# Patient Record
Sex: MALE | Race: OTHER | NOT HISPANIC OR LATINO | URBAN - METROPOLITAN AREA
[De-identification: names, ages, dates, MRNs, and addresses within clinical notes are randomized per-mention and may not be internally consistent; named-entity substitution may affect disease eponyms.]

---

## 2017-07-18 PROBLEM — D04.39 CARCINOMA IN SITU OF SKIN OF OTHER PARTS OF FACE: Status: ACTIVE | Noted: 2017-07-18

## 2017-10-12 ENCOUNTER — APPOINTMENT (RX ONLY)
Dept: URBAN - METROPOLITAN AREA CLINIC 36 | Facility: CLINIC | Age: 72
Setting detail: DERMATOLOGY
End: 2017-10-12

## 2017-10-12 DIAGNOSIS — L57.8 OTHER SKIN CHANGES DUE TO CHRONIC EXPOSURE TO NONIONIZING RADIATION: ICD-10-CM

## 2017-10-12 PROBLEM — C44.329 SQUAMOUS CELL CARCINOMA OF SKIN OF OTHER PARTS OF FACE: Status: ACTIVE | Noted: 2017-10-12

## 2017-10-12 PROCEDURE — 17311 MOHS 1 STAGE H/N/HF/G: CPT

## 2017-10-12 PROCEDURE — 17312 MOHS ADDL STAGE: CPT

## 2017-10-12 PROCEDURE — ? COUNSELING

## 2017-10-12 PROCEDURE — 14041 TIS TRNFR F/C/C/M/N/A/G/H/F: CPT

## 2017-10-12 PROCEDURE — ? MOHS SURGERY

## 2017-10-12 PROCEDURE — 99213 OFFICE O/P EST LOW 20 MIN: CPT | Mod: 25

## 2017-10-12 NOTE — PROCEDURE: MOHS SURGERY
Complex Repair And Graft Additional Text (Will Appearing After The Standard Complex Repair Text): The complex repair was not sufficient to completely close the primary defect. The remaining additional defect was repaired with the graft mentioned below.
Stage 10: Additional Anesthesia Volume In Cc: 0
Inflammation Suggestive Of Cancer Camouflage Histology Text: There was a dense lymphocytic infiltrate which prevented adequate histologic evaluation of adjacent structures.
Number Of Stages: 4
Surgical Defect Width In Cm (Optional): 1.6
Show Asc Variables: Yes
Melolabial Transposition Flap Text: The defect edges were debeveled with a #15 scalpel blade.  Given the location of the defect and the proximity to free margins a melolabial flap was deemed most appropriate.  Using a sterile surgical marker, an appropriate melolabial transposition flap was drawn incorporating the defect.    The area thus outlined was incised deep to adipose tissue with a #15 scalpel blade.  The skin margins were undermined to an appropriate distance in all directions utilizing iris scissors.
Consent (Ear)/Introductory Paragraph: The rationale for Mohs was explained to the patient and consent was obtained. The risks, benefits and alternatives to therapy were discussed in detail. Specifically, the risks of ear deformity, infection, scarring, bleeding, prolonged wound healing, incomplete removal, allergy to anesthesia, nerve injury and recurrence were addressed. Prior to the procedure, the treatment site was clearly identified and confirmed by the patient. All components of Universal Protocol/PAUSE Rule completed.
Surgical Defect Length In Cm (Optional): 2.2
Secondary Intention Text (Leave Blank If You Do Not Want): The defect will heal with secondary intention.
Dressing: dry sterile dressing
Dorsal Nasal Flap Text: The defect edges were debeveled with a #15 scalpel blade.  Given the location of the defect and the proximity to free margins a dorsal nasal flap was deemed most appropriate.  Using a sterile surgical marker, an appropriate dorsal nasal flap was drawn around the defect.    The area thus outlined was incised deep to adipose tissue with a #15 scalpel blade.  The skin margins were undermined to an appropriate distance in all directions utilizing iris scissors.
Complex Repair Preamble Text (Leave Blank If You Do Not Want): Extensive wide undermining was performed.
Crescentic Advancement Flap Text: The defect edges were debeveled with a #15 scalpel blade.  Given the location of the defect and the proximity to free margins a crescentic advancement flap was deemed most appropriate.  Using a sterile surgical marker, the appropriate advancement flap was drawn incorporating the defect and placing the expected incisions within the relaxed skin tension lines where possible.    The area thus outlined was incised deep to adipose tissue with a #15 scalpel blade.  The skin margins were undermined to an appropriate distance in all directions utilizing iris scissors.
Area L Indication Text: Tumors in this location are included in Area L (trunk and extremities).  Mohs surgery is indicated for larger tumors, or tumors with aggressive histologic features, in these anatomic locations.
Eye Shield Used: No
Crescentic Intermediate Repair Preamble Text (Leave Blank If You Do Not Want): Undermining was performed with blunt dissection.
Medical Necessity Statement: Based on my medical judgement, Mohs surgery is the most appropriate treatment for this cancer compared to other treatments.
Repair Hemostasis (Optional): Electrocautery (pulsed)
Epidermal Autograft Text: The defect edges were debeveled with a #15 scalpel blade.  Given the location of the defect, shape of the defect and the proximity to free margins an epidermal autograft was deemed most appropriate.  Using a sterile surgical marker, the primary defect shape was transferred to the donor site. The epidermal graft was then harvested.  The skin graft was then placed in the primary defect and oriented appropriately.
Graft Donor Site Bandage (Optional-Leave Blank If You Don't Want In Note): Steri-strips and a pressure bandage were applied to the donor site.
Stage 5: Additional Anesthesia Type: 1% lidocaine with epinephrine
Surgical Defect Length In Cm (Optional): 2.6
Consent Type: Consent 1 (Standard)
Advancement Flap (Single) Text: The defect edges were debeveled with a #15 scalpel blade.  Given the location of the defect and the proximity to free margins a single advancement flap was deemed most appropriate.  Using a sterile surgical marker, an appropriate advancement flap was drawn incorporating the defect and placing the expected incisions within the relaxed skin tension lines where possible.    The area thus outlined was incised deep to adipose tissue with a #15 scalpel blade.  The skin margins were undermined to an appropriate distance in all directions utilizing iris scissors.
V-Y Plasty Text: The defect edges were debeveled with a #15 scalpel blade.  Given the location of the defect, shape of the defect and the proximity to free margins an V-Y advancement flap was deemed most appropriate.  Using a sterile surgical marker, an appropriate advancement flap was drawn incorporating the defect and placing the expected incisions within the relaxed skin tension lines where possible.    The area thus outlined was incised deep to adipose tissue with a #15 scalpel blade.  The skin margins were undermined to an appropriate distance in all directions utilizing iris scissors.
Anesthesia Volume In Cc: 6
Referred To Otolaryngology For Closure Text (Leave Blank If You Do Not Want): After obtaining clear surgical margins the patient was sent to otolaryngology for surgical repair.  The patient understands they will receive post-surgical care and follow-up from the referring physician's office.
Mucosal Advancement Flap Text: Given the location of the defect, shape of the defect and the proximity to free margins a mucosal advancement flap was deemed most appropriate. Incisions were made with a 15 blade scalpel in the appropriate fashion along the cutaneous vermilion border and the mucosal lip. The remaining actinically damaged mucosal tissue was excised.  The mucosal advancement flap was then elevated to the gingival sulcus with care taken to preserve the neurovascular structures and advanced into the primary defect. Care was taken to ensure that precise realignment of the vermilion border was achieved.
Surgical Defect Width In Cm (Optional): 2.8
Bcc Infiltrative Histology Text: There were numerous aggregates of basaloid cells demonstrating an infiltrative pattern.
Partial Purse String (Simple) Text: Given the location of the defect and the characteristics of the surrounding skin a simple purse string closure was deemed most appropriate.  Undermining was performed circumfirentially around the surgical defect.  A purse string suture was then placed and tightened. Wound tension only allowed a partial closure of the circular defect.
Full Thickness Lip Wedge Repair (Flap) Text: Given the location of the defect and the proximity to free margins a full thickness wedge repair was deemed most appropriate.  Using a sterile surgical marker, the appropriate repair was drawn incorporating the defect and placing the expected incisions perpendicular to the vermilion border.  The vermilion border was also meticulously outlined to ensure appropriate reapproximation during the repair.  The area thus outlined was incised through and through with a #15 scalpel blade.  The muscularis and dermis were reaproximated with deep sutures following hemostasis. Care was taken to realign the vermilion border before proceeding with the superficial closure.  Once the vermilion was realigned the superfical and mucosal closure was finished.
Mastoid Interpolation Flap Text: A decision was made to reconstruct the defect utilizing an interpolation axial flap and a staged reconstruction.  A telfa template was made of the defect.  This telfa template was then used to outline the mastoid interpolation flap.  The donor area for the pedicle flap was then injected with anesthesia.  The flap was excised through the skin and subcutaneous tissue down to the layer of the underlying musculature.  The pedicle flap was carefully excised within this deep plane to maintain its blood supply.  The edges of the donor site were undermined.   The donor site was closed in a primary fashion.  The pedicle was then rotated into position and sutured.  Once the tube was sutured into place, adequate blood supply was confirmed with blanching and refill.  The pedicle was then wrapped with xeroform gauze and dressed appropriately with a telfa and gauze bandage to ensure continued blood supply and protect the attached pedicle.
Location Indication Override (Is Already Calculated Based On Selected Body Location): Area H
Bcc Histology Text: There were numerous aggregates of basaloid cells.
Same Histology In Subsequent Stages Text: The pattern and morphology of the tumor is as described in the first stage.
Mauc Instructions: By selecting yes to the question below the MAUC number will be added into the note.  This will be calculated automatically based on the diagnosis chosen, the size entered, the body zone selected (H,M,L) and the specific indications you chose. You will also have the option to override the Mohs AUC if you disagree with the automatically calculated number and this option is found in the Case Summary tab.
Island Pedicle Flap With Canthal Suspension Text: The defect edges were debeveled with a #15 scalpel blade.  Given the location of the defect, shape of the defect and the proximity to free margins an island pedicle advancement flap was deemed most appropriate.  Using a sterile surgical marker, an appropriate advancement flap was drawn incorporating the defect, outlining the appropriate donor tissue and placing the expected incisions within the relaxed skin tension lines where possible. The area thus outlined was incised deep to adipose tissue with a #15 scalpel blade.  The skin margins were undermined to an appropriate distance in all directions around the primary defect and laterally outward around the island pedicle utilizing iris scissors.  There was minimal undermining beneath the pedicle flap. A suspension suture was placed in the canthal tendon to prevent tension and prevent ectropion.
Stage 2: Number Of Blocks?: 1
Bi-Rhombic Flap Text: The defect edges were debeveled with a #15 scalpel blade.  Given the location of the defect and the proximity to free margins a bi-rhombic flap was deemed most appropriate.  Using a sterile surgical marker, an appropriate rhombic flap was drawn incorporating the defect. The area thus outlined was incised deep to adipose tissue with a #15 scalpel blade.  The skin margins were undermined to an appropriate distance in all directions utilizing iris scissors.
Consent 2/Introductory Paragraph: Mohs surgery was explained to the patient and consent was obtained. The risks, benefits and alternatives to therapy were discussed in detail. Specifically, the risks of infection, scarring, bleeding, prolonged wound healing, incomplete removal, allergy to anesthesia, nerve injury and recurrence were addressed. Prior to the procedure, the treatment site was clearly identified and confirmed by the patient. All components of Universal Protocol/PAUSE Rule completed.
Anesthesia Type: 1% lidocaine with epinephrine and a 1:10 solution of 8.4% sodium bicarbonate
Anesthesia Volume In Cc: 3.5
Tarsorrhaphy Text: A tarsorrhaphy was performed using Frost sutures.
Advancement-Rotation Flap Text: The defect edges were debeveled with a #15 scalpel blade.  Given the location of the defect, shape of the defect and the proximity to free margins an advancement-rotation flap was deemed most appropriate.  Using a sterile surgical marker, an appropriate flap was drawn incorporating the defect and placing the expected incisions within the relaxed skin tension lines where possible. The area thus outlined was incised deep to adipose tissue with a #15 scalpel blade.  The skin margins were undermined to an appropriate distance in all directions utilizing iris scissors.
Epidermal Sutures: 5-0 Surgipro
Purse String (Simple) Text: Given the location of the defect and the characteristics of the surrounding skin a purse string closure was deemed most appropriate.  Undermining was performed circumfirentially around the surgical defect.  A purse string suture was then placed and tightened.
Initial Size Of Lesion: 1.5
Repair Anesthesia Method: local infiltration
Advancement Flap (Double) Text: The defect edges were debeveled with a #15 scalpel blade.  Given the location of the defect and the proximity to free margins a double advancement flap was deemed most appropriate.  Using a sterile surgical marker, the appropriate advancement flaps were drawn incorporating the defect and placing the expected incisions within the relaxed skin tension lines where possible.    The area thus outlined was incised deep to adipose tissue with a #15 scalpel blade.  The skin margins were undermined to an appropriate distance in all directions utilizing iris scissors.
Referred To Mid-Level For Closure Text (Leave Blank If You Do Not Want): After obtaining clear surgical margins the patient was sent to a mid-level provider for surgical repair.  The patient understands they will receive post-surgical care and follow-up from the mid-level provider.
Mohs Method Verbiage: An incision at a 45 degree angle following the standard Mohs approach was done and the specimen was harvested as a microscopic controlled layer.
Additional Epidermal Closure (Optional): three point
Primary Defect Length In Cm (Final Defect Size - Required For Flaps/Grafts): 3.1
Paramedian Forehead Flap Text: A decision was made to reconstruct the defect utilizing an interpolation axial flap and a staged reconstruction.  A telfa template was made of the defect.  This telfa template was then used to outline the paramedian forehead pedicle flap.  The donor area for the pedicle flap was then injected with anesthesia.  The flap was excised through the skin and subcutaneous tissue down to the layer of the underlying musculature.  The pedicle flap was carefully excised within this deep plane to maintain its blood supply.  The edges of the donor site were undermined.   The donor site was closed in a primary fashion.  The pedicle was then rotated into position and sutured.  Once the tube was sutured into place, adequate blood supply was confirmed with blanching and refill.  The pedicle was then wrapped with xeroform gauze and dressed appropriately with a telfa and gauze bandage to ensure continued blood supply and protect the attached pedicle.
Star Wedge Flap Text: The defect edges were debeveled with a #15 scalpel blade.  Given the location of the defect, shape of the defect and the proximity to free margins a star wedge flap was deemed most appropriate.  Using a sterile surgical marker, an appropriate rotation flap was drawn incorporating the defect and placing the expected incisions within the relaxed skin tension lines where possible. The area thus outlined was incised deep to adipose tissue with a #15 scalpel blade.  The skin margins were undermined to an appropriate distance in all directions utilizing iris scissors.
Detail Level: Detailed
O-T Advancement Flap Text: The defect edges were debeveled with a #15 scalpel blade.  Given the location of the defect, shape of the defect and the proximity to free margins an O-T advancement flap was deemed most appropriate.  Using a sterile surgical marker, an appropriate advancement flap was drawn incorporating the defect and placing the expected incisions within the relaxed skin tension lines where possible.    The area thus outlined was incised deep to adipose tissue with a #15 scalpel blade.  The skin margins were undermined to an appropriate distance in all directions utilizing iris scissors.
Bilobed Transposition Flap Text: The defect edges were debeveled with a #15 scalpel blade.  Given the location of the defect and the proximity to free margins a bilobed transposition flap was deemed most appropriate.  Using a sterile surgical marker, an appropriate bilobe flap drawn around the defect.    The area thus outlined was incised deep to adipose tissue with a #15 scalpel blade.  The skin margins were undermined to an appropriate distance in all directions utilizing iris scissors.
Wound Care: Bacitracin
Consent (Temporal Branch)/Introductory Paragraph: The rationale for Mohs was explained to the patient and consent was obtained. The risks, benefits and alternatives to therapy were discussed in detail. Specifically, the risks of damage to the temporal branch of the facial nerve, infection, scarring, bleeding, prolonged wound healing, incomplete removal, allergy to anesthesia, and recurrence were addressed. Prior to the procedure, the treatment site was clearly identified and confirmed by the patient. All components of Universal Protocol/PAUSE Rule completed.
Referred To Plastics For Closure Text (Leave Blank If You Do Not Want): After obtaining clear surgical margins the patient was sent to plastics for surgical repair.  The patient understands they will receive post-surgical care and follow-up from the referring physician's office.
Ear Wedge Repair Text: A wedge excision was completed by carrying down an excision through the full thickness of the ear and cartilage with an inward facing Burow's triangle. The wound was then closed in a layered fashion.
O-Z Plasty Text: The defect edges were debeveled with a #15 scalpel blade.  Given the location of the defect, shape of the defect and the proximity to free margins an O-Z plasty (double transposition flap) was deemed most appropriate.  Using a sterile surgical marker, the appropriate transposition flaps were drawn incorporating the defect and placing the expected incisions within the relaxed skin tension lines where possible.    The area thus outlined was incised deep to adipose tissue with a #15 scalpel blade.  The skin margins were undermined to an appropriate distance in all directions utilizing iris scissors.  Hemostasis was achieved with electrocautery.  The flaps were then transposed into place, one clockwise and the other counterclockwise, and anchored with interrupted buried subcutaneous sutures.
Unna Boot Text: An Unna boot was placed to help immobilize the limb and facilitate more rapid healing.
Closure 3 Information: This tab is for additional flaps and grafts above and beyond our usual structured repairs.  Please note if you enter information here it will not currently bill and you will need to add the billing information manually.
Cheiloplasty (Complex) Text: A decision was made to reconstruct the defect with a  cheiloplasty.  The defect was undermined extensively.  Additional obicularis oris muscle was excised with a 15 blade scalpel.  The defect was converted into a full thickness wedge to facilite a better cosmetic result.  Small vessels were then tied off with 5-0 monocyrl. The obicularis oris, superficial fascia, adipose and dermis were then reapproximated.  After the deeper layers were approximated the epidermis was reapproximated with particular care given to realign the vermilion border.
Bilateral Helical Rim Advancement Flap Text: The defect edges were debeveled with a #15 blade scalpel.  Given the location of the defect and the proximity to free margins (helical rim) a bilateral helical rim advancement flap was deemed most appropriate.  Using a sterile surgical marker, the appropriate advancement flaps were drawn incorporating the defect and placing the expected incisions between the helical rim and antihelix where possible.  The area thus outlined was incised through and through with a #15 scalpel blade.  With a skin hook and iris scissors, the flaps were gently and sharply undermined and freed up.
Skin Substitute Text: The defect edges were debeveled with a #15 scalpel blade.  Given the location of the defect, shape of the defect and the proximity to free margins a skin substitute graft was deemed most appropriate.  The graft material was trimmed to fit the size of the defect. The graft was then placed in the primary defect and oriented appropriately.
Area M Indication Text: Tumors in this location are included in Area M (cheek, forehead, scalp, neck, jawline and pretibial skin).  Mohs surgery is indicated for tumors in these anatomic locations.
Double Island Pedicle Flap Text: The defect edges were debeveled with a #15 scalpel blade.  Given the location of the defect, shape of the defect and the proximity to free margins a double island pedicle advancement flap was deemed most appropriate.  Using a sterile surgical marker, an appropriate advancement flap was drawn incorporating the defect, outlining the appropriate donor tissue and placing the expected incisions within the relaxed skin tension lines where possible.    The area thus outlined was incised deep to adipose tissue with a #15 scalpel blade.  The skin margins were undermined to an appropriate distance in all directions around the primary defect and laterally outward around the island pedicle utilizing iris scissors.  There was minimal undermining beneath the pedicle flap.
Epidermal Closure Graft Donor Site (Optional): simple interrupted
Stage 2: Additional Anesthesia Type: 0.5% lidocaine with 1:200,000 epinephrine and a 1:10 solution of 8.4% sodium bicarbonate
Consent (Spinal Accessory)/Introductory Paragraph: The rationale for Mohs was explained to the patient and consent was obtained. The risks, benefits and alternatives to therapy were discussed in detail. Specifically, the risks of damage to the spinal accessory nerve, infection, scarring, bleeding, prolonged wound healing, incomplete removal, allergy to anesthesia, and recurrence were addressed. Prior to the procedure, the treatment site was clearly identified and confirmed by the patient. All components of Universal Protocol/PAUSE Rule completed.
Cheek-To-Nose Interpolation Flap Text: A decision was made to reconstruct the defect utilizing an interpolation axial flap and a staged reconstruction.  A telfa template was made of the defect.  This telfa template was then used to outline the Cheek-To-Nose Interpolation flap.  The donor area for the pedicle flap was then injected with anesthesia.  The flap was excised through the skin and subcutaneous tissue down to the layer of the underlying musculature.  The interpolation flap was carefully excised within this deep plane to maintain its blood supply.  The edges of the donor site were undermined.   The donor site was closed in a primary fashion.  The pedicle was then rotated into position and sutured.  Once the tube was sutured into place, adequate blood supply was confirmed with blanching and refill.  The pedicle was then wrapped with xeroform gauze and dressed appropriately with a telfa and gauze bandage to ensure continued blood supply and protect the attached pedicle.
Tumor Debulked?: curette
Muscle Hinge Flap Text: The defect edges were debeveled with a #15 scalpel blade.  Given the size, depth and location of the defect and the proximity to free margins a muscle hinge flap was deemed most appropriate.  Using a sterile surgical marker, an appropriate hinge flap was drawn incorporating the defect. The area thus outlined was incised with a #15 scalpel blade.  The skin margins were undermined to an appropriate distance in all directions utilizing iris scissors.
Mohs Histo Method Verbiage: Each section was then chromacoded and processed in the Mohs lab using the Mohs protocol and submitted for frozen section.
Z Plasty Text: The lesion was extirpated to the level of the fat with a #15 scalpel blade.  Given the location of the defect, shape of the defect and the proximity to free margins a Z-plasty was deemed most appropriate for repair.  Using a sterile surgical marker, the appropriate transposition arms of the Z-plasty were drawn incorporating the defect and placing the expected incisions within the relaxed skin tension lines where possible.    The area thus outlined was incised deep to adipose tissue with a #15 scalpel blade.  The skin margins were undermined to an appropriate distance in all directions utilizing iris scissors.  The opposing transposition arms were then transposed into place in opposite direction and anchored with interrupted buried subcutaneous sutures.
Split-Thickness Skin Graft Text: The defect edges were debeveled with a #15 scalpel blade.  Given the location of the defect, shape of the defect and the proximity to free margins a split thickness skin graft was deemed most appropriate.  Using a sterile surgical marker, the primary defect shape was transferred to the donor site. The split thickness graft was then harvested.  The skin graft was then placed in the primary defect and oriented appropriately.
Ear Star Wedge Flap Text: The defect edges were debeveled with a #15 blade scalpel.  Given the location of the defect and the proximity to free margins (helical rim) an ear star wedge flap was deemed most appropriate.  Using a sterile surgical marker, the appropriate flap was drawn incorporating the defect and placing the expected incisions between the helical rim and antihelix where possible.  The area thus outlined was incised through and through with a #15 scalpel blade.
H Plasty Text: Given the location of the defect, shape of the defect and the proximity to free margins a H-plasty was deemed most appropriate for repair.  Using a sterile surgical marker, the appropriate advancement arms of the H-plasty were drawn incorporating the defect and placing the expected incisions within the relaxed skin tension lines where possible. The area thus outlined was incised deep to adipose tissue with a #15 scalpel blade. The skin margins were undermined to an appropriate distance in all directions utilizing iris scissors.  The opposing advancement arms were then advanced into place in opposite direction and anchored with interrupted buried subcutaneous sutures.
Consent (Scalp)/Introductory Paragraph: The rationale for Mohs was explained to the patient and consent was obtained. The risks, benefits and alternatives to therapy were discussed in detail. Specifically, the risks of changes in hair growth pattern secondary to repair, infection, scarring, bleeding, prolonged wound healing, incomplete removal, allergy to anesthesia, nerve injury and recurrence were addressed. Prior to the procedure, the treatment site was clearly identified and confirmed by the patient. All components of Universal Protocol/PAUSE Rule completed.
S Plasty Text: Given the location and shape of the defect, and the orientation of relaxed skin tension lines, an S-plasty was deemed most appropriate for repair.  Using a sterile surgical marker, the appropriate outline of the S-plasty was drawn, incorporating the defect and placing the expected incisions within the relaxed skin tension lines where possible.  The area thus outlined was incised deep to adipose tissue with a #15 scalpel blade.  The skin margins were undermined to an appropriate distance in all directions utilizing iris scissors. The skin flaps were advanced over the defect.  The opposing margins were then approximated with interrupted buried subcutaneous sutures.
Subsequent Stages Histo Method Verbiage: Using a similar technique to that described above, a thin layer of tissue was removed from all areas where tumor was visible on the previous stage.  The tissue was again oriented, mapped, dyed, and processed as above.
Referred To Oculoplastics For Closure Text (Leave Blank If You Do Not Want): After obtaining clear surgical margins the patient was sent to oculoplastics for surgical repair.  The patient understands they will receive post-surgical care and follow-up from the referring physician's office.
Hatchet Flap Text: The defect edges were debeveled with a #15 scalpel blade.  Given the location of the defect, shape of the defect and the proximity to free margins a hatchet flap was deemed most appropriate.  Using a sterile surgical marker, an appropriate hatchet flap was drawn incorporating the defect and placing the expected incisions within the relaxed skin tension lines where possible.    The area thus outlined was incised deep to adipose tissue with a #15 scalpel blade.  The skin margins were undermined to an appropriate distance in all directions utilizing iris scissors.
Surgeon: Kwasi Pinon M.D.
No Residual Tumor Seen Histology Text: There were no malignant cells seen in the sections examined.
Repair Performed By Another Provider Text (Leave Blank If You Do Not Want): After obtaining clear surgical margins the defect was repaired by another provider.
Epidermal Closure: running cuticular
Xenograft Text: The defect edges were debeveled with a #15 scalpel blade.  Given the location of the defect, shape of the defect and the proximity to free margins a xenograft was deemed most appropriate.  The graft was then trimmed to fit the size of the defect.  The graft was then placed in the primary defect and oriented appropriately.
Alar Island Pedicle Flap Text: The defect edges were debeveled with a #15 scalpel blade.  Given the location of the defect, shape of the defect and the proximity to the alar rim an island pedicle advancement flap was deemed most appropriate.  Using a sterile surgical marker, an appropriate advancement flap was drawn incorporating the defect, outlining the appropriate donor tissue and placing the expected incisions within the nasal ala running parallel to the alar rim. The area thus outlined was incised with a #15 scalpel blade.  The skin margins were undermined minimally to an appropriate distance in all directions around the primary defect and laterally outward around the island pedicle utilizing iris scissors.  There was minimal undermining beneath the pedicle flap.
Repair Type: Flap
No Repair - Repaired With Adjacent Surgical Defect Text (Leave Blank If You Do Not Want): After obtaining clear surgical margins the defect was repaired concurrently with another surgical defect which was in close approximation.
Partial Purse String (Intermediate) Text: Given the location of the defect and the characteristics of the surrounding skin an intermediate purse string closure was deemed most appropriate.  Undermining was performed circumfirentially around the surgical defect.  A purse string suture was then placed and tightened. Wound tension only allowed a partial closure of the circular defect.
Composite Graft Text: The defect edges were debeveled with a #15 scalpel blade.  Given the location of the defect, shape of the defect, the proximity to free margins and the fact the defect was full thickness a composite graft was deemed most appropriate.  The defect was outline and then transferred to the donor site.  A full thickness graft was then excised from the donor site. The graft was then placed in the primary defect, oriented appropriately and then sutured into place.  The secondary defect was then repaired using a primary closure.
Alternatives Discussed Intro (Do Not Add Period): I discussed alternative treatments to Mohs surgery and specifically discussed the risks and benefits of
Posterior Auricular Interpolation Flap Text: A decision was made to reconstruct the defect utilizing an interpolation axial flap and a staged reconstruction.  A telfa template was made of the defect.  This telfa template was then used to outline the posterior auricular interpolation flap.  The donor area for the pedicle flap was then injected with anesthesia.  The flap was excised through the skin and subcutaneous tissue down to the layer of the underlying musculature.  The pedicle flap was carefully excised within this deep plane to maintain its blood supply.  The edges of the donor site were undermined.   The donor site was closed in a primary fashion.  The pedicle was then rotated into position and sutured.  Once the tube was sutured into place, adequate blood supply was confirmed with blanching and refill.  The pedicle was then wrapped with xeroform gauze and dressed appropriately with a telfa and gauze bandage to ensure continued blood supply and protect the attached pedicle.
Trilobed Flap Text: The defect edges were debeveled with a #15 scalpel blade.  Given the location of the defect and the proximity to free margins a trilobed flap was deemed most appropriate.  Using a sterile surgical marker, an appropriate trilobed flap drawn around the defect.    The area thus outlined was incised deep to adipose tissue with a #15 scalpel blade.  The skin margins were undermined to an appropriate distance in all directions utilizing iris scissors.
Consent (Nose)/Introductory Paragraph: The rationale for Mohs was explained to the patient and consent was obtained. The risks, benefits and alternatives to therapy were discussed in detail. Specifically, the risks of nasal deformity, changes in the flow of air through the nose, infection, scarring, bleeding, prolonged wound healing, incomplete removal, allergy to anesthesia, nerve injury and recurrence were addressed. Prior to the procedure, the treatment site was clearly identified and confirmed by the patient. All components of Universal Protocol/PAUSE Rule completed.
Consent 1/Introductory Paragraph: The rationale for Mohs was explained to the patient and consent was obtained. The risks, benefits and alternatives to therapy were discussed in detail. Specifically, the risks of infection, scarring, bleeding, prolonged wound healing, incomplete removal, allergy to anesthesia, nerve injury and recurrence were addressed. Prior to the procedure, the treatment site was clearly identified and confirmed by the patient. All components of Universal Protocol/PAUSE Rule completed.
Melolabial Interpolation Flap Text: A decision was made to reconstruct the defect utilizing an interpolation axial flap and a staged reconstruction.  A telfa template was made of the defect.  This telfa template was then used to outline the melolabial interpolation flap.  The donor area for the pedicle flap was then injected with anesthesia.  The flap was excised through the skin and subcutaneous tissue down to the layer of the underlying musculature.  The pedicle flap was carefully excised within this deep plane to maintain its blood supply.  The edges of the donor site were undermined.   The donor site was closed in a primary fashion.  The pedicle was then rotated into position and sutured.  Once the tube was sutured into place, adequate blood supply was confirmed with blanching and refill.  The pedicle was then wrapped with xeroform gauze and dressed appropriately with a telfa and gauze bandage to ensure continued blood supply and protect the attached pedicle.
X Size Of Lesion In Cm (Optional): 1.2
Island Pedicle Flap Text: The defect edges were debeveled with a #15 scalpel blade.  Given the location of the defect, shape of the defect and the proximity to free margins an island pedicle advancement flap was deemed most appropriate.  Using a sterile surgical marker, an appropriate advancement flap was drawn incorporating the defect, outlining the appropriate donor tissue and placing the expected incisions within the relaxed skin tension lines where possible.    The area thus outlined was incised deep to adipose tissue with a #15 scalpel blade.  The skin margins were undermined to an appropriate distance in all directions around the primary defect and laterally outward around the island pedicle utilizing iris scissors.  There was minimal undermining beneath the pedicle flap.
Modified Advancement Flap Text: The defect edges were debeveled with a #15 scalpel blade.  Given the location of the defect, shape of the defect and the proximity to free margins a modified advancement flap was deemed most appropriate.  Using a sterile surgical marker, an appropriate advancement flap was drawn incorporating the defect and placing the expected incisions within the relaxed skin tension lines where possible.    The area thus outlined was incised deep to adipose tissue with a #15 scalpel blade.  The skin margins were undermined to an appropriate distance in all directions utilizing iris scissors.
Closure 2 Information: This tab is for additional flaps and grafts, including complex repair and grafts and complex repair and flaps. You can also specify a different location for the additional defect, if the location is the same you do not need to select a new one. We will insert the automated text for the repair you select below just as we do for solitary flaps and grafts. Please note that at this time if you select a location with a different insurance zone you will need to override the ICD10 and CPT if appropriate.
Flap Type: Rhombic Flap
Rhombic Flap Text: The defect edges were debeveled with a #15 scalpel blade.  Given the location of the defect and the proximity to free margins a rhombic flap was deemed most appropriate.  Using a sterile surgical marker, an appropriate rhombic flap was drawn incorporating the defect.    The area thus outlined was incised deep to adipose tissue with a #15 scalpel blade.  The skin margins were undermined to an appropriate distance in all directions utilizing iris scissors.
Surgical Defect Width In Cm (Optional): 3
Keystone Flap Text: The defect edges were debeveled with a #15 scalpel blade.  Given the location of the defect, shape of the defect a keystone flap was deemed most appropriate.  Using a sterile surgical marker, an appropriate keystone flap was drawn incorporating the defect, outlining the appropriate donor tissue and placing the expected incisions within the relaxed skin tension lines where possible. The area thus outlined was incised deep to adipose tissue with a #15 scalpel blade.  The skin margins were undermined to an appropriate distance in all directions around the primary defect and laterally outward around the flap utilizing iris scissors.
Spiral Flap Text: The defect edges were debeveled with a #15 scalpel blade.  Given the location of the defect, shape of the defect and the proximity to free margins a spiral flap was deemed most appropriate.  Using a sterile surgical marker, an appropriate rotation flap was drawn incorporating the defect and placing the expected incisions within the relaxed skin tension lines where possible. The area thus outlined was incised deep to adipose tissue with a #15 scalpel blade.  The skin margins were undermined to an appropriate distance in all directions utilizing iris scissors.
Suture Removal: 7 days
V-Y Flap Text: The defect edges were debeveled with a #15 scalpel blade.  Given the location of the defect, shape of the defect and the proximity to free margins a V-Y flap was deemed most appropriate.  Using a sterile surgical marker, an appropriate advancement flap was drawn incorporating the defect and placing the expected incisions within the relaxed skin tension lines where possible.    The area thus outlined was incised deep to adipose tissue with a #15 scalpel blade.  The skin margins were undermined to an appropriate distance in all directions utilizing iris scissors.
Mohs Rapid Report Verbiage: The area of clinically evident tumor was marked with skin marking ink and appropriately hatched.  The initial incision was made following the Mohs approach through the skin.  The specimen was taken to the lab, divided into the necessary number of pieces, chromacoded and processed according to the Mohs protocol.  This was repeated in successive stages until a tumor free defect was achieved.
Helical Rim Advancement Flap Text: The defect edges were debeveled with a #15 blade scalpel.  Given the location of the defect and the proximity to free margins (helical rim) a double helical rim advancement flap was deemed most appropriate.  Using a sterile surgical marker, the appropriate advancement flaps were drawn incorporating the defect and placing the expected incisions between the helical rim and antihelix where possible.  The area thus outlined was incised through and through with a #15 scalpel blade.  With a skin hook and iris scissors, the flaps were gently and sharply undermined and freed up.
Interpolation Flap Text: A decision was made to reconstruct the defect utilizing an interpolation axial flap and a staged reconstruction.  A telfa template was made of the defect.  This telfa template was then used to outline the interpolation flap.  The donor area for the pedicle flap was then injected with anesthesia.  The flap was excised through the skin and subcutaneous tissue down to the layer of the underlying musculature.  The interpolation flap was carefully excised within this deep plane to maintain its blood supply.  The edges of the donor site were undermined.   The donor site was closed in a primary fashion.  The pedicle was then rotated into position and sutured.  Once the tube was sutured into place, adequate blood supply was confirmed with blanching and refill.  The pedicle was then wrapped with xeroform gauze and dressed appropriately with a telfa and gauze bandage to ensure continued blood supply and protect the attached pedicle.
Post-Care Instructions: I reviewed with the patient in detail post-care instructions. Patient is not to engage in any heavy lifting, exercise, or swimming for the next 14 days. Should the patient develop any fevers, chills, bleeding, severe pain patient will contact the office immediately.
Ftsg Text: The defect edges were debeveled with a #15 scalpel blade.  Given the location of the defect, shape of the defect and the proximity to free margins a full thickness skin graft was deemed most appropriate.  Using a sterile surgical marker, the primary defect shape was transferred to the donor site. The area thus outlined was incised deep to adipose tissue with a #15 scalpel blade.  The harvested graft was then trimmed of adipose tissue until only dermis and epidermis was left.  The skin margins of the secondary defect were undermined to an appropriate distance in all directions utilizing iris scissors.  The secondary defect was closed with interrupted buried subcutaneous sutures.  The skin edges were then re-apposed with running  sutures.  The skin graft was then placed in the primary defect and oriented appropriately.
Surgeon/Pathologist Verbiage (Will Incorporate Name Of Surgeon From Intro If Not Blank): operated in two distinct and integrated capacities as the surgeon and pathologist.
O-T Plasty Text: The defect edges were debeveled with a #15 scalpel blade.  Given the location of the defect, shape of the defect and the proximity to free margins an O-T plasty was deemed most appropriate.  Using a sterile surgical marker, an appropriate O-T plasty was drawn incorporating the defect and placing the expected incisions within the relaxed skin tension lines where possible.    The area thus outlined was incised deep to adipose tissue with a #15 scalpel blade.  The skin margins were undermined to an appropriate distance in all directions utilizing iris scissors.
Manual Repair Warning Statement: We plan on removing the manually selected variable below in favor of our much easier automatic structured text blocks found in the previous tab. We decided to do this to help make the flow better and give you the full power of structured data. Manual selection is never going to be ideal in our platform and I would encourage you to avoid using manual selection from this point on, especially since I will be sunsetting this feature. It is important that you do one of two things with the customized text below. First, you can save all of the text in a word file so you can have it for future reference. Second, transfer the text to the appropriate area in the Library tab. Lastly, if there is a flap or graft type which we do not have you need to let us know right away so I can add it in before the variable is hidden. No need to panic, we plan to give you roughly 6 months to make the change.
Consent 3/Introductory Paragraph: I gave the patient a chance to ask questions they had about the procedure.  Following this I explained the Mohs procedure and consent was obtained. The risks, benefits and alternatives to therapy were discussed in detail. Specifically, the risks of infection, scarring, bleeding, prolonged wound healing, incomplete removal, allergy to anesthesia, nerve injury and recurrence were addressed. Prior to the procedure, the treatment site was clearly identified and confirmed by the patient. All components of Universal Protocol/PAUSE Rule completed.
Hemostasis: Electrocautery
Referred To Asc For Closure Text (Leave Blank If You Do Not Want): After obtaining clear surgical margins the patient was sent to an ASC for surgical repair.  The patient understands they will receive post-surgical care and follow-up from the ASC physician.
Localized Dermabrasion With Wire Brush Text: The patient was draped in routine manner.  Localized dermabrasion using 3 x 17 mm wire brush was performed in routine manner to papillary dermis. This spot dermabrasion is being performed to complete skin cancer reconstruction. It also will eliminate the other sun damaged precancerous cells that are known to be part of the regional effect of a lifetime's worth of sun exposure. This localized dermabrasion is therapeutic and should not be considered cosmetic in any regard.
Tissue Cultured Epidermal Autograft Text: The defect edges were debeveled with a #15 scalpel blade.  Given the location of the defect, shape of the defect and the proximity to free margins a tissue cultured epidermal autograft was deemed most appropriate.  The graft was then trimmed to fit the size of the defect.  The graft was then placed in the primary defect and oriented appropriately.
Postop Diagnosis: same
Purse String (Intermediate) Text: Given the location of the defect and the characteristics of the surrounding skin a purse string intermediate closure was deemed most appropriate.  Undermining was performed circumfirentially around the surgical defect.  A purse string suture was then placed and tightened.
Area H Indication Text: Tumors in this location are included in Area H (eyelids, eyebrows, nose, lips, chin, ear, pre-auricular, post-auricular, temple, genitalia, hands, feet, ankles and areola).  Tissue conservation is critical in these anatomic locations.
W Plasty Text: The lesion was extirpated to the level of the fat with a #15 scalpel blade.  Given the location of the defect, shape of the defect and the proximity to free margins a W-plasty was deemed most appropriate for repair.  Using a sterile surgical marker, the appropriate transposition arms of the W-plasty were drawn incorporating the defect and placing the expected incisions within the relaxed skin tension lines where possible.    The area thus outlined was incised deep to adipose tissue with a #15 scalpel blade.  The skin margins were undermined to an appropriate distance in all directions utilizing iris scissors.  The opposing transposition arms were then transposed into place in opposite direction and anchored with interrupted buried subcutaneous sutures.
O-L Flap Text: The defect edges were debeveled with a #15 scalpel blade.  Given the location of the defect, shape of the defect and the proximity to free margins an O-L flap was deemed most appropriate.  Using a sterile surgical marker, an appropriate advancement flap was drawn incorporating the defect and placing the expected incisions within the relaxed skin tension lines where possible.    The area thus outlined was incised deep to adipose tissue with a #15 scalpel blade.  The skin margins were undermined to an appropriate distance in all directions utilizing iris scissors.
Home Suture Removal Text: Patient was provided instructions on removing sutures and will remove their sutures at home.  If they have any questions or difficulties they will call the office.
Transposition Flap Text: The defect edges were debeveled with a #15 scalpel blade.  Given the location of the defect and the proximity to free margins a transposition flap was deemed most appropriate.  Using a sterile surgical marker, an appropriate transposition flap was drawn incorporating the defect.    The area thus outlined was incised deep to adipose tissue with a #15 scalpel blade.  The skin margins were undermined to an appropriate distance in all directions utilizing iris scissors.
Consent (Lip)/Introductory Paragraph: The rationale for Mohs was explained to the patient and consent was obtained. The risks, benefits and alternatives to therapy were discussed in detail. Specifically, the risks of lip deformity, changes in the oral aperture, infection, scarring, bleeding, prolonged wound healing, incomplete removal, allergy to anesthesia, nerve injury and recurrence were addressed. Prior to the procedure, the treatment site was clearly identified and confirmed by the patient. All components of Universal Protocol/PAUSE Rule completed.
Burow's Advancement Flap Text: The defect edges were debeveled with a #15 scalpel blade.  Given the location of the defect and the proximity to free margins a Burow's advancement flap was deemed most appropriate.  Using a sterile surgical marker, the appropriate advancement flap was drawn incorporating the defect and placing the expected incisions within the relaxed skin tension lines where possible.    The area thus outlined was incised deep to adipose tissue with a #15 scalpel blade.  The skin margins were undermined to an appropriate distance in all directions utilizing iris scissors.
Cartilage Graft Text: The defect edges were debeveled with a #15 scalpel blade.  Given the location of the defect, shape of the defect, the fact the defect involved a full thickness cartilage defect a cartilage graft was deemed most appropriate.  An appropriate donor site was identified, cleansed, and anesthetized. The cartilage graft was then harvested and transferred to the recipient site, oriented appropriately and then sutured into place.  The secondary defect was then repaired using a primary closure.
Cheek Interpolation Flap Text: A decision was made to reconstruct the defect utilizing an interpolation axial flap and a staged reconstruction.  A telfa template was made of the defect.  This telfa template was then used to outline the Cheek Interpolation flap.  The donor area for the pedicle flap was then injected with anesthesia.  The flap was excised through the skin and subcutaneous tissue down to the layer of the underlying musculature.  The interpolation flap was carefully excised within this deep plane to maintain its blood supply.  The edges of the donor site were undermined.   The donor site was closed in a primary fashion.  The pedicle was then rotated into position and sutured.  Once the tube was sutured into place, adequate blood supply was confirmed with blanching and refill.  The pedicle was then wrapped with xeroform gauze and dressed appropriately with a telfa and gauze bandage to ensure continued blood supply and protect the attached pedicle.
Consent (Marginal Mandibular)/Introductory Paragraph: The rationale for Mohs was explained to the patient and consent was obtained. The risks, benefits and alternatives to therapy were discussed in detail. Specifically, the risks of damage to the marginal mandibular branch of the facial nerve, infection, scarring, bleeding, prolonged wound healing, incomplete removal, allergy to anesthesia, and recurrence were addressed. Prior to the procedure, the treatment site was clearly identified and confirmed by the patient. All components of Universal Protocol/PAUSE Rule completed.
Consent (Near Eyelid Margin)/Introductory Paragraph: The rationale for Mohs was explained to the patient and consent was obtained. The risks, benefits and alternatives to therapy were discussed in detail. Specifically, the risks of ectropion or eyelid deformity, infection, scarring, bleeding, prolonged wound healing, incomplete removal, allergy to anesthesia, nerve injury and recurrence were addressed. Prior to the procedure, the treatment site was clearly identified and confirmed by the patient. All components of Universal Protocol/PAUSE Rule completed.
Bilobed Flap Text: The defect edges were debeveled with a #15 scalpel blade.  Given the location of the defect and the proximity to free margins a bilobe flap was deemed most appropriate.  Using a sterile surgical marker, an appropriate bilobe flap drawn around the defect.    The area thus outlined was incised deep to adipose tissue with a #15 scalpel blade.  The skin margins were undermined to an appropriate distance in all directions utilizing iris scissors.
Secondary Defect Length In Cm (Required For Flaps): 2
Island Pedicle Flap-Requiring Vessel Identification Text: The defect edges were debeveled with a #15 scalpel blade.  Given the location of the defect, shape of the defect and the proximity to free margins an island pedicle advancement flap was deemed most appropriate.  Using a sterile surgical marker, an appropriate advancement flap was drawn, based on the axial vessel mentioned above, incorporating the defect, outlining the appropriate donor tissue and placing the expected incisions within the relaxed skin tension lines where possible.    The area thus outlined was incised deep to adipose tissue with a #15 scalpel blade.  The skin margins were undermined to an appropriate distance in all directions around the primary defect and laterally outward around the island pedicle utilizing iris scissors.  There was minimal undermining beneath the pedicle flap.
Estimated Blood Loss (Cc): minimal
Eye Protection Verbiage: Before proceeding with the stage, a plastic scleral shield was inserted. The globe was anesthetized with a few drops of 1% lidocaine with 1:100,000 epinephrine. Then, an appropriate sized scleral shield was chosen and coated with lacrilube ointment. The shield was gently inserted and left in place for the duration of each stage. After the stage was completed, the shield was gently removed.
Rotation Flap Text: The defect edges were debeveled with a #15 scalpel blade.  Given the location of the defect, shape of the defect and the proximity to free margins a rotation flap was deemed most appropriate.  Using a sterile surgical marker, an appropriate rotation flap was drawn incorporating the defect and placing the expected incisions within the relaxed skin tension lines where possible.    The area thus outlined was incised deep to adipose tissue with a #15 scalpel blade.  The skin margins were undermined to an appropriate distance in all directions utilizing iris scissors.
Dermal Autograft Text: The defect edges were debeveled with a #15 scalpel blade.  Given the location of the defect, shape of the defect and the proximity to free margins a dermal autograft was deemed most appropriate.  Using a sterile surgical marker, the primary defect shape was transferred to the donor site. The area thus outlined was incised deep to adipose tissue with a #15 scalpel blade.  The harvested graft was then trimmed of adipose and epidermal tissue until only dermis was left.  The skin graft was then placed in the primary defect and oriented appropriately.
Complex Repair And Flap Additional Text (Will Appearing After The Standard Complex Repair Text): The complex repair was not sufficient to completely close the primary defect. The remaining additional defect was repaired with the flap mentioned below.
Cheiloplasty (Less Than 50%) Text: A decision was made to reconstruct the defect with a  cheiloplasty.  The defect was undermined extensively.  Additional obicularis oris muscle was excised with a 15 blade scalpel.  The defect was converted into a full thickness wedge, of less than 50% of the vertical height of the lip, to facilite a better cosmetic result.  Small vessels were then tied off with 5-0 monocyrl. The obicularis oris, superficial fascia, adipose and dermis were then reapproximated.  After the deeper layers were approximated the epidermis was reapproximated with particular care given to realign the vermilion border.
Deep Sutures: 4-0 Vicryl
Mohs Case Number: [id number]
A-T Advancement Flap Text: The defect edges were debeveled with a #15 scalpel blade.  Given the location of the defect, shape of the defect and the proximity to free margins an A-T advancement flap was deemed most appropriate.  Using a sterile surgical marker, an appropriate advancement flap was drawn incorporating the defect and placing the expected incisions within the relaxed skin tension lines where possible.    The area thus outlined was incised deep to adipose tissue with a #15 scalpel blade.  The skin margins were undermined to an appropriate distance in all directions utilizing iris scissors.

## 2017-10-20 ENCOUNTER — APPOINTMENT (RX ONLY)
Dept: URBAN - METROPOLITAN AREA CLINIC 4 | Facility: CLINIC | Age: 72
Setting detail: DERMATOLOGY
End: 2017-10-20

## 2017-10-20 DIAGNOSIS — Z48.02 ENCOUNTER FOR REMOVAL OF SUTURES: ICD-10-CM

## 2017-10-20 PROCEDURE — 99024 POSTOP FOLLOW-UP VISIT: CPT

## 2017-10-20 PROCEDURE — ? SUTURE REMOVAL (GLOBAL PERIOD)

## 2017-10-20 PROCEDURE — ? POST-OP WOUND CHECK (NO GLOBAL PERIOD)

## 2017-10-20 ASSESSMENT — LOCATION ZONE DERM: LOCATION ZONE: FACE

## 2017-10-20 ASSESSMENT — LOCATION SIMPLE DESCRIPTION DERM: LOCATION SIMPLE: LEFT TEMPLE

## 2017-10-20 ASSESSMENT — LOCATION DETAILED DESCRIPTION DERM: LOCATION DETAILED: LEFT CENTRAL TEMPLE

## 2017-10-20 NOTE — PROCEDURE: SUTURE REMOVAL (GLOBAL PERIOD)
Detail Level: Detailed
Add 38832 Cpt? (Important Note: In 2017 The Use Of 50272 Is Being Tracked By Cms To Determine Future Global Period Reimbursement For Global Periods): yes

## 2018-01-18 ENCOUNTER — APPOINTMENT (RX ONLY)
Dept: URBAN - METROPOLITAN AREA CLINIC 36 | Facility: CLINIC | Age: 73
Setting detail: DERMATOLOGY
End: 2018-01-18

## 2018-01-18 DIAGNOSIS — Z48.817 ENCOUNTER FOR SURGICAL AFTERCARE FOLLOWING SURGERY ON THE SKIN AND SUBCUTANEOUS TISSUE: ICD-10-CM

## 2018-01-18 PROCEDURE — ? POST-OP WOUND EVALUATION

## 2018-01-18 PROCEDURE — 99024 POSTOP FOLLOW-UP VISIT: CPT

## 2018-01-18 ASSESSMENT — LOCATION DETAILED DESCRIPTION DERM: LOCATION DETAILED: LEFT CENTRAL TEMPLE

## 2018-01-18 ASSESSMENT — LOCATION SIMPLE DESCRIPTION DERM: LOCATION SIMPLE: LEFT TEMPLE

## 2018-01-18 ASSESSMENT — LOCATION ZONE DERM: LOCATION ZONE: FACE

## 2018-01-18 NOTE — PROCEDURE: POST-OP WOUND EVALUATION
Wound Evaluated By (Optional): Kwasi Pinon MD
Detail Level: Detailed
Wound Crusting?: clean
Wound Diameter In Cm(Optional): 0
Add 09049 Cpt? (Important Note: In 2017 The Use Of 42596 Is Being Tracked By Cms To Determine Future Global Period Reimbursement For Global Periods): yes
Wound Color?: pink

## 2018-01-22 ENCOUNTER — APPOINTMENT (RX ONLY)
Dept: URBAN - METROPOLITAN AREA CLINIC 20 | Facility: CLINIC | Age: 73
Setting detail: DERMATOLOGY
End: 2018-01-22

## 2018-01-22 DIAGNOSIS — L82.0 INFLAMED SEBORRHEIC KERATOSIS: ICD-10-CM

## 2018-01-22 DIAGNOSIS — D22 MELANOCYTIC NEVI: ICD-10-CM

## 2018-01-22 DIAGNOSIS — L57.8 OTHER SKIN CHANGES DUE TO CHRONIC EXPOSURE TO NONIONIZING RADIATION: ICD-10-CM

## 2018-01-22 DIAGNOSIS — L82.1 OTHER SEBORRHEIC KERATOSIS: ICD-10-CM

## 2018-01-22 DIAGNOSIS — L57.0 ACTINIC KERATOSIS: ICD-10-CM

## 2018-01-22 DIAGNOSIS — Z85.828 PERSONAL HISTORY OF OTHER MALIGNANT NEOPLASM OF SKIN: ICD-10-CM

## 2018-01-22 DIAGNOSIS — L81.4 OTHER MELANIN HYPERPIGMENTATION: ICD-10-CM

## 2018-01-22 DIAGNOSIS — D18.0 HEMANGIOMA: ICD-10-CM

## 2018-01-22 PROBLEM — Z85.79 PERSONAL HISTORY OF OTHER MALIGNANT NEOPLASMS OF LYMPHOID, HEMATOPOIETIC AND RELATED TISSUES: Status: ACTIVE | Noted: 2018-01-22

## 2018-01-22 PROBLEM — D48.5 NEOPLASM OF UNCERTAIN BEHAVIOR OF SKIN: Status: ACTIVE | Noted: 2018-01-22

## 2018-01-22 PROBLEM — D22.5 MELANOCYTIC NEVI OF TRUNK: Status: ACTIVE | Noted: 2018-01-22

## 2018-01-22 PROBLEM — D18.01 HEMANGIOMA OF SKIN AND SUBCUTANEOUS TISSUE: Status: ACTIVE | Noted: 2018-01-22

## 2018-01-22 PROCEDURE — ? COUNSELING

## 2018-01-22 PROCEDURE — ? LIQUID NITROGEN

## 2018-01-22 PROCEDURE — 17003 DESTRUCT PREMALG LES 2-14: CPT

## 2018-01-22 PROCEDURE — ? BIOPSY BY SHAVE METHOD

## 2018-01-22 PROCEDURE — 11100: CPT | Mod: 59

## 2018-01-22 PROCEDURE — 99214 OFFICE O/P EST MOD 30 MIN: CPT | Mod: 25

## 2018-01-22 PROCEDURE — 17000 DESTRUCT PREMALG LESION: CPT | Mod: 59

## 2018-01-22 ASSESSMENT — LOCATION ZONE DERM
LOCATION ZONE: SCALP
LOCATION ZONE: NECK
LOCATION ZONE: HAND
LOCATION ZONE: NOSE
LOCATION ZONE: TRUNK
LOCATION ZONE: FACE

## 2018-01-22 ASSESSMENT — LOCATION DETAILED DESCRIPTION DERM
LOCATION DETAILED: RIGHT SUPERIOR MEDIAL UPPER BACK
LOCATION DETAILED: LEFT SUPERIOR MEDIAL FOREHEAD
LOCATION DETAILED: EPIGASTRIC SKIN
LOCATION DETAILED: RIGHT SUPERIOR FOREHEAD
LOCATION DETAILED: NASAL ROOT
LOCATION DETAILED: LEFT RIB CAGE
LOCATION DETAILED: LEFT SUPERIOR ANTERIOR NECK
LOCATION DETAILED: RIGHT ULNAR DORSAL HAND
LOCATION DETAILED: LEFT ULNAR DORSAL HAND
LOCATION DETAILED: RIGHT INFERIOR CENTRAL MALAR CHEEK
LOCATION DETAILED: RIGHT SUPERIOR UPPER BACK
LOCATION DETAILED: LEFT LATERAL ABDOMEN
LOCATION DETAILED: PERIUMBILICAL SKIN
LOCATION DETAILED: LEFT SUPERIOR LATERAL FOREHEAD
LOCATION DETAILED: LEFT MEDIAL FRONTAL SCALP

## 2018-01-22 ASSESSMENT — LOCATION SIMPLE DESCRIPTION DERM
LOCATION SIMPLE: RIGHT HAND
LOCATION SIMPLE: LEFT SCALP
LOCATION SIMPLE: LEFT ANTERIOR NECK
LOCATION SIMPLE: RIGHT CHEEK
LOCATION SIMPLE: RIGHT FOREHEAD
LOCATION SIMPLE: RIGHT UPPER BACK
LOCATION SIMPLE: ABDOMEN
LOCATION SIMPLE: LEFT HAND
LOCATION SIMPLE: LEFT FOREHEAD
LOCATION SIMPLE: NOSE

## 2018-01-22 NOTE — PROCEDURE: LIQUID NITROGEN
Duration Of Freeze Thaw-Cycle (Seconds): 4
Consent: The patient's consent was obtained including but not limited to risks of crusting, scabbing, blistering, scarring, darker or lighter pigmentary change, recurrence, incomplete removal and infection.
Post-Care Instructions: I reviewed with the patient in detail post-care instructions. Patient is to wear sunprotection, and avoid picking at any of the treated lesions. Pt may apply Vaseline to crusted or scabbing areas.
Detail Level: Detailed
Render Post-Care Instructions In Note?: no
Medical Necessity Clause: This procedure was medically necessary because the lesions that were treated were:
Medical Necessity Information: It is in your best interest to select a reason for this procedure from the list below. All of these items fulfill various CMS LCD requirements except the new and changing color options.

## 2018-01-22 NOTE — PROCEDURE: BIOPSY BY SHAVE METHOD
Type Of Destruction Used: Curettage
Detail Level: Detailed
Hemostasis: Drysol and Electrocautery
Dressing: Band-Aid
Biopsy Type: H and E
Electrodesiccation Text: The wound bed was treated with electrodesiccation after the biopsy was performed.
Billing Type: Third-Party Bill
Render Post-Care Instructions In Note?: no
Notification Instructions: Patient will be notified of biopsy results. However, patient instructed to call the office if not contacted within 2 weeks.
Lab Facility: 
Post-Care Instructions: I reviewed with the patient in detail post-care instructions. Patient is to keep the biopsy site dry overnight, and then apply bacitracin twice daily until healed. Patient may apply hydrogen peroxide soaks to remove any crusting.
Consent: Written consent was obtained and risks were reviewed including but not limited to scarring, infection, bleeding, scabbing, incomplete removal, nerve damage and allergy to anesthesia.
Biopsy Method: Personna blade
X Size Of Lesion In Cm: 0
Lab: [id number]
Electrodesiccation And Curettage Text: The wound bed was treated with electrodesiccation and curettage after the biopsy was performed.
Wound Care: Vaseline
Anesthesia Type: 1% lidocaine with 1:100,000 epinephrine and 408mcg clindamycin/ml and a 1:10 solution of 8.4% sodium bicarbonate
Anesthesia Volume In Cc: 0.5
Silver Nitrate Text: The wound bed was treated with silver nitrate after the biopsy was performed.

## 2018-02-26 ENCOUNTER — APPOINTMENT (RX ONLY)
Dept: URBAN - METROPOLITAN AREA CLINIC 20 | Facility: CLINIC | Age: 73
Setting detail: DERMATOLOGY
End: 2018-02-26

## 2018-02-26 PROBLEM — C44.329 SQUAMOUS CELL CARCINOMA OF SKIN OF OTHER PARTS OF FACE: Status: ACTIVE | Noted: 2018-02-26

## 2018-02-26 PROCEDURE — 17281 DSTR MAL LS F/E/E/N/L/M .6-1: CPT

## 2018-02-26 PROCEDURE — ? CURETTAGE AND DESTRUCTION

## 2018-02-26 NOTE — PROCEDURE: CURETTAGE AND DESTRUCTION
Detail Level: Detailed
Post-Care Instructions: I reviewed with the patient in detail post-care instructions. Patient is to keep the area dry for 48 hours, and not to engage in any swimming until the area is healed. Should the patient develop any fevers, chills, bleeding, severe pain patient will contact the office immediately.
Add Ability To Document Additional Intralesional Injection: No
What Was Performed First?: Curettage
Total Volume (Ccs): 1
Cautery Type: electrodesiccation
Anesthesia Volume In Cc: -
Anesthesia Type: 1% lidocaine with epinephrine
Additional Information: (Optional): The wound was cleaned, and a pressure dressing was applied.  The patient received detailed post-op instructions.
Medication Injected: 5-Fluorouracil
Bill As A Line Item Or As Units: Line Item
Consent was obtained from the patient. The risks, benefits and alternatives to therapy were discussed in detail. Specifically, the risks of infection, scarring, bleeding, prolonged wound healing, nerve injury, incomplete removal, allergy to anesthesia and recurrence were addressed. Alternatives to ED&C, such as: surgical removal and XRT were also discussed.  Prior to the procedure, the treatment site was clearly identified and confirmed by the patient. All components of Universal Protocol/PAUSE Rule completed.
Size Of Lesion In Cm: 0.5
Size Of Lesion After Curettage: 0.6
Number Of Curettages: 3

## 2018-08-07 ENCOUNTER — APPOINTMENT (RX ONLY)
Dept: URBAN - METROPOLITAN AREA CLINIC 20 | Facility: CLINIC | Age: 73
Setting detail: DERMATOLOGY
End: 2018-08-07

## 2018-08-07 DIAGNOSIS — D18.0 HEMANGIOMA: ICD-10-CM

## 2018-08-07 DIAGNOSIS — L57.8 OTHER SKIN CHANGES DUE TO CHRONIC EXPOSURE TO NONIONIZING RADIATION: ICD-10-CM

## 2018-08-07 DIAGNOSIS — Z85.828 PERSONAL HISTORY OF OTHER MALIGNANT NEOPLASM OF SKIN: ICD-10-CM

## 2018-08-07 DIAGNOSIS — D22 MELANOCYTIC NEVI: ICD-10-CM

## 2018-08-07 DIAGNOSIS — L82.1 OTHER SEBORRHEIC KERATOSIS: ICD-10-CM

## 2018-08-07 DIAGNOSIS — L81.4 OTHER MELANIN HYPERPIGMENTATION: ICD-10-CM

## 2018-08-07 DIAGNOSIS — L57.0 ACTINIC KERATOSIS: ICD-10-CM

## 2018-08-07 PROBLEM — D22.5 MELANOCYTIC NEVI OF TRUNK: Status: ACTIVE | Noted: 2018-08-07

## 2018-08-07 PROBLEM — D18.01 HEMANGIOMA OF SKIN AND SUBCUTANEOUS TISSUE: Status: ACTIVE | Noted: 2018-08-07

## 2018-08-07 PROCEDURE — ? LIQUID NITROGEN

## 2018-08-07 PROCEDURE — 17000 DESTRUCT PREMALG LESION: CPT

## 2018-08-07 PROCEDURE — ? COUNSELING

## 2018-08-07 PROCEDURE — 99213 OFFICE O/P EST LOW 20 MIN: CPT | Mod: 25

## 2018-08-07 PROCEDURE — 17003 DESTRUCT PREMALG LES 2-14: CPT

## 2018-08-07 ASSESSMENT — LOCATION SIMPLE DESCRIPTION DERM
LOCATION SIMPLE: LEFT CHEEK
LOCATION SIMPLE: SCALP
LOCATION SIMPLE: RIGHT HAND
LOCATION SIMPLE: RIGHT FOREHEAD
LOCATION SIMPLE: LEFT HAND
LOCATION SIMPLE: LEFT FOREHEAD
LOCATION SIMPLE: LEFT EAR
LOCATION SIMPLE: LEFT ANTERIOR NECK
LOCATION SIMPLE: RIGHT SCALP
LOCATION SIMPLE: LEFT SCALP
LOCATION SIMPLE: RIGHT UPPER BACK
LOCATION SIMPLE: RIGHT CHEEK

## 2018-08-07 ASSESSMENT — LOCATION DETAILED DESCRIPTION DERM
LOCATION DETAILED: RIGHT ULNAR DORSAL HAND
LOCATION DETAILED: LEFT INFERIOR LATERAL FOREHEAD
LOCATION DETAILED: LEFT CLAVICULAR NECK
LOCATION DETAILED: RIGHT MEDIAL FRONTAL SCALP
LOCATION DETAILED: RIGHT SUPERIOR MEDIAL UPPER BACK
LOCATION DETAILED: LEFT SUPERIOR CRUS OF ANTIHELIX
LOCATION DETAILED: LEFT SUPERIOR ANTERIOR NECK
LOCATION DETAILED: LEFT SUPERIOR LATERAL MALAR CHEEK
LOCATION DETAILED: RIGHT SUPERIOR FRONTAL SCALP
LOCATION DETAILED: RIGHT SUPERIOR FOREHEAD
LOCATION DETAILED: LEFT ULNAR DORSAL HAND
LOCATION DETAILED: RIGHT SUPERIOR UPPER BACK
LOCATION DETAILED: RIGHT INFERIOR CENTRAL MALAR CHEEK
LOCATION DETAILED: RIGHT CENTRAL FRONTAL SCALP
LOCATION DETAILED: LEFT LATERAL FRONTAL SCALP

## 2018-08-07 ASSESSMENT — LOCATION ZONE DERM
LOCATION ZONE: NECK
LOCATION ZONE: HAND
LOCATION ZONE: EAR
LOCATION ZONE: FACE
LOCATION ZONE: SCALP
LOCATION ZONE: TRUNK

## 2018-08-07 NOTE — PROCEDURE: LIQUID NITROGEN
Consent: The patient's consent was obtained including but not limited to risks of crusting, scabbing, blistering, scarring, darker or lighter pigmentary change, recurrence, incomplete removal and infection.
Post-Care Instructions: I reviewed with the patient in detail post-care instructions. Patient is to wear sunprotection, and avoid picking at any of the treated lesions. Pt may apply Vaseline to crusted or scabbing areas.
Render Post-Care Instructions In Note?: no
Detail Level: Detailed
Duration Of Freeze Thaw-Cycle (Seconds): 4

## 2018-11-29 ENCOUNTER — APPOINTMENT (RX ONLY)
Dept: URBAN - METROPOLITAN AREA CLINIC 20 | Facility: CLINIC | Age: 73
Setting detail: DERMATOLOGY
End: 2018-11-29

## 2018-11-29 DIAGNOSIS — L57.0 ACTINIC KERATOSIS: ICD-10-CM

## 2018-11-29 DIAGNOSIS — Z85.828 PERSONAL HISTORY OF OTHER MALIGNANT NEOPLASM OF SKIN: ICD-10-CM

## 2018-11-29 PROBLEM — D48.5 NEOPLASM OF UNCERTAIN BEHAVIOR OF SKIN: Status: ACTIVE | Noted: 2018-11-29

## 2018-11-29 PROCEDURE — 11100: CPT | Mod: 59

## 2018-11-29 PROCEDURE — ? BIOPSY BY SHAVE METHOD AND DESTRUCTION

## 2018-11-29 PROCEDURE — 99212 OFFICE O/P EST SF 10 MIN: CPT | Mod: 25

## 2018-11-29 PROCEDURE — ? LIQUID NITROGEN

## 2018-11-29 PROCEDURE — 17000 DESTRUCT PREMALG LESION: CPT

## 2018-11-29 PROCEDURE — ? OBSERVATION

## 2018-11-29 PROCEDURE — 17003 DESTRUCT PREMALG LES 2-14: CPT

## 2018-11-29 ASSESSMENT — LOCATION DETAILED DESCRIPTION DERM
LOCATION DETAILED: LEFT SUPERIOR MEDIAL MALAR CHEEK
LOCATION DETAILED: RIGHT FOREHEAD
LOCATION DETAILED: LEFT TRAGUS
LOCATION DETAILED: RIGHT TRAGUS
LOCATION DETAILED: RIGHT LATERAL MALAR CHEEK
LOCATION DETAILED: LEFT TRIANGULAR FOSSA
LOCATION DETAILED: RIGHT SUPERIOR FOREHEAD

## 2018-11-29 ASSESSMENT — LOCATION ZONE DERM
LOCATION ZONE: FACE
LOCATION ZONE: EAR

## 2018-11-29 ASSESSMENT — LOCATION SIMPLE DESCRIPTION DERM
LOCATION SIMPLE: RIGHT FOREHEAD
LOCATION SIMPLE: RIGHT EAR
LOCATION SIMPLE: RIGHT CHEEK
LOCATION SIMPLE: LEFT EAR
LOCATION SIMPLE: LEFT CHEEK

## 2018-11-29 NOTE — PROCEDURE: LIQUID NITROGEN
Post-Care Instructions: I reviewed with the patient in detail post-care instructions. Patient is to wear sunprotection, and avoid picking at any of the treated lesions. Pt may apply Vaseline to crusted or scabbing areas.
Duration Of Freeze Thaw-Cycle (Seconds): 4
Render Post-Care Instructions In Note?: no
Consent: The patient's consent was obtained including but not limited to risks of crusting, scabbing, blistering, scarring, darker or lighter pigmentary change, recurrence, incomplete removal and infection.
Detail Level: Detailed

## 2018-11-29 NOTE — PROCEDURE: BIOPSY BY SHAVE METHOD AND DESTRUCTION
Lab Facility: 
Post-Care Instructions: I reviewed with the patient in detail post-care instructions. Patient is to keep the biopsy site dry overnight, and then apply bacitracin twice daily until healed. Patient may apply hydrogen peroxide soaks to remove any crusting.
Anesthesia Volume In Cc: 0.5
Render Post-Care Instructions In Note?: no
Size Of Lesion After Curettage: 0.6
Anesthesia Type: 1% lidocaine with epinephrine
Destruction Type: electrodesiccation
Bill As?: Biopsy by Shave Method
Number Of Curettages: 3
Notification Instructions: Patient will be notified of biopsy results. However, patient instructed to call the office if not contacted within 2 weeks.
Hemostasis: Drysol
Size Of Lesion In Cm (Optional): 0.4
Lab: [id number]
Biopsy Type: H and E
Billing Type: Third-Party Bill
Detail Level: Detailed
Consent: Written consent was obtained and risks were reviewed including but not limited to scarring, infection, bleeding, scabbing, incomplete removal, nerve damage and allergy to anesthesia.
Wound Care: Petrolatum

## 2019-08-29 ENCOUNTER — APPOINTMENT (RX ONLY)
Dept: URBAN - METROPOLITAN AREA CLINIC 4 | Facility: CLINIC | Age: 74
Setting detail: DERMATOLOGY
End: 2019-08-29

## 2019-08-29 DIAGNOSIS — D18.0 HEMANGIOMA: ICD-10-CM

## 2019-08-29 DIAGNOSIS — D22 MELANOCYTIC NEVI: ICD-10-CM

## 2019-08-29 DIAGNOSIS — Z85.828 PERSONAL HISTORY OF OTHER MALIGNANT NEOPLASM OF SKIN: ICD-10-CM

## 2019-08-29 DIAGNOSIS — L82.1 OTHER SEBORRHEIC KERATOSIS: ICD-10-CM

## 2019-08-29 DIAGNOSIS — L72.8 OTHER FOLLICULAR CYSTS OF THE SKIN AND SUBCUTANEOUS TISSUE: ICD-10-CM

## 2019-08-29 DIAGNOSIS — L82.0 INFLAMED SEBORRHEIC KERATOSIS: ICD-10-CM

## 2019-08-29 DIAGNOSIS — L81.4 OTHER MELANIN HYPERPIGMENTATION: ICD-10-CM

## 2019-08-29 PROBLEM — D18.01 HEMANGIOMA OF SKIN AND SUBCUTANEOUS TISSUE: Status: ACTIVE | Noted: 2019-08-29

## 2019-08-29 PROBLEM — D22.5 MELANOCYTIC NEVI OF TRUNK: Status: ACTIVE | Noted: 2019-08-29

## 2019-08-29 PROBLEM — C44.329 SQUAMOUS CELL CARCINOMA OF SKIN OF OTHER PARTS OF FACE: Status: ACTIVE | Noted: 2019-08-29

## 2019-08-29 PROCEDURE — 99213 OFFICE O/P EST LOW 20 MIN: CPT | Mod: 25

## 2019-08-29 PROCEDURE — ? OBSERVATION

## 2019-08-29 PROCEDURE — 17110 DESTRUCTION B9 LES UP TO 14: CPT

## 2019-08-29 PROCEDURE — ? LIQUID NITROGEN

## 2019-08-29 ASSESSMENT — LOCATION DETAILED DESCRIPTION DERM
LOCATION DETAILED: LEFT DISTAL ULNAR DORSAL FOREARM
LOCATION DETAILED: LEFT MEDIAL UPPER BACK
LOCATION DETAILED: LEFT DISTAL DORSAL FOREARM
LOCATION DETAILED: LEFT SUPERIOR MEDIAL UPPER BACK
LOCATION DETAILED: RIGHT DISTAL DORSAL FOREARM
LOCATION DETAILED: LEFT SUPERIOR PARIETAL SCALP
LOCATION DETAILED: LEFT SUPERIOR MEDIAL MIDBACK
LOCATION DETAILED: LEFT PROXIMAL DORSAL FOREARM
LOCATION DETAILED: LEFT CENTRAL MALAR CHEEK
LOCATION DETAILED: RIGHT MEDIAL UPPER BACK
LOCATION DETAILED: INFERIOR THORACIC SPINE
LOCATION DETAILED: LEFT INFERIOR CENTRAL MALAR CHEEK
LOCATION DETAILED: RIGHT ELBOW
LOCATION DETAILED: LEFT INFERIOR LATERAL MALAR CHEEK

## 2019-08-29 ASSESSMENT — LOCATION SIMPLE DESCRIPTION DERM
LOCATION SIMPLE: LEFT FOREARM
LOCATION SIMPLE: LEFT UPPER BACK
LOCATION SIMPLE: UPPER BACK
LOCATION SIMPLE: RIGHT UPPER BACK
LOCATION SIMPLE: RIGHT FOREARM
LOCATION SIMPLE: LEFT CHEEK
LOCATION SIMPLE: SCALP
LOCATION SIMPLE: RIGHT ELBOW
LOCATION SIMPLE: LEFT LOWER BACK

## 2019-08-29 ASSESSMENT — LOCATION ZONE DERM
LOCATION ZONE: FACE
LOCATION ZONE: ARM
LOCATION ZONE: SCALP
LOCATION ZONE: TRUNK

## 2019-10-09 ENCOUNTER — OUTPATIENT INFUSION SERVICES (OUTPATIENT)
Dept: ONCOLOGY | Facility: MEDICAL CENTER | Age: 74
End: 2019-10-09
Attending: INTERNAL MEDICINE
Payer: MEDICARE

## 2019-10-09 VITALS
OXYGEN SATURATION: 94 % | HEART RATE: 82 BPM | SYSTOLIC BLOOD PRESSURE: 135 MMHG | RESPIRATION RATE: 18 BRPM | HEIGHT: 67 IN | WEIGHT: 181.44 LBS | TEMPERATURE: 98.8 F | BODY MASS INDEX: 28.48 KG/M2 | DIASTOLIC BLOOD PRESSURE: 59 MMHG

## 2019-10-09 DIAGNOSIS — C82.18 GRADE 2 FOLLICULAR LYMPHOMA OF LYMPH NODES OF MULTIPLE REGIONS (HCC): ICD-10-CM

## 2019-10-09 PROBLEM — C82.90 FOLLICULAR LYMPHOMA (HCC): Status: ACTIVE | Noted: 2019-10-09

## 2019-10-09 LAB
ALBUMIN SERPL BCP-MCNC: 4.3 G/DL (ref 3.2–4.9)
ALBUMIN/GLOB SERPL: 1.8 G/DL
ALP SERPL-CCNC: 56 U/L (ref 30–99)
ALT SERPL-CCNC: 13 U/L (ref 2–50)
ANION GAP SERPL CALC-SCNC: 7 MMOL/L (ref 0–11.9)
AST SERPL-CCNC: 17 U/L (ref 12–45)
BASOPHILS # BLD AUTO: 0.9 % (ref 0–1.8)
BASOPHILS # BLD: 0.04 K/UL (ref 0–0.12)
BILIRUB SERPL-MCNC: 0.5 MG/DL (ref 0.1–1.5)
BUN SERPL-MCNC: 14 MG/DL (ref 8–22)
CALCIUM SERPL-MCNC: 9.1 MG/DL (ref 8.5–10.5)
CHLORIDE SERPL-SCNC: 106 MMOL/L (ref 96–112)
CO2 SERPL-SCNC: 23 MMOL/L (ref 20–33)
CREAT SERPL-MCNC: 0.71 MG/DL (ref 0.5–1.4)
EOSINOPHIL # BLD AUTO: 0.18 K/UL (ref 0–0.51)
EOSINOPHIL NFR BLD: 4 % (ref 0–6.9)
ERYTHROCYTE [DISTWIDTH] IN BLOOD BY AUTOMATED COUNT: 42.8 FL (ref 35.9–50)
GLOBULIN SER CALC-MCNC: 2.4 G/DL (ref 1.9–3.5)
GLUCOSE SERPL-MCNC: 96 MG/DL (ref 65–99)
HCT VFR BLD AUTO: 46.9 % (ref 42–52)
HGB BLD-MCNC: 16.8 G/DL (ref 14–18)
IMM GRANULOCYTES # BLD AUTO: 0.01 K/UL (ref 0–0.11)
IMM GRANULOCYTES NFR BLD AUTO: 0.2 % (ref 0–0.9)
LDH SERPL L TO P-CCNC: 128 U/L (ref 107–266)
LYMPHOCYTES # BLD AUTO: 1.46 K/UL (ref 1–4.8)
LYMPHOCYTES NFR BLD: 32.6 % (ref 22–41)
MCH RBC QN AUTO: 32.9 PG (ref 27–33)
MCHC RBC AUTO-ENTMCNC: 35.8 G/DL (ref 33.7–35.3)
MCV RBC AUTO: 91.8 FL (ref 81.4–97.8)
MONOCYTES # BLD AUTO: 0.48 K/UL (ref 0–0.85)
MONOCYTES NFR BLD AUTO: 10.7 % (ref 0–13.4)
NEUTROPHILS # BLD AUTO: 2.31 K/UL (ref 1.82–7.42)
NEUTROPHILS NFR BLD: 51.6 % (ref 44–72)
NRBC # BLD AUTO: 0 K/UL
NRBC BLD-RTO: 0 /100 WBC
PLATELET # BLD AUTO: 164 K/UL (ref 164–446)
PMV BLD AUTO: 9.9 FL (ref 9–12.9)
POTASSIUM SERPL-SCNC: 4.1 MMOL/L (ref 3.6–5.5)
PROT SERPL-MCNC: 6.7 G/DL (ref 6–8.2)
RBC # BLD AUTO: 5.11 M/UL (ref 4.7–6.1)
SODIUM SERPL-SCNC: 136 MMOL/L (ref 135–145)
URATE SERPL-MCNC: 3.7 MG/DL (ref 2.5–8.3)
WBC # BLD AUTO: 4.5 K/UL (ref 4.8–10.8)

## 2019-10-09 PROCEDURE — 96375 TX/PRO/DX INJ NEW DRUG ADDON: CPT

## 2019-10-09 PROCEDURE — 96415 CHEMO IV INFUSION ADDL HR: CPT

## 2019-10-09 PROCEDURE — 700105 HCHG RX REV CODE 258: Performed by: INTERNAL MEDICINE

## 2019-10-09 PROCEDURE — 96411 CHEMO IV PUSH ADDL DRUG: CPT

## 2019-10-09 PROCEDURE — 85025 COMPLETE CBC W/AUTO DIFF WBC: CPT

## 2019-10-09 PROCEDURE — 700111 HCHG RX REV CODE 636 W/ 250 OVERRIDE (IP): Mod: JG

## 2019-10-09 PROCEDURE — 700105 HCHG RX REV CODE 258

## 2019-10-09 PROCEDURE — 80053 COMPREHEN METABOLIC PANEL: CPT

## 2019-10-09 PROCEDURE — 96413 CHEMO IV INFUSION 1 HR: CPT

## 2019-10-09 PROCEDURE — 83615 LACTATE (LD) (LDH) ENZYME: CPT

## 2019-10-09 PROCEDURE — 84550 ASSAY OF BLOOD/URIC ACID: CPT

## 2019-10-09 PROCEDURE — 700111 HCHG RX REV CODE 636 W/ 250 OVERRIDE (IP): Performed by: INTERNAL MEDICINE

## 2019-10-09 PROCEDURE — 700102 HCHG RX REV CODE 250 W/ 637 OVERRIDE(OP): Performed by: INTERNAL MEDICINE

## 2019-10-09 PROCEDURE — A9270 NON-COVERED ITEM OR SERVICE: HCPCS | Performed by: INTERNAL MEDICINE

## 2019-10-09 RX ORDER — ACETAMINOPHEN 325 MG/1
650 TABLET ORAL PRN
Status: CANCELLED | OUTPATIENT
Start: 2019-10-09

## 2019-10-09 RX ORDER — ACETAMINOPHEN 325 MG/1
650 TABLET ORAL ONCE
Status: COMPLETED | OUTPATIENT
Start: 2019-10-09 | End: 2019-10-09

## 2019-10-09 RX ORDER — ONDANSETRON 8 MG/1
8 TABLET, ORALLY DISINTEGRATING ORAL PRN
Status: CANCELLED | OUTPATIENT
Start: 2019-10-09

## 2019-10-09 RX ORDER — PROCHLORPERAZINE MALEATE 10 MG
10 TABLET ORAL EVERY 6 HOURS PRN
Status: CANCELLED | OUTPATIENT
Start: 2019-10-10

## 2019-10-09 RX ORDER — ONDANSETRON 4 MG/1
4 TABLET, FILM COATED ORAL EVERY 4 HOURS PRN
Qty: 30 TAB | Refills: 6 | Status: SHIPPED | OUTPATIENT
Start: 2019-10-09 | End: 2020-12-03

## 2019-10-09 RX ORDER — MEPERIDINE HYDROCHLORIDE 25 MG/ML
25 INJECTION INTRAMUSCULAR; INTRAVENOUS; SUBCUTANEOUS PRN
Status: CANCELLED | OUTPATIENT
Start: 2019-10-09

## 2019-10-09 RX ORDER — ACETAMINOPHEN 325 MG/1
650 TABLET ORAL ONCE
Status: CANCELLED | OUTPATIENT
Start: 2019-10-09

## 2019-10-09 RX ORDER — 0.9 % SODIUM CHLORIDE 0.9 %
VIAL (ML) INJECTION PRN
Status: CANCELLED | OUTPATIENT
Start: 2019-10-10

## 2019-10-09 RX ORDER — SODIUM CHLORIDE 9 MG/ML
INJECTION, SOLUTION INTRAVENOUS CONTINUOUS
Status: CANCELLED | OUTPATIENT
Start: 2019-10-09

## 2019-10-09 RX ORDER — ONDANSETRON 2 MG/ML
4 INJECTION INTRAMUSCULAR; INTRAVENOUS PRN
Status: CANCELLED | OUTPATIENT
Start: 2019-10-10

## 2019-10-09 RX ORDER — 0.9 % SODIUM CHLORIDE 0.9 %
5 VIAL (ML) INJECTION PRN
Status: CANCELLED | OUTPATIENT
Start: 2019-10-10

## 2019-10-09 RX ORDER — ACYCLOVIR 200 MG/1
800 CAPSULE ORAL
COMMUNITY
End: 2020-11-03

## 2019-10-09 RX ORDER — 0.9 % SODIUM CHLORIDE 0.9 %
5 VIAL (ML) INJECTION PRN
Status: CANCELLED | OUTPATIENT
Start: 2019-10-09

## 2019-10-09 RX ORDER — 0.9 % SODIUM CHLORIDE 0.9 %
VIAL (ML) INJECTION PRN
Status: CANCELLED | OUTPATIENT
Start: 2019-10-09

## 2019-10-09 RX ORDER — SODIUM CHLORIDE 9 MG/ML
INJECTION, SOLUTION INTRAVENOUS CONTINUOUS
Status: CANCELLED | OUTPATIENT
Start: 2019-10-10

## 2019-10-09 RX ORDER — DIPHENHYDRAMINE HYDROCHLORIDE 50 MG/ML
25 INJECTION INTRAMUSCULAR; INTRAVENOUS PRN
Status: CANCELLED | OUTPATIENT
Start: 2019-10-09

## 2019-10-09 RX ORDER — ONDANSETRON 8 MG/1
8 TABLET, ORALLY DISINTEGRATING ORAL PRN
Status: CANCELLED | OUTPATIENT
Start: 2019-10-10

## 2019-10-09 RX ORDER — LIDOCAINE AND PRILOCAINE 25; 25 MG/G; MG/G
CREAM TOPICAL
Qty: 1 TUBE | Refills: 3 | Status: SHIPPED
Start: 2019-10-09 | End: 2020-01-01

## 2019-10-09 RX ORDER — PROCHLORPERAZINE MALEATE 10 MG
10 TABLET ORAL EVERY 6 HOURS PRN
Status: CANCELLED | OUTPATIENT
Start: 2019-10-09

## 2019-10-09 RX ORDER — PROCHLORPERAZINE MALEATE 10 MG
10 TABLET ORAL EVERY 6 HOURS PRN
Qty: 30 TAB | Refills: 6 | Status: SHIPPED | OUTPATIENT
Start: 2019-10-09 | End: 2020-12-03

## 2019-10-09 RX ORDER — ONDANSETRON 2 MG/ML
4 INJECTION INTRAMUSCULAR; INTRAVENOUS PRN
Status: CANCELLED | OUTPATIENT
Start: 2019-10-09

## 2019-10-09 RX ADMIN — BENDAMUSTINE HYDROCHLORIDE 137.9 MG: 25 INJECTION, SOLUTION INTRAVENOUS at 13:25

## 2019-10-09 RX ADMIN — ONDANSETRON HYDROCHLORIDE 16 MG: 2 SOLUTION INTRAMUSCULAR; INTRAVENOUS at 12:50

## 2019-10-09 RX ADMIN — DIPHENHYDRAMINE HYDROCHLORIDE 50 MG: 50 INJECTION INTRAMUSCULAR; INTRAVENOUS at 12:36

## 2019-10-09 RX ADMIN — DEXAMETHASONE SODIUM PHOSPHATE 12 MG: 4 INJECTION, SOLUTION INTRAMUSCULAR; INTRAVENOUS at 13:08

## 2019-10-09 RX ADMIN — HYDROCORTISONE SODIUM SUCCINATE 100 MG: 100 INJECTION, POWDER, FOR SOLUTION INTRAMUSCULAR; INTRAVENOUS at 15:49

## 2019-10-09 RX ADMIN — ACETAMINOPHEN 650 MG: 325 TABLET ORAL at 12:36

## 2019-10-09 RX ADMIN — SODIUM CHLORIDE 700 MG: 9 INJECTION, SOLUTION INTRAVENOUS at 13:43

## 2019-10-09 NOTE — PROGRESS NOTES
Chemotherapy Verification - PRIMARY RN      Height = 169.5 cm  Weight = 82.3 kg  BSA = 1.97 m2       Medication: Bendeka  Dose: 70 mg/m2  Calculated Dose: 137.9 mg                             (In mg/m2, AUC, mg/kg)     Medication: Rituxan  Dose: 375 mg/m2  Calculated Dose: 738.75 mg                             (In mg/m2, AUC, mg/kg)      I confirm this process was performed independently with the BSA and all final chemotherapy dosing calculations congruent.  Any discrepancies of 10% or greater have been addressed with the chemotherapy pharmacist. The resolution of the discrepancy has been documented in the EPIC progress notes.

## 2019-10-09 NOTE — PROGRESS NOTES
"Pharmacy Chemotherapy Calculations Note:    Dx: Follicular Lymphoma  Cycle:  1 Days 1-2 Previous treatment: n/a     Protocol: BR  Rituximab 375 mg/m2 IV on Day 1  Bendamustine  IV Daily on Days 1 and 2  --C1: MD dosing 70 mg/m2  28-Day cycle x 6 cycles  NCCN Guidelines for B-Cell Lymphomas. V.3.2019.  Jacqueline GREEN, et al. Lancet. 2013;381(0260):8483-10.  Chacho IW, et al. Blood. 2014;123(08):1074-52.          /59   Pulse 82   Temp 37.1 °C (98.8 °F) (Temporal)   Resp 18   Ht 1.695 m (5' 6.73\") Comment: Pt unable to stand straight, back bothering him  Wt 82.3 kg (181 lb 7 oz) Comment: Weight taken twice  SpO2 94%   BMI 28.65 kg/m²  Body surface area is 1.97 meters squared.    Labs from 10/9/19 reviewed - all within treatment parameters.  10/4/19 CCS: HepB panel NEG     Rituximab 375 mg/m² x 1.97 m² = 738.8 mg   rounded to vial size (within 10%) per RX protocol = 700 mg IV on Day 1    Bendamustine 70 mg/m² x 1.97 m² = 137.9 mg   <10% difference, okay to treat with final dose = 137.9 mg IV on Days 1 and 2        Sarina Arevalo, PharmD, BCOP             "

## 2019-10-09 NOTE — PROGRESS NOTES
Pt arrived to [facility], ambulatory, for day 1 Rituxan/Bendeka. Pt voices no complaints. Pt oriented to infusion services. Pt educated about treatment and potential side effects. 22g PIV established in the L-wrist, positive blood return noted. Labs drawn per MD order. Due to delay in lab with chemistry machine, pt's CMP not resulted for 2.5 hours. Pre-medications administered with no complications. Bendeka infused with no complications. Rituxan infusion started at 50 mg/hr. At 150 mg/hr, pt c/o itching on chest, back, and ears. Infusion paused. After 30 minutes, pt reported slight resolution in symptoms. Solu-cortef administered, pt reported complete resolution of symptoms after 20 minutes. Rituxan restarted at 75 mg/hr, titrated to 125 mg/hr - report given to DREW Drew.

## 2019-10-09 NOTE — PROGRESS NOTES
"Pharmacy Chemotherapy Calculation    Dx: follicular lymphoma         Protocol: Rituxan + Bendeka  *Dosing Reference*  Rituximab 375 mg/m² IV on Day 1  Bendamustine  IV over 10 min on IV over 30 min daily on Days 1-2   -- dose reduced to 70 mg/m² for anticipated tolerance  28-day cycle for 6 cycles  NCCN Guidelines for B-Cell Lymphomas V.3.2019  Jacqueline GREEN, et al. Lancet. 2013;381(2763):0818-10    Allergies:  Vicodin [hydrocodone-acetaminophen]     /59   Pulse 82   Temp 37.1 °C (98.8 °F) (Temporal)   Resp 18   Ht 1.695 m (5' 6.73\") Comment: Pt unable to stand straight, back bothering him  Wt 82.3 kg (181 lb 7 oz) Comment: Weight taken twice  SpO2 94%   BMI 28.65 kg/m²  Body surface area is 1.97 meters squared.    All labs (10/9/19) within treatment plan parameters.       Drug Order   (Drug name, dose, route, IV Fluid & volume, frequency, number of doses) Cycle 1, Day 1      Previous treatment: none     Medication = Bendamustine (Bendeka)  Base Dose = 70 mg/m²  Calc Dose: Base Dose x 1.97 m² = 137.9 mg  Final Dose = 137.9 mg  Route = IV  Fluid & Volume = NS 50 mL  Admin Duration = Over 10 min          <10% difference, okay to treat with final dose   Medication = Rituximab (Rituxan)  Base Dose = 375 mg/m²  Calc Dose: Base Dose x 1.97 m² = 739 mg  Final Dose = 700 mg  Route = IV  Fluid & Volume =  mL  Admin Duration = see rate sheet          Rounded to vial size (within 10%) per dose rounding protocol   By my signature below, I confirm this process was performed independently with the BSA and all final chemotherapy dosing calculations congruent. I have reviewed the above chemotherapy order and that my calculation of the final dose and BSA (when applicable) corroborate those calculations of the  pharmacist. Discrepancies of 5% or greater in the written dose have been addressed and documented within the [facility] Progress notes.      Shawna Fischer, PharmD  "

## 2019-10-09 NOTE — PROGRESS NOTES
169.5 cmChemotherapy Verification - SECONDARY RN       Height = 169.5 cm  Weight = 82.3 kg  BSA = 1.97 m2       Medication: Rituxan  Dose: 375 mg/m2  Calculated Dose: 738.75 mg                            (In mg/m2, AUC, mg/kg)     Medication: Bendeka  Dose: 70 mg/m2  Calculated Dose: 137.9 mg                             (In mg/m2, AUC, mg/kg)        I confirm that this process was performed independently.

## 2019-10-10 ENCOUNTER — OUTPATIENT INFUSION SERVICES (OUTPATIENT)
Dept: ONCOLOGY | Facility: MEDICAL CENTER | Age: 74
End: 2019-10-10
Attending: INTERNAL MEDICINE
Payer: MEDICARE

## 2019-10-10 ENCOUNTER — DOCUMENTATION (OUTPATIENT)
Dept: HEMATOLOGY ONCOLOGY | Facility: MEDICAL CENTER | Age: 74
End: 2019-10-10

## 2019-10-10 VITALS
DIASTOLIC BLOOD PRESSURE: 49 MMHG | TEMPERATURE: 98.9 F | SYSTOLIC BLOOD PRESSURE: 102 MMHG | OXYGEN SATURATION: 95 % | WEIGHT: 187.39 LBS | HEIGHT: 67 IN | HEART RATE: 88 BPM | RESPIRATION RATE: 18 BRPM | BODY MASS INDEX: 29.41 KG/M2

## 2019-10-10 DIAGNOSIS — C82.18 GRADE 2 FOLLICULAR LYMPHOMA OF LYMPH NODES OF MULTIPLE REGIONS (HCC): ICD-10-CM

## 2019-10-10 PROCEDURE — 96409 CHEMO IV PUSH SNGL DRUG: CPT

## 2019-10-10 PROCEDURE — 96375 TX/PRO/DX INJ NEW DRUG ADDON: CPT

## 2019-10-10 PROCEDURE — 700105 HCHG RX REV CODE 258: Performed by: INTERNAL MEDICINE

## 2019-10-10 PROCEDURE — 700111 HCHG RX REV CODE 636 W/ 250 OVERRIDE (IP): Performed by: INTERNAL MEDICINE

## 2019-10-10 RX ORDER — SODIUM CHLORIDE 9 MG/ML
INJECTION, SOLUTION INTRAVENOUS CONTINUOUS
Status: DISCONTINUED | OUTPATIENT
Start: 2019-10-10 | End: 2019-10-10 | Stop reason: HOSPADM

## 2019-10-10 RX ADMIN — SODIUM CHLORIDE: 9 INJECTION, SOLUTION INTRAVENOUS at 15:06

## 2019-10-10 RX ADMIN — ONDANSETRON HYDROCHLORIDE 16 MG: 2 SOLUTION INTRAMUSCULAR; INTRAVENOUS at 15:13

## 2019-10-10 RX ADMIN — BENDAMUSTINE HYDROCHLORIDE 140 MG: 25 INJECTION, SOLUTION INTRAVENOUS at 15:35

## 2019-10-10 NOTE — PROGRESS NOTES
Pt to [facility] for C1D2 Bendamustine.  Pt stated he tolerated yesterday's treatment well. Denied any bothersome side effects.  PIV placed, flushed easily, osmel briskly. Pt received pre-med followed by Bendamustine. Tolerated treatment well.  PIV flushed with NS then d/c'd. Catheter tip remained intact. Gauze and coban to site. Pt left [facility] in NAD, copy of appointment time given to patient.

## 2019-10-10 NOTE — PROGRESS NOTES
Chemotherapy Verification - SECONDARY RN       Height = 169 cm  Weight = 85 kg  BSA = 2 m2       Medication: Bendeka  Dose: 70 mg/m2  Calculated Dose: 140 mg                             (In mg/m2, AUC, mg/kg)       I confirm that this process was performed independently.

## 2019-10-10 NOTE — PROGRESS NOTES
Chemotherapy Verification - PRIMARY RN      Height = 169 cm  Weight = 85 kg  BSA = 2 m2       Medication: Bendamustine  Dose: 40 mg/m2  Calculated Dose: 140 mg                             (In mg/m2, AUC, mg/kg)     I confirm this process was performed independently with the BSA and all final chemotherapy dosing calculations congruent.  Any discrepancies of 10% or greater have been addressed with the chemotherapy pharmacist. The resolution of the discrepancy has been documented in the EPIC progress notes.

## 2019-10-10 NOTE — PROGRESS NOTES
Report received from DREW Costa.  Pt resting comfortably. Rituxan infusing at 175mg/hr. Rate increased by 50 mg/hr to final rate of 375 mg/hr. Pt tolerated remainder of infusion well. PIV flushed with NS, then d/c'd. Catheter tip remained intact. Gauze and coban to site. Pt left [facility] in NAD, aware of next appointment time.

## 2019-10-10 NOTE — PROGRESS NOTES
"Nutrition Services: RD Consultation/ New Chemotherapy Start  Pt presented to appointment alone. RD met with pt to assess current intake, appetite, and nutritional status. Pt appears adequately nourished. Pt states is doing well and appetite is good. Relays that would like to lose 10-15 lbs as has gained weight recently. States was previously around 170 lbs a few months ago. Relays cause of weight gain likely d/t sedentary job and eating a bit more throughout the day. Chart review shows limited weight history, last recorded weight from 2016. States drinking Equate on occasion to get more vitamins and minerals. Denies avoiding certain foods nor has food allergies. Denies n/v/d/c. No issues chewing or swallowing. States brother underwent chemo years ago so is aware of side effects. Pt did not express any further nutrition-related questions or concerns at this time.     Assessment:  - Primary Dx: Follicular lymphoma   -Pertinent Medication: zofran, emla, compazine  - Height: 5'6\"  -   Weight: 187 lbs   -   BMI: 29 (Overweight classification)  -Recent Weight Change: Pt has experienced weight gain within past few months, per pt discussion, wt has increased by 17 lbs.     Patient does not appear to present with malnutrition at this time, no criteria noted.     Plan/Recommendations:  1. Provided and discussed handout regarding general nutrition guidelines as well as nutritional recommendations for patient's with Cancer, including tips/tricks should side effects of tx occur.   2. Discussed importance of PO intake/ nutrition to maintain weight and preserve lean body mass.   3. Provided and went over food lists including healthy snack ideas as well as foods high in protein.   4. Reviewed adequate hydration  5. Provided RD contact info, encouraged to reach out as concerns/questions arise.     Pt reports understanding and was somewhat receptive. RD following and to make further recommendations as indicated.     "

## 2019-10-10 NOTE — PROGRESS NOTES
"Pharmacy Chemotherapy [facility]  Patient Name: Ronal Hair  Dx: follicular lymphoma         Protocol: RiTUXimab + Bendeka  *Dosing Reference*  RiTUXmab 375 mg/m² IV on Day 1  Bendamustine  IV over 10 min on IV over 30 min daily on Days 1-2   -- dose reduced to 70 mg/m² for anticipated tolerance  28-day cycle for 6 cycles  NCCN Guidelines for B-Cell Lymphomas V.3.2019  Jacqueline GREEN, et al. Lancet. 2013;381(1253):8261-10    Allergies:  Vicodin [hydrocodone-acetaminophen]     /49   Pulse 88   Temp 37.2 °C (98.9 °F) (Temporal)   Resp 18   Ht 1.69 m (5' 6.54\")   Wt 85 kg (187 lb 6.3 oz)   SpO2 95%   BMI 29.76 kg/m²  Body surface area is 2 meters squared.    Labs 10/9/19    Hgb 16.8 Plt 164k  SCr 0.71 CrCl 113.2 mL/min   AST/ALT/AP = 17/13/56 Tbili 0.5  Uric Acid = 3.7 LDH = 128     Drug Order   (Drug name, dose, route, IV Fluid & volume, frequency, number of doses) Cycle 1, Day 2    Previous treatment: none     Medication = Bendamustine (Bendeka)  Base Dose = 70 mg/m²  Calc Dose: Base Dose x 2 m² = 140 mg  Final Dose = 140 mg  Route = IV  Fluid & Volume = NS 50 mL  Admin Duration = Over 10 minutes          <10% difference, okay to treat with final dose   By my signature below, I confirm this process was performed independently with the BSA and all final chemotherapy dosing calculations congruent. I have reviewed the above chemotherapy order and that my calculation of the final dose and BSA (when applicable) corroborate those calculations of the  pharmacist. Discrepancies of 5% or greater in the written dose have been addressed and documented within the [facility] Progress notes.    Estephania Ayoub, PharmD, BCOP    "

## 2019-10-15 ENCOUNTER — PATIENT OUTREACH (OUTPATIENT)
Dept: ONCOLOGY | Facility: MEDICAL CENTER | Age: 74
End: 2019-10-15

## 2019-10-15 NOTE — PROGRESS NOTES
Returned patient's call stated that he had completed the financial hardship paperwork.  I returned his phone call and let him know that he could drop the application off at the radiation desk for completion and to ask for Noé. He was also asking about assistance in getting his care fixed in which he had already paid for 3-4 months ago.  EMMETT explained that [facility] can assist moving forward for additional inexpensive costs if he qualified but at the moment that was not a realistic option.

## 2019-11-05 ENCOUNTER — HOSPITAL ENCOUNTER (OUTPATIENT)
Dept: LAB | Facility: MEDICAL CENTER | Age: 74
End: 2019-11-05
Attending: INTERNAL MEDICINE
Payer: MEDICARE

## 2019-11-05 LAB
ALBUMIN SERPL BCP-MCNC: 4.2 G/DL (ref 3.2–4.9)
ALBUMIN/GLOB SERPL: 1.9 G/DL
ALP SERPL-CCNC: 57 U/L (ref 30–99)
ALT SERPL-CCNC: 13 U/L (ref 2–50)
ANION GAP SERPL CALC-SCNC: 9 MMOL/L (ref 0–11.9)
AST SERPL-CCNC: 18 U/L (ref 12–45)
BASOPHILS # BLD AUTO: 0.7 % (ref 0–1.8)
BASOPHILS # BLD: 0.03 K/UL (ref 0–0.12)
BILIRUB SERPL-MCNC: 0.6 MG/DL (ref 0.1–1.5)
BUN SERPL-MCNC: 15 MG/DL (ref 8–22)
CALCIUM SERPL-MCNC: 8.8 MG/DL (ref 8.5–10.5)
CHLORIDE SERPL-SCNC: 106 MMOL/L (ref 96–112)
CO2 SERPL-SCNC: 24 MMOL/L (ref 20–33)
CREAT SERPL-MCNC: 0.74 MG/DL (ref 0.5–1.4)
EOSINOPHIL # BLD AUTO: 0.33 K/UL (ref 0–0.51)
EOSINOPHIL NFR BLD: 8.1 % (ref 0–6.9)
ERYTHROCYTE [DISTWIDTH] IN BLOOD BY AUTOMATED COUNT: 45.4 FL (ref 35.9–50)
GLOBULIN SER CALC-MCNC: 2.2 G/DL (ref 1.9–3.5)
GLUCOSE SERPL-MCNC: 99 MG/DL (ref 65–99)
HCT VFR BLD AUTO: 43.8 % (ref 42–52)
HGB BLD-MCNC: 15.1 G/DL (ref 14–18)
IMM GRANULOCYTES # BLD AUTO: 0.01 K/UL (ref 0–0.11)
IMM GRANULOCYTES NFR BLD AUTO: 0.2 % (ref 0–0.9)
LDH SERPL L TO P-CCNC: 123 U/L (ref 107–266)
LYMPHOCYTES # BLD AUTO: 0.83 K/UL (ref 1–4.8)
LYMPHOCYTES NFR BLD: 20.4 % (ref 22–41)
MCH RBC QN AUTO: 32.5 PG (ref 27–33)
MCHC RBC AUTO-ENTMCNC: 34.5 G/DL (ref 33.7–35.3)
MCV RBC AUTO: 94.4 FL (ref 81.4–97.8)
MONOCYTES # BLD AUTO: 0.54 K/UL (ref 0–0.85)
MONOCYTES NFR BLD AUTO: 13.3 % (ref 0–13.4)
NEUTROPHILS # BLD AUTO: 2.33 K/UL (ref 1.82–7.42)
NEUTROPHILS NFR BLD: 57.3 % (ref 44–72)
NRBC # BLD AUTO: 0 K/UL
NRBC BLD-RTO: 0 /100 WBC
PLATELET # BLD AUTO: 164 K/UL (ref 164–446)
PMV BLD AUTO: 10.4 FL (ref 9–12.9)
POTASSIUM SERPL-SCNC: 3.9 MMOL/L (ref 3.6–5.5)
PROT SERPL-MCNC: 6.4 G/DL (ref 6–8.2)
RBC # BLD AUTO: 4.64 M/UL (ref 4.7–6.1)
SODIUM SERPL-SCNC: 139 MMOL/L (ref 135–145)
URATE SERPL-MCNC: 3.6 MG/DL (ref 2.5–8.3)
WBC # BLD AUTO: 4.1 K/UL (ref 4.8–10.8)

## 2019-11-05 PROCEDURE — 84550 ASSAY OF BLOOD/URIC ACID: CPT

## 2019-11-05 PROCEDURE — 80053 COMPREHEN METABOLIC PANEL: CPT

## 2019-11-05 PROCEDURE — 83615 LACTATE (LD) (LDH) ENZYME: CPT

## 2019-11-05 PROCEDURE — 85025 COMPLETE CBC W/AUTO DIFF WBC: CPT

## 2019-11-05 RX ORDER — 0.9 % SODIUM CHLORIDE 0.9 %
VIAL (ML) INJECTION PRN
Status: CANCELLED | OUTPATIENT
Start: 2019-11-07

## 2019-11-05 RX ORDER — ACETAMINOPHEN 325 MG/1
650 TABLET ORAL PRN
Status: CANCELLED | OUTPATIENT
Start: 2019-11-06

## 2019-11-05 RX ORDER — PROCHLORPERAZINE MALEATE 10 MG
10 TABLET ORAL EVERY 6 HOURS PRN
Status: CANCELLED | OUTPATIENT
Start: 2019-11-06

## 2019-11-05 RX ORDER — SODIUM CHLORIDE 9 MG/ML
INJECTION, SOLUTION INTRAVENOUS CONTINUOUS
Status: CANCELLED | OUTPATIENT
Start: 2019-11-07

## 2019-11-05 RX ORDER — 0.9 % SODIUM CHLORIDE 0.9 %
VIAL (ML) INJECTION PRN
Status: CANCELLED | OUTPATIENT
Start: 2019-11-06

## 2019-11-05 RX ORDER — SODIUM CHLORIDE 9 MG/ML
INJECTION, SOLUTION INTRAVENOUS CONTINUOUS
Status: CANCELLED | OUTPATIENT
Start: 2019-11-06

## 2019-11-05 RX ORDER — ONDANSETRON 2 MG/ML
4 INJECTION INTRAMUSCULAR; INTRAVENOUS PRN
Status: CANCELLED | OUTPATIENT
Start: 2019-11-07

## 2019-11-05 RX ORDER — 0.9 % SODIUM CHLORIDE 0.9 %
5 VIAL (ML) INJECTION PRN
Status: CANCELLED | OUTPATIENT
Start: 2019-11-05

## 2019-11-05 RX ORDER — 0.9 % SODIUM CHLORIDE 0.9 %
5 VIAL (ML) INJECTION PRN
Status: CANCELLED | OUTPATIENT
Start: 2019-11-07

## 2019-11-05 RX ORDER — PROCHLORPERAZINE MALEATE 10 MG
10 TABLET ORAL EVERY 6 HOURS PRN
Status: CANCELLED | OUTPATIENT
Start: 2019-11-07

## 2019-11-05 RX ORDER — ONDANSETRON 8 MG/1
8 TABLET, ORALLY DISINTEGRATING ORAL PRN
Status: CANCELLED | OUTPATIENT
Start: 2019-11-06

## 2019-11-05 RX ORDER — ONDANSETRON 2 MG/ML
4 INJECTION INTRAMUSCULAR; INTRAVENOUS PRN
Status: CANCELLED | OUTPATIENT
Start: 2019-11-06

## 2019-11-05 RX ORDER — MEPERIDINE HYDROCHLORIDE 25 MG/ML
25 INJECTION INTRAMUSCULAR; INTRAVENOUS; SUBCUTANEOUS PRN
Status: CANCELLED | OUTPATIENT
Start: 2019-11-06

## 2019-11-05 RX ORDER — 0.9 % SODIUM CHLORIDE 0.9 %
VIAL (ML) INJECTION PRN
Status: CANCELLED | OUTPATIENT
Start: 2019-11-05

## 2019-11-05 RX ORDER — DIPHENHYDRAMINE HYDROCHLORIDE 50 MG/ML
25 INJECTION INTRAMUSCULAR; INTRAVENOUS PRN
Status: CANCELLED | OUTPATIENT
Start: 2019-11-06

## 2019-11-05 RX ORDER — ACETAMINOPHEN 325 MG/1
650 TABLET ORAL ONCE
Status: CANCELLED | OUTPATIENT
Start: 2019-11-06

## 2019-11-05 RX ORDER — 0.9 % SODIUM CHLORIDE 0.9 %
5 VIAL (ML) INJECTION PRN
Status: CANCELLED | OUTPATIENT
Start: 2019-11-06

## 2019-11-05 RX ORDER — ONDANSETRON 8 MG/1
8 TABLET, ORALLY DISINTEGRATING ORAL PRN
Status: CANCELLED | OUTPATIENT
Start: 2019-11-07

## 2019-11-05 NOTE — PROGRESS NOTES
"Pharmacy Chemotherapy Verification    Dx: Follicular Lymphoma  Cycle: 2 Days 1-2 Previous treatment: C1 October 9-10, 2019     Protocol: BR  RiTUXimab 375 mg/m2 IV on Day 1  Bendamustine  IV Daily on Days 1 and 2   -C1: MD dosing 70 mg/m2  28-Day cycle x 6 cycles  NCCN Guidelines for B-Cell Lymphomas. V.3.2019.  Jacqueline GREEN, et al. Lancet. 2013;381(2391):1023-10.  Chacho IW, et al. Blood. 2014;123(23):7027-52.          Allergies:Vicodin [hydrocodone-acetaminophen]  /59   Pulse 78   Temp 36.6 °C (97.9 °F) (Temporal)   Resp 18   Ht 1.695 m (5' 6.73\")   Wt 83.5 kg (184 lb 1.4 oz)   SpO2 98%   BMI 29.06 kg/m²  Body surface area is 1.98 meters squared.    Labs 11/5/19  ANC 2330 Hgb 15.1 Plt 164k  SCr 0.74 CrCl 106.5 mL/min   AST/ALT/AP = 18/13/57 Tbili = 0.6  Uric Acid = 3.6  LDH = 123    Labs 10/4/19 ([facility])  Hep B CorAB/sAG: Negative    RiTUXimab 375 mg/m² x 1.98 m² = 742.5 mg   Rounded to vial size (within 10%) per RX protocol = 700 mg IV on Day 1    Bendamustine 70 mg/m² x 1.98 m² = 138.6 mg   <10% difference, okay to treat with final dose = 138.6 mg IV on Days 1 and 2    Estephania Ayoub, PharmD, BCOP             "

## 2019-11-06 ENCOUNTER — OUTPATIENT INFUSION SERVICES (OUTPATIENT)
Dept: ONCOLOGY | Facility: MEDICAL CENTER | Age: 74
End: 2019-11-06
Attending: INTERNAL MEDICINE
Payer: MEDICARE

## 2019-11-06 VITALS
HEART RATE: 78 BPM | SYSTOLIC BLOOD PRESSURE: 123 MMHG | WEIGHT: 184.08 LBS | OXYGEN SATURATION: 98 % | BODY MASS INDEX: 28.89 KG/M2 | RESPIRATION RATE: 18 BRPM | HEIGHT: 67 IN | DIASTOLIC BLOOD PRESSURE: 59 MMHG | TEMPERATURE: 97.9 F

## 2019-11-06 DIAGNOSIS — C82.18 GRADE 2 FOLLICULAR LYMPHOMA OF LYMPH NODES OF MULTIPLE REGIONS (HCC): ICD-10-CM

## 2019-11-06 PROCEDURE — 96415 CHEMO IV INFUSION ADDL HR: CPT

## 2019-11-06 PROCEDURE — 700111 HCHG RX REV CODE 636 W/ 250 OVERRIDE (IP): Performed by: INTERNAL MEDICINE

## 2019-11-06 PROCEDURE — A9270 NON-COVERED ITEM OR SERVICE: HCPCS | Performed by: INTERNAL MEDICINE

## 2019-11-06 PROCEDURE — 96375 TX/PRO/DX INJ NEW DRUG ADDON: CPT

## 2019-11-06 PROCEDURE — 96411 CHEMO IV PUSH ADDL DRUG: CPT

## 2019-11-06 PROCEDURE — 700102 HCHG RX REV CODE 250 W/ 637 OVERRIDE(OP): Performed by: INTERNAL MEDICINE

## 2019-11-06 PROCEDURE — 96413 CHEMO IV INFUSION 1 HR: CPT

## 2019-11-06 PROCEDURE — 700105 HCHG RX REV CODE 258: Performed by: INTERNAL MEDICINE

## 2019-11-06 RX ORDER — ACETAMINOPHEN 325 MG/1
650 TABLET ORAL ONCE
Status: COMPLETED | OUTPATIENT
Start: 2019-11-06 | End: 2019-11-06

## 2019-11-06 RX ADMIN — BENDAMUSTINE HYDROCHLORIDE 138.6 MG: 25 INJECTION, SOLUTION INTRAVENOUS at 11:09

## 2019-11-06 RX ADMIN — RITUXIMAB 700 MG: 10 INJECTION, SOLUTION INTRAVENOUS at 11:31

## 2019-11-06 RX ADMIN — DEXAMETHASONE SODIUM PHOSPHATE 12 MG: 4 INJECTION, SOLUTION INTRAMUSCULAR; INTRAVENOUS at 10:50

## 2019-11-06 RX ADMIN — DIPHENHYDRAMINE HYDROCHLORIDE 50 MG: 50 INJECTION INTRAMUSCULAR; INTRAVENOUS at 10:17

## 2019-11-06 RX ADMIN — ONDANSETRON HYDROCHLORIDE 16 MG: 2 SOLUTION INTRAMUSCULAR; INTRAVENOUS at 10:30

## 2019-11-06 RX ADMIN — ACETAMINOPHEN 650 MG: 325 TABLET ORAL at 10:17

## 2019-11-06 NOTE — PROGRESS NOTES
Chemotherapy Verification - SECONDARY RN       Height = 169.5 cm  Weight = 83.5 kg  BSA = 1.98 m2       Medication: Bendeka  Dose: 70 mg/m2  Calculated Dose: 138.6 mg                             (In mg/m2, AUC, mg/kg)     Medication: Rituxan  Dose: 375 mg/m2  Calculated Dose: 742.5 mg                             (In mg/m2, AUC, mg/kg)      I confirm that this process was performed independently.

## 2019-11-06 NOTE — PROGRESS NOTES
"Pharmacy Chemotherapy Calculation    Dx: follicular lymphoma         Protocol: Rituxan + Bendeka  *Dosing Reference*  Rituximab 375 mg/m² IV on Day 1  Bendamustine  IV over 10 min on IV over 30 min daily on Days 1-2   -- dose reduced to 70 mg/m² for anticipated tolerance  28-day cycle for 6 cycles  NCCN Guidelines for B-Cell Lymphomas V.3.2019  Jacqueline GREEN, et al. Lancet. 2013;381(4500):8915-23    Allergies:  Vicodin [hydrocodone-acetaminophen]     /59   Pulse 78   Temp 36.6 °C (97.9 °F) (Temporal)   Resp 18   Ht 1.695 m (5' 6.73\")   Wt 83.5 kg (184 lb 1.4 oz)   SpO2 98%   BMI 29.06 kg/m²  Body surface area is 1.98 meters squared.    Labs: 11/5/19  ANC: 2330  PLT: 164K HGB: 15.1   SCr: 0.74 CrCl:106.57    LFT: WNL TBili: 0.6    10/3/19: Negative HepB Panel     Drug Order   (Drug name, dose, route, IV Fluid & volume, frequency, number of doses) Cycle 2, Day 1 of 2  Previous treatment: C1 D1-2 10/9/19-10/10/19     Medication = Bendamustine (Bendeka)  Base Dose = 70 mg/m²  Calc Dose: Base Dose x 1.98 m² = 138.6 mg  Final Dose = 138.6mg  Route = IV  Fluid & Volume = NS 50 mL  Admin Duration = Over 10 min          <10% difference, okay to treat with final dose   Medication = Rituximab (Rituxan)  Base Dose = 375 mg/m²  Calc Dose: Base Dose x 1.98m² = 742.5 mg  Final Dose = 700 mg  Route = IV  Fluid & Volume =  mL  Admin Duration = see rate sheet   Patient Assistance Medication       Rounded to vial size (within 10%) per dose rounding protocol   By my signature below, I confirm this process was performed independently with the BSA and all final chemotherapy dosing calculations congruent. I have reviewed the above chemotherapy order and that my calculation of the final dose and BSA (when applicable) corroborate those calculations of the  pharmacist. Discrepancies of 10% or greater in the written dose have been addressed and documented within the [facility] Progress notes.      Bay Davidson " PharmD

## 2019-11-06 NOTE — PROGRESS NOTES
Pt arrived to IS, ambulatory, for cycle 2 day 1 Rituxan/Bendeka. Pt voices no complaints. Labs reviewed from 11/5, pt within parameters to treat today. 24g PIV established in the L-FA, positive blood return noted. Pre-medications administered with no complications. Bendeka infused with no complications. Rituxan infused with no complications, titrated per protocol. PIV flushed and removed per pt request. Pt left IS with no s/sx of distress. Follow up appointment confirmed for tomorrow.

## 2019-11-06 NOTE — PROGRESS NOTES
Chemotherapy Verification - PRIMARY RN      Height = 169.5 cm  Weight = 83.5 kg  BSA = 1.98 m2       Medication: Rituxan  Dose: 375 mg/m2  Calculated Dose: 742.5 mg                             (In mg/m2, AUC, mg/kg)     Medication: Bendeka  Dose: 70 mg/m2  Calculated Dose: 138.6 mg                             (In mg/m2, AUC, mg/kg)      I confirm this process was performed independently with the BSA and all final chemotherapy dosing calculations congruent.  Any discrepancies of 10% or greater have been addressed with the chemotherapy pharmacist. The resolution of the discrepancy has been documented in the EPIC progress notes.

## 2019-11-07 ENCOUNTER — DOCUMENTATION (OUTPATIENT)
Dept: HEMATOLOGY ONCOLOGY | Facility: MEDICAL CENTER | Age: 74
End: 2019-11-07

## 2019-11-07 ENCOUNTER — OUTPATIENT INFUSION SERVICES (OUTPATIENT)
Dept: ONCOLOGY | Facility: MEDICAL CENTER | Age: 74
End: 2019-11-07
Attending: INTERNAL MEDICINE
Payer: MEDICARE

## 2019-11-07 VITALS
BODY MASS INDEX: 29.41 KG/M2 | OXYGEN SATURATION: 98 % | RESPIRATION RATE: 18 BRPM | DIASTOLIC BLOOD PRESSURE: 60 MMHG | TEMPERATURE: 98 F | HEIGHT: 67 IN | HEART RATE: 77 BPM | SYSTOLIC BLOOD PRESSURE: 131 MMHG | WEIGHT: 187.39 LBS

## 2019-11-07 DIAGNOSIS — C82.18 GRADE 2 FOLLICULAR LYMPHOMA OF LYMPH NODES OF MULTIPLE REGIONS (HCC): ICD-10-CM

## 2019-11-07 PROCEDURE — 700111 HCHG RX REV CODE 636 W/ 250 OVERRIDE (IP): Mod: TB | Performed by: INTERNAL MEDICINE

## 2019-11-07 PROCEDURE — 96409 CHEMO IV PUSH SNGL DRUG: CPT

## 2019-11-07 PROCEDURE — 96375 TX/PRO/DX INJ NEW DRUG ADDON: CPT

## 2019-11-07 PROCEDURE — 96413 CHEMO IV INFUSION 1 HR: CPT

## 2019-11-07 PROCEDURE — 700105 HCHG RX REV CODE 258: Performed by: INTERNAL MEDICINE

## 2019-11-07 RX ORDER — ATORVASTATIN CALCIUM 40 MG/1
40 TABLET, FILM COATED ORAL EVERY EVENING
COMMUNITY
End: 2022-08-26 | Stop reason: SDUPTHER

## 2019-11-07 RX ORDER — SILDENAFIL 25 MG/1
TABLET, FILM COATED ORAL
COMMUNITY
Start: 2014-06-12 | End: 2020-11-03

## 2019-11-07 RX ORDER — SODIUM CHLORIDE 9 MG/ML
INJECTION, SOLUTION INTRAVENOUS CONTINUOUS
Status: DISCONTINUED | OUTPATIENT
Start: 2019-11-07 | End: 2019-11-07 | Stop reason: HOSPADM

## 2019-11-07 RX ADMIN — BENDAMUSTINE HYDROCHLORIDE 140 MG: 25 INJECTION, SOLUTION INTRAVENOUS at 14:45

## 2019-11-07 RX ADMIN — ONDANSETRON HYDROCHLORIDE 16 MG: 2 SOLUTION INTRAMUSCULAR; INTRAVENOUS at 13:49

## 2019-11-07 RX ADMIN — SODIUM CHLORIDE: 9 INJECTION, SOLUTION INTRAVENOUS at 13:47

## 2019-11-07 NOTE — PROGRESS NOTES
Nutrition Services: Brief Update  Weight: 187 lbs, weighed today in Infusion  Previous weight: 187 lbs on 10/10/19    Weight Change: pt maintained weight within past month.    RD able to visit pt at bedside. Pt states is doing well. States appetite is great, mentions if anything has had more of an appetite after chemo. Denies n/v/d/c. In good spirits, pt states does not have plan to do something for birthday but may consider it after treatment. Denies concerns for RD at this time.    Plan/Recommend:  • Discussed weight maintenance as positive.  • RD reviewed importance of hydration  • Pt confirms has RD contact info and will reach out as needed.     Pt reports doing well in regards to nutrition. RD to sign off at this time.   Please contact [phone]

## 2019-11-07 NOTE — PROGRESS NOTES
Chemotherapy Verification - SECONDARY RN     D2C2    Height = 169cm  Weight = 85kg  BSA = 2m2       Medication: Bendamustine HCI (Bendeka)  Dose: 70mg/m2  Calculated Dose: 140mg                                 I confirm that this process was performed independently.

## 2019-11-07 NOTE — PROGRESS NOTES
"Pharmacy Chemotherapy Calculation    Dx: Follicular Lymphoma         Protocol: Rituxan + Bendeka    *Dosing Reference*  Rituximab 375 mg/m² IV on Day 1  Bendamustine  IV over 10 minutes on IV over 30 minutes daily on Days 1-2   -- dose reduced to 70 mg/m² for anticipated tolerance  28-day cycle for 6 cycles    NCCN Guidelines for B-Cell Lymphomas. V.3.2019.  Jacqueline GREEN, et al. Lancet. 2013;381(5013):8356-10.    Allergies:  Vicodin [hydrocodone-acetaminophen]     /60   Pulse 77   Temp 36.7 °C (98 °F) (Temporal)   Resp 18   Ht 1.695 m (5' 6.73\")   Wt 85 kg (187 lb 6.3 oz)   SpO2 98%   BMI 29.59 kg/m²  Body surface area is 2 meters squared.    Labs: 11/5/19  ANC = 2330  PLT = 164     SCr = 0.74 CrCl = 106.57  LFT = WNL TBili = 0.6    10/3/19: Negative HepB Panel     Drug Order   (Drug name, dose, route, IV Fluid & volume, frequency, number of doses) Cycle 2, Day 2 of 2  Previous treatment: C1 = 10/9-10/10/19   Medication = Bendamustine (Bendeka)  Base Dose = 70 mg/m²  Calc Dose: Base Dose x 2 m² = 140 mg  Final Dose = 140 mg  Route = IV  Fluid & Volume = NSS 50 mL  Admin Duration = Over 10 minutes          <10% difference, okay to treat with final dose   By my signature below, I confirm this process was performed independently with the BSA and all final chemotherapy dosing calculations congruent. I have reviewed the above chemotherapy order and that my calculation of the final dose and BSA (when applicable) corroborate those calculations of the  pharmacist. Discrepancies of 10% or greater in the written dose have been addressed and documented within the [facility] Progress notes.      Keo Parham, PharmD, BCPS  "

## 2019-11-07 NOTE — PROGRESS NOTES
Chemotherapy Verification - PRIMARY RN    D2C2    Height = 169.5 cm  Weight = 85 kg  BSA = 2.0 m2       Medication: Bendamustine (Bendeka)  Dose: 70 mg/m2  Calculated Dose: 140 mg                                I confirm this process was performed independently with the BSA and all final chemotherapy dosing calculations congruent.  Any discrepancies of 10% or greater have been addressed with the chemotherapy pharmacist. The resolution of the discrepancy has been documented in the EPIC progress notes.

## 2019-11-08 NOTE — PROGRESS NOTES
Pt returns for D2 Bendeka only. IV access established. Premed given and chemo infused per MAR. Pt iván well. Line flushed clear. IV flushed and removed. Pressure dressing applied. Pt knows when to return. Discharged home under self care in no apparent distress.

## 2019-11-18 NOTE — PROGRESS NOTES
Patient is currently enrolled in Rituxan replacement patient assistance program.  Patient credited for the following dates:  11/06/2019  10/09/2019           Ilsa Cerda   Pharmacy Patient Advocate

## 2019-11-25 ENCOUNTER — PATIENT OUTREACH (OUTPATIENT)
Dept: OTHER | Facility: MEDICAL CENTER | Age: 74
End: 2019-11-25

## 2019-11-25 NOTE — PROGRESS NOTES
Patient called Cancer Nurse Navigator today informing me that he was going to have a insurance change beginning the first of the year he is switching his medicare to [facility] for lower co pays.  Patient stated that he received a letter from [facility] asking for authorization for his infusions starting in January.  Onn called infusion  and they instructed the patient to bring in his letter or his new card to his next appt on December 4th and they will handle the situation for the patient.

## 2019-12-03 ENCOUNTER — HOSPITAL ENCOUNTER (OUTPATIENT)
Dept: LAB | Facility: MEDICAL CENTER | Age: 74
End: 2019-12-03
Attending: INTERNAL MEDICINE
Payer: MEDICARE

## 2019-12-03 LAB
ALBUMIN SERPL BCP-MCNC: 4.2 G/DL (ref 3.2–4.9)
ALBUMIN/GLOB SERPL: 2 G/DL
ALP SERPL-CCNC: 56 U/L (ref 30–99)
ALT SERPL-CCNC: 17 U/L (ref 2–50)
ANION GAP SERPL CALC-SCNC: 8 MMOL/L (ref 0–11.9)
AST SERPL-CCNC: 19 U/L (ref 12–45)
BILIRUB SERPL-MCNC: 0.7 MG/DL (ref 0.1–1.5)
BUN SERPL-MCNC: 15 MG/DL (ref 8–22)
CALCIUM SERPL-MCNC: 8.9 MG/DL (ref 8.5–10.5)
CHLORIDE SERPL-SCNC: 105 MMOL/L (ref 96–112)
CO2 SERPL-SCNC: 27 MMOL/L (ref 20–33)
CREAT SERPL-MCNC: 0.85 MG/DL (ref 0.5–1.4)
GLOBULIN SER CALC-MCNC: 2.1 G/DL (ref 1.9–3.5)
GLUCOSE SERPL-MCNC: 90 MG/DL (ref 65–99)
LDH SERPL L TO P-CCNC: 137 U/L (ref 107–266)
POTASSIUM SERPL-SCNC: 4.5 MMOL/L (ref 3.6–5.5)
PROT SERPL-MCNC: 6.3 G/DL (ref 6–8.2)
SODIUM SERPL-SCNC: 140 MMOL/L (ref 135–145)
URATE SERPL-MCNC: 3.5 MG/DL (ref 2.5–8.3)

## 2019-12-03 PROCEDURE — 84550 ASSAY OF BLOOD/URIC ACID: CPT

## 2019-12-03 PROCEDURE — 80053 COMPREHEN METABOLIC PANEL: CPT

## 2019-12-03 PROCEDURE — 83615 LACTATE (LD) (LDH) ENZYME: CPT

## 2019-12-04 ENCOUNTER — OUTPATIENT INFUSION SERVICES (OUTPATIENT)
Dept: ONCOLOGY | Facility: MEDICAL CENTER | Age: 74
End: 2019-12-04
Attending: INTERNAL MEDICINE
Payer: MEDICARE

## 2019-12-04 VITALS
RESPIRATION RATE: 18 BRPM | BODY MASS INDEX: 28.75 KG/M2 | HEIGHT: 67 IN | OXYGEN SATURATION: 92 % | TEMPERATURE: 97.7 F | HEART RATE: 82 BPM | SYSTOLIC BLOOD PRESSURE: 118 MMHG | WEIGHT: 183.2 LBS | DIASTOLIC BLOOD PRESSURE: 91 MMHG

## 2019-12-04 DIAGNOSIS — C82.18 GRADE 2 FOLLICULAR LYMPHOMA OF LYMPH NODES OF MULTIPLE REGIONS (HCC): ICD-10-CM

## 2019-12-04 PROCEDURE — 700111 HCHG RX REV CODE 636 W/ 250 OVERRIDE (IP): Performed by: INTERNAL MEDICINE

## 2019-12-04 PROCEDURE — 700111 HCHG RX REV CODE 636 W/ 250 OVERRIDE (IP)

## 2019-12-04 PROCEDURE — A9270 NON-COVERED ITEM OR SERVICE: HCPCS | Performed by: INTERNAL MEDICINE

## 2019-12-04 PROCEDURE — 700102 HCHG RX REV CODE 250 W/ 637 OVERRIDE(OP): Performed by: INTERNAL MEDICINE

## 2019-12-04 PROCEDURE — 96413 CHEMO IV INFUSION 1 HR: CPT

## 2019-12-04 PROCEDURE — 96411 CHEMO IV PUSH ADDL DRUG: CPT

## 2019-12-04 PROCEDURE — 96415 CHEMO IV INFUSION ADDL HR: CPT

## 2019-12-04 PROCEDURE — 96375 TX/PRO/DX INJ NEW DRUG ADDON: CPT

## 2019-12-04 PROCEDURE — 700105 HCHG RX REV CODE 258: Performed by: INTERNAL MEDICINE

## 2019-12-04 PROCEDURE — 700105 HCHG RX REV CODE 258

## 2019-12-04 RX ORDER — DIPHENHYDRAMINE HYDROCHLORIDE 50 MG/ML
25 INJECTION INTRAMUSCULAR; INTRAVENOUS PRN
Status: CANCELLED | OUTPATIENT
Start: 2019-12-04

## 2019-12-04 RX ORDER — 0.9 % SODIUM CHLORIDE 0.9 %
VIAL (ML) INJECTION PRN
Status: CANCELLED | OUTPATIENT
Start: 2019-12-04

## 2019-12-04 RX ORDER — 0.9 % SODIUM CHLORIDE 0.9 %
VIAL (ML) INJECTION PRN
Status: CANCELLED | OUTPATIENT
Start: 2019-12-05

## 2019-12-04 RX ORDER — ONDANSETRON 8 MG/1
8 TABLET, ORALLY DISINTEGRATING ORAL PRN
Status: CANCELLED | OUTPATIENT
Start: 2019-12-04

## 2019-12-04 RX ORDER — ONDANSETRON 2 MG/ML
4 INJECTION INTRAMUSCULAR; INTRAVENOUS PRN
Status: CANCELLED | OUTPATIENT
Start: 2019-12-05

## 2019-12-04 RX ORDER — SODIUM CHLORIDE 9 MG/ML
INJECTION, SOLUTION INTRAVENOUS CONTINUOUS
Status: CANCELLED | OUTPATIENT
Start: 2019-12-05

## 2019-12-04 RX ORDER — PROCHLORPERAZINE MALEATE 10 MG
10 TABLET ORAL EVERY 6 HOURS PRN
Status: CANCELLED | OUTPATIENT
Start: 2019-12-05

## 2019-12-04 RX ORDER — PROCHLORPERAZINE MALEATE 10 MG
10 TABLET ORAL EVERY 6 HOURS PRN
Status: CANCELLED | OUTPATIENT
Start: 2019-12-04

## 2019-12-04 RX ORDER — MEPERIDINE HYDROCHLORIDE 25 MG/ML
25 INJECTION INTRAMUSCULAR; INTRAVENOUS; SUBCUTANEOUS PRN
Status: CANCELLED | OUTPATIENT
Start: 2019-12-04

## 2019-12-04 RX ORDER — ACETAMINOPHEN 325 MG/1
650 TABLET ORAL ONCE
Status: COMPLETED | OUTPATIENT
Start: 2019-12-04 | End: 2019-12-04

## 2019-12-04 RX ORDER — ACETAMINOPHEN 325 MG/1
650 TABLET ORAL PRN
Status: CANCELLED | OUTPATIENT
Start: 2019-12-04

## 2019-12-04 RX ORDER — 0.9 % SODIUM CHLORIDE 0.9 %
5 VIAL (ML) INJECTION PRN
Status: CANCELLED | OUTPATIENT
Start: 2019-12-05

## 2019-12-04 RX ORDER — 0.9 % SODIUM CHLORIDE 0.9 %
5 VIAL (ML) INJECTION PRN
Status: CANCELLED | OUTPATIENT
Start: 2019-12-04

## 2019-12-04 RX ORDER — SODIUM CHLORIDE 9 MG/ML
INJECTION, SOLUTION INTRAVENOUS CONTINUOUS
Status: CANCELLED | OUTPATIENT
Start: 2019-12-04

## 2019-12-04 RX ORDER — ONDANSETRON 2 MG/ML
4 INJECTION INTRAMUSCULAR; INTRAVENOUS PRN
Status: CANCELLED | OUTPATIENT
Start: 2019-12-04

## 2019-12-04 RX ORDER — ACETAMINOPHEN 325 MG/1
650 TABLET ORAL ONCE
Status: CANCELLED | OUTPATIENT
Start: 2019-12-04

## 2019-12-04 RX ORDER — ONDANSETRON 8 MG/1
8 TABLET, ORALLY DISINTEGRATING ORAL PRN
Status: CANCELLED | OUTPATIENT
Start: 2019-12-05

## 2019-12-04 RX ADMIN — RITUXIMAB 700 MG: 10 INJECTION, SOLUTION INTRAVENOUS at 11:30

## 2019-12-04 RX ADMIN — ONDANSETRON HYDROCHLORIDE 16 MG: 2 SOLUTION INTRAMUSCULAR; INTRAVENOUS at 10:35

## 2019-12-04 RX ADMIN — DIPHENHYDRAMINE HYDROCHLORIDE 50 MG: 50 INJECTION INTRAMUSCULAR; INTRAVENOUS at 10:20

## 2019-12-04 RX ADMIN — DEXAMETHASONE SODIUM PHOSPHATE 12 MG: 4 INJECTION, SOLUTION INTRAMUSCULAR; INTRAVENOUS at 10:55

## 2019-12-04 RX ADMIN — ACETAMINOPHEN 650 MG: 325 TABLET ORAL at 10:19

## 2019-12-04 RX ADMIN — BENDAMUSTINE HYDROCHLORIDE 138.6 MG: 25 INJECTION, SOLUTION INTRAVENOUS at 11:13

## 2019-12-04 NOTE — PROGRESS NOTES
Chemotherapy Verification - SECONDARY RN       Height = 169 cm  Weight = 83.1 kg  BSA = 1.98 m2       Medication: Bendeka  Dose: 70 mg/m2  Calculated Dose: 138.6 mg                             (In mg/m2, AUC, mg/kg)     Medication: Rituxan  Dose: 375 mg/m2  Calculated Dose: 742.5 mg                             (In mg/m2, AUC, mg/kg)        I confirm that this process was performed independently.

## 2019-12-04 NOTE — PROGRESS NOTES
"Pharmacy Chemotherapy Calculation    Dx: follicular lymphoma         Protocol: Rituxan + Bendeka  *Dosing Reference*  Rituximab 375 mg/m² IV on Day 1  Bendamustine  IV over 10 min on IV over 30 min daily on Days 1-2   -- dose reduced to 70 mg/m² for anticipated tolerance  28-day cycle for 6 cycles  NCCN Guidelines for B-Cell Lymphomas V.3.2019  Jacqueline GREEN, et al. Lancet. 2013;381(1862):9645-26    Allergies:  Vicodin [hydrocodone-acetaminophen]     /91   Pulse 82   Temp 36.5 °C (97.7 °F) (Temporal)   Resp 18   Ht 1.69 m (5' 6.54\")   Wt 83.1 kg (183 lb 3.2 oz)   SpO2 92%   BMI 29.10 kg/m²  Body surface area is 1.98 meters squared.    10/3/19: Negative HepB Panel     CBC (12/3/19 @ [facility]) and CMP (12/3/19 @ [facility]) within treatment plan parameters.      Drug Order   (Drug name, dose, route, IV Fluid & volume, frequency, number of doses) Cycle 3, Day 1 of 2  Previous treatment: C2D1-2 11/6/19-11/7/19   Medication = Bendamustine (Bendeka)  Base Dose = 70 mg/m²  Calc Dose: Base Dose x 1.98 m² = 138.6 mg  Final Dose = 138.6 mg  Route = IV  Fluid & Volume = NS 50 mL  Admin Duration = Over 10 min          <10% difference, okay to treat with final dose   Medication = Rituximab (Rituxan)  Base Dose = 375 mg/m²  Calc Dose: Base Dose x 1.98 m² = 743 mg  Final Dose = 700 mg  Route = IV  Fluid & Volume =  mL  Admin Duration = see rate sheet   Patient Assistance Medication       Rounded down to vial size (within 10%) per dose rounding protocol   By my signature below, I confirm this process was performed independently with the BSA and all final chemotherapy dosing calculations congruent. I have reviewed the above chemotherapy order and that my calculation of the final dose and BSA (when applicable) corroborate those calculations of the  pharmacist. Discrepancies of 10% or greater in the written dose have been addressed and documented within the [facility] Progress notes.      Shawna Fischer, PharmD  "

## 2019-12-04 NOTE — PROGRESS NOTES
Pt arrived to IS, ambulatory, for cycle 3 day 1 Rituxan/Bendeka. Pt voices no complaints. Labs reviewed from 12/3, pt within parameters to treat today. 22g PIV established in the L-upper arm, positive blood return noted. Pre-medications administered with no complications. Bendeka infused with no complications. Rituxan infused with no complications, titrated per protocol. PIV flushed and removed per pt request. Pt left IS with no s/sx of distress. Follow up appointment confirmed for tomorrow.

## 2019-12-04 NOTE — PROGRESS NOTES
"Pharmacy Chemotherapy Verification    Dx: Follicular Lymphoma  Cycle: 3 Days 1-2   Previous treatment: C2  Nov 6-7, 2019     Protocol: BR  RiTUXimab 375 mg/m2 IV on Day 1  Bendamustine  IV Daily on Days 1 and 2   -C1: MD dosing 70 mg/m2  28-Day cycle x 6 cycles  NCCN Guidelines for B-Cell Lymphomas. V.3.2019.  Jacqueline GREEN, et al. Lancet. 2013;381(3428):2303-10.  Chacho IW, et al. Blood. 2014;123(02):7725-52.          Allergies:Vicodin [hydrocodone-acetaminophen]  /91   Pulse 82   Temp 36.5 °C (97.7 °F) (Temporal)   Resp 18   Ht 1.69 m (5' 6.54\")   Wt 83.1 kg (183 lb 3.2 oz)   SpO2 92%   BMI 29.10 kg/m²  Body surface area is 1.98 meters squared.    All lab results 12/3/19 [facility] and 12/4/19 [facility] within treatment parameters.       Labs 10/4/19 ([facility])  Hep B CorAB/sAG: Negative    RiTUXimab 375 mg/m² x 1.98m² = 742.5mg   Rounded to vial size (within 10%) per RX protocol = 700mg IV on Day 1    Bendamustine 70 mg/m² x 1.98m² = 138.6mg   <10% difference, okay to treat with final dose = 138.6mg IV on Days 1 and 2    RADHA Gayle, Pharm.D.               "

## 2019-12-04 NOTE — PROGRESS NOTES
Chemotherapy Verification - PRIMARY RN      Height = 169 cm  Weight = 83.1 kg  BSA = 1.98 m2       Medication: Rituxan  Dose: 375 mg/m2  Calculated Dose: 742.5 mg                             (In mg/m2, AUC, mg/kg)     Medication: Bendeka  Dose: 70 mg/m2  Calculated Dose: 138.6 mg                             (In mg/m2, AUC, mg/kg)      I confirm this process was performed independently with the BSA and all final chemotherapy dosing calculations congruent.  Any discrepancies of 10% or greater have been addressed with the chemotherapy pharmacist. The resolution of the discrepancy has been documented in the EPIC progress notes.

## 2019-12-05 ENCOUNTER — OUTPATIENT INFUSION SERVICES (OUTPATIENT)
Dept: ONCOLOGY | Facility: MEDICAL CENTER | Age: 74
End: 2019-12-05
Attending: INTERNAL MEDICINE
Payer: MEDICARE

## 2019-12-05 VITALS
DIASTOLIC BLOOD PRESSURE: 59 MMHG | WEIGHT: 186.29 LBS | TEMPERATURE: 98.3 F | SYSTOLIC BLOOD PRESSURE: 106 MMHG | OXYGEN SATURATION: 93 % | RESPIRATION RATE: 18 BRPM | HEART RATE: 87 BPM | HEIGHT: 67 IN | BODY MASS INDEX: 29.24 KG/M2

## 2019-12-05 DIAGNOSIS — C82.18 GRADE 2 FOLLICULAR LYMPHOMA OF LYMPH NODES OF MULTIPLE REGIONS (HCC): ICD-10-CM

## 2019-12-05 PROCEDURE — 96409 CHEMO IV PUSH SNGL DRUG: CPT

## 2019-12-05 PROCEDURE — 96375 TX/PRO/DX INJ NEW DRUG ADDON: CPT

## 2019-12-05 PROCEDURE — 700111 HCHG RX REV CODE 636 W/ 250 OVERRIDE (IP): Mod: TB | Performed by: INTERNAL MEDICINE

## 2019-12-05 PROCEDURE — 700105 HCHG RX REV CODE 258: Performed by: INTERNAL MEDICINE

## 2019-12-05 RX ADMIN — BENDAMUSTINE HYDROCHLORIDE 139.3 MG: 25 INJECTION, SOLUTION INTRAVENOUS at 14:46

## 2019-12-05 RX ADMIN — ONDANSETRON HYDROCHLORIDE 16 MG: 2 SOLUTION INTRAMUSCULAR; INTRAVENOUS at 14:03

## 2019-12-31 ENCOUNTER — HOSPITAL ENCOUNTER (OUTPATIENT)
Dept: LAB | Facility: MEDICAL CENTER | Age: 74
End: 2019-12-31
Attending: NURSE PRACTITIONER
Payer: MEDICARE

## 2019-12-31 LAB
ALBUMIN SERPL BCP-MCNC: 4.2 G/DL (ref 3.2–4.9)
ALBUMIN/GLOB SERPL: 1.6 G/DL
ALP SERPL-CCNC: 58 U/L (ref 30–99)
ALT SERPL-CCNC: 17 U/L (ref 2–50)
ANION GAP SERPL CALC-SCNC: 7 MMOL/L (ref 0–11.9)
AST SERPL-CCNC: 17 U/L (ref 12–45)
BASOPHILS # BLD AUTO: 0.9 % (ref 0–1.8)
BASOPHILS # BLD: 0.03 K/UL (ref 0–0.12)
BILIRUB SERPL-MCNC: 0.7 MG/DL (ref 0.1–1.5)
BUN SERPL-MCNC: 15 MG/DL (ref 8–22)
CALCIUM SERPL-MCNC: 8.9 MG/DL (ref 8.5–10.5)
CHLORIDE SERPL-SCNC: 107 MMOL/L (ref 96–112)
CO2 SERPL-SCNC: 25 MMOL/L (ref 20–33)
CREAT SERPL-MCNC: 0.85 MG/DL (ref 0.5–1.4)
EOSINOPHIL # BLD AUTO: 0.33 K/UL (ref 0–0.51)
EOSINOPHIL NFR BLD: 9.5 % (ref 0–6.9)
ERYTHROCYTE [DISTWIDTH] IN BLOOD BY AUTOMATED COUNT: 48.4 FL (ref 35.9–50)
GLOBULIN SER CALC-MCNC: 2.6 G/DL (ref 1.9–3.5)
GLUCOSE SERPL-MCNC: 84 MG/DL (ref 65–99)
HCT VFR BLD AUTO: 43.4 % (ref 42–52)
HGB BLD-MCNC: 14.9 G/DL (ref 14–18)
IMM GRANULOCYTES # BLD AUTO: 0.01 K/UL (ref 0–0.11)
IMM GRANULOCYTES NFR BLD AUTO: 0.3 % (ref 0–0.9)
LDH SERPL L TO P-CCNC: 144 U/L (ref 107–266)
LYMPHOCYTES # BLD AUTO: 0.45 K/UL (ref 1–4.8)
LYMPHOCYTES NFR BLD: 13 % (ref 22–41)
MCH RBC QN AUTO: 32.7 PG (ref 27–33)
MCHC RBC AUTO-ENTMCNC: 34.3 G/DL (ref 33.7–35.3)
MCV RBC AUTO: 95.2 FL (ref 81.4–97.8)
MONOCYTES # BLD AUTO: 0.52 K/UL (ref 0–0.85)
MONOCYTES NFR BLD AUTO: 15 % (ref 0–13.4)
NEUTROPHILS # BLD AUTO: 2.13 K/UL (ref 1.82–7.42)
NEUTROPHILS NFR BLD: 61.3 % (ref 44–72)
NRBC # BLD AUTO: 0 K/UL
NRBC BLD-RTO: 0 /100 WBC
PLATELET # BLD AUTO: 160 K/UL (ref 164–446)
PMV BLD AUTO: 10.2 FL (ref 9–12.9)
POTASSIUM SERPL-SCNC: 4.2 MMOL/L (ref 3.6–5.5)
PROT SERPL-MCNC: 6.8 G/DL (ref 6–8.2)
RBC # BLD AUTO: 4.56 M/UL (ref 4.7–6.1)
SODIUM SERPL-SCNC: 139 MMOL/L (ref 135–145)
URATE SERPL-MCNC: 3.7 MG/DL (ref 2.5–8.3)
WBC # BLD AUTO: 3.5 K/UL (ref 4.8–10.8)

## 2019-12-31 PROCEDURE — 84550 ASSAY OF BLOOD/URIC ACID: CPT

## 2019-12-31 PROCEDURE — 80053 COMPREHEN METABOLIC PANEL: CPT

## 2019-12-31 PROCEDURE — 83615 LACTATE (LD) (LDH) ENZYME: CPT

## 2019-12-31 PROCEDURE — 85025 COMPLETE CBC W/AUTO DIFF WBC: CPT

## 2019-12-31 RX ORDER — 0.9 % SODIUM CHLORIDE 0.9 %
VIAL (ML) INJECTION PRN
Status: CANCELLED | OUTPATIENT
Start: 2020-01-02

## 2019-12-31 RX ORDER — SODIUM CHLORIDE 9 MG/ML
INJECTION, SOLUTION INTRAVENOUS CONTINUOUS
Status: CANCELLED | OUTPATIENT
Start: 2020-01-01

## 2019-12-31 RX ORDER — SODIUM CHLORIDE 9 MG/ML
INJECTION, SOLUTION INTRAVENOUS CONTINUOUS
Status: CANCELLED | OUTPATIENT
Start: 2020-01-02

## 2019-12-31 RX ORDER — ACETAMINOPHEN 325 MG/1
650 TABLET ORAL ONCE
Status: CANCELLED | OUTPATIENT
Start: 2020-01-01

## 2019-12-31 RX ORDER — PROCHLORPERAZINE MALEATE 10 MG
10 TABLET ORAL EVERY 6 HOURS PRN
Status: CANCELLED | OUTPATIENT
Start: 2020-01-01

## 2019-12-31 RX ORDER — PROCHLORPERAZINE MALEATE 10 MG
10 TABLET ORAL EVERY 6 HOURS PRN
Status: CANCELLED | OUTPATIENT
Start: 2020-01-02

## 2019-12-31 RX ORDER — 0.9 % SODIUM CHLORIDE 0.9 %
5 VIAL (ML) INJECTION PRN
Status: CANCELLED | OUTPATIENT
Start: 2020-01-01

## 2019-12-31 RX ORDER — DIPHENHYDRAMINE HYDROCHLORIDE 50 MG/ML
25 INJECTION INTRAMUSCULAR; INTRAVENOUS PRN
Status: CANCELLED | OUTPATIENT
Start: 2020-01-01

## 2019-12-31 RX ORDER — ONDANSETRON 8 MG/1
8 TABLET, ORALLY DISINTEGRATING ORAL PRN
Status: CANCELLED | OUTPATIENT
Start: 2020-01-02

## 2019-12-31 RX ORDER — MEPERIDINE HYDROCHLORIDE 25 MG/ML
25 INJECTION INTRAMUSCULAR; INTRAVENOUS; SUBCUTANEOUS PRN
Status: CANCELLED | OUTPATIENT
Start: 2020-01-01

## 2019-12-31 RX ORDER — 0.9 % SODIUM CHLORIDE 0.9 %
VIAL (ML) INJECTION PRN
Status: CANCELLED | OUTPATIENT
Start: 2020-01-01

## 2019-12-31 RX ORDER — ONDANSETRON 2 MG/ML
4 INJECTION INTRAMUSCULAR; INTRAVENOUS PRN
Status: CANCELLED | OUTPATIENT
Start: 2020-01-01

## 2019-12-31 RX ORDER — 0.9 % SODIUM CHLORIDE 0.9 %
5 VIAL (ML) INJECTION PRN
Status: CANCELLED | OUTPATIENT
Start: 2020-01-02

## 2019-12-31 RX ORDER — ONDANSETRON 2 MG/ML
4 INJECTION INTRAMUSCULAR; INTRAVENOUS PRN
Status: CANCELLED | OUTPATIENT
Start: 2020-01-02

## 2019-12-31 RX ORDER — ACETAMINOPHEN 325 MG/1
650 TABLET ORAL PRN
Status: CANCELLED | OUTPATIENT
Start: 2020-01-01

## 2019-12-31 RX ORDER — ONDANSETRON 8 MG/1
8 TABLET, ORALLY DISINTEGRATING ORAL PRN
Status: CANCELLED | OUTPATIENT
Start: 2020-01-01

## 2020-01-01 ENCOUNTER — OUTPATIENT INFUSION SERVICES (OUTPATIENT)
Dept: ONCOLOGY | Facility: MEDICAL CENTER | Age: 75
End: 2020-01-01
Attending: INTERNAL MEDICINE
Payer: MEDICARE

## 2020-01-01 VITALS
WEIGHT: 184.3 LBS | SYSTOLIC BLOOD PRESSURE: 101 MMHG | RESPIRATION RATE: 18 BRPM | DIASTOLIC BLOOD PRESSURE: 48 MMHG | OXYGEN SATURATION: 97 % | HEART RATE: 90 BPM | TEMPERATURE: 97.8 F | BODY MASS INDEX: 28.93 KG/M2 | HEIGHT: 67 IN

## 2020-01-01 DIAGNOSIS — C82.18 GRADE 2 FOLLICULAR LYMPHOMA OF LYMPH NODES OF MULTIPLE REGIONS (HCC): ICD-10-CM

## 2020-01-01 PROCEDURE — 96415 CHEMO IV INFUSION ADDL HR: CPT

## 2020-01-01 PROCEDURE — 700102 HCHG RX REV CODE 250 W/ 637 OVERRIDE(OP): Performed by: NURSE PRACTITIONER

## 2020-01-01 PROCEDURE — A9270 NON-COVERED ITEM OR SERVICE: HCPCS | Performed by: NURSE PRACTITIONER

## 2020-01-01 PROCEDURE — 96375 TX/PRO/DX INJ NEW DRUG ADDON: CPT

## 2020-01-01 PROCEDURE — 96417 CHEMO IV INFUS EACH ADDL SEQ: CPT

## 2020-01-01 PROCEDURE — 700111 HCHG RX REV CODE 636 W/ 250 OVERRIDE (IP)

## 2020-01-01 PROCEDURE — 96413 CHEMO IV INFUSION 1 HR: CPT

## 2020-01-01 PROCEDURE — 700111 HCHG RX REV CODE 636 W/ 250 OVERRIDE (IP): Performed by: NURSE PRACTITIONER

## 2020-01-01 PROCEDURE — 700105 HCHG RX REV CODE 258: Performed by: NURSE PRACTITIONER

## 2020-01-01 PROCEDURE — 700105 HCHG RX REV CODE 258

## 2020-01-01 RX ORDER — ACETAMINOPHEN 325 MG/1
650 TABLET ORAL ONCE
Status: COMPLETED | OUTPATIENT
Start: 2020-01-01 | End: 2020-01-01

## 2020-01-01 RX ORDER — SODIUM CHLORIDE 9 MG/ML
INJECTION, SOLUTION INTRAVENOUS CONTINUOUS
Status: DISCONTINUED | OUTPATIENT
Start: 2020-01-01 | End: 2020-01-01 | Stop reason: HOSPADM

## 2020-01-01 RX ADMIN — RITUXIMAB 700 MG: 10 INJECTION, SOLUTION INTRAVENOUS at 11:04

## 2020-01-01 RX ADMIN — DIPHENHYDRAMINE HYDROCHLORIDE 50 MG: 50 INJECTION INTRAMUSCULAR; INTRAVENOUS at 10:00

## 2020-01-01 RX ADMIN — ACETAMINOPHEN 650 MG: 325 TABLET ORAL at 09:31

## 2020-01-01 RX ADMIN — DEXAMETHASONE SODIUM PHOSPHATE 12 MG: 4 INJECTION, SOLUTION INTRAMUSCULAR; INTRAVENOUS at 09:31

## 2020-01-01 RX ADMIN — BENDAMUSTINE HYDROCHLORIDE 139.3 MG: 25 INJECTION, SOLUTION INTRAVENOUS at 10:34

## 2020-01-01 RX ADMIN — ONDANSETRON HYDROCHLORIDE 16 MG: 2 SOLUTION INTRAMUSCULAR; INTRAVENOUS at 09:42

## 2020-01-01 RX ADMIN — SODIUM CHLORIDE: 9 INJECTION, SOLUTION INTRAVENOUS at 09:33

## 2020-01-01 NOTE — PROGRESS NOTES
Pt returns for D1C4 Bendeka/Taxol. IV access established. Pt had labs done ahead, results reviewed and WNL to proceed. Premeds given and chemo infused per MAR. Pt iván well. Line flushed clear. IV flushed and removed. Pressure dressing applied. Pt knows to return tomorrow. Discharged home under self care in no apparent distress.

## 2020-01-01 NOTE — PROGRESS NOTES
"Pharmacy Chemotherapy Calculation    Dx: follicular lymphoma         Protocol: Rituxan + Bendeka  *Dosing Reference*  Rituximab 375 mg/m² IV on Day 1  Bendamustine  IV over 10 min on IV over 30 min daily on Days 1-2   -- dose reduced to 70 mg/m² for anticipated tolerance  28-day cycle for 6 cycles  NCCN Guidelines for B-Cell Lymphomas V.3.2019  Jacqueline GREEN, et al. Lancet. 2013;381(9734):8488-60    Allergies:  Vicodin [hydrocodone-acetaminophen]     /48   Pulse 90   Temp 36.6 °C (97.8 °F) (Temporal)   Resp 18   Ht 1.7 m (5' 6.93\")   Wt 83.6 kg (184 lb 4.9 oz)   SpO2 97%   BMI 28.93 kg/m²  Body surface area is 1.99 meters squared.    10/3/19: Negative HepB Panel     Labs 12/31/19  ANC~ 2130 Plt = 160k   Hgb = 14.9     SCr = 0.85 mg/dL CrCl ~ 92.7 mL/min   LFT's = WNLs  TBili = 0.7  LDH = 144    Drug Order   (Drug name, dose, route, IV Fluid & volume, frequency, number of doses) Cycle 4, Day 1 of 2  Previous treatment: C3D1-2 12/4/19-12/5/19   Medication = Bendamustine (Bendeka)  Base Dose = 70 mg/m²  Calc Dose: Base Dose x 1.99 m² = 139.3 mg  Final Dose = 139.3 mg  Route = IV  Fluid & Volume = NS 50 mL  Admin Duration = Over 10 min          <10% difference, okay to treat with final dose   Medication = Rituximab (Rituxan)  Base Dose = 375 mg/m²  Calc Dose: Base Dose x 1.99 m² = 746.25 mg  Final Dose = 700 mg  Route = IV  Fluid & Volume =  mL  Admin Duration = see rate sheet   Patient Assistance Medication       Rounded down to vial size (within 10%) per dose rounding protocol   By my signature below, I confirm this process was performed independently with the BSA and all final chemotherapy dosing calculations congruent. I have reviewed the above chemotherapy order and that my calculation of the final dose and BSA (when applicable) corroborate those calculations of the  pharmacist. Discrepancies of 10% or greater in the written dose have been addressed and documented within the EPIC Progress " notes.    Scott Martinez, PharmD

## 2020-01-01 NOTE — PROGRESS NOTES
"Pharmacy Chemotherapy Verification    Dx: Follicular Lymphoma  Cycle: 4 Days 1-2   Previous treatment: C3  Dec 4-5, 2019     Protocol: BR  RiTUXimab 375 mg/m2 IV on Day 1  Bendamustine  IV Daily on Days 1 and 2  -C1: MD dosing 70 mg/m2  28-Day cycle x 6 cycles  NCCN Guidelines for B-Cell Lymphomas. V.3.2019.  Jacqueline GREEN, et al. Lancet. 2013;381(7687):1943-10.  Chacho IW, et al. Blood. 2014;123(15):3601-52.          Allergies:Vicodin [hydrocodone-acetaminophen]  /48   Pulse 90   Temp 36.6 °C (97.8 °F) (Temporal)   Resp 18   Ht 1.7 m (5' 6.93\")   Wt 83.6 kg (184 lb 4.9 oz)   SpO2 97%   BMI 28.93 kg/m²  Body surface area is 1.99 meters squared.    All lab results 12/31/19 within treatment parameters.       Labs 10/4/19 ([facility])  Hep B CorAB/sAG: Negative    RiTUXimab 375 mg/m² x 1.99m² = 746.3mg   Rounded to vial size (within 10%) per RX protocol = 700mg IV on Day 1    Bendamustine 70 mg/m² x 1.99m² = 139.3mg   <10% difference, okay to treat with final dose = 139.3mg IV on Days 1 and 2    RADHA Gayle, Pharm.D.  "

## 2020-01-01 NOTE — PROGRESS NOTES
Chemotherapy Verification - PRIMARY RN    D1C4    Height = 170 cm  Weight = 83.6 kg  BSA = 1.98 m2       Medication: Bendamustine HCl (Bendeka)  Dose: 70 mg/m2  Calculated Dose: 138.6 mg                                Medication: Rituximab (Rituxan)  Dose: 375 mg/m2  Calculated Dose: 742.5 mg - dose rounded to 700 mg per MAB protocol                               I confirm this process was performed independently with the BSA and all final chemotherapy dosing calculations congruent.  Any discrepancies of 10% or greater have been addressed with the chemotherapy pharmacist. The resolution of the discrepancy has been documented in the EPIC progress notes.

## 2020-01-02 ENCOUNTER — OUTPATIENT INFUSION SERVICES (OUTPATIENT)
Dept: ONCOLOGY | Facility: MEDICAL CENTER | Age: 75
End: 2020-01-02
Attending: INTERNAL MEDICINE
Payer: MEDICARE

## 2020-01-02 VITALS
SYSTOLIC BLOOD PRESSURE: 122 MMHG | OXYGEN SATURATION: 97 % | HEIGHT: 67 IN | WEIGHT: 187.39 LBS | TEMPERATURE: 98.2 F | HEART RATE: 88 BPM | DIASTOLIC BLOOD PRESSURE: 95 MMHG | BODY MASS INDEX: 29.41 KG/M2 | RESPIRATION RATE: 18 BRPM

## 2020-01-02 DIAGNOSIS — C82.18 GRADE 2 FOLLICULAR LYMPHOMA OF LYMPH NODES OF MULTIPLE REGIONS (HCC): ICD-10-CM

## 2020-01-02 PROCEDURE — 96375 TX/PRO/DX INJ NEW DRUG ADDON: CPT

## 2020-01-02 PROCEDURE — 700111 HCHG RX REV CODE 636 W/ 250 OVERRIDE (IP): Performed by: NURSE PRACTITIONER

## 2020-01-02 PROCEDURE — 96409 CHEMO IV PUSH SNGL DRUG: CPT

## 2020-01-02 PROCEDURE — 700105 HCHG RX REV CODE 258: Performed by: NURSE PRACTITIONER

## 2020-01-02 RX ADMIN — ONDANSETRON HYDROCHLORIDE 16 MG: 2 SOLUTION INTRAMUSCULAR; INTRAVENOUS at 13:48

## 2020-01-02 RX ADMIN — BENDAMUSTINE HYDROCHLORIDE 140 MG: 25 INJECTION, SOLUTION INTRAVENOUS at 14:21

## 2020-01-02 ASSESSMENT — PAIN SCALES - GENERAL: PAINLEVEL: NO PAIN

## 2020-01-02 NOTE — PROGRESS NOTES
Chemotherapy Verification - SECONDARY RN     C4D2    Height = 170 cm  Weight = 85 kg  BSA = 2.0 m2       Medication: Bendeka  Dose: 70mg/m2  Calculated Dose: 140 mg                             (In mg/m2, AUC, mg/kg)     I confirm that this process was performed independently.

## 2020-01-02 NOTE — PROGRESS NOTES
"Pharmacy Chemotherapy Verification  Patient Name; Ronal Hair  Dx: follicular lymphoma         Protocol: Rituxan + Bendeka  *Dosing Reference*  RiTUXimab 375 mg/m² IV on Day 1  Bendamustine  IV over 10 min on IV over 30 min daily on Days 1-2   -- dose reduced to 70 mg/m² for anticipated tolerance  28-day cycle for 6 cycles  NCCN Guidelines for B-Cell Lymphomas V.3.2019  Jacqueline MJ, et al. Lancet. 2013;381(8325):0803-10    Allergies:  Vicodin [hydrocodone-acetaminophen]     /95   Pulse 88   Temp 36.8 °C (98.2 °F) (Temporal)   Resp 18   Ht 1.7 m (5' 6.93\")   Wt 85 kg (187 lb 6.3 oz)   SpO2 97%   BMI 29.41 kg/m²  Body surface area is 2 meters squared.    Labs 12/31/19  ANC~ 2130 Plt = 160k   Hgb = 14.9     SCr = 0.85 mg/dL CrCl ~ 92.7 mL/min   AST/ALT/AP = 17/17/58 TBili = 0.7  LDH = 144    Labs 10/3/19  Hep B panel: Negative    Drug Order   (Drug name, dose, route, IV Fluid & volume, frequency, number of doses) Cycle 4, Day 2 of 2  Previous treatment: C3D1-2 12/4/19-12/5/19   Medication = Bendamustine (Bendeka)  Base Dose = 70 mg/m²  Calc Dose: Base Dose x 2 m² = 140 mg  Final Dose = 140 mg  Route = IV  Fluid & Volume = NS 50 mL  Admin Duration = Over 10 minutes          <10% difference, okay to treat with final dose   By my signature below, I confirm this process was performed independently with the BSA and all final chemotherapy dosing calculations congruent. I have reviewed the above chemotherapy order and that my calculation of the final dose and BSA (when applicable) corroborate those calculations of the  pharmacist. Discrepancies of 10% or greater in the written dose have been addressed and documented within the [facility] Progress notes.    Estephania Ayoub, PharmD, BCOP    "

## 2020-01-02 NOTE — PROGRESS NOTES
Chemotherapy Verification - PRIMARY RN      Height = 170 cm  Weight = 85 kg  BSA = 2 m^2       Medication: Bendamustine (Bendeka)  Dose: 70 mg/m^2  Calculated Dose: 140 mg                             (In mg/m2, AUC, mg/kg)     I confirm this process was performed independently with the BSA and all final chemotherapy dosing calculations congruent.  Any discrepancies of 10% or greater have been addressed with the chemotherapy pharmacist. The resolution of the discrepancy has been documented in the EPIC progress notes.

## 2020-01-02 NOTE — PROGRESS NOTES
Patient here for Day 2 bendamustine. PIV established. Pre-medication given per MAR. Bendamustine given per MAR. PIV flushed with normal saline and removed; gauze/coban applied to site. Next appointment scheduled. Discharged to self care; no apparent distress noted.

## 2020-01-10 ENCOUNTER — PATIENT OUTREACH (OUTPATIENT)
Dept: OTHER | Facility: MEDICAL CENTER | Age: 75
End: 2020-01-10

## 2020-01-10 NOTE — PROGRESS NOTES
"On January 10, 2020, Oncology Social Worker Lucia Iglesias contacted pt. via telephone.  Pt. states he is \"doing fine.\"  Pt. states his only complaint to be [facility]'s billing.  Pt. states he received a marta from the [employer] for $5000 but has not been able to send them his hospital bill as he has only received one of his four chemo's bills.  Pt. states the marta requires that he submit bills every 90 days and is worried that he will lose the money because of [facility] billing's delay.  Pt. stated if he lost his marta because of [facility] billing he will nicholas them.  Pt. 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  Pt. states medically [facility] \"is one of the best there is.\"  MARIA ELENA Iglesias asked pt. if he is working with his Financial Resource Advocate Noé Vasquez.  Pt. stated he worked with him in the beginning.  MARIA ELENA Iglesias informed pt. she would be putting him in contact with PILAR Vasquez to help navigate his billing concerns.  Pt. shared financially he is \"fine\" as he continues to work at [employer].  Pt. states he does not know think he will be able to work longer than 6 more months at which time he would only have the social security income of $1284 instead of $2000.  Pt. states his rent went up from $400 to $700.  MARIA ELENA gIlesias asked pt. if he would accept for MARIA ELENA Iglesias to mail him a [employer] [facility] application.  Pt. agreed.   "

## 2020-01-23 ENCOUNTER — APPOINTMENT (OUTPATIENT)
Dept: RADIOLOGY | Facility: MEDICAL CENTER | Age: 75
End: 2020-01-23
Attending: INTERNAL MEDICINE
Payer: MEDICARE

## 2020-01-24 ENCOUNTER — HOSPITAL ENCOUNTER (OUTPATIENT)
Dept: RADIOLOGY | Facility: MEDICAL CENTER | Age: 75
End: 2020-01-24
Attending: INTERNAL MEDICINE
Payer: MEDICARE

## 2020-01-24 ENCOUNTER — HOSPITAL ENCOUNTER (OUTPATIENT)
Dept: RADIOLOGY | Facility: MEDICAL CENTER | Age: 75
End: 2020-01-24

## 2020-01-24 DIAGNOSIS — C82.05 FOLICLAR LYMPH GRADE I, NODES OF ING REGION AND LOWER LIMB (HCC): ICD-10-CM

## 2020-01-24 PROCEDURE — A9552 F18 FDG: HCPCS

## 2020-01-28 ENCOUNTER — HOSPITAL ENCOUNTER (OUTPATIENT)
Dept: LAB | Facility: MEDICAL CENTER | Age: 75
End: 2020-01-28
Attending: INTERNAL MEDICINE
Payer: MEDICARE

## 2020-01-28 LAB
ALBUMIN SERPL BCP-MCNC: 4.2 G/DL (ref 3.2–4.9)
ALBUMIN/GLOB SERPL: 1.6 G/DL
ALP SERPL-CCNC: 52 U/L (ref 30–99)
ALT SERPL-CCNC: 17 U/L (ref 2–50)
ANION GAP SERPL CALC-SCNC: 7 MMOL/L (ref 0–11.9)
AST SERPL-CCNC: 19 U/L (ref 12–45)
BASOPHILS # BLD AUTO: 1.2 % (ref 0–1.8)
BASOPHILS # BLD: 0.03 K/UL (ref 0–0.12)
BILIRUB SERPL-MCNC: 0.6 MG/DL (ref 0.1–1.5)
BUN SERPL-MCNC: 14 MG/DL (ref 8–22)
CALCIUM SERPL-MCNC: 9 MG/DL (ref 8.5–10.5)
CHLORIDE SERPL-SCNC: 105 MMOL/L (ref 96–112)
CO2 SERPL-SCNC: 27 MMOL/L (ref 20–33)
CREAT SERPL-MCNC: 0.8 MG/DL (ref 0.5–1.4)
EOSINOPHIL # BLD AUTO: 0.29 K/UL (ref 0–0.51)
EOSINOPHIL NFR BLD: 11.4 % (ref 0–6.9)
ERYTHROCYTE [DISTWIDTH] IN BLOOD BY AUTOMATED COUNT: 48.2 FL (ref 35.9–50)
GLOBULIN SER CALC-MCNC: 2.6 G/DL (ref 1.9–3.5)
GLUCOSE SERPL-MCNC: 92 MG/DL (ref 65–99)
HCT VFR BLD AUTO: 40.6 % (ref 42–52)
HGB BLD-MCNC: 14.3 G/DL (ref 14–18)
IMM GRANULOCYTES # BLD AUTO: 0.01 K/UL (ref 0–0.11)
IMM GRANULOCYTES NFR BLD AUTO: 0.4 % (ref 0–0.9)
LDH SERPL L TO P-CCNC: 156 U/L (ref 107–266)
LYMPHOCYTES # BLD AUTO: 0.36 K/UL (ref 1–4.8)
LYMPHOCYTES NFR BLD: 14.1 % (ref 22–41)
MCH RBC QN AUTO: 34 PG (ref 27–33)
MCHC RBC AUTO-ENTMCNC: 35.2 G/DL (ref 33.7–35.3)
MCV RBC AUTO: 96.4 FL (ref 81.4–97.8)
MONOCYTES # BLD AUTO: 0.55 K/UL (ref 0–0.85)
MONOCYTES NFR BLD AUTO: 21.6 % (ref 0–13.4)
NEUTROPHILS # BLD AUTO: 1.31 K/UL (ref 1.82–7.42)
NEUTROPHILS NFR BLD: 51.3 % (ref 44–72)
NRBC # BLD AUTO: 0 K/UL
NRBC BLD-RTO: 0 /100 WBC
PLATELET # BLD AUTO: 154 K/UL (ref 164–446)
PMV BLD AUTO: 10.2 FL (ref 9–12.9)
POTASSIUM SERPL-SCNC: 4.4 MMOL/L (ref 3.6–5.5)
PROT SERPL-MCNC: 6.8 G/DL (ref 6–8.2)
RBC # BLD AUTO: 4.21 M/UL (ref 4.7–6.1)
SODIUM SERPL-SCNC: 139 MMOL/L (ref 135–145)
WBC # BLD AUTO: 2.6 K/UL (ref 4.8–10.8)

## 2020-01-28 PROCEDURE — 82784 ASSAY IGA/IGD/IGG/IGM EACH: CPT

## 2020-01-28 PROCEDURE — 80053 COMPREHEN METABOLIC PANEL: CPT

## 2020-01-28 PROCEDURE — 85025 COMPLETE CBC W/AUTO DIFF WBC: CPT

## 2020-01-28 PROCEDURE — 83615 LACTATE (LD) (LDH) ENZYME: CPT

## 2020-01-28 RX ORDER — 0.9 % SODIUM CHLORIDE 0.9 %
VIAL (ML) INJECTION PRN
Status: CANCELLED | OUTPATIENT
Start: 2020-01-29

## 2020-01-28 RX ORDER — PROCHLORPERAZINE MALEATE 10 MG
10 TABLET ORAL EVERY 6 HOURS PRN
Status: CANCELLED | OUTPATIENT
Start: 2020-01-29

## 2020-01-28 RX ORDER — 0.9 % SODIUM CHLORIDE 0.9 %
VIAL (ML) INJECTION PRN
Status: CANCELLED | OUTPATIENT
Start: 2020-01-30

## 2020-01-28 RX ORDER — ACETAMINOPHEN 325 MG/1
650 TABLET ORAL ONCE
Status: CANCELLED | OUTPATIENT
Start: 2020-01-29

## 2020-01-28 RX ORDER — PROCHLORPERAZINE MALEATE 10 MG
10 TABLET ORAL EVERY 6 HOURS PRN
Status: CANCELLED | OUTPATIENT
Start: 2020-01-30

## 2020-01-28 RX ORDER — 0.9 % SODIUM CHLORIDE 0.9 %
5 VIAL (ML) INJECTION PRN
Status: CANCELLED | OUTPATIENT
Start: 2020-01-30

## 2020-01-28 RX ORDER — ONDANSETRON 8 MG/1
8 TABLET, ORALLY DISINTEGRATING ORAL PRN
Status: CANCELLED | OUTPATIENT
Start: 2020-01-30

## 2020-01-28 RX ORDER — ONDANSETRON 2 MG/ML
4 INJECTION INTRAMUSCULAR; INTRAVENOUS PRN
Status: CANCELLED | OUTPATIENT
Start: 2020-01-29

## 2020-01-28 RX ORDER — 0.9 % SODIUM CHLORIDE 0.9 %
VIAL (ML) INJECTION PRN
Status: CANCELLED | OUTPATIENT
Start: 2020-01-28

## 2020-01-28 RX ORDER — 0.9 % SODIUM CHLORIDE 0.9 %
5 VIAL (ML) INJECTION PRN
Status: CANCELLED | OUTPATIENT
Start: 2020-01-28

## 2020-01-28 RX ORDER — ONDANSETRON 8 MG/1
8 TABLET, ORALLY DISINTEGRATING ORAL PRN
Status: CANCELLED | OUTPATIENT
Start: 2020-01-29

## 2020-01-28 RX ORDER — SODIUM CHLORIDE 9 MG/ML
INJECTION, SOLUTION INTRAVENOUS CONTINUOUS
Status: CANCELLED | OUTPATIENT
Start: 2020-01-29

## 2020-01-28 RX ORDER — 0.9 % SODIUM CHLORIDE 0.9 %
5 VIAL (ML) INJECTION PRN
Status: CANCELLED | OUTPATIENT
Start: 2020-01-29

## 2020-01-28 RX ORDER — DIPHENHYDRAMINE HYDROCHLORIDE 50 MG/ML
25 INJECTION INTRAMUSCULAR; INTRAVENOUS PRN
Status: CANCELLED | OUTPATIENT
Start: 2020-01-29

## 2020-01-28 RX ORDER — MEPERIDINE HYDROCHLORIDE 25 MG/ML
25 INJECTION INTRAMUSCULAR; INTRAVENOUS; SUBCUTANEOUS PRN
Status: CANCELLED | OUTPATIENT
Start: 2020-01-29

## 2020-01-28 RX ORDER — SODIUM CHLORIDE 9 MG/ML
INJECTION, SOLUTION INTRAVENOUS CONTINUOUS
Status: CANCELLED | OUTPATIENT
Start: 2020-01-30

## 2020-01-28 RX ORDER — ACETAMINOPHEN 325 MG/1
650 TABLET ORAL PRN
Status: CANCELLED | OUTPATIENT
Start: 2020-01-29

## 2020-01-28 RX ORDER — ONDANSETRON 2 MG/ML
4 INJECTION INTRAMUSCULAR; INTRAVENOUS PRN
Status: CANCELLED | OUTPATIENT
Start: 2020-01-30

## 2020-01-29 ENCOUNTER — OUTPATIENT INFUSION SERVICES (OUTPATIENT)
Dept: ONCOLOGY | Facility: MEDICAL CENTER | Age: 75
End: 2020-01-29
Attending: INTERNAL MEDICINE
Payer: MEDICARE

## 2020-01-29 VITALS
BODY MASS INDEX: 29.24 KG/M2 | RESPIRATION RATE: 18 BRPM | TEMPERATURE: 98 F | HEIGHT: 67 IN | WEIGHT: 186.29 LBS | OXYGEN SATURATION: 98 % | SYSTOLIC BLOOD PRESSURE: 112 MMHG | HEART RATE: 89 BPM | DIASTOLIC BLOOD PRESSURE: 50 MMHG

## 2020-01-29 DIAGNOSIS — C82.18 GRADE 2 FOLLICULAR LYMPHOMA OF LYMPH NODES OF MULTIPLE REGIONS (HCC): ICD-10-CM

## 2020-01-29 PROCEDURE — 96415 CHEMO IV INFUSION ADDL HR: CPT

## 2020-01-29 PROCEDURE — 700105 HCHG RX REV CODE 258: Performed by: INTERNAL MEDICINE

## 2020-01-29 PROCEDURE — 96411 CHEMO IV PUSH ADDL DRUG: CPT

## 2020-01-29 PROCEDURE — 96413 CHEMO IV INFUSION 1 HR: CPT

## 2020-01-29 PROCEDURE — 96375 TX/PRO/DX INJ NEW DRUG ADDON: CPT

## 2020-01-29 PROCEDURE — 700111 HCHG RX REV CODE 636 W/ 250 OVERRIDE (IP)

## 2020-01-29 PROCEDURE — 96409 CHEMO IV PUSH SNGL DRUG: CPT

## 2020-01-29 PROCEDURE — 700105 HCHG RX REV CODE 258

## 2020-01-29 PROCEDURE — 700102 HCHG RX REV CODE 250 W/ 637 OVERRIDE(OP): Performed by: INTERNAL MEDICINE

## 2020-01-29 PROCEDURE — 700111 HCHG RX REV CODE 636 W/ 250 OVERRIDE (IP): Performed by: INTERNAL MEDICINE

## 2020-01-29 PROCEDURE — A9270 NON-COVERED ITEM OR SERVICE: HCPCS | Performed by: INTERNAL MEDICINE

## 2020-01-29 RX ORDER — ACETAMINOPHEN 325 MG/1
650 TABLET ORAL ONCE
Status: COMPLETED | OUTPATIENT
Start: 2020-01-29 | End: 2020-01-29

## 2020-01-29 RX ADMIN — BENDAMUSTINE HYDROCHLORIDE 140 MG: 25 INJECTION, SOLUTION INTRAVENOUS at 10:47

## 2020-01-29 RX ADMIN — ONDANSETRON HYDROCHLORIDE 16 MG: 2 SOLUTION INTRAMUSCULAR; INTRAVENOUS at 10:14

## 2020-01-29 RX ADMIN — RITUXIMAB 700 MG: 10 INJECTION, SOLUTION INTRAVENOUS at 11:12

## 2020-01-29 RX ADMIN — DEXAMETHASONE SODIUM PHOSPHATE 12 MG: 4 INJECTION, SOLUTION INTRAMUSCULAR; INTRAVENOUS at 10:00

## 2020-01-29 RX ADMIN — ACETAMINOPHEN 650 MG: 325 TABLET ORAL at 10:00

## 2020-01-29 RX ADMIN — DIPHENHYDRAMINE HYDROCHLORIDE 50 MG: 50 INJECTION INTRAMUSCULAR; INTRAVENOUS at 10:27

## 2020-01-29 NOTE — PROGRESS NOTES
Chemotherapy Verification - SECONDARY RN       Height = 170.5 cm  Weight = 84.5 kg  BSA = 2 m2       Medication: Bendeka  Dose: 70 mg/m2  Calculated Dose: 140 mg                             (In mg/m2, AUC, mg/kg)     Medication: Rituxan  Dose: 375 mg/m2  Calculated Dose: 750 mg                             (In mg/m2, AUC, mg/kg)      I confirm that this process was performed independently.

## 2020-01-29 NOTE — PROGRESS NOTES
"Pharmacy Chemotherapy Verification    Dx: Follicular Lymphoma  Cycle: 5 Days 1-2   Previous treatment: C4  Jan 1-2, 2020     Protocol: BR  RiTUXimab 375 mg/m2 IV on Day 1  Bendamustine  IV Daily on Days 1 and 2  -C1: MD dosing 70 mg/m2  28-Day cycle x 6 cycles  NCCN Guidelines for B-Cell Lymphomas. V.3.2019.  Jacqueline GREEN, et al. Lancet. 2013;381(4755):5873-10.  Chacho IW, et al. Blood. 2014;123(06):9398-52.          Allergies:Vicodin [hydrocodone-acetaminophen]  /50   Pulse 89   Temp 36.7 °C (98 °F) (Temporal)   Resp 18   Ht 1.705 m (5' 7.13\")   Wt 84.5 kg (186 lb 4.6 oz)   SpO2 98%   BMI 29.07 kg/m²  Body surface area is 2 meters squared.    All lab results 1/28/20 within treatment parameters. Except ANC < 1500.  MD aware of all current lab results. Orders received to proceed with treatment. Plan for pt to return on day 3 for neulasta injection.      Labs 10/4/19 ([facility])  Hep B CorAB/sAG: Negative    RiTUXimab 375 mg/m² x 2m² = 750mg   Rounded to vial size (within 10%) per RX protocol = 700mg IV on Day 1    Bendamustine 70 mg/m² x 2m² = 140mg   <10% difference, okay to treat with final dose = 140mg IV on Days 1 and 2    RADHA Gayle Pharm.D.  "

## 2020-01-29 NOTE — PROGRESS NOTES
Patient presents for day one cycle five Bendeka / Rituxan. Reviewed plan of care, patient verbalizes understanding. Call placed to Dr. Cha. Confirmed with MD coe to proceed with ANC of 1310, patient to get Neulasta day three. Patient updated and aware. PIV established, flushes well with brisk blood return. Infusions completed with no new patient complaints, line flushed clear. PIV removed per patient request, tip intact, compression dressing to site. Patient returns tomorrow and released in no acute distress.

## 2020-01-29 NOTE — PROGRESS NOTES
"Pharmacy Chemotherapy Verification  Patient Name; Ronal Hair  Dx: follicular lymphoma         Protocol: RiTUXimab + Bendeka  *Dosing Reference*  RiTUXimab 375 mg/m² IV on Day 1  Bendamustine  IV over 10 min on IV over 30 min daily on Days 1-2   --Dose reduced to 70 mg/m² for anticipated tolerance  28-day cycle for 6 cycles  NCCN Guidelines for B-Cell Lymphomas V.3.2019  Jacqueline MJ, et al. Lancet. 2013;381(4059):4309-10    Allergies:  Vicodin [hydrocodone-acetaminophen]     /50   Pulse 89   Temp 36.7 °C (98 °F) (Temporal)   Resp 18   Ht 1.705 m (5' 7.13\")   Wt 84.5 kg (186 lb 4.6 oz)   SpO2 98%   BMI 29.07 kg/m²  Body surface area is 2 meters squared.    Labs 1/28/20  ANC 1310 Hgb 14.3 Plt 154k  SCr 0.8 CrCl 99.4 mL/min   AST/ALT/AP = 19/17/52 Tbili = 0.6  LDH = 156    Labs 10/3/19  Hep B panel: Negative    Drug Order   (Drug name, dose, route, IV Fluid & volume, frequency, number of doses) Cycle 5, Day 1 of 2  Previous treatment: C4D1-2 1/1/20-1/2/20   Medication = Bendamustine (Bendeka)  Base Dose = 70 mg/m²  Calc Dose: Base Dose x 2 m² = 140 mg  Final Dose = 140 mg  Route = IV  Fluid & Volume = NS 50 mL  Admin Duration = Over 10 minutes          <10% difference, okay to treat with final dose     Medication = RiTUXimab (Rituxan)  Base Dose = 375 mg/m²  Calc Dose: Base Dose x 2 m² = 750 mg  Final Dose = 700 mg  Route = IV  Fluid & Volume =  mL  Admin Duration = Per rate sheet            Rounded down to vial size (within 10%) per dose rounding protocol     By my signature below, I confirm this process was performed independently with the BSA and all final chemotherapy dosing calculations congruent. I have reviewed the above chemotherapy order and that my calculation of the final dose and BSA (when applicable) corroborate those calculations of the  pharmacist. Discrepancies of 10% or greater in the written dose have been addressed and documented within the EPIC Progress " notes.    Estephanai Ayoub, PharmD, BCOP

## 2020-01-29 NOTE — PROGRESS NOTES
CChemotherapy Verification - PRIMARY RN      Height = 170.5 cm  Weight = 84.5 kg  BSA = 2 m2       Medication: Bendeka  Dose: 70 mg/m2  Calculated Dose: 140 mg                             (In mg/m2, AUC, mg/kg)     Medication: Rituxan  Dose: 375 mg/m2  Calculated Dose: 750 mg                             (In mg/m2, AUC, mg/kg)          I confirm this process was performed independently with the BSA and all final chemotherapy dosing calculations congruent.  Any discrepancies of 10% or greater have been addressed with the chemotherapy pharmacist. The resolution of the discrepancy has been documented in the EPIC progress notes.

## 2020-01-30 ENCOUNTER — OUTPATIENT INFUSION SERVICES (OUTPATIENT)
Dept: ONCOLOGY | Facility: MEDICAL CENTER | Age: 75
End: 2020-01-30
Attending: INTERNAL MEDICINE
Payer: MEDICARE

## 2020-01-30 VITALS
WEIGHT: 186.07 LBS | DIASTOLIC BLOOD PRESSURE: 59 MMHG | BODY MASS INDEX: 29.2 KG/M2 | RESPIRATION RATE: 18 BRPM | SYSTOLIC BLOOD PRESSURE: 136 MMHG | TEMPERATURE: 98.4 F | HEART RATE: 83 BPM | OXYGEN SATURATION: 98 % | HEIGHT: 67 IN

## 2020-01-30 DIAGNOSIS — C82.18 GRADE 2 FOLLICULAR LYMPHOMA OF LYMPH NODES OF MULTIPLE REGIONS (HCC): ICD-10-CM

## 2020-01-30 PROCEDURE — 96409 CHEMO IV PUSH SNGL DRUG: CPT

## 2020-01-30 PROCEDURE — 700111 HCHG RX REV CODE 636 W/ 250 OVERRIDE (IP): Mod: TB | Performed by: INTERNAL MEDICINE

## 2020-01-30 PROCEDURE — 96413 CHEMO IV INFUSION 1 HR: CPT

## 2020-01-30 PROCEDURE — 700105 HCHG RX REV CODE 258: Performed by: INTERNAL MEDICINE

## 2020-01-30 PROCEDURE — 96375 TX/PRO/DX INJ NEW DRUG ADDON: CPT

## 2020-01-30 RX ADMIN — BENDAMUSTINE HYDROCHLORIDE 140 MG: 25 INJECTION, SOLUTION INTRAVENOUS at 14:35

## 2020-01-30 RX ADMIN — ONDANSETRON HYDROCHLORIDE 16 MG: 2 SOLUTION INTRAMUSCULAR; INTRAVENOUS at 14:13

## 2020-01-30 NOTE — PROGRESS NOTES
"Pharmacy Chemotherapy Verification    Dx: follicular lymphoma         Protocol: RiTUXimab + Bendeka  *Dosing Reference*  RiTUXimab 375 mg/m² IV on Day 1  Bendamustine  IV over 10 min on IV over 30 min daily on Days 1-2   --Dose reduced to 70 mg/m² for anticipated tolerance  28-day cycle for 6 cycles  NCCN Guidelines for B-Cell Lymphomas V.3.2019  Jacqueline GREEN, et al. Lancet. 2013;381(5742):7782-07    Allergies:  Vicodin [hydrocodone-acetaminophen]     /59   Pulse 83   Temp 36.9 °C (98.4 °F) (Temporal)   Resp 18   Ht 1.705 m (5' 7.13\")   Wt 84.4 kg (186 lb 1.1 oz)   SpO2 98%   BMI 29.03 kg/m²  Body surface area is 2 meters squared.    Labs 10/3/19  Hep B panel: Negative    Labs 1/28/20  ANC 1310 Hgb 14.3 Plt 154k  SCr 0.8 CrCl 99.4 mL/min   AST/ALT/AP = 19/17/52 Tbili = 0.6  LDH = 156    Drug Order   (Drug name, dose, route, IV Fluid & volume, frequency, number of doses) Cycle 5, Day 2 of 2  Previous treatment: C4D1-2 1/1/20-1/2/20   Medication = Bendamustine (Bendeka)  Base Dose = 70 mg/m²  Calc Dose: Base Dose x 2 m² = 140 mg  Final Dose = 140 mg  Route = IV  Fluid & Volume = NS 50 mL  Admin Duration = Over 10 minutes          <10% difference, okay to treat with final dose   By my signature below, I confirm this process was performed independently with the BSA and all final chemotherapy dosing calculations congruent. I have reviewed the above chemotherapy order and that my calculation of the final dose and BSA (when applicable) corroborate those calculations of the  pharmacist. Discrepancies of 10% or greater in the written dose have been addressed and documented within the [facility] Progress notes.    Shawna Fischer, PharmD  "

## 2020-01-30 NOTE — PROGRESS NOTES
Chemotherapy Verification - PRIMARY RN    C5D2    Height = 170.5 cm  Weight = 84.4 kg  BSA = 2.0 m2       Medication: Bendamustine (Bendeka)  Dose: 70mg/m2  Calculated Dose: 140 mg                             (In mg/m2, AUC, mg/kg)     I confirm this process was performed independently with the BSA and all final chemotherapy dosing calculations congruent.  Any discrepancies of 10% or greater have been addressed with the chemotherapy pharmacist. The resolution of the discrepancy has been documented in the EPIC progress notes.

## 2020-01-30 NOTE — PROGRESS NOTES
Chemotherapy Verification - SECONDARY RN     D2C5    Height = 170.5 cm  Weight = 84.4 kg  BSA = 2.0 m2       Medication: Bendamustine (Bendeka)  Dose: 70 mg/m2  Calculated Dose: 140 mg                              I confirm that this process was performed independently.

## 2020-01-30 NOTE — PROGRESS NOTES
Pt returns to C5D2 of Bendeka infusion.  This is pt's last treatment.  Reviewed labs drawn on 1/28/2020.  Pt reports he had hiccups today and it caused an episode of n/v.   Pt denies nausea currently.  24g PIV established to L-hand with positive blood return noted.  Zofran given as pre-medication.  Bendeka infused without issue.   PIV flushed and site removed.  Site wrapped with pressure dressing.  Confirmed tomorrow's appt time for Neulasta with pt.  Pt dc home to self care.

## 2020-01-31 ENCOUNTER — OUTPATIENT INFUSION SERVICES (OUTPATIENT)
Dept: ONCOLOGY | Facility: MEDICAL CENTER | Age: 75
End: 2020-01-31
Attending: INTERNAL MEDICINE
Payer: MEDICARE

## 2020-01-31 VITALS
TEMPERATURE: 98.4 F | BODY MASS INDEX: 29.34 KG/M2 | WEIGHT: 186.95 LBS | RESPIRATION RATE: 18 BRPM | HEIGHT: 67 IN | OXYGEN SATURATION: 99 % | DIASTOLIC BLOOD PRESSURE: 61 MMHG | SYSTOLIC BLOOD PRESSURE: 115 MMHG | HEART RATE: 88 BPM

## 2020-01-31 DIAGNOSIS — C82.18 GRADE 2 FOLLICULAR LYMPHOMA OF LYMPH NODES OF MULTIPLE REGIONS (HCC): ICD-10-CM

## 2020-01-31 LAB — IGG SERPL-MCNC: 948 MG/DL (ref 768–1632)

## 2020-01-31 PROCEDURE — 96372 THER/PROPH/DIAG INJ SC/IM: CPT

## 2020-01-31 PROCEDURE — 700111 HCHG RX REV CODE 636 W/ 250 OVERRIDE (IP): Mod: JG | Performed by: INTERNAL MEDICINE

## 2020-01-31 PROCEDURE — 306780 HCHG STAT FOR TRANSFUSION PER CASE

## 2020-01-31 RX ORDER — IBUPROFEN 200 MG
200 TABLET ORAL EVERY 6 HOURS PRN
COMMUNITY
End: 2020-11-03

## 2020-01-31 RX ADMIN — PEGFILGRASTIM 6 MG: 6 INJECTION SUBCUTANEOUS at 17:10

## 2020-02-01 NOTE — PROGRESS NOTES
Here for SQ Neulasta ( first time dose ) 24 hrs post chemotherapy. In good spirits, voices no new complaints. Teaching and education reinforcement provided, also emotional support. SQ injection given in left back upper arm, well tolerated, compression dressing applied. Patient observed for 30 min post injection. No s/s of reaction observed or expressed. Discharge home with family to self care. No further appointments needed.

## 2020-02-04 ENCOUNTER — PATIENT OUTREACH (OUTPATIENT)
Dept: OTHER | Facility: MEDICAL CENTER | Age: 75
End: 2020-02-04

## 2020-02-04 NOTE — PROGRESS NOTES
"On 2-4-20, Oncology Social Worker, Maliha Hein, followed up on Distress of 9, done on 1/31/20 in infusion; no reasons stated. OSNIR Hein contacted pt and pt stated his only complaint is the billing department. Pt stated, \"I almost lost a $5000.00 marta in one day. Noé Vasquez was the only person that saved me. He saved [facility].\" OSNIR Hein offered other forms of support and pt denied. Pt stated, for now I am doing ok and will reach out if I need anything else. OSNIR Hein offered direct contact number and pt denied. “The stress of the billing is the only thing that may kill me. Right now I am cancer free and I will be fine.\"  "

## 2020-03-03 ENCOUNTER — APPOINTMENT (RX ONLY)
Dept: URBAN - METROPOLITAN AREA CLINIC 4 | Facility: CLINIC | Age: 75
Setting detail: DERMATOLOGY
End: 2020-03-03

## 2020-03-03 DIAGNOSIS — D18.0 HEMANGIOMA: ICD-10-CM

## 2020-03-03 DIAGNOSIS — Z85.828 PERSONAL HISTORY OF OTHER MALIGNANT NEOPLASM OF SKIN: ICD-10-CM

## 2020-03-03 DIAGNOSIS — D22 MELANOCYTIC NEVI: ICD-10-CM

## 2020-03-03 DIAGNOSIS — L81.4 OTHER MELANIN HYPERPIGMENTATION: ICD-10-CM

## 2020-03-03 DIAGNOSIS — L82.1 OTHER SEBORRHEIC KERATOSIS: ICD-10-CM

## 2020-03-03 DIAGNOSIS — L72.8 OTHER FOLLICULAR CYSTS OF THE SKIN AND SUBCUTANEOUS TISSUE: ICD-10-CM

## 2020-03-03 PROBLEM — D22.5 MELANOCYTIC NEVI OF TRUNK: Status: ACTIVE | Noted: 2020-03-03

## 2020-03-03 PROBLEM — D48.5 NEOPLASM OF UNCERTAIN BEHAVIOR OF SKIN: Status: ACTIVE | Noted: 2020-03-03

## 2020-03-03 PROBLEM — D18.01 HEMANGIOMA OF SKIN AND SUBCUTANEOUS TISSUE: Status: ACTIVE | Noted: 2020-03-03

## 2020-03-03 PROCEDURE — 11102 TANGNTL BX SKIN SINGLE LES: CPT

## 2020-03-03 PROCEDURE — ? OBSERVATION

## 2020-03-03 PROCEDURE — ? BIOPSY BY SHAVE METHOD

## 2020-03-03 PROCEDURE — 99213 OFFICE O/P EST LOW 20 MIN: CPT | Mod: 25

## 2020-03-03 ASSESSMENT — LOCATION DETAILED DESCRIPTION DERM
LOCATION DETAILED: LEFT SUPERIOR PARIETAL SCALP
LOCATION DETAILED: RIGHT MEDIAL UPPER BACK
LOCATION DETAILED: LEFT INFERIOR LATERAL MALAR CHEEK
LOCATION DETAILED: LEFT INFERIOR CENTRAL MALAR CHEEK
LOCATION DETAILED: LEFT SUPERIOR MEDIAL UPPER BACK
LOCATION DETAILED: INFERIOR THORACIC SPINE
LOCATION DETAILED: LEFT MEDIAL UPPER BACK
LOCATION DETAILED: RIGHT DISTAL DORSAL FOREARM
LOCATION DETAILED: LEFT DISTAL ULNAR DORSAL FOREARM
LOCATION DETAILED: LEFT SUPERIOR MEDIAL MIDBACK
LOCATION DETAILED: LEFT PROXIMAL DORSAL FOREARM
LOCATION DETAILED: RIGHT ELBOW

## 2020-03-03 ASSESSMENT — LOCATION SIMPLE DESCRIPTION DERM
LOCATION SIMPLE: RIGHT UPPER BACK
LOCATION SIMPLE: LEFT LOWER BACK
LOCATION SIMPLE: LEFT FOREARM
LOCATION SIMPLE: UPPER BACK
LOCATION SIMPLE: LEFT CHEEK
LOCATION SIMPLE: SCALP
LOCATION SIMPLE: RIGHT FOREARM
LOCATION SIMPLE: LEFT UPPER BACK
LOCATION SIMPLE: RIGHT ELBOW

## 2020-03-03 ASSESSMENT — LOCATION ZONE DERM
LOCATION ZONE: SCALP
LOCATION ZONE: ARM
LOCATION ZONE: TRUNK
LOCATION ZONE: FACE

## 2020-03-03 NOTE — PROCEDURE: BIOPSY BY SHAVE METHOD
Detail Level: Detailed
Depth Of Biopsy: dermis
Was A Bandage Applied: Yes
Size Of Lesion In Cm: 0
Anticipated Plan (Based On Presumed Biopsy Results): C&D
Biopsy Type: H and E
Biopsy Method: Personna blade
Anesthesia Type: 1% lidocaine with epinephrine and a 1:10 solution of 8.4% sodium bicarbonate
Anesthesia Volume In Cc: 1.5
Hemostasis: Drysol and Electrocautery
Wound Care: Vaseline
Dressing: Band-Aid
Destruction After The Procedure: No
Type Of Destruction Used: Curettage
Curettage Text: The wound bed was treated with curettage after the biopsy was performed.
Electrodesiccation Text: The wound bed was treated with electrodesiccation after the biopsy was performed.
Electrodesiccation And Curettage Text: The wound bed was treated with electrodesiccation and curettage after the biopsy was performed.
Lab: [id number]
Lab Facility: 
Consent: Written consent was obtained and risks were reviewed including but not limited to scarring, infection, bleeding, scabbing, incomplete removal, nerve damage and allergy to anesthesia.
Post-Care Instructions: I reviewed with the patient in detail post-care instructions. Patient is to keep the biopsy site dry overnight, and then apply vasaline twice daily until healed.
Notification Instructions: Patient will be notified of biopsy results. However, patient instructed to call the office if not contacted within 2 weeks.
Billing Type: Third-Party Bill

## 2020-03-05 ENCOUNTER — PATIENT OUTREACH (OUTPATIENT)
Dept: OTHER | Facility: MEDICAL CENTER | Age: 75
End: 2020-03-05

## 2020-03-05 NOTE — PROGRESS NOTES
On 2-5-20, Oncology Social Worker, Maliha Hein, followed up with pt and inquired on status and current needs. Pt stated, “I am doing okay. I continue to be cancer free and I get a PET scan once a year to verify my cancer status. I do have a lot of medical bills but Noé is helping me with all of them.” MARIA ELENA Hein inquired on basic needs such as food and housing. Pt stated, “I have food in my fridge, I pay rent a week early each month, and I get paid tomorrow to address my other needs. I also get my social security next week so I will be fine.” MARIA ELENA Hein offered to provide referrals. Pt declined and stated, “Everything takes too long to get so I just make do on my own. I also work 3 days and 1 night a week at the [employer] so that helps supplement some things.”   MARIA ELENA Hein encouraged pt to contact if needing social support/resources or has questions and/or concerns. Pt thanked MARIA ELENA Hein and stated he would reach out if something changes.

## 2020-05-22 ENCOUNTER — HOSPITAL ENCOUNTER (OUTPATIENT)
Dept: LAB | Facility: MEDICAL CENTER | Age: 75
End: 2020-05-22
Attending: INTERNAL MEDICINE
Payer: MEDICARE

## 2020-05-22 LAB
ALBUMIN SERPL BCP-MCNC: 4.1 G/DL (ref 3.2–4.9)
ALBUMIN/GLOB SERPL: 1.5 G/DL
ALP SERPL-CCNC: 70 U/L (ref 30–99)
ALT SERPL-CCNC: 23 U/L (ref 2–50)
ANION GAP SERPL CALC-SCNC: 13 MMOL/L (ref 7–16)
AST SERPL-CCNC: 26 U/L (ref 12–45)
BASOPHILS # BLD AUTO: 0.9 % (ref 0–1.8)
BASOPHILS # BLD: 0.03 K/UL (ref 0–0.12)
BILIRUB SERPL-MCNC: 0.6 MG/DL (ref 0.1–1.5)
BUN SERPL-MCNC: 14 MG/DL (ref 8–22)
CALCIUM SERPL-MCNC: 9.4 MG/DL (ref 8.5–10.5)
CHLORIDE SERPL-SCNC: 100 MMOL/L (ref 96–112)
CO2 SERPL-SCNC: 23 MMOL/L (ref 20–33)
CREAT SERPL-MCNC: 0.82 MG/DL (ref 0.5–1.4)
EOSINOPHIL # BLD AUTO: 0.15 K/UL (ref 0–0.51)
EOSINOPHIL NFR BLD: 4.4 % (ref 0–6.9)
ERYTHROCYTE [DISTWIDTH] IN BLOOD BY AUTOMATED COUNT: 44.5 FL (ref 35.9–50)
GLOBULIN SER CALC-MCNC: 2.7 G/DL (ref 1.9–3.5)
GLUCOSE SERPL-MCNC: 100 MG/DL (ref 65–99)
HCT VFR BLD AUTO: 45.7 % (ref 42–52)
HGB BLD-MCNC: 15.6 G/DL (ref 14–18)
IMM GRANULOCYTES # BLD AUTO: 0.05 K/UL (ref 0–0.11)
IMM GRANULOCYTES NFR BLD AUTO: 1.5 % (ref 0–0.9)
LDH SERPL L TO P-CCNC: 228 U/L (ref 107–266)
LYMPHOCYTES # BLD AUTO: 0.45 K/UL (ref 1–4.8)
LYMPHOCYTES NFR BLD: 13.3 % (ref 22–41)
MCH RBC QN AUTO: 32.2 PG (ref 27–33)
MCHC RBC AUTO-ENTMCNC: 34.1 G/DL (ref 33.7–35.3)
MCV RBC AUTO: 94.2 FL (ref 81.4–97.8)
MONOCYTES # BLD AUTO: 0.44 K/UL (ref 0–0.85)
MONOCYTES NFR BLD AUTO: 13 % (ref 0–13.4)
NEUTROPHILS # BLD AUTO: 2.27 K/UL (ref 1.82–7.42)
NEUTROPHILS NFR BLD: 66.9 % (ref 44–72)
NRBC # BLD AUTO: 0 K/UL
NRBC BLD-RTO: 0 /100 WBC
PLATELET # BLD AUTO: 171 K/UL (ref 164–446)
PMV BLD AUTO: 9.7 FL (ref 9–12.9)
POTASSIUM SERPL-SCNC: 4.5 MMOL/L (ref 3.6–5.5)
PROT SERPL-MCNC: 6.8 G/DL (ref 6–8.2)
RBC # BLD AUTO: 4.85 M/UL (ref 4.7–6.1)
SODIUM SERPL-SCNC: 136 MMOL/L (ref 135–145)
WBC # BLD AUTO: 3.4 K/UL (ref 4.8–10.8)

## 2020-05-22 PROCEDURE — 85025 COMPLETE CBC W/AUTO DIFF WBC: CPT

## 2020-05-22 PROCEDURE — 36415 COLL VENOUS BLD VENIPUNCTURE: CPT

## 2020-05-22 PROCEDURE — 80053 COMPREHEN METABOLIC PANEL: CPT

## 2020-05-22 PROCEDURE — 83615 LACTATE (LD) (LDH) ENZYME: CPT

## 2020-05-26 ENCOUNTER — HOSPITAL ENCOUNTER (OUTPATIENT)
Dept: RADIOLOGY | Facility: MEDICAL CENTER | Age: 75
End: 2020-05-26
Attending: INTERNAL MEDICINE
Payer: MEDICARE

## 2020-05-26 DIAGNOSIS — C82.05 FOLICLAR LYMPH GRADE I, NODES OF ING REGION AND LOWER LIMB (HCC): ICD-10-CM

## 2020-05-26 PROCEDURE — 700117 HCHG RX CONTRAST REV CODE 255: Performed by: INTERNAL MEDICINE

## 2020-05-26 PROCEDURE — 71260 CT THORAX DX C+: CPT

## 2020-05-26 RX ADMIN — IOHEXOL 100 ML: 350 INJECTION, SOLUTION INTRAVENOUS at 11:18

## 2020-05-26 RX ADMIN — IOHEXOL 25 ML: 240 INJECTION, SOLUTION INTRATHECAL; INTRAVASCULAR; INTRAVENOUS; ORAL at 11:05

## 2020-09-01 ENCOUNTER — APPOINTMENT (RX ONLY)
Dept: URBAN - METROPOLITAN AREA CLINIC 4 | Facility: CLINIC | Age: 75
Setting detail: DERMATOLOGY
End: 2020-09-01

## 2020-09-01 DIAGNOSIS — L72.8 OTHER FOLLICULAR CYSTS OF THE SKIN AND SUBCUTANEOUS TISSUE: ICD-10-CM

## 2020-09-01 DIAGNOSIS — L81.4 OTHER MELANIN HYPERPIGMENTATION: ICD-10-CM

## 2020-09-01 DIAGNOSIS — D18.0 HEMANGIOMA: ICD-10-CM

## 2020-09-01 DIAGNOSIS — Z85.828 PERSONAL HISTORY OF OTHER MALIGNANT NEOPLASM OF SKIN: ICD-10-CM

## 2020-09-01 DIAGNOSIS — D22 MELANOCYTIC NEVI: ICD-10-CM

## 2020-09-01 DIAGNOSIS — L82.1 OTHER SEBORRHEIC KERATOSIS: ICD-10-CM

## 2020-09-01 PROBLEM — D18.01 HEMANGIOMA OF SKIN AND SUBCUTANEOUS TISSUE: Status: ACTIVE | Noted: 2020-09-01

## 2020-09-01 PROBLEM — D22.5 MELANOCYTIC NEVI OF TRUNK: Status: ACTIVE | Noted: 2020-09-01

## 2020-09-01 PROCEDURE — ? DEFER

## 2020-09-01 PROCEDURE — 99213 OFFICE O/P EST LOW 20 MIN: CPT

## 2020-09-01 PROCEDURE — ? OBSERVATION

## 2020-09-01 ASSESSMENT — LOCATION DETAILED DESCRIPTION DERM
LOCATION DETAILED: LEFT INFERIOR LATERAL MALAR CHEEK
LOCATION DETAILED: LEFT INFERIOR CENTRAL MALAR CHEEK
LOCATION DETAILED: LEFT PROXIMAL DORSAL FOREARM
LOCATION DETAILED: LEFT MEDIAL UPPER BACK
LOCATION DETAILED: RIGHT ELBOW
LOCATION DETAILED: LEFT SUPERIOR PARIETAL SCALP
LOCATION DETAILED: LEFT SUPERIOR MEDIAL UPPER BACK
LOCATION DETAILED: RIGHT CLAVICULAR NECK
LOCATION DETAILED: LEFT SUPERIOR MEDIAL MIDBACK
LOCATION DETAILED: LEFT DISTAL ULNAR DORSAL FOREARM
LOCATION DETAILED: RIGHT MEDIAL UPPER BACK
LOCATION DETAILED: INFERIOR THORACIC SPINE
LOCATION DETAILED: RIGHT DISTAL DORSAL FOREARM
LOCATION DETAILED: STERNUM

## 2020-09-01 ASSESSMENT — LOCATION SIMPLE DESCRIPTION DERM
LOCATION SIMPLE: RIGHT ANTERIOR NECK
LOCATION SIMPLE: RIGHT ELBOW
LOCATION SIMPLE: UPPER BACK
LOCATION SIMPLE: RIGHT FOREARM
LOCATION SIMPLE: CHEST
LOCATION SIMPLE: SCALP
LOCATION SIMPLE: LEFT LOWER BACK
LOCATION SIMPLE: LEFT FOREARM
LOCATION SIMPLE: RIGHT UPPER BACK
LOCATION SIMPLE: LEFT UPPER BACK
LOCATION SIMPLE: LEFT CHEEK

## 2020-09-01 ASSESSMENT — LOCATION ZONE DERM
LOCATION ZONE: NECK
LOCATION ZONE: FACE
LOCATION ZONE: ARM
LOCATION ZONE: TRUNK
LOCATION ZONE: SCALP

## 2020-09-01 NOTE — PROCEDURE: REASSURANCE
Detail Level: Zone
Include Location In Plan?: Yes
Detail Level: Detailed
Additional Notes (Optional): Lesion on the back is bothersome and enlarging and he would like this one removed.  I will have him schedule an excision appointment for removal. 1.9cm
Hide Additional Notes?: No

## 2020-09-08 ENCOUNTER — APPOINTMENT (RX ONLY)
Dept: URBAN - METROPOLITAN AREA CLINIC 4 | Facility: CLINIC | Age: 75
Setting detail: DERMATOLOGY
End: 2020-09-08

## 2020-09-08 DIAGNOSIS — L72.8 OTHER FOLLICULAR CYSTS OF THE SKIN AND SUBCUTANEOUS TISSUE: ICD-10-CM

## 2020-09-08 PROCEDURE — 11403 EXC TR-EXT B9+MARG 2.1-3CM: CPT

## 2020-09-08 PROCEDURE — ? EXCISION

## 2020-09-08 PROCEDURE — 12032 INTMD RPR S/A/T/EXT 2.6-7.5: CPT

## 2020-09-08 ASSESSMENT — LOCATION SIMPLE DESCRIPTION DERM: LOCATION SIMPLE: LEFT UPPER BACK

## 2020-09-08 ASSESSMENT — LOCATION DETAILED DESCRIPTION DERM: LOCATION DETAILED: LEFT SUPERIOR MEDIAL UPPER BACK

## 2020-09-08 ASSESSMENT — LOCATION ZONE DERM: LOCATION ZONE: TRUNK

## 2020-09-08 NOTE — PROCEDURE: EXCISION
Medical Necessity Information: It is in your best interest to select a reason for this procedure from the list below. All of these items fulfill various CMS LCD requirements except lesion extends to a margin.
Include Z78.9 (Other Specified Conditions Influencing Health Status) As An Associated Diagnosis?: Yes
Add Nub Associated Diagnoses If Applicable When Selecting Medical Necessity: No
Medical Necessity Clause: This procedure was medically necessary because the lesion that was treated was:
Lab: [id number]
Lab Facility: 
Surgeon Performing Repair (Optional): Pramod
Biopsy Photograph Reviewed: No (no photograph available)
Size Of Lesion In Cm: 1.9
X Size Of Lesion In Cm (Optional): 0
Size Of Margin In Cm: 0.1
Anesthesia Volume In Cc: 8
Excision Method: Fusiform
Repair Type: Intermediate
Suturegard Retention Suture: 2-0 Nylon
Retention Suture Bite Size: 3 mm
Length To Time In Minutes Device Was In Place: 10
Number Of Hemigard Strips Per Side: 1
Intermediate / Complex Repair - Final Wound Length In Cm: 3.1
Undermining Type: Entire Wound
Debridement Text: The wound edges were debrided prior to proceeding with the closure to facilitate wound healing.
Helical Rim Text: The closure involved the helical rim.
Vermilion Border Text: The closure involved the vermilion border.
Nostril Rim Text: The closure involved the nostril rim.
Retention Suture Text: Retention sutures were placed to support the closure and prevent dehiscence.
Suture Removal: 13 days
Epidermal Closure Graft Donor Site (Optional): simple interrupted
Graft Donor Site Bandage (Optional-Leave Blank If You Don't Want In Note): Steri-strips and a pressure bandage were applied to the donor site.
Detail Level: Detailed
Excision Depth: adipose tissue
Scalpel Size: 15 blade
Anesthesia Type: 1% lidocaine with 1:100,000 epinephrine and a 1:10 solution of 8.4% sodium bicarbonate
Hemostasis: Electrocautery
Estimated Blood Loss (Cc): minimal
Deep Sutures: 4-0 Polysorb
Number Of Deep Sutures (Optional): 2
Epidermal Sutures: 4-0 Nylon
Epidermal Closure: running cuticular
Wound Care: Vaseline
Dressing: pressure dressing
Suturegard Intro: Intraoperative tissue expansion was performed, utilizing the SUTUREGARD device, in order to reduce wound tension.
Suturegard Body: The suture ends were repeatedly re-tightened and re-clamped to achieve the desired tissue expansion.
Hemigard Intro: Due to skin fragility and wound tension, it was decided to use HEMIGARD adhesive retention suture devices to permit a linear closure. The skin was cleaned and dried for a 6cm distance away from the wound. Excessive hair, if present, was removed to allow for adhesion.
Hemigard Postcare Instructions: The HEMIGARD strips are to remain completely dry for at least 5-7 days.
Complex Repair Preamble Text (Leave Blank If You Do Not Want): Extensive wide undermining was performed.
Intermediate Repair Preamble Text (Leave Blank If You Do Not Want): Undermining was performed with blunt dissection.
Fusiform Excision Additional Text (Leave Blank If You Do Not Want): The margin was drawn around the clinically apparent lesion.  A fusiform shape was then drawn on the skin incorporating the lesion and margins.  Incisions were then made along these lines to the appropriate tissue plane and the lesion was extirpated.
Eliptical Excision Additional Text (Leave Blank If You Do Not Want): The margin was drawn around the clinically apparent lesion.  An elliptical shape was then drawn on the skin incorporating the lesion and margins.  Incisions were then made along these lines to the appropriate tissue plane and the lesion was extirpated.
Saucerization Excision Additional Text (Leave Blank If You Do Not Want): The margin was drawn around the clinically apparent lesion.  Incisions were then made along these lines, in a tangential fashion, to the appropriate tissue plane and the lesion was extirpated.
Slit Excision Additional Text (Leave Blank If You Do Not Want): A linear line was drawn on the skin overlying the lesion. An incision was made slowly until the lesion was visualized.  Once visualized, the lesion was removed with blunt dissection.
Excisional Biopsy Additional Text (Leave Blank If You Do Not Want): The margin was drawn around the clinically apparent lesion. An elliptical shape was then drawn on the skin incorporating the lesion and margins.  Incisions were then made along these lines to the appropriate tissue plane and the lesion was extirpated.
Perilesional Excision Additional Text (Leave Blank If You Do Not Want): The margin was drawn around the clinically apparent lesion. Incisions were then made along these lines to the appropriate tissue plane and the lesion was extirpated.
Repair Performed By Another Provider Text (Leave Blank If You Do Not Want): After the tissue was excised the defect was repaired by another provider.
No Repair - Repaired With Adjacent Surgical Defect Text (Leave Blank If You Do Not Want): After the excision the defect was repaired concurrently with another surgical defect which was in close approximation.
Advancement Flap (Single) Text: The defect edges were debeveled with a #15 scalpel blade.  Given the location of the defect and the proximity to free margins a single advancement flap was deemed most appropriate.  Using a sterile surgical marker, an appropriate advancement flap was drawn incorporating the defect and placing the expected incisions within the relaxed skin tension lines where possible.    The area thus outlined was incised deep to adipose tissue with a #15 scalpel blade.  The skin margins were undermined to an appropriate distance in all directions utilizing iris scissors.
Advancement Flap (Double) Text: The defect edges were debeveled with a #15 scalpel blade.  Given the location of the defect and the proximity to free margins a double advancement flap was deemed most appropriate.  Using a sterile surgical marker, the appropriate advancement flaps were drawn incorporating the defect and placing the expected incisions within the relaxed skin tension lines where possible.    The area thus outlined was incised deep to adipose tissue with a #15 scalpel blade.  The skin margins were undermined to an appropriate distance in all directions utilizing iris scissors.
Burow's Advancement Flap Text: The defect edges were debeveled with a #15 scalpel blade.  Given the location of the defect and the proximity to free margins a Burow's advancement flap was deemed most appropriate.  Using a sterile surgical marker, the appropriate advancement flap was drawn incorporating the defect and placing the expected incisions within the relaxed skin tension lines where possible.    The area thus outlined was incised deep to adipose tissue with a #15 scalpel blade.  The skin margins were undermined to an appropriate distance in all directions utilizing iris scissors.
Chonodrocutaneous Helical Advancement Flap Text: The defect edges were debeveled with a #15 scalpel blade.  Given the location of the defect and the proximity to free margins a chondrocutaneous helical advancement flap was deemed most appropriate.  Using a sterile surgical marker, the appropriate advancement flap was drawn incorporating the defect and placing the expected incisions within the relaxed skin tension lines where possible.    The area thus outlined was incised deep to adipose tissue with a #15 scalpel blade.  The skin margins were undermined to an appropriate distance in all directions utilizing iris scissors.
Crescentic Advancement Flap Text: The defect edges were debeveled with a #15 scalpel blade.  Given the location of the defect and the proximity to free margins a crescentic advancement flap was deemed most appropriate.  Using a sterile surgical marker, the appropriate advancement flap was drawn incorporating the defect and placing the expected incisions within the relaxed skin tension lines where possible.    The area thus outlined was incised deep to adipose tissue with a #15 scalpel blade.  The skin margins were undermined to an appropriate distance in all directions utilizing iris scissors.
A-T Advancement Flap Text: The defect edges were debeveled with a #15 scalpel blade.  Given the location of the defect, shape of the defect and the proximity to free margins an A-T advancement flap was deemed most appropriate.  Using a sterile surgical marker, an appropriate advancement flap was drawn incorporating the defect and placing the expected incisions within the relaxed skin tension lines where possible.    The area thus outlined was incised deep to adipose tissue with a #15 scalpel blade.  The skin margins were undermined to an appropriate distance in all directions utilizing iris scissors.
O-T Advancement Flap Text: The defect edges were debeveled with a #15 scalpel blade.  Given the location of the defect, shape of the defect and the proximity to free margins an O-T advancement flap was deemed most appropriate.  Using a sterile surgical marker, an appropriate advancement flap was drawn incorporating the defect and placing the expected incisions within the relaxed skin tension lines where possible.    The area thus outlined was incised deep to adipose tissue with a #15 scalpel blade.  The skin margins were undermined to an appropriate distance in all directions utilizing iris scissors.
O-L Flap Text: The defect edges were debeveled with a #15 scalpel blade.  Given the location of the defect, shape of the defect and the proximity to free margins an O-L flap was deemed most appropriate.  Using a sterile surgical marker, an appropriate advancement flap was drawn incorporating the defect and placing the expected incisions within the relaxed skin tension lines where possible.    The area thus outlined was incised deep to adipose tissue with a #15 scalpel blade.  The skin margins were undermined to an appropriate distance in all directions utilizing iris scissors.
O-Z Flap Text: The defect edges were debeveled with a #15 scalpel blade.  Given the location of the defect, shape of the defect and the proximity to free margins an O-Z flap was deemed most appropriate.  Using a sterile surgical marker, an appropriate transposition flap was drawn incorporating the defect and placing the expected incisions within the relaxed skin tension lines where possible. The area thus outlined was incised deep to adipose tissue with a #15 scalpel blade.  The skin margins were undermined to an appropriate distance in all directions utilizing iris scissors.
Double O-Z Flap Text: The defect edges were debeveled with a #15 scalpel blade.  Given the location of the defect, shape of the defect and the proximity to free margins a Double O-Z flap was deemed most appropriate.  Using a sterile surgical marker, an appropriate transposition flap was drawn incorporating the defect and placing the expected incisions within the relaxed skin tension lines where possible. The area thus outlined was incised deep to adipose tissue with a #15 scalpel blade.  The skin margins were undermined to an appropriate distance in all directions utilizing iris scissors.
V-Y Flap Text: The defect edges were debeveled with a #15 scalpel blade.  Given the location of the defect, shape of the defect and the proximity to free margins a V-Y flap was deemed most appropriate.  Using a sterile surgical marker, an appropriate advancement flap was drawn incorporating the defect and placing the expected incisions within the relaxed skin tension lines where possible.    The area thus outlined was incised deep to adipose tissue with a #15 scalpel blade.  The skin margins were undermined to an appropriate distance in all directions utilizing iris scissors.
Mercedes Flap Text: The defect edges were debeveled with a #15 scalpel blade.  Given the location of the defect, shape of the defect and the proximity to free margins a Mercedes flap was deemed most appropriate.  Using a sterile surgical marker, an appropriate advancement flap was drawn incorporating the defect and placing the expected incisions within the relaxed skin tension lines where possible. The area thus outlined was incised deep to adipose tissue with a #15 scalpel blade.  The skin margins were undermined to an appropriate distance in all directions utilizing iris scissors.
Modified Advancement Flap Text: The defect edges were debeveled with a #15 scalpel blade.  Given the location of the defect, shape of the defect and the proximity to free margins a modified advancement flap was deemed most appropriate.  Using a sterile surgical marker, an appropriate advancement flap was drawn incorporating the defect and placing the expected incisions within the relaxed skin tension lines where possible.    The area thus outlined was incised deep to adipose tissue with a #15 scalpel blade.  The skin margins were undermined to an appropriate distance in all directions utilizing iris scissors.
Mucosal Advancement Flap Text: Given the location of the defect, shape of the defect and the proximity to free margins a mucosal advancement flap was deemed most appropriate. Incisions were made with a 15 blade scalpel in the appropriate fashion along the cutaneous vermillion border and the mucosal lip. The remaining actinically damaged mucosal tissue was excised.  The mucosal advancement flap was then elevated to the gingival sulcus with care taken to preserve the neurovascular structures and advanced into the primary defect. Care was taken to ensure that precise realignment of the vermilion border was achieved.
Hatchet Flap Text: The defect edges were debeveled with a #15 scalpel blade.  Given the location of the defect, shape of the defect and the proximity to free margins a hatchet flap was deemed most appropriate.  Using a sterile surgical marker, an appropriate hatchet flap was drawn incorporating the defect and placing the expected incisions within the relaxed skin tension lines where possible.    The area thus outlined was incised deep to adipose tissue with a #15 scalpel blade.  The skin margins were undermined to an appropriate distance in all directions utilizing iris scissors.
Rotation Flap Text: The defect edges were debeveled with a #15 scalpel blade.  Given the location of the defect, shape of the defect and the proximity to free margins a rotation flap was deemed most appropriate.  Using a sterile surgical marker, an appropriate rotation flap was drawn incorporating the defect and placing the expected incisions within the relaxed skin tension lines where possible.    The area thus outlined was incised deep to adipose tissue with a #15 scalpel blade.  The skin margins were undermined to an appropriate distance in all directions utilizing iris scissors.
Spiral Flap Text: The defect edges were debeveled with a #15 scalpel blade.  Given the location of the defect, shape of the defect and the proximity to free margins a spiral flap was deemed most appropriate.  Using a sterile surgical marker, an appropriate rotation flap was drawn incorporating the defect and placing the expected incisions within the relaxed skin tension lines where possible. The area thus outlined was incised deep to adipose tissue with a #15 scalpel blade.  The skin margins were undermined to an appropriate distance in all directions utilizing iris scissors.
Star Wedge Flap Text: The defect edges were debeveled with a #15 scalpel blade.  Given the location of the defect, shape of the defect and the proximity to free margins a star wedge flap was deemed most appropriate.  Using a sterile surgical marker, an appropriate rotation flap was drawn incorporating the defect and placing the expected incisions within the relaxed skin tension lines where possible. The area thus outlined was incised deep to adipose tissue with a #15 scalpel blade.  The skin margins were undermined to an appropriate distance in all directions utilizing iris scissors.
Transposition Flap Text: The defect edges were debeveled with a #15 scalpel blade.  Given the location of the defect and the proximity to free margins a transposition flap was deemed most appropriate.  Using a sterile surgical marker, an appropriate transposition flap was drawn incorporating the defect.    The area thus outlined was incised deep to adipose tissue with a #15 scalpel blade.  The skin margins were undermined to an appropriate distance in all directions utilizing iris scissors.
Muscle Hinge Flap Text: The defect edges were debeveled with a #15 scalpel blade.  Given the size, depth and location of the defect and the proximity to free margins a muscle hinge flap was deemed most appropriate.  Using a sterile surgical marker, an appropriate hinge flap was drawn incorporating the defect. The area thus outlined was incised with a #15 scalpel blade.  The skin margins were undermined to an appropriate distance in all directions utilizing iris scissors.
Melolabial Transposition Flap Text: The defect edges were debeveled with a #15 scalpel blade.  Given the location of the defect and the proximity to free margins a melolabial flap was deemed most appropriate.  Using a sterile surgical marker, an appropriate melolabial transposition flap was drawn incorporating the defect.    The area thus outlined was incised deep to adipose tissue with a #15 scalpel blade.  The skin margins were undermined to an appropriate distance in all directions utilizing iris scissors.
Rhombic Flap Text: The defect edges were debeveled with a #15 scalpel blade.  Given the location of the defect and the proximity to free margins a rhombic flap was deemed most appropriate.  Using a sterile surgical marker, an appropriate rhombic flap was drawn incorporating the defect.    The area thus outlined was incised deep to adipose tissue with a #15 scalpel blade.  The skin margins were undermined to an appropriate distance in all directions utilizing iris scissors.
Rhomboid Transposition Flap Text: The defect edges were debeveled with a #15 scalpel blade.  Given the location of the defect and the proximity to free margins a rhomboid transposition flap was deemed most appropriate.  Using a sterile surgical marker, an appropriate rhomboid flap was drawn incorporating the defect.    The area thus outlined was incised deep to adipose tissue with a #15 scalpel blade.  The skin margins were undermined to an appropriate distance in all directions utilizing iris scissors.
Bi-Rhombic Flap Text: The defect edges were debeveled with a #15 scalpel blade.  Given the location of the defect and the proximity to free margins a bi-rhombic flap was deemed most appropriate.  Using a sterile surgical marker, an appropriate rhombic flap was drawn incorporating the defect. The area thus outlined was incised deep to adipose tissue with a #15 scalpel blade.  The skin margins were undermined to an appropriate distance in all directions utilizing iris scissors.
Helical Rim Advancement Flap Text: The defect edges were debeveled with a #15 blade scalpel.  Given the location of the defect and the proximity to free margins (helical rim) a double helical rim advancement flap was deemed most appropriate.  Using a sterile surgical marker, the appropriate advancement flaps were drawn incorporating the defect and placing the expected incisions between the helical rim and antihelix where possible.  The area thus outlined was incised through and through with a #15 scalpel blade.  With a skin hook and iris scissors, the flaps were gently and sharply undermined and freed up.
Bilateral Helical Rim Advancement Flap Text: The defect edges were debeveled with a #15 blade scalpel.  Given the location of the defect and the proximity to free margins (helical rim) a bilateral helical rim advancement flap was deemed most appropriate.  Using a sterile surgical marker, the appropriate advancement flaps were drawn incorporating the defect and placing the expected incisions between the helical rim and antihelix where possible.  The area thus outlined was incised through and through with a #15 scalpel blade.  With a skin hook and iris scissors, the flaps were gently and sharply undermined and freed up.
Ear Star Wedge Flap Text: The defect edges were debeveled with a #15 blade scalpel.  Given the location of the defect and the proximity to free margins (helical rim) an ear star wedge flap was deemed most appropriate.  Using a sterile surgical marker, the appropriate flap was drawn incorporating the defect and placing the expected incisions between the helical rim and antihelix where possible.  The area thus outlined was incised through and through with a #15 scalpel blade.
Banner Transposition Flap Text: The defect edges were debeveled with a #15 scalpel blade.  Given the location of the defect and the proximity to free margins a Banner transposition flap was deemed most appropriate.  Using a sterile surgical marker, an appropriate flap drawn around the defect. The area thus outlined was incised deep to adipose tissue with a #15 scalpel blade.  The skin margins were undermined to an appropriate distance in all directions utilizing iris scissors.
Bilobed Flap Text: The defect edges were debeveled with a #15 scalpel blade.  Given the location of the defect and the proximity to free margins a bilobe flap was deemed most appropriate.  Using a sterile surgical marker, an appropriate bilobe flap drawn around the defect.    The area thus outlined was incised deep to adipose tissue with a #15 scalpel blade.  The skin margins were undermined to an appropriate distance in all directions utilizing iris scissors.
Bilobed Transposition Flap Text: The defect edges were debeveled with a #15 scalpel blade.  Given the location of the defect and the proximity to free margins a bilobed transposition flap was deemed most appropriate.  Using a sterile surgical marker, an appropriate bilobe flap drawn around the defect.    The area thus outlined was incised deep to adipose tissue with a #15 scalpel blade.  The skin margins were undermined to an appropriate distance in all directions utilizing iris scissors.
Trilobed Flap Text: The defect edges were debeveled with a #15 scalpel blade.  Given the location of the defect and the proximity to free margins a trilobed flap was deemed most appropriate.  Using a sterile surgical marker, an appropriate trilobed flap drawn around the defect.    The area thus outlined was incised deep to adipose tissue with a #15 scalpel blade.  The skin margins were undermined to an appropriate distance in all directions utilizing iris scissors.
Dorsal Nasal Flap Text: The defect edges were debeveled with a #15 scalpel blade.  Given the location of the defect and the proximity to free margins a dorsal nasal flap was deemed most appropriate.  Using a sterile surgical marker, an appropriate dorsal nasal flap was drawn around the defect.    The area thus outlined was incised deep to adipose tissue with a #15 scalpel blade.  The skin margins were undermined to an appropriate distance in all directions utilizing iris scissors.
Island Pedicle Flap Text: The defect edges were debeveled with a #15 scalpel blade.  Given the location of the defect, shape of the defect and the proximity to free margins an island pedicle advancement flap was deemed most appropriate.  Using a sterile surgical marker, an appropriate advancement flap was drawn incorporating the defect, outlining the appropriate donor tissue and placing the expected incisions within the relaxed skin tension lines where possible.    The area thus outlined was incised deep to adipose tissue with a #15 scalpel blade.  The skin margins were undermined to an appropriate distance in all directions around the primary defect and laterally outward around the island pedicle utilizing iris scissors.  There was minimal undermining beneath the pedicle flap.
Island Pedicle Flap With Canthal Suspension Text: The defect edges were debeveled with a #15 scalpel blade.  Given the location of the defect, shape of the defect and the proximity to free margins an island pedicle advancement flap was deemed most appropriate.  Using a sterile surgical marker, an appropriate advancement flap was drawn incorporating the defect, outlining the appropriate donor tissue and placing the expected incisions within the relaxed skin tension lines where possible. The area thus outlined was incised deep to adipose tissue with a #15 scalpel blade.  The skin margins were undermined to an appropriate distance in all directions around the primary defect and laterally outward around the island pedicle utilizing iris scissors.  There was minimal undermining beneath the pedicle flap. A suspension suture was placed in the canthal tendon to prevent tension and prevent ectropion.
Alar Island Pedicle Flap Text: The defect edges were debeveled with a #15 scalpel blade.  Given the location of the defect, shape of the defect and the proximity to the alar rim an island pedicle advancement flap was deemed most appropriate.  Using a sterile surgical marker, an appropriate advancement flap was drawn incorporating the defect, outlining the appropriate donor tissue and placing the expected incisions within the nasal ala running parallel to the alar rim. The area thus outlined was incised with a #15 scalpel blade.  The skin margins were undermined minimally to an appropriate distance in all directions around the primary defect and laterally outward around the island pedicle utilizing iris scissors.  There was minimal undermining beneath the pedicle flap.
Double Island Pedicle Flap Text: The defect edges were debeveled with a #15 scalpel blade.  Given the location of the defect, shape of the defect and the proximity to free margins a double island pedicle advancement flap was deemed most appropriate.  Using a sterile surgical marker, an appropriate advancement flap was drawn incorporating the defect, outlining the appropriate donor tissue and placing the expected incisions within the relaxed skin tension lines where possible.    The area thus outlined was incised deep to adipose tissue with a #15 scalpel blade.  The skin margins were undermined to an appropriate distance in all directions around the primary defect and laterally outward around the island pedicle utilizing iris scissors.  There was minimal undermining beneath the pedicle flap.
Island Pedicle Flap-Requiring Vessel Identification Text: The defect edges were debeveled with a #15 scalpel blade.  Given the location of the defect, shape of the defect and the proximity to free margins an island pedicle advancement flap was deemed most appropriate.  Using a sterile surgical marker, an appropriate advancement flap was drawn, based on the axial vessel mentioned above, incorporating the defect, outlining the appropriate donor tissue and placing the expected incisions within the relaxed skin tension lines where possible.    The area thus outlined was incised deep to adipose tissue with a #15 scalpel blade.  The skin margins were undermined to an appropriate distance in all directions around the primary defect and laterally outward around the island pedicle utilizing iris scissors.  There was minimal undermining beneath the pedicle flap.
Keystone Flap Text: The defect edges were debeveled with a #15 scalpel blade.  Given the location of the defect, shape of the defect a keystone flap was deemed most appropriate.  Using a sterile surgical marker, an appropriate keystone flap was drawn incorporating the defect, outlining the appropriate donor tissue and placing the expected incisions within the relaxed skin tension lines where possible. The area thus outlined was incised deep to adipose tissue with a #15 scalpel blade.  The skin margins were undermined to an appropriate distance in all directions around the primary defect and laterally outward around the flap utilizing iris scissors.
O-T Plasty Text: The defect edges were debeveled with a #15 scalpel blade.  Given the location of the defect, shape of the defect and the proximity to free margins an O-T plasty was deemed most appropriate.  Using a sterile surgical marker, an appropriate O-T plasty was drawn incorporating the defect and placing the expected incisions within the relaxed skin tension lines where possible.    The area thus outlined was incised deep to adipose tissue with a #15 scalpel blade.  The skin margins were undermined to an appropriate distance in all directions utilizing iris scissors.
O-Z Plasty Text: The defect edges were debeveled with a #15 scalpel blade.  Given the location of the defect, shape of the defect and the proximity to free margins an O-Z plasty (double transposition flap) was deemed most appropriate.  Using a sterile surgical marker, the appropriate transposition flaps were drawn incorporating the defect and placing the expected incisions within the relaxed skin tension lines where possible.    The area thus outlined was incised deep to adipose tissue with a #15 scalpel blade.  The skin margins were undermined to an appropriate distance in all directions utilizing iris scissors.  Hemostasis was achieved with electrocautery.  The flaps were then transposed into place, one clockwise and the other counterclockwise, and anchored with interrupted buried subcutaneous sutures.
Double O-Z Plasty Text: The defect edges were debeveled with a #15 scalpel blade.  Given the location of the defect, shape of the defect and the proximity to free margins a Double O-Z plasty (double transposition flap) was deemed most appropriate.  Using a sterile surgical marker, the appropriate transposition flaps were drawn incorporating the defect and placing the expected incisions within the relaxed skin tension lines where possible. The area thus outlined was incised deep to adipose tissue with a #15 scalpel blade.  The skin margins were undermined to an appropriate distance in all directions utilizing iris scissors.  Hemostasis was achieved with electrocautery.  The flaps were then transposed into place, one clockwise and the other counterclockwise, and anchored with interrupted buried subcutaneous sutures.
V-Y Plasty Text: The defect edges were debeveled with a #15 scalpel blade.  Given the location of the defect, shape of the defect and the proximity to free margins an V-Y advancement flap was deemed most appropriate.  Using a sterile surgical marker, an appropriate advancement flap was drawn incorporating the defect and placing the expected incisions within the relaxed skin tension lines where possible.    The area thus outlined was incised deep to adipose tissue with a #15 scalpel blade.  The skin margins were undermined to an appropriate distance in all directions utilizing iris scissors.
H Plasty Text: Given the location of the defect, shape of the defect and the proximity to free margins a H-plasty was deemed most appropriate for repair.  Using a sterile surgical marker, the appropriate advancement arms of the H-plasty were drawn incorporating the defect and placing the expected incisions within the relaxed skin tension lines where possible. The area thus outlined was incised deep to adipose tissue with a #15 scalpel blade. The skin margins were undermined to an appropriate distance in all directions utilizing iris scissors.  The opposing advancement arms were then advanced into place in opposite direction and anchored with interrupted buried subcutaneous sutures.
W Plasty Text: The lesion was extirpated to the level of the fat with a #15 scalpel blade.  Given the location of the defect, shape of the defect and the proximity to free margins a W-plasty was deemed most appropriate for repair.  Using a sterile surgical marker, the appropriate transposition arms of the W-plasty were drawn incorporating the defect and placing the expected incisions within the relaxed skin tension lines where possible.    The area thus outlined was incised deep to adipose tissue with a #15 scalpel blade.  The skin margins were undermined to an appropriate distance in all directions utilizing iris scissors.  The opposing transposition arms were then transposed into place in opposite direction and anchored with interrupted buried subcutaneous sutures.
Z Plasty Text: The lesion was extirpated to the level of the fat with a #15 scalpel blade.  Given the location of the defect, shape of the defect and the proximity to free margins a Z-plasty was deemed most appropriate for repair.  Using a sterile surgical marker, the appropriate transposition arms of the Z-plasty were drawn incorporating the defect and placing the expected incisions within the relaxed skin tension lines where possible.    The area thus outlined was incised deep to adipose tissue with a #15 scalpel blade.  The skin margins were undermined to an appropriate distance in all directions utilizing iris scissors.  The opposing transposition arms were then transposed into place in opposite direction and anchored with interrupted buried subcutaneous sutures.
Zygomaticofacial Flap Text: Given the location of the defect, shape of the defect and the proximity to free margins a zygomaticofacial flap was deemed most appropriate for repair.  Using a sterile surgical marker, the appropriate flap was drawn incorporating the defect and placing the expected incisions within the relaxed skin tension lines where possible. The area thus outlined was incised deep to adipose tissue with a #15 scalpel blade with preservation of a vascular pedicle.  The skin margins were undermined to an appropriate distance in all directions utilizing iris scissors.  The flap was then placed into the defect and anchored with interrupted buried subcutaneous sutures.
Cheek Interpolation Flap Text: A decision was made to reconstruct the defect utilizing an interpolation axial flap and a staged reconstruction.  A telfa template was made of the defect.  This telfa template was then used to outline the Cheek Interpolation flap.  The donor area for the pedicle flap was then injected with anesthesia.  The flap was excised through the skin and subcutaneous tissue down to the layer of the underlying musculature.  The interpolation flap was carefully excised within this deep plane to maintain its blood supply.  The edges of the donor site were undermined.   The donor site was closed in a primary fashion.  The pedicle was then rotated into position and sutured.  Once the tube was sutured into place, adequate blood supply was confirmed with blanching and refill.  The pedicle was then wrapped with xeroform gauze and dressed appropriately with a telfa and gauze bandage to ensure continued blood supply and protect the attached pedicle.
Cheek-To-Nose Interpolation Flap Text: A decision was made to reconstruct the defect utilizing an interpolation axial flap and a staged reconstruction.  A telfa template was made of the defect.  This telfa template was then used to outline the Cheek-To-Nose Interpolation flap.  The donor area for the pedicle flap was then injected with anesthesia.  The flap was excised through the skin and subcutaneous tissue down to the layer of the underlying musculature.  The interpolation flap was carefully excised within this deep plane to maintain its blood supply.  The edges of the donor site were undermined.   The donor site was closed in a primary fashion.  The pedicle was then rotated into position and sutured.  Once the tube was sutured into place, adequate blood supply was confirmed with blanching and refill.  The pedicle was then wrapped with xeroform gauze and dressed appropriately with a telfa and gauze bandage to ensure continued blood supply and protect the attached pedicle.
Interpolation Flap Text: A decision was made to reconstruct the defect utilizing an interpolation axial flap and a staged reconstruction.  A telfa template was made of the defect.  This telfa template was then used to outline the interpolation flap.  The donor area for the pedicle flap was then injected with anesthesia.  The flap was excised through the skin and subcutaneous tissue down to the layer of the underlying musculature.  The interpolation flap was carefully excised within this deep plane to maintain its blood supply.  The edges of the donor site were undermined.   The donor site was closed in a primary fashion.  The pedicle was then rotated into position and sutured.  Once the tube was sutured into place, adequate blood supply was confirmed with blanching and refill.  The pedicle was then wrapped with xeroform gauze and dressed appropriately with a telfa and gauze bandage to ensure continued blood supply and protect the attached pedicle.
Melolabial Interpolation Flap Text: A decision was made to reconstruct the defect utilizing an interpolation axial flap and a staged reconstruction.  A telfa template was made of the defect.  This telfa template was then used to outline the melolabial interpolation flap.  The donor area for the pedicle flap was then injected with anesthesia.  The flap was excised through the skin and subcutaneous tissue down to the layer of the underlying musculature.  The pedicle flap was carefully excised within this deep plane to maintain its blood supply.  The edges of the donor site were undermined.   The donor site was closed in a primary fashion.  The pedicle was then rotated into position and sutured.  Once the tube was sutured into place, adequate blood supply was confirmed with blanching and refill.  The pedicle was then wrapped with xeroform gauze and dressed appropriately with a telfa and gauze bandage to ensure continued blood supply and protect the attached pedicle.
Mastoid Interpolation Flap Text: A decision was made to reconstruct the defect utilizing an interpolation axial flap and a staged reconstruction.  A telfa template was made of the defect.  This telfa template was then used to outline the mastoid interpolation flap.  The donor area for the pedicle flap was then injected with anesthesia.  The flap was excised through the skin and subcutaneous tissue down to the layer of the underlying musculature.  The pedicle flap was carefully excised within this deep plane to maintain its blood supply.  The edges of the donor site were undermined.   The donor site was closed in a primary fashion.  The pedicle was then rotated into position and sutured.  Once the tube was sutured into place, adequate blood supply was confirmed with blanching and refill.  The pedicle was then wrapped with xeroform gauze and dressed appropriately with a telfa and gauze bandage to ensure continued blood supply and protect the attached pedicle.
Posterior Auricular Interpolation Flap Text: A decision was made to reconstruct the defect utilizing an interpolation axial flap and a staged reconstruction.  A telfa template was made of the defect.  This telfa template was then used to outline the posterior auricular interpolation flap.  The donor area for the pedicle flap was then injected with anesthesia.  The flap was excised through the skin and subcutaneous tissue down to the layer of the underlying musculature.  The pedicle flap was carefully excised within this deep plane to maintain its blood supply.  The edges of the donor site were undermined.   The donor site was closed in a primary fashion.  The pedicle was then rotated into position and sutured.  Once the tube was sutured into place, adequate blood supply was confirmed with blanching and refill.  The pedicle was then wrapped with xeroform gauze and dressed appropriately with a telfa and gauze bandage to ensure continued blood supply and protect the attached pedicle.
Paramedian Forehead Flap Text: A decision was made to reconstruct the defect utilizing an interpolation axial flap and a staged reconstruction.  A telfa template was made of the defect.  This telfa template was then used to outline the paramedian forehead pedicle flap.  The donor area for the pedicle flap was then injected with anesthesia.  The flap was excised through the skin and subcutaneous tissue down to the layer of the underlying musculature.  The pedicle flap was carefully excised within this deep plane to maintain its blood supply.  The edges of the donor site were undermined.   The donor site was closed in a primary fashion.  The pedicle was then rotated into position and sutured.  Once the tube was sutured into place, adequate blood supply was confirmed with blanching and refill.  The pedicle was then wrapped with xeroform gauze and dressed appropriately with a telfa and gauze bandage to ensure continued blood supply and protect the attached pedicle.
Lip Wedge Excision Repair Text: Given the location of the defect and the proximity to free margins a full thickness wedge repair was deemed most appropriate.  Using a sterile surgical marker, the appropriate repair was drawn incorporating the defect and placing the expected incisions perpendicular to the vermilion border.  The vermilion border was also meticulously outlined to ensure appropriate reapproximation during the repair.  The area thus outlined was incised through and through with a #15 scalpel blade.  The muscularis and dermis were reaproximated with deep sutures following hemostasis. Care was taken to realign the vermilion border before proceeding with the superficial closure.  Once the vermilion was realigned the superfical and mucosal closure was finished.
Ftsg Text: The defect edges were debeveled with a #15 scalpel blade.  Given the location of the defect, shape of the defect and the proximity to free margins a full thickness skin graft was deemed most appropriate.  Using a sterile surgical marker, the primary defect shape was transferred to the donor site. The area thus outlined was incised deep to adipose tissue with a #15 scalpel blade.  The harvested graft was then trimmed of adipose tissue until only dermis and epidermis was left.  The skin margins of the secondary defect were undermined to an appropriate distance in all directions utilizing iris scissors.  The secondary defect was closed with interrupted buried subcutaneous sutures.  The skin edges were then re-apposed with running  sutures.  The skin graft was then placed in the primary defect and oriented appropriately.
Split-Thickness Skin Graft Text: The defect edges were debeveled with a #15 scalpel blade.  Given the location of the defect, shape of the defect and the proximity to free margins a split thickness skin graft was deemed most appropriate.  Using a sterile surgical marker, the primary defect shape was transferred to the donor site. The split thickness graft was then harvested.  The skin graft was then placed in the primary defect and oriented appropriately.
Burow's Graft Text: The defect edges were debeveled with a #15 scalpel blade.  Given the location of the defect, shape of the defect, the proximity to free margins and the presence of a standing cone deformity a Burow's skin graft was deemed most appropriate. The standing cone was removed and this tissue was then trimmed to the shape of the primary defect. The adipose tissue was also removed until only dermis and epidermis were left.  The skin margins of the secondary defect were undermined to an appropriate distance in all directions utilizing iris scissors.  The secondary defect was closed with interrupted buried subcutaneous sutures.  The skin edges were then re-apposed with running  sutures.  The skin graft was then placed in the primary defect and oriented appropriately.
Cartilage Graft Text: The defect edges were debeveled with a #15 scalpel blade.  Given the location of the defect, shape of the defect, the fact the defect involved a full thickness cartilage defect a cartilage graft was deemed most appropriate.  An appropriate donor site was identified, cleansed, and anesthetized. The cartilage graft was then harvested and transferred to the recipient site, oriented appropriately and then sutured into place.  The secondary defect was then repaired using a primary closure.
Composite Graft Text: The defect edges were debeveled with a #15 scalpel blade.  Given the location of the defect, shape of the defect, the proximity to free margins and the fact the defect was full thickness a composite graft was deemed most appropriate.  The defect was outline and then transferred to the donor site.  A full thickness graft was then excised from the donor site. The graft was then placed in the primary defect, oriented appropriately and then sutured into place.  The secondary defect was then repaired using a primary closure.
Epidermal Autograft Text: The defect edges were debeveled with a #15 scalpel blade.  Given the location of the defect, shape of the defect and the proximity to free margins an epidermal autograft was deemed most appropriate.  Using a sterile surgical marker, the primary defect shape was transferred to the donor site. The epidermal graft was then harvested.  The skin graft was then placed in the primary defect and oriented appropriately.
Dermal Autograft Text: The defect edges were debeveled with a #15 scalpel blade.  Given the location of the defect, shape of the defect and the proximity to free margins a dermal autograft was deemed most appropriate.  Using a sterile surgical marker, the primary defect shape was transferred to the donor site. The area thus outlined was incised deep to adipose tissue with a #15 scalpel blade.  The harvested graft was then trimmed of adipose and epidermal tissue until only dermis was left.  The skin graft was then placed in the primary defect and oriented appropriately.
Skin Substitute Text: The defect edges were debeveled with a #15 scalpel blade.  Given the location of the defect, shape of the defect and the proximity to free margins a skin substitute graft was deemed most appropriate.  The graft material was trimmed to fit the size of the defect. The graft was then placed in the primary defect and oriented appropriately.
Tissue Cultured Epidermal Autograft Text: The defect edges were debeveled with a #15 scalpel blade.  Given the location of the defect, shape of the defect and the proximity to free margins a tissue cultured epidermal autograft was deemed most appropriate.  The graft was then trimmed to fit the size of the defect.  The graft was then placed in the primary defect and oriented appropriately.
Xenograft Text: The defect edges were debeveled with a #15 scalpel blade.  Given the location of the defect, shape of the defect and the proximity to free margins a xenograft was deemed most appropriate.  The graft was then trimmed to fit the size of the defect.  The graft was then placed in the primary defect and oriented appropriately.
Purse String (Intermediate) Text: Given the location of the defect and the characteristics of the surrounding skin a purse string intermediate closure was deemed most appropriate.  Undermining was performed circumferentially around the surgical defect.  A purse string suture was then placed and tightened.
Purse String (Simple) Text: Given the location of the defect and the characteristics of the surrounding skin a purse string simple closure was deemed most appropriate.  Undermining was performed circumferentially around the surgical defect.  A purse string suture was then placed and tightened.
Complex Repair And Single Advancement Flap Text: The defect edges were debeveled with a #15 scalpel blade.  The primary defect was closed partially with a complex linear closure.  Given the location of the remaining defect, shape of the defect and the proximity to free margins a single advancement flap was deemed most appropriate for complete closure of the defect.  Using a sterile surgical marker, an appropriate advancement flap was drawn incorporating the defect and placing the expected incisions within the relaxed skin tension lines where possible.    The area thus outlined was incised deep to adipose tissue with a #15 scalpel blade.  The skin margins were undermined to an appropriate distance in all directions utilizing iris scissors.
Complex Repair And Double Advancement Flap Text: The defect edges were debeveled with a #15 scalpel blade.  The primary defect was closed partially with a complex linear closure.  Given the location of the remaining defect, shape of the defect and the proximity to free margins a double advancement flap was deemed most appropriate for complete closure of the defect.  Using a sterile surgical marker, an appropriate advancement flap was drawn incorporating the defect and placing the expected incisions within the relaxed skin tension lines where possible.    The area thus outlined was incised deep to adipose tissue with a #15 scalpel blade.  The skin margins were undermined to an appropriate distance in all directions utilizing iris scissors.
Complex Repair And Modified Advancement Flap Text: The defect edges were debeveled with a #15 scalpel blade.  The primary defect was closed partially with a complex linear closure.  Given the location of the remaining defect, shape of the defect and the proximity to free margins a modified advancement flap was deemed most appropriate for complete closure of the defect.  Using a sterile surgical marker, an appropriate advancement flap was drawn incorporating the defect and placing the expected incisions within the relaxed skin tension lines where possible.    The area thus outlined was incised deep to adipose tissue with a #15 scalpel blade.  The skin margins were undermined to an appropriate distance in all directions utilizing iris scissors.
Complex Repair And A-T Advancement Flap Text: The defect edges were debeveled with a #15 scalpel blade.  The primary defect was closed partially with a complex linear closure.  Given the location of the remaining defect, shape of the defect and the proximity to free margins an A-T advancement flap was deemed most appropriate for complete closure of the defect.  Using a sterile surgical marker, an appropriate advancement flap was drawn incorporating the defect and placing the expected incisions within the relaxed skin tension lines where possible.    The area thus outlined was incised deep to adipose tissue with a #15 scalpel blade.  The skin margins were undermined to an appropriate distance in all directions utilizing iris scissors.
Complex Repair And O-T Advancement Flap Text: The defect edges were debeveled with a #15 scalpel blade.  The primary defect was closed partially with a complex linear closure.  Given the location of the remaining defect, shape of the defect and the proximity to free margins an O-T advancement flap was deemed most appropriate for complete closure of the defect.  Using a sterile surgical marker, an appropriate advancement flap was drawn incorporating the defect and placing the expected incisions within the relaxed skin tension lines where possible.    The area thus outlined was incised deep to adipose tissue with a #15 scalpel blade.  The skin margins were undermined to an appropriate distance in all directions utilizing iris scissors.
Complex Repair And O-L Flap Text: The defect edges were debeveled with a #15 scalpel blade.  The primary defect was closed partially with a complex linear closure.  Given the location of the remaining defect, shape of the defect and the proximity to free margins an O-L flap was deemed most appropriate for complete closure of the defect.  Using a sterile surgical marker, an appropriate flap was drawn incorporating the defect and placing the expected incisions within the relaxed skin tension lines where possible.    The area thus outlined was incised deep to adipose tissue with a #15 scalpel blade.  The skin margins were undermined to an appropriate distance in all directions utilizing iris scissors.
Complex Repair And Bilobe Flap Text: The defect edges were debeveled with a #15 scalpel blade.  The primary defect was closed partially with a complex linear closure.  Given the location of the remaining defect, shape of the defect and the proximity to free margins a bilobe flap was deemed most appropriate for complete closure of the defect.  Using a sterile surgical marker, an appropriate advancement flap was drawn incorporating the defect and placing the expected incisions within the relaxed skin tension lines where possible.    The area thus outlined was incised deep to adipose tissue with a #15 scalpel blade.  The skin margins were undermined to an appropriate distance in all directions utilizing iris scissors.
Complex Repair And Melolabial Flap Text: The defect edges were debeveled with a #15 scalpel blade.  The primary defect was closed partially with a complex linear closure.  Given the location of the remaining defect, shape of the defect and the proximity to free margins a melolabial flap was deemed most appropriate for complete closure of the defect.  Using a sterile surgical marker, an appropriate advancement flap was drawn incorporating the defect and placing the expected incisions within the relaxed skin tension lines where possible.    The area thus outlined was incised deep to adipose tissue with a #15 scalpel blade.  The skin margins were undermined to an appropriate distance in all directions utilizing iris scissors.
Complex Repair And Rotation Flap Text: The defect edges were debeveled with a #15 scalpel blade.  The primary defect was closed partially with a complex linear closure.  Given the location of the remaining defect, shape of the defect and the proximity to free margins a rotation flap was deemed most appropriate for complete closure of the defect.  Using a sterile surgical marker, an appropriate advancement flap was drawn incorporating the defect and placing the expected incisions within the relaxed skin tension lines where possible.    The area thus outlined was incised deep to adipose tissue with a #15 scalpel blade.  The skin margins were undermined to an appropriate distance in all directions utilizing iris scissors.
Complex Repair And Rhombic Flap Text: The defect edges were debeveled with a #15 scalpel blade.  The primary defect was closed partially with a complex linear closure.  Given the location of the remaining defect, shape of the defect and the proximity to free margins a rhombic flap was deemed most appropriate for complete closure of the defect.  Using a sterile surgical marker, an appropriate advancement flap was drawn incorporating the defect and placing the expected incisions within the relaxed skin tension lines where possible.    The area thus outlined was incised deep to adipose tissue with a #15 scalpel blade.  The skin margins were undermined to an appropriate distance in all directions utilizing iris scissors.
Complex Repair And Transposition Flap Text: The defect edges were debeveled with a #15 scalpel blade.  The primary defect was closed partially with a complex linear closure.  Given the location of the remaining defect, shape of the defect and the proximity to free margins a transposition flap was deemed most appropriate for complete closure of the defect.  Using a sterile surgical marker, an appropriate advancement flap was drawn incorporating the defect and placing the expected incisions within the relaxed skin tension lines where possible.    The area thus outlined was incised deep to adipose tissue with a #15 scalpel blade.  The skin margins were undermined to an appropriate distance in all directions utilizing iris scissors.
Complex Repair And V-Y Plasty Text: The defect edges were debeveled with a #15 scalpel blade.  The primary defect was closed partially with a complex linear closure.  Given the location of the remaining defect, shape of the defect and the proximity to free margins a V-Y plasty was deemed most appropriate for complete closure of the defect.  Using a sterile surgical marker, an appropriate advancement flap was drawn incorporating the defect and placing the expected incisions within the relaxed skin tension lines where possible.    The area thus outlined was incised deep to adipose tissue with a #15 scalpel blade.  The skin margins were undermined to an appropriate distance in all directions utilizing iris scissors.
Complex Repair And M Plasty Text: The defect edges were debeveled with a #15 scalpel blade.  The primary defect was closed partially with a complex linear closure.  Given the location of the remaining defect, shape of the defect and the proximity to free margins an M plasty was deemed most appropriate for complete closure of the defect.  Using a sterile surgical marker, an appropriate advancement flap was drawn incorporating the defect and placing the expected incisions within the relaxed skin tension lines where possible.    The area thus outlined was incised deep to adipose tissue with a #15 scalpel blade.  The skin margins were undermined to an appropriate distance in all directions utilizing iris scissors.
Complex Repair And Double M Plasty Text: The defect edges were debeveled with a #15 scalpel blade.  The primary defect was closed partially with a complex linear closure.  Given the location of the remaining defect, shape of the defect and the proximity to free margins a double M plasty was deemed most appropriate for complete closure of the defect.  Using a sterile surgical marker, an appropriate advancement flap was drawn incorporating the defect and placing the expected incisions within the relaxed skin tension lines where possible.    The area thus outlined was incised deep to adipose tissue with a #15 scalpel blade.  The skin margins were undermined to an appropriate distance in all directions utilizing iris scissors.
Complex Repair And W Plasty Text: The defect edges were debeveled with a #15 scalpel blade.  The primary defect was closed partially with a complex linear closure.  Given the location of the remaining defect, shape of the defect and the proximity to free margins a W plasty was deemed most appropriate for complete closure of the defect.  Using a sterile surgical marker, an appropriate advancement flap was drawn incorporating the defect and placing the expected incisions within the relaxed skin tension lines where possible.    The area thus outlined was incised deep to adipose tissue with a #15 scalpel blade.  The skin margins were undermined to an appropriate distance in all directions utilizing iris scissors.
Complex Repair And Z Plasty Text: The defect edges were debeveled with a #15 scalpel blade.  The primary defect was closed partially with a complex linear closure.  Given the location of the remaining defect, shape of the defect and the proximity to free margins a Z plasty was deemed most appropriate for complete closure of the defect.  Using a sterile surgical marker, an appropriate advancement flap was drawn incorporating the defect and placing the expected incisions within the relaxed skin tension lines where possible.    The area thus outlined was incised deep to adipose tissue with a #15 scalpel blade.  The skin margins were undermined to an appropriate distance in all directions utilizing iris scissors.
Complex Repair And Dorsal Nasal Flap Text: The defect edges were debeveled with a #15 scalpel blade.  The primary defect was closed partially with a complex linear closure.  Given the location of the remaining defect, shape of the defect and the proximity to free margins a dorsal nasal flap was deemed most appropriate for complete closure of the defect.  Using a sterile surgical marker, an appropriate flap was drawn incorporating the defect and placing the expected incisions within the relaxed skin tension lines where possible.    The area thus outlined was incised deep to adipose tissue with a #15 scalpel blade.  The skin margins were undermined to an appropriate distance in all directions utilizing iris scissors.
Complex Repair And Ftsg Text: The defect edges were debeveled with a #15 scalpel blade.  The primary defect was closed partially with a complex linear closure.  Given the location of the defect, shape of the defect and the proximity to free margins a full thickness skin graft was deemed most appropriate to repair the remaining defect.  The graft was trimmed to fit the size of the remaining defect.  The graft was then placed in the primary defect, oriented appropriately, and sutured into place.
Complex Repair And Burow's Graft Text: The defect edges were debeveled with a #15 scalpel blade.  The primary defect was closed partially with a complex linear closure.  Given the location of the defect, shape of the defect, the proximity to free margins and the presence of a standing cone deformity a Burow's graft was deemed most appropriate to repair the remaining defect.  The graft was trimmed to fit the size of the remaining defect.  The graft was then placed in the primary defect, oriented appropriately, and sutured into place.
Complex Repair And Split-Thickness Skin Graft Text: The defect edges were debeveled with a #15 scalpel blade.  The primary defect was closed partially with a complex linear closure.  Given the location of the defect, shape of the defect and the proximity to free margins a split thickness skin graft was deemed most appropriate to repair the remaining defect.  The graft was trimmed to fit the size of the remaining defect.  The graft was then placed in the primary defect, oriented appropriately, and sutured into place.
Complex Repair And Epidermal Autograft Text: The defect edges were debeveled with a #15 scalpel blade.  The primary defect was closed partially with a complex linear closure.  Given the location of the defect, shape of the defect and the proximity to free margins an epidermal autograft was deemed most appropriate to repair the remaining defect.  The graft was trimmed to fit the size of the remaining defect.  The graft was then placed in the primary defect, oriented appropriately, and sutured into place.
Complex Repair And Dermal Autograft Text: The defect edges were debeveled with a #15 scalpel blade.  The primary defect was closed partially with a complex linear closure.  Given the location of the defect, shape of the defect and the proximity to free margins an dermal autograft was deemed most appropriate to repair the remaining defect.  The graft was trimmed to fit the size of the remaining defect.  The graft was then placed in the primary defect, oriented appropriately, and sutured into place.
Complex Repair And Tissue Cultured Epidermal Autograft Text: The defect edges were debeveled with a #15 scalpel blade.  The primary defect was closed partially with a complex linear closure.  Given the location of the defect, shape of the defect and the proximity to free margins an tissue cultured epidermal autograft was deemed most appropriate to repair the remaining defect.  The graft was trimmed to fit the size of the remaining defect.  The graft was then placed in the primary defect, oriented appropriately, and sutured into place.
Complex Repair And Xenograft Text: The defect edges were debeveled with a #15 scalpel blade.  The primary defect was closed partially with a complex linear closure.  Given the location of the defect, shape of the defect and the proximity to free margins a xenograft was deemed most appropriate to repair the remaining defect.  The graft was trimmed to fit the size of the remaining defect.  The graft was then placed in the primary defect, oriented appropriately, and sutured into place.
Complex Repair And Skin Substitute Graft Text: The defect edges were debeveled with a #15 scalpel blade.  The primary defect was closed partially with a complex linear closure.  Given the location of the remaining defect, shape of the defect and the proximity to free margins a skin substitute graft was deemed most appropriate to repair the remaining defect.  The graft was trimmed to fit the size of the remaining defect.  The graft was then placed in the primary defect, oriented appropriately, and sutured into place.
Path Notes (To The Dermatopathologist): Please check margins.
Consent: Verbal consent was obtained from the patient. The risks and benefits to therapy were discussed in detail. Specifically, the risks of infection, scarring, bleeding, prolonged wound healing, incomplete removal, allergy to anesthesia, nerve injury and recurrence were addressed. Prior to the procedure, the treatment site was clearly identified and confirmed by the patient. All components of Universal Protocol/PAUSE Rule completed.
Post-Care Instructions: I reviewed with the patient in detail post-care instructions. Patient is not to engage in any heavy lifting, exercise, or swimming for the next 14 days. Should the patient develop any fevers, chills, bleeding, severe pain patient will contact the office immediately.
Where Do You Want The Question To Include Opioid Counseling Located?: Case Summary Tab
Billing Type: Third-Party Bill
Information: Selecting Yes will display possible errors in your note based on the variables you have selected. This validation is only offered as a suggestion for you. PLEASE NOTE THAT THE VALIDATION TEXT WILL BE REMOVED WHEN YOU FINALIZE YOUR NOTE. IF YOU WANT TO FAX A PRELIMINARY NOTE YOU WILL NEED TO TOGGLE THIS TO 'NO' IF YOU DO NOT WANT IT IN YOUR FAXED NOTE.

## 2020-09-21 ENCOUNTER — APPOINTMENT (RX ONLY)
Dept: URBAN - METROPOLITAN AREA CLINIC 4 | Facility: CLINIC | Age: 75
Setting detail: DERMATOLOGY
End: 2020-09-21

## 2020-09-21 DIAGNOSIS — Z48.02 ENCOUNTER FOR REMOVAL OF SUTURES: ICD-10-CM

## 2020-09-21 PROCEDURE — ? SUTURE REMOVAL (NO GLOBAL PERIOD)

## 2020-09-21 ASSESSMENT — LOCATION SIMPLE DESCRIPTION DERM: LOCATION SIMPLE: LEFT UPPER BACK

## 2020-09-21 ASSESSMENT — LOCATION DETAILED DESCRIPTION DERM: LOCATION DETAILED: LEFT SUPERIOR MEDIAL UPPER BACK

## 2020-09-21 ASSESSMENT — LOCATION ZONE DERM: LOCATION ZONE: TRUNK

## 2020-10-30 ENCOUNTER — OFFICE VISIT (OUTPATIENT)
Dept: URGENT CARE | Facility: CLINIC | Age: 75
End: 2020-10-30
Payer: MEDICARE

## 2020-10-30 VITALS
OXYGEN SATURATION: 97 % | HEART RATE: 84 BPM | SYSTOLIC BLOOD PRESSURE: 118 MMHG | DIASTOLIC BLOOD PRESSURE: 60 MMHG | BODY MASS INDEX: 29.4 KG/M2 | WEIGHT: 194 LBS | HEIGHT: 68 IN | RESPIRATION RATE: 18 BRPM | TEMPERATURE: 98.1 F

## 2020-10-30 DIAGNOSIS — L03.032 CELLULITIS OF GREAT TOE OF LEFT FOOT: ICD-10-CM

## 2020-10-30 PROCEDURE — 99203 OFFICE O/P NEW LOW 30 MIN: CPT | Mod: 25 | Performed by: PHYSICIAN ASSISTANT

## 2020-10-30 RX ORDER — SULFAMETHOXAZOLE AND TRIMETHOPRIM 800; 160 MG/1; MG/1
1 TABLET ORAL 2 TIMES DAILY
Qty: 14 TAB | Refills: 0 | Status: ON HOLD | OUTPATIENT
Start: 2020-10-30 | End: 2020-11-15

## 2020-10-30 ASSESSMENT — ENCOUNTER SYMPTOMS
NAUSEA: 0
TINGLING: 0
SORE THROAT: 0
CHILLS: 0
BLURRED VISION: 0
SENSORY CHANGE: 0
SHORTNESS OF BREATH: 0
FEVER: 0
PALPITATIONS: 0
VOMITING: 0

## 2020-10-30 ASSESSMENT — FIBROSIS 4 INDEX: FIB4 SCORE: 2.28

## 2020-10-30 NOTE — PROGRESS NOTES
Subjective:      Marcello Hair is a 74 y.o. male who presents with Toe Pain    HPI:  This is a new problem. Ronal Hair is a 74 y.o. male who presents today for evaluation of left toe pain. He states that 9 days ago he had his left big toenail removed at his primary care's office because he had an ingrown toenail.  He thought he was keeping it clean by putting hydrogen peroxide and Neosporin on it but a few days after the procedure it started to swell up and get a little bit red.  4 days ago he noticed that some of the swelling and redness was extending into his foot and the second/third toes as well.  He says that he finally decided to come back in to the office to get it checked out because it started to drain pus last night but his PCPs office is closed until Tuesday of next week.  He came here for evaluation instead.  Patient has no history of diabetes.  He has not had any fever/chills or numbness/tingling.  Pain is worse with ambulation.      Review of Systems   Constitutional: Negative for chills and fever.   HENT: Negative for sore throat.    Eyes: Negative for blurred vision.   Respiratory: Negative for shortness of breath.    Cardiovascular: Negative for chest pain and palpitations.   Gastrointestinal: Negative for nausea and vomiting.   Musculoskeletal: Negative for joint pain.   Skin:        Infection of left great toe   Neurological: Negative for tingling and sensory change.       PMH:  has no past medical history on file.  MEDS:   Current Outpatient Medications:   •  sulfamethoxazole-trimethoprim (BACTRIM DS) 800-160 MG tablet, Take 1 Tab by mouth 2 times a day for 7 days., Disp: 14 Tab, Rfl: 0  •  ibuprofen (MOTRIN) 200 MG Tab, Take 200 mg by mouth every 6 hours as needed., Disp: , Rfl:   •  atorvastatin (LIPITOR) 40 MG Tab, ATORVASTATIN CALCIUM 40 MG TABS, Disp: , Rfl:   •  sildenafil citrate (VIAGRA) 25 MG Tab, SILDENAFIL CITRATE 25 MG TABS, Disp: , Rfl:   •  ondansetron (ZOFRAN) 4 MG Tab  "tablet, Take 1 Tab by mouth every four hours as needed for Nausea/Vomiting (for nausea, vomiting)., Disp: 30 Tab, Rfl: 6  •  prochlorperazine (COMPAZINE) 10 MG Tab, Take 1 Tab by mouth every 6 hours as needed (for nausea, vomiting)., Disp: 30 Tab, Rfl: 6  •  acyclovir (ZOVIRAX) 200 MG Cap, Take 800 mg by mouth 5 Times a Day., Disp: , Rfl:     Current Facility-Administered Medications:   •  cefTRIAXone (ROCEPHIN) 1 g, lidocaine (XYLOCAINE) 1 % 3.6 mL for IM use, 1 g, Intramuscular, Once, RAMIRO VelazquezAMichaela-CRISTINE  ALLERGIES:   Allergies   Allergen Reactions   • Vicodin [Hydrocodone-Acetaminophen]      nausea     SURGHX: No past surgical history on file.  SOCHX:    FH: Family history was reviewed, no pertinent findings to report     Objective:     /60   Pulse 84   Temp 36.7 °C (98.1 °F) (Temporal)   Resp 18   Ht 1.727 m (5' 8\")   Wt 88 kg (194 lb)   SpO2 97%   BMI 29.50 kg/m²      Physical Exam  Constitutional:       Appearance: He is well-developed.   HENT:      Head: Normocephalic and atraumatic.      Right Ear: External ear normal.      Left Ear: External ear normal.   Eyes:      Conjunctiva/sclera: Conjunctivae normal.      Pupils: Pupils are equal, round, and reactive to light.   Cardiovascular:      Rate and Rhythm: Normal rate and regular rhythm.      Heart sounds: Normal heart sounds. No murmur.   Pulmonary:      Effort: Pulmonary effort is normal.      Breath sounds: Normal breath sounds. No wheezing.   Musculoskeletal:      Comments: Left great toenail has been removed and there is overlying scab on the nailbed.  There is a little bit of purulent discharge noted more proximally as well.  No fluctuance or abscess seen.  The entire toe is erythematous and edematous and this extends into the second and third toes as well as into the distal aspect of the foot.  This area is also tender to palpation, worse at the distal aspect of the toe.  No streaking from the wound.  Patient has full ROM of " affected toe/foot.  Sensation intact.  Cap refill less than 2 seconds.  DP pulses 2+ and symmetric bilaterally.  Antalgic gait.   Skin:     General: Skin is warm and dry.      Capillary Refill: Capillary refill takes less than 2 seconds.   Neurological:      Mental Status: He is alert and oriented to person, place, and time.   Psychiatric:         Behavior: Behavior normal.         Judgment: Judgment normal.            Assessment/Plan:     1. Cellulitis of great toe of left foot  - cefTRIAXone (ROCEPHIN) 1 g, lidocaine (XYLOCAINE) 1 % 3.6 mL for IM use  - sulfamethoxazole-trimethoprim (BACTRIM DS) 800-160 MG tablet; Take 1 Tab by Wound care instructions provided.  OTC analgesics prn  Keep elevated/ice as needed  Worsening/infection precautions given.  Patient states understands agrees with treatment plan and follow up.

## 2020-11-03 ENCOUNTER — HOSPITAL ENCOUNTER (INPATIENT)
Facility: MEDICAL CENTER | Age: 75
LOS: 12 days | DRG: 271 | End: 2020-11-15
Attending: EMERGENCY MEDICINE | Admitting: FAMILY MEDICINE
Payer: MEDICARE

## 2020-11-03 ENCOUNTER — APPOINTMENT (OUTPATIENT)
Dept: RADIOLOGY | Facility: MEDICAL CENTER | Age: 75
DRG: 271 | End: 2020-11-03
Attending: EMERGENCY MEDICINE
Payer: MEDICARE

## 2020-11-03 DIAGNOSIS — I73.9 PERIPHERAL ARTERIAL DISEASE (HCC): Primary | ICD-10-CM

## 2020-11-03 DIAGNOSIS — M86.9 OSTEOMYELITIS OF LEFT FOOT, UNSPECIFIED TYPE (HCC): ICD-10-CM

## 2020-11-03 DIAGNOSIS — I74.5 BILATERAL ILIAC ARTERY OCCLUSION (HCC): ICD-10-CM

## 2020-11-03 DIAGNOSIS — G89.18 POSTOPERATIVE PAIN: ICD-10-CM

## 2020-11-03 LAB
ALBUMIN SERPL BCP-MCNC: 4.5 G/DL (ref 3.2–4.9)
ALBUMIN/GLOB SERPL: 1.6 G/DL
ALP SERPL-CCNC: 75 U/L (ref 30–99)
ALT SERPL-CCNC: 23 U/L (ref 2–50)
ANION GAP SERPL CALC-SCNC: 8 MMOL/L (ref 7–16)
AST SERPL-CCNC: 23 U/L (ref 12–45)
BASOPHILS # BLD AUTO: 0.6 % (ref 0–1.8)
BASOPHILS # BLD: 0.02 K/UL (ref 0–0.12)
BILIRUB SERPL-MCNC: 0.4 MG/DL (ref 0.1–1.5)
BUN SERPL-MCNC: 16 MG/DL (ref 8–22)
CALCIUM SERPL-MCNC: 9.3 MG/DL (ref 8.5–10.5)
CHLORIDE SERPL-SCNC: 95 MMOL/L (ref 96–112)
CO2 SERPL-SCNC: 23 MMOL/L (ref 20–33)
CREAT SERPL-MCNC: 1.02 MG/DL (ref 0.5–1.4)
EOSINOPHIL # BLD AUTO: 0.15 K/UL (ref 0–0.51)
EOSINOPHIL NFR BLD: 4.2 % (ref 0–6.9)
ERYTHROCYTE [DISTWIDTH] IN BLOOD BY AUTOMATED COUNT: 45.3 FL (ref 35.9–50)
GLOBULIN SER CALC-MCNC: 2.8 G/DL (ref 1.9–3.5)
GLUCOSE SERPL-MCNC: 111 MG/DL (ref 65–99)
HCT VFR BLD AUTO: 43.4 % (ref 42–52)
HGB BLD-MCNC: 15 G/DL (ref 14–18)
IMM GRANULOCYTES # BLD AUTO: 0.02 K/UL (ref 0–0.11)
IMM GRANULOCYTES NFR BLD AUTO: 0.6 % (ref 0–0.9)
LYMPHOCYTES # BLD AUTO: 0.62 K/UL (ref 1–4.8)
LYMPHOCYTES NFR BLD: 17.3 % (ref 22–41)
MCH RBC QN AUTO: 32.5 PG (ref 27–33)
MCHC RBC AUTO-ENTMCNC: 34.6 G/DL (ref 33.7–35.3)
MCV RBC AUTO: 93.9 FL (ref 81.4–97.8)
MONOCYTES # BLD AUTO: 0.39 K/UL (ref 0–0.85)
MONOCYTES NFR BLD AUTO: 10.9 % (ref 0–13.4)
NEUTROPHILS # BLD AUTO: 2.38 K/UL (ref 1.82–7.42)
NEUTROPHILS NFR BLD: 66.4 % (ref 44–72)
NRBC # BLD AUTO: 0 K/UL
NRBC BLD-RTO: 0 /100 WBC
PLATELET # BLD AUTO: 226 K/UL (ref 164–446)
PMV BLD AUTO: 9.7 FL (ref 9–12.9)
POTASSIUM SERPL-SCNC: 4.7 MMOL/L (ref 3.6–5.5)
PROT SERPL-MCNC: 7.3 G/DL (ref 6–8.2)
RBC # BLD AUTO: 4.62 M/UL (ref 4.7–6.1)
SODIUM SERPL-SCNC: 126 MMOL/L (ref 135–145)
WBC # BLD AUTO: 3.6 K/UL (ref 4.8–10.8)

## 2020-11-03 PROCEDURE — 700105 HCHG RX REV CODE 258: Performed by: EMERGENCY MEDICINE

## 2020-11-03 PROCEDURE — 96366 THER/PROPH/DIAG IV INF ADDON: CPT

## 2020-11-03 PROCEDURE — 87040 BLOOD CULTURE FOR BACTERIA: CPT

## 2020-11-03 PROCEDURE — A9270 NON-COVERED ITEM OR SERVICE: HCPCS | Performed by: EMERGENCY MEDICINE

## 2020-11-03 PROCEDURE — 700111 HCHG RX REV CODE 636 W/ 250 OVERRIDE (IP): Performed by: EMERGENCY MEDICINE

## 2020-11-03 PROCEDURE — 99285 EMERGENCY DEPT VISIT HI MDM: CPT

## 2020-11-03 PROCEDURE — 96365 THER/PROPH/DIAG IV INF INIT: CPT

## 2020-11-03 PROCEDURE — 96375 TX/PRO/DX INJ NEW DRUG ADDON: CPT

## 2020-11-03 PROCEDURE — 700102 HCHG RX REV CODE 250 W/ 637 OVERRIDE(OP): Performed by: EMERGENCY MEDICINE

## 2020-11-03 PROCEDURE — 73660 X-RAY EXAM OF TOE(S): CPT | Mod: LT

## 2020-11-03 PROCEDURE — 770006 HCHG ROOM/CARE - MED/SURG/GYN SEMI*

## 2020-11-03 PROCEDURE — 36415 COLL VENOUS BLD VENIPUNCTURE: CPT

## 2020-11-03 PROCEDURE — 85025 COMPLETE CBC W/AUTO DIFF WBC: CPT

## 2020-11-03 PROCEDURE — 80053 COMPREHEN METABOLIC PANEL: CPT

## 2020-11-03 PROCEDURE — 96367 TX/PROPH/DG ADDL SEQ IV INF: CPT

## 2020-11-03 RX ORDER — ONDANSETRON 2 MG/ML
4 INJECTION INTRAMUSCULAR; INTRAVENOUS
Status: COMPLETED | OUTPATIENT
Start: 2020-11-03 | End: 2020-11-03

## 2020-11-03 RX ORDER — OXYCODONE HYDROCHLORIDE AND ACETAMINOPHEN 5; 325 MG/1; MG/1
1 TABLET ORAL ONCE
Status: COMPLETED | OUTPATIENT
Start: 2020-11-03 | End: 2020-11-03

## 2020-11-03 RX ADMIN — ONDANSETRON 4 MG: 2 INJECTION INTRAMUSCULAR; INTRAVENOUS at 21:36

## 2020-11-03 RX ADMIN — OXYCODONE HYDROCHLORIDE AND ACETAMINOPHEN 1 TABLET: 5; 325 TABLET ORAL at 21:12

## 2020-11-03 RX ADMIN — VANCOMYCIN HYDROCHLORIDE 2250 MG: 500 INJECTION, POWDER, LYOPHILIZED, FOR SOLUTION INTRAVENOUS at 22:45

## 2020-11-03 RX ADMIN — SODIUM CHLORIDE 3 G: 900 INJECTION INTRAVENOUS at 21:34

## 2020-11-03 ASSESSMENT — LIFESTYLE VARIABLES
EVER HAD A DRINK FIRST THING IN THE MORNING TO STEADY YOUR NERVES TO GET RID OF A HANGOVER: NO
EVER FELT BAD OR GUILTY ABOUT YOUR DRINKING: NO
TOTAL SCORE: 0
TOTAL SCORE: 0
HOW MANY TIMES IN THE PAST YEAR HAVE YOU HAD 5 OR MORE DRINKS IN A DAY: 0
HAVE PEOPLE ANNOYED YOU BY CRITICIZING YOUR DRINKING: NO
TOTAL SCORE: 0
CONSUMPTION TOTAL: NEGATIVE
DO YOU DRINK ALCOHOL: NO
DOES PATIENT WANT TO STOP DRINKING: NO
AVERAGE NUMBER OF DAYS PER WEEK YOU HAVE A DRINK CONTAINING ALCOHOL: 0
HAVE YOU EVER FELT YOU SHOULD CUT DOWN ON YOUR DRINKING: NO
ON A TYPICAL DAY WHEN YOU DRINK ALCOHOL HOW MANY DRINKS DO YOU HAVE: 0

## 2020-11-03 ASSESSMENT — FIBROSIS 4 INDEX: FIB4 SCORE: 2.28

## 2020-11-03 ASSESSMENT — PAIN DESCRIPTION - PAIN TYPE: TYPE: ACUTE PAIN

## 2020-11-03 NOTE — ED TRIAGE NOTES
Ronal Hair presents to triage, wearing a mask, reporting:  Chief Complaint   Patient presents with   • Toe Pain     L great toe. s/p nail removal infected.    Ingrown toenail removal on 10/23, seen at  for infxn on 10/30 and given rocephin + bactrim, s/s still ongoing. Toe is red, edematous upon assessment at this time  Pt denies any respiratory or flu like symptoms at this time.    Pt speaking in full sentences, NAD noted. Pt educated of triage process, placed back in waiting area pending room assignment.

## 2020-11-04 ENCOUNTER — APPOINTMENT (OUTPATIENT)
Dept: RADIOLOGY | Facility: MEDICAL CENTER | Age: 75
DRG: 271 | End: 2020-11-04
Attending: STUDENT IN AN ORGANIZED HEALTH CARE EDUCATION/TRAINING PROGRAM
Payer: MEDICARE

## 2020-11-04 PROCEDURE — A9576 INJ PROHANCE MULTIPACK: HCPCS | Performed by: STUDENT IN AN ORGANIZED HEALTH CARE EDUCATION/TRAINING PROGRAM

## 2020-11-04 PROCEDURE — 700111 HCHG RX REV CODE 636 W/ 250 OVERRIDE (IP): Performed by: STUDENT IN AN ORGANIZED HEALTH CARE EDUCATION/TRAINING PROGRAM

## 2020-11-04 PROCEDURE — C9803 HOPD COVID-19 SPEC COLLECT: HCPCS | Performed by: HOSPITALIST

## 2020-11-04 PROCEDURE — 96372 THER/PROPH/DIAG INJ SC/IM: CPT

## 2020-11-04 PROCEDURE — 700117 HCHG RX CONTRAST REV CODE 255: Performed by: STUDENT IN AN ORGANIZED HEALTH CARE EDUCATION/TRAINING PROGRAM

## 2020-11-04 PROCEDURE — A9270 NON-COVERED ITEM OR SERVICE: HCPCS | Performed by: STUDENT IN AN ORGANIZED HEALTH CARE EDUCATION/TRAINING PROGRAM

## 2020-11-04 PROCEDURE — 700102 HCHG RX REV CODE 250 W/ 637 OVERRIDE(OP): Performed by: STUDENT IN AN ORGANIZED HEALTH CARE EDUCATION/TRAINING PROGRAM

## 2020-11-04 PROCEDURE — 700105 HCHG RX REV CODE 258: Performed by: STUDENT IN AN ORGANIZED HEALTH CARE EDUCATION/TRAINING PROGRAM

## 2020-11-04 PROCEDURE — 96367 TX/PROPH/DG ADDL SEQ IV INF: CPT

## 2020-11-04 PROCEDURE — 96375 TX/PRO/DX INJ NEW DRUG ADDON: CPT

## 2020-11-04 PROCEDURE — 770006 HCHG ROOM/CARE - MED/SURG/GYN SEMI*

## 2020-11-04 PROCEDURE — 73720 MRI LWR EXTREMITY W/O&W/DYE: CPT | Mod: LT

## 2020-11-04 RX ORDER — NICOTINE 21 MG/24HR
21 PATCH, TRANSDERMAL 24 HOURS TRANSDERMAL
Status: DISCONTINUED | OUTPATIENT
Start: 2020-11-04 | End: 2020-11-15 | Stop reason: HOSPADM

## 2020-11-04 RX ORDER — HEPARIN SODIUM 5000 [USP'U]/ML
5000 INJECTION, SOLUTION INTRAVENOUS; SUBCUTANEOUS EVERY 8 HOURS
Status: DISCONTINUED | OUTPATIENT
Start: 2020-11-04 | End: 2020-11-08

## 2020-11-04 RX ORDER — BISACODYL 10 MG
10 SUPPOSITORY, RECTAL RECTAL
Status: DISCONTINUED | OUTPATIENT
Start: 2020-11-04 | End: 2020-11-11

## 2020-11-04 RX ORDER — OXYCODONE HYDROCHLORIDE AND ACETAMINOPHEN 5; 325 MG/1; MG/1
1 TABLET ORAL EVERY 4 HOURS PRN
Status: DISCONTINUED | OUTPATIENT
Start: 2020-11-04 | End: 2020-11-05

## 2020-11-04 RX ORDER — POLYETHYLENE GLYCOL 3350 17 G/17G
1 POWDER, FOR SOLUTION ORAL
Status: DISCONTINUED | OUTPATIENT
Start: 2020-11-04 | End: 2020-11-11

## 2020-11-04 RX ORDER — ACETAMINOPHEN 325 MG/1
650 TABLET ORAL EVERY 6 HOURS PRN
Status: DISCONTINUED | OUTPATIENT
Start: 2020-11-04 | End: 2020-11-15 | Stop reason: HOSPADM

## 2020-11-04 RX ORDER — ATORVASTATIN CALCIUM 40 MG/1
40 TABLET, FILM COATED ORAL EVERY EVENING
Status: DISCONTINUED | OUTPATIENT
Start: 2020-11-04 | End: 2020-11-15 | Stop reason: HOSPADM

## 2020-11-04 RX ORDER — ONDANSETRON 4 MG/1
4 TABLET, ORALLY DISINTEGRATING ORAL EVERY 4 HOURS PRN
Status: DISCONTINUED | OUTPATIENT
Start: 2020-11-04 | End: 2020-11-15 | Stop reason: HOSPADM

## 2020-11-04 RX ORDER — AMOXICILLIN 250 MG
2 CAPSULE ORAL 2 TIMES DAILY
Status: DISCONTINUED | OUTPATIENT
Start: 2020-11-04 | End: 2020-11-11

## 2020-11-04 RX ORDER — SODIUM CHLORIDE 9 MG/ML
INJECTION, SOLUTION INTRAVENOUS CONTINUOUS
Status: DISCONTINUED | OUTPATIENT
Start: 2020-11-04 | End: 2020-11-05

## 2020-11-04 RX ORDER — OXYCODONE HYDROCHLORIDE AND ACETAMINOPHEN 5; 325 MG/1; MG/1
1-2 TABLET ORAL EVERY 4 HOURS PRN
Status: DISCONTINUED | OUTPATIENT
Start: 2020-11-04 | End: 2020-11-15 | Stop reason: HOSPADM

## 2020-11-04 RX ORDER — MORPHINE SULFATE 4 MG/ML
1-2 INJECTION, SOLUTION INTRAMUSCULAR; INTRAVENOUS
Status: DISCONTINUED | OUTPATIENT
Start: 2020-11-04 | End: 2020-11-08

## 2020-11-04 RX ADMIN — SODIUM CHLORIDE: 9 INJECTION, SOLUTION INTRAVENOUS at 01:18

## 2020-11-04 RX ADMIN — NICOTINE TRANSDERMAL SYSTEM 21 MG: 21 PATCH, EXTENDED RELEASE TRANSDERMAL at 07:52

## 2020-11-04 RX ADMIN — HEPARIN SODIUM 5000 UNITS: 5000 INJECTION, SOLUTION INTRAVENOUS; SUBCUTANEOUS at 21:30

## 2020-11-04 RX ADMIN — GADOTERIDOL 19 ML: 279.3 INJECTION, SOLUTION INTRAVENOUS at 09:32

## 2020-11-04 RX ADMIN — MORPHINE SULFATE 2 MG: 4 INJECTION INTRAVENOUS at 11:24

## 2020-11-04 RX ADMIN — HEPARIN SODIUM 5000 UNITS: 5000 INJECTION, SOLUTION INTRAVENOUS; SUBCUTANEOUS at 14:46

## 2020-11-04 RX ADMIN — ATORVASTATIN CALCIUM 40 MG: 40 TABLET, FILM COATED ORAL at 17:26

## 2020-11-04 RX ADMIN — SODIUM CHLORIDE: 9 INJECTION, SOLUTION INTRAVENOUS at 23:40

## 2020-11-04 RX ADMIN — OXYCODONE HYDROCHLORIDE AND ACETAMINOPHEN 2 TABLET: 5; 325 TABLET ORAL at 21:29

## 2020-11-04 RX ADMIN — CEFTRIAXONE SODIUM 2 G: 2 INJECTION, POWDER, FOR SOLUTION INTRAMUSCULAR; INTRAVENOUS at 06:00

## 2020-11-04 RX ADMIN — DOCUSATE SODIUM 50 MG AND SENNOSIDES 8.6 MG 2 TABLET: 8.6; 5 TABLET, FILM COATED ORAL at 06:00

## 2020-11-04 RX ADMIN — DOCUSATE SODIUM 50 MG AND SENNOSIDES 8.6 MG 2 TABLET: 8.6; 5 TABLET, FILM COATED ORAL at 17:26

## 2020-11-04 RX ADMIN — OXYCODONE HYDROCHLORIDE AND ACETAMINOPHEN 2 TABLET: 5; 325 TABLET ORAL at 12:39

## 2020-11-04 RX ADMIN — OXYCODONE HYDROCHLORIDE AND ACETAMINOPHEN 2 TABLET: 5; 325 TABLET ORAL at 17:26

## 2020-11-04 RX ADMIN — OXYCODONE HYDROCHLORIDE AND ACETAMINOPHEN 1 TABLET: 5; 325 TABLET ORAL at 07:55

## 2020-11-04 RX ADMIN — HEPARIN SODIUM 5000 UNITS: 5000 INJECTION, SOLUTION INTRAVENOUS; SUBCUTANEOUS at 01:18

## 2020-11-04 SDOH — ECONOMIC STABILITY: FOOD INSECURITY: WITHIN THE PAST 12 MONTHS, THE FOOD YOU BOUGHT JUST DIDN'T LAST AND YOU DIDN'T HAVE MONEY TO GET MORE.: NEVER TRUE

## 2020-11-04 SDOH — ECONOMIC STABILITY: TRANSPORTATION INSECURITY
IN THE PAST 12 MONTHS, HAS LACK OF TRANSPORTATION KEPT YOU FROM MEETINGS, WORK, OR FROM GETTING THINGS NEEDED FOR DAILY LIVING?: NO

## 2020-11-04 SDOH — ECONOMIC STABILITY: FOOD INSECURITY: WITHIN THE PAST 12 MONTHS, YOU WORRIED THAT YOUR FOOD WOULD RUN OUT BEFORE YOU GOT MONEY TO BUY MORE.: NEVER TRUE

## 2020-11-04 SDOH — ECONOMIC STABILITY: TRANSPORTATION INSECURITY
IN THE PAST 12 MONTHS, HAS THE LACK OF TRANSPORTATION KEPT YOU FROM MEDICAL APPOINTMENTS OR FROM GETTING MEDICATIONS?: NO

## 2020-11-04 ASSESSMENT — PAIN DESCRIPTION - PAIN TYPE
TYPE: ACUTE PAIN

## 2020-11-04 ASSESSMENT — PATIENT HEALTH QUESTIONNAIRE - PHQ9
1. LITTLE INTEREST OR PLEASURE IN DOING THINGS: NOT AT ALL
SUM OF ALL RESPONSES TO PHQ9 QUESTIONS 1 AND 2: 0
2. FEELING DOWN, DEPRESSED, IRRITABLE, OR HOPELESS: NOT AT ALL

## 2020-11-04 ASSESSMENT — COGNITIVE AND FUNCTIONAL STATUS - GENERAL
CLIMB 3 TO 5 STEPS WITH RAILING: A LITTLE
WALKING IN HOSPITAL ROOM: A LITTLE
DRESSING REGULAR LOWER BODY CLOTHING: A LITTLE
SUGGESTED CMS G CODE MODIFIER DAILY ACTIVITY: CI
STANDING UP FROM CHAIR USING ARMS: A LITTLE
DAILY ACTIVITIY SCORE: 23
SUGGESTED CMS G CODE MODIFIER MOBILITY: CJ
MOBILITY SCORE: 21

## 2020-11-04 ASSESSMENT — LIFESTYLE VARIABLES
HAVE YOU EVER FELT YOU SHOULD CUT DOWN ON YOUR DRINKING: NO
EVER HAD A DRINK FIRST THING IN THE MORNING TO STEADY YOUR NERVES TO GET RID OF A HANGOVER: NO
EVER FELT BAD OR GUILTY ABOUT YOUR DRINKING: NO
HAVE PEOPLE ANNOYED YOU BY CRITICIZING YOUR DRINKING: NO
TOTAL SCORE: 0
CONSUMPTION TOTAL: INCOMPLETE
TOTAL SCORE: 0
TOTAL SCORE: 0
ALCOHOL_USE: NO

## 2020-11-04 ASSESSMENT — FIBROSIS 4 INDEX
FIB4 SCORE: 1.53
FIB4 SCORE: 1.53

## 2020-11-04 NOTE — ED NOTES
Med rec updated and complete. Allergies reviewed     Pt is currently on Bactrim start date 10/30/20-11/06/20    Home pharmacy [facility]

## 2020-11-04 NOTE — PROGRESS NOTES
Attending Hospitalist today is Dr Ladd starting at 0700. Please call this Physician for orders, updates and questions.

## 2020-11-04 NOTE — PROGRESS NOTES
"Report received from RN. Assessment completed. Pt in no acute distress asking to be taken off oxygen. Pt sat 98% per pt he has never used oxygen.   Pt irritable wanting to be discharged. Per pt \"I have been here too long with little done. I'm supposed to be getting an MRI done and zander been waiting HOURS for a 5 - 10 min scan.\"  RN over viewed POC with pt and assured pt she would call MRI to get an estimated time.     MRI soon after called this RN. Pt in MRI at this time. Pt impatient wanting to discharge soon after MRI. Placed page for [facility] family for MD notification.   "

## 2020-11-04 NOTE — PROGRESS NOTES
"Pharmacy Kinetics 74 y.o. male on vancomycin day # 1 2020    Currently on Vancomycin 1250mg mg IV q12hr  Provider specified end date: Not Specified    Indication for Treatment: Osteomyelitis    Pertinent history per medical record: Admitted on 11/3/2020 for Osteomyelitis.  Ingrown L big toenail removed on 10/21, 10/30 given IV rocephin + PO bactrim from urgent care, Leukopenia.  PMH current 3 pack/day smoker, PVD, lymphoma.      Other antibiotics: Ceftriaxone 2g q24 hours    Allergies: Vicodin [hydrocodone-acetaminophen]     List concerns for renal function: Advanced age  Pertinent cultures to date:   N/A    MRSA nares swab if pneumonia is a concern (ordered/positive/negative/n-a): N/A    Recent Labs     20  1531   WBC 3.6*   NEUTSPOLYS 66.40     Recent Labs     20  1531   BUN 16   CREATININE 1.02   ALBUMIN 4.5     No results for input(s): VANCOTROUGH, VANCOPEAK, VANCORANDOM in the last 72 hours.No intake or output data in the 24 hours ending 20 0033   BP (!) 162/67   Pulse 91   Temp 36.7 °C (98.1 °F) (Oral)   Resp 18   Ht 1.727 m (5' 8\")   Wt 88 kg (194 lb)   SpO2 90%  Temp (24hrs), Av.6 °C (97.8 °F), Min:36.3 °C (97.3 °F), Max:36.7 °C (98.1 °F)      A/P   1. Vancomycin dose change: New Start  2. Next vancomycin level:  @2230 (before 5th total dose, not ordered)  3. Goal trough: 10-15mcg/ml  4. Comments: Renal clearance as expected for age, although borderline 20mg/kg q24 hour vs 15mg/kg q12 hour empiric maintenance dosing.  15mg/kg q12 hour dosing initiated to expedite time to steady state. Consistent historical renal function.  2250mg loading dose given 11/3 @2245.  Please dose reduce as appropriate following 1st VT.  Pharmacy will continue to monitor.    Krunal Cline, AnnD     "

## 2020-11-04 NOTE — ED NOTES
PT calls RN to ask to leave the hospital to run errands for his roommate.  RN explained that he needs to stay here for MRI.  Pt very anxious, very upset that he does not have a scheduled time for MRI.

## 2020-11-04 NOTE — PROGRESS NOTES
Attempting to notify MD of pt's desire to leave. No call back at this time.   Third page placed to [facility] family.

## 2020-11-04 NOTE — H&P
[facility] – [place], [place] FAMILY MEDICINE HISTORY AND PHYSICAL     PATIENT ID:  NAME:  Ronal Hair  MRN:               [id number]  YOB: 1945    Date of Admission: 11/3/2020     Attending: Dr Alicea        Resident: Dr. Halley Dennis    Primary Care Physician:  Laura Orourke M.D.    CC: Left big toe pain    HPI: Ronal Hair is a 74 y.o. male with past medical history of lymphoma, peripheral vascular disease and a current 3 pack/day smoker, who presented with the above chief complaint.  Patient states that he had his left great toenail removed on October 21, nearly 2 weeks ago because of an ingrown toenail.  Since that time, the toe has been very painful, and the pain is been getting worse.  Most recently, it has kept him from sleeping well at night, and he states he has been waking up every hour because of pain in that foot.  On October 30, he went to an urgent care facility and was given a one-time dose of intramuscular ceftriaxone and prescribed Bactrim; he has been taking the Bactrim daily since, and it has not helped at all.  He states that today he took 2 pills of Bactrim to see if he could help speed things up a little.    Patient denies fever, chills, malaise.  He also denies cough, shortness of breath, nausea, vomiting, diarrhea, change in taste or smell.    Patient denies history of diabetes, high blood pressure (states blood pressures at home are 120/80).  He also denies history of poorly healing wounds.  He has had multiple toenails removed in the past for the same reason without complications.    ED course: Patient afebrile. One isolated hypertensive reading to 162/67.  CBC with leukopenia to 3.6, otherwise within normal limits.  CMP with hyponatremia at 126 and hypochloremia to 95; essentially otherwise within normal limits.  Plain films of toes with suspicion for osteomyelitis of the left great toe, as well as soft tissue swelling.    REVIEW OF SYSTEMS:   Ten systems reviewed and were negative except  as noted in the HPI.                PAST MEDICAL HISTORY:  History reviewed. No pertinent past medical history.    PAST SURGICAL HISTORY:  History reviewed. No pertinent surgical history.    FAMILY HISTORY:  No family history on file.    SOCIAL HISTORY:   Pt lives in [place]  Smoking current 3 packs/day  Etoh use denies  Drug use denies    DIET:   Orders Placed This Encounter   Procedures   • Diet NPO     Standing Status:   Standing     Number of Occurrences:   1     Order Specific Question:   Restrict to:     Answer:   Sips with Medications [3]       ALLERGIES:  Allergies   Allergen Reactions   • Vicodin [Hydrocodone-Acetaminophen]      nausea       OUTPATIENT MEDICATIONS:    Current Facility-Administered Medications:   •  senna-docusate (PERICOLACE or SENOKOT S) 8.6-50 MG per tablet 2 Tab, 2 Tab, Oral, BID **AND** polyethylene glycol/lytes (MIRALAX) PACKET 1 Packet, 1 Packet, Oral, QDAY PRN **AND** magnesium hydroxide (MILK OF MAGNESIA) suspension 30 mL, 30 mL, Oral, QDAY PRN **AND** bisacodyl (DULCOLAX) suppository 10 mg, 10 mg, Rectal, QDAY PRN, Halley Dennis M.D.  •  NS infusion, , Intravenous, Continuous, Halley Dennis M.D.  •  heparin injection 5,000 Units, 5,000 Units, Subcutaneous, Q8HRS, Halley Dennis M.D.  •  acetaminophen (TYLENOL) tablet 650 mg, 650 mg, Oral, Q6HRS PRN, Halley Dennis M.D.  •  nicotine (NICODERM) 21 MG/24HR 21 mg, 21 mg, Transdermal, Daily-0600 **AND** Nicotine Replacement Patient Education Materials, , , Once **AND** nicotine polacrilex (NICORETTE) 2 MG piece 2 mg, 2 mg, Oral, Q HOUR PRN, Halely Dennis M.D.  •  oxyCODONE-acetaminophen (PERCOCET) 5-325 MG per tablet 1 Tab, 1 Tab, Oral, Q4HRS PRN, Halley Dennsi M.D.  •  atorvastatin (LIPITOR) tablet 40 mg, 40 mg, Oral, Q EVENING, Halley Dennis M.D.  •  cefTRIAXone (ROCEPHIN) 2 g in  mL IVPB, 2 g, Intravenous, Q24HRS, Halley Dennis M.D.  •  MD Alert...Vancomycin per  Pharmacy, , Other, PHARMACY TO DOSE, Halley Dennis M.D.  •  vancomycin (VANCOCIN) 2,250 mg in  mL IVPB, 25 mg/kg, Intravenous, Once, Beltran Trinidad M.D., Last Rate: 166.7 mL/hr at 20, 2,250 mg at 20    Current Outpatient Medications:   •  sulfamethoxazole-trimethoprim (BACTRIM DS) 800-160 MG tablet, Take 1 Tab by mouth 2 times a day for 7 days., Disp: 14 Tab, Rfl: 0  •  atorvastatin (LIPITOR) 40 MG Tab, Take 40 mg by mouth every evening., Disp: , Rfl:   •  ondansetron (ZOFRAN) 4 MG Tab tablet, Take 1 Tab by mouth every four hours as needed for Nausea/Vomiting (for nausea, vomiting). (Patient not taking: Reported on 11/3/2020), Disp: 30 Tab, Rfl: 6  •  prochlorperazine (COMPAZINE) 10 MG Tab, Take 1 Tab by mouth every 6 hours as needed (for nausea, vomiting). (Patient not taking: Reported on 11/3/2020), Disp: 30 Tab, Rfl: 6    PHYSICAL EXAM:  Vitals:    20 1526 20 1708 20 1950 20 2244   BP:  147/83 141/74 (!) 162/67   Pulse:  96 91 91   Resp:  18 18    Temp:  36.3 °C (97.3 °F) 36.7 °C (98.1 °F)    TempSrc:  Temporal Oral    SpO2:  97% 96% 90%   Weight: 88 kg (194 lb)      Height:       , Temp (24hrs), Av.6 °C (97.8 °F), Min:36.3 °C (97.3 °F), Max:36.7 °C (98.1 °F)  , Pulse Oximetry: 90 %    General: Pt resting in NAD, cooperative    Skin:  Pink, warm and dry.  No rashes.  Multiple scattered seborrheic keratoses across upper extremities but especially prominent on face.  HEENT: NC/AT. PERRL. EOMI. MMM. No nasal discharge.   Neck:  Supple without lymphadenopathy or rigidity. No JVD   Lungs:  Symmetrical.  CTAB with no W/R/R.  Good air movement   Cardiovascular:  S1/S2 RRR without M/R/G.  Abdomen: Central adiposity.  Abdomen is soft NT/ND. +BS. No masses noted.  Genitourinary:  Deferred.    Extremities:  Full range of motion. No gross deformities noted. 2+ radial pulses at upper extremities bilaterally; thready to 1+ dorsalis pedis pulses bilaterally. No  "C/C/E   Spine:  Straight without vertebral anomalies.  CNS:  Muscle tone is normal. Cranial nerves II-XII grossly intact.          LAB TESTS:   Recent Labs     11/03/20  1531   WBC 3.6*   RBC 4.62*   HEMOGLOBIN 15.0   HEMATOCRIT 43.4   MCV 93.9   MCH 32.5   RDW 45.3   PLATELETCT 226   MPV 9.7   NEUTSPOLYS 66.40   LYMPHOCYTES 17.30*   MONOCYTES 10.90   EOSINOPHILS 4.20   BASOPHILS 0.60         Recent Labs     11/03/20  1531   SODIUM 126*   POTASSIUM 4.7   CHLORIDE 95*   CO2 23   BUN 16   CREATININE 1.02   CALCIUM 9.3   ALBUMIN 4.5       CULTURES:   Results     Procedure Component Value Units Date/Time    BLOOD CULTURE [[id number]] Collected: 11/03/20 2130    Order Status: Completed Specimen: Blood from Peripheral Updated: 11/03/20 2215    Narrative:      Per Hospital Policy: Only change Specimen Src: to \"Line\" if  specified by physician order.    BLOOD CULTURE [[id number]] Collected: 11/03/20 2115    Order Status: Completed Specimen: Blood from Peripheral Updated: 11/03/20 2215    Narrative:      Per Hospital Policy: Only change Specimen Src: to \"Line\" if  specified by physician order.          IMAGES:  DX-TOE(S) 2+ LEFT   Final Result         1.  No acute traumatic bony injury.   2.  Slight permeative appearance of the medial aspect of the first toe distal phalanx, could represent subtle changes of osteomyelitis.      MR-FOOT-WITH LEFT    (Results Pending)         CONSULTS:   none    ASSESSMENT/PLAN: 74 y.o. male with PMH of peripheral vascular disease, heavy tobacco smoking and lymphoma admitted for probable osteomyelitis of left great toe.    #Left great toe osteomyelitis, probable  #Left great toe pain  Patient with worsening pain and swelling of left great toe since outpatient toenail removal October 21.  This, in spite of one dose of intramuscular ceftriaxone at urgent care, followed by oral Bactrim at home.  -MRI with contrast now to confirm osteomyelitis  -Vancomycin and ceftriaxone empiric antibiotic " therapy  -Depending on MRI results, day team to consider orthopedic consult for possible debridement  -NPO at midnight with maintenance fluids until MRI results  -Percocet as needed for pain control    #Hyponatremia  -Maintenance normal saline while NPO  -Repeat BMP in the morning    #Leukopenia without neutropenia  -This appears to be the patient's baseline following lymphoma; no acute need at this time    #Nicotine dependence  Patient is a longtime smoker, currently smoking 3 packs/day.  -Nicotine patch will be provided at patient's request  -Smoking cessation counseling will be provided to the patient    Disposition: inpatient for IV antibiotics vs surgical debridement

## 2020-11-04 NOTE — ED PROVIDER NOTES
ED Provider Note    Scribed for Beltran Trinidad M.D. by Sukumar Roblero. 11/3/2020,  8:56 PM.    Means of Arrival: Walk-in  History obtained from: Patient  History limited by: None    CHIEF COMPLAINT  Chief Complaint   Patient presents with   • Toe Pain     L great toe. s/p nail removal infected.    • Sent by MD     sent by PCP to r/o osteo       HPI  Ronal Hair is a 74 y.o. male who presents to the Emergency Department complaining of left first toe pain. He had ingrown left first toenail removed two weeks ago and states pain and states pain and swelling in the toe has been worsening for about 10 days. He states the pain is keeping him from walking and sleeping. He also states he noticed a little bit of pus excreting around the nail bed. He denies any fever.    PPE Note: I personally donned full PPE for all patient encounters during this visit, including being clean-shaven with an N95 respirator mask, gloves, and goggles.     Scribe remained outside the patient's room and did not have any contact with the patient for the duration of patient encounter.    REVIEW OF SYSTEMS  CONSTITUTIONAL:  No fever.  EXTREMITIES: Pain and swelling in left first toe.    See HPI for further details.   All other systems are negative.     PAST MEDICAL HISTORY  History reviewed. No pertinent past medical history.    FAMILY HISTORY  No family history pertinent    SOCIAL HISTORY   None noted    SURGICAL HISTORY  History reviewed. No pertinent surgical history.    CURRENT MEDICATIONS  Home Medications     Reviewed by Schuyler B Collett, R.N. (Registered Nurse) on 11/03/20 at 1533  Med List Status: <None>   Medication Last Dose Status   acyclovir (ZOVIRAX) 200 MG Cap  Active   atorvastatin (LIPITOR) 40 MG Tab  Active   ibuprofen (MOTRIN) 200 MG Tab  Active   ondansetron (ZOFRAN) 4 MG Tab tablet  Active   prochlorperazine (COMPAZINE) 10 MG Tab  Active   sildenafil citrate (VIAGRA) 25 MG Tab  Active   sulfamethoxazole-trimethoprim  "(BACTRIM DS) 800-160 MG tablet  Active                ALLERGIES  Allergies   Allergen Reactions   • Vicodin [Hydrocodone-Acetaminophen]      nausea       PHYSICAL EXAM  VITAL SIGNS: /74   Pulse 91   Temp 36.7 °C (98.1 °F) (Oral)   Resp 18   Ht 1.727 m (5' 8\")   Wt 88 kg (194 lb)   SpO2 96%   BMI 29.50 kg/m²    Gen: Alert, milddistress  HEENT: ATNC  Eyes: PERRL, EOMI, normal conjunctiva.   Neck: trachea midline  Resp: no respiratory distress  CV: No JVD  Abd: non-distended  Ext: 1+ pitting edema on left shin. 2+ Dorsalis pedis pulses. Toenail absent on left first toe. Swelling of left foot, erythematous left first toe.  Psych: normal mood  Neuro: speech fluent     DIAGNOSTIC STUDIES / PROCEDURES     LABS  Labs Reviewed   CBC WITH DIFFERENTIAL - Abnormal; Notable for the following components:       Result Value    WBC 3.6 (*)     RBC 4.62 (*)     Lymphocytes 17.30 (*)     Lymphs (Absolute) 0.62 (*)     All other components within normal limits   COMP METABOLIC PANEL - Abnormal; Notable for the following components:    Sodium 126 (*)     Chloride 95 (*)     Glucose 111 (*)     All other components within normal limits   ESTIMATED GFR   BLOOD CULTURE    Narrative:     Per Hospital Policy: Only change Specimen Src: to \"Line\" if  specified by physician order.   BLOOD CULTURE    Narrative:     Per Hospital Policy: Only change Specimen Src: to \"Line\" if  specified by physician order.     All labs reviewed by me.    RADIOLOGY  DX-TOE(S) 2+ LEFT   Final Result         1.  No acute traumatic bony injury.   2.  Slight permeative appearance of the medial aspect of the first toe distal phalanx, could represent subtle changes of osteomyelitis.      MR-FOOT-WITH LEFT    (Results Pending)     The radiologist’s interpretation of all radiology studies have been reviewed by me.    COURSE & MEDICAL DECISION MAKING  Pertinent Labs & Imaging studies reviewed. (See chart for details)    8:56 PM - Patient seen and examined at " bedside. Informed patient I would order labs and imaging to evalute his symptoms and order pain medication, and he will most likely stay overnight.Ordered for labs and imaging to evaluate. Patient will be treated with Percocet 1 tab, Vancocin 500 mL IVPB, Zofran 4 mg injection, and Unasyn 100 mL IVPB for his symptoms.    9:52 PM - Patient was reevaluated at bedside. Discussed lab and radiology results with the patient and informed them that his x-ray results are concerning. Discussed plan to admit. Patient was given the opportunity to ask questions.    10:32 PM - I discussed the patient's case and the above findings with Dr. Otero (Hospitalist) who agreed to evaluate the patient for hospitalization.     Medical Decision Making:  Patient presents with great toe pain and redness with tenderness.  Labs reassuring, however x-ray concerning for possible osteomyelitis.  The patient has failed outpatient antibiotic therapy for this.  He has good circulation, no evidence of dry gangrene.  He is not diabetic, will cover for staph and strep.  I do not believe he requires Pseudomonas coverage at this time.  Blood cultures sent, patient will be hospitalized for further work-up.    DISPOSITION:  Patient will be hospitalized by Dr. Otero in guarded condition.     FINAL IMPRESSION  1. Osteomyelitis of left foot, unspecified type (HCC)       Sukumar JUNG ([place]), am scribing for, and in the presence of, Beltran Trinidad M.D..    Electronically signed by: Sukumar Roblero ([place]), 11/3/2020    Beltran JUNG M.D. personally performed the services described in this documentation, as scribed by Sukumar Roblero in my presence, and it is both accurate and complete.    C.    The note accurately reflects work and decisions made by me.  Beltran Trinidad M.D.  11/4/2020  3:54 AM

## 2020-11-04 NOTE — PROGRESS NOTES
"Pharmacy Kinetics 74 y.o. male on vancomycin day # 1 2020    Currently on Vancomycin 1750mg mg IV q24hr  Provider specified end date: Not Specified    Indication for Treatment: Osteomyelitis    Pertinent history per medical record: Admitted on 11/3/2020 for Osteomyelitis.  Ingrown L big toenail removed on 10/21, 10/30 given IV rocephin + PO bactrim from urgent care, Leukopenia.  PMH current 3 pack/day smoker, PVD, lymphoma.      Other antibiotics: Ceftriaxone 2g q24 hours    Allergies: Vicodin [hydrocodone-acetaminophen]     List concerns for renal function: Advanced age, body habitus  Pertinent cultures to date:    blood cx in process    MRSA nares swab if pneumonia is a concern (ordered/positive/negative/n-a): N/A    Recent Labs     20  1531   WBC 3.6*   NEUTSPOLYS 66.40     Recent Labs     20  1531   BUN 16   CREATININE 1.02   ALBUMIN 4.5     No results for input(s): VANCOTROUGH, VANCOPEAK, VANCORANDOM in the last 72 hours.No intake or output data in the 24 hours ending 20 0033   /70   Pulse 88   Temp 36.7 °C (98.1 °F) (Oral)   Resp 18   Ht 1.727 m (5' 8\")   Wt 88 kg (194 lb)   SpO2 99%  Temp (24hrs), Av.6 °C (97.8 °F), Min:36.3 °C (97.3 °F), Max:36.7 °C (98.1 °F)      A/P   1. Vancomycin dose change: vancomycin 1750 mg IV q24hr  2. Next vancomycin level:  @ 75494 (ordered)  3. Goal trough: 10-15mcg/ml  4. Comments: Started on vanco for osteomyelitis. Blood cultures in process. Patient afebrile and HD stable with no leukocytosis. No hx of vanco dosing to assess clearance. Given patient's age and body habitus do anticipate patient to accumulate vancomycin. Renal function is adequate but given above concerns will be conservative with dosing and change from q12 to q24 hr dosing as to reduced risk of vanco induced nephropathy. Trough prior to 3rd total dose as above. Will adjust vanco dosing based on levels and cultures.     Corey Segura, PharmD       "

## 2020-11-05 ENCOUNTER — APPOINTMENT (OUTPATIENT)
Dept: RADIOLOGY | Facility: MEDICAL CENTER | Age: 75
DRG: 271 | End: 2020-11-05
Attending: STUDENT IN AN ORGANIZED HEALTH CARE EDUCATION/TRAINING PROGRAM
Payer: MEDICARE

## 2020-11-05 LAB
ALBUMIN SERPL BCP-MCNC: 3.9 G/DL (ref 3.2–4.9)
ALBUMIN/GLOB SERPL: 1.6 G/DL
ALP SERPL-CCNC: 64 U/L (ref 30–99)
ALT SERPL-CCNC: 20 U/L (ref 2–50)
ANION GAP SERPL CALC-SCNC: 10 MMOL/L (ref 7–16)
AST SERPL-CCNC: 21 U/L (ref 12–45)
BASOPHILS # BLD AUTO: 0.9 % (ref 0–1.8)
BASOPHILS # BLD: 0.03 K/UL (ref 0–0.12)
BILIRUB SERPL-MCNC: 0.4 MG/DL (ref 0.1–1.5)
BUN SERPL-MCNC: 14 MG/DL (ref 8–22)
CALCIUM SERPL-MCNC: 8.5 MG/DL (ref 8.5–10.5)
CHLORIDE SERPL-SCNC: 102 MMOL/L (ref 96–112)
CO2 SERPL-SCNC: 24 MMOL/L (ref 20–33)
COVID ORDER STATUS COVID19: NORMAL
CREAT SERPL-MCNC: 0.72 MG/DL (ref 0.5–1.4)
EOSINOPHIL # BLD AUTO: 0.19 K/UL (ref 0–0.51)
EOSINOPHIL NFR BLD: 5.5 % (ref 0–6.9)
ERYTHROCYTE [DISTWIDTH] IN BLOOD BY AUTOMATED COUNT: 45.1 FL (ref 35.9–50)
GLOBULIN SER CALC-MCNC: 2.5 G/DL (ref 1.9–3.5)
GLUCOSE SERPL-MCNC: 101 MG/DL (ref 65–99)
HCT VFR BLD AUTO: 39.6 % (ref 42–52)
HGB BLD-MCNC: 13.4 G/DL (ref 14–18)
IMM GRANULOCYTES # BLD AUTO: 0.01 K/UL (ref 0–0.11)
IMM GRANULOCYTES NFR BLD AUTO: 0.3 % (ref 0–0.9)
LYMPHOCYTES # BLD AUTO: 0.68 K/UL (ref 1–4.8)
LYMPHOCYTES NFR BLD: 19.7 % (ref 22–41)
MAGNESIUM SERPL-MCNC: 2.1 MG/DL (ref 1.5–2.5)
MCH RBC QN AUTO: 32.3 PG (ref 27–33)
MCHC RBC AUTO-ENTMCNC: 33.8 G/DL (ref 33.7–35.3)
MCV RBC AUTO: 95.4 FL (ref 81.4–97.8)
MONOCYTES # BLD AUTO: 0.36 K/UL (ref 0–0.85)
MONOCYTES NFR BLD AUTO: 10.4 % (ref 0–13.4)
NEUTROPHILS # BLD AUTO: 2.18 K/UL (ref 1.82–7.42)
NEUTROPHILS NFR BLD: 63.2 % (ref 44–72)
NRBC # BLD AUTO: 0 K/UL
NRBC BLD-RTO: 0 /100 WBC
PLATELET # BLD AUTO: 172 K/UL (ref 164–446)
PMV BLD AUTO: 9.5 FL (ref 9–12.9)
POTASSIUM SERPL-SCNC: 4.4 MMOL/L (ref 3.6–5.5)
PROT SERPL-MCNC: 6.4 G/DL (ref 6–8.2)
RBC # BLD AUTO: 4.15 M/UL (ref 4.7–6.1)
SARS-COV-2 RNA RESP QL NAA+PROBE: NOTDETECTED
SODIUM SERPL-SCNC: 136 MMOL/L (ref 135–145)
SPECIMEN SOURCE: NORMAL
WBC # BLD AUTO: 3.5 K/UL (ref 4.8–10.8)

## 2020-11-05 PROCEDURE — A9270 NON-COVERED ITEM OR SERVICE: HCPCS | Performed by: STUDENT IN AN ORGANIZED HEALTH CARE EDUCATION/TRAINING PROGRAM

## 2020-11-05 PROCEDURE — 700102 HCHG RX REV CODE 250 W/ 637 OVERRIDE(OP): Performed by: STUDENT IN AN ORGANIZED HEALTH CARE EDUCATION/TRAINING PROGRAM

## 2020-11-05 PROCEDURE — 700105 HCHG RX REV CODE 258: Performed by: STUDENT IN AN ORGANIZED HEALTH CARE EDUCATION/TRAINING PROGRAM

## 2020-11-05 PROCEDURE — 99221 1ST HOSP IP/OBS SF/LOW 40: CPT | Mod: GC | Performed by: INTERNAL MEDICINE

## 2020-11-05 PROCEDURE — 93925 LOWER EXTREMITY STUDY: CPT

## 2020-11-05 PROCEDURE — 700111 HCHG RX REV CODE 636 W/ 250 OVERRIDE (IP): Performed by: STUDENT IN AN ORGANIZED HEALTH CARE EDUCATION/TRAINING PROGRAM

## 2020-11-05 PROCEDURE — 85025 COMPLETE CBC W/AUTO DIFF WBC: CPT

## 2020-11-05 PROCEDURE — 80053 COMPREHEN METABOLIC PANEL: CPT

## 2020-11-05 PROCEDURE — 93925 LOWER EXTREMITY STUDY: CPT | Mod: 26 | Performed by: INTERNAL MEDICINE

## 2020-11-05 PROCEDURE — U0003 INFECTIOUS AGENT DETECTION BY NUCLEIC ACID (DNA OR RNA); SEVERE ACUTE RESPIRATORY SYNDROME CORONAVIRUS 2 (SARS-COV-2) (CORONAVIRUS DISEASE [COVID-19]), AMPLIFIED PROBE TECHNIQUE, MAKING USE OF HIGH THROUGHPUT TECHNOLOGIES AS DESCRIBED BY CMS-2020-01-R: HCPCS

## 2020-11-05 PROCEDURE — 93922 UPR/L XTREMITY ART 2 LEVELS: CPT

## 2020-11-05 PROCEDURE — 99222 1ST HOSP IP/OBS MODERATE 55: CPT | Performed by: NURSE PRACTITIONER

## 2020-11-05 PROCEDURE — 93922 UPR/L XTREMITY ART 2 LEVELS: CPT | Mod: 26 | Performed by: INTERNAL MEDICINE

## 2020-11-05 PROCEDURE — 83735 ASSAY OF MAGNESIUM: CPT

## 2020-11-05 PROCEDURE — 36415 COLL VENOUS BLD VENIPUNCTURE: CPT

## 2020-11-05 PROCEDURE — 770006 HCHG ROOM/CARE - MED/SURG/GYN SEMI*

## 2020-11-05 RX ADMIN — ATORVASTATIN CALCIUM 40 MG: 40 TABLET, FILM COATED ORAL at 16:59

## 2020-11-05 RX ADMIN — HEPARIN SODIUM 5000 UNITS: 5000 INJECTION, SOLUTION INTRAVENOUS; SUBCUTANEOUS at 21:14

## 2020-11-05 RX ADMIN — OXYCODONE HYDROCHLORIDE AND ACETAMINOPHEN 2 TABLET: 5; 325 TABLET ORAL at 02:02

## 2020-11-05 RX ADMIN — VANCOMYCIN HYDROCHLORIDE 1750 MG: 500 INJECTION, POWDER, LYOPHILIZED, FOR SOLUTION INTRAVENOUS at 05:50

## 2020-11-05 RX ADMIN — HEPARIN SODIUM 5000 UNITS: 5000 INJECTION, SOLUTION INTRAVENOUS; SUBCUTANEOUS at 05:04

## 2020-11-05 RX ADMIN — SODIUM CHLORIDE: 9 INJECTION, SOLUTION INTRAVENOUS at 11:34

## 2020-11-05 RX ADMIN — OXYCODONE HYDROCHLORIDE AND ACETAMINOPHEN 2 TABLET: 5; 325 TABLET ORAL at 11:33

## 2020-11-05 RX ADMIN — OXYCODONE HYDROCHLORIDE AND ACETAMINOPHEN 2 TABLET: 5; 325 TABLET ORAL at 19:37

## 2020-11-05 RX ADMIN — OXYCODONE HYDROCHLORIDE AND ACETAMINOPHEN 2 TABLET: 5; 325 TABLET ORAL at 15:31

## 2020-11-05 RX ADMIN — HEPARIN SODIUM 5000 UNITS: 5000 INJECTION, SOLUTION INTRAVENOUS; SUBCUTANEOUS at 14:04

## 2020-11-05 RX ADMIN — OXYCODONE HYDROCHLORIDE AND ACETAMINOPHEN 2 TABLET: 5; 325 TABLET ORAL at 06:09

## 2020-11-05 RX ADMIN — CEFTRIAXONE SODIUM 2 G: 2 INJECTION, POWDER, FOR SOLUTION INTRAMUSCULAR; INTRAVENOUS at 05:03

## 2020-11-05 RX ADMIN — NICOTINE TRANSDERMAL SYSTEM 21 MG: 21 PATCH, EXTENDED RELEASE TRANSDERMAL at 05:03

## 2020-11-05 ASSESSMENT — PAIN DESCRIPTION - PAIN TYPE
TYPE: ACUTE PAIN

## 2020-11-05 NOTE — PROGRESS NOTES
"Pharmacy Kinetics 74 y.o. male on vancomycin day # 2 2020    Currently on Vancomycin 1750 mg iv q24hr  Provider specified end date: TBD (abx x 6 weeks total per ID)    Indication for Treatment: osteomyelitis    Pertinent history per medical record: Admitted on 11/3/2020 for left great toe pain s/p toe nail removal on 10/23. PMH current 3 pack/day smoker, PVD, lymphoma. Pt presented to urgent care and was given IM ceftriaxone x 1 and Bactrim rx, but redness and pain worsened. Osteomyelitis confirmed on imaging, abx started empirically and ID consulted.    Other antibiotics: ceftriaxone 2g IV daily    Allergies: Vicodin [hydrocodone-acetaminophen]     List concerns for renal function: age     Pertinent cultures to date:   11/3/20 PBC x 2 - NGTD    MRSA nares swab if pneumonia is a concern (ordered/positive/negative/n-a): n/a    Recent Labs     20  1531 20  0902   WBC 3.6* 3.5*   NEUTSPOLYS 66.40 63.20     Recent Labs     20  1531 20  0902   BUN 16 14   CREATININE 1.02 0.72   ALBUMIN 4.5 3.9     No results for input(s): VANCOTROUGH, VANCOPEAK, VANCORANDOM in the last 72 hours.    Intake/Output Summary (Last 24 hours) at 2020 1337  Last data filed at 2020 0900  Gross per 24 hour   Intake 2100 ml   Output --   Net 2100 ml      /73   Pulse 82   Temp 36.6 °C (97.8 °F) (Axillary)   Resp 16   Ht 1.727 m (5' 7.99\")   Wt 85.1 kg (187 lb 9.8 oz)   SpO2 95%  Temp (24hrs), Av.5 °C (97.7 °F), Min:36.1 °C (97 °F), Max:36.7 °C (98 °F)      A/P   1. Vancomycin dose change: not indicated  2. Next vancomycin level: tomorrow 20 @ 0530  3. Goal trough: 10-15 mcg/mL  4. Comments: No growth from Cx to date, renal function looks good. Trough tomorrow to evaluate current dose, anticipate 6 weeks abx per ID, can change to PO on d/c. Vascular sx also consulted re: amputation. CTM.    Sarina Arevalo, PharmD, BCOP      "

## 2020-11-05 NOTE — PROGRESS NOTES
Received bedside report from day RNReyna. Assumed care at 1900. Patient is awake/alert. Assessment completed. Pt on room air. PIV flushed and infusing NS @ 125. Bed locked in lowest position. Call light within reach. Whiteboard updates with nurse's and CNA's numbers. Hourly rounding in place.

## 2020-11-05 NOTE — PROGRESS NOTES
[facility] [place]     Attending: Shakira Henriquez M.D.  Senior Resident: Sudha Estrella M.D. (PGY-3)  PATIENT: Ronal Hair; [id number]; 1945    ID: 74 y.o. male with PMH of peripheral vascular disease, heavy tobacco smoking and lymphoma admitted for osteomyelitis of left great toe, confirmed on MR     Overnight Events: No acute events overnight    Subjective: Doing well, pain well managed with percocet     OBJECTIVE:  Temp:  [36.1 °C (97 °F)-36.7 °C (98 °F)] 36.6 °C (97.8 °F)  Pulse:  [80-89] 80  Resp:  [18-19] 18  BP: (110-141)/(59-69) 110/63  SpO2:  [90 %-98 %] 90 %    Intake/Output Summary (Last 24 hours) at 11/5/2020 0435  Last data filed at 11/4/2020 2129  Gross per 24 hour   Intake 1320 ml   Output --   Net 1320 ml       PE:  General: Pt resting in NAD, cooperative    Skin:  Pink, warm and dry.  No rashes.  Multiple scattered seborrheic keratoses across upper extremities but especially prominent on face.  HEENT: NC/AT. PERRL. EOMI. MMM. No nasal discharge.   Neck:  Supple without lymphadenopathy or rigidity. No JVD   Lungs:  Symmetrical.  CTAB with no W/R/R.  Good air movement   Cardiovascular:  S1/S2 RRR without M/R/G.  Abdomen: Central adiposity.  Abdomen is soft NT/ND. +BS. No masses noted.  Genitourinary:  Deferred.    Extremities:  Full range of motion. No gross deformities noted. 2+ radial pulses at upper extremities bilaterally; thready to 1+ dorsalis pedis pulses bilaterally. + swelling and edema noted along L great toe, erythema   Spine:  Straight without vertebral anomalies.  CNS:  Muscle tone is normal. Cranial nerves II-XII grossly intact.      LABS:  Recent Labs     11/03/20  1531   WBC 3.6*   RBC 4.62*   HEMOGLOBIN 15.0   HEMATOCRIT 43.4   MCV 93.9   MCH 32.5   RDW 45.3   PLATELETCT 226   MPV 9.7   NEUTSPOLYS 66.40   LYMPHOCYTES 17.30*   MONOCYTES 10.90   EOSINOPHILS 4.20   BASOPHILS 0.60     Recent Labs     11/03/20  1531   SODIUM 126*   POTASSIUM 4.7   CHLORIDE 95*   CO2 23    BUN 16   CREATININE 1.02   CALCIUM 9.3   ALBUMIN 4.5     Estimated GFR/CRCL = Estimated Creatinine Clearance: 69.5 mL/min (by C-G formula based on SCr of 1.02 mg/dL).  Recent Labs     11/03/20  1531   GLUCOSE 111*     Recent Labs     11/03/20  1531   ASTSGOT 23   ALTSGPT 23   TBILIRUBIN 0.4   ALKPHOSPHAT 75   GLOBULIN 2.8             No results for input(s): INR, APTT, FIBRINOGEN in the last 72 hours.    Invalid input(s): DIMER    MICROBIOLOGY:   No results found for: BLOODCULTU, BLDCULT, BCHOLD     IMAGING:   MR-FOOT-WITH & W/O LEFT   Final Result      1.  There are edema and mild enhancement of the tip of the first distal phalanx likely representing osteomyelitis.      2.  Cellulitis.      DX-TOE(S) 2+ LEFT   Final Result         1.  No acute traumatic bony injury.   2.  Slight permeative appearance of the medial aspect of the first toe distal phalanx, could represent subtle changes of osteomyelitis.          MEDS:  Current Facility-Administered Medications   Medication Last Admin   • senna-docusate (PERICOLACE or SENOKOT S) 8.6-50 MG per tablet 2 Tab 2 Tab at 11/04/20 1726    And   • polyethylene glycol/lytes (MIRALAX) PACKET 1 Packet      And   • magnesium hydroxide (MILK OF MAGNESIA) suspension 30 mL      And   • bisacodyl (DULCOLAX) suppository 10 mg     • NS infusion New Bag at 11/04/20 2340   • heparin injection 5,000 Units 5,000 Units at 11/04/20 2130   • acetaminophen (TYLENOL) tablet 650 mg     • nicotine (NICODERM) 21 MG/24HR 21 mg 21 mg at 11/04/20 0752    And   • nicotine polacrilex (NICORETTE) 2 MG piece 2 mg     • oxyCODONE-acetaminophen (PERCOCET) 5-325 MG per tablet 1 Tab 1 Tab at 11/04/20 0755   • atorvastatin (LIPITOR) tablet 40 mg 40 mg at 11/04/20 1726   • cefTRIAXone (ROCEPHIN) 2 g in  mL IVPB Stopped at 11/04/20 0630   • MD Alert...Vancomycin per Pharmacy     • vancomycin (VANCOCIN) 1,750 mg in  mL IVPB     • morphine (pf) 4 mg/mL injection 1-2 mg 2 mg at 11/04/20 1124   •  ondansetron (ZOFRAN ODT) dispertab 4 mg     • oxyCODONE-acetaminophen (PERCOCET) 5-325 MG per tablet 1-2 Tab 2 Tab at 11/05/20 0202       PROBLEM LIST:  No problems updated.    ASSESSMENT/PLAN:    74 y.o. male with PMH of peripheral vascular disease, heavy tobacco smoking and lymphoma admitted for osteomyelitis of left great toe, confirmed on MR     # Left great toe osteomyelitis  # Left great toe pain  # Cellulitis, failed OP therapy   # Severe peripheral vascular disease   Patient with worsening pain and swelling of left great toe since outpatient toenail removal October 21. This, in spite of one dose of intramuscular ceftriaxone at urgent care, followed by oral Bactrim at home.  MRI impression: There are edema and mild enhancement of the tip of the first distal phalanx likely representing osteomyelitis. Cellulitis.  HUGO showing monophasic waveforms at the femoral, popliteal, and tibial and peroneal arteries, severly reduced amplitude. Indicate significant disease in the aorto iliac segment   - Day 2 of Vancomycin and ceftriaxone empiric antibiotic therapy  - Vanco per pharmacy   - ID consulted, recommending vascular consult  - Arterial studies confirm severe reduced flow  - Vascular, Dr Morrison, consulted and states he will see him  - Percocet as needed for pain control   - IV morphine as needed for pain control   - Zofran for nausea  - Limb preservation services consulted  - Wound care consult     # Hyponatremia, resolved  Na+ of 126 on admission, asymptomatic, no neuro findings. After fluid resuscitation now normal  - Patient eating, DC fluids      # Leukopenia without neutropenia  This appears to be the patient's baseline following lymphoma; no acute need at this time     #Nicotine dependence  Patient is a longtime smoker, currently smoking 3 packs/day.  - Nicotine patch will be provided at patient's request  - Smoking cessation counseling will be provided to the patient     Disposition: inpatient pending  negative blood cultures and plan for OP treatment for osteomyelitis, pending vascular consult     #Core Measures   VTE PPx: Heparin   Abx: C3 and Vancomycin   Lines/Tubes: PIV  Fluids: DCed today   Diet: Regular   Code Status: Full       Sudha Estrella M.D.   PGY-3  [facility]   [phone]

## 2020-11-05 NOTE — CONSULTS
LIMB PRESERVATION SERVICE CONSULT      REFERRED BY: Dr. Estrella    DATE OF CONSULTATION: 11/5/2020    REASON FOR CONSULT: Left great toe     HISTORY OF PRESENT ILLNESS: Ronal Hair is a 74 y.o.  with a past medical history that includes tobacco abuse, lymphoma, previous history of toenail removal for ingrown toenails, admitted 11/3/2020 for toe osteomyelitis  Osteomyelitis of ankle or foot, acute, left (HCC).   LPS has been consulted for evaluation of left great toe SP left great toenail removal 10/21/2020 by PCP.    Patient reports he has previously had bilateral great toenails removed permanently about 8 years ago.  Ingrown toenail returned to left great toe.  It was removed on 10/21/2020 at Dr. Henriquez's office.  Patient reports he was treating the wound by applying a Neosporin, gauze, tape that he would change daily.  He did not notice any purulent drainage initially.  He did report that the toe was red post toenail removal but denied any edema.  Around 10/30/2020 he developed yellow-green drainage from the wound and edema.  His pain also worsened.  Patient experiencing majority of his pain to the medial aspect of the wound.  This is where he said the nail was deep in his toe.  He went to urgent care and was given a dose of Rocephin and discharged with Bactrim which he took until he followed up with his PCP on 11/3/2020.  Patient was sent to ED from PCPs office for further work-up and evaluation for possible osteomyelitis of left great toe.  Patient denies any fevers, chills, nausea, vomiting.  IV antibiotics were started on this admission.  Infectious diseases has been consulted.    Xray completed and suggestive of possible osteomyelitis.  MRI completed and report states that there is likely osteomyelitis  Ortho has not been consulted yet.    Denies diabetes.  Denies neuropathy.  Patient does complains of leg cramping.  Patient experiences cramping of his legs after ambulating short distances.  Denies  any claudication pains at rest.  Has never been seen by vascular surgeon before.  Currently smoking 3 packs/day which he reports is increased since he lost his job working at the [employer].      Smoking:   reports that he has been smoking cigarettes. He started smoking about 54 years ago. He has a 54.00 pack-year smoking history. He does not have any smokeless tobacco history on file.    Alcohol:   reports previous alcohol use.    Drug:   reports no history of drug use.      PAST MEDICAL HISTORY:   Past Medical History:   Diagnosis Date   • Back pain    • Cancer (HCC)    • Cataract    • Hypertension    • Infectious disease         PAST SURGICAL HISTORY:   Past Surgical History:   Procedure Laterality Date   • OTHER ABDOMINAL SURGERY     • OTHER ORTHOPEDIC SURGERY         MEDICATIONS:   Current Facility-Administered Medications   Medication Dose   • senna-docusate (PERICOLACE or SENOKOT S) 8.6-50 MG per tablet 2 Tab  2 Tab    And   • polyethylene glycol/lytes (MIRALAX) PACKET 1 Packet  1 Packet    And   • magnesium hydroxide (MILK OF MAGNESIA) suspension 30 mL  30 mL    And   • bisacodyl (DULCOLAX) suppository 10 mg  10 mg   • NS infusion     • heparin injection 5,000 Units  5,000 Units   • acetaminophen (TYLENOL) tablet 650 mg  650 mg   • nicotine (NICODERM) 21 MG/24HR 21 mg  21 mg    And   • nicotine polacrilex (NICORETTE) 2 MG piece 2 mg  2 mg   • atorvastatin (LIPITOR) tablet 40 mg  40 mg   • cefTRIAXone (ROCEPHIN) 2 g in  mL IVPB  2 g   • MD Alert...Vancomycin per Pharmacy     • vancomycin (VANCOCIN) 1,750 mg in  mL IVPB  20 mg/kg   • morphine (pf) 4 mg/mL injection 1-2 mg  1-2 mg   • ondansetron (ZOFRAN ODT) dispertab 4 mg  4 mg   • oxyCODONE-acetaminophen (PERCOCET) 5-325 MG per tablet 1-2 Tab  1-2 Tab       ALLERGIES:    Allergies   Allergen Reactions   • Vicodin [Hydrocodone-Acetaminophen]      nausea        FAMILY HISTORY: No family history on file.      REVIEW OF SYSTEMS:    Constitutional: Negative for chills, fever   Respiratory: Negative for cough and shortness of breath.    Cardiovascular:Negative for chest pain, and claudication.   Gastrointestinal: Negative for constipation, diarrhea, nausea and vomiting.   Lower extremities: positive for swelling and redness to left great toe  Neurological: Negative for numbness to feet and lower legs  All other systems reviewed and are negative     RESULTS:     Recent Labs     11/03/20  1531 11/05/20  0902   WBC 3.6* 3.5*   RBC 4.62* 4.15*   HEMOGLOBIN 15.0 13.4*   HEMATOCRIT 43.4 39.6*   MCV 93.9 95.4   MCH 32.5 32.3   MCHC 34.6 33.8   RDW 45.3 45.1   PLATELETCT 226 172   MPV 9.7 9.5     Recent Labs     11/03/20  1531 11/05/20  0902   SODIUM 126* 136   POTASSIUM 4.7 4.4   CHLORIDE 95* 102   CO2 23 24   GLUCOSE 111* 101*   BUN 16 14         ESR:     None this admission    CRP:       None this admission      COVID-19: Collected 11/5/2020 and pending    X-ray:   1.  No acute traumatic bony injury.  2.  Slight permeative appearance of the medial aspect of the first toe distal phalanx, could represent subtle changes of osteomyelitis.        Left foot    MRI: Left foot:   1.  There are edema and mild enhancement of the tip of the first distal phalanx likely representing osteomyelitis.     2.  Cellulitis.    Arterial studies: In process    A1c:  No results found for: HBA1C         Microbiology:  Results     Procedure Component Value Units Date/Time    SARS-CoV-2, PCR (In-House) [[id number]] Collected: 11/05/20 0745    Order Status: Completed Updated: 11/05/20 1226     SARS-CoV-2 Source NP Swab    Narrative:      Is patient being admitted?->Yes  Does this patient meet criteria for Rush/Cepheid per [facility]  Inpatient Workflow? (See workflow link below)->No  Expected turn around time?->Routine (In-House PCR up to 24  hours)  Is this the patients First SARS CoV-2 test?->Unknown  Is this patient employed in healthcare?->No  Is the patient symptomatic as  "defined by the CDC?->No  Is the patient hospitalized?->Yes  Is the patient in the ICU?->No  Is the patient a resident in a congregate care setting?->No  Is the patient pregnant?->No    COVID/SARS CoV-2 PCR [[id number]] Collected: 11/05/20 0745    Order Status: Completed Specimen: Respirate from Nasopharyngeal Updated: 11/05/20 0752     COVID Order Status Received     Comment: The order for SARS CoV-2 testing has been received by the  Laboratory. This result is neither positive nor negative.  Final results of testing will report in 24-48 hours, separately.         Narrative:      Is patient being admitted?->Yes  Does this patient meet criteria for Rush/Cepheid per [facility]  Inpatient Workflow? (See workflow link below)->No  Expected turn around time?->Routine (In-House PCR up to 24  hours)  Is this the patients First SARS CoV-2 test?->Unknown  Is this patient employed in healthcare?->No  Is the patient symptomatic as defined by the CDC?->No  Is the patient hospitalized?->Yes  Is the patient in the ICU?->No  Is the patient a resident in a congregate care setting?->No  Is the patient pregnant?->No    BLOOD CULTURE [[id number]] Collected: 11/03/20 2115    Order Status: Completed Specimen: Blood from Peripheral Updated: 11/04/20 0736     Significant Indicator NEG     Source BLD     Site PERIPHERAL     Culture Result No Growth  Note: Blood cultures are incubated for 5 days and  are monitored continuously.Positive blood cultures  are called to the RN and reported as soon as  they are identified.      Narrative:      Per Hospital Policy: Only change Specimen Src: to \"Line\" if  specified by physician order.  Left Forearm/Arm    BLOOD CULTURE [[id number]] Collected: 11/03/20 2130    Order Status: Completed Specimen: Blood from Peripheral Updated: 11/04/20 0736     Significant Indicator NEG     Source BLD     Site PERIPHERAL     Culture Result No Growth  Note: Blood cultures are incubated for 5 days and  are monitored " "continuously.Positive blood cultures  are called to the RN and reported as soon as  they are identified.      Narrative:      Per Hospital Policy: Only change Specimen Src: to \"Line\" if  specified by physician order.  Right AC           PHYSICAL EXAMINATION:     VITAL SIGNS: /73   Pulse 82   Temp 36.6 °C (97.8 °F) (Axillary)   Resp 16   Ht 1.727 m (5' 7.99\")   Wt 85.1 kg (187 lb 9.8 oz)   SpO2 95%   BMI 28.53 kg/m²       General Appearance:  Well developed, well nourished, in no acute distress    Lower Extremity Assessment:    Edema:   Left great toe and left forefoot    Pulses:  R foot: +1 DP, faintly palpable PT  L foot: +1 DP, unable to palpate PT    ROM dorsi/plantarflexion  Intact    Structural /mechanical changes:  intact    Sensory Assessment  Right foot sensate to light touch 10/10  Left foot diminished to toes otherwise sensation intact      Wound Assessment:    Left great toe nailbed:  Measures 1.1 x 2.2 cm  Severely painful with light touch primarily to medial aspect.  Erythema: Severe from tip of toe to MTPJ  Drainage: None  No odor  Dried firm brown exudate  Toe cool to touch                  ASSESSMENT AND PLAN:   74-year-old male with 50+ years tobacco use, nondiabetic.  Presents with infected left great toe following ingrown toenail removal on 10/21/2020.  Toe is severely painful with cellulitis.  Weak pedal pulses.  Recommend vascular surgery consultation to evaluate for revascularization.  MRI reports that there is likely osteomyelitis left great toe.  No open ulcerations other than dried exudate that is hardened to toenail bed.  Recommend treatment nonsurgically at this time with long-term course of IV antibiotics.  Patient is at risk for toe amputation but would like to establish adequate blood flow to foot before any discussion of surgery.       Wound care:   -Wound care orders placed for nursing  Left great toe: Betadine twice daily,    Imaging/Labs:  -COVID-19: Pending  -Arterial " studies pending    Vascular status:   -Weak pedal pulses.  Arterial studies in process.  Recommend consultation of vascular surgery.  Discussed with Dr. Estrella who will consult    Surgery:   -No plans for debridement, biopsy, toe amputation at this time.  Recommend vascular surgery first.    Antibiotics:   -ID has been consulted  Continue IV antibiotics    Weight Bearing Status:   -Weight bearing as tolerated    Offloading:   Patient has non constricting shoes  -Orthotic company: None.  May require orthotics if amputation occurs.  This was discussed with patient.    PT/OT:   Not involved at this time      Tobacco abuse:  Smoking cessation discussed.  Patient has nicotine patch in place.  Not interested in quitting.  Discussed harmful effects to cardiac, pulmonary, vascular systems    Ingrown toenails:  -avoid trimming own nails. See podiatrist or certified foot and nail RN            D/W: pt, RN, Dr. Taylor, Dr. Estrella      Discharge Plan:  Anticipate home            Please note that this dictation was created using voice recognition software. I have  worked with technical experts from [facility] [employer] to optimize the interface.  I have made every reasonable attempt to correct obvious errors, but there may be errors of grammar and possibly content that I did not discover before finalizing the note.

## 2020-11-05 NOTE — PROGRESS NOTES
2 RN Skin Check    2 RN skin check complete.   Devices in place: SCDs.  Skin assessed under devices: yes.  Confirmed pressure ulcers found on: N/A.  New potential pressure ulcers noted on N/A. Wound consult placed No.  The following interventions in place Pillows and Lotion.     Skin is warm, dry, intact. Left great toe is red, warm, and edematous. Left foot is warm and edematous. 1+ edema to left ankle.

## 2020-11-05 NOTE — DISCHARGE PLANNING
Anticipated Disposition:  Home    Action:   -> Chart review completed. This CM spoke with Pt at bedside, obtained assessment information, verified the accuracy of face-sheet. Pt lives alone in a single level house, has a daughter lives 2 hours away that provides support. Pt is fully independent with all ADLs/IDLs, hold an active 's license, drives himself to appointments.    Pt's PCP is: Shakira Henriquez M.D  Pt's preferred pharmacy is [facility] and [facility].   Possible antibiotic is needed for 6 weeks after discharge per attending physician.             Barriers to Discharge:   Medical clearance      Plan:  Please follow up with attending physician for medical clearance.           Care Transition Team Assessment    Information Source  Orientation : Oriented x 4  Information Given By: Patient  Informant's Name: Marcello         Elopement Risk  Legal Hold: No  Ambulatory or Self Mobile in Wheelchair: Yes  Disoriented: No  Psychiatric Symptoms: None  History of Wandering: No  Elopement this Admit: No  Vocalizing Wanting to Leave: No  Displays Behaviors, Body Language Wanting to Leave: No-Not at Risk for Elopement  Elopement Risk: Not at Risk for Elopement  Picture of Patient on Inside Chart Front Cover: No (See Comments)  Purple Armband on Patient: No (See Comments)    Interdisciplinary Discharge Planning  Does Admitting Nurse Feel This Could be a Complex Discharge?: No  Primary Care Physician: Dr. Henriquez  Lives with - Patient's Self Care Capacity: Alone and Able to Care For Self  Patient or legal guardian wants to designate a caregiver: No  Caregiver name: Veronique Campos  Caregiver contact info: [phone]  ([facility]) Authorization for Release of Health Information has been completed: Yes  Support Systems: Children, Friends / Neighbors  Housing / Facility: 1 Story House  Do You Take your Prescribed Medications Regularly: Yes  Able to Return to Previous ADL's: Yes  Mobility Issues: No  Prior Services:  Home-Independent  Patient Prefers to be Discharged to:: Home  Assistance Needed: No  Durable Medical Equipment: Not Applicable    Discharge Preparedness  What is your plan after discharge?: Home with help  What are your discharge supports?: Child  Prior Functional Level: Ambulatory, Drives Self, Independent with Activities of Daily Living    Functional Assesment  Prior Functional Level: Ambulatory, Drives Self, Independent with Activities of Daily Living    Finances  Financial Barriers to Discharge: No  Prescription Coverage: Yes    Vision / Hearing Impairment  Vision Impairment : Yes  Right Eye Vision: Wears Glasses  Left Eye Vision: Wears Glasses  Hearing Impairment : No              Domestic Abuse  Have you ever been the victim of abuse or violence?: No  Physical Abuse or Sexual Abuse: No  Verbal Abuse or Emotional Abuse: No  Possible Abuse/Neglect Reported to:: Not Applicable         Discharge Risks or Barriers  Discharge risks or barriers?: No    Anticipated Discharge Information  Discharge Disposition: Discharged to home/self care (01)

## 2020-11-05 NOTE — CARE PLAN
Problem: Safety  Goal: Will remain free from injury  Outcome: PROGRESSING AS EXPECTED  Note: Bed in locked and lowest position. Three side rails up. Call light within reach. Patient instructed on use of call bell and how to call for assistance. Threaded socks in use. Objects in room cleared for ambulation. Pt calls appropriately when needing help. Pt is SBA and is steady.      Problem: Pain Management  Goal: Pain level will decrease to patient's comfort goal  Outcome: PROGRESSING AS EXPECTED  Note: Patient pain level assessed using the 1-10 scale. Patient given medication as per MAR, encouraged position changes and rest. Pt has tylenol and percocet PRN.

## 2020-11-05 NOTE — PROGRESS NOTES
Assumed care of pt at 0745. Report received and bedside rounding completed with night RN. Pt is calm no SOB, no acute distress noted.    Fall precautions in place,  bed alarm. - Treaded non slip socks. Bed locked. Communication board updated with POC. Call light and pt belongings within reach - pt makes needs known - hourly rounding in place. See flowsheets for further assessment.

## 2020-11-05 NOTE — PROGRESS NOTES
Assumed care at 1530 following ED nurse report, assessment completed. Patient is A&O X4. Patient complains of 3/10 left foot pain at this time. Fall precautions and appropriate signs in place. Call light/phone system and RN extension updated on whiteboard. Bed alarm not in use, patient stand-by assist. Patient denies any additional needs at this time, Hourly rounding place.

## 2020-11-05 NOTE — CONSULTS
"[facility]     Date of Service: 11/5/2020    Consult Requested By: No att. providers found    Reason for Consultation: Left great toe osteomyelitis    Chief Complaint: toe pain    History of Present Illness:   74-year-old male admitted on 11/3/2020 for left first toe pain status post removal ingrown toenail 2 weeks ago, with worsening and progressive pain and swelling since that time; started outpatient Bactrim course after receiving 1 dose C3 IM at urgent care; all in the setting of significant peripheral arterial disease.    Patient was anxious to go home to get back to his \"Social Security check\" that is coming in the mail and pays bills; also there is a roommate at home that he helps care for.    Review of Systems:  GEN: Denies fevers/chills.  No significant weight change.  HEENT: Denies blurry vision, nasal congestion, sore throat  CARDIO: Denies chest pain, palpitations, orthopnea.  Says he can only walk about 60 feet, before having to stop because of the burning in his legs.  Never seen vascular surgery  PULM: Denies shortness of breath, wheezing, or coughing  GI: Denies nausea/vomiting/diarrhea/constipation, or abdominal pain  : Denies dysuria or frequency  HEME: Denies easy bruising or bleeding  MSK: Denies focal weakness, joint pain or swelling  SKIN: Left foot redness and swelling.    NEURO: Denies confusion, memory loss.  Occasional paresthesia with walking more than 50 feet.  ENDO: Denies polyuria or polydipsia  PSYCH: Denies anxiety or depression      Past Medical History:   Diagnosis Date   • Back pain    • Cancer (HCC)    • Cataract    • Hypertension    • Infectious disease        Past Surgical History:   Procedure Laterality Date   • OTHER ABDOMINAL SURGERY     • OTHER ORTHOPEDIC SURGERY         No family history on file.    Social History     Socioeconomic History   • Marital status: Single     Spouse name: Not on file   • Number of children: Not on file   • " Years of education: Not on file   • Highest education level: Not on file   Occupational History   • Not on file   Social Needs   • Financial resource strain: Not on file   • Food insecurity     Worry: Never true     Inability: Never true   • Transportation needs     Medical: No     Non-medical: No   Tobacco Use   • Smoking status: Current Every Day Smoker     Packs/day: 1.00     Years: 54.00     Pack years: 54.00     Types: Cigarettes     Start date: 1966   Substance and Sexual Activity   • Alcohol use: Not Currently   • Drug use: Never   • Sexual activity: Not on file   Lifestyle   • Physical activity     Days per week: Not on file     Minutes per session: Not on file   • Stress: Not on file   Relationships   • Social connections     Talks on phone: Not on file     Gets together: Not on file     Attends [employer] service: Not on file     Active member of club or organization: Not on file     Attends meetings of clubs or organizations: Not on file     Relationship status: Not on file   • Intimate partner violence     Fear of current or ex partner: Not on file     Emotionally abused: Not on file     Physically abused: Not on file     Forced sexual activity: Not on file   Other Topics Concern   • Not on file   Social History Narrative   • Not on file       Allergies   Allergen Reactions   • Vicodin [Hydrocodone-Acetaminophen]      nausea       Medications:    Current Facility-Administered Medications:   •  senna-docusate (PERICOLACE or SENOKOT S) 8.6-50 MG per tablet 2 Tab, 2 Tab, Oral, BID, 2 Tab at 11/04/20 1726 **AND** polyethylene glycol/lytes (MIRALAX) PACKET 1 Packet, 1 Packet, Oral, QDAY PRN **AND** magnesium hydroxide (MILK OF MAGNESIA) suspension 30 mL, 30 mL, Oral, QDAY PRN **AND** bisacodyl (DULCOLAX) suppository 10 mg, 10 mg, Rectal, QDAY PRN, Halley Dennis M.D.  •  NS infusion, , Intravenous, Continuous, Halley Dennis M.D., Last Rate: 120 mL/hr at 11/05/20 1134, New Bag at 11/05/20 1134  •  " heparin injection 5,000 Units, 5,000 Units, Subcutaneous, Q8HRS, Halley Dennis M.D., 5,000 Units at 20 0504  •  acetaminophen (TYLENOL) tablet 650 mg, 650 mg, Oral, Q6HRS PRN, Halley Dennis M.D.  •  nicotine (NICODERM) 21 MG/24HR 21 mg, 21 mg, Transdermal, Daily-0600, 21 mg at 20 0503 **AND** Nicotine Replacement Patient Education Materials, , , Once **AND** nicotine polacrilex (NICORETTE) 2 MG piece 2 mg, 2 mg, Oral, Q HOUR PRN, Halley Dennis M.D.  •  atorvastatin (LIPITOR) tablet 40 mg, 40 mg, Oral, Q EVENING, Halley Dennis M.D., 40 mg at 20 1726  •  cefTRIAXone (ROCEPHIN) 2 g in  mL IVPB, 2 g, Intravenous, Q24HRS, Halley Dennis M.D., Stopped at 20 0533  •  MD Alert...Vancomycin per Pharmacy, , Other, PHARMACY TO DOSE, Halley Dennis M.D.  •  vancomycin (VANCOCIN) 1,750 mg in  mL IVPB, 20 mg/kg, Intravenous, Q24HR, Halley Dennis M.D., Stopped at 20 0750  •  morphine (pf) 4 mg/mL injection 1-2 mg, 1-2 mg, Intravenous, Q3HRS PRN, Sudha Estrella M.D., 2 mg at 20 1124  •  ondansetron (ZOFRAN ODT) dispertab 4 mg, 4 mg, Oral, Q4HRS PRN, Sudha Estrella M.D.  •  oxyCODONE-acetaminophen (PERCOCET) 5-325 MG per tablet 1-2 Tab, 1-2 Tab, Oral, Q4HRS PRN, Sudha Estrella M.D., 2 Tab at 20 1133    Physical Exam:   Vital Signs: /73   Pulse 82   Temp 36.6 °C (97.8 °F) (Axillary)   Resp 16   Ht 1.727 m (5' 7.99\")   Wt 85.1 kg (187 lb 9.8 oz)   SpO2 95%   BMI 28.53 kg/m²   Temp  Av.6 °C (97.8 °F)  Min: 36.1 °C (97 °F)  Max: 36.7 °C (98.1 °F)  Vital signs reviewed    GEN: Awake and alert.  Sitting in bed no acute distress.  Pleasant and friendly.  Communicative.  Hyperverbal.  Anxious about leaving the hospital  HEENT: NC/AT.  EOMI.  No pallor or scleral icterus.  Moist oral mucosa.  Supple neck  CARDIO: Regular rate and rhythm.  No murmurs, rubs or accessory heart sounds.  2+ bilateral radial " pulses.  JVP 6cm H2O.   RESP: Clear lungs bilaterally on auscultation.  No crackles wheezes or cough.  No accessory muscle usage.  GI: Positive bowel sounds. soft, nontender, nondistended abdomen  EXT: No clubbing, cyanosis, edema  NEURO: Alert and oriented.  No obvious focal deficits.  Able to move all extremities  SKIN: Left foot great toe: Erythematous, tender to palpation, missing toenail with crusted over.  No drainage.  Left second toe also erythematous and tender.  Left foot erythema and warmth just below the ankle  PSYCH: Appropriate mood and affect.  Judgment and reasoning intact, although has poor understanding of his medical condition    LABS:  Recent Labs     11/03/20  1531 11/05/20  0902   WBC 3.6* 3.5*      Recent Labs     11/03/20  1531 11/05/20  0902   HEMOGLOBIN 15.0 13.4*   HEMATOCRIT 43.4 39.6*   MCV 93.9 95.4   MCH 32.5 32.3   PLATELETCT 226 172       Recent Labs     11/03/20  1531 11/05/20  0902   SODIUM 126* 136   POTASSIUM 4.7 4.4   CHLORIDE 95* 102   CO2 23 24   CREATININE 1.02 0.72        Recent Labs     11/03/20  1531 11/05/20  0902   ALBUMIN 4.5 3.9        MICRO:  No results found for: BLOODCULTU, BLDCULT, BCHOLD     Latest pertinent labs were reviewed    IMAGING STUDIES:  US-EXTREMITY ARTERY LOWER BILAT   Final Result      US-HUGO SINGLE LEVEL BILAT   Final Result      MR-FOOT-WITH & W/O LEFT   Final Result      1.  There are edema and mild enhancement of the tip of the first distal phalanx likely representing osteomyelitis.      2.  Cellulitis.      DX-TOE(S) 2+ LEFT   Final Result         1.  No acute traumatic bony injury.   2.  Slight permeative appearance of the medial aspect of the first toe distal phalanx, could represent subtle changes of osteomyelitis.          Assessment and Plan:   #Left great toe osteomyelitis, with associated cellulitis, status post ingrown toenail with removal, the setting of significant peripheral arterial disease  #Leukopenia  #Normocytic anemia,  acute  #Hyponatremia, acute  #Hypochloremia  #Tobacco abuse, current  #Hypertension    - On vancomycin and C3 (initiated 11/3/2020), continue for now.  Will need 6 weeks antibiotic therapy.  On DC, will consider Bactrim and Augmentin.  - In setting of severe PAD, poor prognosis for amputation at this time.  Needs vascular studies, needs to be seen by vascular surgery.  Also, given poor vasculature, decreased efficacy of antibiotic therapy, high risk for worsening/nonhealing/return to hospital  - Smoking cessation counseling provided    Updated the attending, Dr. Santoyo

## 2020-11-06 ENCOUNTER — APPOINTMENT (OUTPATIENT)
Dept: RADIOLOGY | Facility: MEDICAL CENTER | Age: 75
DRG: 271 | End: 2020-11-06
Attending: SURGERY
Payer: MEDICARE

## 2020-11-06 LAB
ALBUMIN SERPL BCP-MCNC: 4 G/DL (ref 3.2–4.9)
ALBUMIN/GLOB SERPL: 1.4 G/DL
ALP SERPL-CCNC: 65 U/L (ref 30–99)
ALT SERPL-CCNC: 19 U/L (ref 2–50)
ANION GAP SERPL CALC-SCNC: 13 MMOL/L (ref 7–16)
AST SERPL-CCNC: 25 U/L (ref 12–45)
BASOPHILS # BLD AUTO: 0.5 % (ref 0–1.8)
BASOPHILS # BLD: 0.02 K/UL (ref 0–0.12)
BILIRUB SERPL-MCNC: 0.4 MG/DL (ref 0.1–1.5)
BUN SERPL-MCNC: 14 MG/DL (ref 8–22)
CALCIUM SERPL-MCNC: 8.9 MG/DL (ref 8.5–10.5)
CHLORIDE SERPL-SCNC: 100 MMOL/L (ref 96–112)
CO2 SERPL-SCNC: 24 MMOL/L (ref 20–33)
CREAT SERPL-MCNC: 0.72 MG/DL (ref 0.5–1.4)
EOSINOPHIL # BLD AUTO: 0.28 K/UL (ref 0–0.51)
EOSINOPHIL NFR BLD: 7.5 % (ref 0–6.9)
ERYTHROCYTE [DISTWIDTH] IN BLOOD BY AUTOMATED COUNT: 44.6 FL (ref 35.9–50)
GLOBULIN SER CALC-MCNC: 2.9 G/DL (ref 1.9–3.5)
GLUCOSE SERPL-MCNC: 94 MG/DL (ref 65–99)
HCT VFR BLD AUTO: 39.7 % (ref 42–52)
HGB BLD-MCNC: 13.6 G/DL (ref 14–18)
IMM GRANULOCYTES # BLD AUTO: 0.01 K/UL (ref 0–0.11)
IMM GRANULOCYTES NFR BLD AUTO: 0.3 % (ref 0–0.9)
LYMPHOCYTES # BLD AUTO: 0.66 K/UL (ref 1–4.8)
LYMPHOCYTES NFR BLD: 17.6 % (ref 22–41)
MCH RBC QN AUTO: 32.2 PG (ref 27–33)
MCHC RBC AUTO-ENTMCNC: 34.3 G/DL (ref 33.7–35.3)
MCV RBC AUTO: 94.1 FL (ref 81.4–97.8)
MONOCYTES # BLD AUTO: 0.37 K/UL (ref 0–0.85)
MONOCYTES NFR BLD AUTO: 9.9 % (ref 0–13.4)
NEUTROPHILS # BLD AUTO: 2.41 K/UL (ref 1.82–7.42)
NEUTROPHILS NFR BLD: 64.2 % (ref 44–72)
NRBC # BLD AUTO: 0 K/UL
NRBC BLD-RTO: 0 /100 WBC
PLATELET # BLD AUTO: 179 K/UL (ref 164–446)
PMV BLD AUTO: 9.5 FL (ref 9–12.9)
POTASSIUM SERPL-SCNC: 4.1 MMOL/L (ref 3.6–5.5)
PROT SERPL-MCNC: 6.9 G/DL (ref 6–8.2)
RBC # BLD AUTO: 4.22 M/UL (ref 4.7–6.1)
SODIUM SERPL-SCNC: 137 MMOL/L (ref 135–145)
VANCOMYCIN TROUGH SERPL-MCNC: 5.3 UG/ML (ref 10–20)
WBC # BLD AUTO: 3.8 K/UL (ref 4.8–10.8)

## 2020-11-06 PROCEDURE — 700111 HCHG RX REV CODE 636 W/ 250 OVERRIDE (IP): Performed by: INTERNAL MEDICINE

## 2020-11-06 PROCEDURE — 85025 COMPLETE CBC W/AUTO DIFF WBC: CPT

## 2020-11-06 PROCEDURE — 700102 HCHG RX REV CODE 250 W/ 637 OVERRIDE(OP): Performed by: STUDENT IN AN ORGANIZED HEALTH CARE EDUCATION/TRAINING PROGRAM

## 2020-11-06 PROCEDURE — B41FYZZ FLUOROSCOPY OF RIGHT LOWER EXTREMITY ARTERIES USING OTHER CONTRAST: ICD-10-PCS | Performed by: SURGERY

## 2020-11-06 PROCEDURE — 80202 ASSAY OF VANCOMYCIN: CPT

## 2020-11-06 PROCEDURE — B41GYZZ FLUOROSCOPY OF LEFT LOWER EXTREMITY ARTERIES USING OTHER CONTRAST: ICD-10-PCS | Performed by: SURGERY

## 2020-11-06 PROCEDURE — 700111 HCHG RX REV CODE 636 W/ 250 OVERRIDE (IP): Performed by: STUDENT IN AN ORGANIZED HEALTH CARE EDUCATION/TRAINING PROGRAM

## 2020-11-06 PROCEDURE — A9270 NON-COVERED ITEM OR SERVICE: HCPCS | Performed by: STUDENT IN AN ORGANIZED HEALTH CARE EDUCATION/TRAINING PROGRAM

## 2020-11-06 PROCEDURE — 75716 ARTERY X-RAYS ARMS/LEGS: CPT

## 2020-11-06 PROCEDURE — B41CYZZ FLUOROSCOPY OF PELVIC ARTERIES USING OTHER CONTRAST: ICD-10-PCS | Performed by: SURGERY

## 2020-11-06 PROCEDURE — 700105 HCHG RX REV CODE 258: Performed by: STUDENT IN AN ORGANIZED HEALTH CARE EDUCATION/TRAINING PROGRAM

## 2020-11-06 PROCEDURE — 36005 INJECTION EXT VENOGRAPHY: CPT

## 2020-11-06 PROCEDURE — 80053 COMPREHEN METABOLIC PANEL: CPT

## 2020-11-06 PROCEDURE — C1887 CATHETER, GUIDING: HCPCS

## 2020-11-06 PROCEDURE — 700101 HCHG RX REV CODE 250: Performed by: STUDENT IN AN ORGANIZED HEALTH CARE EDUCATION/TRAINING PROGRAM

## 2020-11-06 PROCEDURE — 700105 HCHG RX REV CODE 258: Performed by: INTERNAL MEDICINE

## 2020-11-06 PROCEDURE — 700117 HCHG RX CONTRAST REV CODE 255: Performed by: SURGERY

## 2020-11-06 PROCEDURE — 99233 SBSQ HOSP IP/OBS HIGH 50: CPT | Mod: GC | Performed by: INTERNAL MEDICINE

## 2020-11-06 PROCEDURE — 36415 COLL VENOUS BLD VENIPUNCTURE: CPT

## 2020-11-06 PROCEDURE — 770006 HCHG ROOM/CARE - MED/SURG/GYN SEMI*

## 2020-11-06 RX ORDER — DEXTROSE MONOHYDRATE, SODIUM CHLORIDE, AND POTASSIUM CHLORIDE 50; 1.49; 9 G/1000ML; G/1000ML; G/1000ML
INJECTION, SOLUTION INTRAVENOUS CONTINUOUS
Status: DISCONTINUED | OUTPATIENT
Start: 2020-11-06 | End: 2020-11-07

## 2020-11-06 RX ORDER — IODIXANOL 270 MG/ML
20 INJECTION, SOLUTION INTRAVASCULAR ONCE
Status: COMPLETED | OUTPATIENT
Start: 2020-11-06 | End: 2020-11-06

## 2020-11-06 RX ORDER — MIDAZOLAM HYDROCHLORIDE 1 MG/ML
INJECTION INTRAMUSCULAR; INTRAVENOUS
Status: DISPENSED
Start: 2020-11-06 | End: 2020-11-07

## 2020-11-06 RX ADMIN — CEFTRIAXONE SODIUM 2 G: 2 INJECTION, POWDER, FOR SOLUTION INTRAMUSCULAR; INTRAVENOUS at 04:45

## 2020-11-06 RX ADMIN — VANCOMYCIN HYDROCHLORIDE 1750 MG: 500 INJECTION, POWDER, LYOPHILIZED, FOR SOLUTION INTRAVENOUS at 05:53

## 2020-11-06 RX ADMIN — MORPHINE SULFATE 2 MG: 4 INJECTION INTRAVENOUS at 05:12

## 2020-11-06 RX ADMIN — POTASSIUM CHLORIDE, DEXTROSE MONOHYDRATE AND SODIUM CHLORIDE: 150; 5; 900 INJECTION, SOLUTION INTRAVENOUS at 12:54

## 2020-11-06 RX ADMIN — NICOTINE TRANSDERMAL SYSTEM 21 MG: 21 PATCH, EXTENDED RELEASE TRANSDERMAL at 05:12

## 2020-11-06 RX ADMIN — IODIXANOL 20 ML: 270 INJECTION, SOLUTION INTRAVASCULAR at 17:14

## 2020-11-06 RX ADMIN — ATORVASTATIN CALCIUM 40 MG: 40 TABLET, FILM COATED ORAL at 17:18

## 2020-11-06 RX ADMIN — OXYCODONE HYDROCHLORIDE AND ACETAMINOPHEN 2 TABLET: 5; 325 TABLET ORAL at 18:30

## 2020-11-06 RX ADMIN — OXYCODONE HYDROCHLORIDE AND ACETAMINOPHEN 2 TABLET: 5; 325 TABLET ORAL at 14:36

## 2020-11-06 RX ADMIN — OXYCODONE HYDROCHLORIDE AND ACETAMINOPHEN 2 TABLET: 5; 325 TABLET ORAL at 22:34

## 2020-11-06 RX ADMIN — VANCOMYCIN HYDROCHLORIDE 1000 MG: 500 INJECTION, POWDER, LYOPHILIZED, FOR SOLUTION INTRAVENOUS at 17:18

## 2020-11-06 NOTE — PROGRESS NOTES
"Pharmacy Kinetics 74 y.o. male on vancomycin day # 4  2020    Currently on Vancomycin 1750 mg iv q24hr  Provider specified end date: TBD (abx x 6 weeks total per ID)     Indication for Treatment: osteomyelitis     Pertinent history per medical record: Admitted on 11/3/2020 for left great toe pain s/p toe nail removal on 10/23. PMH current 3 pack/day smoker, PVD, lymphoma. Pt presented to urgent care and was given IM ceftriaxone x 1 and Bactrim rx, but redness and pain worsened. Osteomyelitis confirmed on imaging, abx started empirically and ID consulted.     Other antibiotics: ceftriaxone 2g IV daily     Allergies: Vicodin [hydrocodone-acetaminophen]      List concerns for renal function: age      Pertinent cultures to date:   11/3/20 PBC x 2 - NGTD     MRSA nares swab if pneumonia is a concern (ordered/positive/negative/n-a): n/a    Recent Labs     20  1531 20  0902 20  0534   WBC 3.6* 3.5* 3.8*   NEUTSPOLYS 66.40 63.20 64.20     Recent Labs     20  1531 20  0902 20  0534   BUN 16 14 14   CREATININE 1.02 0.72 0.72   ALBUMIN 4.5 3.9 4.0     Recent Labs     20  0534   VANCOTROUGH 5.3*       Intake/Output Summary (Last 24 hours) at 2020 1342  Last data filed at 2020 1400  Gross per 24 hour   Intake 480 ml   Output --   Net 480 ml      /78   Pulse 94   Temp 36.6 °C (97.8 °F) (Temporal)   Resp 16   Ht 1.727 m (5' 7.99\")   Wt 85.1 kg (187 lb 9.8 oz)   SpO2 97%  Temp (24hrs), Av.7 °C (98 °F), Min:36.3 °C (97.4 °F), Max:37.1 °C (98.7 °F)      A/P   1. Vancomycin dose change: yes, increase vancomycin to 1000 mg q12h   2. Next vancomycin level: 20 @ 0330    3. Goal trough: 10-15 mcg/mL  4. Comments: Sub-therapeutic vancomycin trough.  ~ 31 hr interval between vancomycin loading dose and 1st maintenance dose.  Vancomycin level obtained prior to 2nd total dose with level of 5.3 mcg/mL.  Vancomycin would not likely reach goal trough levels on current " dose. Increased vancomycin total daily dose slightly and changed interval to q12h dosing with plans to obtain repeat trough level prior to 4th new dosing regimen. Pt is afebrile with stable renal indices, and negative leukocytosis. Pharmacy will continue to monitor and adjust dosing to obtain therapeutic trough levels.     Steffi Jeronimo, PharmD, BCOP

## 2020-11-06 NOTE — PROGRESS NOTES
Pt irate about delay of IR procedure. States he's sick of waiting in the hospital, not able to eat. Also states that staying this many nights is costing him too much money ($275/night, while his income is $1,400/month, per pt). Pt states he wants to go home, and come back Monday for an outpt procedure, and wants to bring a , purpose of  was not stated.

## 2020-11-06 NOTE — PROGRESS NOTES
"ID daily Progress Note:     Date of Service: 11/6/2020    Chief Complaint: Toe pain, left great toe    Subjective:  74-year-old male admitted on 11/3/2020 for left first toe pain status post removal ingrown toenail 2 weeks prior, with worsening and progressive pain and swelling since that time.  Does report minimal discharge, denies fever/chills, S OB.  Patient was started on Bactrim course from urgent care after receiving 1 dose C3 IM.  Additionally, patient reports \"years worth\" of bilateral leg and foot pain after walking.  Reports he is only able to walk a distance of 60 feet, before he has to stop and rest because the pain is so bad.    Patient continues to be anxious to go home and does not understand \"what is taking things so long.\"  Otherwise no acute events overnight.  No new complaints.    Hospital course:  11/5/2020: Seen by surgeon, Dr. Morrison.  Plan for aortogram with runoff and possible intervention to improve blood flow for limb salvage (possibly 11/6/2020)  11/4/2020: HUGO significantly reduced bilaterally.  Arterial duplex indicates significant disease in the aorto iliac segment.      Review of Systems:  GEN: Denies fevers/chills.  No significant weight change.  HEENT: Denies blurry vision, nasal congestion, sore throat  CARDIO: Denies chest pain, palpitations, orthopnea.   PULM: Denies shortness of breath, wheezing, or coughing  GI: Denies nausea/vomiting/diarrhea/constipation, or abdominal pain  : Denies dysuria or frequency  HEME: Denies easy bruising or bleeding  MSK: Denies focal weakness, joint pain or swelling  SKIN: Left foot redness and swelling.    NEURO: Denies confusion, memory loss.    Minimal paresthesias of bilateral lower extremity.  Intermittent swelling  ENDO: Denies polyuria or polydipsia  PSYCH: Denies anxiety or depression      Physical Exam:   /78   Pulse 94   Temp 36.6 °C (97.8 °F) (Temporal)   Resp 16   Ht 1.727 m (5' 7.99\")   Wt 85.1 kg (187 lb 9.8 oz)   SpO2 " 97%   GEN: Awake and alert.  Sitting in bed no acute distress.   Communicative.  Hyperverbal.    HEENT: NC/AT.  EOMI.  No pallor or scleral icterus.  Moist oral mucosa.  Supple neck  CARDIO: Regular rate and rhythm.  No murmurs, rubs or accessory heart sounds.  2+ bilateral radial pulses.   RESP: Clear lungs bilaterally on auscultation.  No crackles wheezes or cough.  No accessory muscle usage.  GI: Positive bowel sounds. soft, nontender, nondistended abdomen  EXT: No clubbing, cyanosis, edema  NEURO: Alert and oriented.  No obvious focal deficits.  Able to move all extremities  SKIN: Left foot great toe: Erythematous, tender to palpation, missing toenail with crusted over.  No drainage.  Left second toe also erythematous and tender.  Left foot erythema and warmth just below the ankle, improved from previous day  PSYCH: Appropriate mood and affect.  Judgment and reasoning intact, although has poor understanding of his medical condition    Labs:   WBC 3.8, hemoglobin 13.6  BMP WNL  Vancomycin trough 5.3  Negative blood cultures to date.      Imaging:   No new      Assessment and plan:  #Left great toe osteomyelitis, with associated cellulitis, status post ingrown toenail with removal, the setting of significant peripheral arterial disease  #Leukopenia  #Normocytic anemia, acute  #Hyponatremia, acute  #Hypochloremia  #Tobacco abuse, current  #Hypertension.     - On vancomycin and C3 (initiated 11/3/2020), continue for now.  Will need 6 weeks antibiotic therapy.  On DC, will consider Bactrim and Augmentin.  - In setting of severe PAD, poor prognosis for amputation at this time.  Given significant peripheral vascular disease, also decreased efficacy of antibiotic therapy, high risk for worsening/healing/return to hospital.  Will await vascular evaluation, anticipate revascularization, and then it may be possible for an amputation  - Smoking cessation counseling provided       Updated the primary team resident,   Sameer       ,DirectAddress_Unknown

## 2020-11-06 NOTE — CARE PLAN
Problem: Safety  Goal: Will remain free from injury  Outcome: PROGRESSING AS EXPECTED     Problem: Discharge Barriers/Planning  Goal: Patient's continuum of care needs will be met  Outcome: PROGRESSING AS EXPECTED      See prior note. Pt had MD promise to have his procedure today, and stated that if he doesn't, he's leaving ama. Will continue to monitor.

## 2020-11-06 NOTE — PROGRESS NOTES
Patient AxO x 4  Respirations normal and unlabored. VSS.  Patient has complained of pain with PRNs given as appropriate. Declines ice or heat packs.   Patient aware of plan for tomorrow and that he cannot eat or drink after midnight.   Fall risk protocol in place. Bed locked and in lowest position. Non-skid socks on.  Rounded on hourly. Call light with in reach.

## 2020-11-06 NOTE — CONSULTS
DATE OF CONSULTATION: 11/5/2020     REFERRING PHYSICIAN: Hospitalist, MD.     CONSULTING PHYSICIAN: Jimi Morrison M.D.      REASON FOR CONSULTATION: Evaluate patient with peripheral arterial disease and osteomyelitis of his left great toe.     Accu-Chek meter read  HISTORY OF PRESENT ILLNESS: The patient is a 74-year-old gentleman who had an ingrown toenail removed recently from his left great toe.  The patient had persistent pain in the area and developed some erythema.  He was admitted to the hospital and infectious disease was consulted.  Peripheral arterial disease was suspected and noninvasive arterial studies demonstrate significant peripheral arterial disease primarily with respect to inflow in the aortoiliac segment.  The ankle-brachial indices were markedly diminished.  The patient does give a history of short distance claudication with pain in his calves with ambulation relieved by rest.    PAST MEDICAL HISTORY:  has a past medical history of Back pain, Cancer (HCC), Cataract, Hypertension, and Infectious disease.     PAST SURGICAL HISTORY:  has a past surgical history that includes other orthopedic surgery and other abdominal surgery.     ALLERGIES:   Allergies   Allergen Reactions   • Vicodin [Hydrocodone-Acetaminophen]      nausea        CURRENT MEDICATIONS:   Home Medications     Reviewed by Cosme Robles on 11/03/20 at 2318  Med List Status: Complete   Medication Last Dose Status   atorvastatin (LIPITOR) 40 MG Tab 11/2/2020 Active   ondansetron (ZOFRAN) 4 MG Tab tablet Not Taking Active   prochlorperazine (COMPAZINE) 10 MG Tab Not Taking Active   sulfamethoxazole-trimethoprim (BACTRIM DS) 800-160 MG tablet 11/3/2020 Active                FAMILY HISTORY: No family history on file.     SOCIAL HISTORY:   Social History     Tobacco Use   • Smoking status: Current Every Day Smoker     Packs/day: 1.00     Years: 54.00     Pack years: 54.00     Types: Cigarettes     Start date: 1966   Substance and  Sexual Activity   • Alcohol use: Not Currently   • Drug use: Never   • Sexual activity: Not on file       Review of Systems:      Constitutional: Denies fevers, Denies weight changes  Eyes: Denies changes in vision, no eye pain  Ears/Nose/Throat/Mouth: Denies nasal congestion or sore throat   Cardiovascular: Denies chest pain or palpitations.  Respiratory: Denies shortness of breath, cough, and wheezing.  Gastrointestinal/Hepatic: Denies abdominal pain, nausea, vomiting, diarrhea, constipation or GI bleeding   Genitourinary: Denies dysuria or frequency  Musculoskeletal/Rheum: Patient reports pain in his lower extremities with ambulation relieved by rest patient also reports pain in his left great toe.    Skin: Denies rash  Neurological: Denies headache, confusion, memory loss or focal weakness/parasthesias  Psychiatric: denies mood disorder   Endocrine: Claire thyroid problems  Heme/Oncology/Lymph Nodes: Denies enlarged lymph nodes, denies brusing or known bleeding disorder  All other systems were reviewed and are negative (AMA/CMS criteria)                  PHYSICAL EXAMINATION:       Constitutional:   Well developed, Well nourished, No acute distress  HENMT:  Normocephalic, Atraumatic, Oropharynx moist mucous membranes, No oral exudates, Nose normal.  No thyromegaly.  Eyes:  EOMI, Conjunctiva normal, No discharge.  Neck:  Normal range of motion, No cervical tenderness,  no JVD.  Cardiovascular:  Normal heart rate, Normal rhythm, No murmurs, No rubs, No gallops.   Extremitites absent femoral popliteal and pedal pulses bilaterally, left great toenail has been removed.  There is some mild erythema of the forefoot.  Lungs:  Normal breath sounds, breath sounds clear to auscultation bilaterally,  no crackles, no wheezing.   Abdomen: Bowel sounds normal, Soft, No tenderness, No guarding, No rebound, No masses, No hepatosplenomegaly.  Skin: Warm, Dry, No erythema, No rash, no induration.  Neurologic: Alert & oriented x 3,  No focal deficits noted, cranial nerves II through X are grossly intact.  Psychiatric: Affect normal, Judgment normal, Mood normal.        LABORATORY VALUES:   Recent Labs     11/03/20  1531 11/05/20  0902   WBC 3.6* 3.5*   RBC 4.62* 4.15*   HEMOGLOBIN 15.0 13.4*   HEMATOCRIT 43.4 39.6*   MCV 93.9 95.4   MCH 32.5 32.3   MCHC 34.6 33.8   RDW 45.3 45.1   PLATELETCT 226 172   MPV 9.7 9.5     Recent Labs     11/03/20  1531 11/05/20  0902   SODIUM 126* 136   POTASSIUM 4.7 4.4   CHLORIDE 95* 102   CO2 23 24   GLUCOSE 111* 101*   BUN 16 14   CREATININE 1.02 0.72   CALCIUM 9.3 8.5     Recent Labs     11/03/20  1531 11/05/20  0902   ASTSGOT 23 21   ALTSGPT 23 20   TBILIRUBIN 0.4 0.4   ALKPHOSPHAT 75 64   GLOBULIN 2.8 2.5            IMAGING:   US-EXTREMITY ARTERY LOWER BILAT   Final Result      US-HUGO SINGLE LEVEL BILAT   Final Result      MR-FOOT-WITH & W/O LEFT   Final Result      1.  There are edema and mild enhancement of the tip of the first distal phalanx likely representing osteomyelitis.      2.  Cellulitis.      DX-TOE(S) 2+ LEFT   Final Result         1.  No acute traumatic bony injury.   2.  Slight permeative appearance of the medial aspect of the first toe distal phalanx, could represent subtle changes of osteomyelitis.          IMPRESSION AND PLAN:  Severe peripheral arterial disease.  Osteomyelitis left great toe  I believe that the arterial insufficiency is definitely contributing to the patient's failure to respond to antibiotic therapy and to improve.  We will plan aortogram with runoff tomorrow and possible intervention to improve blood flow for limb salvage.    I discussed the risk benefits alternatives and expectations with the procedure.  Patient agrees to proceed.    ____________________________________   Jimi Morrison M.D.          DD: 11/5/2020   DT: 4:33 PM

## 2020-11-06 NOTE — DOCUMENTATION QUERY
[facility]                                                                       Query Response Note      PATIENT:               POORNIMA TORREZ  ACCT #:                  [id number]  MRN:                     [id number]  :                      1945  ADMIT DATE:       11/3/2020 8:11 PM  DISCH DATE:          RESPONDING  PROVIDER #:        [id number]           QUERY TEXT:    Please clarify in documentation the relationship, if any, between Osteomyelitis and Toenail removal procedure    The patient's Clinical Indicators include:   MD PN: Left great toe osteomyelitis - worsening pain and swelling of left great toe since outpatient toenail removal   ID MD Consult: Left great toe osteomyelitis, with associated cellulitis, status post ingrown toenail with removal, the setting of significant peripheral arterial disease   LPS consult: 10/30/2020 he developed yellow-green drainage from the wound  and edema  Surg consult: I believe that the arterial insufficiency is definitely contributing to the patient's failure to respond to antibiotic therapy and to improve.   Treatment: ID consult, MRI, Vancomycin, Bactrim, Vasc surg consult  Risk Factors: Advanced age, PAD, S/P removal of ingrown toenail  Options provided:   -- Osteomyelitis is due to or associated with Toenail removal procedure   -- Unrelated to each other   -- Unable to determine      Query created by: Andie Howard on 2020 8:05 AM    RESPONSE TEXT:    Unable to determine          Electronically signed by:  RUPINDER MONTANEZ MD 2020 1:57 PM

## 2020-11-06 NOTE — PROGRESS NOTES
Received report from Sadaf RUSSELL and assumed care of patient at 0000. Patient is currently resting comfortably in bed with no signs of distress noted. Patient updated on plan of care; verbalizes understanding and states all of his questions/concerns have been addressed and answered appropriately. Safety precautions in place including patient call light within reach, personal possessions nearby, bed in low position and locked, patient rounding in practice, and non-skid socks in place.

## 2020-11-06 NOTE — PROGRESS NOTES
"  [facility] PROGRESS NOTE     Attending:   Shakira Henriquez M.D.    Resident:   Laurie Lock MD    PATIENT:   Ronal Hair; [id number]; 1945    ID:   74 y.o. male with PMH of peripheral vascular disease, heavy tobacco smoking and lymphoma admitted for osteomyelitis of left great toe, confirmed on MR     SUBJECTIVE:   Interval events: Made NPO by vascular surgeon last night in advance of aortogram    Patient wants to go home. Requesting at least 1 week of pain meds, claims he's \"not a pill popper.\" He's hungry and feels as though he's being taken advantage of by the hospital.    OBJECTIVE:  Vitals:    11/05/20 0418 11/05/20 0700 11/05/20 2012 11/06/20 0449   BP: 110/63 117/73 135/63 146/64   Pulse: 80 82 88 83   Resp: 18 16 19 19   Temp: 36.6 °C (97.8 °F) 36.6 °C (97.8 °F) 37.1 °C (98.7 °F) 36.3 °C (97.4 °F)   TempSrc: Temporal Axillary Temporal Temporal   SpO2: 90% 95% 91% 95%   Weight:       Height:           Intake/Output Summary (Last 24 hours) at 11/6/2020 0544  Last data filed at 11/5/2020 1400  Gross per 24 hour   Intake 2160 ml   Output --   Net 2160 ml       PHYSICAL EXAM:  General: Pt resting in NAD, cooperative    Skin:  Pink, warm and dry.  No rashes.  Multiple scattered seborrheic keratoses across upper extremities but especially prominent on face.  HEENT: NC/AT. PERRL. EOMI. MMM. No nasal discharge.   Neck:  Supple without lymphadenopathy or rigidity. No JVD   Lungs:  Symmetrical.  CTAB with no W/R/R.  Good air movement   Cardiovascular:  S1/S2 RRR without M/R/G.  Abdomen: Central adiposity.  Abdomen is soft NT/ND. +BS. No masses noted.  Genitourinary:  Deferred.    Extremities:  Full range of motion. No gross deformities noted. 2+ radial pulses at upper extremities bilaterally; thready to 1+ dorsalis pedis pulses bilaterally. + swelling and edema noted along L great toe, erythema   CNS:  Muscle tone is normal. Cranial nerves II-XII grossly intact.        LABS:  Recent Labs     " 11/03/20  1531 11/05/20  0902   WBC 3.6* 3.5*   RBC 4.62* 4.15*   HEMOGLOBIN 15.0 13.4*   HEMATOCRIT 43.4 39.6*   MCV 93.9 95.4   MCH 32.5 32.3   RDW 45.3 45.1   PLATELETCT 226 172   MPV 9.7 9.5   NEUTSPOLYS 66.40 63.20   LYMPHOCYTES 17.30* 19.70*   MONOCYTES 10.90 10.40   EOSINOPHILS 4.20 5.50   BASOPHILS 0.60 0.90     Recent Labs     11/03/20  1531 11/05/20  0902   SODIUM 126* 136   POTASSIUM 4.7 4.4   CHLORIDE 95* 102   CO2 23 24   BUN 16 14   CREATININE 1.02 0.72   CALCIUM 9.3 8.5   MAGNESIUM  --  2.1   ALBUMIN 4.5 3.9     Estimated GFR/CRCL = Estimated Creatinine Clearance: 98.5 mL/min (by C-G formula based on SCr of 0.72 mg/dL).  Recent Labs     11/03/20  1531 11/05/20  0902   GLUCOSE 111* 101*     Recent Labs     11/03/20  1531 11/05/20  0902   ASTSGOT 23 21   ALTSGPT 23 20   TBILIRUBIN 0.4 0.4   ALKPHOSPHAT 75 64   GLOBULIN 2.8 2.5             No results for input(s): INR, APTT, FIBRINOGEN in the last 72 hours.    Invalid input(s): DIMER    MICROBIOLOGY:   No results found for: BLOODCULTU, BLDCULT, BCHOLD     IMAGING:   US-EXTREMITY ARTERY LOWER BILAT   Final Result      US-HUGO SINGLE LEVEL BILAT   Final Result      MR-FOOT-WITH & W/O LEFT   Final Result      1.  There are edema and mild enhancement of the tip of the first distal phalanx likely representing osteomyelitis.      2.  Cellulitis.      DX-TOE(S) 2+ LEFT   Final Result         1.  No acute traumatic bony injury.   2.  Slight permeative appearance of the medial aspect of the first toe distal phalanx, could represent subtle changes of osteomyelitis.      IR-ABDOMINAL AORTOGRAM    (Results Pending)       CULTURES:   Results     Procedure Component Value Units Date/Time    SARS-CoV-2, PCR (In-House) [[id number]] Collected: 11/05/20 0753    Order Status: Completed Updated: 11/05/20 2119     SARS-CoV-2 Source NP Swab     SARS-CoV-2 by PCR NotDetected     Comment: [facility] providers: PLEASE REFER TO DE-ESCALATION AND RETESTING PROTOCOL  on  [email or web address]  **The Roche SARS-CoV-2 PCR test has been made available for use under the  Emergency Use Authorization (EUA) only.         Narrative:      Is patient being admitted?->Yes  Does this patient meet criteria for Rush/Cepheid per [facility]  Inpatient Workflow? (See workflow link below)->No  Expected turn around time?->Routine (In-House PCR up to 24  hours)  Is this the patients First SARS CoV-2 test?->Unknown  Is this patient employed in healthcare?->No  Is the patient symptomatic as defined by the CDC?->No  Is the patient hospitalized?->Yes  Is the patient in the ICU?->No  Is the patient a resident in a congregate care setting?->No  Is the patient pregnant?->No    COVID/SARS CoV-2 PCR [[id number]] Collected: 11/05/20 0745    Order Status: Completed Specimen: Respirate from Nasopharyngeal Updated: 11/05/20 0752     COVID Order Status Received     Comment: The order for SARS CoV-2 testing has been received by the  Laboratory. This result is neither positive nor negative.  Final results of testing will report in 24-48 hours, separately.         Narrative:      Is patient being admitted?->Yes  Does this patient meet criteria for Rush/Cepheid per [facility]  Inpatient Workflow? (See workflow link below)->No  Expected turn around time?->Routine (In-House PCR up to 24  hours)  Is this the patients First SARS CoV-2 test?->Unknown  Is this patient employed in healthcare?->No  Is the patient symptomatic as defined by the CDC?->No  Is the patient hospitalized?->Yes  Is the patient in the ICU?->No  Is the patient a resident in a congregate care setting?->No  Is the patient pregnant?->No    BLOOD CULTURE [[id number]] Collected: 11/03/20 2115    Order Status: Completed Specimen: Blood from Peripheral Updated: 11/04/20 0736     Significant Indicator NEG     Source BLD     Site PERIPHERAL     Culture Result No Growth  Note: Blood cultures are incubated for 5 days and  are monitored continuously.Positive blood cultures  are  "called to the RN and reported as soon as  they are identified.      Narrative:      Per Hospital Policy: Only change Specimen Src: to \"Line\" if  specified by physician order.  Left Forearm/Arm    BLOOD CULTURE [[id number]] Collected: 11/03/20 2130    Order Status: Completed Specimen: Blood from Peripheral Updated: 11/04/20 0736     Significant Indicator NEG     Source BLD     Site PERIPHERAL     Culture Result No Growth  Note: Blood cultures are incubated for 5 days and  are monitored continuously.Positive blood cultures  are called to the RN and reported as soon as  they are identified.      Narrative:      Per Hospital Policy: Only change Specimen Src: to \"Line\" if  specified by physician order.  Right AC          MEDS:  Current Facility-Administered Medications   Medication Last Admin   • senna-docusate (PERICOLACE or SENOKOT S) 8.6-50 MG per tablet 2 Tab Stopped at 11/06/20 0600    And   • polyethylene glycol/lytes (MIRALAX) PACKET 1 Packet      And   • magnesium hydroxide (MILK OF MAGNESIA) suspension 30 mL      And   • bisacodyl (DULCOLAX) suppository 10 mg     • heparin injection 5,000 Units 5,000 Units at 11/05/20 2114   • acetaminophen (TYLENOL) tablet 650 mg     • nicotine (NICODERM) 21 MG/24HR 21 mg 21 mg at 11/06/20 0512    And   • nicotine polacrilex (NICORETTE) 2 MG piece 2 mg     • atorvastatin (LIPITOR) tablet 40 mg 40 mg at 11/05/20 1659   • cefTRIAXone (ROCEPHIN) 2 g in  mL IVPB 2 g at 11/06/20 0445   • MD Alert...Vancomycin per Pharmacy     • vancomycin (VANCOCIN) 1,750 mg in  mL IVPB Stopped at 11/05/20 0750   • morphine (pf) 4 mg/mL injection 1-2 mg 2 mg at 11/06/20 0512   • ondansetron (ZOFRAN ODT) dispertab 4 mg     • oxyCODONE-acetaminophen (PERCOCET) 5-325 MG per tablet 1-2 Tab 2 Tab at 11/05/20 1937       PROBLEM LIST:  No problems updated.    ASSESSMENT/PLAN: 74 y.o. male with PMH of peripheral vascular disease, heavy tobacco smoking and lymphoma admitted for osteomyelitis of " left great toe, confirmed on MR      # Left great toe osteomyelitis  # Left great toe pain  # Cellulitis, failed OP therapy   # Severe peripheral vascular disease   Patient with worsening pain and swelling of left great toe since outpatient toenail removal October 21. This, in spite of one dose of intramuscular ceftriaxone at urgent care, followed by oral Bactrim at home.  MRI impression: There are edema and mild enhancement of the tip of the first distal phalanx likely representing osteomyelitis. Cellulitis.  HUGO showing monophasic waveforms at the femoral, popliteal, and tibial and peroneal arteries, severly reduced amplitude. Indicate significant disease in the aorto iliac segment. HUGO of 0.51 in RLE, 0.35 in LLE, indicating severe PVD.  - Day 3 of Vancomycin and ceftriaxone empiric antibiotic therapy.  - Vanco per pharmacy. Vancomycin subtherapeutic at 5.3 per trough level on 11/6. Pharmacy to adjust.  - ID following. Consider transitioning to PO Abx with Bactrim and Augmentin in anticipation of discharge.   - Vascular, Dr. Morrison, consulted; plan for aortogram with runoff 11/6; made NPO in advance. D5NS while NPO.  - Percocet as needed for pain control, IV morphine PRN breakthrough  - Zofran for nausea  - Limb preservation services consulted  - Wound care consult     # Hyponatremia, resolved  Na+ of 126 on admission, asymptomatic, no neuro findings. After fluid resuscitation now normal  - Patient eating, DC fluids      # Leukopenia without neutropenia  This appears to be the patient's baseline following lymphoma; no acute need at this time     #Nicotine dependence  Patient is a longtime smoker, currently smoking 3 packs/day.  - Nicotine patch will be provided at patient's request  - Smoking cessation counseling will be provided to the patient     VTE PPx: Heparin   Abx: C3 and Vancomycin   Lines/Tubes: PIV  Fluids: DCed   Diet: Regular, NPO last night at [place]  Code Status: Full     Disposition:  inpatient pending  vascular intervention, negative blood cultures and plan for OP treatment for osteomyelitis    Laurie Lock MD   PGY-1 Family Medicine Resident   [facility]

## 2020-11-07 PROCEDURE — A9270 NON-COVERED ITEM OR SERVICE: HCPCS | Performed by: STUDENT IN AN ORGANIZED HEALTH CARE EDUCATION/TRAINING PROGRAM

## 2020-11-07 PROCEDURE — 700111 HCHG RX REV CODE 636 W/ 250 OVERRIDE (IP): Performed by: INTERNAL MEDICINE

## 2020-11-07 PROCEDURE — 700105 HCHG RX REV CODE 258: Performed by: INTERNAL MEDICINE

## 2020-11-07 PROCEDURE — 700102 HCHG RX REV CODE 250 W/ 637 OVERRIDE(OP): Performed by: STUDENT IN AN ORGANIZED HEALTH CARE EDUCATION/TRAINING PROGRAM

## 2020-11-07 PROCEDURE — 770006 HCHG ROOM/CARE - MED/SURG/GYN SEMI*

## 2020-11-07 PROCEDURE — 700111 HCHG RX REV CODE 636 W/ 250 OVERRIDE (IP): Performed by: STUDENT IN AN ORGANIZED HEALTH CARE EDUCATION/TRAINING PROGRAM

## 2020-11-07 PROCEDURE — 700105 HCHG RX REV CODE 258: Performed by: STUDENT IN AN ORGANIZED HEALTH CARE EDUCATION/TRAINING PROGRAM

## 2020-11-07 RX ORDER — DEXTROSE AND SODIUM CHLORIDE 5; .9 G/100ML; G/100ML
INJECTION, SOLUTION INTRAVENOUS CONTINUOUS
Status: DISCONTINUED | OUTPATIENT
Start: 2020-11-07 | End: 2020-11-08

## 2020-11-07 RX ADMIN — CEFTRIAXONE SODIUM 2 G: 2 INJECTION, POWDER, FOR SOLUTION INTRAMUSCULAR; INTRAVENOUS at 05:33

## 2020-11-07 RX ADMIN — OXYCODONE HYDROCHLORIDE AND ACETAMINOPHEN 2 TABLET: 5; 325 TABLET ORAL at 17:27

## 2020-11-07 RX ADMIN — OXYCODONE HYDROCHLORIDE AND ACETAMINOPHEN 2 TABLET: 5; 325 TABLET ORAL at 13:08

## 2020-11-07 RX ADMIN — OXYCODONE HYDROCHLORIDE AND ACETAMINOPHEN 2 TABLET: 5; 325 TABLET ORAL at 04:11

## 2020-11-07 RX ADMIN — NICOTINE TRANSDERMAL SYSTEM 21 MG: 21 PATCH, EXTENDED RELEASE TRANSDERMAL at 05:34

## 2020-11-07 RX ADMIN — VANCOMYCIN HYDROCHLORIDE 1000 MG: 500 INJECTION, POWDER, LYOPHILIZED, FOR SOLUTION INTRAVENOUS at 04:16

## 2020-11-07 RX ADMIN — DOCUSATE SODIUM 50 MG AND SENNOSIDES 8.6 MG 2 TABLET: 8.6; 5 TABLET, FILM COATED ORAL at 05:34

## 2020-11-07 RX ADMIN — VANCOMYCIN HYDROCHLORIDE 1000 MG: 500 INJECTION, POWDER, LYOPHILIZED, FOR SOLUTION INTRAVENOUS at 16:26

## 2020-11-07 RX ADMIN — ATORVASTATIN CALCIUM 40 MG: 40 TABLET, FILM COATED ORAL at 17:27

## 2020-11-07 RX ADMIN — OXYCODONE HYDROCHLORIDE AND ACETAMINOPHEN 2 TABLET: 5; 325 TABLET ORAL at 08:49

## 2020-11-07 RX ADMIN — OXYCODONE HYDROCHLORIDE AND ACETAMINOPHEN 2 TABLET: 5; 325 TABLET ORAL at 22:32

## 2020-11-07 ASSESSMENT — FIBROSIS 4 INDEX: FIB4 SCORE: 2.34

## 2020-11-07 NOTE — OP REPORT
11/06/20    OPERATIVE NOTE    PRE-OPERATIVE DIAGNOSIS -     1.  Severe peripheral arterial disease  2.  Left lower extremity osteomyelitis great toe    POST-OPERATIVE DIAGNOSIS -same    PROCEDURE PERFORMED -     1.  Ultrasound-guided access bilateral common femoral arteries  2.  Angiogram bilateral lower extremities    SURGEON - Jimi Morrison M.D.    TYPE OF ANESTHESIA -local      INDICATIONS - 74 y.o. gentleman who has a nonhealing left great toe.  The patient underwent removal of his toenail and this went on to not heal.  MRI suggested osteomyelitis of the left great toe.  Patient has significant peripheral arterial disease and the noninvasive arterial studies suggested significant disease within the aortoiliac segment.      Findings- Bilateral external iliac artery occlusions      PROCEDURE IN DETAIL -     Patient was taken to the endovascular suite at [facility] and placed in the supine position.  After adequate positioning the patient's bilateral groins were shaved prepped draped in sterile fashion.  1% lidocaine was infiltrated into the subcutaneous tissue of the right groin region.  Using ultrasound guidance a thinwall access needle was inserted into the right common femoral artery and a 4 [place] sheath was placed.  A wire would not advance into the iliac system and a retrograde angiogram was performed demonstrating occlusion of the external iliac artery.  An attempt was made to cross the area of occlusion but was unsuccessful.  Next using ultrasound guidance a thinwall access needle was placed in the left femoral artery and using a Seldinger technique a 4 [place] sheath was placed.  Once again a wire would not advance into the iliac system in a retrograde pelvic angiogram demonstrated occlusion of the left external iliac artery.  An attempt was made with a [place] catheter and Glidewire to cross the occlusion which was unsuccessful.  Next bilateral lower extremity EMG angiograms were  performed from the sheaths.  This demonstrated that the common femoral and profunda femoris arteries are widely patent.  The proximal superficial femoral arteries are also probably widely patent bilaterally.  The there is some disease at the abductor canal bilaterally but further runoff images were not obtained.    Recommendations-     Patient will need an aortobifemoral bypass graft for limb salvage.  Continue IV antibiotic therapy  Date and time of surgery to be determined      Jimi Morrison M.D.

## 2020-11-07 NOTE — PROGRESS NOTES
Report received from SABRA Cordova RN. Assumed care, assessment complete at 2000. Pt is A & O x 4.  Pt denies having any pain at this time. Fall precautions and appropriate signs in place. Pt oriented to unit routine, call light/phone system and RN extension number provided. Pt educated regarding fall precautions. Bed alarm in use. Pt denies any additional needs at this time. Call light within reach.

## 2020-11-07 NOTE — PROGRESS NOTES
"Pharmacy Kinetics 75 y.o. male on vancomycin day # 5 2020    Currently on Vancomycin 1000 mg iv q12hr  Provider specified end date: TBD (6 weeks per ID)       Indication for Treatment: osteomyelitis     Pertinent history per medical record: Admitted on 11/3/2020 for left great toe pain s/p toe nail removal on 10/23. PMH current 3 pack/day smoker, PVD, lymphoma. Pt presented to urgent care and was given IM ceftriaxone x 1 and Bactrim rx, but redness and pain worsened. Osteomyelitis confirmed on imaging, abx started empirically and ID consulted.     Other antibiotics: ceftriaxone 2g IV daily     Allergies: Vicodin [hydrocodone-acetaminophen]      List concerns for renal function: age      Pertinent cultures to date:   11/3/20 PBC x 2 - NGTD     MRSA nares swab if pneumonia is a concern (ordered/positive/negative/n-a): n/a       Recent Labs     20  0902 20  0534   WBC 3.5* 3.8*   NEUTSPOLYS 63.20 64.20     Recent Labs     20  0902 20  0534   BUN 14 14   CREATININE 0.72 0.72   ALBUMIN 3.9 4.0     Recent Labs     20  0534   VANCOTROUGH 5.3*       Intake/Output Summary (Last 24 hours) at 2020 1250  Last data filed at 2020 1000  Gross per 24 hour   Intake 848 ml   Output --   Net 848 ml      /60   Pulse 83   Temp 36.6 °C (97.8 °F) (Temporal)   Resp 17   Ht 1.727 m (5' 7.99\")   Wt 83.8 kg (184 lb 11.9 oz)   SpO2 94%  Temp (24hrs), Av.7 °C (98.1 °F), Min:36.6 °C (97.8 °F), Max:37 °C (98.6 °F)      A/P   1. Vancomycin dose change: Not indicated at this time- continue with vancomycin 1000 mg q12H  2. Next vancomycin level: 20 @ 0330  3. Goal trough: 10-15 mcg/mL  4. Comments: Both ortho and ID have been consulted on patient. Per ortho- plan for aorto-bifemoral bypass tomorrow. No confirmed timed yet. Plan for vancomycin trough in AM. Pharmacy will continue to monitor.    June Selby, PharmD      "

## 2020-11-07 NOTE — PROGRESS NOTES
IR RN note    Patient underwent a Aortogram with runoff and possible interventions by Dr. Morrison.  Procedure site was verified by MD using imaging guidance. Consent was signed.  IR Kranthi Ortiz and Kavya assisted. Patient was placed in a supine position.  Vitals were taken every 5 minutes and remained stable during procedure (see doc flow sheet for results).  CO2 waveform capnography was monitored throughout procedure, see chart.  Right and left groin access site.   A gauze and tegaderm dressing was placed over surgical site, held pressure for 10 min.  Report called to Jimi RUSSELL. Pt transported by stefanie with RN to S527, with monitor .   NO sedation orders with MD RIVER procedure ended at 16:58

## 2020-11-07 NOTE — PROGRESS NOTES
[facility] [place] FAMILY MEDICINE PROGRESS NOTE     Attending:   Irina Goddard MD    Resident:   Laurie Lock MD    PATIENT:   Ronal Hair; [id number]; 1945    ID:   75 y.o. male with PMH of peripheral vascular disease, heavy tobacco smoking and lymphoma admitted for osteomyelitis of left great toe, confirmed on MR      SUBJECTIVE:   Interval events: Vascular surgery performed angiogram; patient is in need of aortobifemoral bypass graft for limb salvage.     Patient's pain well controlled with PO meds. Happy that he now has a plan with vascular surgery.    OBJECTIVE:  Vitals:    11/06/20 1904 11/06/20 2018 11/07/20 0404 11/07/20 0405   BP: (!) 161/70 155/74 156/74    Pulse: 84  87    Resp: 20 19    Temp: 36.6 °C (97.9 °F)  37 °C (98.6 °F)    TempSrc: Temporal  Temporal    SpO2: 95%  91%    Weight:    83.8 kg (184 lb 11.9 oz)   Height:           Intake/Output Summary (Last 24 hours) at 11/7/2020 0617  Last data filed at 11/6/2020 1918  Gross per 24 hour   Intake 250 ml   Output --   Net 250 ml       PHYSICAL EXAM:  General: Pt resting in NAD, cooperative    Skin:  Pink, warm and dry.  No rashes.  Multiple scattered seborrheic keratoses across upper extremities but especially prominent on face.  HEENT: NC/AT. PERRL. EOMI. MMM. No nasal discharge.   Neck:  Supple without lymphadenopathy or rigidity. No JVD   Lungs:  Symmetrical.  CTAB with no W/R/R.  Good air movement   Cardiovascular:  S1/S2 RRR without M/R/G.  Abdomen: Central adiposity.  Abdomen is soft NT/ND. +BS. No masses noted.  Genitourinary:  Deferred.    Extremities:  Full range of motion. No gross deformities noted. 2+ radial pulses at upper extremities bilaterally; thready to 1+ dorsalis pedis pulses bilaterally. + swelling and edema noted along L great toe, erythema, no purulence  CNS:  Muscle tone is normal. Cranial nerves II-XII grossly intact.       LABS:  Recent Labs     11/05/20  0902 11/06/20  0534   WBC 3.5* 3.8*   RBC 4.15* 4.22*   HEMOGLOBIN  13.4* 13.6*   HEMATOCRIT 39.6* 39.7*   MCV 95.4 94.1   MCH 32.3 32.2   RDW 45.1 44.6   PLATELETCT 172 179   MPV 9.5 9.5   NEUTSPOLYS 63.20 64.20   LYMPHOCYTES 19.70* 17.60*   MONOCYTES 10.40 9.90   EOSINOPHILS 5.50 7.50*   BASOPHILS 0.90 0.50     Recent Labs     11/05/20  0902 11/06/20  0534   SODIUM 136 137   POTASSIUM 4.4 4.1   CHLORIDE 102 100   CO2 24 24   BUN 14 14   CREATININE 0.72 0.72   CALCIUM 8.5 8.9   MAGNESIUM 2.1  --    ALBUMIN 3.9 4.0     Estimated GFR/CRCL = Estimated Creatinine Clearance: 96.4 mL/min (by C-G formula based on SCr of 0.72 mg/dL).  Recent Labs     11/05/20  0902 11/06/20  0534   GLUCOSE 101* 94     Recent Labs     11/05/20  0902 11/06/20  0534   ASTSGOT 21 25   ALTSGPT 20 19   TBILIRUBIN 0.4 0.4   ALKPHOSPHAT 64 65   GLOBULIN 2.5 2.9             No results for input(s): INR, APTT, FIBRINOGEN in the last 72 hours.    Invalid input(s): DIMER    MICROBIOLOGY:   No results found for: BLOODCULTU, BLDCULT, BCHOLD     IMAGING:   US-EXTREMITY ARTERY LOWER BILAT   Final Result      US-HUGO SINGLE LEVEL BILAT   Final Result      MR-FOOT-WITH & W/O LEFT   Final Result      1.  There are edema and mild enhancement of the tip of the first distal phalanx likely representing osteomyelitis.      2.  Cellulitis.      DX-TOE(S) 2+ LEFT   Final Result         1.  No acute traumatic bony injury.   2.  Slight permeative appearance of the medial aspect of the first toe distal phalanx, could represent subtle changes of osteomyelitis.      IR-ABDOMINAL AORTOGRAM    (Results Pending)       CULTURES:   Results     Procedure Component Value Units Date/Time    SARS-CoV-2, PCR (In-House) [[id number]] Collected: 11/05/20 0745    Order Status: Completed Updated: 11/05/20 2119     SARS-CoV-2 Source NP Swab     SARS-CoV-2 by PCR NotDetected     Comment: [facility] providers: PLEASE REFER TO DE-ESCALATION AND RETESTING PROTOCOL  on [email or web address]  **The Roche SARS-CoV-2 PCR test has been made available for use under  the  Emergency Use Authorization (EUA) only.         Narrative:      Is patient being admitted?->Yes  Does this patient meet criteria for Rush/Cepheid per [facility]  Inpatient Workflow? (See workflow link below)->No  Expected turn around time?->Routine (In-House PCR up to 24  hours)  Is this the patients First SARS CoV-2 test?->Unknown  Is this patient employed in healthcare?->No  Is the patient symptomatic as defined by the CDC?->No  Is the patient hospitalized?->Yes  Is the patient in the ICU?->No  Is the patient a resident in a congregate care setting?->No  Is the patient pregnant?->No    COVID/SARS CoV-2 PCR [[id number]] Collected: 11/05/20 0745    Order Status: Completed Specimen: Respirate from Nasopharyngeal Updated: 11/05/20 0752     COVID Order Status Received     Comment: The order for SARS CoV-2 testing has been received by the  Laboratory. This result is neither positive nor negative.  Final results of testing will report in 24-48 hours, separately.         Narrative:      Is patient being admitted?->Yes  Does this patient meet criteria for Rush/Cepheid per [facility]  Inpatient Workflow? (See workflow link below)->No  Expected turn around time?->Routine (In-House PCR up to 24  hours)  Is this the patients First SARS CoV-2 test?->Unknown  Is this patient employed in healthcare?->No  Is the patient symptomatic as defined by the CDC?->No  Is the patient hospitalized?->Yes  Is the patient in the ICU?->No  Is the patient a resident in a congregate care setting?->No  Is the patient pregnant?->No    BLOOD CULTURE [[id number]] Collected: 11/03/20 2115    Order Status: Completed Specimen: Blood from Peripheral Updated: 11/04/20 0736     Significant Indicator NEG     Source BLD     Site PERIPHERAL     Culture Result No Growth  Note: Blood cultures are incubated for 5 days and  are monitored continuously.Positive blood cultures  are called to the RN and reported as soon as  they are identified.      Narrative:      Per  "Hospital Policy: Only change Specimen Src: to \"Line\" if  specified by physician order.  Left Forearm/Arm    BLOOD CULTURE [[id number]] Collected: 11/03/20 2130    Order Status: Completed Specimen: Blood from Peripheral Updated: 11/04/20 0736     Significant Indicator NEG     Source BLD     Site PERIPHERAL     Culture Result No Growth  Note: Blood cultures are incubated for 5 days and  are monitored continuously.Positive blood cultures  are called to the RN and reported as soon as  they are identified.      Narrative:      Per Hospital Policy: Only change Specimen Src: to \"Line\" if  specified by physician order.  Right AC          MEDS:  Current Facility-Administered Medications   Medication Last Admin   • vancomycin (VANCOCIN) 1,000 mg in  mL IVPB Stopped at 11/07/20 0616   • senna-docusate (PERICOLACE or SENOKOT S) 8.6-50 MG per tablet 2 Tab 2 Tab at 11/07/20 0534    And   • polyethylene glycol/lytes (MIRALAX) PACKET 1 Packet      And   • magnesium hydroxide (MILK OF MAGNESIA) suspension 30 mL      And   • bisacodyl (DULCOLAX) suppository 10 mg     • heparin injection 5,000 Units 5,000 Units at 11/05/20 2114   • acetaminophen (TYLENOL) tablet 650 mg     • nicotine (NICODERM) 21 MG/24HR 21 mg 21 mg at 11/07/20 0534    And   • nicotine polacrilex (NICORETTE) 2 MG piece 2 mg     • atorvastatin (LIPITOR) tablet 40 mg 40 mg at 11/06/20 1718   • cefTRIAXone (ROCEPHIN) 2 g in  mL IVPB Stopped at 11/07/20 0603   • MD Alert...Vancomycin per Pharmacy     • morphine (pf) 4 mg/mL injection 1-2 mg 2 mg at 11/06/20 0512   • ondansetron (ZOFRAN ODT) dispertab 4 mg     • oxyCODONE-acetaminophen (PERCOCET) 5-325 MG per tablet 1-2 Tab 2 Tab at 11/07/20 0411       PROBLEM LIST:  No problems updated.    ASSESSMENT/PLAN: 75 y.o.  male with PMH of peripheral vascular disease, heavy tobacco smoking and lymphoma admitted for osteomyelitis of left great toe, confirmed on MR      # Left great toe osteomyelitis  # Left great toe " pain  # Cellulitis, failed OP therapy   # Severe peripheral vascular disease   Patient with worsening pain and swelling of left great toe since outpatient toenail removal October 21. This, in spite of one dose of intramuscular ceftriaxone at urgent care, followed by oral Bactrim at home.  MRI impression: There are edema and mild enhancement of the tip of the first distal phalanx likely representing osteomyelitis. Cellulitis.  HUGO showing monophasic waveforms at the femoral, popliteal, and tibial and peroneal arteries, severly reduced amplitude. Indicate significant disease in the aorto iliac segment. HUGO of 0.51 in RLE, 0.35 in LLE, indicating severe PVD.  - Day 4 of Vancomycin and ceftriaxone empiric antibiotic therapy.  - Vanco per pharmacy. Vancomycin subtherapeutic at 5.3 per trough level on 11/6. Pharmacy to adjust.  - ID following. Consider transitioning to PO Abx with Bactrim and Augmentin in anticipation of discharge. Note today indicates futility in transitioning too soon before revascularization.  - Vascular, Dr. Morrison, consulted; 11/6 angiogram showed inability to place stent; patient needs aortobifemoral bypass graft for limb salvage. This is scheduled for 11/8. Will make NPO in advance with fluids.  - Percocet as needed for pain control, IV morphine PRN breakthrough  - Zofran for nausea  - Limb preservation services consulted  - Wound care consult     # Hyponatremia, resolved  Na+ of 126 on admission, asymptomatic, no neuro findings. After fluid resuscitation now normal  - Patient eating, DC fluids      # Leukopenia without neutropenia  This appears to be the patient's baseline following lymphoma; no acute need at this time     #Nicotine dependence  Patient is a longtime smoker, currently smoking 3 packs/day.  - Nicotine patch will be provided at patient's request  - Smoking cessation counseling will be provided to the patient     VTE PPx: Heparin   Abx: C3 and  Vancomycin   Lines/Tubes: PIV  Fluids: DCed   Diet: Regular, NPO last night at MN  Code Status: Full      Disposition:  inpatient pending vascular intervention, negative blood cultures and plan for OP treatment for osteomyelitis    Laurie Lock MD   PGY-1 Family Medicine Resident   [facility]

## 2020-11-07 NOTE — PROGRESS NOTES
Brief ID note:    Chart reviewed.  Patient afebrile.  He underwent an aortogram yesterday that showed bilateral external iliac artery occlusions.  Vascular recommending an aortobifemoral bypass graft for limb salvage.    Plan:  -Continue IV ceftriaxone and vancomycin for now  -Monitor renal function and Vanco trough  -Plan for aortobifemoral bypass graft per vascular surgery-date and -time to be determined    Patient very anxious to be discharged however would needs to remain in the hospital to have revascularization as antibiotics would otherwise be futile if not surgical procedure not performed.    Will follow

## 2020-11-07 NOTE — CARE PLAN
Problem: Communication  Goal: The ability to communicate needs accurately and effectively will improve  Outcome: PROGRESSING AS EXPECTED     Problem: Safety  Goal: Will remain free from injury  Outcome: PROGRESSING AS EXPECTED       Patient is able to verbalize needs and wants during hospital admission with no difficulty. Patient has bed lowered and placed in locked position.

## 2020-11-07 NOTE — PROGRESS NOTES
Vascular Surgery     Discussed angio findings     Recommended aorto-bifemoral bypass  Discussed risk, benefits expectations  Agrees to proceed       Plan surgery tomorrow   Awaiting exact time     NPO after MN     Jimi Morrison MD

## 2020-11-08 ENCOUNTER — ANESTHESIA (OUTPATIENT)
Dept: SURGERY | Facility: MEDICAL CENTER | Age: 75
DRG: 271 | End: 2020-11-08
Payer: MEDICARE

## 2020-11-08 ENCOUNTER — APPOINTMENT (OUTPATIENT)
Dept: RADIOLOGY | Facility: MEDICAL CENTER | Age: 75
DRG: 271 | End: 2020-11-08
Attending: ANESTHESIOLOGY
Payer: MEDICARE

## 2020-11-08 ENCOUNTER — ANESTHESIA EVENT (OUTPATIENT)
Dept: SURGERY | Facility: MEDICAL CENTER | Age: 75
DRG: 271 | End: 2020-11-08
Payer: MEDICARE

## 2020-11-08 PROBLEM — I74.09 AORTOILIAC OCCLUSIVE DISEASE (HCC): Status: ACTIVE | Noted: 2020-11-08

## 2020-11-08 LAB
ABO + RH BLD: NORMAL
ABO GROUP BLD: ABNORMAL
ANION GAP SERPL CALC-SCNC: 11 MMOL/L (ref 7–16)
BACTERIA BLD CULT: NORMAL
BACTERIA BLD CULT: NORMAL
BASOPHILS # BLD AUTO: 0.9 % (ref 0–1.8)
BASOPHILS # BLD: 0.03 K/UL (ref 0–0.12)
BLD GP AB SCN SERPL QL: ABNORMAL
BUN SERPL-MCNC: 15 MG/DL (ref 8–22)
CALCIUM SERPL-MCNC: 8.8 MG/DL (ref 8.5–10.5)
CHLORIDE SERPL-SCNC: 99 MMOL/L (ref 96–112)
CO2 SERPL-SCNC: 22 MMOL/L (ref 20–33)
CREAT SERPL-MCNC: 0.76 MG/DL (ref 0.5–1.4)
EKG IMPRESSION: NORMAL
EOSINOPHIL # BLD AUTO: 0.31 K/UL (ref 0–0.51)
EOSINOPHIL NFR BLD: 9.1 % (ref 0–6.9)
ERYTHROCYTE [DISTWIDTH] IN BLOOD BY AUTOMATED COUNT: 43.2 FL (ref 35.9–50)
ERYTHROCYTE [DISTWIDTH] IN BLOOD BY AUTOMATED COUNT: 44 FL (ref 35.9–50)
GLUCOSE SERPL-MCNC: 165 MG/DL (ref 65–99)
HCT VFR BLD AUTO: 38.8 % (ref 42–52)
HCT VFR BLD AUTO: 39.7 % (ref 42–52)
HGB BLD-MCNC: 13.4 G/DL (ref 14–18)
HGB BLD-MCNC: 13.6 G/DL (ref 14–18)
IMM GRANULOCYTES # BLD AUTO: 0.01 K/UL (ref 0–0.11)
IMM GRANULOCYTES NFR BLD AUTO: 0.3 % (ref 0–0.9)
LYMPHOCYTES # BLD AUTO: 0.68 K/UL (ref 1–4.8)
LYMPHOCYTES NFR BLD: 19.9 % (ref 22–41)
MCH RBC QN AUTO: 31.9 PG (ref 27–33)
MCH RBC QN AUTO: 32.2 PG (ref 27–33)
MCHC RBC AUTO-ENTMCNC: 34.3 G/DL (ref 33.7–35.3)
MCHC RBC AUTO-ENTMCNC: 34.5 G/DL (ref 33.7–35.3)
MCV RBC AUTO: 93.2 FL (ref 81.4–97.8)
MCV RBC AUTO: 93.3 FL (ref 81.4–97.8)
MONOCYTES # BLD AUTO: 0.32 K/UL (ref 0–0.85)
MONOCYTES NFR BLD AUTO: 9.4 % (ref 0–13.4)
NEUTROPHILS # BLD AUTO: 2.06 K/UL (ref 1.82–7.42)
NEUTROPHILS NFR BLD: 60.4 % (ref 44–72)
NRBC # BLD AUTO: 0 K/UL
NRBC BLD-RTO: 0 /100 WBC
PLATELET # BLD AUTO: 183 K/UL (ref 164–446)
PLATELET # BLD AUTO: 188 K/UL (ref 164–446)
PMV BLD AUTO: 9.4 FL (ref 9–12.9)
PMV BLD AUTO: 9.7 FL (ref 9–12.9)
POTASSIUM SERPL-SCNC: 4.3 MMOL/L (ref 3.6–5.5)
RBC # BLD AUTO: 4.16 M/UL (ref 4.7–6.1)
RBC # BLD AUTO: 4.26 M/UL (ref 4.7–6.1)
RH BLD: ABNORMAL
SIGNIFICANT IND 70042: NORMAL
SIGNIFICANT IND 70042: NORMAL
SITE SITE: NORMAL
SITE SITE: NORMAL
SODIUM SERPL-SCNC: 132 MMOL/L (ref 135–145)
SOURCE SOURCE: NORMAL
SOURCE SOURCE: NORMAL
VANCOMYCIN TROUGH SERPL-MCNC: 10.5 UG/ML (ref 10–20)
WBC # BLD AUTO: 3.4 K/UL (ref 4.8–10.8)
WBC # BLD AUTO: 7.6 K/UL (ref 4.8–10.8)

## 2020-11-08 PROCEDURE — 85025 COMPLETE CBC W/AUTO DIFF WBC: CPT

## 2020-11-08 PROCEDURE — 700105 HCHG RX REV CODE 258: Performed by: INTERNAL MEDICINE

## 2020-11-08 PROCEDURE — 71045 X-RAY EXAM CHEST 1 VIEW: CPT

## 2020-11-08 PROCEDURE — 700111 HCHG RX REV CODE 636 W/ 250 OVERRIDE (IP): Performed by: ANESTHESIOLOGY

## 2020-11-08 PROCEDURE — 700102 HCHG RX REV CODE 250 W/ 637 OVERRIDE(OP): Performed by: STUDENT IN AN ORGANIZED HEALTH CARE EDUCATION/TRAINING PROGRAM

## 2020-11-08 PROCEDURE — 700105 HCHG RX REV CODE 258: Performed by: ANESTHESIOLOGY

## 2020-11-08 PROCEDURE — 700105 HCHG RX REV CODE 258: Performed by: STUDENT IN AN ORGANIZED HEALTH CARE EDUCATION/TRAINING PROGRAM

## 2020-11-08 PROCEDURE — 04100JK BYPASS ABDOMINAL AORTA TO BILATERAL FEMORAL ARTERIES WITH SYNTHETIC SUBSTITUTE, OPEN APPROACH: ICD-10-PCS | Performed by: SURGERY

## 2020-11-08 PROCEDURE — 93005 ELECTROCARDIOGRAM TRACING: CPT | Performed by: ANESTHESIOLOGY

## 2020-11-08 PROCEDURE — 64520 N BLOCK LUMBAR/THORACIC: CPT | Performed by: SURGERY

## 2020-11-08 PROCEDURE — 700101 HCHG RX REV CODE 250: Performed by: SURGERY

## 2020-11-08 PROCEDURE — 501838 HCHG SUTURE GENERAL: Performed by: SURGERY

## 2020-11-08 PROCEDURE — 501837 HCHG SUTURE CV: Performed by: SURGERY

## 2020-11-08 PROCEDURE — 700111 HCHG RX REV CODE 636 W/ 250 OVERRIDE (IP): Performed by: INTERNAL MEDICINE

## 2020-11-08 PROCEDURE — 110454 HCHG SHELL REV 250: Performed by: SURGERY

## 2020-11-08 PROCEDURE — 160009 HCHG ANES TIME/MIN: Performed by: SURGERY

## 2020-11-08 PROCEDURE — 02HV33Z INSERTION OF INFUSION DEVICE INTO SUPERIOR VENA CAVA, PERCUTANEOUS APPROACH: ICD-10-PCS | Performed by: ANESTHESIOLOGY

## 2020-11-08 PROCEDURE — 86900 BLOOD TYPING SEROLOGIC ABO: CPT

## 2020-11-08 PROCEDURE — 86901 BLOOD TYPING SEROLOGIC RH(D): CPT

## 2020-11-08 PROCEDURE — 86850 RBC ANTIBODY SCREEN: CPT

## 2020-11-08 PROCEDURE — C1894 INTRO/SHEATH, NON-LASER: HCPCS | Performed by: SURGERY

## 2020-11-08 PROCEDURE — 03HY32Z INSERTION OF MONITORING DEVICE INTO UPPER ARTERY, PERCUTANEOUS APPROACH: ICD-10-PCS | Performed by: ANESTHESIOLOGY

## 2020-11-08 PROCEDURE — 160041 HCHG SURGERY MINUTES - EA ADDL 1 MIN LEVEL 4: Performed by: SURGERY

## 2020-11-08 PROCEDURE — 700111 HCHG RX REV CODE 636 W/ 250 OVERRIDE (IP): Performed by: STUDENT IN AN ORGANIZED HEALTH CARE EDUCATION/TRAINING PROGRAM

## 2020-11-08 PROCEDURE — 85027 COMPLETE CBC AUTOMATED: CPT

## 2020-11-08 PROCEDURE — B548ZZA ULTRASONOGRAPHY OF SUPERIOR VENA CAVA, GUIDANCE: ICD-10-PCS | Performed by: ANESTHESIOLOGY

## 2020-11-08 PROCEDURE — 700101 HCHG RX REV CODE 250: Performed by: ANESTHESIOLOGY

## 2020-11-08 PROCEDURE — 160029 HCHG SURGERY MINUTES - 1ST 30 MINS LEVEL 4: Performed by: SURGERY

## 2020-11-08 PROCEDURE — 80202 ASSAY OF VANCOMYCIN: CPT

## 2020-11-08 PROCEDURE — A9270 NON-COVERED ITEM OR SERVICE: HCPCS | Performed by: STUDENT IN AN ORGANIZED HEALTH CARE EDUCATION/TRAINING PROGRAM

## 2020-11-08 PROCEDURE — 700105 HCHG RX REV CODE 258: Performed by: SURGERY

## 2020-11-08 PROCEDURE — 160048 HCHG OR STATISTICAL LEVEL 1-5: Performed by: SURGERY

## 2020-11-08 PROCEDURE — 770022 HCHG ROOM/CARE - ICU (200)

## 2020-11-08 PROCEDURE — 62322 NJX INTERLAMINAR LMBR/SAC: CPT | Performed by: SURGERY

## 2020-11-08 PROCEDURE — 160002 HCHG RECOVERY MINUTES (STAT): Performed by: SURGERY

## 2020-11-08 PROCEDURE — 80048 BASIC METABOLIC PNL TOTAL CA: CPT

## 2020-11-08 PROCEDURE — 700111 HCHG RX REV CODE 636 W/ 250 OVERRIDE (IP): Performed by: SURGERY

## 2020-11-08 PROCEDURE — 160036 HCHG PACU - EA ADDL 30 MINS PHASE I: Performed by: SURGERY

## 2020-11-08 PROCEDURE — C1768 GRAFT, VASCULAR: HCPCS | Performed by: SURGERY

## 2020-11-08 PROCEDURE — C1725 CATH, TRANSLUMIN NON-LASER: HCPCS | Performed by: SURGERY

## 2020-11-08 PROCEDURE — 160035 HCHG PACU - 1ST 60 MINS PHASE I: Performed by: SURGERY

## 2020-11-08 PROCEDURE — 93010 ELECTROCARDIOGRAM REPORT: CPT | Performed by: INTERNAL MEDICINE

## 2020-11-08 DEVICE — GRAFT HEMA MDV BIF 16X8 40CM: Type: IMPLANTABLE DEVICE | Status: FUNCTIONAL

## 2020-11-08 RX ORDER — ONDANSETRON 2 MG/ML
4 INJECTION INTRAMUSCULAR; INTRAVENOUS EVERY 4 HOURS PRN
Status: DISCONTINUED | OUTPATIENT
Start: 2020-11-08 | End: 2020-11-08

## 2020-11-08 RX ORDER — HYDROMORPHONE HYDROCHLORIDE 1 MG/ML
0.2 INJECTION, SOLUTION INTRAMUSCULAR; INTRAVENOUS; SUBCUTANEOUS
Status: DISCONTINUED | OUTPATIENT
Start: 2020-11-08 | End: 2020-11-08 | Stop reason: HOSPADM

## 2020-11-08 RX ORDER — HEPARIN SODIUM,PORCINE 1000/ML
VIAL (ML) INJECTION
Status: DISCONTINUED | OUTPATIENT
Start: 2020-11-08 | End: 2020-11-08 | Stop reason: HOSPADM

## 2020-11-08 RX ORDER — HEPARIN SODIUM,PORCINE 1000/ML
VIAL (ML) INJECTION PRN
Status: DISCONTINUED | OUTPATIENT
Start: 2020-11-08 | End: 2020-11-08 | Stop reason: SURG

## 2020-11-08 RX ORDER — ONDANSETRON 2 MG/ML
4 INJECTION INTRAMUSCULAR; INTRAVENOUS
Status: DISCONTINUED | OUTPATIENT
Start: 2020-11-08 | End: 2020-11-08 | Stop reason: HOSPADM

## 2020-11-08 RX ORDER — LIDOCAINE HYDROCHLORIDE AND EPINEPHRINE 15; 5 MG/ML; UG/ML
INJECTION, SOLUTION EPIDURAL
Status: COMPLETED | OUTPATIENT
Start: 2020-11-08 | End: 2020-11-08

## 2020-11-08 RX ORDER — DIPHENHYDRAMINE HYDROCHLORIDE 50 MG/ML
12.5 INJECTION INTRAMUSCULAR; INTRAVENOUS
Status: DISCONTINUED | OUTPATIENT
Start: 2020-11-08 | End: 2020-11-08 | Stop reason: HOSPADM

## 2020-11-08 RX ORDER — PROTAMINE SULFATE 10 MG/ML
INJECTION, SOLUTION INTRAVENOUS PRN
Status: DISCONTINUED | OUTPATIENT
Start: 2020-11-08 | End: 2020-11-08 | Stop reason: SURG

## 2020-11-08 RX ORDER — MEPERIDINE HYDROCHLORIDE 25 MG/ML
6.25 INJECTION INTRAMUSCULAR; INTRAVENOUS; SUBCUTANEOUS
Status: DISCONTINUED | OUTPATIENT
Start: 2020-11-08 | End: 2020-11-08 | Stop reason: HOSPADM

## 2020-11-08 RX ORDER — DIPHENHYDRAMINE HYDROCHLORIDE 50 MG/ML
25 INJECTION INTRAMUSCULAR; INTRAVENOUS EVERY 6 HOURS PRN
Status: DISCONTINUED | OUTPATIENT
Start: 2020-11-08 | End: 2020-11-15 | Stop reason: HOSPADM

## 2020-11-08 RX ORDER — LABETALOL HYDROCHLORIDE 5 MG/ML
10 INJECTION, SOLUTION INTRAVENOUS
Status: COMPLETED | OUTPATIENT
Start: 2020-11-08 | End: 2020-11-09

## 2020-11-08 RX ORDER — SODIUM CHLORIDE, SODIUM LACTATE, POTASSIUM CHLORIDE, CALCIUM CHLORIDE 600; 310; 30; 20 MG/100ML; MG/100ML; MG/100ML; MG/100ML
INJECTION, SOLUTION INTRAVENOUS
Status: DISCONTINUED | OUTPATIENT
Start: 2020-11-08 | End: 2020-11-08 | Stop reason: SURG

## 2020-11-08 RX ORDER — DIPHENHYDRAMINE HYDROCHLORIDE 50 MG/ML
25 INJECTION INTRAMUSCULAR; INTRAVENOUS EVERY 6 HOURS PRN
Status: DISCONTINUED | OUTPATIENT
Start: 2020-11-08 | End: 2020-11-08

## 2020-11-08 RX ORDER — ONDANSETRON 2 MG/ML
4 INJECTION INTRAMUSCULAR; INTRAVENOUS EVERY 4 HOURS PRN
Status: DISCONTINUED | OUTPATIENT
Start: 2020-11-08 | End: 2020-11-15 | Stop reason: HOSPADM

## 2020-11-08 RX ORDER — SCOLOPAMINE TRANSDERMAL SYSTEM 1 MG/1
1 PATCH, EXTENDED RELEASE TRANSDERMAL
Status: DISCONTINUED | OUTPATIENT
Start: 2020-11-08 | End: 2020-11-15 | Stop reason: HOSPADM

## 2020-11-08 RX ORDER — HYDROMORPHONE HYDROCHLORIDE 1 MG/ML
0.1 INJECTION, SOLUTION INTRAMUSCULAR; INTRAVENOUS; SUBCUTANEOUS
Status: DISCONTINUED | OUTPATIENT
Start: 2020-11-08 | End: 2020-11-08 | Stop reason: HOSPADM

## 2020-11-08 RX ORDER — DEXTROSE, SODIUM CHLORIDE, SODIUM LACTATE, POTASSIUM CHLORIDE, AND CALCIUM CHLORIDE 5; .6; .31; .03; .02 G/100ML; G/100ML; G/100ML; G/100ML; G/100ML
INJECTION, SOLUTION INTRAVENOUS CONTINUOUS
Status: DISCONTINUED | OUTPATIENT
Start: 2020-11-08 | End: 2020-11-08

## 2020-11-08 RX ORDER — HYDROMORPHONE HYDROCHLORIDE 1 MG/ML
0.4 INJECTION, SOLUTION INTRAMUSCULAR; INTRAVENOUS; SUBCUTANEOUS
Status: DISCONTINUED | OUTPATIENT
Start: 2020-11-08 | End: 2020-11-08 | Stop reason: HOSPADM

## 2020-11-08 RX ORDER — LIDOCAINE HYDROCHLORIDE 20 MG/ML
INJECTION, SOLUTION EPIDURAL; INFILTRATION; INTRACAUDAL; PERINEURAL PRN
Status: DISCONTINUED | OUTPATIENT
Start: 2020-11-08 | End: 2020-11-08 | Stop reason: SURG

## 2020-11-08 RX ORDER — DEXAMETHASONE SODIUM PHOSPHATE 4 MG/ML
4 INJECTION, SOLUTION INTRA-ARTICULAR; INTRALESIONAL; INTRAMUSCULAR; INTRAVENOUS; SOFT TISSUE
Status: DISCONTINUED | OUTPATIENT
Start: 2020-11-08 | End: 2020-11-08

## 2020-11-08 RX ORDER — DEXAMETHASONE SODIUM PHOSPHATE 4 MG/ML
4 INJECTION, SOLUTION INTRA-ARTICULAR; INTRALESIONAL; INTRAMUSCULAR; INTRAVENOUS; SOFT TISSUE
Status: DISCONTINUED | OUTPATIENT
Start: 2020-11-08 | End: 2020-11-15 | Stop reason: HOSPADM

## 2020-11-08 RX ORDER — HALOPERIDOL 5 MG/ML
1 INJECTION INTRAMUSCULAR EVERY 6 HOURS PRN
Status: DISCONTINUED | OUTPATIENT
Start: 2020-11-08 | End: 2020-11-08

## 2020-11-08 RX ORDER — SODIUM CHLORIDE, SODIUM LACTATE, POTASSIUM CHLORIDE, CALCIUM CHLORIDE 600; 310; 30; 20 MG/100ML; MG/100ML; MG/100ML; MG/100ML
150 INJECTION, SOLUTION INTRAVENOUS EVERY 6 HOURS
Status: ACTIVE | OUTPATIENT
Start: 2020-11-08 | End: 2020-11-08

## 2020-11-08 RX ORDER — SCOLOPAMINE TRANSDERMAL SYSTEM 1 MG/1
1 PATCH, EXTENDED RELEASE TRANSDERMAL
Status: DISCONTINUED | OUTPATIENT
Start: 2020-11-08 | End: 2020-11-08

## 2020-11-08 RX ORDER — MORPHINE SULFATE 4 MG/ML
2-3 INJECTION, SOLUTION INTRAMUSCULAR; INTRAVENOUS
Status: DISCONTINUED | OUTPATIENT
Start: 2020-11-08 | End: 2020-11-11

## 2020-11-08 RX ORDER — HALOPERIDOL 5 MG/ML
1 INJECTION INTRAMUSCULAR EVERY 6 HOURS PRN
Status: DISCONTINUED | OUTPATIENT
Start: 2020-11-08 | End: 2020-11-15 | Stop reason: HOSPADM

## 2020-11-08 RX ORDER — HYDRALAZINE HYDROCHLORIDE 20 MG/ML
5 INJECTION INTRAMUSCULAR; INTRAVENOUS
Status: DISCONTINUED | OUTPATIENT
Start: 2020-11-08 | End: 2020-11-08

## 2020-11-08 RX ORDER — SODIUM CHLORIDE, SODIUM LACTATE, POTASSIUM CHLORIDE, CALCIUM CHLORIDE 600; 310; 30; 20 MG/100ML; MG/100ML; MG/100ML; MG/100ML
INJECTION, SOLUTION INTRAVENOUS CONTINUOUS
Status: DISCONTINUED | OUTPATIENT
Start: 2020-11-08 | End: 2020-11-11

## 2020-11-08 RX ORDER — SODIUM CHLORIDE, SODIUM LACTATE, POTASSIUM CHLORIDE, AND CALCIUM CHLORIDE .6; .31; .03; .02 G/100ML; G/100ML; G/100ML; G/100ML
500 INJECTION, SOLUTION INTRAVENOUS ONCE
Status: COMPLETED | OUTPATIENT
Start: 2020-11-08 | End: 2020-11-08

## 2020-11-08 RX ORDER — HALOPERIDOL 5 MG/ML
1 INJECTION INTRAMUSCULAR
Status: DISCONTINUED | OUTPATIENT
Start: 2020-11-08 | End: 2020-11-08 | Stop reason: HOSPADM

## 2020-11-08 RX ADMIN — FENTANYL CITRATE 50 MCG: 50 INJECTION, SOLUTION INTRAMUSCULAR; INTRAVENOUS at 13:38

## 2020-11-08 RX ADMIN — HYDROMORPHONE HYDROCHLORIDE: 1 INJECTION, SOLUTION INTRAMUSCULAR; INTRAVENOUS; SUBCUTANEOUS at 13:42

## 2020-11-08 RX ADMIN — HYDROMORPHONE HYDROCHLORIDE 0.2 MG: 1 INJECTION, SOLUTION INTRAMUSCULAR; INTRAVENOUS; SUBCUTANEOUS at 14:20

## 2020-11-08 RX ADMIN — ATORVASTATIN CALCIUM 40 MG: 40 TABLET, FILM COATED ORAL at 19:29

## 2020-11-08 RX ADMIN — FENTANYL CITRATE 25 MCG: 50 INJECTION, SOLUTION INTRAMUSCULAR; INTRAVENOUS at 13:33

## 2020-11-08 RX ADMIN — CEFTRIAXONE SODIUM 2 G: 2 INJECTION, POWDER, FOR SOLUTION INTRAMUSCULAR; INTRAVENOUS at 05:48

## 2020-11-08 RX ADMIN — FENTANYL CITRATE 100 MCG: 50 INJECTION, SOLUTION INTRAMUSCULAR; INTRAVENOUS at 12:33

## 2020-11-08 RX ADMIN — DEXTROSE AND SODIUM CHLORIDE: 5; 900 INJECTION, SOLUTION INTRAVENOUS at 00:11

## 2020-11-08 RX ADMIN — PROTAMINE SULFATE 30 MG: 10 INJECTION, SOLUTION INTRAVENOUS at 12:22

## 2020-11-08 RX ADMIN — SODIUM CHLORIDE, POTASSIUM CHLORIDE, SODIUM LACTATE AND CALCIUM CHLORIDE: 600; 310; 30; 20 INJECTION, SOLUTION INTRAVENOUS at 19:45

## 2020-11-08 RX ADMIN — SODIUM CHLORIDE, SODIUM LACTATE, POTASSIUM CHLORIDE, CALCIUM CHLORIDE AND DEXTROSE MONOHYDRATE: 5; 600; 310; 30; 20 INJECTION, SOLUTION INTRAVENOUS at 16:45

## 2020-11-08 RX ADMIN — HYDROMORPHONE HYDROCHLORIDE 0.2 MG: 1 INJECTION, SOLUTION INTRAMUSCULAR; INTRAVENOUS; SUBCUTANEOUS at 14:02

## 2020-11-08 RX ADMIN — PROPOFOL 200 MG: 10 INJECTION, EMULSION INTRAVENOUS at 10:53

## 2020-11-08 RX ADMIN — LIDOCAINE HYDROCHLORIDE,EPINEPHRINE BITARTRATE 3 ML: 15; .005 INJECTION, SOLUTION EPIDURAL; INFILTRATION; INTRACAUDAL; PERINEURAL at 09:38

## 2020-11-08 RX ADMIN — FENTANYL CITRATE 25 MCG: 50 INJECTION, SOLUTION INTRAMUSCULAR; INTRAVENOUS at 13:51

## 2020-11-08 RX ADMIN — SODIUM CHLORIDE, POTASSIUM CHLORIDE, SODIUM LACTATE AND CALCIUM CHLORIDE: 600; 310; 30; 20 INJECTION, SOLUTION INTRAVENOUS at 10:27

## 2020-11-08 RX ADMIN — NICOTINE TRANSDERMAL SYSTEM 21 MG: 21 PATCH, EXTENDED RELEASE TRANSDERMAL at 05:47

## 2020-11-08 RX ADMIN — MIDAZOLAM 2 MG: 1 INJECTION INTRAMUSCULAR; INTRAVENOUS at 09:38

## 2020-11-08 RX ADMIN — SODIUM CHLORIDE, POTASSIUM CHLORIDE, SODIUM LACTATE AND CALCIUM CHLORIDE 500 ML: 600; 310; 30; 20 INJECTION, SOLUTION INTRAVENOUS at 19:32

## 2020-11-08 RX ADMIN — ROCURONIUM BROMIDE 50 MG: 10 INJECTION, SOLUTION INTRAVENOUS at 10:53

## 2020-11-08 RX ADMIN — DOCUSATE SODIUM 50 MG AND SENNOSIDES 8.6 MG 2 TABLET: 8.6; 5 TABLET, FILM COATED ORAL at 19:29

## 2020-11-08 RX ADMIN — Medication 100 MG: at 10:53

## 2020-11-08 RX ADMIN — FENTANYL CITRATE 150 MCG: 50 INJECTION, SOLUTION INTRAMUSCULAR; INTRAVENOUS at 10:55

## 2020-11-08 RX ADMIN — HYDROMORPHONE HYDROCHLORIDE 0.4 MG: 1 INJECTION, SOLUTION INTRAMUSCULAR; INTRAVENOUS; SUBCUTANEOUS at 14:10

## 2020-11-08 RX ADMIN — SUGAMMADEX 200 MG: 100 INJECTION, SOLUTION INTRAVENOUS at 13:32

## 2020-11-08 RX ADMIN — HYDROMORPHONE HYDROCHLORIDE 0.2 MG: 1 INJECTION, SOLUTION INTRAMUSCULAR; INTRAVENOUS; SUBCUTANEOUS at 14:07

## 2020-11-08 RX ADMIN — HEPARIN SODIUM 7000 UNITS: 1000 INJECTION, SOLUTION INTRAVENOUS; SUBCUTANEOUS at 11:31

## 2020-11-08 RX ADMIN — LIDOCAINE HYDROCHLORIDE 5 ML: 20 INJECTION, SOLUTION EPIDURAL; INFILTRATION; INTRACAUDAL; PERINEURAL at 12:34

## 2020-11-08 RX ADMIN — VANCOMYCIN HYDROCHLORIDE 1000 MG: 500 INJECTION, POWDER, LYOPHILIZED, FOR SOLUTION INTRAVENOUS at 04:07

## 2020-11-08 RX ADMIN — LIDOCAINE HYDROCHLORIDE 5 ML: 20 INJECTION, SOLUTION EPIDURAL; INFILTRATION; INTRACAUDAL; PERINEURAL at 12:33

## 2020-11-08 NOTE — ASSESSMENT & PLAN NOTE
11/6 Ultrasound-guided access bilateral common femoral arteries. Angiogram bilateral lower extremities.  11/8 Aortobifemoral bypass using an 16 x 8 bifurcated graft.  Jimi Morrison MD. Vascular Surgery.

## 2020-11-08 NOTE — ANESTHESIA PROCEDURE NOTES
Arterial Line  Performed by: Yunior Escoto M.D.  Authorized by: Yunior Escoto M.D.     Start Time:  11/8/2020 10:00 AM  End Time:  11/8/2020 10:01 AM  Localization: surface landmarks    Patient Location:  OR  Indication: continuous blood pressure monitoring        Catheter Size:  20 G  Seldinger Technique?: Yes    Site:  Radial artery  Line Secured:  Antimicrobial disc, tape and transparent dressing  Events: patient tolerated procedure well with no complications

## 2020-11-08 NOTE — ANESTHESIA PROCEDURE NOTES
Central Venous Line  Performed by: Yunior Esocto M.D.  Authorized by: Yunior Escoto M.D.         Number of Lumens:  Triple lumen

## 2020-11-08 NOTE — OR NURSING
Spoke w/ Dr Morrison. Reported that pt's abdomen is tight/firm. VSS . Dr Morrison states that abdomen was tight prior surgery

## 2020-11-08 NOTE — OR NURSING
Consulted w/ Dr Escoto. Pt/ c/o abdominal pain despite epidural bolus x 2, fentanyl, dilaudid. There was a 45 minute delay in obtaining epidural drip.    Dr Escoto redosed epidural

## 2020-11-08 NOTE — ANESTHESIA TIME REPORT
Anesthesia Start and Stop Event Times     Date Time Event    11/8/2020 0938 Anesthesia Start     1339 Anesthesia Stop        Responsible Staff  11/08/20    Name Role Begin End    Yunior Escoto M.D. Anesth [id number]        Preop Diagnosis (Free Text):  Pre-op Diagnosis     EXTERNAL ILIAC OCCLUSION        Preop Diagnosis (Codes):    Post op Diagnosis  Iliac artery occlusion (HCC)      Premium Reason  B. 1st Call    Comments:

## 2020-11-08 NOTE — ANESTHESIA PREPROCEDURE EVALUATION
Relevant Problems   No relevant active problems       Physical Exam    Airway   Mallampati: II  TM distance: >3 FB  Neck ROM: full       Cardiovascular - normal exam  Rhythm: regular  Rate: normal  (-) murmur     Dental - normal exam           Pulmonary - normal exam  Breath sounds clear to auscultation     Abdominal    Neurological - normal exam                 Anesthesia Plan    ASA 3   ASA physical status 3 criteria: other (comment)    Plan - general       Airway plan will be ETT        Induction: intravenous    Postoperative Plan: Postoperative administration of opioids is intended.    Pertinent diagnostic labs and testing reviewed    Informed Consent:    Anesthetic plan and risks discussed with patient.    Use of blood products discussed with: patient whom consented to blood products.

## 2020-11-08 NOTE — OR NURSING
Contacted Dr Escoto regarding pt's BP trending down. 71/45. Pt is alert/ appropriate. Pain 1/10. Dozing easily aroused by normal voice.    Dr Escoto orders to continue close monitoring.

## 2020-11-08 NOTE — PROGRESS NOTES
Report received from Flower MONTAÑO RN. Assumed care, assessment complete at 2014. Pt is A & O x 4.  Pt denies having any pain at this time. Fall precautions and appropriate signs in place. Pt oriented to unit routine, call light/phone system and RN extension number provided. Pt educated regarding fall precautions. Bed alarm in use. Pt denies any additional needs at this time. Call light within reach.

## 2020-11-08 NOTE — OP REPORT
11/08/20    OPERATIVE NOTE    PRE-OPERATIVE DIAGNOSIS -     1.  Severe peripheral arterial disease  2.  Nonhealing left great toe  3.  Osteomyelitis left great toe  4.  Bilateral iliac artery occlusion  5.  Small infrarenal abdominal aortic aneurysm    POST-OPERATIVE DIAGNOSIS -same    PROCEDURE PERFORMED -     1.  Aortobifemoral bypass using an 16 x 8 bifurcated graft    SURGEON - Jimi Morrison M.D.    ASSISTANT - DERRICK Rios    TYPE OF ANESTHESIA - General     ANESTHESIOLOGIST  -Dr. Escoto    BLOOD LOSS- 100cc    SPECIMENS -none    INDICATIONS - 75 y.o. white male who was admitted with a nonhealing left toe after the ingrown toenail has been removed.  Work-up demonstrated osteomyelitis of the tip of the great toe.  The patient was diagnosed with severe peripheral arterial disease and bilateral iliac artery occlusions.  Patient was placed on IV antibiotics per infectious disease and is being taken today for aortobifemoral bypass surgery for limb salvage.      PROCEDURE IN DETAIL -     Patient was taken to the operating suite and placed in the supine position.  Anesthesia placed an epidural catheter central line and arterial catheter prior to making the incision.  After endotracheal intubation the patient's abdomen groins and thighs were prepped and draped in sterile fashion.  An Ioban was employed.  An incision was made in oblique fashion overlying the right inguinal ligament.  The incision was carried down through the subcutaneous tissue and the right common femoral artery was dissected circumferentially and was isolated with Vesseloops.  A mirror image incision was made on the contralateral side.  The incision was carried down through the subcutaneous tissue and the left common femoral artery was dissected from the surrounding tissue and isolated with Vesseloops.  Moist gauze were placed in both groin incisions and incision was then made from xiphoid to pubis.  Peritoneal cavity was opened in standard  fashion.  Fixed retraction was established using an Omni retractor.  The colon was retracted cephalad and the small bowel was retracted the patient right.  The retroperitoneum was entered into and the dissection took place up near the left renal vein.  The patient had a small infrarenal abdominal aortic aneurysm and the neck of the aorta was dissected just below the renal arteries and before the beginning of the aneurysm.  Once the clamp site was developed then tunnels were created between the groin incisions in the abdomen for passage of the limbs of the graft.  Umbilical tapes were passed bilaterally.  Next 7000 units of heparin was administered and after an appropriate period of time the infrarenal aorta was crossclamped.  The aorta was then transected and the distal aorta was oversewn using 2-0 Prolene suture in mattress and subsequent running fashion.  Next the neck of the aorta was properly fashioned and a 16 x 8 bifurcated Hemashield graft was selected.  An end-to-end anastomosis was completed between the aortic graft and the infrarenal aorta using a 2-0 Prolene suture in a running fashion with a posterior and anterior pledget.  The end-to-end anastomosis was completed and the clamp was removed with no significant bleeding from the anastomosis.  Next the both limbs of the aortobifemoral bypass graft were then passed through the previously developed tunnels into the groin incisions.  The retractors were then let off of tension and a moist towel was placed over the abdominal incision.  Next the common femoral artery was occluded proximally and distally using a delicate vascular clamp.  A longitudinal arteriotomy was made in the common femoral artery just at the level of the bifurcation.  The right limb was cut to length and properly spatulated and an end-to-side anastomosis was completed between the right limb of the dacryon graft and the femoral artery using a 5-0 Prolene suture in a running fashion.  Just  prior to completing the anastomosis all vessels were flushed and irrigated.  The anastomosis was completed and flow was established to the right lower extremity.  Next the left common femoral artery was occluded proximally and distally.  The graft was cut to length and spatulated.  Longitudinal arteriotomy was made in the femoral artery and an end-to-side anastomosis was completed using a 5-0 Prolene suture in running fashion.  Just prior to completing the anastomosis all vessels were vigorously flushed and irrigated and the anastomosis was complete.  Flow was then established to the left lower extremity.  There was excellent flow noted throughout the aortobifemoral bypass graft.  I did not guzmán the graft over the profunda femoris artery as the common femoral artery and femoral bifurcation were normal at this level.  Once hemostasis was assured 30 mg of protamine was administered.  The retroperitoneum was then closed over the bypass graft within the abdomen using a 2-0 Vicryl running suture.  Next all retractors and sponges were removed from the peritoneal cavity and the omentum was placed over the small bowel the fascia was then reapproximated using a #1 Vicryl runner x2.  Skin staples were then used to reapproximate the skin incision.  For the groin to the areas were irrigated 2-0 Vicryl running sutures were placed for the first layer followed by 2-0 Vicryl interrupted sutures.  Skin staples were then used on those incisions as well and Aurelia's were placed.  Sterile bandage was applied over the abdominal incision and the patient was taken to the recovery room in stable condition.      Jimi Morrison M.D.

## 2020-11-08 NOTE — ANESTHESIA PROCEDURE NOTES
Airway    Date/Time: 11/8/2020 10:54 AM  Performed by: Yunior Escoto M.D.  Authorized by: Yunior Escoto M.D.     Location:  OR  Urgency:  Elective  Indications for Airway Management:  Anesthesia      Spontaneous Ventilation: absent    Sedation Level:  Deep  Preoxygenated: Yes    Patient Position:  Sniffing  Final Airway Type:  Endotracheal airway  Final Endotracheal Airway:  ETT  Cuffed: Yes    Technique Used for Successful ETT Placement:  Direct laryngoscopy    Insertion Site:  Oral  Blade Type:  Tee  Laryngoscope Blade/Videolaryngoscope Blade Size:  3  ETT Size (mm):  8.0  Measured from:  Teeth  ETT to Teeth (cm):  22  Placement Verified by: auscultation and capnometry    Cormack-Lehane Classification:  Grade I - full view of glottis  Number of Attempts at Approach:  1

## 2020-11-08 NOTE — PROGRESS NOTES
"Pharmacy Kinetics 75 y.o. male on vancomycin day #6(2020)    Currently on Vancomycin 1000 mg iv q12hr  Provider specified end date: TBD (6 weeks per ID)        Indication for Treatment: osteomyelitis     Pertinent history per medical record: Admitted on 11/3/2020 for left great toe pain s/p toe nail removal on 10/23. PMH current 3 pack/day smoker, PVD, lymphoma. Pt presented to urgent care and was given IM ceftriaxone x 1 and Bactrim rx, but redness and pain worsened. Osteomyelitis confirmed on imaging, abx started empirically and ID consulted.     Other antibiotics: ceftriaxone 2g IV daily     Allergies: Vicodin [hydrocodone-acetaminophen]      List concerns for renal function: age      Pertinent cultures to date:   11/3/20 PBC x 2 - NGTD     MRSA nares swab if pneumonia is a concern (ordered/positive/negative/n-a): n/a    Recent Labs     20  0534 20  0322   WBC 3.8* 3.4*   NEUTSPOLYS 64.20 60.40     Recent Labs     20  0534   BUN 14   CREATININE 0.72   ALBUMIN 4.0     Recent Labs     20  0534 20  0322   VANCOTROUGH 5.3* 10.5       Intake/Output Summary (Last 24 hours) at 2020 0934  Last data filed at 2020  Gross per 24 hour   Intake 838 ml   Output --   Net 838 ml      /65   Pulse 84   Temp 36.6 °C (97.9 °F) (Temporal)   Resp 17   Ht 1.727 m (5' 7.99\")   Wt 83.8 kg (184 lb 11.9 oz)   SpO2 94%  Temp (24hrs), Av.5 °C (97.7 °F), Min:36.4 °C (97.6 °F), Max:36.6 °C (97.9 °F)      A/P   1. Vancomycin dose change: Not indicated at this time-continue with vancomycin 1000 mg q12H  2. Next vancomycin level: ~3 days or sooner pending renal function (will need to be ordered)  3. Goal trough: 10-15 mcg/mL  4. Comments: Vancomycin trough this AM therapeutic at 10.5 mcg/mL. NNL to assess Scr. This is patient's first therapeutic trough on dosing regimen. Patient to OR today for aorto-bifemoral bypass. Pharmacy will continue to monitor.    June Selby, AnnD      "

## 2020-11-08 NOTE — OR NURSING
[facility]  aortobifem [facility]. Island dressing to abdomen. Scant drainage. bilateral groin prevenas. Pedal pulses dopplered/present.    Right IJ TLC. Xray done  Left radial art. Correlates w/ cuff. Positional at times.   20 g left hand.   Martell 125 cc out  Epidural. Hydromorphone/ ropivacaine at 6 ml/hr. Delay in getting epidural.. redosed by anesthesia. Hypotensive. Now BP 's.  Easily aroused by normal voice AXOX4. FOUNTAIN. Dentures in mouth. Came from   S- 527-2  Dr Montano had stopped by PACU no orders.

## 2020-11-08 NOTE — ANESTHESIA PROCEDURE NOTES
Central Venous Line  Performed by: Yunior Escoto M.D.  Authorized by: Yunior Escoto M.D.     Start Time:  11/8/2020 10:05 AM  End Time:  11/8/2020 10:06 AM      provider hand hygiene performed prior to central venous catheter insertion, all 5 sterile barriers used (gloves, gown, cap, mask, large sterile drape) during central venous catheter insertion and skin prep agent completely dried prior to procedure    Patient Position:  Trendelenburg  Site:  Internal jugular  Prep:  Chlorhexidine  Number of Lumens:  Triple lumen  target vein identified, needle advanced into vein and blood aspirated and guidewire advanced into vein    Seldinger Technique?: Yes    Ultrasound-Guided: ultrasound-guided  Image captured, interpreted and electronically stored.  Sterile Gel and Probe Cover Used for Ultrasound?: Yes    Intravenous Verification: verified by ultrasound, venous blood return and chest x-ray pending    all ports aspirated, all ports flushed easily, guidewire was removed intact, biopatch was applied, line was sutured in place and dressing was applied    Events: patient tolerated procedure well with no complications

## 2020-11-08 NOTE — ANESTHESIA POSTPROCEDURE EVALUATION
Patient: Ronal Hair    Procedure Summary     Date: 11/08/20 Room / Location: [facility] 09 / SURGERY [facility]    Anesthesia Start: 0938 Anesthesia Stop: 1334    Procedure: CREATION, BYPASS, ARTERIAL, AORTA TO FEMORAL, BILATERAL (Abdomen) Diagnosis: (EXTERNAL ILIAC OCCLUSION)    Surgeons: Jimi Morrison M.D. Responsible Provider: Yunior Escoto M.D.    Anesthesia Type: general ASA Status: 3          Final Anesthesia Type: general  Last vitals  BP   Blood Pressure : 102/59, Arterial BP: 111/53    Temp   36.1 °C (96.9 °F)    Pulse   Pulse: 72   Resp   15    SpO2   99 %      Anesthesia Post Evaluation    Patient location during evaluation: PACU  Patient participation: complete - patient participated  Level of consciousness: awake and alert    Airway patency: patent  Anesthetic complications: no  Cardiovascular status: hemodynamically stable  Respiratory status: acceptable  Hydration status: euvolemic    PONV: none           Nurse Pain Score: 10 (NPRS)

## 2020-11-08 NOTE — CARE PLAN
Problem: Safety  Goal: Will remain free from injury  Outcome: PROGRESSING AS EXPECTED     Problem: Discharge Barriers/Planning  Goal: Patient's continuum of care needs will be met  Outcome: PROGRESSING AS EXPECTED     Plan is for bypass tomorrow. Pt states MD told him that he will go to the ICU afterwards. Pt continues on IV vanco. Will continue to monitor discharge needs.

## 2020-11-08 NOTE — PROGRESS NOTES
Trauma / Surgical Daily Progress Note    Date of Service  11/8/2020    Chief Complaint  75 y.o. male admitted 11/3/2020 with toe osteomyelitis    Interval Events  ABFB:  For occlusive disease.  Distal limb ischemia.    Minimal intraoperative blood loss  Extubated post op  SICU team to assist management post op.        Review of Systems  Review of Systems     Vital Signs for last 24 hours  Temp:  [36.1 °C (96.9 °F)-36.6 °C (97.9 °F)] 36.4 °C (97.5 °F)  Pulse:  [72-93] 84  Resp:  [9-23] 10  BP: ()/(53-84) 88/59  SpO2:  [94 %-100 %] 100 %    Hemodynamic parameters for last 24 hours       Respiratory Data     Respiration: (!) 10, Pulse Oximetry: 100 %        RUL Breath Sounds: Clear, RML Breath Sounds: Clear, RLL Breath Sounds: Diminished, JOSE Breath Sounds: Clear, LLL Breath Sounds: Diminished    Physical Exam  Physical Exam  HENT:      Head: Normocephalic.   Eyes:      Pupils: Pupils are equal, round, and reactive to light.   Cardiovascular:      Rate and Rhythm: Regular rhythm.   Pulmonary:      Effort: No respiratory distress.      Breath sounds: No wheezing.   Abdominal:      Palpations: Abdomen is soft.      Tenderness: There is abdominal tenderness.   Skin:     General: Skin is warm and dry.   Neurological:      General: No focal deficit present.      Mental Status: He is lethargic.         Laboratory  Recent Results (from the past 24 hour(s))   CBC WITH DIFFERENTIAL    Collection Time: 11/08/20  3:22 AM   Result Value Ref Range    WBC 3.4 (L) 4.8 - 10.8 K/uL    RBC 4.26 (L) 4.70 - 6.10 M/uL    Hemoglobin 13.6 (L) 14.0 - 18.0 g/dL    Hematocrit 39.7 (L) 42.0 - 52.0 %    MCV 93.2 81.4 - 97.8 fL    MCH 31.9 27.0 - 33.0 pg    MCHC 34.3 33.7 - 35.3 g/dL    RDW 43.2 35.9 - 50.0 fL    Platelet Count 188 164 - 446 K/uL    MPV 9.4 9.0 - 12.9 fL    Neutrophils-Polys 60.40 44.00 - 72.00 %    Lymphocytes 19.90 (L) 22.00 - 41.00 %    Monocytes 9.40 0.00 - 13.40 %    Eosinophils 9.10 (H) 0.00 - 6.90 %    Basophils 0.90  0.00 - 1.80 %    Immature Granulocytes 0.30 0.00 - 0.90 %    Nucleated RBC 0.00 /100 WBC    Neutrophils (Absolute) 2.06 1.82 - 7.42 K/uL    Lymphs (Absolute) 0.68 (L) 1.00 - 4.80 K/uL    Monos (Absolute) 0.32 0.00 - 0.85 K/uL    Eos (Absolute) 0.31 0.00 - 0.51 K/uL    Baso (Absolute) 0.03 0.00 - 0.12 K/uL    Immature Granulocytes (abs) 0.01 0.00 - 0.11 K/uL    NRBC (Absolute) 0.00 K/uL   VANCOMYCIN TROUGH LEVEL    Collection Time: 20  3:22 AM   Result Value Ref Range    Vancomycin Trough 10.5 10.0 - 20.0 ug/mL   EKG    Collection Time: 20  9:35 AM   Result Value Ref Range    Report       [facility]    Test Date:  2020  Pt Name:    POORNIMA TORREZ                 Department: [facility]  MRN:        [id number]                      Room:       [facility]  Gender:     Male                         Technician: PAO  :        [id number]                   Requested By:RADHA LEBLANC  Order #:    [id number]                    Reading MD:    Measurements  Intervals                                Axis  Rate:       85                           P:          54  RI:         188                          QRS:        -56  QRSD:       106                          T:          38  QT:         376  QTc:        447    Interpretive Statements  SINUS RHYTHM  LEFT ANTERIOR FASCICULAR BLOCK  CONSIDER RIGHT VENTRICULAR HYPERTROPHY  No previous ECG available for comparison     COD - Adult (Type and Screen)    Collection Time: 20  9:58 AM   Result Value Ref Range    ABO Grouping Only A (A)     Rh Grouping Only POS     Antibody Screen-Cod NEG        Fluids    Intake/Output Summary (Last 24 hours) at 2020 1540  Last data filed at 2020 1506  Gross per 24 hour   Intake 240 ml   Output 750 ml   Net -510 ml       Core Measures & Quality Metrics  Core Measures & Quality Metrics  JUAN DANIEL Score  ETOH Screening    Assessment/Plan  No new Assessment & Plan notes have been filed under this hospital service since the last note was  generated.  Service: Surgery General      Discussed patient condition with RN.  CRITICAL CARE TIME EXCLUDING PROCEDURES: 35    Minutes.Decision making of high complexity.  I reviewed clinical labs, trends and orders for follow up.  Review of imaging,reports, consultant documentation .  Utilization of the information in todays decision making.   I evaluated the patient condition at bedside and discussed the daily plan(s) with available nursing staff,  pharmacists on rounds.

## 2020-11-08 NOTE — ANESTHESIA PROCEDURE NOTES
Epidural Block    Date/Time: 11/8/2020 9:38 AM  Performed by: Yunior Escoto M.D.  Authorized by: Yunior Escoto M.D.     Start Time:  11/8/2020 9:38 AM  End Time:  11/8/2020 9:39 AM  Reason for Block: at surgeon's request and post-op pain management    patient identified, IV checked, site marked, risks and benefits discussed, surgical consent, monitors and equipment checked, pre-op evaluation and timeout performed    Patient Position:  Sitting  Prep: ChloraPrep, patient draped and sterile technique    Monitoring:  Blood pressure, continuous pulse oximetry and heart rate  Location:  L2-L3  Injection Technique:  FAITH saline  Needle Type:  Tuohy  Needle Gauge:  17 G  Needle Length:  3.5 in  Loss of resistance::  8  Catheter Size:  19 G  Catheter at Skin Depth:  13  Test Dose Result:  Negative

## 2020-11-09 LAB
ALBUMIN SERPL BCP-MCNC: 3.5 G/DL (ref 3.2–4.9)
ALBUMIN/GLOB SERPL: 1.3 G/DL
ALP SERPL-CCNC: 61 U/L (ref 30–99)
ALT SERPL-CCNC: 59 U/L (ref 2–50)
ANION GAP SERPL CALC-SCNC: 12 MMOL/L (ref 7–16)
AST SERPL-CCNC: 60 U/L (ref 12–45)
BASOPHILS # BLD AUTO: 0.1 % (ref 0–1.8)
BASOPHILS # BLD: 0.01 K/UL (ref 0–0.12)
BILIRUB SERPL-MCNC: 0.7 MG/DL (ref 0.1–1.5)
BUN SERPL-MCNC: 20 MG/DL (ref 8–22)
CALCIUM SERPL-MCNC: 8.7 MG/DL (ref 8.5–10.5)
CHLORIDE SERPL-SCNC: 100 MMOL/L (ref 96–112)
CO2 SERPL-SCNC: 22 MMOL/L (ref 20–33)
CREAT SERPL-MCNC: 1.21 MG/DL (ref 0.5–1.4)
EOSINOPHIL # BLD AUTO: 0.01 K/UL (ref 0–0.51)
EOSINOPHIL NFR BLD: 0.1 % (ref 0–6.9)
ERYTHROCYTE [DISTWIDTH] IN BLOOD BY AUTOMATED COUNT: 45.6 FL (ref 35.9–50)
GLOBULIN SER CALC-MCNC: 2.8 G/DL (ref 1.9–3.5)
GLUCOSE SERPL-MCNC: 130 MG/DL (ref 65–99)
HCT VFR BLD AUTO: 41 % (ref 42–52)
HGB BLD-MCNC: 13.6 G/DL (ref 14–18)
IMM GRANULOCYTES # BLD AUTO: 0.02 K/UL (ref 0–0.11)
IMM GRANULOCYTES NFR BLD AUTO: 0.3 % (ref 0–0.9)
LYMPHOCYTES # BLD AUTO: 0.61 K/UL (ref 1–4.8)
LYMPHOCYTES NFR BLD: 8.2 % (ref 22–41)
MCH RBC QN AUTO: 31.6 PG (ref 27–33)
MCHC RBC AUTO-ENTMCNC: 33.2 G/DL (ref 33.7–35.3)
MCV RBC AUTO: 95.3 FL (ref 81.4–97.8)
MONOCYTES # BLD AUTO: 0.78 K/UL (ref 0–0.85)
MONOCYTES NFR BLD AUTO: 10.5 % (ref 0–13.4)
NEUTROPHILS # BLD AUTO: 6.03 K/UL (ref 1.82–7.42)
NEUTROPHILS NFR BLD: 80.8 % (ref 44–72)
NRBC # BLD AUTO: 0 K/UL
NRBC BLD-RTO: 0 /100 WBC
PLATELET # BLD AUTO: 205 K/UL (ref 164–446)
PMV BLD AUTO: 9.6 FL (ref 9–12.9)
POTASSIUM SERPL-SCNC: 4.7 MMOL/L (ref 3.6–5.5)
PROT SERPL-MCNC: 6.3 G/DL (ref 6–8.2)
RBC # BLD AUTO: 4.3 M/UL (ref 4.7–6.1)
SODIUM SERPL-SCNC: 134 MMOL/L (ref 135–145)
WBC # BLD AUTO: 7.5 K/UL (ref 4.8–10.8)

## 2020-11-09 PROCEDURE — 80053 COMPREHEN METABOLIC PANEL: CPT

## 2020-11-09 PROCEDURE — 99231 SBSQ HOSP IP/OBS SF/LOW 25: CPT | Performed by: SURGERY

## 2020-11-09 PROCEDURE — 770022 HCHG ROOM/CARE - ICU (200)

## 2020-11-09 PROCEDURE — 700111 HCHG RX REV CODE 636 W/ 250 OVERRIDE (IP): Performed by: STUDENT IN AN ORGANIZED HEALTH CARE EDUCATION/TRAINING PROGRAM

## 2020-11-09 PROCEDURE — 700111 HCHG RX REV CODE 636 W/ 250 OVERRIDE (IP): Performed by: ANESTHESIOLOGY

## 2020-11-09 PROCEDURE — A9270 NON-COVERED ITEM OR SERVICE: HCPCS | Performed by: STUDENT IN AN ORGANIZED HEALTH CARE EDUCATION/TRAINING PROGRAM

## 2020-11-09 PROCEDURE — 85025 COMPLETE CBC W/AUTO DIFF WBC: CPT

## 2020-11-09 PROCEDURE — 99233 SBSQ HOSP IP/OBS HIGH 50: CPT | Performed by: INTERNAL MEDICINE

## 2020-11-09 PROCEDURE — 700105 HCHG RX REV CODE 258: Performed by: STUDENT IN AN ORGANIZED HEALTH CARE EDUCATION/TRAINING PROGRAM

## 2020-11-09 PROCEDURE — 700105 HCHG RX REV CODE 258: Performed by: ANESTHESIOLOGY

## 2020-11-09 PROCEDURE — 700105 HCHG RX REV CODE 258: Performed by: INTERNAL MEDICINE

## 2020-11-09 PROCEDURE — 700102 HCHG RX REV CODE 250 W/ 637 OVERRIDE(OP): Performed by: STUDENT IN AN ORGANIZED HEALTH CARE EDUCATION/TRAINING PROGRAM

## 2020-11-09 PROCEDURE — 700111 HCHG RX REV CODE 636 W/ 250 OVERRIDE (IP): Performed by: INTERNAL MEDICINE

## 2020-11-09 PROCEDURE — 700101 HCHG RX REV CODE 250: Performed by: NURSE PRACTITIONER

## 2020-11-09 PROCEDURE — 700105 HCHG RX REV CODE 258: Performed by: SURGERY

## 2020-11-09 RX ORDER — SODIUM CHLORIDE, SODIUM LACTATE, POTASSIUM CHLORIDE, AND CALCIUM CHLORIDE .6; .31; .03; .02 G/100ML; G/100ML; G/100ML; G/100ML
500 INJECTION, SOLUTION INTRAVENOUS ONCE
Status: COMPLETED | OUTPATIENT
Start: 2020-11-09 | End: 2020-11-09

## 2020-11-09 RX ADMIN — ATORVASTATIN CALCIUM 40 MG: 40 TABLET, FILM COATED ORAL at 17:26

## 2020-11-09 RX ADMIN — SODIUM CHLORIDE, POTASSIUM CHLORIDE, SODIUM LACTATE AND CALCIUM CHLORIDE: 600; 310; 30; 20 INJECTION, SOLUTION INTRAVENOUS at 12:21

## 2020-11-09 RX ADMIN — SODIUM CHLORIDE, POTASSIUM CHLORIDE, SODIUM LACTATE AND CALCIUM CHLORIDE 500 ML: 600; 310; 30; 20 INJECTION, SOLUTION INTRAVENOUS at 06:45

## 2020-11-09 RX ADMIN — LABETALOL HYDROCHLORIDE 10 MG: 5 INJECTION, SOLUTION INTRAVENOUS at 20:24

## 2020-11-09 RX ADMIN — VANCOMYCIN HYDROCHLORIDE 1000 MG: 500 INJECTION, POWDER, LYOPHILIZED, FOR SOLUTION INTRAVENOUS at 16:16

## 2020-11-09 RX ADMIN — LABETALOL HYDROCHLORIDE 10 MG: 5 INJECTION, SOLUTION INTRAVENOUS at 19:51

## 2020-11-09 RX ADMIN — SODIUM CHLORIDE, POTASSIUM CHLORIDE, SODIUM LACTATE AND CALCIUM CHLORIDE: 600; 310; 30; 20 INJECTION, SOLUTION INTRAVENOUS at 17:26

## 2020-11-09 RX ADMIN — LABETALOL HYDROCHLORIDE 10 MG: 5 INJECTION, SOLUTION INTRAVENOUS at 22:11

## 2020-11-09 RX ADMIN — LABETALOL HYDROCHLORIDE 10 MG: 5 INJECTION, SOLUTION INTRAVENOUS at 21:11

## 2020-11-09 RX ADMIN — DOCUSATE SODIUM 50 MG AND SENNOSIDES 8.6 MG 2 TABLET: 8.6; 5 TABLET, FILM COATED ORAL at 05:36

## 2020-11-09 RX ADMIN — VANCOMYCIN HYDROCHLORIDE 1000 MG: 500 INJECTION, POWDER, LYOPHILIZED, FOR SOLUTION INTRAVENOUS at 04:42

## 2020-11-09 RX ADMIN — HYDROMORPHONE HYDROCHLORIDE: 1 INJECTION, SOLUTION INTRAMUSCULAR; INTRAVENOUS; SUBCUTANEOUS at 14:03

## 2020-11-09 RX ADMIN — NICOTINE TRANSDERMAL SYSTEM 21 MG: 21 PATCH, EXTENDED RELEASE TRANSDERMAL at 04:43

## 2020-11-09 RX ADMIN — CEFTRIAXONE SODIUM 2 G: 2 INJECTION, POWDER, FOR SOLUTION INTRAMUSCULAR; INTRAVENOUS at 04:42

## 2020-11-09 RX ADMIN — SODIUM CHLORIDE, POTASSIUM CHLORIDE, SODIUM LACTATE AND CALCIUM CHLORIDE: 600; 310; 30; 20 INJECTION, SOLUTION INTRAVENOUS at 03:31

## 2020-11-09 RX ADMIN — DOCUSATE SODIUM 50 MG AND SENNOSIDES 8.6 MG 2 TABLET: 8.6; 5 TABLET, FILM COATED ORAL at 17:26

## 2020-11-09 ASSESSMENT — ENCOUNTER SYMPTOMS
HEADACHES: 0
ABDOMINAL PAIN: 0
CHILLS: 0
NAUSEA: 0
FEVER: 0
MYALGIAS: 1
DIZZINESS: 0
VOMITING: 0

## 2020-11-09 ASSESSMENT — FIBROSIS 4 INDEX: FIB4 SCORE: 2.04

## 2020-11-09 NOTE — PROGRESS NOTES
"Pharmacy Kinetics 75 y.o. male on vancomycin day # 7  2020    Currently on Vancomycin 1000 mg iv q12hr (04,16)  Provider specified end date: TBD, anticipate 6 weeks with plans to transition to PO Bactrim and Augmentin at time of discharge    Indication for Treatment: L-great toe osteomyelitis    Pertinent history per medical record: Admitted on 11/3/2020 for persistent left great toe pain following L-great toe following removal of ingrown toe nail on 10/21.  He was receiving Bactrim as an outpatient without improvement.  MRI-foot with findings of osteomyelitis of the L-great toe.  He underwent an aorto-bifemoral bypass on  and was hypotensive post-op, thus requiring transfer to the ICU.      Other antibiotics: ceftriaxone 2 grams iv q24h    Allergies: Vicodin [hydrocodone-acetaminophen]     List concerns for renal function: age, hypotension post-op  > resolved, SCr bump after episode of hypotension    Pertinent cultures to date:   11/3 blood-peripheral x 2:  NGTD    MRSA nares swab if pneumonia is a concern (ordered/positive/negative/n-a): n-a    Recent Labs     20  0322 20  0435   WBC 3.4* 7.6 7.5   NEUTSPOLYS 60.40  --  80.80*     Recent Labs     207 20  0435   BUN 15 20   CREATININE 0.76 1.21   ALBUMIN  --  3.5     Recent Labs     20  0322   VANCOTROUGH 10.5       Intake/Output Summary (Last 24 hours) at 2020 1515  Last data filed at 2020 1400  Gross per 24 hour   Intake 4701.72 ml   Output 625 ml   Net 4076.72 ml      /80   Pulse (!) 104   Temp 37.1 °C (98.8 °F) (Temporal)   Resp (!) 11   Ht 1.727 m (5' 7.99\")   Wt 82.1 kg (181 lb)   SpO2 97%  Temp (24hrs), Av.7 °C (98 °F), Min:36.4 °C (97.5 °F), Max:37.1 °C (98.8 °F)      A/P   1. Vancomycin dose change: continue current regimen  2. Next vancomycin level: 11/10 at 0330  3. Goal trough: 10-15 mcg/mL  4. Assessment: Patient received fluid boluses and is on LR at 150 ml/hr for " hypotension post-ABF bypass yesterday.  His UOP did notably decrease after the procedure and hypotension.  It has been variable and appears to be improving.  His SCr did increase today as well.    5. Plan:  Will continue current regimen with a follow up level tomorrow morning.    Isis Valverde, Pharm.D., BCPS

## 2020-11-09 NOTE — PROGRESS NOTES
Infectious Disease Progress Note    Author: Mariia Batista M.D. Date & Time of service: 2020  7:31 AM    Chief Complaint:  Follow-up for left great toe osteomyelitis    Interval History:   Tmax 100.4 WBC 7.5.  Patient transferred to the ICU after aortobifem bypass yesterday secondary to postop hypotension.  Patient doing okay this morning.  States he no longer has left foot pain after procedure    Labs Reviewed, Medications Reviewed and Wound Reviewed.    Review of Systems:  Review of Systems   Constitutional: Negative for chills and fever.   Cardiovascular: Negative for chest pain.   Gastrointestinal: Negative for abdominal pain, nausea and vomiting.   Musculoskeletal: Positive for myalgias.        Improved   Neurological: Negative for dizziness and headaches.   All other systems reviewed and are negative.      Hemodynamics:  Temp (24hrs), Av.3 °C (97.3 °F), Min:36.1 °C (96.9 °F), Max:36.4 °C (97.6 °F)  Temperature: 36.4 °C (97.5 °F), Monitored Temp: 37.6 °C (99.7 °F)  Pulse  Av.8  Min: 72  Max: 117   Blood Pressure : (!) 178/72, Arterial BP: 142/88       Physical Exam:  Physical Exam  HENT:      Mouth/Throat:      Mouth: Mucous membranes are moist.   Eyes:      Extraocular Movements: Extraocular movements intact.      Pupils: Pupils are equal, round, and reactive to light.   Neck:      Comments: Left IJ catheter-blood under dressing  Cardiovascular:      Rate and Rhythm: Normal rate and regular rhythm.      Heart sounds: Normal heart sounds.   Pulmonary:      Effort: Pulmonary effort is normal.      Breath sounds: Normal breath sounds.   Abdominal:      Palpations: Abdomen is soft.      Tenderness: There is no abdominal tenderness.   Musculoskeletal: Normal range of motion.      Left lower leg: Edema present.      Comments: Left great toe erythema improving.  Pedal pulses palpated today   Skin:     General: Skin is warm and dry.   Neurological:      General: No focal deficit present.       Mental Status: He is alert and oriented to person, place, and time.   Psychiatric:      Comments: Chatty         Meds:    Current Facility-Administered Medications:   •  morphine injection  •  ondansetron  •  dexamethasone  •  diphenhydrAMINE  •  haloperidol lactate  •  scopolamine  •  HYDROmorphone-ropivacaine epidural infusion  •  Pharmacy Consult Request  •  clevidipine  •  labetalol **OR** [DISCONTINUED] hydrALAZINE  •  LR  •  vancomycin  •  senna-docusate **AND** polyethylene glycol/lytes **AND** magnesium hydroxide **AND** bisacodyl  •  acetaminophen  •  nicotine **AND** Nicotine Replacement Patient Education Materials **AND** nicotine polacrilex  •  atorvastatin  •  cefTRIAXone (ROCEPHIN) IV  •  MD Alert...Vancomycin per Pharmacy  •  ondansetron  •  oxyCODONE-acetaminophen    Labs:  Recent Labs     11/08/20 0322 11/08/20 1927 11/09/20  0435   WBC 3.4* 7.6 7.5   RBC 4.26* 4.16* 4.30*   HEMOGLOBIN 13.6* 13.4* 13.6*   HEMATOCRIT 39.7* 38.8* 41.0*   MCV 93.2 93.3 95.3   MCH 31.9 32.2 31.6   RDW 43.2 44.0 45.6   PLATELETCT 188 183 205   MPV 9.4 9.7 9.6   NEUTSPOLYS 60.40  --  80.80*   LYMPHOCYTES 19.90*  --  8.20*   MONOCYTES 9.40  --  10.50   EOSINOPHILS 9.10*  --  0.10   BASOPHILS 0.90  --  0.10     Recent Labs     11/08/20 1927 11/09/20  0435   SODIUM 132* 134*   POTASSIUM 4.3 4.7   CHLORIDE 99 100   CO2 22 22   GLUCOSE 165* 130*   BUN 15 20     Recent Labs     11/08/20 1927 11/09/20  0435   ALBUMIN  --  3.5   TBILIRUBIN  --  0.7   ALKPHOSPHAT  --  61   TOTPROTEIN  --  6.3   ALTSGPT  --  59*   ASTSGOT  --  60*   CREATININE 0.76 1.21       Imaging:  Dx-chest-portable (1 View)    Result Date: 11/8/2020 11/8/2020 1:50 PM HISTORY/REASON FOR EXAM:  central line placement. TECHNIQUE/EXAM DESCRIPTION AND NUMBER OF VIEWS: Single portable view of the chest. COMPARISON: 5/26/2020 FINDINGS: The cardiomediastinal silhouette is enlarged. Right central line projects over the SVC. No focal consolidation, pleural effusion  or pneumothorax is identified.  Costophrenic angles are clear. There is mild interstitial prominence .     Right central line projects over the SVC. No pneumothorax. Cardiomegaly. Mild interstitial prominence may represent mild edema or pneumonitis.     Dx-toe(s) 2+ Left    Result Date: 11/3/2020  11/3/2020 9:05 PM HISTORY/REASON FOR EXAM:  Great toe infection. c/f osteomyelitis. TECHNIQUE/EXAM DESCRIPTION AND NUMBER OF VIEWS:  3 views of the LEFT toes. COMPARISON: None FINDINGS: There is no visualized fracture, subluxation, or dislocation identified. Permeative appearance of the medial aspect of the distal first toe is seen. Soft tissue swelling of the forefoot is seen.     1.  No acute traumatic bony injury. 2.  Slight permeative appearance of the medial aspect of the first toe distal phalanx, could represent subtle changes of osteomyelitis.    Mr-foot-with & W/o Left    Result Date: 11/4/2020 11/4/2020 8:37 AM HISTORY/REASON FOR EXAM: Diabetic foot infection. TECHNIQUE/EXAM DESCRIPTION: MRI of the LEFT foot with contrast. Using a [employer]. Signa 1.5 Isis MRI scanner, T1 axial, sagittal, and coronal, fast spin-echo T2 fat-suppressed axial and coronal, fast inversion recovery sagittal, and T1 fat suppressed axial and sagittal post intravenous contrast images were obtained. A total of 19 mL ProHance given. COMPARISON: X-ray 11/3/2020 FINDINGS: The study is degraded by patient motion artifact. Osseous structures/Cartilaginous surfaces: There is marrow edema and mild enhancement of the tip of the first distal phalanx. There is otherwise no evidence of marrow edema or abnormal enhancement. 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 Miscellaneous: There is cellulitis involving the dorsum of the forefoot and midfoot. No soft tissue abscess.     1.  There are edema and mild enhancement of the tip of the first distal phalanx likely representing osteomyelitis. 2.  Cellulitis.    Us-velma Single Level Bilat    Result Date: 11/5/2020    Vascular Laboratory  Conclusions  Ankle-brachial index is severely reduced bilaterally                                                                                                                                                  Doppler waveforms at the ankle are monophasic with low  amplitude  bilaterally.  POORNIMA TORREZ  Age:    74    Gender:     M  MRN:    [id number]  :    1945      BSA:  Exam Date:     2020 09:18  Room #:     Inpatient  Priority:     Stat  Ht (in):             Wt (lb):  Ordering Physician:        DARLINE GIBSON  Referring Physician:  Sonographer:               Moira Ann RDCS, RVT  Study Type:                Complete Bilateral  Technical Quality:         Adequate  Indications:     PVD  CPT Codes:       31149  ICD Codes:       443.9  History:         Claudication bilateral lower extremities  Limitations:                 RIGHT  Waveform            Systolic BPs (mmHg)                             149           Brachial  Monophasic                               Common Femoral  Monophasic                 76            Posterior Tibial  Monophasic                 59            Dorsalis Pedis                                           Peroneal                             0.51          HUGO                                           TBI                       LEFT  Waveform        Systolic BPs (mmHg)                             146           Brachial  Monophasic                               Common Femoral  Monophasic                 52            Posterior Tibial  Monophasic                 49            Dorsalis Pedis                                           Peroneal                             0.35          HUGO                                           TBI  Findings  Bilateral.  Ankle-brachial index is severely reduced bilaterally                                                                                                                                                   Doppler waveforms at the ankle are monophasic with low  amplitude  bilaterally  .  Rg Nicholson MD  (Electronically Signed)  Final Date:      2020                   10:13    Us-extremity Artery Lower Bilat    Result Date: 2020  Lower Extremity  Arterial Duplex Report  Vascular Laboratory  CONCLUSIONS  Waveforms at the femoral ,popliteal and tibial and peroneal  arteries  are  monophasic with severly reduced amplitude.           Findings of this study  indicate significant disease in the aorto-iliac segment.  POORNIMA TORREZ  Exam Date:     2020 10:20  Room #:     Inpatient  Priority:     Stat  Ht (in):     67      Wt (lb):     187  Ordering Physician:        DARLINE GIBSON  Referring Physician:  Sonographer:               Moira Ann RDCS, RVT  Study Type:                Complete Bilateral  Technical Quality:         Adequate  Age:    74    Gender:     M  MRN:    [id number]  :    1945      BSA:    1.97  Indications:     PVD  CPT Codes:       69761  ICD Codes:       443.9  History:         Bilateral lower extremity pain  Limitations:                RIGHT  Waveform        Peak Systolic Velocity (cm/s)                  Prox    Prox-Mid  Mid    Mid-Dist  Distal  Monophasic                        41                       CFA  Monophasic      51                                         PFA  Monophasic      24                27               37      SFA  Monophasic                        26                       POP  Monophasic      12                                 15      AT  Monophasic      30                                 31      PT  Monophasic      14                                 0       JOYCE                LEFT  Waveform        Peak Systolic Velocity (cm/s)                  Prox    Prox-Mid  Mid    Mid-Dist  Distal  Monophasic                        61                       CFA  Monophasic      37                       "                   PFA  Monophasic      30                15               13      SFA  Monophasic                        21                       POP  Monophasic      28                                 11      AT  Monophasic      18                                 14      PT  Monophasic      13                                 0       JOYCE  FINDINGS  Bilateral.  Plaque is seen in the  femoral ,popliteal  ,tibial and peroneal arteries  bilaterally  with no focal  elevated velocities.  Waveforms at the femoral ,popliteal and tibial and peroneal  arteries  are  monophasic with severly reduced amplitude.           Findings of this study  indicate significant disease in the aorto-iliac segment.    Rg Nicholson MD  (Electronically Signed)  Final Date:      05 November 2020                   11:48      Micro:  Results     Procedure Component Value Units Date/Time    BLOOD CULTURE [[id number]] Collected: 11/03/20 2115    Order Status: Completed Specimen: Blood from Peripheral Updated: 11/08/20 2300     Significant Indicator NEG     Source BLD     Site PERIPHERAL     Culture Result No growth after 5 days of incubation.    Narrative:      Per Hospital Policy: Only change Specimen Src: to \"Line\" if  specified by physician order.  Left Forearm/Arm    BLOOD CULTURE [[id number]] Collected: 11/03/20 2130    Order Status: Completed Specimen: Blood from Peripheral Updated: 11/08/20 2300     Significant Indicator NEG     Source BLD     Site PERIPHERAL     Culture Result No growth after 5 days of incubation.    Narrative:      Per Hospital Policy: Only change Specimen Src: to \"Line\" if  specified by physician order.  Right AC    SARS-CoV-2, PCR (In-House) [[id number]] Collected: 11/05/20 0745    Order Status: Completed Updated: 11/05/20 2119     SARS-CoV-2 Source NP Swab     SARS-CoV-2 by PCR NotDetected     Comment: [facility] providers: PLEASE REFER TO DE-ESCALATION AND RETESTING PROTOCOL  on [email or web address]  **The Roche SARS-CoV-2 " PCR test has been made available for use under the  Emergency Use Authorization (EUA) only.         Narrative:      Is patient being admitted?->Yes  Does this patient meet criteria for Rush/Cepheid per [facility]  Inpatient Workflow? (See workflow link below)->No  Expected turn around time?->Routine (In-House PCR up to 24  hours)  Is this the patients First SARS CoV-2 test?->Unknown  Is this patient employed in healthcare?->No  Is the patient symptomatic as defined by the CDC?->No  Is the patient hospitalized?->Yes  Is the patient in the ICU?->No  Is the patient a resident in a congregate care setting?->No  Is the patient pregnant?->No    COVID/SARS CoV-2 PCR [[id number]] Collected: 11/05/20 0745    Order Status: Completed Specimen: Respirate from Nasopharyngeal Updated: 11/05/20 0752     COVID Order Status Received     Comment: The order for SARS CoV-2 testing has been received by the  Laboratory. This result is neither positive nor negative.  Final results of testing will report in 24-48 hours, separately.         Narrative:      Is patient being admitted?->Yes  Does this patient meet criteria for Rush/Cepheid per [facility]  Inpatient Workflow? (See workflow link below)->No  Expected turn around time?->Routine (In-House PCR up to 24  hours)  Is this the patients First SARS CoV-2 test?->Unknown  Is this patient employed in healthcare?->No  Is the patient symptomatic as defined by the CDC?->No  Is the patient hospitalized?->Yes  Is the patient in the ICU?->No  Is the patient a resident in a congregate care setting?->No  Is the patient pregnant?->No          Assessment:  Active Hospital Problems    Diagnosis   • Aortoiliac occlusive disease (HCC) [I74.09]   • Follicular lymphoma (HCC) [C82.90]     74-year-old man with a history of peripheral vascular disease and tobacco abuse admitted on 11/3/20/2020 for persistent left great toe pain.  He underwent left great toe removal on 10/21 secondary to an ingrown toenail.  Admitted  with increasing pain.  Received Bactrim as an outpatient without any clinical improvement.  On admission x-ray showed findings concerning for osteomyelitis of the great toe.  Patient found to have significant peripheral vascular disease on vascular studies.  He has now status post aorto femoral bypass on 11/8    Plan:  Left great toe osteomyelitis  Febrile overnight  No leukocytosis  Noted on x-ray  Continue IV vancomycin and ceftriaxone  Monitor renal function and Vanco trough  Last Vanco trough 10.5 on 11/8  Anticipate a 6-week course of antibiotics  At discharge can transition patient to p.o. Bactrim and Augmentin to complete his antibiotic course from 11/8  Stop date 12/20/2020    Acute kidney injury, new  Likely multifactorial with recent bypass and postop hypotension overnight  Continue to monitor as patient also on IV vancomycin    Severe peripheral vascular disease  Contributing to above  Status post aortobifemoral bypass graft on 11/8/2020    Discussed with bedside nurse

## 2020-11-09 NOTE — PROGRESS NOTES
[facility] [place]     Attending:   Shakira Henriquez MD    Resident:   Laurie Lock MD    PATIENT:   Ronal Hair; [id number]; 1945    ID:   75 y.o. male with PMH of peripheral vascular disease, heavy tobacco smoking and lymphoma admitted for osteomyelitis of left great toe, confirmed on MR      SUBJECTIVE:   Interval events: Vascular surgery performed aortobifemoral bypass graft for limb salvage 11/8, transferred to ICU for recovery. Patient made NPO, on O2 NC, not requiring pain meds.     This morning, feeling great. On heavy duty pain meds, but has movement in L foot, more than ever before. No appetite due to pain meds.    OBJECTIVE:  Vitals:    11/09/20 0200 11/09/20 0300 11/09/20 0400 11/09/20 0500   BP: 135/68  118/77 (!) 178/72   Pulse: (!) 108 (!) 110 (!) 111 (!) 117   Resp: 17 (!) 10 (!) 9 17   Temp:       TempSrc:       SpO2: 92% 94% 92% 94%   Weight:   82.1 kg (181 lb)    Height:           Intake/Output Summary (Last 24 hours) at 11/7/2020 0617  Last data filed at 11/6/2020 1918  Gross per 24 hour   Intake 250 ml   Output --   Net 250 ml       PHYSICAL EXAM:  General: Pt resting in NAD, cooperative    Skin:  Pink, warm and dry.  No rashes.  Multiple scattered seborrheic keratoses across upper extremities but especially prominent on face. Prevnar in place in groin bilaterally without any output.   HEENT: NC/AT. PERRL. EOMI. MMM. No nasal discharge.   Neck:  Supple without lymphadenopathy or rigidity. No JVD   Lungs:  Symmetrical.  CTAB with no W/R/R.  Good air movement   Cardiovascular:  S1/S2 tachycardic, no murmurs without M/R/G.  Abdomen: Abdomen is firm, rigid. +BS. No masses noted.  Genitourinary:  Deferred.    Extremities:  Full range of motion. No gross deformities noted. PT and DP pulses much stronger in LLE; reduced redness and swelling. Minimal tenderness.  CNS:  Muscle tone is normal. Cranial nerves II-XII grossly intact.       LABS:  Recent Labs     11/08/20  0322  11/08/20 1927 11/09/20 0435   WBC 3.4* 7.6 7.5   RBC 4.26* 4.16* 4.30*   HEMOGLOBIN 13.6* 13.4* 13.6*   HEMATOCRIT 39.7* 38.8* 41.0*   MCV 93.2 93.3 95.3   MCH 31.9 32.2 31.6   RDW 43.2 44.0 45.6   PLATELETCT 188 183 205   MPV 9.4 9.7 9.6   NEUTSPOLYS 60.40  --  80.80*   LYMPHOCYTES 19.90*  --  8.20*   MONOCYTES 9.40  --  10.50   EOSINOPHILS 9.10*  --  0.10   BASOPHILS 0.90  --  0.10     Recent Labs     11/08/20 1927 11/09/20 0435   SODIUM 132* 134*   POTASSIUM 4.3 4.7   CHLORIDE 99 100   CO2 22 22   BUN 15 20   CREATININE 0.76 1.21   CALCIUM 8.8 8.7   ALBUMIN  --  3.5     Estimated GFR/CRCL = Estimated Creatinine Clearance: 56.8 mL/min (by C-G formula based on SCr of 1.21 mg/dL).  Recent Labs     11/08/20 1927 11/09/20 0435   GLUCOSE 165* 130*     Recent Labs     11/09/20 0435   ASTSGOT 60*   ALTSGPT 59*   TBILIRUBIN 0.7   ALKPHOSPHAT 61   GLOBULIN 2.8             No results for input(s): INR, APTT, FIBRINOGEN in the last 72 hours.    Invalid input(s): DIMER    MICROBIOLOGY:   No results found for: BLOODCULTU, BLDCULT, BCHOLD     IMAGING:   DX-CHEST-PORTABLE (1 VIEW)   Final Result      Right central line projects over the SVC. No pneumothorax.      Cardiomegaly.      Mild interstitial prominence may represent mild edema or pneumonitis.         US-EXTREMITY ARTERY LOWER BILAT   Final Result      US-HUGO SINGLE LEVEL BILAT   Final Result      MR-FOOT-WITH & W/O LEFT   Final Result      1.  There are edema and mild enhancement of the tip of the first distal phalanx likely representing osteomyelitis.      2.  Cellulitis.      DX-TOE(S) 2+ LEFT   Final Result         1.  No acute traumatic bony injury.   2.  Slight permeative appearance of the medial aspect of the first toe distal phalanx, could represent subtle changes of osteomyelitis.      IR-ABDOMINAL AORTOGRAM    (Results Pending)       CULTURES:   Results     Procedure Component Value Units Date/Time    BLOOD CULTURE [[id number]] Collected: 11/03/20 2548     "Order Status: Completed Specimen: Blood from Peripheral Updated: 11/08/20 2300     Significant Indicator NEG     Source BLD     Site PERIPHERAL     Culture Result No growth after 5 days of incubation.    Narrative:      Per Hospital Policy: Only change Specimen Src: to \"Line\" if  specified by physician order.  Left Forearm/Arm    BLOOD CULTURE [[id number]] Collected: 11/03/20 2130    Order Status: Completed Specimen: Blood from Peripheral Updated: 11/08/20 2300     Significant Indicator NEG     Source BLD     Site PERIPHERAL     Culture Result No growth after 5 days of incubation.    Narrative:      Per Hospital Policy: Only change Specimen Src: to \"Line\" if  specified by physician order.  Right AC    SARS-CoV-2, PCR (In-House) [[id number]] Collected: 11/05/20 0745    Order Status: Completed Updated: 11/05/20 2119     SARS-CoV-2 Source NP Swab     SARS-CoV-2 by PCR NotDetected     Comment: [facility] providers: PLEASE REFER TO DE-ESCALATION AND RETESTING PROTOCOL  on [email or web address]  **The Roche SARS-CoV-2 PCR test has been made available for use under the  Emergency Use Authorization (EUA) only.         Narrative:      Is patient being admitted?->Yes  Does this patient meet criteria for Rush/Cepheid per [facility]  Inpatient Workflow? (See workflow link below)->No  Expected turn around time?->Routine (In-House PCR up to 24  hours)  Is this the patients First SARS CoV-2 test?->Unknown  Is this patient employed in healthcare?->No  Is the patient symptomatic as defined by the CDC?->No  Is the patient hospitalized?->Yes  Is the patient in the ICU?->No  Is the patient a resident in a congregate care setting?->No  Is the patient pregnant?->No    COVID/SARS CoV-2 PCR [[id number]] Collected: 11/05/20 0745    Order Status: Completed Specimen: Respirate from Nasopharyngeal Updated: 11/05/20 0752     COVID Order Status Received     Comment: The order for SARS CoV-2 testing has been received by the  Laboratory. This result is neither " positive nor negative.  Final results of testing will report in 24-48 hours, separately.         Narrative:      Is patient being admitted?->Yes  Does this patient meet criteria for Rush/Cepheid per [facility]  Inpatient Workflow? (See workflow link below)->No  Expected turn around time?->Routine (In-House PCR up to 24  hours)  Is this the patients First SARS CoV-2 test?->Unknown  Is this patient employed in healthcare?->No  Is the patient symptomatic as defined by the CDC?->No  Is the patient hospitalized?->Yes  Is the patient in the ICU?->No  Is the patient a resident in a congregate care setting?->No  Is the patient pregnant?->No          MEDS:  Current Facility-Administered Medications   Medication Last Admin   • morphine (pf) 4 mg/mL injection 2-3 mg     • ondansetron (ZOFRAN) syringe/vial injection 4 mg     • dexamethasone (DECADRON) injection 4 mg     • diphenhydrAMINE (BENADRYL) injection 25 mg     • haloperidol lactate (HALDOL) injection 1 mg     • scopolamine (TRANSDERM-SCOP) patch 1 Patch     • HYDROmorphone (DILAUDID) 20 mcg/mL, ropivacaine (NAROPIN) 0.1 % in  mL epidural infusion Rate Verify at 11/08/20 1625   • Pharmacy Consult Request ...Pain Management Review 1 Each     • clevidipine (CLEVIPREX) IV emulsion Stopped at 11/08/20 1645   • labetalol (NORMODYNE/TRANDATE) injection 10 mg     • lactated ringers infusion New Bag at 11/09/20 0331   • vancomycin (VANCOCIN) 1,000 mg in  mL IVPB 1,000 mg at 11/09/20 0442   • senna-docusate (PERICOLACE or SENOKOT S) 8.6-50 MG per tablet 2 Tab 2 Tab at 11/09/20 0536    And   • polyethylene glycol/lytes (MIRALAX) PACKET 1 Packet      And   • magnesium hydroxide (MILK OF MAGNESIA) suspension 30 mL      And   • bisacodyl (DULCOLAX) suppository 10 mg     • acetaminophen (TYLENOL) tablet 650 mg     • nicotine (NICODERM) 21 MG/24HR 21 mg 21 mg at 11/09/20 0443    And   • nicotine polacrilex (NICORETTE) 2 MG piece 2 mg     • atorvastatin (LIPITOR) tablet 40 mg 40  mg at 11/08/20 1929   • cefTRIAXone (ROCEPHIN) 2 g in  mL IVPB Stopped at 11/09/20 0512   • MD Alert...Vancomycin per Pharmacy     • ondansetron (ZOFRAN ODT) dispertab 4 mg     • oxyCODONE-acetaminophen (PERCOCET) 5-325 MG per tablet 1-2 Tab 2 Tab at 11/07/20 2232       PROBLEM LIST:  No problems updated.    ASSESSMENT/PLAN: 75 y.o.  male with PMH of peripheral vascular disease, heavy tobacco smoking and lymphoma admitted for osteomyelitis of left great toe, confirmed on MR. Now transferred to ICU to recover from aortobifemoral bypass on 11/8.      # Left great toe osteomyelitis  # Left great toe pain  # Cellulitis, failed OP therapy   Patient with worsening pain and swelling of left great toe since outpatient toenail removal October 21. This, in spite of one dose of intramuscular ceftriaxone at urgent care, followed by oral Bactrim at home.  MRI impression: There are edema and mild enhancement of the tip of the first distal phalanx likely representing osteomyelitis. Cellulitis.  Day 7 of Vancomycin and ceftriaxone empiric antibiotic therapy.   - ID to assess if he's candidate for PO Abx (Augmentin and Bactrim) vs. Needing PICC line.    # Severe peripheral vascular disease  # s/p aortobifemoral bypass 11/8  HUGO showing monophasic waveforms at the femoral, popliteal, and tibial and peroneal arteries, severly reduced amplitude. Indicate significant disease in the aorto iliac segment. HUGO of 0.51 in RLE, 0.35 in LLE, indicating severe PVD.  Prevnars in place on groin bilaterally from ABF revascularization, with minimal output.   - Vascular surgery following; await their recommendations for when to bear weight. Will do PT/OT after. Currently on bed rest.  - Post-operatively, he's on dilaudid epidural for pain control per surgery. Previously, on Percocet as needed for pain control, IV morphine PRN breakthrough  - Zofran for nausea  - Limb preservation services consulted    # Oliguric  - Continue cancino  - LR boluses  with continuous maintenance fluids  - Monitor closely in ICU    # Abdominal rigidity  Consistent with exam pre-operatively  Denies tenderness at baseline, mild tenderness with exam.  - Consider KUB if persists     #Nicotine dependence  Patient is a longtime smoker, currently smoking 3 packs/day.  - Nicotine patch will be provided at patient's request  - Smoking cessation counseling will be provided to the patient     VTE PPx: Heparin   Abx: C3 and Vancomycin   Lines/Tubes: PIV  Fluids: LR  Diet: NPO  Code Status: Full      Disposition:  inpatient pending recovery from revascularization and plan for OP treatment for osteomyelitis    Laurie Lock MD   PGY-1 Family Medicine Resident   [facility]

## 2020-11-09 NOTE — THERAPY
Missed Therapy     Patient Name: Ronal Hair  Age:  75 y.o., Sex:  male  Medical Record #: [id number]  Today's Date: 11/9/2020    Discussed missed therapy with RN    OT consult rec'd. Hold for bedrest; will eval after bedrest order d/c'd as appropriate/able.

## 2020-11-09 NOTE — PROGRESS NOTES
"  POD # 1 s/p ABF bypass     S: Doing well   No complaints   Feet warm with excellent signals   Incision OK     /71   Pulse (!) 113   Temp 37.1 °C (98.8 °F) (Temporal)   Resp (!) 6   Ht 1.727 m (5' 7.99\")   Wt 82.1 kg (181 lb)   SpO2 95%     Intake/Output Summary (Last 24 hours) at 11/9/2020 1141  Last data filed at 11/9/2020 1000  Gross per 24 hour   Intake 3727.72 ml   Output 1240 ml   Net 2487.72 ml     Recent Labs     11/08/20  0322 11/08/20 1927 11/09/20  0435   WBC 3.4* 7.6 7.5   RBC 4.26* 4.16* 4.30*   HEMOGLOBIN 13.6* 13.4* 13.6*   HEMATOCRIT 39.7* 38.8* 41.0*   MCV 93.2 93.3 95.3   MCH 31.9 32.2 31.6   MCHC 34.3 34.5 33.2*   RDW 43.2 44.0 45.6   PLATELETCT 188 183 205   MPV 9.4 9.7 9.6     Recent Labs     11/08/20 1927 11/09/20  0435   SODIUM 132* 134*   POTASSIUM 4.3 4.7   CHLORIDE 99 100   CO2 22 22   GLUCOSE 165* 130*   BUN 15 20   CREATININE 0.76 1.21   CALCIUM 8.8 8.7     Recent Labs     11/08/20 1927 11/09/20  0435   SODIUM 132* 134*   POTASSIUM 4.3 4.7   CHLORIDE 99 100   CO2 22 22   GLUCOSE 165* 130*   BUN 15 20               Current Facility-Administered Medications   Medication Dose   • morphine (pf) 4 mg/mL injection 2-3 mg  2-3 mg   • ondansetron (ZOFRAN) syringe/vial injection 4 mg  4 mg   • dexamethasone (DECADRON) injection 4 mg  4 mg   • diphenhydrAMINE (BENADRYL) injection 25 mg  25 mg   • haloperidol lactate (HALDOL) injection 1 mg  1 mg   • scopolamine (TRANSDERM-SCOP) patch 1 Patch  1 Patch   • HYDROmorphone (DILAUDID) 20 mcg/mL, ropivacaine (NAROPIN) 0.1 % in  mL epidural infusion     • Pharmacy Consult Request ...Pain Management Review 1 Each  1 Each   • clevidipine (CLEVIPREX) IV emulsion  2 mg/hr   • labetalol (NORMODYNE/TRANDATE) injection 10 mg  10 mg   • lactated ringers infusion     • vancomycin (VANCOCIN) 1,000 mg in  mL IVPB  12 mg/kg   • senna-docusate (PERICOLACE or SENOKOT S) 8.6-50 MG per tablet 2 Tab  2 Tab    And   • polyethylene glycol/lytes " (MIRALAX) PACKET 1 Packet  1 Packet    And   • magnesium hydroxide (MILK OF MAGNESIA) suspension 30 mL  30 mL    And   • bisacodyl (DULCOLAX) suppository 10 mg  10 mg   • acetaminophen (TYLENOL) tablet 650 mg  650 mg   • nicotine (NICODERM) 21 MG/24HR 21 mg  21 mg    And   • nicotine polacrilex (NICORETTE) 2 MG piece 2 mg  2 mg   • atorvastatin (LIPITOR) tablet 40 mg  40 mg   • cefTRIAXone (ROCEPHIN) 2 g in  mL IVPB  2 g   • MD Alert...Vancomycin per Pharmacy     • ondansetron (ZOFRAN ODT) dispertab 4 mg  4 mg   • oxyCODONE-acetaminophen (PERCOCET) 5-325 MG per tablet 1-2 Tab  1-2 Tab       A:  Active Hospital Problems    Diagnosis   • Aortoiliac occlusive disease (HCC) [I74.09]   • Follicular 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

## 2020-11-09 NOTE — PROGRESS NOTES
Wasted 25mL of the Hydromorphone/ropivacaine epidural bag with Saundra ALMODOVAR RN. New bag hung per MAR.

## 2020-11-09 NOTE — PROGRESS NOTES
Pt updated on POC and reasoning behind therapies and interventions. Pt understands and encouraged to ask questions    Pt pain assessed using appropriate pain scale, 1-10 and treated per MAR. Non-pharm interventions in place such as re-positioning and temperature therapy and mobilization

## 2020-11-09 NOTE — PROGRESS NOTES
2 RN skin check complete with DREW Blackwood.  Devices in place:   epidural catheter in place,scant amount of serosanguinous drainage, right IJ central line that is oozing sanginous drainage, bilateral femoral prevenas, EKG leads, cancino in place, BP cuff, peripheral IV, right arterial line, pulse ox in place, scds in place.        Skin assessed under the following devices mentioned above.       Preventative measures in place including mepilex, q 2 hour turns, floated heels on mepilex, silicone nasal cannula in place.        Following areas of concern:   · Coccyx is red and blanching --mepilex placed    --midline incision with scan drainage   --bruising on the abdomen   --Left big toe infected and red    --bilateral elbows are blanching redness   --bilateral cracked and dried heels red and blanching--floated on pillows   --simple mask changed to a silicone nasal cannula with grey foams       Wound consult placedYES/NO: no    Wound reported YES/NO: no  Appropriate LDAs opened YES/NO: yes

## 2020-11-09 NOTE — PROGRESS NOTES
Trauma / Surgical Daily Progress Note    Date of Service  11/9/2020    Chief Complaint  75 y.o. male admitted 11/3/2020 with toe osteomyelitis    Interval Events  Hbg 13  Begin Lovenox  OOB  Sips clear.    Feet perfused  Urine output improving  HCT 41      Review of Systems  Review of Systems     Vital Signs for last 24 hours  Temp:  [37.1 °C (98.8 °F)] 37.1 °C (98.8 °F)  Pulse:  [] 88  Resp:  [9-42] 24  BP: (118-178)/() 129/74  SpO2:  [92 %-98 %] 98 %    Hemodynamic parameters for last 24 hours       Respiratory Data     Respiration: (!) 24, Pulse Oximetry: 98 %        RUL Breath Sounds: Clear, RML Breath Sounds: Diminished, RLL Breath Sounds: Diminished, JOSE Breath Sounds: Clear, LLL Breath Sounds: Diminished    Physical Exam  Physical Exam    Laboratory  Recent Results (from the past 24 hour(s))   CBC WITH DIFFERENTIAL    Collection Time: 11/09/20  4:35 AM   Result Value Ref Range    WBC 7.5 4.8 - 10.8 K/uL    RBC 4.30 (L) 4.70 - 6.10 M/uL    Hemoglobin 13.6 (L) 14.0 - 18.0 g/dL    Hematocrit 41.0 (L) 42.0 - 52.0 %    MCV 95.3 81.4 - 97.8 fL    MCH 31.6 27.0 - 33.0 pg    MCHC 33.2 (L) 33.7 - 35.3 g/dL    RDW 45.6 35.9 - 50.0 fL    Platelet Count 205 164 - 446 K/uL    MPV 9.6 9.0 - 12.9 fL    Neutrophils-Polys 80.80 (H) 44.00 - 72.00 %    Lymphocytes 8.20 (L) 22.00 - 41.00 %    Monocytes 10.50 0.00 - 13.40 %    Eosinophils 0.10 0.00 - 6.90 %    Basophils 0.10 0.00 - 1.80 %    Immature Granulocytes 0.30 0.00 - 0.90 %    Nucleated RBC 0.00 /100 WBC    Neutrophils (Absolute) 6.03 1.82 - 7.42 K/uL    Lymphs (Absolute) 0.61 (L) 1.00 - 4.80 K/uL    Monos (Absolute) 0.78 0.00 - 0.85 K/uL    Eos (Absolute) 0.01 0.00 - 0.51 K/uL    Baso (Absolute) 0.01 0.00 - 0.12 K/uL    Immature Granulocytes (abs) 0.02 0.00 - 0.11 K/uL    NRBC (Absolute) 0.00 K/uL   Comp Metabolic Panel    Collection Time: 11/09/20  4:35 AM   Result Value Ref Range    Sodium 134 (L) 135 - 145 mmol/L    Potassium 4.7 3.6 - 5.5 mmol/L     Chloride 100 96 - 112 mmol/L    Co2 22 20 - 33 mmol/L    Anion Gap 12.0 7.0 - 16.0    Glucose 130 (H) 65 - 99 mg/dL    Bun 20 8 - 22 mg/dL    Creatinine 1.21 0.50 - 1.40 mg/dL    Calcium 8.7 8.5 - 10.5 mg/dL    AST(SGOT) 60 (H) 12 - 45 U/L    ALT(SGPT) 59 (H) 2 - 50 U/L    Alkaline Phosphatase 61 30 - 99 U/L    Total Bilirubin 0.7 0.1 - 1.5 mg/dL    Albumin 3.5 3.2 - 4.9 g/dL    Total Protein 6.3 6.0 - 8.2 g/dL    Globulin 2.8 1.9 - 3.5 g/dL    A-G Ratio 1.3 g/dL   ESTIMATED GFR    Collection Time: 11/09/20  4:35 AM   Result Value Ref Range    GFR If African American >60 >60 mL/min/1.73 m 2    GFR If Non African American 59 (A) >60 mL/min/1.73 m 2       Fluids    Intake/Output Summary (Last 24 hours) at 11/9/2020 2115  Last data filed at 11/9/2020 2000  Gross per 24 hour   Intake 5164 ml   Output 715 ml   Net 4449 ml       Core Measures & Quality Metrics  Core Measures & Quality Metrics  JUAN DANIEL Score  ETOH Screening    Assessment/Plan  Aortoiliac occlusive disease (HCC)  Assessment & Plan  11/8 Aortobifemoral bypass.  Minimal blood loss.  Extubated post operatively.  Serial Hct. Monitor urine output  Dr. Morrison      Discussed patient condition with RN, RT and Pharmacy.  CRITICAL CARE TIME EXCLUDING PROCEDURES: 35    Minutes.Decision making of high complexity.  I reviewed clinical labs, trends and orders for follow up.  Review of imaging,reports, consultant documentation .  Utilization of the information in todays decision making.   I evaluated the patient condition at bedside and discussed the daily plan(s) with available nursing staff,  pharmacists on rounds.

## 2020-11-09 NOTE — THERAPY
Missed Therapy     Patient Name: Ronal Hair  Age:  75 y.o., Sex:  male  Medical Record #: [id number]  Today's Date: 11/9/2020 11/09/20 0719   Interdisciplinary Plan of Care Collaboration   Collaboration Comments PT eval order received. Patient has active bedrest orders in chart. Please discharge these in order for initiation of PT eval

## 2020-11-10 ENCOUNTER — ANESTHESIA (OUTPATIENT)
Dept: ANESTHESIOLOGY | Facility: MEDICAL CENTER | Age: 75
End: 2020-11-10

## 2020-11-10 ENCOUNTER — APPOINTMENT (OUTPATIENT)
Dept: RADIOLOGY | Facility: MEDICAL CENTER | Age: 75
DRG: 271 | End: 2020-11-10
Attending: STUDENT IN AN ORGANIZED HEALTH CARE EDUCATION/TRAINING PROGRAM
Payer: MEDICARE

## 2020-11-10 ENCOUNTER — APPOINTMENT (OUTPATIENT)
Dept: CARDIOLOGY | Facility: MEDICAL CENTER | Age: 75
DRG: 271 | End: 2020-11-10
Attending: STUDENT IN AN ORGANIZED HEALTH CARE EDUCATION/TRAINING PROGRAM
Payer: MEDICARE

## 2020-11-10 ENCOUNTER — ANESTHESIA EVENT (OUTPATIENT)
Dept: ANESTHESIOLOGY | Facility: MEDICAL CENTER | Age: 75
End: 2020-11-10

## 2020-11-10 LAB
ALBUMIN SERPL BCP-MCNC: 3.1 G/DL (ref 3.2–4.9)
ALBUMIN/GLOB SERPL: 1.1 G/DL
ALP SERPL-CCNC: 54 U/L (ref 30–99)
ALT SERPL-CCNC: 50 U/L (ref 2–50)
ANION GAP SERPL CALC-SCNC: 9 MMOL/L (ref 7–16)
AST SERPL-CCNC: 52 U/L (ref 12–45)
BASOPHILS # BLD AUTO: 0.1 % (ref 0–1.8)
BASOPHILS # BLD: 0.01 K/UL (ref 0–0.12)
BILIRUB SERPL-MCNC: 0.8 MG/DL (ref 0.1–1.5)
BUN SERPL-MCNC: 26 MG/DL (ref 8–22)
CALCIUM SERPL-MCNC: 9 MG/DL (ref 8.5–10.5)
CHLORIDE SERPL-SCNC: 102 MMOL/L (ref 96–112)
CO2 SERPL-SCNC: 26 MMOL/L (ref 20–33)
CREAT SERPL-MCNC: 0.8 MG/DL (ref 0.5–1.4)
EOSINOPHIL # BLD AUTO: 0.04 K/UL (ref 0–0.51)
EOSINOPHIL NFR BLD: 0.5 % (ref 0–6.9)
ERYTHROCYTE [DISTWIDTH] IN BLOOD BY AUTOMATED COUNT: 46.5 FL (ref 35.9–50)
GLOBULIN SER CALC-MCNC: 2.9 G/DL (ref 1.9–3.5)
GLUCOSE SERPL-MCNC: 125 MG/DL (ref 65–99)
HCT VFR BLD AUTO: 35.9 % (ref 42–52)
HGB BLD-MCNC: 11.7 G/DL (ref 14–18)
IMM GRANULOCYTES # BLD AUTO: 0.03 K/UL (ref 0–0.11)
IMM GRANULOCYTES NFR BLD AUTO: 0.4 % (ref 0–0.9)
LV EJECT FRACT  99904: 65
LV EJECT FRACT MOD 2C 99903: 62.44
LV EJECT FRACT MOD 4C 99902: 65.88
LV EJECT FRACT MOD BP 99901: 63.63
LYMPHOCYTES # BLD AUTO: 0.48 K/UL (ref 1–4.8)
LYMPHOCYTES NFR BLD: 6.2 % (ref 22–41)
MCH RBC QN AUTO: 32 PG (ref 27–33)
MCHC RBC AUTO-ENTMCNC: 32.6 G/DL (ref 33.7–35.3)
MCV RBC AUTO: 98.1 FL (ref 81.4–97.8)
MONOCYTES # BLD AUTO: 0.8 K/UL (ref 0–0.85)
MONOCYTES NFR BLD AUTO: 10.4 % (ref 0–13.4)
NEUTROPHILS # BLD AUTO: 6.33 K/UL (ref 1.82–7.42)
NEUTROPHILS NFR BLD: 82.4 % (ref 44–72)
NRBC # BLD AUTO: 0 K/UL
NRBC BLD-RTO: 0 /100 WBC
PLATELET # BLD AUTO: 165 K/UL (ref 164–446)
PMV BLD AUTO: 9.5 FL (ref 9–12.9)
POTASSIUM SERPL-SCNC: 4.5 MMOL/L (ref 3.6–5.5)
PROT SERPL-MCNC: 6 G/DL (ref 6–8.2)
RBC # BLD AUTO: 3.66 M/UL (ref 4.7–6.1)
SODIUM SERPL-SCNC: 137 MMOL/L (ref 135–145)
VANCOMYCIN TROUGH SERPL-MCNC: 10.5 UG/ML (ref 10–20)
WBC # BLD AUTO: 7.7 K/UL (ref 4.8–10.8)

## 2020-11-10 PROCEDURE — 700105 HCHG RX REV CODE 258: Performed by: STUDENT IN AN ORGANIZED HEALTH CARE EDUCATION/TRAINING PROGRAM

## 2020-11-10 PROCEDURE — 99232 SBSQ HOSP IP/OBS MODERATE 35: CPT | Performed by: SURGERY

## 2020-11-10 PROCEDURE — 700111 HCHG RX REV CODE 636 W/ 250 OVERRIDE (IP): Performed by: INTERNAL MEDICINE

## 2020-11-10 PROCEDURE — 85025 COMPLETE CBC W/AUTO DIFF WBC: CPT

## 2020-11-10 PROCEDURE — 97165 OT EVAL LOW COMPLEX 30 MIN: CPT

## 2020-11-10 PROCEDURE — 700111 HCHG RX REV CODE 636 W/ 250 OVERRIDE (IP): Performed by: STUDENT IN AN ORGANIZED HEALTH CARE EDUCATION/TRAINING PROGRAM

## 2020-11-10 PROCEDURE — A9270 NON-COVERED ITEM OR SERVICE: HCPCS | Performed by: STUDENT IN AN ORGANIZED HEALTH CARE EDUCATION/TRAINING PROGRAM

## 2020-11-10 PROCEDURE — 93306 TTE W/DOPPLER COMPLETE: CPT | Mod: 26 | Performed by: INTERNAL MEDICINE

## 2020-11-10 PROCEDURE — 700111 HCHG RX REV CODE 636 W/ 250 OVERRIDE (IP): Performed by: SURGERY

## 2020-11-10 PROCEDURE — 770022 HCHG ROOM/CARE - ICU (200)

## 2020-11-10 PROCEDURE — 700102 HCHG RX REV CODE 250 W/ 637 OVERRIDE(OP): Performed by: SURGERY

## 2020-11-10 PROCEDURE — 71045 X-RAY EXAM CHEST 1 VIEW: CPT

## 2020-11-10 PROCEDURE — 93306 TTE W/DOPPLER COMPLETE: CPT

## 2020-11-10 PROCEDURE — 80053 COMPREHEN METABOLIC PANEL: CPT

## 2020-11-10 PROCEDURE — 51798 US URINE CAPACITY MEASURE: CPT

## 2020-11-10 PROCEDURE — 700105 HCHG RX REV CODE 258: Performed by: INTERNAL MEDICINE

## 2020-11-10 PROCEDURE — 97162 PT EVAL MOD COMPLEX 30 MIN: CPT

## 2020-11-10 PROCEDURE — 80202 ASSAY OF VANCOMYCIN: CPT

## 2020-11-10 PROCEDURE — 700102 HCHG RX REV CODE 250 W/ 637 OVERRIDE(OP): Performed by: STUDENT IN AN ORGANIZED HEALTH CARE EDUCATION/TRAINING PROGRAM

## 2020-11-10 PROCEDURE — A9270 NON-COVERED ITEM OR SERVICE: HCPCS | Performed by: SURGERY

## 2020-11-10 PROCEDURE — 99232 SBSQ HOSP IP/OBS MODERATE 35: CPT | Performed by: INTERNAL MEDICINE

## 2020-11-10 RX ORDER — FUROSEMIDE 10 MG/ML
20 INJECTION INTRAMUSCULAR; INTRAVENOUS ONCE
Status: COMPLETED | OUTPATIENT
Start: 2020-11-10 | End: 2020-11-10

## 2020-11-10 RX ADMIN — VANCOMYCIN HYDROCHLORIDE 1000 MG: 500 INJECTION, POWDER, LYOPHILIZED, FOR SOLUTION INTRAVENOUS at 16:02

## 2020-11-10 RX ADMIN — DOCUSATE SODIUM 50 MG AND SENNOSIDES 8.6 MG 2 TABLET: 8.6; 5 TABLET, FILM COATED ORAL at 04:52

## 2020-11-10 RX ADMIN — METOPROLOL TARTRATE 25 MG: 25 TABLET, FILM COATED ORAL at 14:08

## 2020-11-10 RX ADMIN — NICOTINE TRANSDERMAL SYSTEM 21 MG: 21 PATCH, EXTENDED RELEASE TRANSDERMAL at 04:51

## 2020-11-10 RX ADMIN — VANCOMYCIN HYDROCHLORIDE 1000 MG: 500 INJECTION, POWDER, LYOPHILIZED, FOR SOLUTION INTRAVENOUS at 04:51

## 2020-11-10 RX ADMIN — METOPROLOL TARTRATE 25 MG: 25 TABLET, FILM COATED ORAL at 06:00

## 2020-11-10 RX ADMIN — ENOXAPARIN SODIUM 40 MG: 40 INJECTION SUBCUTANEOUS at 10:52

## 2020-11-10 RX ADMIN — METOPROLOL TARTRATE 25 MG: 25 TABLET, FILM COATED ORAL at 20:26

## 2020-11-10 RX ADMIN — ATORVASTATIN CALCIUM 40 MG: 40 TABLET, FILM COATED ORAL at 17:10

## 2020-11-10 RX ADMIN — FUROSEMIDE 20 MG: 10 INJECTION, SOLUTION INTRAMUSCULAR; INTRAVENOUS at 09:07

## 2020-11-10 RX ADMIN — CEFTRIAXONE SODIUM 2 G: 2 INJECTION, POWDER, FOR SOLUTION INTRAMUSCULAR; INTRAVENOUS at 04:51

## 2020-11-10 RX ADMIN — DOCUSATE SODIUM 50 MG AND SENNOSIDES 8.6 MG 2 TABLET: 8.6; 5 TABLET, FILM COATED ORAL at 17:11

## 2020-11-10 ASSESSMENT — COGNITIVE AND FUNCTIONAL STATUS - GENERAL
HELP NEEDED FOR BATHING: A LOT
CLIMB 3 TO 5 STEPS WITH RAILING: A LITTLE
PERSONAL GROOMING: A LITTLE
STANDING UP FROM CHAIR USING ARMS: A LITTLE
DRESSING REGULAR UPPER BODY CLOTHING: A LITTLE
MOVING TO AND FROM BED TO CHAIR: A LITTLE
MOBILITY SCORE: 16
SUGGESTED CMS G CODE MODIFIER MOBILITY: CK
DAILY ACTIVITIY SCORE: 16
MOVING FROM LYING ON BACK TO SITTING ON SIDE OF FLAT BED: UNABLE
DRESSING REGULAR LOWER BODY CLOTHING: A LOT
TOILETING: A LOT
SUGGESTED CMS G CODE MODIFIER DAILY ACTIVITY: CK
WALKING IN HOSPITAL ROOM: A LITTLE
TURNING FROM BACK TO SIDE WHILE IN FLAT BAD: A LITTLE

## 2020-11-10 ASSESSMENT — ENCOUNTER SYMPTOMS
VOMITING: 0
NAUSEA: 0
BACK PAIN: 1
CHILLS: 0
DIZZINESS: 0
FEVER: 0
HEADACHES: 0
ABDOMINAL PAIN: 0
ABDOMINAL DISTENTION: 1
ARTHRALGIAS: 1
SHORTNESS OF BREATH: 1
ABDOMINAL PAIN: 1
MYALGIAS: 0

## 2020-11-10 ASSESSMENT — GAIT ASSESSMENTS
ASSISTIVE DEVICE: FRONT WHEEL WALKER
DEVIATION: BRADYKINETIC;SHUFFLED GAIT
GAIT LEVEL OF ASSIST: MINIMAL ASSIST
DISTANCE (FEET): 75

## 2020-11-10 ASSESSMENT — FIBROSIS 4 INDEX: FIB4 SCORE: 3.25

## 2020-11-10 ASSESSMENT — COPD QUESTIONNAIRES
HAVE YOU SMOKED AT LEAST 100 CIGARETTES IN YOUR ENTIRE LIFE: YES
DURING THE PAST 4 WEEKS HOW MUCH DID YOU FEEL SHORT OF BREATH: NONE/LITTLE OF THE TIME
COPD SCREENING SCORE: 5
DO YOU EVER COUGH UP ANY MUCUS OR PHLEGM?: YES, A FEW DAYS A WEEK OR MONTH

## 2020-11-10 ASSESSMENT — ACTIVITIES OF DAILY LIVING (ADL): TOILETING: INDEPENDENT

## 2020-11-10 NOTE — ANESTHESIA POST-OP FOLLOW-UP NOTE
"Anesthesia Pain Service Note    Type:  Epidural catheter    Patient: Marcello Hair    Patient seen and examined.     /61   Pulse 87   Temp 36.7 °C (98 °F) (Temporal)   Resp (!) 8   Ht 1.727 m (5' 7.99\")   Wt 90.1 kg (198 lb 10.2 oz)   SpO2 95%   BMI 30.21 kg/m²     Complaints:  No complaints.    Pain:   3/10    LOC:   Awake    Rate:  6    Exam:  Vital signs stable, Afebrile and Site clean, dry, intact without tenderness or erythema    Impression:  Adequate analgesia    Assessment & Plan:  Acute post-procedural pain (G89.18)     No change  - continue      Luis Wilson M.D.  11/10/2020  11:30 AM  "

## 2020-11-10 NOTE — PROGRESS NOTES
Infectious Disease Progress Note    Author: Mariia Batista M.D. Date & Time of service: 11/10/2020  8:03 AM    Chief Complaint:  Follow-up for left great toe osteomyelitis    Interval History:   Tmax 100.4 WBC 7.5.  Patient transferred to the ICU after aortobifem bypass yesterday secondary to postop hypotension.  Patient doing okay this morning.  States he no longer has left foot pain after procedure  11/10 afebrile WBC 7.7 renal function improved.  Left great toe erythema resolving.  Denies any pain      Labs Reviewed, Medications Reviewed and Wound Reviewed.    Review of Systems:  Review of Systems   Constitutional: Negative for chills and fever.   Cardiovascular: Negative for chest pain.   Gastrointestinal: Negative for abdominal pain, nausea and vomiting.   Musculoskeletal: Negative for myalgias.   Neurological: Negative for dizziness and headaches.   All other systems reviewed and are negative.      Hemodynamics:  Temp (24hrs), Av.7 °C (98.1 °F), Min:36.7 °C (98.1 °F), Max:36.7 °C (98.1 °F)  Temperature: 36.7 °C (98.1 °F), Monitored Temp: 36.9 °C (98.4 °F)  Pulse  Av.6  Min: 72  Max: 121   Blood Pressure : 135/70, Arterial BP: 105/61       Physical Exam:  Physical Exam  HENT:      Mouth/Throat:      Mouth: Mucous membranes are moist.   Eyes:      Extraocular Movements: Extraocular movements intact.      Pupils: Pupils are equal, round, and reactive to light.   Neck:      Musculoskeletal: Neck supple.      Comments: Left IJ catheter  Cardiovascular:      Rate and Rhythm: Normal rate and regular rhythm.      Heart sounds: Normal heart sounds.   Pulmonary:      Effort: Pulmonary effort is normal.      Breath sounds: Normal breath sounds.   Abdominal:      Palpations: Abdomen is soft.      Tenderness: There is no abdominal tenderness.      Comments: Abdominal surgical site dressed   Musculoskeletal: Normal range of motion.      Left lower leg: Edema present.      Comments: Left great toe erythema  resolving.  Palpable pedal pulses present.  Resolving edema   Skin:     General: Skin is warm and dry.   Neurological:      General: No focal deficit present.      Mental Status: He is alert and oriented to person, place, and time.   Psychiatric:         Mood and Affect: Mood normal.      Comments: Chatty         Meds:    Current Facility-Administered Medications:   •  metoprolol  •  furosemide  •  morphine injection  •  ondansetron  •  dexamethasone  •  diphenhydrAMINE  •  haloperidol lactate  •  scopolamine  •  HYDROmorphone-ropivacaine epidural infusion  •  Pharmacy Consult Request  •  clevidipine  •  [Held by provider] LR  •  vancomycin  •  senna-docusate **AND** polyethylene glycol/lytes **AND** magnesium hydroxide **AND** bisacodyl  •  acetaminophen  •  nicotine **AND** Nicotine Replacement Patient Education Materials **AND** nicotine polacrilex  •  atorvastatin  •  cefTRIAXone (ROCEPHIN) IV  •  MD Alert...Vancomycin per Pharmacy  •  ondansetron  •  oxyCODONE-acetaminophen    Labs:  Recent Labs     11/08/20  0322 11/08/20  1927 11/09/20  0435 11/10/20  0400   WBC 3.4* 7.6 7.5 7.7   RBC 4.26* 4.16* 4.30* 3.66*   HEMOGLOBIN 13.6* 13.4* 13.6* 11.7*   HEMATOCRIT 39.7* 38.8* 41.0* 35.9*   MCV 93.2 93.3 95.3 98.1*   MCH 31.9 32.2 31.6 32.0   RDW 43.2 44.0 45.6 46.5   PLATELETCT 188 183 205 165   MPV 9.4 9.7 9.6 9.5   NEUTSPOLYS 60.40  --  80.80* 82.40*   LYMPHOCYTES 19.90*  --  8.20* 6.20*   MONOCYTES 9.40  --  10.50 10.40   EOSINOPHILS 9.10*  --  0.10 0.50   BASOPHILS 0.90  --  0.10 0.10     Recent Labs     11/08/20  1927 11/09/20  0435 11/10/20  0400   SODIUM 132* 134* 137   POTASSIUM 4.3 4.7 4.5   CHLORIDE 99 100 102   CO2 22 22 26   GLUCOSE 165* 130* 125*   BUN 15 20 26*     Recent Labs     11/08/20 1927 11/09/20  0435 11/10/20  0400   ALBUMIN  --  3.5 3.1*   TBILIRUBIN  --  0.7 0.8   ALKPHOSPHAT  --  61 54   TOTPROTEIN  --  6.3 6.0   ALTSGPT  --  59* 50   ASTSGOT  --  60* 52*   CREATININE 0.76 1.21 0.80        Imaging:  Dx-chest-portable (1 View)    Result Date: 11/8/2020 11/8/2020 1:50 PM HISTORY/REASON FOR EXAM:  central line placement. TECHNIQUE/EXAM DESCRIPTION AND NUMBER OF VIEWS: Single portable view of the chest. COMPARISON: 5/26/2020 FINDINGS: The cardiomediastinal silhouette is enlarged. Right central line projects over the SVC. No focal consolidation, pleural effusion or pneumothorax is identified.  Costophrenic angles are clear. There is mild interstitial prominence .     Right central line projects over the SVC. No pneumothorax. Cardiomegaly. Mild interstitial prominence may represent mild edema or pneumonitis.     Dx-toe(s) 2+ Left    Result Date: 11/3/2020  11/3/2020 9:05 PM HISTORY/REASON FOR EXAM:  Great toe infection. c/f osteomyelitis. TECHNIQUE/EXAM DESCRIPTION AND NUMBER OF VIEWS:  3 views of the LEFT toes. COMPARISON: None FINDINGS: There is no visualized fracture, subluxation, or dislocation identified. Permeative appearance of the medial aspect of the distal first toe is seen. Soft tissue swelling of the forefoot is seen.     1.  No acute traumatic bony injury. 2.  Slight permeative appearance of the medial aspect of the first toe distal phalanx, could represent subtle changes of osteomyelitis.    Mr-foot-with & W/o Left    Result Date: 11/4/2020 11/4/2020 8:37 AM HISTORY/REASON FOR EXAM: Diabetic foot infection. TECHNIQUE/EXAM DESCRIPTION: MRI of the LEFT foot with contrast. Using a [employer] 1.5 Isis MRI scanner, T1 axial, sagittal, and coronal, fast spin-echo T2 fat-suppressed axial and coronal, fast inversion recovery sagittal, and T1 fat suppressed axial and sagittal post intravenous contrast images were obtained. A total of 19 mL ProHance given. COMPARISON: X-ray 11/3/2020 FINDINGS: The study is degraded by patient motion artifact. Osseous structures/Cartilaginous surfaces: There is marrow edema and mild enhancement of the tip of the first distal phalanx. There is otherwise no  evidence of marrow edema or abnormal enhancement. 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 Miscellaneous: There is cellulitis involving the dorsum of the forefoot and midfoot. No soft tissue abscess.     1.  There are edema and mild enhancement of the tip of the first distal phalanx likely representing osteomyelitis. 2.  Cellulitis.    Us-velma Single Level Bilat    Result Date: 2020   Vascular Laboratory  Conclusions  Ankle-brachial index is severely reduced bilaterally                                                                                                                                                  Doppler waveforms at the ankle are monophasic with low  amplitude  bilaterally.  POORNIMA TORREZ  Age:    74    Gender:     M  MRN:    [id number]  :    1945      BSA:  Exam Date:     2020 09:18  Room #:     Inpatient  Priority:     Stat  Ht (in):             Wt (lb):  Ordering Physician:        DARLINE GIBSON  Referring Physician:  Sonographer:               Moira Ann RDCS, VERONIKAT  Study Type:                Complete Bilateral  Technical Quality:         Adequate  Indications:     PVD  CPT Codes:       72621  ICD Codes:       443.9  History:         Claudication bilateral lower extremities  Limitations:                 RIGHT  Waveform            Systolic BPs (mmHg)                             149           Brachial  Monophasic                               Common Femoral  Monophasic                 76            Posterior Tibial  Monophasic                 59            Dorsalis Pedis                                           Peroneal                             0.51          VELMA                                           TBI                       LEFT  Waveform        Systolic BPs (mmHg)                             146           Brachial  Monophasic                               Common Femoral  Monophasic                 52            Posterior Tibial   Monophasic                 49            Dorsalis Pedis                                           Peroneal                             0.35          HUGO                                           TBI  Findings  Bilateral.  Ankle-brachial index is severely reduced bilaterally                                                                                                                                                  Doppler waveforms at the ankle are monophasic with low  amplitude  bilaterally  .  Rg Nicholson MD  (Electronically Signed)  Final Date:      2020                   10:13    Us-extremity Artery Lower Bilat    Result Date: 2020  Lower Extremity  Arterial Duplex Report  Vascular Laboratory  CONCLUSIONS  Waveforms at the femoral ,popliteal and tibial and peroneal  arteries  are  monophasic with severly reduced amplitude.           Findings of this study  indicate significant disease in the aorto-iliac segment.  LORE POORNIMA  Exam Date:     2020 10:20  Room #:     Inpatient  Priority:     Stat  Ht (in):     67      Wt (lb):     187  Ordering Physician:        DARLINE GIBSON  Referring Physician:  Sonographer:               Moira Ann RDCS, RVT  Study Type:                Complete Bilateral  Technical Quality:         Adequate  Age:    74    Gender:     M  MRN:    [id number]  :    1945      BSA:    1.97  Indications:     PVD  CPT Codes:       06791  ICD Codes:       443.9  History:         Bilateral lower extremity pain  Limitations:                RIGHT  Waveform        Peak Systolic Velocity (cm/s)                  Prox    Prox-Mid  Mid    Mid-Dist  Distal  Monophasic                        41                       CFA  Monophasic      51                                         PFA  Monophasic      24                27               37      SFA  Monophasic                        26                       POP  Monophasic      12       "                           15      AT  Monophasic      30                                 31      PT  Monophasic      14                                 0       JOYCE                LEFT  Waveform        Peak Systolic Velocity (cm/s)                  Prox    Prox-Mid  Mid    Mid-Dist  Distal  Monophasic                        61                       CFA  Monophasic      37                                         PFA  Monophasic      30                15               13      SFA  Monophasic                        21                       POP  Monophasic      28                                 11      AT  Monophasic      18                                 14      PT  Monophasic      13                                 0       JOYCE  FINDINGS  Bilateral.  Plaque is seen in the  femoral ,popliteal  ,tibial and peroneal arteries  bilaterally  with no focal  elevated velocities.  Waveforms at the femoral ,popliteal and tibial and peroneal  arteries  are  monophasic with severly reduced amplitude.           Findings of this study  indicate significant disease in the aorto-iliac segment.    Rg Nicholson MD  (Electronically Signed)  Final Date:      05 November 2020                   11:48      Micro:  Results     Procedure Component Value Units Date/Time    BLOOD CULTURE [[id number]] Collected: 11/03/20 2115    Order Status: Completed Specimen: Blood from Peripheral Updated: 11/08/20 2300     Significant Indicator NEG     Source BLD     Site PERIPHERAL     Culture Result No growth after 5 days of incubation.    Narrative:      Per Hospital Policy: Only change Specimen Src: to \"Line\" if  specified by physician order.  Left Forearm/Arm    BLOOD CULTURE [[id number]] Collected: 11/03/20 2130    Order Status: Completed Specimen: Blood from Peripheral Updated: 11/08/20 2300     Significant Indicator NEG     Source BLD     Site PERIPHERAL     Culture Result No growth after 5 days of incubation.    Narrative:      Per Hospital " "Policy: Only change Specimen Src: to \"Line\" if  specified by physician order.  Right AC    SARS-CoV-2, PCR (In-House) [[id number]] Collected: 11/05/20 0745    Order Status: Completed Updated: 11/05/20 2119     SARS-CoV-2 Source NP Swab     SARS-CoV-2 by PCR NotDetected     Comment: [facility] providers: PLEASE REFER TO DE-ESCALATION AND RETESTING PROTOCOL  on [email or web address]  **The Roche SARS-CoV-2 PCR test has been made available for use under the  Emergency Use Authorization (EUA) only.         Narrative:      Is patient being admitted?->Yes  Does this patient meet criteria for Rush/Cepheid per [facility]  Inpatient Workflow? (See workflow link below)->No  Expected turn around time?->Routine (In-House PCR up to 24  hours)  Is this the patients First SARS CoV-2 test?->Unknown  Is this patient employed in healthcare?->No  Is the patient symptomatic as defined by the CDC?->No  Is the patient hospitalized?->Yes  Is the patient in the ICU?->No  Is the patient a resident in a congregate care setting?->No  Is the patient pregnant?->No    COVID/SARS CoV-2 PCR [[id number]] Collected: 11/05/20 0745    Order Status: Completed Specimen: Respirate from Nasopharyngeal Updated: 11/05/20 0752     COVID Order Status Received     Comment: The order for SARS CoV-2 testing has been received by the  Laboratory. This result is neither positive nor negative.  Final results of testing will report in 24-48 hours, separately.         Narrative:      Is patient being admitted?->Yes  Does this patient meet criteria for Rush/Cepheid per [facility]  Inpatient Workflow? (See workflow link below)->No  Expected turn around time?->Routine (In-House PCR up to 24  hours)  Is this the patients First SARS CoV-2 test?->Unknown  Is this patient employed in healthcare?->No  Is the patient symptomatic as defined by the CDC?->No  Is the patient hospitalized?->Yes  Is the patient in the ICU?->No  Is the patient a resident in a congregate care setting?->No  Is the " patient pregnant?->No          Assessment:  Active Hospital Problems    Diagnosis   • Aortoiliac occlusive disease (HCC) [I74.09]   • Follicular lymphoma (HCC) [C82.90]     74-year-old man with a history of peripheral vascular disease and tobacco abuse admitted on 11/3/20/2020 for persistent left great toe pain.  He underwent left great toe removal on 10/21 secondary to an ingrown toenail.  Admitted with increasing pain.  Received Bactrim as an outpatient without any clinical improvement.  On admission x-ray showed findings concerning for osteomyelitis of the great toe.  Patient found to have significant peripheral vascular disease on vascular studies.  He has now status post aorto femoral bypass on 11/8    Plan:  Left great toe osteomyelitis  Afebrile  No leukocytosis  Noted on x-ray  Continue IV vancomycin and ceftriaxone while inpatient  Monitor renal function and Vanco trough  Last Vanco trough 10.5 on 11/8  Anticipate a 6-week course of antibiotics  At discharge, transition patient to p.o. Bactrim 1 double strength tablet twice daily and Augmentin 875 mg twice daily to complete his antibiotic course from 11/8  Stop date 12/20/2020    Acute kidney injury, resolved  Likely multifactorial with recent bypass and postop hypotension overnight  Continue to monitor as patient also on IV vancomycin    Severe peripheral vascular disease  Contributing to above  Status post aortobifemoral bypass graft on 11/8/2020    I have performed a physical exam and reviewed and updated ROS and plan today 11/10/2020.  In review of yesterday's note 11/9/2020, there are no changes except as documented above.    Follow-up in ID clinic post discharge    Discussed with [facility] family resident, Dr. Lock and bedside nurse.  Signing off.

## 2020-11-10 NOTE — PROGRESS NOTES
"  S: Awake ALert  No complaints     O: Abdomen soft, feet warm and well perfused     /70   Pulse 89   Temp 36.7 °C (98.1 °F) (Temporal)   Resp (!) 22   Ht 1.727 m (5' 7.99\")   Wt 90.1 kg (198 lb 10.2 oz)   SpO2 93%     Intake/Output Summary (Last 24 hours) at 11/10/2020 0801  Last data filed at 11/10/2020 0600  Gross per 24 hour   Intake 5114 ml   Output 850 ml   Net 4264 ml     Recent Labs     11/08/20 1927 11/09/20 0435 11/10/20  0400   WBC 7.6 7.5 7.7   RBC 4.16* 4.30* 3.66*   HEMOGLOBIN 13.4* 13.6* 11.7*   HEMATOCRIT 38.8* 41.0* 35.9*   MCV 93.3 95.3 98.1*   MCH 32.2 31.6 32.0   MCHC 34.5 33.2* 32.6*   RDW 44.0 45.6 46.5   PLATELETCT 183 205 165   MPV 9.7 9.6 9.5     Recent Labs     11/08/20 1927 11/09/20 0435 11/10/20  0400   SODIUM 132* 134* 137   POTASSIUM 4.3 4.7 4.5   CHLORIDE 99 100 102   CO2 22 22 26   GLUCOSE 165* 130* 125*   BUN 15 20 26*   CREATININE 0.76 1.21 0.80   CALCIUM 8.8 8.7 9.0     Recent Labs     11/08/20 1927 11/09/20  0435 11/10/20  0400   SODIUM 132* 134* 137   POTASSIUM 4.3 4.7 4.5   CHLORIDE 99 100 102   CO2 22 22 26   GLUCOSE 165* 130* 125*   BUN 15 20 26*               Current Facility-Administered Medications   Medication Dose   • metoprolol (LOPRESSOR) tablet 25 mg  25 mg   • furosemide (LASIX) injection 20 mg  20 mg   • morphine (pf) 4 mg/mL injection 2-3 mg  2-3 mg   • ondansetron (ZOFRAN) syringe/vial injection 4 mg  4 mg   • dexamethasone (DECADRON) injection 4 mg  4 mg   • diphenhydrAMINE (BENADRYL) injection 25 mg  25 mg   • haloperidol lactate (HALDOL) injection 1 mg  1 mg   • scopolamine (TRANSDERM-SCOP) patch 1 Patch  1 Patch   • HYDROmorphone (DILAUDID) 20 mcg/mL, ropivacaine (NAROPIN) 0.1 % in  mL epidural infusion     • Pharmacy Consult Request ...Pain Management Review 1 Each  1 Each   • clevidipine (CLEVIPREX) IV emulsion  2 mg/hr   • [Held by provider] lactated ringers infusion     • vancomycin (VANCOCIN) 1,000 mg in  mL IVPB  12 mg/kg   • " senna-docusate (PERICOLACE or SENOKOT S) 8.6-50 MG per tablet 2 Tab  2 Tab    And   • polyethylene glycol/lytes (MIRALAX) PACKET 1 Packet  1 Packet    And   • magnesium hydroxide (MILK OF MAGNESIA) suspension 30 mL  30 mL    And   • bisacodyl (DULCOLAX) suppository 10 mg  10 mg   • acetaminophen (TYLENOL) tablet 650 mg  650 mg   • nicotine (NICODERM) 21 MG/24HR 21 mg  21 mg    And   • nicotine polacrilex (NICORETTE) 2 MG piece 2 mg  2 mg   • atorvastatin (LIPITOR) tablet 40 mg  40 mg   • cefTRIAXone (ROCEPHIN) 2 g in  mL IVPB  2 g   • MD Alert...Vancomycin per Pharmacy     • ondansetron (ZOFRAN ODT) dispertab 4 mg  4 mg   • oxyCODONE-acetaminophen (PERCOCET) 5-325 MG per tablet 1-2 Tab  1-2 Tab       A:  Active Hospital Problems    Diagnosis   • Aortoiliac occlusive disease (HCC) [I74.09]   • Follicular lymphoma (HCC) [C82.90]       POD # 2 s/p aortobifemoral bypass   Doing well   Mobilizing OR fluids with signs of early volume overload   Lasix given   Keep epidural today   Remain on clears   Hopefully d/c epidural tomorrow and transfer to floor   Continue abx per ID recommendations

## 2020-11-10 NOTE — DISCHARGE PLANNING
Anticipated Discharge Disposition: Home    Action: Chart review completed; pt remains in ICU level of care until epidural discontinued.  ID recommending IC antibiotics with transition to PO prior at discharge.     Barriers to Discharge:   Anticipate pt will need outpatient wound care-wound clinic vs HH.  Awaiting PT OT evaluation for disposition determination.  Medical clearance    Plan: Continue to follow and assist with discharge planning needs.

## 2020-11-10 NOTE — PROGRESS NOTES
"Pharmacy Kinetics 75 y.o. male on vancomycin day # 8 11/10/2020    Currently on Vancomycin 1000 mg iv q12hr (04,16)  Provider specified end date: TBD, anticipate 6 weeks with plans to transition to PO Bactrim and Augmentin at time of discharge     Indication for Treatment: L-great toe osteomyelitis     Pertinent history per medical record: Admitted on 11/3/2020 for persistent left great toe pain following L-great toe following removal of ingrown toe nail on 10/21.  He was receiving Bactrim as an outpatient without improvement.  MRI-foot with findings of osteomyelitis of the L-great toe.  He underwent an aorto-bifemoral bypass on  and was hypotensive post-op, thus requiring transfer to the ICU.       Other antibiotics: ceftriaxone 2 grams iv q24h     Allergies: Vicodin [hydrocodone-acetaminophen]      List concerns for renal function: age, hypotension post-op  > resolved, SCr bump after episode of hypotension     Pertinent cultures to date:   11/3 blood-peripheral x 2:  No growth after 5 days of incubation     MRSA nares swab if pneumonia is a concern (ordered/positive/negative/n-a): n-a    Recent Labs     20  0435 11/10/20  0400   WBC 3.4* 7.6 7.5 7.7   NEUTSPOLYS 60.40  --  80.80* 82.40*     Recent Labs     20  0435 11/10/20  0400   BUN 15 20 26*   CREATININE 0.76 1.21 0.80   ALBUMIN  --  3.5 3.1*     Recent Labs     202 11/10/20  0400   [facility]       Intake/Output Summary (Last 24 hours) at 11/10/2020 1425  Last data filed at 11/10/2020 1400  Gross per 24 hour   Intake 3854 ml   Output 1570 ml   Net 2284 ml      /73   Pulse 81   Temp 36.6 °C (97.8 °F) (Temporal)   Resp 12   Ht 1.727 m (5' 7.99\")   Wt 90.1 kg (198 lb 10.2 oz)   SpO2 96%  Temp (24hrs), Av.6 °C (97.9 °F), Min:36.5 °C (97.7 °F), Max:36.7 °C (98.1 °F)      A/P   1. Vancomycin dose change: continue current regimen  2. Next vancomycin level: 3-5 " days  3. Goal trough: 10-15 mcg/mL  4. Assessment: Patient's UOP improved today.  He received furosemide 20 mg iv x 1 and remains on LR at 150 ml/hr.  SCr improved to baseline.  No further issues with hypotension.  Follow up level this morning remains stable within therapeutic range.  5. Plan:  Continue current regimen with a recommended follow up level in 3-5 days unless otherwise indicated.    Isis Valverde, Ann.D., BCPS

## 2020-11-10 NOTE — PROGRESS NOTES
LIMB PRESERVATION SERVICE   POST SURGICAL PROGRESS NOTE      HPI:  Ronal Hair is a 75 y.o.  with a past medical history that includes tobacco abuse, lymphoma, previous history of toenail removal for ingrown toenails, admitted 11/3/2020 for toe osteomyelitis  Osteomyelitis of ankle or foot, acute, left (HCC).   LPS has been consulted for evaluation of left great toe SP left great toenail removal 10/21/2020 by PCP.    Patient reports he has previously had bilateral great toenails removed permanently about 8 years ago.  Ingrown toenail returned to left great toe.  It was removed on 10/21/2020 at Dr. Henriquez's office.  Patient reports he was treating the wound by applying a Neosporin, gauze, tape that he would change daily.  He did not notice any purulent drainage initially.  He did report that the toe was red post toenail removal but denied any edema.  Around 10/30/2020 he developed yellow-green drainage from the wound and edema.  His pain also worsened.  Patient experiencing majority of his pain to the medial aspect of the wound.  This is where he said the nail was deep in his toe.  He went to urgent care and was given a dose of Rocephin and discharged with Bactrim which he took until he followed up with his PCP on 11/3/2020.  Patient was sent to ED from PCPs office for further work-up and evaluation for possible osteomyelitis of left great toe.  Patient denies any fevers, chills, nausea, vomiting.  IV antibiotics were started on this admission.  Infectious diseases has been consulted.    Xray completed and suggestive of possible osteomyelitis.  MRI completed and report states that there is likely osteomyelitis  Ortho has not been consulted yet.    SURGERY DATE: 11/06/2020 by Dr. Morrison  PROCEDURE: 1.  Ultrasound-guided access bilateral common femoral arteries  2.  Angiogram bilateral lower extremities    SURGERY DATE: 11/08/2020 by Dr. Morrison  PROCEDURE: 1.  Aortobifemoral bypass using an 16 x 8 bifurcated  "graft      INTERVAL HISTORY:  11/10/2020: POD #2.  Patient denies fevers, chills, nausea, vomiting.  Pain well controlled.       PERTINENT LPS RESULTS:   none    SURGICAL SITE EXAM:      /61   Pulse 87   Temp 36.7 °C (98 °F) (Temporal)   Resp (!) 8   Ht 1.727 m (5' 7.99\")   Wt 90.1 kg (198 lb 10.2 oz)   SpO2 95%   BMI 30.21 kg/m²     Pedal Pulses: palpable pulses, doppler sounds strong  Sensation: intact  bilateral feet warm to touch      Wound 11/04/20 Full Thickness Wound Toe, Hallux Left nailbed (Active)   Wound Image   11/04/20 2052   Site Assessment Eschar 11/10/20 1000   Periwound Assessment Dry 11/10/20 1000   Margins Defined edges 11/10/20 1000   Closure Open to air 11/10/20 1000   Drainage Amount None 11/10/20 1000   Wound Cleansing Not Applicable 11/10/20 1000   Periwound Protectant Not Applicable 11/10/20 1000   Dressing Cleansing/Solutions 3% Betadine 11/10/20 1000   Dressing Options Open to Air 11/10/20 1000   Dressing Status Open to Air 11/10/20 1000   Shape toenail shape 11/10/20 1000   Wound Odor None 11/10/20 1000   Exposed Structures None 11/10/20 1000   WOUND NURSE ONLY - Time Spent with Patient (mins) 30 11/10/20 1000     Wound care: Betadine applied daily      INFECTION MANAGEMENT:    Results from last 7 days   Lab Units 11/10/20  0400 11/09/20  0435 11/08/20  1927 11/08/20  0322 11/06/20  0534 11/05/20  0902   WBC 1501 K/uL 7.7 7.5 7.6 3.4* 3.8* 3.5*   PLATELET COUNT 1518 K/uL 165 205 183 188 179 172     Wound culture results:   Results     Procedure Component Value Units Date/Time    BLOOD CULTURE [[id number]] Collected: 11/03/20 2115    Order Status: Completed Specimen: Blood from Peripheral Updated: 11/08/20 2300     Significant Indicator NEG     Source BLD     Site PERIPHERAL     Culture Result No growth after 5 days of incubation.    Narrative:      Per Hospital Policy: Only change Specimen Src: to \"Line\" if  specified by physician order.  Left Forearm/Arm    BLOOD CULTURE " "[[id number]] Collected: 11/03/20 2130    Order Status: Completed Specimen: Blood from Peripheral Updated: 11/08/20 2300     Significant Indicator NEG     Source BLD     Site PERIPHERAL     Culture Result No growth after 5 days of incubation.    Narrative:      Per Hospital Policy: Only change Specimen Src: to \"Line\" if  specified by physician order.  Right AC    SARS-CoV-2, PCR (In-House) [[id number]] Collected: 11/05/20 0745    Order Status: Completed Updated: 11/05/20 2119     SARS-CoV-2 Source NP Swab     SARS-CoV-2 by PCR NotDetected     Comment: [facility] providers: PLEASE REFER TO DE-ESCALATION AND RETESTING PROTOCOL  on [email or web address]  **The Roche SARS-CoV-2 PCR test has been made available for use under the  Emergency Use Authorization (EUA) only.         Narrative:      Is patient being admitted?->Yes  Does this patient meet criteria for Rush/Cepheid per [facility]  Inpatient Workflow? (See workflow link below)->No  Expected turn around time?->Routine (In-House PCR up to 24  hours)  Is this the patients First SARS CoV-2 test?->Unknown  Is this patient employed in healthcare?->No  Is the patient symptomatic as defined by the CDC?->No  Is the patient hospitalized?->Yes  Is the patient in the ICU?->No  Is the patient a resident in a congregate care setting?->No  Is the patient pregnant?->No    COVID/SARS CoV-2 PCR [[id number]] Collected: 11/05/20 0745    Order Status: Completed Specimen: Respirate from Nasopharyngeal Updated: 11/05/20 0752     COVID Order Status Received     Comment: The order for SARS CoV-2 testing has been received by the  Laboratory. This result is neither positive nor negative.  Final results of testing will report in 24-48 hours, separately.         Narrative:      Is patient being admitted?->Yes  Does this patient meet criteria for Rush/Cepheid per [facility]  Inpatient Workflow? (See workflow link below)->No  Expected turn around time?->Routine (In-House PCR up to 24  hours)  Is this the patients " First SARS CoV-2 test?->Unknown  Is this patient employed in healthcare?->No  Is the patient symptomatic as defined by the CDC?->No  Is the patient hospitalized?->Yes  Is the patient in the ICU?->No  Is the patient a resident in a congregate care setting?->No  Is the patient pregnant?->No               ASSESSMENT/PLAN:   POD #2 S/P aortobifemoral bypass by Dr. Morrison on 11/08/2020.    Right great toe is warm to touch as were bilateral feet.    Eschar/scab in place, dry.  Pain has diminished to the right great toe.  No surgery at this time, will continue with abx therapy and local wound care.       Wound care:   - wound care orders updated for nursing    Vascular status:   - palpable pulses and doppler wavelength heard    Antibiotics:   -ID: involved      Plan to return to O.R.:   -no further surgeries planned at this time      Weight Bearing Status:   -Weight bearing as tolerated    Offloading:   -None  -Orthotic company: none    PT/OT :   -involved, last seen 11/09      DISCHARGE PLAN:    Disposition: home with outpatient infusion therapy for abx therapy    Follow-up: Dr. Morrison for follow up and staples to be removed approximately 3 weeks post-op      Discussed with: pt, RN      Nicki Martinez, R.N.    If any questions or concerns, please contact LPS through voalte.

## 2020-11-10 NOTE — PROGRESS NOTES
[facility] [place] [facility]IC[facility] PROGRESS NOTE     Attending:   Shakira Henriquez MD    Resident:   Laurie Lock MD    PATIENT:   Ronal Hair; [id number]; 1945    ID:   75 y.o. male with PMH of peripheral vascular disease, heavy tobacco smoking and lymphoma admitted for osteomyelitis of left great toe, confirmed on MR      SUBJECTIVE:   Patient tolerated CLD yesterday, advancing to GI soft today.   Per nursing, he is wheezing. Consulted RT.  Patient still making poor urine output; however, he feels as though he needs to urinate. Pain well controlled     OBJECTIVE:  Vitals:    11/10/20 0100 11/10/20 0200 11/10/20 0300 11/10/20 0400   BP: 147/73 119/71 144/83 144/75   Pulse: 92 88 88 95   Resp: 20 18 16 (!) 22   Temp:  36.7 °C (98.1 °F)     TempSrc:  Temporal  Bladder   SpO2: 90% 94% 95% 93%   Weight:    90.1 kg (198 lb 10.2 oz)   Height:           Intake/Output Summary (Last 24 hours) at 11/7/2020 0617  Last data filed at 11/6/2020 1918  Gross per 24 hour   Intake 250 ml   Output --   Net 250 ml       PHYSICAL EXAM:  General: Pt resting in NAD, cooperative    Skin:  Pink, warm and dry.  No rashes.  Multiple scattered seborrheic keratoses across upper extremities but especially prominent on face. Prevena in place in groin bilaterally without any output.   HEENT: NC/AT. PERRL. EOMI. MMM. No nasal discharge.   Neck:  Supple without lymphadenopathy or rigidity. No JVD   Lungs:  Mild wheezing. NC in place 4L.  Cardiovascular:  S1/S2 tachycardic, no murmurs without M/R/G.  Abdomen: Abdomen is firm, rigid. +BS. No masses noted.  Genitourinary:  Deferred.    Extremities:  Full range of motion. No gross deformities noted. PT and DP 2+; erythema of L foot has completely cleared, swelling minimal, no pain.  CNS:  Muscle tone is normal. Cranial nerves II-XII grossly intact.       LABS:  Recent Labs     11/08/20  0322 11/08/20  1927 11/09/20  0435 11/10/20  0400   WBC 3.4* 7.6 7.5 7.7   RBC 4.26* 4.16* 4.30* 3.66*   HEMOGLOBIN 13.6*  13.4* 13.6* 11.7*   HEMATOCRIT 39.7* 38.8* 41.0* 35.9*   MCV 93.2 93.3 95.3 98.1*   MCH 31.9 32.2 31.6 32.0   RDW 43.2 44.0 45.6 46.5   PLATELETCT 188 183 205 165   MPV 9.4 9.7 9.6 9.5   NEUTSPOLYS 60.40  --  80.80* 82.40*   LYMPHOCYTES 19.90*  --  8.20* 6.20*   MONOCYTES 9.40  --  10.50 10.40   EOSINOPHILS 9.10*  --  0.10 0.50   BASOPHILS 0.90  --  0.10 0.10     Recent Labs     11/08/20  1927 11/09/20  0435 11/10/20  0400   SODIUM 132* 134* 137   POTASSIUM 4.3 4.7 4.5   CHLORIDE 99 100 102   CO2 22 22 26   BUN 15 20 26*   CREATININE 0.76 1.21 0.80   CALCIUM 8.8 8.7 9.0   ALBUMIN  --  3.5 3.1*     Estimated GFR/CRCL = Estimated Creatinine Clearance: 89.7 mL/min (by C-G formula based on SCr of 0.8 mg/dL).  Recent Labs     11/08/20  1927 11/09/20  0435 11/10/20  0400   GLUCOSE 165* 130* 125*     Recent Labs     11/09/20  0435 11/10/20  0400   ASTSGOT 60* 52*   ALTSGPT 59* 50   TBILIRUBIN 0.7 0.8   ALKPHOSPHAT 61 54   GLOBULIN 2.8 2.9             No results for input(s): INR, APTT, FIBRINOGEN in the last 72 hours.    Invalid input(s): DIMER    MICROBIOLOGY:   No results found for: BLOODCULTU, BLDCULT, BCHOLD     IMAGING:   DX-CHEST-PORTABLE (1 VIEW)   Final Result      Right central line projects over the SVC. No pneumothorax.      Cardiomegaly.      Mild interstitial prominence may represent mild edema or pneumonitis.         US-EXTREMITY ARTERY LOWER BILAT   Final Result      US-HUGO SINGLE LEVEL BILAT   Final Result      MR-FOOT-WITH & W/O LEFT   Final Result      1.  There are edema and mild enhancement of the tip of the first distal phalanx likely representing osteomyelitis.      2.  Cellulitis.      DX-TOE(S) 2+ LEFT   Final Result         1.  No acute traumatic bony injury.   2.  Slight permeative appearance of the medial aspect of the first toe distal phalanx, could represent subtle changes of osteomyelitis.      IR-ABDOMINAL AORTOGRAM    (Results Pending)       CULTURES:   Results     Procedure Component Value  "Units Date/Time    BLOOD CULTURE [[id number]] Collected: 11/03/20 2115    Order Status: Completed Specimen: Blood from Peripheral Updated: 11/08/20 2300     Significant Indicator NEG     Source BLD     Site PERIPHERAL     Culture Result No growth after 5 days of incubation.    Narrative:      Per Hospital Policy: Only change Specimen Src: to \"Line\" if  specified by physician order.  Left Forearm/Arm    BLOOD CULTURE [[id number]] Collected: 11/03/20 2130    Order Status: Completed Specimen: Blood from Peripheral Updated: 11/08/20 2300     Significant Indicator NEG     Source BLD     Site PERIPHERAL     Culture Result No growth after 5 days of incubation.    Narrative:      Per Hospital Policy: Only change Specimen Src: to \"Line\" if  specified by physician order.  Right AC    SARS-CoV-2, PCR (In-House) [[id number]] Collected: 11/05/20 0745    Order Status: Completed Updated: 11/05/20 2119     SARS-CoV-2 Source NP Swab     SARS-CoV-2 by PCR NotDetected     Comment: [facility] providers: PLEASE REFER TO DE-ESCALATION AND RETESTING PROTOCOL  on [email or web address]  **The Roche SARS-CoV-2 PCR test has been made available for use under the  Emergency Use Authorization (EUA) only.         Narrative:      Is patient being admitted?->Yes  Does this patient meet criteria for Rush/Cepheid per [facility]  Inpatient Workflow? (See workflow link below)->No  Expected turn around time?->Routine (In-House PCR up to 24  hours)  Is this the patients First SARS CoV-2 test?->Unknown  Is this patient employed in healthcare?->No  Is the patient symptomatic as defined by the CDC?->No  Is the patient hospitalized?->Yes  Is the patient in the ICU?->No  Is the patient a resident in a congregate care setting?->No  Is the patient pregnant?->No    COVID/SARS CoV-2 PCR [[id number]] Collected: 11/05/20 0745    Order Status: Completed Specimen: Respirate from Nasopharyngeal Updated: 11/05/20 0752     COVID Order Status Received     Comment: The order for SARS " CoV-2 testing has been received by the  Laboratory. This result is neither positive nor negative.  Final results of testing will report in 24-48 hours, separately.         Narrative:      Is patient being admitted?->Yes  Does this patient meet criteria for Rush/Cepheid per [facility]  Inpatient Workflow? (See workflow link below)->No  Expected turn around time?->Routine (In-House PCR up to 24  hours)  Is this the patients First SARS CoV-2 test?->Unknown  Is this patient employed in healthcare?->No  Is the patient symptomatic as defined by the CDC?->No  Is the patient hospitalized?->Yes  Is the patient in the ICU?->No  Is the patient a resident in a congregate care setting?->No  Is the patient pregnant?->No          MEDS:  Current Facility-Administered Medications   Medication Last Admin   • metoprolol (LOPRESSOR) tablet 25 mg     • morphine (pf) 4 mg/mL injection 2-3 mg     • ondansetron (ZOFRAN) syringe/vial injection 4 mg     • dexamethasone (DECADRON) injection 4 mg     • diphenhydrAMINE (BENADRYL) injection 25 mg     • haloperidol lactate (HALDOL) injection 1 mg     • scopolamine (TRANSDERM-SCOP) patch 1 Patch     • HYDROmorphone (DILAUDID) 20 mcg/mL, ropivacaine (NAROPIN) 0.1 % in  mL epidural infusion Rate Verify at 11/09/20 2121   • Pharmacy Consult Request ...Pain Management Review 1 Each     • clevidipine (CLEVIPREX) IV emulsion Stopped at 11/08/20 1645   • lactated ringers infusion New Bag at 11/09/20 1726   • vancomycin (VANCOCIN) 1,000 mg in  mL IVPB 1,000 mg at 11/10/20 0451   • senna-docusate (PERICOLACE or SENOKOT S) 8.6-50 MG per tablet 2 Tab 2 Tab at 11/10/20 0452    And   • polyethylene glycol/lytes (MIRALAX) PACKET 1 Packet      And   • magnesium hydroxide (MILK OF MAGNESIA) suspension 30 mL      And   • bisacodyl (DULCOLAX) suppository 10 mg     • acetaminophen (TYLENOL) tablet 650 mg     • nicotine (NICODERM) 21 MG/24HR 21 mg 21 mg at 11/10/20 0451    And   • nicotine polacrilex  (NICORETTE) 2 MG piece 2 mg     • atorvastatin (LIPITOR) tablet 40 mg 40 mg at 11/09/20 1726   • cefTRIAXone (ROCEPHIN) 2 g in  mL IVPB Stopped at 11/10/20 0521   • MD Alert...Vancomycin per Pharmacy     • ondansetron (ZOFRAN ODT) dispertab 4 mg     • oxyCODONE-acetaminophen (PERCOCET) 5-325 MG per tablet 1-2 Tab 2 Tab at 11/07/20 2232       PROBLEM LIST:  No problems updated.    ASSESSMENT/PLAN: 75 y.o.  male with PMH of peripheral vascular disease, heavy tobacco smoking and lymphoma admitted for osteomyelitis of left great toe, confirmed on MR. Now transferred to ICU to recover from aortobifemoral bypass on 11/8.      # Left great toe osteomyelitis  # Left great toe pain  # Cellulitis, failed OP therapy   Patient with worsening pain and swelling of left great toe since outpatient toenail removal October 21. This, in spite of one dose of intramuscular ceftriaxone at urgent care, followed by oral Bactrim at home.  MRI impression: There are edema and mild enhancement of the tip of the first distal phalanx likely representing osteomyelitis. Cellulitis.  Day 8 of Vancomycin and ceftriaxone empiric antibiotic therapy.   Swelling appears to have reduced as well as redness  - 11/10: ID cleared patient to be discharged on PO Bactrim 1 double strength tablet twice daily and Augmentin 875 mg twice daily until 12/20/20     # Severe peripheral vascular disease  # s/p aortobifemoral bypass 11/8  Limb preservation services consulted   HUGO showing monophasic waveforms at the femoral, popliteal, and tibial and peroneal arteries, severly reduced amplitude. Indicate significant disease in the aorto iliac segment. HUGO of 0.51 in RLE, 0.35 in LLE, indicating severe PVD.  Prevenas in place on groin bilaterally from ABF revascularization, with minimal output.  - Vascular surgery following; await their recommendations for when to bear weight. Will do PT/OT after. Currently on bed rest.  - Post-operatively, he's on dilaudid epidural  for pain control per surgery. Anesthesia protocol to continue for 3 days, surgery agrees to continue today 11/10 with plan to DC tomorrow. Previously, on Percocet as needed for pain control, IV morphine PRN breakthrough  - Zofran for nausea    # Tachycardia  # HTN  Post-operatively.   - Continue Metoprolol 25mg q8h added on by ICU team 11/10    # Oliguric  # Abdominal rigidity  # Volume overload  700L output on 11/9 with 4700 input, so patient is up 4L. Cr has remained normal at 0.8. Bladder scan of 0mL.  Echo shows EF of 65%. No hx of CHF.  Abdominal exam consistent with exam pre-operatively. Persistently rigid today. Mild tenderness in suprapubic area.  Patient could be third spacing fluids as he's net positive. Unclear why.  CXR shows hypo-ventilatory chest with increased L midlung zone opacity possibly representing atelectasis  S/p one dose of Lasix this AM - patient's urine output much improved.  Oncotic pressure presumably okay with albumin being wnl.   Abdominal compartment syndrome as a consideration, but will watch and wait to see how things progress.  - Continue with incentive spirometer   - Continue cancino  - If he continues to retain, consider additional Lasix dose tomorrow  - Stopped IVF this AM; encouraged patient to increase PO liquids  - Monitor closely in ICU    # Normocytic Anemia  Likely post-operative blood loss. 13.4>>11.7.  - Continue to monitor    # Mild respiratory distress   Wheezing on exam. Not on home O2, now requiring 4L in ICU. Hx of excessive smoking; never been formally diagnosed with COPD, however very likely that pulmonary disease is at play  - Order RT treatment for scheduled duonebs  - Wean off O2 as appropriate     #Nicotine dependence  Patient is a longtime smoker, currently smoking 3 packs/day.  - Nicotine patch will be provided at patient's request  - Smoking cessation counseling will be provided to the patient     VTE PPx: Heparin   Abx: C3 and  Vancomycin   Lines/Tubes: PIV  Fluids: LR  Diet: NPO  Code Status: Full      Disposition:  Transfer to floor tomorrow. Continue inpatient pending recovery from revascularization and plan for OP treatment for osteomyelitis with PO Abx.    - Follow-up in ID clinic post discharge  - Follow up with vascular surgeon post-discharge    Laurie Lock MD   PGY-1 Family Medicine Resident   [facility]

## 2020-11-10 NOTE — PROGRESS NOTES
Pt HR remains above 70 with BP of 114/60, orders for cleviprex not started due to BP. Dr. Montano made aware and gives orders to start metoprolol PO in one hour if HR remains above 85. New HR parameter <85.

## 2020-11-10 NOTE — PROGRESS NOTES
Trauma / Surgical Daily Progress Note    Date of Service  11/10/2020    Chief Complaint  75 y.o. male admitted 11/3/2020 with toe osteomyelitis    Interval Events  Distal perfusion remains intact.  Abdomen is softer  Initiate clear liquids  Fluid balance positive diuresing  Mobilize  Respiratory protocol initiated  Lovenox    Review of Systems  Review of Systems   Respiratory: Positive for shortness of breath.    Gastrointestinal: Positive for abdominal distention and abdominal pain.   Musculoskeletal: Positive for arthralgias and back pain.   All other systems reviewed and are negative.       Vital Signs for last 24 hours  Temp:  [36.5 °C (97.7 °F)-36.7 °C (98.1 °F)] 36.6 °C (97.8 °F)  Pulse:  [] 81  Resp:  [8-31] 12  BP: (114-149)/() 127/73  SpO2:  [88 %-98 %] 96 %    Hemodynamic parameters for last 24 hours       Respiratory Data     Respiration: 12, Pulse Oximetry: 96 %        RUL Breath Sounds: Clear, RML Breath Sounds: Clear;Diminished, RLL Breath Sounds: Diminished, JOSE Breath Sounds: Clear, LLL Breath Sounds: Diminished    Physical Exam  Physical Exam  Vitals signs and nursing note reviewed.   HENT:      Mouth/Throat:      Mouth: Mucous membranes are moist.   Eyes:      Extraocular Movements: Extraocular movements intact.      Pupils: Pupils are equal, round, and reactive to light.   Neck:      Musculoskeletal: Neck supple.      Comments: Left IJ catheter  Cardiovascular:      Rate and Rhythm: Normal rate and regular rhythm.      Heart sounds: Normal heart sounds.   Pulmonary:      Effort: Pulmonary effort is normal.      Breath sounds: Normal breath sounds.   Abdominal:      Palpations: Abdomen is soft.      Tenderness: There is no abdominal tenderness.      Comments: Abdominal surgical site dressed  Bilateral inguinal provenas    Musculoskeletal: Normal range of motion.      Left lower leg: Edema present.      Comments: Left great toe erythema resolving.  Palpable pedal pulses present.   Resolving edema   Skin:     General: Skin is warm and dry.      Capillary Refill: Capillary refill takes 2 to 3 seconds.   Neurological:      General: No focal deficit present.      Mental Status: He is alert and oriented to person, place, and time.   Psychiatric:         Mood and Affect: Mood normal.      Comments: Chatty         Laboratory  Recent Results (from the past 24 hour(s))   VANCOMYCIN TROUGH LEVEL    Collection Time: 11/10/20  4:00 AM   Result Value Ref Range    Vancomycin Trough 10.5 10.0 - 20.0 ug/mL   CBC WITH DIFFERENTIAL    Collection Time: 11/10/20  4:00 AM   Result Value Ref Range    WBC 7.7 4.8 - 10.8 K/uL    RBC 3.66 (L) 4.70 - 6.10 M/uL    Hemoglobin 11.7 (L) 14.0 - 18.0 g/dL    Hematocrit 35.9 (L) 42.0 - 52.0 %    MCV 98.1 (H) 81.4 - 97.8 fL    MCH 32.0 27.0 - 33.0 pg    MCHC 32.6 (L) 33.7 - 35.3 g/dL    RDW 46.5 35.9 - 50.0 fL    Platelet Count 165 164 - 446 K/uL    MPV 9.5 9.0 - 12.9 fL    Neutrophils-Polys 82.40 (H) 44.00 - 72.00 %    Lymphocytes 6.20 (L) 22.00 - 41.00 %    Monocytes 10.40 0.00 - 13.40 %    Eosinophils 0.50 0.00 - 6.90 %    Basophils 0.10 0.00 - 1.80 %    Immature Granulocytes 0.40 0.00 - 0.90 %    Nucleated RBC 0.00 /100 WBC    Neutrophils (Absolute) 6.33 1.82 - 7.42 K/uL    Lymphs (Absolute) 0.48 (L) 1.00 - 4.80 K/uL    Monos (Absolute) 0.80 0.00 - 0.85 K/uL    Eos (Absolute) 0.04 0.00 - 0.51 K/uL    Baso (Absolute) 0.01 0.00 - 0.12 K/uL    Immature Granulocytes (abs) 0.03 0.00 - 0.11 K/uL    NRBC (Absolute) 0.00 K/uL   Comp Metabolic Panel    Collection Time: 11/10/20  4:00 AM   Result Value Ref Range    Sodium 137 135 - 145 mmol/L    Potassium 4.5 3.6 - 5.5 mmol/L    Chloride 102 96 - 112 mmol/L    Co2 26 20 - 33 mmol/L    Anion Gap 9.0 7.0 - 16.0    Glucose 125 (H) 65 - 99 mg/dL    Bun 26 (H) 8 - 22 mg/dL    Creatinine 0.80 0.50 - 1.40 mg/dL    Calcium 9.0 8.5 - 10.5 mg/dL    AST(SGOT) 52 (H) 12 - 45 U/L    ALT(SGPT) 50 2 - 50 U/L    Alkaline Phosphatase 54 30 - 99 U/L     Total Bilirubin 0.8 0.1 - 1.5 mg/dL    Albumin 3.1 (L) 3.2 - 4.9 g/dL    Total Protein 6.0 6.0 - 8.2 g/dL    Globulin 2.9 1.9 - 3.5 g/dL    A-G Ratio 1.1 g/dL   ESTIMATED GFR    Collection Time: 11/10/20  4:00 AM   Result Value Ref Range    GFR If African American >60 >60 mL/min/1.73 m 2    GFR If Non African American >60 >60 mL/min/1.73 m 2   EC-ECHOCARDIOGRAM COMPLETE W/O CONT    Collection Time: 11/10/20  8:12 AM   Result Value Ref Range    Eject.Frac. MOD BP 63.63     Eject.Frac. MOD 4C 65.88     Eject.Frac. MOD 2C 62.44     Left Ventrical Ejection Fraction 65        Fluids    Intake/Output Summary (Last 24 hours) at 11/10/2020 1426  Last data filed at 11/10/2020 1400  Gross per 24 hour   Intake 3854 ml   Output 1570 ml   Net 2284 ml       Core Measures & Quality Metrics  Labs reviewed, Medications reviewed and Radiology images reviewed  Martell catheter: Critically Ill - Requiring Accurate Measurement of Urinary Output  Central line in place: Need for access    DVT Prophylaxis: Enoxaparin (Lovenox)  DVT prophylaxis - mechanical: SCDs  Ulcer prophylaxis: Yes        JUAN DANIEL Score  ETOH Screening    Assessment/Plan  Aortoiliac occlusive disease (HCC)  Assessment & Plan  11/8 Aortobifemoral bypass.  Minimal blood loss.  Extubated post operatively.  Serial Hct. Monitor urine output  Dr. Morrison        Discussed patient condition with RN, RT, Pharmacy and Patient.  CRITICAL CARE TIME EXCLUDING PROCEDURES: 35    Minutes  I independently reviewed pertinent clinical lab tests from the last 48 hours and ordered additional follow up clinical lab tests.  I independently reviewed pertinent radiographic images and the radiologist's reports from the last 48 hours and ordered additional follow up radiographic studies.  I reviewed the details of the available patient records and documentation by consulting physicians in EPIC up to today, summated the information, and utilized the information as warranted in today's medical  decision making for this patient.  I personally evaluated the patient condition at bedside and discussed the daily plan(s) with available nursing staff, dieticians, social workers, pharmacists on rounds.  I am actively managing this patient based on my personal bedside evaluation of this patient's radiographic, and laboratory findings and clinical changes throughout the day.  Critical care interventions include optimization of fluid balance, correction of electrolyte abnormalities and imbalances, respiratory assessment and support increasing oxygen requirements  Respiratory status is unstable with risk for deterioration  Continue ICU

## 2020-11-11 ENCOUNTER — APPOINTMENT (OUTPATIENT)
Dept: RADIOLOGY | Facility: MEDICAL CENTER | Age: 75
DRG: 271 | End: 2020-11-11
Attending: STUDENT IN AN ORGANIZED HEALTH CARE EDUCATION/TRAINING PROGRAM
Payer: MEDICARE

## 2020-11-11 PROBLEM — M86.9 OSTEOMYELITIS OF LEFT FOOT (HCC): Status: ACTIVE | Noted: 2020-11-11

## 2020-11-11 PROBLEM — Z78.9 NO CONTRAINDICATION TO DEEP VEIN THROMBOSIS (DVT) PROPHYLAXIS: Status: ACTIVE | Noted: 2020-11-11

## 2020-11-11 PROBLEM — I73.9 PERIPHERAL ARTERIAL DISEASE (HCC): Status: ACTIVE | Noted: 2020-11-11

## 2020-11-11 PROBLEM — F17.200 TOBACCO DEPENDENCE: Status: ACTIVE | Noted: 2020-11-11

## 2020-11-11 PROBLEM — I71.40 ABDOMINAL AORTIC ANEURYSM (AAA) WITHOUT RUPTURE (HCC): Status: ACTIVE | Noted: 2020-11-11

## 2020-11-11 PROBLEM — L03.032 CELLULITIS OF GREAT TOE OF LEFT FOOT: Status: ACTIVE | Noted: 2020-11-11

## 2020-11-11 PROBLEM — I74.5 BILATERAL ILIAC ARTERY OCCLUSION (HCC): Status: ACTIVE | Noted: 2020-11-08

## 2020-11-11 LAB
ANION GAP SERPL CALC-SCNC: 10 MMOL/L (ref 7–16)
BASOPHILS # BLD AUTO: 0.3 % (ref 0–1.8)
BASOPHILS # BLD: 0.02 K/UL (ref 0–0.12)
BUN SERPL-MCNC: 26 MG/DL (ref 8–22)
CALCIUM SERPL-MCNC: 9 MG/DL (ref 8.5–10.5)
CHLORIDE SERPL-SCNC: 102 MMOL/L (ref 96–112)
CO2 SERPL-SCNC: 29 MMOL/L (ref 20–33)
CREAT SERPL-MCNC: 0.66 MG/DL (ref 0.5–1.4)
EOSINOPHIL # BLD AUTO: 0.28 K/UL (ref 0–0.51)
EOSINOPHIL NFR BLD: 4.1 % (ref 0–6.9)
ERYTHROCYTE [DISTWIDTH] IN BLOOD BY AUTOMATED COUNT: 45.1 FL (ref 35.9–50)
GLUCOSE SERPL-MCNC: 99 MG/DL (ref 65–99)
HCT VFR BLD AUTO: 33.6 % (ref 42–52)
HGB BLD-MCNC: 11.2 G/DL (ref 14–18)
IMM GRANULOCYTES # BLD AUTO: 0.03 K/UL (ref 0–0.11)
IMM GRANULOCYTES NFR BLD AUTO: 0.4 % (ref 0–0.9)
LYMPHOCYTES # BLD AUTO: 0.65 K/UL (ref 1–4.8)
LYMPHOCYTES NFR BLD: 9.5 % (ref 22–41)
MCH RBC QN AUTO: 32.4 PG (ref 27–33)
MCHC RBC AUTO-ENTMCNC: 33.3 G/DL (ref 33.7–35.3)
MCV RBC AUTO: 97.1 FL (ref 81.4–97.8)
MONOCYTES # BLD AUTO: 0.72 K/UL (ref 0–0.85)
MONOCYTES NFR BLD AUTO: 10.5 % (ref 0–13.4)
NEUTROPHILS # BLD AUTO: 5.16 K/UL (ref 1.82–7.42)
NEUTROPHILS NFR BLD: 75.2 % (ref 44–72)
NRBC # BLD AUTO: 0 K/UL
NRBC BLD-RTO: 0 /100 WBC
PLATELET # BLD AUTO: 182 K/UL (ref 164–446)
PMV BLD AUTO: 10 FL (ref 9–12.9)
POTASSIUM SERPL-SCNC: 4.2 MMOL/L (ref 3.6–5.5)
RBC # BLD AUTO: 3.46 M/UL (ref 4.7–6.1)
SODIUM SERPL-SCNC: 141 MMOL/L (ref 135–145)
WBC # BLD AUTO: 6.9 K/UL (ref 4.8–10.8)

## 2020-11-11 PROCEDURE — 700105 HCHG RX REV CODE 258: Performed by: INTERNAL MEDICINE

## 2020-11-11 PROCEDURE — A9270 NON-COVERED ITEM OR SERVICE: HCPCS | Performed by: SURGERY

## 2020-11-11 PROCEDURE — A9270 NON-COVERED ITEM OR SERVICE: HCPCS | Performed by: STUDENT IN AN ORGANIZED HEALTH CARE EDUCATION/TRAINING PROGRAM

## 2020-11-11 PROCEDURE — 700105 HCHG RX REV CODE 258: Performed by: STUDENT IN AN ORGANIZED HEALTH CARE EDUCATION/TRAINING PROGRAM

## 2020-11-11 PROCEDURE — 51798 US URINE CAPACITY MEASURE: CPT

## 2020-11-11 PROCEDURE — 700102 HCHG RX REV CODE 250 W/ 637 OVERRIDE(OP): Performed by: SURGERY

## 2020-11-11 PROCEDURE — 700101 HCHG RX REV CODE 250: Performed by: STUDENT IN AN ORGANIZED HEALTH CARE EDUCATION/TRAINING PROGRAM

## 2020-11-11 PROCEDURE — 700102 HCHG RX REV CODE 250 W/ 637 OVERRIDE(OP): Performed by: STUDENT IN AN ORGANIZED HEALTH CARE EDUCATION/TRAINING PROGRAM

## 2020-11-11 PROCEDURE — 700105 HCHG RX REV CODE 258: Performed by: ANESTHESIOLOGY

## 2020-11-11 PROCEDURE — 76937 US GUIDE VASCULAR ACCESS: CPT

## 2020-11-11 PROCEDURE — 770006 HCHG ROOM/CARE - MED/SURG/GYN SEMI*

## 2020-11-11 PROCEDURE — 85025 COMPLETE CBC W/AUTO DIFF WBC: CPT

## 2020-11-11 PROCEDURE — 99232 SBSQ HOSP IP/OBS MODERATE 35: CPT | Performed by: SURGERY

## 2020-11-11 PROCEDURE — 700111 HCHG RX REV CODE 636 W/ 250 OVERRIDE (IP): Performed by: INTERNAL MEDICINE

## 2020-11-11 PROCEDURE — 700111 HCHG RX REV CODE 636 W/ 250 OVERRIDE (IP): Performed by: ANESTHESIOLOGY

## 2020-11-11 PROCEDURE — 80048 BASIC METABOLIC PNL TOTAL CA: CPT

## 2020-11-11 PROCEDURE — 700111 HCHG RX REV CODE 636 W/ 250 OVERRIDE (IP): Performed by: STUDENT IN AN ORGANIZED HEALTH CARE EDUCATION/TRAINING PROGRAM

## 2020-11-11 RX ORDER — BISACODYL 10 MG
10 SUPPOSITORY, RECTAL RECTAL
Status: DISCONTINUED | OUTPATIENT
Start: 2020-11-11 | End: 2020-11-13

## 2020-11-11 RX ORDER — AMOXICILLIN 250 MG
2 CAPSULE ORAL 2 TIMES DAILY
Status: DISCONTINUED | OUTPATIENT
Start: 2020-11-11 | End: 2020-11-13

## 2020-11-11 RX ORDER — POLYETHYLENE GLYCOL 3350 17 G/17G
1 POWDER, FOR SOLUTION ORAL DAILY
Status: DISCONTINUED | OUTPATIENT
Start: 2020-11-12 | End: 2020-11-13

## 2020-11-11 RX ADMIN — DOCUSATE SODIUM 50 MG AND SENNOSIDES 8.6 MG 2 TABLET: 8.6; 5 TABLET, FILM COATED ORAL at 17:23

## 2020-11-11 RX ADMIN — HYDROMORPHONE HYDROCHLORIDE: 1 INJECTION, SOLUTION INTRAMUSCULAR; INTRAVENOUS; SUBCUTANEOUS at 05:23

## 2020-11-11 RX ADMIN — CEFTRIAXONE SODIUM 2 G: 2 INJECTION, POWDER, FOR SOLUTION INTRAMUSCULAR; INTRAVENOUS at 07:25

## 2020-11-11 RX ADMIN — ATORVASTATIN CALCIUM 40 MG: 40 TABLET, FILM COATED ORAL at 17:23

## 2020-11-11 RX ADMIN — NICOTINE TRANSDERMAL SYSTEM 21 MG: 21 PATCH, EXTENDED RELEASE TRANSDERMAL at 05:28

## 2020-11-11 RX ADMIN — METOPROLOL TARTRATE 25 MG: 25 TABLET, FILM COATED ORAL at 14:51

## 2020-11-11 RX ADMIN — DOCUSATE SODIUM 50 MG AND SENNOSIDES 8.6 MG 2 TABLET: 8.6; 5 TABLET, FILM COATED ORAL at 05:28

## 2020-11-11 RX ADMIN — VANCOMYCIN HYDROCHLORIDE 1000 MG: 500 INJECTION, POWDER, LYOPHILIZED, FOR SOLUTION INTRAVENOUS at 19:16

## 2020-11-11 RX ADMIN — BISACODYL 10 MG: 10 SUPPOSITORY RECTAL at 07:46

## 2020-11-11 RX ADMIN — OXYCODONE HYDROCHLORIDE AND ACETAMINOPHEN 1 TABLET: 5; 325 TABLET ORAL at 12:43

## 2020-11-11 RX ADMIN — VANCOMYCIN HYDROCHLORIDE 1000 MG: 500 INJECTION, POWDER, LYOPHILIZED, FOR SOLUTION INTRAVENOUS at 04:07

## 2020-11-11 RX ADMIN — POLYETHYLENE GLYCOL 3350 1 PACKET: 17 POWDER, FOR SOLUTION ORAL at 07:46

## 2020-11-11 RX ADMIN — METOPROLOL TARTRATE 25 MG: 25 TABLET, FILM COATED ORAL at 21:35

## 2020-11-11 RX ADMIN — METOPROLOL TARTRATE 25 MG: 25 TABLET, FILM COATED ORAL at 05:28

## 2020-11-11 ASSESSMENT — ENCOUNTER SYMPTOMS
FEVER: 0
VOMITING: 0
DIZZINESS: 0
ABDOMINAL PAIN: 1
CHILLS: 0
SHORTNESS OF BREATH: 0
SPEECH CHANGE: 0
NAUSEA: 0
CONSTIPATION: 0

## 2020-11-11 ASSESSMENT — PATIENT HEALTH QUESTIONNAIRE - PHQ9
2. FEELING DOWN, DEPRESSED, IRRITABLE, OR HOPELESS: NOT AT ALL
SUM OF ALL RESPONSES TO PHQ9 QUESTIONS 1 AND 2: 0
1. LITTLE INTEREST OR PLEASURE IN DOING THINGS: NOT AT ALL

## 2020-11-11 ASSESSMENT — FIBROSIS 4 INDEX: FIB4 SCORE: 2.95

## 2020-11-11 NOTE — PROGRESS NOTES
[facility] [place] [facility] [place]GRESS [place]     Attending:   Shakira Henriquez MD    Resident:   Laurie Lock MD    PATIENT:   Ronal Hair; [id number]; [id number]    ID:   75 y.o. male with PMH of peripheral vascular disease, heavy tobacco smoking and lymphoma admitted for osteomyelitis of left great toe, confirmed on MR      SUBJECTIVE:   Patient tolerating soft bite sized diet.  Urine output of 1450 with 1050 in.   One small bowel movement with suppository. Still complains of some abdominal pain, feels backed up. No pain otherwise.    OBJECTIVE:  Vitals:    11/11/20 0100 11/11/20 0200 11/11/20 0300 11/11/20 0400   BP: 155/72 135/68 158/78 132/68   Pulse: 79 82 84 93   Resp: 16 12 18 19   Temp:  36.8 °C (98.3 °F)  37.1 °C (98.8 °F)   TempSrc:  Temporal  Temporal   SpO2: 96% 97% 97% 97%   Weight:       Height:             Intake/Output Summary (Last 24 hours) at 11/11/2020 0540  Last data filed at 11/11/2020 0200  Gross per 24 hour   Intake 1362 ml   Output 1600 ml   Net -238 ml       PHYSICAL EXAM:  General: Pt resting in NAD, cooperative    Skin:  Pink, warm and dry.  No rashes.  Multiple scattered seborrheic keratoses across upper extremities but especially prominent on face. Prevena in place in groin bilaterally without any output.   HEENT: NC/AT. PERRL. EOMI. MMM. No nasal discharge.   Neck:  Supple without lymphadenopathy or rigidity. No JVD   Lungs:  Improved wheezing. NC in place 4L.  Cardiovascular:  S1/S2 tachycardic, no murmurs without M/R/G.  Abdomen: Abdomen is firm, rigid. +BS. No masses noted.  Genitourinary:  Deferred.    Extremities:  Full range of motion. No gross deformities noted. PT and DP 2+; erythema of L foot has completely cleared, swelling minimal, no pain.  CNS:  Muscle tone is normal. Cranial nerves II-XII grossly intact.       LABS:  Recent Labs     11/09/20  0435 11/10/20  0400 11/11/20  0400   WBC 7.5 7.7 6.9   RBC 4.30* 3.66* 3.46*   HEMOGLOBIN 13.6* 11.7* 11.2*   HEMATOCRIT 41.0* 35.9*  33.6*   MCV 95.3 98.1* 97.1   MCH 31.6 32.0 32.4   RDW 45.6 46.5 45.1   PLATELETCT 205 165 182   MPV 9.6 9.5 10.0   NEUTSPOLYS 80.80* 82.40* 75.20*   LYMPHOCYTES 8.20* 6.20* 9.50*   MONOCYTES 10.50 10.40 10.50   EOSINOPHILS 0.10 0.50 4.10   BASOPHILS 0.10 0.10 0.30     Recent Labs     11/09/20  0435 11/10/20  0400 11/11/20  0400   SODIUM 134* 137 141   POTASSIUM 4.7 4.5 4.2   CHLORIDE 100 102 102   CO2 22 26 29   BUN 20 26* 26*   CREATININE 1.21 0.80 0.66   CALCIUM 8.7 9.0 9.0   ALBUMIN 3.5 3.1*  --      Estimated GFR/CRCL = Estimated Creatinine Clearance: 108.7 mL/min (by C-G formula based on SCr of 0.66 mg/dL).  Recent Labs     11/09/20  0435 11/10/20  0400 11/11/20  0400   GLUCOSE 130* 125* 99     Recent Labs     11/09/20  0435 11/10/20  0400   ASTSGOT 60* 52*   ALTSGPT 59* 50   TBILIRUBIN 0.7 0.8   ALKPHOSPHAT 61 54   GLOBULIN 2.8 2.9             No results for input(s): INR, APTT, FIBRINOGEN in the last 72 hours.    Invalid input(s): DIMER    MICROBIOLOGY:   No results found for: BLOODCULTU, BLDCULT, BCHOLD     IMAGING:   DX-CHEST-PORTABLE (1 VIEW)   Final Result      Hypoventilatory chest with some increasing left midlung zone opacity suspicious for atelectasis. Consolidation cannot be excluded      EC-ECHOCARDIOGRAM COMPLETE W/O CONT   Final Result      DX-CHEST-PORTABLE (1 VIEW)   Final Result      Right central line projects over the SVC. No pneumothorax.      Cardiomegaly.      Mild interstitial prominence may represent mild edema or pneumonitis.         US-EXTREMITY ARTERY LOWER BILAT   Final Result      US-HUGO SINGLE LEVEL BILAT   Final Result      MR-FOOT-WITH & W/O LEFT   Final Result      1.  There are edema and mild enhancement of the tip of the first distal phalanx likely representing osteomyelitis.      2.  Cellulitis.      DX-TOE(S) 2+ LEFT   Final Result         1.  No acute traumatic bony injury.   2.  Slight permeative appearance of the medial aspect of the first toe distal phalanx, could  "represent subtle changes of osteomyelitis.      IR-ABDOMINAL AORTOGRAM    (Results Pending)       CULTURES:   Results     Procedure Component Value Units Date/Time    BLOOD CULTURE [[id number]] Collected: 11/03/20 2115    Order Status: Completed Specimen: Blood from Peripheral Updated: 11/08/20 2300     Significant Indicator NEG     Source BLD     Site PERIPHERAL     Culture Result No growth after 5 days of incubation.    Narrative:      Per Hospital Policy: Only change Specimen Src: to \"Line\" if  specified by physician order.  Left Forearm/Arm    BLOOD CULTURE [[id number]] Collected: 11/03/20 2130    Order Status: Completed Specimen: Blood from Peripheral Updated: 11/08/20 2300     Significant Indicator NEG     Source BLD     Site PERIPHERAL     Culture Result No growth after 5 days of incubation.    Narrative:      Per Hospital Policy: Only change Specimen Src: to \"Line\" if  specified by physician order.  Right AC    SARS-CoV-2, PCR (In-House) [[id number]] Collected: 11/05/20 0745    Order Status: Completed Updated: 11/05/20 2119     SARS-CoV-2 Source NP Swab     SARS-CoV-2 by PCR NotDetected     Comment: [facility] providers: PLEASE REFER TO DE-ESCALATION AND RETESTING PROTOCOL  on [email or web address]  **The Roche SARS-CoV-2 PCR test has been made available for use under the  Emergency Use Authorization (EUA) only.         Narrative:      Is patient being admitted?->Yes  Does this patient meet criteria for Rush/Cepheid per [facility]  Inpatient Workflow? (See workflow link below)->No  Expected turn around time?->Routine (In-House PCR up to 24  hours)  Is this the patients First SARS CoV-2 test?->Unknown  Is this patient employed in healthcare?->No  Is the patient symptomatic as defined by the CDC?->No  Is the patient hospitalized?->Yes  Is the patient in the ICU?->No  Is the patient a resident in a congregate care setting?->No  Is the patient pregnant?->No    COVID/SARS CoV-2 PCR [[id number]] Collected: 11/05/20 0745    Order " Status: Completed Specimen: Respirate from Nasopharyngeal Updated: 11/05/20 9759     COVID Order Status Received     Comment: The order for SARS CoV-2 testing has been received by the  Laboratory. This result is neither positive nor negative.  Final results of testing will report in 24-48 hours, separately.         Narrative:      Is patient being admitted?->Yes  Does this patient meet criteria for Rush/Cepheid per [facility]  Inpatient Workflow? (See workflow link below)->No  Expected turn around time?->Routine (In-House PCR up to 24  hours)  Is this the patients First SARS CoV-2 test?->Unknown  Is this patient employed in healthcare?->No  Is the patient symptomatic as defined by the CDC?->No  Is the patient hospitalized?->Yes  Is the patient in the ICU?->No  Is the patient a resident in a congregate care setting?->No  Is the patient pregnant?->No          MEDS:  Current Facility-Administered Medications   Medication Last Admin   • metoprolol (LOPRESSOR) tablet 25 mg 25 mg at 11/11/20 0528   • enoxaparin (LOVENOX) inj 40 mg 40 mg at 11/10/20 1052   • Respiratory Therapy Consult     • morphine (pf) 4 mg/mL injection 2-3 mg     • ondansetron (ZOFRAN) syringe/vial injection 4 mg     • dexamethasone (DECADRON) injection 4 mg     • diphenhydrAMINE (BENADRYL) injection 25 mg     • haloperidol lactate (HALDOL) injection 1 mg     • scopolamine (TRANSDERM-SCOP) patch 1 Patch     • HYDROmorphone (DILAUDID) 20 mcg/mL, ropivacaine (NAROPIN) 0.1 % in  mL epidural infusion New Bag at 11/11/20 0523   • Pharmacy Consult Request ...Pain Management Review 1 Each     • clevidipine (CLEVIPREX) IV emulsion Stopped at 11/08/20 1645   • [Held by provider] lactated ringers infusion Stopped at 11/10/20 0700   • vancomycin (VANCOCIN) 1,000 mg in  mL IVPB 1,000 mg at 11/11/20 0407   • senna-docusate (PERICOLACE or SENOKOT S) 8.6-50 MG per tablet 2 Tab 2 Tab at 11/11/20 0528    And   • polyethylene glycol/lytes (MIRALAX) PACKET 1  Packet      And   • magnesium hydroxide (MILK OF MAGNESIA) suspension 30 mL      And   • bisacodyl (DULCOLAX) suppository 10 mg     • acetaminophen (TYLENOL) tablet 650 mg     • nicotine (NICODERM) 21 MG/24HR 21 mg 21 mg at 11/11/20 0528    And   • nicotine polacrilex (NICORETTE) 2 MG piece 2 mg     • atorvastatin (LIPITOR) tablet 40 mg 40 mg at 11/10/20 1710   • cefTRIAXone (ROCEPHIN) 2 g in  mL IVPB Stopped at 11/10/20 0521   • MD Alert...Vancomycin per Pharmacy     • ondansetron (ZOFRAN ODT) dispertab 4 mg     • oxyCODONE-acetaminophen (PERCOCET) 5-325 MG per tablet 1-2 Tab 2 Tab at 11/07/20 2232       PROBLEM LIST:  No problems updated.    ASSESSMENT/PLAN: 75 y.o.  male with PMH of peripheral vascular disease, heavy tobacco smoking and lymphoma admitted for osteomyelitis of left great toe, confirmed on MR. Now recovering in ICU to recover from aortobifemoral bypass on 11/8.      # Left great toe osteomyelitis  # Left great toe pain  # Cellulitis, failed OP therapy   Patient with worsening pain and swelling of left great toe since outpatient toenail removal October 21. This, in spite of one dose of intramuscular ceftriaxone at urgent care, followed by oral Bactrim at home.  MRI impression: There are edema and mild enhancement of the tip of the first distal phalanx likely representing osteomyelitis. Cellulitis.  Day 9 of Vancomycin and ceftriaxone empiric antibiotic therapy.   Swelling appears to have reduced as well as redness. Patient recovering well.  - 11/10: ID cleared patient to be discharged on PO Abx -  Bactrim 1 double strength tablet twice daily and Augmentin 875 mg twice daily until 12/20/20     # Severe peripheral vascular disease  # s/p aortobifemoral bypass 11/8  Limb preservation services consulted   HUGO of 0.51 in RLE, 0.35 in LLE, indicating severe PVD.  Prevenas in place on groin bilaterally from ABF revascularization, with minimal output.  - Vascular surgery following; recommended DCing  dilaudid epidural 11/11. Will revert to PO Percocet as patient tolerated this regimen well.  - Continue PT/OT; recommended short-term SNF   - Zofran PRN nausea    # Tachycardia  # HTN  Post-operatively. Much better controlled now with Metoprolol.  - Continue Metoprolol 25mg q8h added on by ICU team 11/10    # Oliguric - resolved  # Abdominal rigidity  # Volume overload  11/11: urine output much improved with Is and Os of 1050 in with 1450 out, so now net negative 300. Cr has remained normal at 0.66 from 0.8. Bun stable at 26. Bladder scan of 0mL 11/10.  Echo shows EF of 65%. No hx of CHF.  CXR shows hypo-ventilatory chest with increased L midlung zone opacity possibly representing atelectasis  - Continue with incentive spirometer   - Remove cancino with voiding trials 11/11  - If he continues to retain, will repeat Lasix    # Normocytic Anemia - stable  Likely post-operative blood loss. 13.4>>11.7>>11.2  - Continue to monitor    # Mild respiratory distress   Wheezing on exam. Not on home O2, now requiring 4L in ICU. Hx of excessive smoking; never been formally diagnosed with COPD, however very likely that pulmonary disease is at play  Desaturated SpO2 with PT/OT  - RT treatment PRN SOB  - Wean off O2 as appropriate     #Nicotine dependence  Patient is a longtime smoker, currently smoking 3 packs/day.  - Nicotine patch will be provided at patient's request  - Smoking cessation counseling will be provided to the patient     VTE PPx: Heparin   Abx: C3 and Vancomycin   Lines/Tubes: PIV  Fluids: LR  Diet: NPO  Code Status: Full      Disposition:  Transfer to floor today. PT/OT recommending SNF for continued recovery. With discharge, plan for OP treatment for osteomyelitis with PO Abx.    - Follow-up in ID clinic post discharge  - Follow up with vascular surgeon post-discharge    Laurie Lock MD   PGY-1 Family Medicine Resident   [facility]

## 2020-11-11 NOTE — PROGRESS NOTES
"Pharmacy Kinetics   75 y.o. male on vancomycin day # 9   2020    Currently on Vancomycin 1000 mg iv q12hr (0400, 1600)  Provider specified end date: TBD, anticipate 6 weeks with plans to transition to PO Bactrim and Augmentin at time of discharge    Indication for Treatment: L-great toe osteomyelitis    Pertinent history per medical record: Admitted on 11/3/2020 for persistent left great toe pain following L-great toe following removal of ingrown toe nail on 10/21.  He was receiving Bactrim as an outpatient without improvement.  MRI-foot with findings of osteomyelitis of the L-great toe.  He underwent an aorto-bifemoral bypass on  and was hypotensive post-op, thus requiring transfer to the ICU.      Other antibiotics: ceftriaxone 2g iv q24h    Allergies: Vicodin [hydrocodone-acetaminophen]     List concerns for renal function: BUN/SCr ratio > 20:1, low albumin, age    Pertinent cultures to date:   11/3 BCx: ngtd    MRSA nares swab if pneumonia is a concern (ordered/positive/negative/n-a): n/a    Recent Labs     205 11/10/20  0400 20  0400   WBC 7.6 7.5 7.7 6.9   NEUTSPOLYS  --  80.80* 82.40* 75.20*     Recent Labs     205 11/10/20  0400 20  0400   BUN 15 20 26* 26*   CREATININE 0.76 1.21 0.80 0.66   ALBUMIN  --  3.5 3.1*  --      Recent Labs     11/10/20  0400   VANCOTROUGH 10.5       Intake/Output Summary (Last 24 hours) at 2020 1432  Last data filed at 2020 1000  Gross per 24 hour   Intake 830 ml   Output 860 ml   Net -30 ml      /67   Pulse 84   Temp 36.5 °C (97.7 °F) (Temporal)   Resp 18   Ht 1.727 m (5' 7.99\")   Wt 92.1 kg (203 lb 0.7 oz)   SpO2 96%  Temp (24hrs), Av.7 °C (98 °F), Min:36.5 °C (97.7 °F), Max:37.1 °C (98.8 °F)      A/P   1. Vancomycin dose change: None  2. Next vancomycin level: 2-3 days; not ordered  3. Goal trough: 10-15 mcg/mL  4. Comments: Adequate renal function; JULIA appears to have resolved " w/ Scr back at baseline, estimated CrCl > 100mL/min, and adequate UOP (amount not charted). Cultures ngtd, per ID plan to continue vancomycin until discharge. Previous trough within goal. Will continue current vancomycin regimen and tentatively plan on checking another level in 2-3 days. Pharmacy will continue to follow.     Elaine Jordan, AnnD

## 2020-11-11 NOTE — PROGRESS NOTES
Vascular Surgery     Doing well   Feels well   Abdomen soft     Feel like he needs to have BM     Feet warm and well perfused     OK to transfer to floor   D/C Epidural   D/C Martell   Ambulate   Advance diet     Jimi Morrison MD

## 2020-11-11 NOTE — THERAPY
"Occupational Therapy   Initial Evaluation     Patient Name: Ronal Hair  Age:  75 y.o., Sex:  male  Medical Record #: [id number]  Today's Date: 11/10/2020       Precautions: Fall Risk  Comments: no weight bearing restrictions    Assessment  Patient is 75 y.o. male with a diagnosis of left great toe osteomyelitis & severe PVD.  Pt is s/p aortobifemoral bypass.  Pt presented today with c/o abd pain & back pain.  Pt was very particular & not receptive to new learning.  Pt required multiple V/Q 's to attend to task.  Pt did desat with activity as he was talking a lot.  Pt educated to focus on his breathing during activities.  Pt should progress well & be able to D/C home once medically cleared.  Pt encouraged to be OOB for all meals.    Plan    Recommend Occupational Therapy 3 times per week until therapy goals are met for the following treatments:  Adaptive Equipment, Neuro Re-Education / Balance, Self Care/Activities of Daily Living, Therapeutic Activities and Therapeutic Exercises.    DC Equipment Recommendations: Unable to determine at this time  Discharge Recommendations: Recommend home health for continued occupational therapy services     Subjective    \"This catheter in me really hurts.\"     Objective       11/10/20 1214   Prior Living Situation   Prior Services None   Housing / Facility 1 Story Apartment / Condo   Steps Into Home 0   Bathroom Set up Bathtub / Shower Combination   Equipment Owned None   Lives with - Patient's Self Care Capacity Alone and Able to Care For Self   Comments pt reports he hels his friends daughter who is disabled?   Cognition    Cognition / Consciousness X   Speech/ Communication Delayed Responses   Level of Consciousness Alert   Ability To Follow Commands 1 Step   New Learning Impaired   Attention Impaired   Comments Pt is very talkative, does not follow directions well & needs a lot of v/q's for redirection to task at hand   Bed Mobility    Supine to Sit Minimal Assist "   Scooting Supervised   Rolling Supervised   ADL Assessment   Eating Supervision   Grooming Supervision;Seated   Upper Body Dressing Minimal Assist   Lower Body Dressing Maximal Assist   Toileting Maximal Assist   Functional Mobility   Sit to Stand Minimal Assist   Bed, Chair, Wheelchair Transfer Minimal Assist   Patient / Family Goals   Patient / Family Goal #1 To have less pain   Short Term Goals   Short Term Goal # 1 Pt will be supervised with ADL transfers   Short Term Goal # 2 Pt will dress LB with AE & supervision   Short Term Goal # 3 Pt will groom standing at sink for 10 min with no LOB

## 2020-11-11 NOTE — CARE PLAN
Problem: Safety  Goal: Will remain free from injury  Outcome: PROGRESSING AS EXPECTED   Pt is wearing non-skid socks, pt educated to utilize call light     Problem: Bowel/Gastric:  Goal: Normal bowel function is maintained or improved  Outcome: PROGRESSING SLOWER THAN EXPECTED   Pt will continue to take prn stool softners as required. Encourage mobility to chair.

## 2020-11-11 NOTE — THERAPY
Physical Therapy   Initial Evaluation     Patient Name: Ronal Hair  Age:  75 y.o., Sex:  male  Medical Record #: [id number]  Today's Date: 11/10/2020     Precautions: Fall Risk    Assessment  Mr. Hair is a 74 y/o male who presents to acute secondary to L great toe osteomyelitis and severe PVD. Is s/p aortobifemoral bypass. Mild LE weakness, dynamic balance deficits, and sequencing impairments that negatively impact his ability to perform gait and transfers and prevent his safe discharge home. Very impulsive and stubborn, max coaching to follow cues for safety. Poor FWW management. Currently not safe to discharge home and recommend placement. Acute PT to follow.    Plan    Recommend Physical Therapy 4 times per week until therapy goals are met for the following treatments:  Bed Mobility, Gait Training, Stair Training, Therapeutic Activities and Therapeutic Exercises    DC Equipment Recommendations: Unable to determine at this time  Discharge Recommendations: Recommend post-acute placement for additional physical therapy services prior to discharge home            Objective       11/10/20 1201   Cognition    Level of Consciousness Alert   Comments extremely particular. Impulsive   Passive ROM Lower Body   Passive ROM Lower Body WDL   Active ROM Lower Body    Active ROM Lower Body  WDL   Strength Lower Body   Comments 4/5 B LE   Sensation Lower Body   Lower Extremity Sensation   WDL   Balance Assessment   Sitting Balance (Static) Fair   Sitting Balance (Dynamic) Fair   Standing Balance (Static) Fair -   Standing Balance (Dynamic) Fair -   Weight Shift Sitting Fair   Weight Shift Standing Fair   Comments FWW   Gait Analysis   Gait Level Of Assist Minimal Assist   Assistive Device Front Wheel Walker   Distance (Feet) 75   # of Times Distance was Traveled 1   Deviation Bradykinetic;Shuffled Gait   Weight Bearing Status no restrictions   Bed Mobility    Supine to Sit Supervised   Sit to Supine   (HOB elevated)    Scooting   (up in chair)   Functional Mobility   Sit to Stand Minimal Assist   Short Term Goals    Short Term Goal # 1 Pt will perform supine <> sit with SPV in 6 visits to get in/out of bed   Short Term Goal # 2 Pt will perform functional transfers with SPV in 6 visits to increase independence   Short Term Goal # 3 Pt will ambulate 150ft with FWW and SPV in 6 visits to increase independence

## 2020-11-11 NOTE — PROGRESS NOTES
TRANSFER NOTE    Patient care being transferred to hospitalist team given changes to IM and FM resident patient management at request of [facility] administration given Covid surge.    Patient was initially admitted for osteomyelitis and significant vascular disease. He was started on IV abx and was seen by vascular surgery given significant PVD. Patient had aortobifemoral bypass. Due to this revascularization, patient was able to transition to PO abx Bactrim DS PO BID and Augmentin 875mg BID daily until 12/20/20. It is recommended that he be discharged to a SNF for PT/OT and therapy until stable to go home.    See most recent assessment and plan below.    75 y.o.  male with PMH of peripheral vascular disease, heavy tobacco smoking and lymphoma admitted for osteomyelitis of left great toe, confirmed on MR. Now recovering in ICU to recover from aortobifemoral bypass on 11/8.      # Left great toe osteomyelitis  # Left great toe pain  # Cellulitis, failed OP therapy   Patient with worsening pain and swelling of left great toe since outpatient toenail removal October 21. This, in spite of one dose of intramuscular ceftriaxone at urgent care, followed by oral Bactrim at home.  MRI impression: There are edema and mild enhancement of the tip of the first distal phalanx likely representing osteomyelitis. Cellulitis.  Day 9 of Vancomycin and ceftriaxone empiric antibiotic therapy.   Swelling appears to have reduced as well as redness. Patient recovering well.  - 11/10: ID cleared patient to be discharged on PO Abx -  Bactrim 1 double strength tablet twice daily and Augmentin 875 mg twice daily until 12/20/20      # Severe peripheral vascular disease  # s/p aortobifemoral bypass 11/8  Limb preservation services consulted   HUGO of 0.51 in RLE, 0.35 in LLE, indicating severe PVD.  Prevenas in place on groin bilaterally from ABF revascularization, with minimal output.  - Vascular surgery following; recommended DCing dilaudid  epidural 11/11. Will revert to PO Percocet as patient tolerated this regimen well.  - Continue PT/OT; recommended short-term SNF   - Zofran PRN nausea     # Tachycardia  # HTN  Post-operatively. Much better controlled now with Metoprolol.  - Continue Metoprolol 25mg q8h added on by ICU team 11/10     # Oliguric - resolved  # Abdominal rigidity  # Volume overload  11/11: urine output much improved with Is and Os of 1050 in with 1450 out, so now net negative 300. Cr has remained normal at 0.66 from 0.8. Bun stable at 26. Bladder scan of 0mL 11/10.  Echo shows EF of 65%. No hx of CHF.  CXR shows hypo-ventilatory chest with increased L midlung zone opacity possibly representing atelectasis  - Continue with incentive spirometer   - Remove cancino with voiding trials 11/11  - If he continues to retain, will repeat Lasix     # Normocytic Anemia - stable  Likely post-operative blood loss. 13.4>>11.7>>11.2  - Continue to monitor     # Mild respiratory distress   Wheezing on exam. Not on home O2, now requiring 4L in ICU. Hx of excessive smoking; never been formally diagnosed with COPD, however very likely that pulmonary disease is at play  Desaturated SpO2 with PT/OT  - RT treatment PRN SOB  - Wean off O2 as appropriate     #Nicotine dependence  Patient is a longtime smoker, currently smoking 3 packs/day.  - Nicotine patch will be provided at patient's request  - Smoking cessation counseling will be provided to the patient     VTE PPx: Heparin   Abx: C3 and Vancomycin   Lines/Tubes: PIV  Fluids: LR  Diet: NPO  Code Status: Full      Disposition:  Transfer to floor today. PT/OT recommending SNF for continued recovery. With discharge, plan for OP treatment for osteomyelitis with PO Abx.

## 2020-11-11 NOTE — ANESTHESIA POST-OP FOLLOW-UP NOTE
"Anesthesia Pain Service Note    Type:  Epidural catheter    Patient: Marcello Hair    Patient seen and examined. and Patient chart reviewed.     /62   Pulse 84   Temp 36.8 °C (98.2 °F) (Temporal)   Resp 12   Ht 1.727 m (5' 7.99\")   Wt 92.1 kg (203 lb 0.7 oz)   SpO2 94%   BMI 30.88 kg/m²     Complaints:  No complaints.    Pain:   0/10    LOC:   Awake    Rate:  6ml/h    Exam:  Vital signs stable and Afebrile    Impression:  Adequate analgesia ; per RN from Dr Morrison, \"Ok to d/c epidural\".  Assessment & Plan:  Acute post-procedural pain (G89.18)     D/C by MD (Tip intact, no apparent complications) Bandaid applied to side (no erythema, tenderness, swelling)      Celio Hernandez M.D.  11/11/2020  10:01 AM  "

## 2020-11-11 NOTE — PROGRESS NOTES
Trauma / Surgical Daily Progress Note    Date of Service  11/11/2020    Chief Complaint  75 y.o. male admitted 11/3/2020 with toe osteomyelitis    Interval Events  Doing well, eating a bit more, BM x 2 today, remains distended  Hop[efully resolving ileus   Epidural and cancino removed  Tolerating IV abx  Incision dressed, bilateral inguinal Prevenas functional    - Transfer to medical ramirez on [employer] service  - Antibiotics per ID  - Prevenas per vascular    Review of Systems  Review of Systems   Constitutional: Negative for chills and fever.   Respiratory: Negative for shortness of breath.    Cardiovascular: Negative for chest pain.   Gastrointestinal: Positive for abdominal pain. Negative for constipation (BM 11/11), nausea and vomiting.   Skin: Negative for rash.   Neurological: Negative for dizziness and speech change.        Vital Signs  Temp:  [36.5 °C (97.7 °F)-37.1 °C (98.8 °F)] 36.5 °C (97.7 °F)  Pulse:  [77-93] 84  Resp:  [10-25] 18  BP: (123-158)/(62-84) 150/67  SpO2:  [93 %-98 %] 96 %    Physical Exam  Physical Exam  Vitals signs and nursing note reviewed.   Constitutional:       General: He is awake.      Appearance: He is well-developed.      Interventions: Nasal cannula in place.      Comments: Up to chair   HENT:      Mouth/Throat:      Mouth: Mucous membranes are moist.      Pharynx: Oropharynx is clear.   Neck:      Musculoskeletal: Neck supple.      Comments: Right IJ catheter in place  Cardiovascular:      Pulses: Normal pulses.   Pulmonary:      Effort: Pulmonary effort is normal. No respiratory distress.      Breath sounds: Normal breath sounds.   Abdominal:      General: There is no distension.      Tenderness: There is abdominal tenderness.      Comments: Midline abdominal incision dressed  Bilateral inguinal incisions with Prevenas in place   Musculoskeletal:      Comments: Moves all extremities  Previous back epidural site dressed   Skin:     General: Skin is warm and dry.   Neurological:       General: No focal deficit present.      Mental Status: He is alert and oriented to person, place, and time.   Psychiatric:         Mood and Affect: Mood normal.         Behavior: Behavior normal. Behavior is cooperative.         Laboratory  Recent Results (from the past 24 hour(s))   CBC WITH DIFFERENTIAL    Collection Time: 11/11/20  4:00 AM   Result Value Ref Range    WBC 6.9 4.8 - 10.8 K/uL    RBC 3.46 (L) 4.70 - 6.10 M/uL    Hemoglobin 11.2 (L) 14.0 - 18.0 g/dL    Hematocrit 33.6 (L) 42.0 - 52.0 %    MCV 97.1 81.4 - 97.8 fL    MCH 32.4 27.0 - 33.0 pg    MCHC 33.3 (L) 33.7 - 35.3 g/dL    RDW 45.1 35.9 - 50.0 fL    Platelet Count 182 164 - 446 K/uL    MPV 10.0 9.0 - 12.9 fL    Neutrophils-Polys 75.20 (H) 44.00 - 72.00 %    Lymphocytes 9.50 (L) 22.00 - 41.00 %    Monocytes 10.50 0.00 - 13.40 %    Eosinophils 4.10 0.00 - 6.90 %    Basophils 0.30 0.00 - 1.80 %    Immature Granulocytes 0.40 0.00 - 0.90 %    Nucleated RBC 0.00 /100 WBC    Neutrophils (Absolute) 5.16 1.82 - 7.42 K/uL    Lymphs (Absolute) 0.65 (L) 1.00 - 4.80 K/uL    Monos (Absolute) 0.72 0.00 - 0.85 K/uL    Eos (Absolute) 0.28 0.00 - 0.51 K/uL    Baso (Absolute) 0.02 0.00 - 0.12 K/uL    Immature Granulocytes (abs) 0.03 0.00 - 0.11 K/uL    NRBC (Absolute) 0.00 K/uL   Basic Metabolic Panel    Collection Time: 11/11/20  4:00 AM   Result Value Ref Range    Sodium 141 135 - 145 mmol/L    Potassium 4.2 3.6 - 5.5 mmol/L    Chloride 102 96 - 112 mmol/L    Co2 29 20 - 33 mmol/L    Glucose 99 65 - 99 mg/dL    Bun 26 (H) 8 - 22 mg/dL    Creatinine 0.66 0.50 - 1.40 mg/dL    Calcium 9.0 8.5 - 10.5 mg/dL    Anion Gap 10.0 7.0 - 16.0   ESTIMATED GFR    Collection Time: 11/11/20  4:00 AM   Result Value Ref Range    GFR If African American >60 >60 mL/min/1.73 m 2    GFR If Non African American >60 >60 mL/min/1.73 m 2       Fluids    Intake/Output Summary (Last 24 hours) at 11/11/2020 1320  Last data filed at 11/11/2020 1000  Gross per 24 hour   Intake 972 ml   Output  940 ml   Net 32 ml       Core Measures & Quality Metrics  Labs reviewed, Medications reviewed and Radiology images reviewed  Cancino Catheter: Trial cancino removal.      DVT Prophylaxis: Enoxaparin (Lovenox)  DVT prophylaxis - mechanical: SCDs  Ulcer prophylaxis: No        JUAN DANIEL Score  ETOH Screening    Assessment/Plan  Osteomyelitis of left foot (HCC)- (present on admission)  Assessment & Plan  Infected left great toe following ingrown toenail removal on 10/21/2020.  11/3 vancomycin and C3 initiated.  Will need 6 weeks antibiotic therapy.  On DC, will consider Bactrim and Augmentin.  Limb preservation services and Infectious Disease following.    Bilateral iliac artery occlusion (HCC)- (present on admission)  Assessment & Plan  11/6 Ultrasound-guided access bilateral common femoral arteries. Angiogram bilateral lower extremities.  11/8 Aortobifemoral bypass using an 16 x 8 bifurcated graft.  Jimi Morrison MD. Vascular Surgery.    Tobacco dependence- (present on admission)  Assessment & Plan  Tobacco cessation.    Abdominal aortic aneurysm (AAA) without rupture (HCC)- (present on admission)  Assessment & Plan  Small infrarenal abdominal aortic aneurysm.  Jimi Morrison MD. Vascular Surgery.    Peripheral arterial disease (HCC)- (present on admission)  Assessment & Plan  Jimi Morrison MD. Vascular Surgery.    No contraindication to deep vein thrombosis (DVT) prophylaxis- (present on admission)  Assessment & Plan  11/10 Chemical DVT prophylaxis (Lovenox) initiated.  Ambulate TID.      Discussed patient condition with RN, Patient and general surgery. Dr. Moseley  I independently reviewed pertinent clinical lab tests from the last 48 hours and ordered additional follow up clinical lab tests.  I independently reviewed pertinent radiographic images and the radiologist's reports from the last 48 hours and ordered additional follow up radiographic studies.  I reviewed the details of the available patient records and documentation by  consulting physicians in [facility] up to today, summated the information, and utilized the information as warranted in today's medical decision making for this patient.  I personally evaluated the patient condition at bedside and discussed the daily plan(s) with available nursing staff, dieticians, social workers, pharmacists on rounds.  I am actively managing this patient based on my personal bedside evaluation of this patient's radiographic, and laboratory findings and clinical changes throughout the day.  Patient seen, data reviewed and discussed.  Agree with assessment and plan.   The APN and I have collaborated in the patient assessment chart documentation and care plan. The clinical decision making , diagnoses and management are mine and the APN has assisted in the creation of the clinical document . The APN has no independent billing for this patient.      I have personally evaluated this patient at the bedside and performed a global high level assessment to allow clinical decision making regarding the patient's risk tolerance for transfer to a lower level of care in this in-house risk environment. This decision and takes into account the patient's current active clinical problems and  Comorbidities. This includes:  Complete neurologic assessment  Assessment of respiratory function considering the need his ongoing therapies and the patient's tolerance'  Cardiovascular status  Coordination of care needs  35 minutes    MESSI Moseley MD

## 2020-11-11 NOTE — ASSESSMENT & PLAN NOTE
Infected left great toe following ingrown toenail removal on 10/21/2020.  11/3 vancomycin and C3 initiated.  Will need 6 weeks antibiotic therapy.  On DC, will consider Bactrim and Augmentin.  Limb preservation services and Infectious Disease following.

## 2020-11-12 PROBLEM — R09.02 HYPOXIA: Status: ACTIVE | Noted: 2020-11-12

## 2020-11-12 LAB
ALBUMIN SERPL BCP-MCNC: 2.8 G/DL (ref 3.2–4.9)
ALBUMIN/GLOB SERPL: 1 G/DL
ALP SERPL-CCNC: 55 U/L (ref 30–99)
ALT SERPL-CCNC: 36 U/L (ref 2–50)
ANION GAP SERPL CALC-SCNC: 10 MMOL/L (ref 7–16)
AST SERPL-CCNC: 34 U/L (ref 12–45)
BASOPHILS # BLD AUTO: 0.4 % (ref 0–1.8)
BASOPHILS # BLD: 0.02 K/UL (ref 0–0.12)
BILIRUB SERPL-MCNC: 0.8 MG/DL (ref 0.1–1.5)
BUN SERPL-MCNC: 24 MG/DL (ref 8–22)
CALCIUM SERPL-MCNC: 8.8 MG/DL (ref 8.5–10.5)
CHLORIDE SERPL-SCNC: 98 MMOL/L (ref 96–112)
CO2 SERPL-SCNC: 27 MMOL/L (ref 20–33)
CREAT SERPL-MCNC: 0.64 MG/DL (ref 0.5–1.4)
EOSINOPHIL # BLD AUTO: 0.32 K/UL (ref 0–0.51)
EOSINOPHIL NFR BLD: 6 % (ref 0–6.9)
ERYTHROCYTE [DISTWIDTH] IN BLOOD BY AUTOMATED COUNT: 43.8 FL (ref 35.9–50)
GLOBULIN SER CALC-MCNC: 2.9 G/DL (ref 1.9–3.5)
GLUCOSE SERPL-MCNC: 97 MG/DL (ref 65–99)
HCT VFR BLD AUTO: 32.5 % (ref 42–52)
HGB BLD-MCNC: 11 G/DL (ref 14–18)
IMM GRANULOCYTES # BLD AUTO: 0.02 K/UL (ref 0–0.11)
IMM GRANULOCYTES NFR BLD AUTO: 0.4 % (ref 0–0.9)
LYMPHOCYTES # BLD AUTO: 0.45 K/UL (ref 1–4.8)
LYMPHOCYTES NFR BLD: 8.5 % (ref 22–41)
MCH RBC QN AUTO: 32.4 PG (ref 27–33)
MCHC RBC AUTO-ENTMCNC: 33.8 G/DL (ref 33.7–35.3)
MCV RBC AUTO: 95.6 FL (ref 81.4–97.8)
MONOCYTES # BLD AUTO: 0.57 K/UL (ref 0–0.85)
MONOCYTES NFR BLD AUTO: 10.8 % (ref 0–13.4)
NEUTROPHILS # BLD AUTO: 3.91 K/UL (ref 1.82–7.42)
NEUTROPHILS NFR BLD: 73.9 % (ref 44–72)
NRBC # BLD AUTO: 0 K/UL
NRBC BLD-RTO: 0 /100 WBC
PLATELET # BLD AUTO: 179 K/UL (ref 164–446)
PMV BLD AUTO: 9.7 FL (ref 9–12.9)
POTASSIUM SERPL-SCNC: 3.5 MMOL/L (ref 3.6–5.5)
PROT SERPL-MCNC: 5.7 G/DL (ref 6–8.2)
RBC # BLD AUTO: 3.4 M/UL (ref 4.7–6.1)
SODIUM SERPL-SCNC: 135 MMOL/L (ref 135–145)
WBC # BLD AUTO: 5.3 K/UL (ref 4.8–10.8)

## 2020-11-12 PROCEDURE — A9270 NON-COVERED ITEM OR SERVICE: HCPCS | Performed by: SURGERY

## 2020-11-12 PROCEDURE — 700105 HCHG RX REV CODE 258: Performed by: STUDENT IN AN ORGANIZED HEALTH CARE EDUCATION/TRAINING PROGRAM

## 2020-11-12 PROCEDURE — 97535 SELF CARE MNGMENT TRAINING: CPT

## 2020-11-12 PROCEDURE — 770006 HCHG ROOM/CARE - MED/SURG/GYN SEMI*

## 2020-11-12 PROCEDURE — 700111 HCHG RX REV CODE 636 W/ 250 OVERRIDE (IP): Performed by: SURGERY

## 2020-11-12 PROCEDURE — 700111 HCHG RX REV CODE 636 W/ 250 OVERRIDE (IP): Performed by: INTERNAL MEDICINE

## 2020-11-12 PROCEDURE — A9270 NON-COVERED ITEM OR SERVICE: HCPCS | Performed by: STUDENT IN AN ORGANIZED HEALTH CARE EDUCATION/TRAINING PROGRAM

## 2020-11-12 PROCEDURE — 700102 HCHG RX REV CODE 250 W/ 637 OVERRIDE(OP): Performed by: STUDENT IN AN ORGANIZED HEALTH CARE EDUCATION/TRAINING PROGRAM

## 2020-11-12 PROCEDURE — 99232 SBSQ HOSP IP/OBS MODERATE 35: CPT | Performed by: HOSPITALIST

## 2020-11-12 PROCEDURE — 80053 COMPREHEN METABOLIC PANEL: CPT

## 2020-11-12 PROCEDURE — 700101 HCHG RX REV CODE 250: Performed by: STUDENT IN AN ORGANIZED HEALTH CARE EDUCATION/TRAINING PROGRAM

## 2020-11-12 PROCEDURE — 700105 HCHG RX REV CODE 258: Performed by: INTERNAL MEDICINE

## 2020-11-12 PROCEDURE — A9270 NON-COVERED ITEM OR SERVICE: HCPCS | Performed by: HOSPITALIST

## 2020-11-12 PROCEDURE — 85025 COMPLETE CBC W/AUTO DIFF WBC: CPT

## 2020-11-12 PROCEDURE — 700102 HCHG RX REV CODE 250 W/ 637 OVERRIDE(OP): Performed by: SURGERY

## 2020-11-12 PROCEDURE — 700111 HCHG RX REV CODE 636 W/ 250 OVERRIDE (IP): Performed by: STUDENT IN AN ORGANIZED HEALTH CARE EDUCATION/TRAINING PROGRAM

## 2020-11-12 PROCEDURE — 700102 HCHG RX REV CODE 250 W/ 637 OVERRIDE(OP): Performed by: HOSPITALIST

## 2020-11-12 RX ORDER — POTASSIUM CHLORIDE 20 MEQ/1
20 TABLET, EXTENDED RELEASE ORAL ONCE
Status: COMPLETED | OUTPATIENT
Start: 2020-11-12 | End: 2020-11-12

## 2020-11-12 RX ADMIN — METOPROLOL TARTRATE 25 MG: 25 TABLET, FILM COATED ORAL at 22:12

## 2020-11-12 RX ADMIN — NICOTINE TRANSDERMAL SYSTEM 21 MG: 21 PATCH, EXTENDED RELEASE TRANSDERMAL at 04:34

## 2020-11-12 RX ADMIN — DOCUSATE SODIUM 50 MG AND SENNOSIDES 8.6 MG 2 TABLET: 8.6; 5 TABLET, FILM COATED ORAL at 04:34

## 2020-11-12 RX ADMIN — METOPROLOL TARTRATE 25 MG: 25 TABLET, FILM COATED ORAL at 04:34

## 2020-11-12 RX ADMIN — ATORVASTATIN CALCIUM 40 MG: 40 TABLET, FILM COATED ORAL at 17:37

## 2020-11-12 RX ADMIN — POTASSIUM CHLORIDE 20 MEQ: 1500 TABLET, EXTENDED RELEASE ORAL at 17:37

## 2020-11-12 RX ADMIN — CEFTRIAXONE SODIUM 2 G: 2 INJECTION, POWDER, FOR SOLUTION INTRAMUSCULAR; INTRAVENOUS at 07:01

## 2020-11-12 RX ADMIN — VANCOMYCIN HYDROCHLORIDE 1000 MG: 500 INJECTION, POWDER, LYOPHILIZED, FOR SOLUTION INTRAVENOUS at 16:15

## 2020-11-12 RX ADMIN — POLYETHYLENE GLYCOL 3350 1 PACKET: 17 POWDER, FOR SOLUTION ORAL at 06:18

## 2020-11-12 RX ADMIN — ENOXAPARIN SODIUM 40 MG: 40 INJECTION SUBCUTANEOUS at 09:44

## 2020-11-12 RX ADMIN — VANCOMYCIN HYDROCHLORIDE 1000 MG: 500 INJECTION, POWDER, LYOPHILIZED, FOR SOLUTION INTRAVENOUS at 04:40

## 2020-11-12 RX ADMIN — METOPROLOL TARTRATE 25 MG: 25 TABLET, FILM COATED ORAL at 15:24

## 2020-11-12 ASSESSMENT — ENCOUNTER SYMPTOMS
VOMITING: 0
NEUROLOGICAL NEGATIVE: 1
EYES NEGATIVE: 1
CHILLS: 0
NAUSEA: 0
DIAPHORESIS: 0
WEIGHT LOSS: 0
FEVER: 0
CONSTITUTIONAL NEGATIVE: 1
SORE THROAT: 0
SHORTNESS OF BREATH: 0
HEADACHES: 0
MYALGIAS: 0
DIZZINESS: 0
ABDOMINAL PAIN: 0
PSYCHIATRIC NEGATIVE: 1
MUSCULOSKELETAL NEGATIVE: 1
BLURRED VISION: 0
DEPRESSION: 0
COUGH: 0
PALPITATIONS: 0
BRUISES/BLEEDS EASILY: 0
FOCAL WEAKNESS: 0
RESPIRATORY NEGATIVE: 1
GASTROINTESTINAL NEGATIVE: 1
CARDIOVASCULAR NEGATIVE: 1
WEAKNESS: 0

## 2020-11-12 ASSESSMENT — COGNITIVE AND FUNCTIONAL STATUS - GENERAL
DAILY ACTIVITIY SCORE: 18
TOILETING: A LITTLE
SUGGESTED CMS G CODE MODIFIER DAILY ACTIVITY: CK
PERSONAL GROOMING: A LITTLE
HELP NEEDED FOR BATHING: A LOT
DRESSING REGULAR LOWER BODY CLOTHING: A LOT

## 2020-11-12 ASSESSMENT — LIFESTYLE VARIABLES: SUBSTANCE_ABUSE: 0

## 2020-11-12 NOTE — PROGRESS NOTES
Patient arrived to unit with ICU RN Shakira by wheelchair. Patient in hospital gown, A+O x 4, ambulated self to hospital bed. Bilat SCDs on.     Safety precautions in place including bed locked & in lowest position, upper bed rails up, call light within reach, tray table and belongings at bedside. Plan of care explained to patient and patient verbalized understanding.

## 2020-11-12 NOTE — PROGRESS NOTES
Vascular surgery    Awake and alert  Reports multiple bowel movements yesterday soft liquid  Feet are well-perfused  Slight shortness of breath    Status post aortobifemoral bypass    Incentive spirometry  Physical therapy consult  Out of bed ambulate  Regular diet  Change bandage midline abdomen    Jimi Morrison MD

## 2020-11-12 NOTE — PROCEDURES
Vascular Access Team    Date of Insertion: 11/11/2020  Arm Circumference: n/a  Line Length: 10cm  Line Size: 18g  Vein Occupancy %: 30  Reason for Midline: access  Labs: WBC 6.9, , BUN 26, Cr 0.66, GFR >60, INR n/a    Orders confirmed, vessel patency confirmed with ultrasound. Risks and benefits of procedure explained to patient and education regarding line associated bloodstream infections provided. Questions answered.     PowerGlide Midline placed in LUE per licensed provider order with ultrasound guidance. 18g, 10 cm line placed in brachial vein after 1 attempt(s).  Catheter inserted with brisk blood return. Secured with 0cm external from insertion site.  Line flushed without resistance with 10 mL 0.9% normal saline.  Midline secured with Biopatch and Tegaderm.     Midline placement is confirmed by nurse using ultrasound and ability to flush and draw blood. Midline is appropriate for use at this time.  No X-ray is needed for placement confirmation. Pt tolerated procedure well.  Patient condition relayed to unit RN or ordering physician via this post procedure note in the EMR.    Ultrasound images uploaded to PACS and viewable in the EMR - yes  Ultrasound imaged printed and placed in paper chart - no      BARD PowerGlide Midline ref # [id number], Lot # [id number], Expiration Date 7/31/2021

## 2020-11-12 NOTE — ASSESSMENT & PLAN NOTE
"Cessation discussed and recommended  Pt very motivated, now refers to himself as \"a former smoker\"  "

## 2020-11-12 NOTE — THERAPY
"Occupational Therapy  Daily Treatment     Patient Name: Ronal Hair  Age:  75 y.o., Sex:  male  Medical Record #: [id number]  Today's Date: 11/12/2020     Precautions: Fall Risk  Comments: no weight bearing restrictions    Assessment  Pt continues to have difficulty with attention and safety awareness. Reports he does not need assistance, however presented with impaired activity tolerance today. Will continue to follow.     Plan  Continue current treatment plan.    DC Equipment Recommendations: Unable to determine at this time  Discharge Recommendations:  Recommend home health for continued occupational therapy services    Subjective  \"I don't need any help\"     Objective   11/12/20 1036   Pain 0 - 10 Group   Therapist Pain Assessment   (c/o minimal pain in L great toe)   Cognition    Cognition / Consciousness X   Speech/ Communication Delayed Responses   Level of Consciousness Alert   Attention Impaired   Comments pt safety awareness impaired; required some verbal cues for redirection   Active ROM Upper Body   Active ROM Upper Body  WDL   Strength Upper Body   Upper Body Strength  WDL   Sensation Upper Body   Upper Extremity Sensation  WDL   Upper Body Muscle Tone   Upper Body Muscle Tone  WDL   Other Treatments   Other Treatments Provided Focused on safety with ADLs and funcitonal transfers   Balance   Sitting Balance (Static) Fair +   Sitting Balance (Dynamic) Fair   Standing Balance (Static) Fair   Standing Balance (Dynamic) Fair -   Weight Shift Sitting Good   Weight Shift Standing Fair   Skilled Intervention Verbal Cuing;Tactile Cuing;Sequencing   Comments w/ no AE   Bed Mobility    Supine to Sit Supervised   Sit to Supine   (pt up in recliner)   Scooting Supervised   Skilled Intervention Verbal Cuing;Tactile Cuing   Activities of Daily Living   Grooming Minimal Assist;Standing  (for safety)   Lower Body Dressing Maximal Assist   Toileting Minimal Assist  (for safety)   Skilled Intervention Verbal " Cuing;Compensatory Strategies;Tactile Cuing   Functional Mobility   Sit to Stand Supervised   Bed, Chair, Wheelchair Transfer Supervised   Toilet Transfers Minimal Assist  (verbal cues for safety)   Mobility walked to bathroom and back to chair   Skilled Intervention Verbal Cuing;Tactile Cuing;Sequencing;Compensatory Strategies   Activity Tolerance   Comments pt appeared fatigued and breathing heavily with walking to the bathroom with O2; nurse notified    Patient / Family Goals   Patient / Family Goal #1 To have less pain   Goal #1 Outcome Progressing as expected   Short Term Goals   Short Term Goal # 1 Pt will be supervised with ADL transfers   Goal Outcome # 1 Progressing as expected   Short Term Goal # 2 Pt will dress LB with AE & supervision   Goal Outcome # 2 Progressing as expected   Short Term Goal # 3 Pt will groom standing at sink for 10 min with no LOB   Goal Outcome # 3 Progressing as expected   Education Group   Education Provided Role of Occupational Therapist;Activities of Daily Living   Role of Occupational Therapist Patient Response Patient;Acceptance;Explanation   ADL Patient Response Patient;Acceptance;Explanation;Verbal Demonstration;Action Demonstration   Interdisciplinary Plan of Care Collaboration   IDT Collaboration with  Nursing   Patient Position at End of Therapy Seated;Call Light within Reach;Tray Table within Reach   Collaboration Comments RN updated   Session Information   Date / Session Number  11/12 2(2/3, 11/16)   Priority 2

## 2020-11-12 NOTE — PROGRESS NOTES
"Pharmacy Kinetics 75 y.o. male on vancomycin day # 10 2020    Currently on Vancomycin 1000 mg iv q12hr (0400, 1600)     Indication for Treatment: L-great toe osteomyelitis    Proposed duration of treatment: TBD, anticipate 6 weeks with plans to transition to PO Bactrim + Augmentin at discharge per ID.    Pertinent history per medical record: Admitted on 11/3/2020 for persistent left great toe pain following removal of ingrown toe nail on 10/21.  He was receiving Bactrim as an outpatient without improvement.  MRI-foot with findings of osteomyelitis of the L-great toe.  He underwent an aorto-bifemoral bypass on  and was hypotensive post-op, thus requiring transfer to the ICU. Discharged from ICU on .    Other antibiotics: Ceftriaxone 2 g q24hr    Allergies: Vicodin [hydrocodone-acetaminophen]     List concerns for renal function: BUN/SCr ratio > 20:1, BMI 30, low albumin, age >65    Pertinent cultures to date:   - 11/3 BCx: NGTD    Recent Labs     11/10/20  0400 20  0400 20  0346   WBC 7.7 6.9 5.3   NEUTSPOLYS 82.40* 75.20* 73.90*     Recent Labs     11/10/20  0400 20  0400 20  0346   BUN 26* 26* 24*   CREATININE 0.80 0.66 0.64   ALBUMIN 3.1*  --  2.8*     Recent Labs     11/10/20  0400   VANCOTROUGH 10.5       Intake/Output Summary (Last 24 hours) at 2020 0814  Last data filed at 2020  Gross per 24 hour   Intake 572 ml   Output 75 ml   Net 497 ml      /83   Pulse 81   Temp 36.1 °C (96.9 °F) (Temporal)   Resp 17   Ht 1.727 m (5' 7.99\")   Wt 92.1 kg (203 lb 0.7 oz)   SpO2 99%  Temp (24hrs), Av.3 °C (97.3 °F), Min:35.8 °C (96.4 °F), Max:36.6 °C (97.8 °F)      A/P   1. Vancomycin dose: continue Vancomycin 1000 mg Q 12 hr (10.5 mg/kg) (0400, 1600)   2. Next vancomycin level:  @ 0330 prior to 13th total dose (ordered)  3. Goal trough: 10-15 mcg/ml  4. Comments: Vanco + CTX for osteo of L-great toe. Blood cultures negative, renal function stable, " historical dosing not available. Dosing done per troughs, will order one additional trough at steady state for tomorrow, if continues within goal can push subsequent troughs out. Pharmacy will continue to follow renal function, cultures, troughs, and patient's clinical status to guide subsequent recommendations.      Mykel Tran, PharmD     Discussed with Edy Isidro, AnnD

## 2020-11-12 NOTE — ASSESSMENT & PLAN NOTE
Resolving  Continue vanc and ceftriaxone per ID recommendation - plan to transition to oral bactrim at discharge  Continue wound care

## 2020-11-12 NOTE — CARE PLAN
Problem: Psychosocial Needs:  Goal: Level of anxiety will decrease  Outcome: PROGRESSING AS EXPECTED  Patient has no complaints of anxiety at this time.     Problem: Skin Integrity  Goal: Risk for impaired skin integrity will decrease  Outcome: PROGRESSING AS EXPECTED  Patient on low air loss mattress. Patient turns self while in bed. Patient able to ambulate with standby assist.

## 2020-11-12 NOTE — CARE PLAN
Problem: Safety  Goal: Will remain free from injury  Outcome: PROGRESSING AS EXPECTED     Problem: Safety  Goal: Will remain free from falls  Outcome: PROGRESSING AS EXPECTED     Problem: Infection  Goal: Will remain free from infection  Outcome: PROGRESSING AS EXPECTED     Problem: Pain Management  Goal: Pain level will decrease to patient's comfort goal  Outcome: PROGRESSING AS EXPECTED

## 2020-11-12 NOTE — PROGRESS NOTES
Per orders central line removed at this time. Pressure placed. Patient lying flat. Catheter tip noted to be intact upon removal. Tolerates without issue. Will monitor.

## 2020-11-12 NOTE — ASSESSMENT & PLAN NOTE
Status post aortofemoral bypass 11/8/20  Surgery following, plan to d/c home in am if stable  Continue statin and lovenox  Following  Continue wound care

## 2020-11-12 NOTE — PROGRESS NOTES
2 RN skin check completed with DREW Rangel  Devices in place Two prevenas, oxygen nasal cannula, SCD to BLE.  Skin assessed under devices :Yes.  Confirmed pressure ulcers found on n/a.  New potential pressure ulcers noted on n/a.   Abdomen incision down the middle with old drainage, two prevenas in place around the abdomen, Left great toe red with some swelling,  Pt. On a Low airloss mattress bed

## 2020-11-12 NOTE — PROGRESS NOTES
[facility] Medicine Daily Progress Note    Date of Service  11/12/2020    Chief Complaint  75 y.o. male admitted 11/3/2020 with left great toe pain    Hospital Course  Patient is a 74 y.o. male with past medical history of lymphoma, peripheral vascular disease who continues to smoke 3 packs/day.  He presented with a two week history of left great toe pain that began with an ingrown toe nail that was subsequently removed. On October 30, he went to an urgent care facility and was given a one-time dose of intramuscular ceftriaxone and prescribed Bactrim.  He was found to have severe peripheral arterial disease with bilateral iliac artery occlusion and with resulting distal limb ischemia and great toe osteomyelitis.  On 11/8/20, he underwent an aortofemoral bypass with Dr. Morrison.  Post operatively he developed an ileus, which has now resolved.    Interval Problem Update  He reports mild wound vac site pain and mild scrotal discomfort from edema.  He denies sob, no cp, no dizziness  Ros otherwise negative  axox3  Discussed with nursing and case mgt    Consultants/Specialty  LPS  Surgery Morrison    Code Status  Full Code    Disposition  Acute rehab    Review of Systems  Review of Systems   Constitutional: Negative.  Negative for chills, diaphoresis, fever, malaise/fatigue and weight loss.   HENT: Negative.  Negative for sore throat.    Eyes: Negative.  Negative for blurred vision.   Respiratory: Negative.  Negative for cough and shortness of breath.    Cardiovascular: Negative.  Negative for chest pain, palpitations and leg swelling.   Gastrointestinal: Negative.  Negative for abdominal pain, nausea and vomiting.   Genitourinary: Negative.  Negative for dysuria.   Musculoskeletal: Negative.  Negative for myalgias.        Pain at surgical sites   Skin: Negative.  Negative for itching and rash.   Neurological: Negative.  Negative for dizziness, focal weakness, weakness and headaches.   Endo/Heme/Allergies: Negative.  Does not  bruise/bleed easily.   Psychiatric/Behavioral: Negative.  Negative for depression, substance abuse and suicidal ideas.   All other systems reviewed and are negative.       Physical Exam  Temp:  [36.1 °C (96.9 °F)-36.6 °C (97.8 °F)] 36.1 °C (96.9 °F)  Pulse:  [81-82] 81  Resp:  [17-18] 17  BP: (124-156)/(82-84) 124/83  SpO2:  [98 %-99 %] 99 %    Physical Exam  Vitals signs and nursing note reviewed. Exam conducted with a chaperone present.   Constitutional:       General: He is not in acute distress.     Appearance: Normal appearance. He is not diaphoretic.   HENT:      Head: Normocephalic.      Nose: Nose normal.      Mouth/Throat:      Mouth: Mucous membranes are moist.   Eyes:      Pupils: Pupils are equal, round, and reactive to light.   Cardiovascular:      Rate and Rhythm: Normal rate and regular rhythm.      Pulses: Normal pulses.      Heart sounds: Normal heart sounds.   Pulmonary:      Effort: Pulmonary effort is normal.      Breath sounds: Normal breath sounds.   Abdominal:      General: Abdomen is flat. Bowel sounds are normal.      Palpations: Abdomen is soft.      Comments: Surgical dressing cdi  Bilateral wound vac in place cdi   Musculoskeletal: Normal range of motion.         General: No swelling or deformity.   Skin:     General: Skin is warm and dry.      Capillary Refill: Capillary refill takes less than 2 seconds.      Comments: L great toe minimal edema   Neurological:      General: No focal deficit present.      Mental Status: He is alert and oriented to person, place, and time.      Cranial Nerves: No cranial nerve deficit.   Psychiatric:         Mood and Affect: Mood normal.         Behavior: Behavior normal.         Fluids    Intake/Output Summary (Last 24 hours) at 11/12/2020 1508  Last data filed at 11/12/2020 1300  Gross per 24 hour   Intake 1040 ml   Output --   Net 1040 ml       Laboratory  Recent Labs     11/10/20  0400 11/11/20  0400 11/12/20  0346   WBC 7.7 6.9 5.3   RBC 3.66* 3.46*  3.40*   HEMOGLOBIN 11.7* 11.2* 11.0*   HEMATOCRIT 35.9* 33.6* 32.5*   MCV 98.1* 97.1 95.6   MCH 32.0 32.4 32.4   MCHC 32.6* 33.3* 33.8   RDW 46.5 45.1 43.8   PLATELETCT 165 182 179   MPV 9.5 10.0 9.7     Recent Labs     11/10/20  0400 11/11/20  0400 11/12/20  0346   SODIUM 137 141 135   POTASSIUM 4.5 4.2 3.5*   CHLORIDE 102 102 98   CO2 26 29 27   GLUCOSE 125* 99 97   BUN 26* 26* 24*   CREATININE 0.80 0.66 0.64   CALCIUM 9.0 9.0 8.8                   Imaging  IR-MIDLINE CATHETER INSERTION WO GUIDANCE > AGE 3   Final Result                  Ultrasound-guided midline placement performed by qualified nursing staff    as above.          DX-CHEST-PORTABLE (1 VIEW)   Final Result      Hypoventilatory chest with some increasing left midlung zone opacity suspicious for atelectasis. Consolidation cannot be excluded      EC-ECHOCARDIOGRAM COMPLETE W/O CONT   Final Result      DX-CHEST-PORTABLE (1 VIEW)   Final Result      Right central line projects over the SVC. No pneumothorax.      Cardiomegaly.      Mild interstitial prominence may represent mild edema or pneumonitis.         US-EXTREMITY ARTERY LOWER BILAT   Final Result      US-HUGO SINGLE LEVEL BILAT   Final Result      MR-FOOT-WITH & W/O LEFT   Final Result      1.  There are edema and mild enhancement of the tip of the first distal phalanx likely representing osteomyelitis.      2.  Cellulitis.      DX-TOE(S) 2+ LEFT   Final Result         1.  No acute traumatic bony injury.   2.  Slight permeative appearance of the medial aspect of the first toe distal phalanx, could represent subtle changes of osteomyelitis.      IR-ABDOMINAL AORTOGRAM    (Results Pending)        Assessment/Plan  Bilateral iliac artery occlusion (HCC)- (present on admission)  Assessment & Plan  Status post aortofemoral bypass 11/8/20  Surgery following  Continue statin and lovenox  Following  Continue wound care    Tobacco dependence- (present on admission)  Assessment & Plan  Cessation discussed  "and recommended  Pt very motivated, now refers to himself as \"a former smoker\"    Hypoxia  Assessment & Plan  Improving  Suspect due to atelectasis  Continue IS and mobilization  Titrate down as tolerated  Continue to follow    Cellulitis of great toe of left foot- (present on admission)  Assessment & Plan  Resolving  Continue bactrim per ID recommendation  Continue wound care       VTE prophylaxis: lovenox      "

## 2020-11-13 ENCOUNTER — HOME HEALTH ADMISSION (OUTPATIENT)
Dept: HOME HEALTH SERVICES | Facility: HOME HEALTHCARE | Age: 75
End: 2020-11-13
Payer: MEDICARE

## 2020-11-13 PROBLEM — R19.7 DIARRHEA: Status: ACTIVE | Noted: 2020-11-13

## 2020-11-13 LAB
ANION GAP SERPL CALC-SCNC: 9 MMOL/L (ref 7–16)
BUN SERPL-MCNC: 20 MG/DL (ref 8–22)
C DIFF DNA SPEC QL NAA+PROBE: NEGATIVE
C DIFF TOX GENS STL QL NAA+PROBE: NEGATIVE
CALCIUM SERPL-MCNC: 8.4 MG/DL (ref 8.5–10.5)
CHLORIDE SERPL-SCNC: 101 MMOL/L (ref 96–112)
CO2 SERPL-SCNC: 26 MMOL/L (ref 20–33)
CREAT SERPL-MCNC: 0.68 MG/DL (ref 0.5–1.4)
GLUCOSE SERPL-MCNC: 101 MG/DL (ref 65–99)
MAGNESIUM SERPL-MCNC: 1.9 MG/DL (ref 1.5–2.5)
POTASSIUM SERPL-SCNC: 3.4 MMOL/L (ref 3.6–5.5)
SODIUM SERPL-SCNC: 136 MMOL/L (ref 135–145)
VANCOMYCIN TROUGH SERPL-MCNC: 10.8 UG/ML (ref 10–20)

## 2020-11-13 PROCEDURE — 700102 HCHG RX REV CODE 250 W/ 637 OVERRIDE(OP): Performed by: HOSPITALIST

## 2020-11-13 PROCEDURE — 80202 ASSAY OF VANCOMYCIN: CPT

## 2020-11-13 PROCEDURE — 700111 HCHG RX REV CODE 636 W/ 250 OVERRIDE (IP): Performed by: HOSPITALIST

## 2020-11-13 PROCEDURE — A9270 NON-COVERED ITEM OR SERVICE: HCPCS | Performed by: STUDENT IN AN ORGANIZED HEALTH CARE EDUCATION/TRAINING PROGRAM

## 2020-11-13 PROCEDURE — A9270 NON-COVERED ITEM OR SERVICE: HCPCS | Performed by: SURGERY

## 2020-11-13 PROCEDURE — 700111 HCHG RX REV CODE 636 W/ 250 OVERRIDE (IP): Performed by: INTERNAL MEDICINE

## 2020-11-13 PROCEDURE — 83735 ASSAY OF MAGNESIUM: CPT

## 2020-11-13 PROCEDURE — 700111 HCHG RX REV CODE 636 W/ 250 OVERRIDE (IP): Performed by: SURGERY

## 2020-11-13 PROCEDURE — 700102 HCHG RX REV CODE 250 W/ 637 OVERRIDE(OP): Performed by: STUDENT IN AN ORGANIZED HEALTH CARE EDUCATION/TRAINING PROGRAM

## 2020-11-13 PROCEDURE — A9270 NON-COVERED ITEM OR SERVICE: HCPCS | Performed by: HOSPITALIST

## 2020-11-13 PROCEDURE — 99232 SBSQ HOSP IP/OBS MODERATE 35: CPT | Performed by: HOSPITALIST

## 2020-11-13 PROCEDURE — 700105 HCHG RX REV CODE 258: Performed by: STUDENT IN AN ORGANIZED HEALTH CARE EDUCATION/TRAINING PROGRAM

## 2020-11-13 PROCEDURE — 87493 C DIFF AMPLIFIED PROBE: CPT

## 2020-11-13 PROCEDURE — 700105 HCHG RX REV CODE 258: Performed by: INTERNAL MEDICINE

## 2020-11-13 PROCEDURE — 700102 HCHG RX REV CODE 250 W/ 637 OVERRIDE(OP): Performed by: SURGERY

## 2020-11-13 PROCEDURE — 770021 HCHG ROOM/CARE - ISO PRIVATE

## 2020-11-13 PROCEDURE — 700111 HCHG RX REV CODE 636 W/ 250 OVERRIDE (IP): Performed by: STUDENT IN AN ORGANIZED HEALTH CARE EDUCATION/TRAINING PROGRAM

## 2020-11-13 PROCEDURE — 80048 BASIC METABOLIC PNL TOTAL CA: CPT

## 2020-11-13 RX ORDER — POTASSIUM CHLORIDE 20 MEQ/1
20 TABLET, EXTENDED RELEASE ORAL ONCE
Status: COMPLETED | OUTPATIENT
Start: 2020-11-13 | End: 2020-11-13

## 2020-11-13 RX ORDER — MAGNESIUM SULFATE HEPTAHYDRATE 40 MG/ML
2 INJECTION, SOLUTION INTRAVENOUS ONCE
Status: COMPLETED | OUTPATIENT
Start: 2020-11-13 | End: 2020-11-13

## 2020-11-13 RX ADMIN — ENOXAPARIN SODIUM 40 MG: 40 INJECTION SUBCUTANEOUS at 10:42

## 2020-11-13 RX ADMIN — METOPROLOL TARTRATE 25 MG: 25 TABLET, FILM COATED ORAL at 04:05

## 2020-11-13 RX ADMIN — ATORVASTATIN CALCIUM 40 MG: 40 TABLET, FILM COATED ORAL at 16:45

## 2020-11-13 RX ADMIN — POTASSIUM CHLORIDE 20 MEQ: 1500 TABLET, EXTENDED RELEASE ORAL at 08:21

## 2020-11-13 RX ADMIN — MAGNESIUM SULFATE 2 G: 2 INJECTION INTRAVENOUS at 09:14

## 2020-11-13 RX ADMIN — VANCOMYCIN HYDROCHLORIDE 1000 MG: 500 INJECTION, POWDER, LYOPHILIZED, FOR SOLUTION INTRAVENOUS at 03:36

## 2020-11-13 RX ADMIN — METOPROLOL TARTRATE 25 MG: 25 TABLET, FILM COATED ORAL at 22:21

## 2020-11-13 RX ADMIN — NICOTINE TRANSDERMAL SYSTEM 21 MG: 21 PATCH, EXTENDED RELEASE TRANSDERMAL at 04:05

## 2020-11-13 RX ADMIN — VANCOMYCIN HYDROCHLORIDE 1000 MG: 500 INJECTION, POWDER, LYOPHILIZED, FOR SOLUTION INTRAVENOUS at 16:45

## 2020-11-13 RX ADMIN — CEFTRIAXONE SODIUM 2 G: 2 INJECTION, POWDER, FOR SOLUTION INTRAMUSCULAR; INTRAVENOUS at 06:23

## 2020-11-13 RX ADMIN — METOPROLOL TARTRATE 25 MG: 25 TABLET, FILM COATED ORAL at 14:16

## 2020-11-13 ASSESSMENT — ENCOUNTER SYMPTOMS
EYES NEGATIVE: 1
ABDOMINAL PAIN: 0
FOCAL WEAKNESS: 0
BLURRED VISION: 0
MYALGIAS: 0
WEAKNESS: 0
ROS GI COMMENTS: SURGICAL SITE PAIN
PSYCHIATRIC NEGATIVE: 1
WEIGHT LOSS: 0
DIARRHEA: 1
FEVER: 0
SORE THROAT: 0
NEUROLOGICAL NEGATIVE: 1
CARDIOVASCULAR NEGATIVE: 1
HEADACHES: 0
COUGH: 0
MUSCULOSKELETAL NEGATIVE: 1
NAUSEA: 0
CONSTITUTIONAL NEGATIVE: 1
CHILLS: 0
PALPITATIONS: 0
RESPIRATORY NEGATIVE: 1
SHORTNESS OF BREATH: 0
DIAPHORESIS: 0
DIZZINESS: 0
VOMITING: 0
DEPRESSION: 0
BRUISES/BLEEDS EASILY: 0

## 2020-11-13 ASSESSMENT — PATIENT HEALTH QUESTIONNAIRE - PHQ9
2. FEELING DOWN, DEPRESSED, IRRITABLE, OR HOPELESS: NOT AT ALL
1. LITTLE INTEREST OR PLEASURE IN DOING THINGS: NOT AT ALL
SUM OF ALL RESPONSES TO PHQ9 QUESTIONS 1 AND 2: 0

## 2020-11-13 ASSESSMENT — LIFESTYLE VARIABLES: SUBSTANCE_ABUSE: 0

## 2020-11-13 NOTE — DISCHARGE PLANNING
Anticipated Discharge Disposition: Home with HH     Action: Spoke to pt at bedside, pt states he has someone at home to assist him. Pt gave verbal choice for [facility] and [facility] if he needs home O2. Choice forms faxed to Daniella MORROW    Barriers to Discharge: Medical Clearance, HH acceptance, O2 delivery if needed.    Plan: f/u with medical team, pt, CCA

## 2020-11-13 NOTE — PROGRESS NOTES
[facility] Medicine Daily Progress Note    Date of Service  11/14/2020    Chief Complaint  75 y.o. male admitted 11/3/2020 with left great toe pain    Hospital Course  Patient is a 74 y.o. male with past medical history of lymphoma, peripheral vascular disease who continues to smoke 3 packs/day.  He presented with a two week history of left great toe pain that began with an ingrown toe nail that was subsequently removed. On October 30, he went to an urgent care facility and was given a one-time dose of intramuscular ceftriaxone and prescribed Bactrim.  He was found to have severe peripheral arterial disease with bilateral iliac artery occlusion and with resulting distal limb ischemia and great toe osteomyelitis.  On 11/8/20, he underwent an aortofemoral bypass with Dr. Morrison.  Post operatively he developed an ileus, which has now resolved.    Interval Problem Update  He has had several episodes of diarrhea today, denies abdominal pain no n/v  Surgical site pain 3/10, no sob, no cp  Ros otherwise negative  He has financial concerns - case mgt asked to review with pt  He remains axox3, very reluctant to go to snf or acute rehab - agrees to continue working with PT  Discussed with nursing and case mgt    Consultants/Specialty  LPS  Surgery Morrison    Code Status  Full Code    Disposition  Acute rehab    Review of Systems  Review of Systems   Constitutional: Negative.  Negative for chills, diaphoresis, fever, malaise/fatigue and weight loss.   HENT: Negative.  Negative for sore throat.    Eyes: Negative.  Negative for blurred vision.   Respiratory: Negative.  Negative for cough and shortness of breath.    Cardiovascular: Negative.  Negative for chest pain, palpitations and leg swelling.   Gastrointestinal: Positive for diarrhea. Negative for abdominal pain, nausea and vomiting.        Surgical site pain   Genitourinary: Negative.  Negative for dysuria.   Musculoskeletal: Negative.  Negative for myalgias.        Pain at  surgical sites   Skin: Negative.  Negative for itching and rash.   Neurological: Negative.  Negative for dizziness, focal weakness, weakness and headaches.   Endo/Heme/Allergies: Negative.  Does not bruise/bleed easily.   Psychiatric/Behavioral: Negative.  Negative for depression, substance abuse and suicidal ideas.   All other systems reviewed and are negative.       Physical Exam  Temp:  [36.4 °C (97.5 °F)-36.9 °C (98.4 °F)] 36.4 °C (97.6 °F)  Pulse:  [77-88] 77  Resp:  [17-18] 18  BP: (131-155)/(76-85) 131/76  SpO2:  [92 %-97 %] 97 %    Physical Exam  Vitals signs and nursing note reviewed. Exam conducted with a chaperone present.   Constitutional:       General: He is not in acute distress.     Appearance: Normal appearance. He is not diaphoretic.   HENT:      Head: Normocephalic.      Nose: Nose normal.      Mouth/Throat:      Mouth: Mucous membranes are moist.   Eyes:      Pupils: Pupils are equal, round, and reactive to light.   Cardiovascular:      Rate and Rhythm: Normal rate and regular rhythm.      Pulses: Normal pulses.      Heart sounds: Normal heart sounds.   Pulmonary:      Effort: Pulmonary effort is normal.      Breath sounds: Normal breath sounds.   Abdominal:      General: Abdomen is flat. Bowel sounds are normal.      Palpations: Abdomen is soft.      Comments: Surgical dressing cdi  Bilateral wound vac in place cdi   Musculoskeletal: Normal range of motion.         General: No swelling or deformity.   Skin:     General: Skin is warm and dry.      Capillary Refill: Capillary refill takes less than 2 seconds.      Comments: L great toe minimal edema   Neurological:      General: No focal deficit present.      Mental Status: He is alert and oriented to person, place, and time.      Cranial Nerves: No cranial nerve deficit.   Psychiatric:         Mood and Affect: Mood normal.         Behavior: Behavior normal.         Fluids    Intake/Output Summary (Last 24 hours) at 11/14/2020 8792  Last data  filed at 11/14/2020 0848  Gross per 24 hour   Intake 720 ml   Output --   Net 720 ml       Laboratory  Recent Labs     11/12/20  0346 11/14/20  0534   WBC 5.3 4.6*   RBC 3.40* 3.33*   HEMOGLOBIN 11.0* 11.0*   HEMATOCRIT 32.5* 30.9*   MCV 95.6 92.8   MCH 32.4 33.0   MCHC 33.8 35.6*   RDW 43.8 42.2   PLATELETCT 179 192   MPV 9.7 9.4     Recent Labs     11/12/20  0346 11/13/20  0332 11/14/20  0534   SODIUM 135 136 137   POTASSIUM 3.5* 3.4* 3.3*   CHLORIDE 98 101 103   CO2 27 26 26   GLUCOSE 97 101* 106*   BUN 24* 20 16   CREATININE 0.64 0.68 0.64   CALCIUM 8.8 8.4* 8.0*                   Imaging  IR-MIDLINE CATHETER INSERTION WO GUIDANCE > AGE 3   Final Result                  Ultrasound-guided midline placement performed by qualified nursing staff    as above.          DX-CHEST-PORTABLE (1 VIEW)   Final Result      Hypoventilatory chest with some increasing left midlung zone opacity suspicious for atelectasis. Consolidation cannot be excluded      EC-ECHOCARDIOGRAM COMPLETE W/O CONT   Final Result      DX-CHEST-PORTABLE (1 VIEW)   Final Result      Right central line projects over the SVC. No pneumothorax.      Cardiomegaly.      Mild interstitial prominence may represent mild edema or pneumonitis.         US-EXTREMITY ARTERY LOWER BILAT   Final Result      US-HUGO SINGLE LEVEL BILAT   Final Result      MR-FOOT-WITH & W/O LEFT   Final Result      1.  There are edema and mild enhancement of the tip of the first distal phalanx likely representing osteomyelitis.      2.  Cellulitis.      DX-TOE(S) 2+ LEFT   Final Result         1.  No acute traumatic bony injury.   2.  Slight permeative appearance of the medial aspect of the first toe distal phalanx, could represent subtle changes of osteomyelitis.      IR-ABDOMINAL AORTOGRAM    (Results Pending)        Assessment/Plan  Bilateral iliac artery occlusion (HCC)- (present on admission)  Assessment & Plan  Status post aortofemoral bypass 11/8/20  Surgery following  Continue  "statin and lovenox  Following  Continue wound care    Tobacco dependence- (present on admission)  Assessment & Plan  Cessation discussed and recommended  Pt very motivated, now refers to himself as \"a former smoker\"    Diarrhea  Assessment & Plan  Following  C dif studies pending    Hypoxia  Assessment & Plan  Continues to slowly improve  Suspect due to atelectasis  Continue IS and mobilization  Continue to titrate down as tolerated  Continue to follow    Cellulitis of great toe of left foot- (present on admission)  Assessment & Plan  Resolving  Continue vanc and ceftriaxone per ID recommendation  Continue wound care       VTE prophylaxis: lovenox      "

## 2020-11-13 NOTE — CARE PLAN
Problem: Venous Thromboembolism (VTW)/Deep Vein Thrombosis (DVT) Prevention:  Goal: Patient will participate in Venous Thrombosis (VTE)/Deep Vein Thrombosis (DVT)Prevention Measures  Outcome: PROGRESSING AS EXPECTED  Flowsheets  Taken 11/13/2020 0902  Mechanical Prophylaxis: SCDs, Sequential Compression Device  Taken 11/13/2020 0821  Pharmacologic Prophylaxis Used: LMWH: Enoxaparin(Lovenox)     Problem: Bowel/Gastric:  Goal: Will not experience complications related to bowel motility  Outcome: PROGRESSING SLOWER THAN EXPECTED  Patient having loose stools. Sample for C. Diff will be obtained and sent to lab.

## 2020-11-13 NOTE — FACE TO FACE
Face to Face Supporting Documentation - Home Health    The encounter with this patient was in whole or in part the primary reason for home health admission.    Date of encounter:   Patient:                    MRN:                       YOB: 2018  Ronal Hair  [id number]  1945     Home health to see patient for:  Skilled Nursing care for assessment, interventions & education    Skilled need for:  Surgical Aftercare bypass    Skilled nursing interventions to include:  Wound Care and Comment: exam wound care    Homebound status evidenced by:  Needs the assistance of another person in order to leave the home. Leaving home requires a considerable and taxing effort. There is a normal inability to leave the home.    Community Physician to provide follow up care: Shakira Henriquez M.D.     Optional Interventions? No      I certify the face to face encounter for this home health care referral meets the CMS requirements and the encounter/clinical assessment with the patient was, in whole, or in part, for the medical condition(s) listed above, which is the primary reason for home health care. Based on my clinical findings: the service(s) are medically necessary, support the need for home health care, and the homebound criteria are met.  I certify that this patient has had a face to face encounter by myself.  Ying Chou M.D. - NPI: [id number]

## 2020-11-13 NOTE — PROGRESS NOTES
"Pharmacy Kinetics 75 y.o. male on vancomycin day # 11 2020    Currently on Vancomycin 1000 mg iv q12hr (0400, 1600)     Indication for Treatment: L-great toe osteomyelitis     Proposed duration of treatment: 6 weeks with plans to transition to PO Bactrim + Augmentin (Stop date 20) at discharge per ID.     Pertinent history per medical record: Admitted on 11/3/2020 for persistent left great toe pain following removal of ingrown toe nail on 10/21.  He was receiving Bactrim as an outpatient without improvement.  MRI-foot with findings of osteomyelitis of the L-great toe.  He underwent an aorto-bifemoral bypass on  and was hypotensive post-op, thus requiring transfer to the ICU. Discharged from ICU on .     Other antibiotics: Ceftriaxone 2 g q24hr    Allergies: Vicodin [hydrocodone-acetaminophen]     List concerns for renal function: BUN/SCr ratio > 20:1, BMI 30, low albumin, age >65  Pertinent cultures to date:   - 11/3 Blood Cx: Negative    Recent Labs     20  0400 20  0346   WBC 6.9 5.3   NEUTSPOLYS 75.20* 73.90*     Recent Labs     20  0400 20  0346 20  0332   BUN 26* 24* 20   CREATININE 0.66 0.64 0.68   ALBUMIN  --  2.8*  --      Recent Labs     20  0332   VANCOTROUGH 10.8       Intake/Output Summary (Last 24 hours) at 2020 0830  Last data filed at 2020 1832  Gross per 24 hour   Intake 840 ml   Output --   Net 840 ml      /80   Pulse 83   Temp 35.8 °C (96.5 °F) (Temporal)   Resp 17   Ht 1.727 m (5' 7.99\")   Wt 92.1 kg (203 lb 0.7 oz)   SpO2 96%  Temp (24hrs), Av.6 °C (97.9 °F), Min:35.8 °C (96.5 °F), Max:37.3 °C (99.1 °F)      A/P   1. Vancomycin dose: continue Vancomycin 1000 mg Q 12 hr (10.5 mg/kg) (0400, 1600)   2. Next vancomycin level: 3-4 days, not ordered  3. Goal trough: 10-15 mcg/ml  4. Comments: Vanco + CTX for osteo of L-great toe. Blood cultures negative, renal function stable?, historical dosing not available. Dosing " done per troughs which have remained consistently at goal for previous 3 troughs, no follow-up trough ordered at this time due to stable renal function and trough levels at goal. Pharmacy will continue follow renal function, cultures, troughs, and patient's clinical status to guide subsequent recommendations.      Mykel Tran, Pharmacy Intern

## 2020-11-13 NOTE — DISCHARGE PLANNING
Agency/Facility Name: [facility]   Referral sent per Choice form @ 1548    Agency/Facility Name: [facility]   Referral sent per Choice form @ 1545      Agency/Facility Name: [facility]   Outcome: Patient Accepted

## 2020-11-13 NOTE — PROGRESS NOTES
Vascular Surgery     Awake Alert  Wants to go home   Tolerating diet   Having multiple loose stools     Incision OK     Preveenas in place    Check C-Diff  Hold stool softeners   Ambulate     Jimi Morrison MD

## 2020-11-13 NOTE — DISCHARGE PLANNING
[facility] Transitional Care Coordination     Referral from:  Deborah   Facesheet indicates: SCP  Potential Rehab Diagnosis:Aortobifemoral bypass Debility     Chart review indicates patient has on going medical management and therapy needs to possibly meet skilled nursing criteria.        Physiatry consultation denied per protocol.        Debility anticipate needs can be met at a skilled nursing level of care. No physiatry consult ordered per protocol.      Thank you for the referral.

## 2020-11-14 LAB
ANION GAP SERPL CALC-SCNC: 8 MMOL/L (ref 7–16)
BASOPHILS # BLD AUTO: 0.4 % (ref 0–1.8)
BASOPHILS # BLD: 0.02 K/UL (ref 0–0.12)
BUN SERPL-MCNC: 16 MG/DL (ref 8–22)
CALCIUM SERPL-MCNC: 8 MG/DL (ref 8.5–10.5)
CHLORIDE SERPL-SCNC: 103 MMOL/L (ref 96–112)
CO2 SERPL-SCNC: 26 MMOL/L (ref 20–33)
CREAT SERPL-MCNC: 0.64 MG/DL (ref 0.5–1.4)
EOSINOPHIL # BLD AUTO: 0.26 K/UL (ref 0–0.51)
EOSINOPHIL NFR BLD: 5.7 % (ref 0–6.9)
ERYTHROCYTE [DISTWIDTH] IN BLOOD BY AUTOMATED COUNT: 42.2 FL (ref 35.9–50)
GLUCOSE SERPL-MCNC: 106 MG/DL (ref 65–99)
HCT VFR BLD AUTO: 30.9 % (ref 42–52)
HGB BLD-MCNC: 11 G/DL (ref 14–18)
IMM GRANULOCYTES # BLD AUTO: 0.02 K/UL (ref 0–0.11)
IMM GRANULOCYTES NFR BLD AUTO: 0.4 % (ref 0–0.9)
LYMPHOCYTES # BLD AUTO: 0.49 K/UL (ref 1–4.8)
LYMPHOCYTES NFR BLD: 10.8 % (ref 22–41)
MAGNESIUM SERPL-MCNC: 2.1 MG/DL (ref 1.5–2.5)
MCH RBC QN AUTO: 33 PG (ref 27–33)
MCHC RBC AUTO-ENTMCNC: 35.6 G/DL (ref 33.7–35.3)
MCV RBC AUTO: 92.8 FL (ref 81.4–97.8)
MONOCYTES # BLD AUTO: 0.54 K/UL (ref 0–0.85)
MONOCYTES NFR BLD AUTO: 11.9 % (ref 0–13.4)
NEUTROPHILS # BLD AUTO: 3.22 K/UL (ref 1.82–7.42)
NEUTROPHILS NFR BLD: 70.8 % (ref 44–72)
NRBC # BLD AUTO: 0 K/UL
NRBC BLD-RTO: 0 /100 WBC
PLATELET # BLD AUTO: 192 K/UL (ref 164–446)
PMV BLD AUTO: 9.4 FL (ref 9–12.9)
POTASSIUM SERPL-SCNC: 3.3 MMOL/L (ref 3.6–5.5)
RBC # BLD AUTO: 3.33 M/UL (ref 4.7–6.1)
SODIUM SERPL-SCNC: 137 MMOL/L (ref 135–145)
WBC # BLD AUTO: 4.6 K/UL (ref 4.8–10.8)

## 2020-11-14 PROCEDURE — 700102 HCHG RX REV CODE 250 W/ 637 OVERRIDE(OP): Performed by: NURSE PRACTITIONER

## 2020-11-14 PROCEDURE — 83735 ASSAY OF MAGNESIUM: CPT

## 2020-11-14 PROCEDURE — 700102 HCHG RX REV CODE 250 W/ 637 OVERRIDE(OP): Performed by: SURGERY

## 2020-11-14 PROCEDURE — 700105 HCHG RX REV CODE 258: Performed by: STUDENT IN AN ORGANIZED HEALTH CARE EDUCATION/TRAINING PROGRAM

## 2020-11-14 PROCEDURE — A9270 NON-COVERED ITEM OR SERVICE: HCPCS | Performed by: SURGERY

## 2020-11-14 PROCEDURE — 700102 HCHG RX REV CODE 250 W/ 637 OVERRIDE(OP): Performed by: HOSPITALIST

## 2020-11-14 PROCEDURE — A9270 NON-COVERED ITEM OR SERVICE: HCPCS | Performed by: STUDENT IN AN ORGANIZED HEALTH CARE EDUCATION/TRAINING PROGRAM

## 2020-11-14 PROCEDURE — 770021 HCHG ROOM/CARE - ISO PRIVATE

## 2020-11-14 PROCEDURE — A9270 NON-COVERED ITEM OR SERVICE: HCPCS | Performed by: NURSE PRACTITIONER

## 2020-11-14 PROCEDURE — A9270 NON-COVERED ITEM OR SERVICE: HCPCS | Performed by: HOSPITALIST

## 2020-11-14 PROCEDURE — 85025 COMPLETE CBC W/AUTO DIFF WBC: CPT

## 2020-11-14 PROCEDURE — 700102 HCHG RX REV CODE 250 W/ 637 OVERRIDE(OP): Performed by: STUDENT IN AN ORGANIZED HEALTH CARE EDUCATION/TRAINING PROGRAM

## 2020-11-14 PROCEDURE — 700111 HCHG RX REV CODE 636 W/ 250 OVERRIDE (IP): Performed by: SURGERY

## 2020-11-14 PROCEDURE — 80048 BASIC METABOLIC PNL TOTAL CA: CPT

## 2020-11-14 PROCEDURE — 36415 COLL VENOUS BLD VENIPUNCTURE: CPT

## 2020-11-14 PROCEDURE — 99232 SBSQ HOSP IP/OBS MODERATE 35: CPT | Performed by: HOSPITALIST

## 2020-11-14 PROCEDURE — 700111 HCHG RX REV CODE 636 W/ 250 OVERRIDE (IP): Performed by: STUDENT IN AN ORGANIZED HEALTH CARE EDUCATION/TRAINING PROGRAM

## 2020-11-14 PROCEDURE — 700111 HCHG RX REV CODE 636 W/ 250 OVERRIDE (IP): Performed by: INTERNAL MEDICINE

## 2020-11-14 PROCEDURE — 700105 HCHG RX REV CODE 258: Performed by: INTERNAL MEDICINE

## 2020-11-14 RX ORDER — TRAZODONE HYDROCHLORIDE 50 MG/1
50 TABLET ORAL
Status: DISCONTINUED | OUTPATIENT
Start: 2020-11-14 | End: 2020-11-15 | Stop reason: HOSPADM

## 2020-11-14 RX ORDER — POTASSIUM CHLORIDE 20 MEQ/1
60 TABLET, EXTENDED RELEASE ORAL ONCE
Status: COMPLETED | OUTPATIENT
Start: 2020-11-14 | End: 2020-11-14

## 2020-11-14 RX ADMIN — TRAZODONE HYDROCHLORIDE 50 MG: 50 TABLET ORAL at 23:35

## 2020-11-14 RX ADMIN — METOPROLOL TARTRATE 25 MG: 25 TABLET, FILM COATED ORAL at 04:18

## 2020-11-14 RX ADMIN — METOPROLOL TARTRATE 25 MG: 25 TABLET, FILM COATED ORAL at 14:17

## 2020-11-14 RX ADMIN — VANCOMYCIN HYDROCHLORIDE 1000 MG: 500 INJECTION, POWDER, LYOPHILIZED, FOR SOLUTION INTRAVENOUS at 16:25

## 2020-11-14 RX ADMIN — CEFTRIAXONE SODIUM 2 G: 2 INJECTION, POWDER, FOR SOLUTION INTRAMUSCULAR; INTRAVENOUS at 05:58

## 2020-11-14 RX ADMIN — VANCOMYCIN HYDROCHLORIDE 1000 MG: 500 INJECTION, POWDER, LYOPHILIZED, FOR SOLUTION INTRAVENOUS at 04:18

## 2020-11-14 RX ADMIN — METOPROLOL TARTRATE 25 MG: 25 TABLET, FILM COATED ORAL at 22:27

## 2020-11-14 RX ADMIN — ATORVASTATIN CALCIUM 40 MG: 40 TABLET, FILM COATED ORAL at 17:29

## 2020-11-14 RX ADMIN — NICOTINE TRANSDERMAL SYSTEM 21 MG: 21 PATCH, EXTENDED RELEASE TRANSDERMAL at 04:18

## 2020-11-14 RX ADMIN — ACETAMINOPHEN 650 MG: 325 TABLET, FILM COATED ORAL at 23:34

## 2020-11-14 RX ADMIN — ENOXAPARIN SODIUM 40 MG: 40 INJECTION SUBCUTANEOUS at 10:30

## 2020-11-14 RX ADMIN — POTASSIUM CHLORIDE 60 MEQ: 1500 TABLET, EXTENDED RELEASE ORAL at 10:30

## 2020-11-14 ASSESSMENT — ENCOUNTER SYMPTOMS
WEAKNESS: 0
BLURRED VISION: 0
CARDIOVASCULAR NEGATIVE: 1
ABDOMINAL PAIN: 0
NAUSEA: 0
PSYCHIATRIC NEGATIVE: 1
NEUROLOGICAL NEGATIVE: 1
ROS GI COMMENTS: SURGICAL SITE PAIN
BRUISES/BLEEDS EASILY: 0
FEVER: 0
COUGH: 0
VOMITING: 0
DIAPHORESIS: 0
SHORTNESS OF BREATH: 0
MUSCULOSKELETAL NEGATIVE: 1
SORE THROAT: 0
EYES NEGATIVE: 1
RESPIRATORY NEGATIVE: 1
DIZZINESS: 0
CONSTITUTIONAL NEGATIVE: 1
MYALGIAS: 0
PALPITATIONS: 0
CHILLS: 0
HEADACHES: 0
FOCAL WEAKNESS: 0
DIARRHEA: 1
DEPRESSION: 0
WEIGHT LOSS: 0

## 2020-11-14 ASSESSMENT — LIFESTYLE VARIABLES: SUBSTANCE_ABUSE: 0

## 2020-11-14 ASSESSMENT — FIBROSIS 4 INDEX: FIB4 SCORE: 2.15

## 2020-11-14 NOTE — CARE PLAN
Problem: Safety  Goal: Will remain free from injury  Outcome: PROGRESSING AS EXPECTED  Patient calls appropriately when in need of assistance.     Problem: Knowledge Deficit  Goal: Knowledge of disease process/condition, treatment plan, diagnostic tests, and medications will improve  Outcome: PROGRESSING AS EXPECTED  Patient aware of POC.  All questions answered.

## 2020-11-14 NOTE — PROGRESS NOTES
Assumed care of patient at 0700. Patient is alert and oriented, respirations are unlabored and regular on room air. Lung sounds clear throughout, diminished in bases. Abdomen soft, slightly tender, +bowel sounds, +BM. Midline incision with CDI island dressing. Bilateral groin incisions with bilateral prevena, secured appropriately. Numbness/tingling to left foot. Left great toe wound, daily wound care. BLE edema +2 pitting, scrotum edema improving. Bed in lowest position, call light within reach, patient states no needs at this time.

## 2020-11-14 NOTE — PROGRESS NOTES
"Vascular    Events  11/14: MARQUES, alert, conversant, pain controlled, tolerating PO, BM normal now, CDiff negative     Vitals  /76   Pulse 77   Temp 36.4 °C (97.6 °F) (Temporal)   Resp 18   Ht 1.727 m (5' 7.99\")   Wt 92.1 kg (203 lb 0.7 oz)   SpO2 97%   BMI 30.88 kg/m²     Exam  Alert, conversant   Bandages CDI  Feet well perfused    Labs  WBC 5 -> 4.6 today  Hgb stable at 11  Creatinine normal    A/P)  11/8/20: ABFB with 16x8 dacron     Doing well  Diet as tolerated  Ambulate as tolerated  Change bandages tomorrow    Home tomorrow if home health arranged      Calvin Luciano MD  [facility] (General and Vascular Surgery)  Cell: [phone] (text or call is fine, if you don't reach me please try my office)  Office: [phone]  __________________________________________________________________  Patient:Ronal Hair   MRN:[id number]   CSN:[id number]      "

## 2020-11-14 NOTE — DISCHARGE PLANNING
ATTN: Case Management  RE: Referral for Home Health    As of 11/13/2020, we have accepted the Home Health referral for the patient listed above.    A [facility] clinician will be out to see the patient within 48 hours. If you have any questions or concerns regarding the patient's transition to Home Health, please do not hesitate to contact us at [phone].      We look forward to collaborating with you,  [facility] Home Health Team

## 2020-11-14 NOTE — PROGRESS NOTES
Hospital Medicine Daily Progress Note    Date of Service  11/14/2020    Chief Complaint  75 y.o. male admitted 11/3/2020 with left great toe pain    Hospital Course  Patient is a 74 y.o. male with past medical history of lymphoma, peripheral vascular disease who continues to smoke 3 packs/day.  He presented with a two week history of left great toe pain that began with an ingrown toe nail that was subsequently removed. On October 30, he went to an urgent care facility and was given a one-time dose of intramuscular ceftriaxone and prescribed Bactrim.  He was found to have severe peripheral arterial disease with bilateral iliac artery occlusion and with resulting distal limb ischemia and great toe osteomyelitis.  On 11/8/20, he underwent an aortofemoral bypass with Dr. Morrison.  Post operatively he developed an ileus, which has now resolved.    Interval Problem Update  Normal bm today, no nausea or vomiting, c dif studies negative, surgical site pain 3/10  Discussed with surgery, no sob, no cp, correcting potassium. Weaned off oxygen, now stable on room air  Ros otherwise negative  axox3  Discussed with nursing and case mgt    Consultants/Specialty  LPS  Surgery Morrison    Code Status  Full Code    Disposition  He has declined rehab, plan to d/c home with home health in am if stable    Review of Systems  Review of Systems   Constitutional: Negative.  Negative for chills, diaphoresis, fever, malaise/fatigue and weight loss.   HENT: Negative.  Negative for sore throat.    Eyes: Negative.  Negative for blurred vision.   Respiratory: Negative.  Negative for cough and shortness of breath.    Cardiovascular: Negative.  Negative for chest pain, palpitations and leg swelling.   Gastrointestinal: Positive for diarrhea. Negative for abdominal pain, nausea and vomiting.        Surgical site pain   Genitourinary: Negative.  Negative for dysuria.   Musculoskeletal: Negative.  Negative for myalgias.        Pain at surgical sites    Skin: Negative.  Negative for itching and rash.   Neurological: Negative.  Negative for dizziness, focal weakness, weakness and headaches.   Endo/Heme/Allergies: Negative.  Does not bruise/bleed easily.   Psychiatric/Behavioral: Negative.  Negative for depression, substance abuse and suicidal ideas.   All other systems reviewed and are negative.       Physical Exam  Temp:  [36.4 °C (97.5 °F)-36.9 °C (98.4 °F)] 36.4 °C (97.6 °F)  Pulse:  [77-90] 90  Resp:  [17-18] 18  BP: (131-155)/(76-85) 152/80  SpO2:  [92 %-97 %] 97 %    Physical Exam  Vitals signs and nursing note reviewed. Exam conducted with a chaperone present.   Constitutional:       General: He is not in acute distress.     Appearance: Normal appearance. He is not diaphoretic.   HENT:      Head: Normocephalic.      Nose: Nose normal.      Mouth/Throat:      Mouth: Mucous membranes are moist.   Eyes:      Pupils: Pupils are equal, round, and reactive to light.   Cardiovascular:      Rate and Rhythm: Normal rate and regular rhythm.      Pulses: Normal pulses.      Heart sounds: Normal heart sounds.   Pulmonary:      Effort: Pulmonary effort is normal.      Breath sounds: Normal breath sounds.   Abdominal:      General: Abdomen is flat. Bowel sounds are normal.      Palpations: Abdomen is soft.      Comments: Surgical dressing cdi  Bilateral wound vac in place cdi   Musculoskeletal: Normal range of motion.         General: No swelling or deformity.   Skin:     General: Skin is warm and dry.      Capillary Refill: Capillary refill takes less than 2 seconds.      Comments: L great toe edema has resolved   Neurological:      General: No focal deficit present.      Mental Status: He is alert and oriented to person, place, and time.      Cranial Nerves: No cranial nerve deficit.   Psychiatric:         Mood and Affect: Mood normal.         Behavior: Behavior normal.         Fluids    Intake/Output Summary (Last 24 hours) at 11/14/2020 0821  Last data filed at  11/14/2020 1312  Gross per 24 hour   Intake 840 ml   Output --   Net 840 ml       Laboratory  Recent Labs     11/12/20  0346 11/14/20  0534   WBC 5.3 4.6*   RBC 3.40* 3.33*   HEMOGLOBIN 11.0* 11.0*   HEMATOCRIT 32.5* 30.9*   MCV 95.6 92.8   MCH 32.4 33.0   MCHC 33.8 35.6*   RDW 43.8 42.2   PLATELETCT 179 192   MPV 9.7 9.4     Recent Labs     11/12/20  0346 11/13/20  0332 11/14/20  0534   SODIUM 135 136 137   POTASSIUM 3.5* 3.4* 3.3*   CHLORIDE 98 101 103   CO2 27 26 26   GLUCOSE 97 101* 106*   BUN 24* 20 16   CREATININE 0.64 0.68 0.64   CALCIUM 8.8 8.4* 8.0*                   Imaging  IR-MIDLINE CATHETER INSERTION WO GUIDANCE > AGE 3   Final Result                  Ultrasound-guided midline placement performed by qualified nursing staff    as above.          DX-CHEST-PORTABLE (1 VIEW)   Final Result      Hypoventilatory chest with some increasing left midlung zone opacity suspicious for atelectasis. Consolidation cannot be excluded      EC-ECHOCARDIOGRAM COMPLETE W/O CONT   Final Result      DX-CHEST-PORTABLE (1 VIEW)   Final Result      Right central line projects over the SVC. No pneumothorax.      Cardiomegaly.      Mild interstitial prominence may represent mild edema or pneumonitis.         US-EXTREMITY ARTERY LOWER BILAT   Final Result      US-HUGO SINGLE LEVEL BILAT   Final Result      MR-FOOT-WITH & W/O LEFT   Final Result      1.  There are edema and mild enhancement of the tip of the first distal phalanx likely representing osteomyelitis.      2.  Cellulitis.      DX-TOE(S) 2+ LEFT   Final Result         1.  No acute traumatic bony injury.   2.  Slight permeative appearance of the medial aspect of the first toe distal phalanx, could represent subtle changes of osteomyelitis.      IR-ABDOMINAL AORTOGRAM    (Results Pending)        Assessment/Plan  Bilateral iliac artery occlusion (HCC)- (present on admission)  Assessment & Plan  Status post aortofemoral bypass 11/8/20  Surgery following, plan to d/c home in  "am if stable  Continue statin and lovenox  Following  Continue wound care    Tobacco dependence- (present on admission)  Assessment & Plan  Cessation discussed and recommended  Pt very motivated, now refers to himself as \"a former smoker\"    Diarrhea  Assessment & Plan  Following  C dif studies pending    Hypoxia  Assessment & Plan  resolved  Suspect due to atelectasis  Continue IS and mobilization      Cellulitis of great toe of left foot- (present on admission)  Assessment & Plan  Resolving  Continue vanc and ceftriaxone per ID recommendation - plan to transition to oral bactrim at discharge  Continue wound care       VTE prophylaxis: lovenox      "

## 2020-11-14 NOTE — PROGRESS NOTES
"Pharmacy Kinetics   75 y.o. male on vancomycin day # 12  2020    Currently on Vancomycin 1000 mg iv q12hr (0400, 1600)  Provider specified end date: 6 weeks with plans to transition to PO Bactrim + Augmentin (Stop date 20) at discharge per ID.    Indication for Treatment: L-great toe osteomyelitis    Pertinent history per medical record: Admitted on 11/3/2020 for persistent left great toe pain following removal of ingrown toe nail on 10/21.  He was receiving Bactrim as an outpatient without improvement.  MRI-foot with findings of osteomyelitis of the L-great toe.  He underwent an aorto-bifemoral bypass on  and was hypotensive post-op, thus requiring transfer to the ICU. Discharged from ICU on , with tentative plan to discharge home on 11/15.     Other antibiotics: ceftriaxone 2g iv q24h    Allergies: Vicodin [hydrocodone-acetaminophen]     List concerns for renal function: BUN/SCr ratio > 20:1, BMI 30, low albumin, age >65    Pertinent cultures to date:   11/3 BCx: ngtd    MRSA nares swab if pneumonia is a concern (ordered/positive/negative/n-a): n/1    Recent Labs     20  0346 20  0534   WBC 5.3 4.6*   NEUTSPOLYS 73.90* 70.80     Recent Labs     20  0346 20  0332 20  0534   BUN 24* 20 16   CREATININE 0.64 0.68 0.64   ALBUMIN 2.8*  --   --      Recent Labs     20  0332   VANCAscension [place] 10.8       Intake/Output Summary (Last 24 hours) at 2020 1517  Last data filed at 2020 1312  Gross per 24 hour   Intake 840 ml   Output --   Net 840 ml      /80   Pulse 90   Temp 36.4 °C (97.6 °F) (Temporal)   Resp 18   Ht 1.727 m (5' 7.99\")   Wt 92.1 kg (203 lb 0.7 oz)   SpO2 97%  Temp (24hrs), Av.6 °C (97.9 °F), Min:36.4 °C (97.5 °F), Max:36.9 °C (98.4 °F)      A/P   1. Vancomycin dose change: None  2. Next vancomycin level: 2-3 days; not ordered  3. Goal trough: 10-15 mcg/mL  4. Comments: Renal function adequate and stable; Scr appears to be at " baseline, estimated CrCl > 100mL/min. Previous trough within goal. Will continue current vancomycin dose and tentatively plan to check another vancomycin trough in a few days, pending renal function. Of note, per vascular possible discharge home tomorrow.     Elaine Jordan, AnnD

## 2020-11-15 ENCOUNTER — PHARMACY VISIT (OUTPATIENT)
Dept: PHARMACY | Facility: MEDICAL CENTER | Age: 75
End: 2020-11-15
Payer: COMMERCIAL

## 2020-11-15 VITALS
TEMPERATURE: 98.5 F | SYSTOLIC BLOOD PRESSURE: 129 MMHG | BODY MASS INDEX: 30.74 KG/M2 | HEIGHT: 68 IN | RESPIRATION RATE: 16 BRPM | DIASTOLIC BLOOD PRESSURE: 51 MMHG | WEIGHT: 202.82 LBS | OXYGEN SATURATION: 99 % | HEART RATE: 79 BPM

## 2020-11-15 PROBLEM — I74.5 BILATERAL ILIAC ARTERY OCCLUSION (HCC): Status: RESOLVED | Noted: 2020-11-08 | Resolved: 2020-11-15

## 2020-11-15 PROBLEM — L03.032 CELLULITIS OF GREAT TOE OF LEFT FOOT: Status: RESOLVED | Noted: 2020-11-11 | Resolved: 2020-11-15

## 2020-11-15 PROBLEM — R19.7 DIARRHEA: Status: RESOLVED | Noted: 2020-11-13 | Resolved: 2020-11-15

## 2020-11-15 PROBLEM — R09.02 HYPOXIA: Status: RESOLVED | Noted: 2020-11-12 | Resolved: 2020-11-15

## 2020-11-15 PROBLEM — Z78.9 NO CONTRAINDICATION TO DEEP VEIN THROMBOSIS (DVT) PROPHYLAXIS: Status: RESOLVED | Noted: 2020-11-11 | Resolved: 2020-11-15

## 2020-11-15 LAB
ANION GAP SERPL CALC-SCNC: 8 MMOL/L (ref 7–16)
BASOPHILS # BLD AUTO: 0.7 % (ref 0–1.8)
BASOPHILS # BLD: 0.03 K/UL (ref 0–0.12)
BUN SERPL-MCNC: 14 MG/DL (ref 8–22)
CALCIUM SERPL-MCNC: 8.3 MG/DL (ref 8.5–10.5)
CHLORIDE SERPL-SCNC: 102 MMOL/L (ref 96–112)
CO2 SERPL-SCNC: 24 MMOL/L (ref 20–33)
CREAT SERPL-MCNC: 0.73 MG/DL (ref 0.5–1.4)
EOSINOPHIL # BLD AUTO: 0.32 K/UL (ref 0–0.51)
EOSINOPHIL NFR BLD: 7.1 % (ref 0–6.9)
ERYTHROCYTE [DISTWIDTH] IN BLOOD BY AUTOMATED COUNT: 42 FL (ref 35.9–50)
GLUCOSE SERPL-MCNC: 103 MG/DL (ref 65–99)
HCT VFR BLD AUTO: 33.6 % (ref 42–52)
HGB BLD-MCNC: 11.5 G/DL (ref 14–18)
IMM GRANULOCYTES # BLD AUTO: 0.03 K/UL (ref 0–0.11)
IMM GRANULOCYTES NFR BLD AUTO: 0.7 % (ref 0–0.9)
LYMPHOCYTES # BLD AUTO: 0.58 K/UL (ref 1–4.8)
LYMPHOCYTES NFR BLD: 12.8 % (ref 22–41)
MCH RBC QN AUTO: 31.5 PG (ref 27–33)
MCHC RBC AUTO-ENTMCNC: 34.2 G/DL (ref 33.7–35.3)
MCV RBC AUTO: 92.1 FL (ref 81.4–97.8)
MONOCYTES # BLD AUTO: 0.53 K/UL (ref 0–0.85)
MONOCYTES NFR BLD AUTO: 11.7 % (ref 0–13.4)
NEUTROPHILS # BLD AUTO: 3.04 K/UL (ref 1.82–7.42)
NEUTROPHILS NFR BLD: 67 % (ref 44–72)
NRBC # BLD AUTO: 0 K/UL
NRBC BLD-RTO: 0 /100 WBC
PLATELET # BLD AUTO: 225 K/UL (ref 164–446)
PMV BLD AUTO: 9.7 FL (ref 9–12.9)
POTASSIUM SERPL-SCNC: 3.5 MMOL/L (ref 3.6–5.5)
RBC # BLD AUTO: 3.65 M/UL (ref 4.7–6.1)
SODIUM SERPL-SCNC: 134 MMOL/L (ref 135–145)
WBC # BLD AUTO: 4.5 K/UL (ref 4.8–10.8)

## 2020-11-15 PROCEDURE — 99239 HOSP IP/OBS DSCHRG MGMT >30: CPT | Performed by: HOSPITALIST

## 2020-11-15 PROCEDURE — 36415 COLL VENOUS BLD VENIPUNCTURE: CPT

## 2020-11-15 PROCEDURE — 700105 HCHG RX REV CODE 258: Performed by: STUDENT IN AN ORGANIZED HEALTH CARE EDUCATION/TRAINING PROGRAM

## 2020-11-15 PROCEDURE — A9270 NON-COVERED ITEM OR SERVICE: HCPCS | Performed by: STUDENT IN AN ORGANIZED HEALTH CARE EDUCATION/TRAINING PROGRAM

## 2020-11-15 PROCEDURE — 85025 COMPLETE CBC W/AUTO DIFF WBC: CPT

## 2020-11-15 PROCEDURE — 700111 HCHG RX REV CODE 636 W/ 250 OVERRIDE (IP): Performed by: STUDENT IN AN ORGANIZED HEALTH CARE EDUCATION/TRAINING PROGRAM

## 2020-11-15 PROCEDURE — A9270 NON-COVERED ITEM OR SERVICE: HCPCS | Performed by: SURGERY

## 2020-11-15 PROCEDURE — 80048 BASIC METABOLIC PNL TOTAL CA: CPT

## 2020-11-15 PROCEDURE — 700102 HCHG RX REV CODE 250 W/ 637 OVERRIDE(OP): Performed by: SURGERY

## 2020-11-15 PROCEDURE — 700111 HCHG RX REV CODE 636 W/ 250 OVERRIDE (IP): Performed by: INTERNAL MEDICINE

## 2020-11-15 PROCEDURE — 700102 HCHG RX REV CODE 250 W/ 637 OVERRIDE(OP): Performed by: STUDENT IN AN ORGANIZED HEALTH CARE EDUCATION/TRAINING PROGRAM

## 2020-11-15 PROCEDURE — RXMED WILLOW AMBULATORY MEDICATION CHARGE: Performed by: HOSPITALIST

## 2020-11-15 PROCEDURE — A9270 NON-COVERED ITEM OR SERVICE: HCPCS | Performed by: HOSPITALIST

## 2020-11-15 PROCEDURE — 700102 HCHG RX REV CODE 250 W/ 637 OVERRIDE(OP): Performed by: HOSPITALIST

## 2020-11-15 PROCEDURE — 700105 HCHG RX REV CODE 258: Performed by: INTERNAL MEDICINE

## 2020-11-15 RX ORDER — AMOXICILLIN AND CLAVULANATE POTASSIUM 875; 125 MG/1; MG/1
1 TABLET, FILM COATED ORAL 2 TIMES DAILY
Qty: 70 TAB | Refills: 0 | Status: SHIPPED | OUTPATIENT
Start: 2020-11-15 | End: 2020-12-20

## 2020-11-15 RX ORDER — ACETAMINOPHEN 325 MG/1
650 TABLET ORAL EVERY 6 HOURS PRN
Qty: 30 TAB | Refills: 0 | Status: SHIPPED | OUTPATIENT
Start: 2020-11-15 | End: 2023-01-06

## 2020-11-15 RX ORDER — SULFAMETHOXAZOLE AND TRIMETHOPRIM 800; 160 MG/1; MG/1
1 TABLET ORAL 2 TIMES DAILY
Qty: 70 TAB | Refills: 0 | Status: SHIPPED | OUTPATIENT
Start: 2020-11-15 | End: 2020-12-20

## 2020-11-15 RX ORDER — TRAMADOL HYDROCHLORIDE 50 MG/1
50-100 TABLET ORAL EVERY 6 HOURS PRN
Qty: 20 TAB | Refills: 0 | Status: SHIPPED | OUTPATIENT
Start: 2020-11-15 | End: 2020-11-18

## 2020-11-15 RX ORDER — POTASSIUM CHLORIDE 20 MEQ/1
40 TABLET, EXTENDED RELEASE ORAL ONCE
Status: COMPLETED | OUTPATIENT
Start: 2020-11-15 | End: 2020-11-15

## 2020-11-15 RX ADMIN — POTASSIUM CHLORIDE 40 MEQ: 1500 TABLET, EXTENDED RELEASE ORAL at 08:53

## 2020-11-15 RX ADMIN — NICOTINE TRANSDERMAL SYSTEM 21 MG: 21 PATCH, EXTENDED RELEASE TRANSDERMAL at 04:40

## 2020-11-15 RX ADMIN — METOPROLOL TARTRATE 25 MG: 25 TABLET, FILM COATED ORAL at 04:40

## 2020-11-15 RX ADMIN — CEFTRIAXONE SODIUM 2 G: 2 INJECTION, POWDER, FOR SOLUTION INTRAMUSCULAR; INTRAVENOUS at 07:14

## 2020-11-15 RX ADMIN — VANCOMYCIN HYDROCHLORIDE 1000 MG: 500 INJECTION, POWDER, LYOPHILIZED, FOR SOLUTION INTRAVENOUS at 04:41

## 2020-11-15 NOTE — DISCHARGE PLANNING
Meds-to-Beds: Discharge prescription orders listed below delivered to patient's bedside. RN notified. Patient counseled.       Marcello Hair   Home Medication Instructions TESSIE:[id number]    Printed on:11/15/20 3355   Medication Information                      acetaminophen (TYLENOL) 325 MG Tab  Take 2 Tabs by mouth every 6 hours as needed (Mild Pain; (Pain scale 1-3); Temp greater than 100.5 F).             amoxicillin-clavulanate (AUGMENTIN) 875-125 MG Tab  Take 1 Tab by mouth 2 times a day for 35 days.             metoprolol (LOPRESSOR) 25 MG Tab  Take 1 Tab by mouth every 8 hours.             sulfamethoxazole-trimethoprim (BACTRIM DS) 800-160 MG tablet  Take 1 Tab by mouth 2 times a day for 35 days.                 Isaiah Nunez, Pharmacy Intern

## 2020-11-15 NOTE — DISCHARGE SUMMARY
Continue home medication with sliding scale of insulin   Discharge Summary    CHIEF COMPLAINT ON ADMISSION  Chief Complaint   Patient presents with   • Toe Pain     L great toe. s/p nail removal infected.    • Sent by MD     sent by PCP to r/o osteo       Reason for Admission  Right Big Toe Pain     Admission Date  11/3/2020    CODE STATUS  Full Code    HPI & HOSPITAL COURSE  Patient is a 74 y.o. male with past medical history of lymphoma and peripheral vascular disease who smoked 3 packs/day prior to his admission.  He presented with a two week history of left great toe pain that began with an ingrown toe nail that was subsequently removed. On October 30, he went to an urgent care facility and was given a one-time dose of intramuscular ceftriaxone and prescribed Bactrim.  He was found to have severe peripheral arterial disease with bilateral iliac artery occlusion and with resulting distal limb ischemia and great toe osteomyelitis.  On 11/8/20, he underwent an aortofemoral bypass with Dr. Morrison. He tolerated this procedure well and the blood flow to his left leg has subsequently greatly improved and the cellulitis of his great toe has resolved.  He was followed by ID here and will continue a course of oral antibiotics through 12/20/20.  He is being discharged with prevna devices on his wound and home health will follow him.  Post operatively he developed an ileus, which has now resolved. He is tolerating a regular diet and has been cleared by PT.  He is highly motivated and his intention is to maintain nicotine cessation.  He agrees to close follow up with surgery and his pcp and to return to the er if needed.    Therefore, he is discharged in fair and stable condition to home with organized home healthcare and close outpatient follow-up.    The patient met 2-midnight criteria for an inpatient stay at the time of discharge.    Discharge Date  11/15/20    FOLLOW UP ITEMS POST DISCHARGE  Home health  Pcp  surgery    DISCHARGE DIAGNOSES  Active Problems:    Osteomyelitis  of left foot (HCC) POA: Yes    Peripheral arterial disease (HCC) POA: Yes    Abdominal aortic aneurysm (AAA) without rupture (HCC) POA: Yes    Tobacco dependence POA: Yes    Follicular lymphoma (HCC) POA: Yes  Resolved Problems:    Bilateral iliac artery occlusion (HCC) POA: Yes    Hypoxia POA: Unknown    Diarrhea POA: Unknown    Cellulitis of great toe of left foot POA: Yes    No contraindication to deep vein thrombosis (DVT) prophylaxis POA: Yes      FOLLOW UP  No future appointments.  [address] [place] [facility].  [place] [address]  [phone]        Shakira Henriquez M.D.  [address]   [address]  [facility] [address]  [phone]    In 1 week      Jimi Morrison M.D.  [address]  [address]  [place] [address]  [phone]    Schedule an appointment as soon as possible for a visit on 11/19/2020  Please call to schedule a follow-up visit on 11/19/20 with Dr. Morrison for staple removal.        MEDICATIONS ON DISCHARGE     Medication List      START taking these medications      Instructions   acetaminophen 325 MG Tabs  Commonly known as: Tylenol   Take 2 Tabs by mouth every 6 hours as needed (Mild Pain; (Pain scale 1-3); Temp greater than 100.5 F).  Dose: 650 mg     amoxicillin-clavulanate 875-125 MG Tabs  Commonly known as: AUGMENTIN   Take 1 Tab by mouth 2 times a day for 35 days.  Dose: 1 Tab     metoprolol 25 MG Tabs  Commonly known as: LOPRESSOR   Doctor's comments: Hold sbp less 110 or hr less 55  Take 1 Tab by mouth every 8 hours.  Dose: 25 mg     traMADol 50 MG Tabs  Commonly known as: ULTRAM   Take 1-2 Tabs by mouth every 6 hours as needed for Moderate Pain or Severe Pain for up to 3 days.  Dose:  mg        CONTINUE taking these medications      Instructions   atorvastatin 40 MG Tabs  Commonly known as: LIPITOR   Take 40 mg by mouth every evening.  Dose: 40 mg     ondansetron 4 MG Tabs tablet  Commonly known as: ZOFRAN   Take 1 Tab by mouth every four hours as needed for Nausea/Vomiting (for  nausea, vomiting).  Dose: 4 mg     prochlorperazine 10 MG Tabs  Commonly known as: COMPAZINE   Take 1 Tab by mouth every 6 hours as needed (for nausea, vomiting).  Dose: 10 mg     sulfamethoxazole-trimethoprim 800-160 MG tablet  Commonly known as: BACTRIM DS   Take 1 Tab by mouth 2 times a day for 35 days.  Dose: 1 Tab            Allergies  Allergies   Allergen Reactions   • Vicodin [Hydrocodone-Acetaminophen]      nausea       DIET  Orders Placed This Encounter   Procedures   • Diet Order Diet: Regular     Standing Status:   Standing     Number of Occurrences:   1     Order Specific Question:   Diet:     Answer:   Regular [1]       ACTIVITY  As tolerated.  Weight bearing as tolerated    CONSULTATIONS  surgery    PROCEDURES  IR-MIDLINE CATHETER INSERTION WO GUIDANCE > AGE 3   Final Result                  Ultrasound-guided midline placement performed by qualified nursing staff    as above.          DX-CHEST-PORTABLE (1 VIEW)   Final Result      Hypoventilatory chest with some increasing left midlung zone opacity suspicious for atelectasis. Consolidation cannot be excluded      EC-ECHOCARDIOGRAM COMPLETE W/O CONT   Final Result      DX-CHEST-PORTABLE (1 VIEW)   Final Result      Right central line projects over the SVC. No pneumothorax.      Cardiomegaly.      Mild interstitial prominence may represent mild edema or pneumonitis.         US-EXTREMITY ARTERY LOWER BILAT   Final Result      US-HUGO SINGLE LEVEL BILAT   Final Result      MR-FOOT-WITH & W/O LEFT   Final Result      1.  There are edema and mild enhancement of the tip of the first distal phalanx likely representing osteomyelitis.      2.  Cellulitis.      DX-TOE(S) 2+ LEFT   Final Result         1.  No acute traumatic bony injury.   2.  Slight permeative appearance of the medial aspect of the first toe distal phalanx, could represent subtle changes of osteomyelitis.      IR-ABDOMINAL AORTOGRAM    (Results Pending)         LABORATORY  Lab Results   Component  Value Date    SODIUM 134 (L) 11/15/2020    POTASSIUM 3.5 (L) 11/15/2020    CHLORIDE 102 11/15/2020    CO2 24 11/15/2020    GLUCOSE 103 (H) 11/15/2020    BUN 14 11/15/2020    CREATININE 0.73 11/15/2020        Lab Results   Component Value Date    WBC 4.5 (L) 11/15/2020    HEMOGLOBIN 11.5 (L) 11/15/2020    HEMATOCRIT 33.6 (L) 11/15/2020    PLATELETCT 225 11/15/2020        Total time of the discharge process exceeds 45 minutes.

## 2020-11-15 NOTE — PROGRESS NOTES
Assumed care of patient at bedside report from NOC RN. Updated on POC. Patient currently A & O x 4; on room air; up self, steady gait; without complaints of acute pain. Call light within reach. Whiteboard updated. Fall precautions in place. Bed locked and in lowest position. All questions answered. No other needs indicated at this time.

## 2020-11-15 NOTE — CARE PLAN
Problem: Communication  Goal: The ability to communicate needs accurately and effectively will improve  Outcome: PROGRESSING AS EXPECTED  Intervention: Educate patient and significant other/support system about the plan of care, procedures, treatments, medications and allow for questions  Note: Whiteboard updated. Discussed daily POC. Discussed MD rounding. Pt asked appropriate questions. Pt verbalized understanding.  Discussed pending d/c     Problem: Knowledge Deficit  Goal: Knowledge of the prescribed therapeutic regimen will improve  Outcome: PROGRESSING AS EXPECTED  Note: Discussed morning medications. Pt verbalized understanding of education provided.

## 2020-11-15 NOTE — DISCHARGE INSTRUCTIONS
DISCHARGE INSTRUCTIONS   ----------------------------------------------   Procedure: Aortobifemoral bypass   1. DIET: Upon discharge from the hospital it is recommended you eat a soft diet for 1-2 weeks. After that you may resume your normal preoperative diet.   2. ACTIVITIES: After discharge from the hospital, you may resume full routine activities. However, there should be no heavy lifting (greater than 15 pounds) and no strenuous activities for 6 weeks after your operation. Otherwise, routine activities of daily living such as walking and going up and down stairs are acceptable.   3. DRIVING: You may drive whenever you are off pain medications and are able to perform the activities needed to drive, i.e. turning, bending, twisting, etc.   4. BATHING/WOUND: OK to shower anytime, bandages are waterproof. OK to remove your bandages 1 day after discharge and leave bandages off. You only need to cover the incisions again if there is drainage from the incisions. Also apply betadine to left toe ulceration once or twice a day.   5. BOWEL FUNCTION: Constipation is common after an operation, especially with pain medications. The combination of pain medication and decreased activity level can cause constipation in otherwise normal patients. If you feel this is occurring, take a laxative (Milk of Magnesia, Ex-Lax, Senokot, Miralax, Magnesium Citrate) until the problem has resolved.   6. PAIN MEDICATION: You will be given a prescription for pain medication at discharge. Please take these as directed. It is important to remember not to take medications on an empty stomach as this may cause nausea.   7.CALL IF YOU HAVE: (1) Fevers to more than 101.5F, (2) Unusual chest or leg pain, (3) Drainage or fluid from incision that may be foul smelling, increased tenderness or soreness at the wound or the wound edges are no longer together, redness or swelling at the incision site. Please do not hesitate to call with any other questions.    8. APPOINTMENT: Please call our office at [phone] to schedule your first follow-up visit with Dr. Morrison on 11/19/20   Office address:   [facility]   [address] [place], [place] [address] [facility], [address] [place] [address] [phone]     Embolectomy and Thrombectomy, Care After  Refer to this sheet in the next few weeks. These discharge instructions provide you with general information on caring for yourself after you leave the hospital. Your health care provider may also give you specific instructions. Your treatment has been planned according to the most current medical practices available, but unavoidable complications sometimes occur. If you have any problems or questions after discharge, call your health care provider.  HOME CARE INSTRUCTIONS  · You will probably be put on blood thinners. Take them as directed.  · Tell your health care provider about any unusual bruising or bleeding, including nosebleeds, blood in your urine, blood in your stools, or if you are vomiting blood. Blood in the stools may be black and tarry or red.  · Keep your follow-up appointments to check how thin your blood is.  · Change your bandages (dressings) as instructed.  · Do not swim, bathe, or shower except as allowed by your health care provider.  · Do not smoke.  · Do not drink alcohol.  · Do not use any tobacco products including cigarettes, chewing tobacco, or electronic cigarettes.  SEEK IMMEDIATE MEDICAL CARE IF:  · You have bleeding from the surgical site.  · You have a sudden pain in the foot, leg, hand, or arm.  · You have sudden abdominal pain.  · You have a sudden facial droop, weakness on one side of your body, or trouble speaking.  · You have bloody stools.  · You vomit blood.  · You suddenly feel short of breath, weak, or dizzy.  · You have chest pain.  · You have drainage, redness, swelling, or pain at the surgery site.  · You have a fever.  MAKE SURE YOU:  · Understand these instructions.  · Will watch your condition.  · Will  get help right away if you are not doing well or get worse.     This information is not intended to replace advice given to you by your health care provider. Make sure you discuss any questions you have with your health care provider.     Document Released: 04/13/2012 Document Revised: 01/08/2016 Document Reviewed: 04/13/2012  [employer] Interactive Patient Education ©2016 [employer]  Discharge Instructions    Discharged to home by car with relative. Discharged via wheelchair, hospital escort: Yes.  Special equipment needed: Not Applicable    Be sure to schedule a follow-up appointment with your primary care doctor or any specialists as instructed.     Discharge Plan:   Influenza Vaccine Indication: Patient Refuses    I understand that a diet low in cholesterol, fat, and sodium is recommended for good health. Unless I have been given specific instructions below for another diet, I accept this instruction as my diet prescription.   Other diet: Regular    Special Instructions: None    · Is patient discharged on Warfarin / Coumadin?   No   Amoxicillin; Clavulanic Acid tablets  What is this medicine?  AMOXICILLIN; CLAVULANIC ACID (a mox i HUNG in; DOROTEO lawson ic AS id) is a penicillin antibiotic. It is used to treat certain kinds of bacterial infections. It will not work for colds, flu, or other viral infections.  This medicine may be used for other purposes; ask your health care provider or pharmacist if you have questions.  COMMON BRAND NAME(S): Augmentin  What should I tell my health care provider before I take this medicine?  They need to know if you have any of these conditions:  · bowel disease, like colitis  · kidney disease  · liver disease  · mononucleosis  · an unusual or allergic reaction to amoxicillin, penicillin, cephalosporin, other antibiotics, clavulanic acid, other medicines, foods, dyes, or preservatives  · pregnant or trying to get pregnant  · breast-feeding  How should I use this medicine?  Take  this medicine by mouth with a full glass of water. Follow the directions on the prescription label. Take at the start of a meal. Do not crush or chew. If the tablet has a score line, you may cut it in half at the score line for easier swallowing. Take your medicine at regular intervals. Do not take your medicine more often than directed. Take all of your medicine as directed even if you think you are better. Do not skip doses or stop your medicine early.  Talk to your pediatrician regarding the use of this medicine in children. Special care may be needed.  Overdosage: If you think you have taken too much of this medicine contact a poison control center or emergency room at once.  NOTE: This medicine is only for you. Do not share this medicine with others.  What if I miss a dose?  If you miss a dose, take it as soon as you can. If it is almost time for your next dose, take only that dose. Do not take double or extra doses.  What may interact with this medicine?  · allopurinol  · anticoagulants  · birth control pills  · methotrexate  · probenecid  This list may not describe all possible interactions. Give your health care provider a list of all the medicines, herbs, non-prescription drugs, or dietary supplements you use. Also tell them if you smoke, drink alcohol, or use illegal drugs. Some items may interact with your medicine.  What should I watch for while using this medicine?  Tell your doctor or healthcare provider if your symptoms do not improve.  This medicine may cause serious skin reactions. They can happen weeks to months after starting the medicine. Contact your healthcare provider right away if you notice fevers or flu-like symptoms with a rash. The rash may be red or purple and then turn into blisters or peeling of the skin. Or, you might notice a red rash with swelling of the face, lips or lymph nodes in your neck or under your arms.  Do not treat diarrhea with over the counter products. Contact your  doctor if you have diarrhea that lasts more than 2 days or if it is severe and watery.  If you have diabetes, you may get a false-positive result for sugar in your urine. Check with your doctor or healthcare provider.  Birth control pills may not work properly while you are taking this medicine. Talk to your doctor about using an extra method of birth control.  What side effects may I notice from receiving this medicine?  Side effects that you should report to your doctor or health care professional as soon as possible:  · allergic reactions like skin rash, itching or hives, swelling of the face, lips, or tongue  · breathing problems  · dark urine  · fever or chills, sore throat  · redness, blistering, peeling, or loosening of the skin, including inside the mouth  · seizures  · trouble passing urine or change in the amount of urine  · unusual bleeding, bruising  · unusually weak or tired  · white patches or sores in the mouth or throat  Side effects that usually do not require medical attention (report to your doctor or health care professional if they continue or are bothersome):  · diarrhea  · dizziness  · headache  · nausea, vomiting  · stomach upset  · vaginal or anal irritation  This list may not describe all possible side effects. Call your doctor for medical advice about side effects. You may report side effects to FDA at 1-428-FDA-0024.  Where should I keep my medicine?  Keep out of the reach of children.  Store at room temperature below 25 degrees C (77 degrees F). Keep container tightly closed. Throw away any unused medicine after the expiration date.  NOTE: This sheet is a summary. It may not cover all possible information. If you have questions about this medicine, talk to your doctor, pharmacist, or health care provider.  © 2020 Elsevier/Gold Standard (2020-03-02 09:43:46)  Metoprolol tablets  What is this medicine?  METOPROLOL (me TOE proe lole) is a beta-blocker. Beta-blockers reduce the workload on  the heart and help it to beat more regularly. This medicine is used to treat high blood pressure and to prevent chest pain. It is also used to after a heart attack and to prevent an additional heart attack from occurring.  This medicine may be used for other purposes; ask your health care provider or pharmacist if you have questions.  COMMON BRAND NAME(S): Lopressor  What should I tell my health care provider before I take this medicine?  They need to know if you have any of these conditions:  · diabetes  · heart or vessel disease like slow heart rate, worsening heart failure, heart block, sick sinus syndrome or Raynaud's disease  · kidney disease  · liver disease  · lung or breathing disease, like asthma or emphysema  · pheochromocytoma  · thyroid disease  · an unusual or allergic reaction to metoprolol, other beta-blockers, medicines, foods, dyes, or preservatives  · pregnant or trying to get pregnant  · breast-feeding  How should I use this medicine?  Take this medicine by mouth with a drink of water. Follow the directions on the prescription label. Take this medicine immediately after meals. Take your doses at regular intervals. Do not take more medicine than directed. Do not stop taking this medicine suddenly. This could lead to serious heart-related effects.  Talk to your pediatrician regarding the use of this medicine in children. Special care may be needed.  Overdosage: If you think you have taken too much of this medicine contact a poison control center or emergency room at once.  NOTE: This medicine is only for you. Do not share this medicine with others.  What if I miss a dose?  If you miss a dose, take it as soon as you can. If it is almost time for your next dose, take only that dose. Do not take double or extra doses.  What may interact with this medicine?  This medicine may interact with the following medications:  · certain medicines for blood pressure, heart disease, irregular heart beat  · certain  medicines for depression like monoamine oxidase (MAO) inhibitors, fluoxetine, or paroxetine  · clonidine  · dobutamine  · epinephrine  · isoproterenol  · reserpine  This list may not describe all possible interactions. Give your health care provider a list of all the medicines, herbs, non-prescription drugs, or dietary supplements you use. Also tell them if you smoke, drink alcohol, or use illegal drugs. Some items may interact with your medicine.  What should I watch for while using this medicine?  Visit your doctor or health care professional for regular check ups. Contact your doctor right away if your symptoms worsen. Check your blood pressure and pulse rate regularly. Ask your health care professional what your blood pressure and pulse rate should be, and when you should contact them.  You may get drowsy or dizzy. Do not drive, use machinery, or do anything that needs mental alertness until you know how this medicine affects you. Do not sit or stand up quickly, especially if you are an older patient. This reduces the risk of dizzy or fainting spells. Contact your doctor if these symptoms continue. Alcohol may interfere with the effect of this medicine. Avoid alcoholic drinks.  This medicine may increase blood sugar. Ask your healthcare provider if changes in diet or medicines are needed if you have diabetes.  What side effects may I notice from receiving this medicine?  Side effects that you should report to your doctor or health care professional as soon as possible:  · allergic reactions like skin rash, itching or hives  · cold or numb hands or feet  · depression  · difficulty breathing  · faint  · fever with sore throat  · irregular heartbeat, chest pain  · rapid weight gain  ·   signs and symptoms of high blood sugar such as being more thirsty or hungry or having to urinate more than normal. You may also feel very tired or have blurry vision.  · swollen legs or ankles  Side effects that usually do not  require medical attention (report to your doctor or health care professional if they continue or are bothersome):  · anxiety or nervousness  · change in sex drive or performance  · dry skin  · headache  · nightmares or trouble sleeping  · short term memory loss  · stomach upset or diarrhea  This list may not describe all possible side effects. Call your doctor for medical advice about side effects. You may report side effects to FDA at 1-700-WSU-1315.  Where should I keep my medicine?  Keep out of the reach of children.  Store at room temperature between 15 and 30 degrees C (59 and 86 degrees F). Throw away any unused medicine after the expiration date.  NOTE: This sheet is a summary. It may not cover all possible information. If you have questions about this medicine, talk to your doctor, pharmacist, or health care provider.  © 2020 Elsevier/Gold Standard (2019-10-08 11:15:23)    Tramadol tablets  What is this medicine?  TRAMADOL (TRA ma dole) is a pain reliever. It is used to treat moderate to severe pain in adults.  This medicine may be used for other purposes; ask your health care provider or pharmacist if you have questions.  COMMON BRAND NAME(S): Ultram  What should I tell my health care provider before I take this medicine?  They need to know if you have any of these conditions:  · brain tumor  · depression  · drug abuse or addiction  · head injury  · if you frequently drink alcohol containing drinks  · kidney disease or trouble passing urine  · liver disease  · lung disease, asthma, or breathing problems  · seizures or epilepsy  · suicidal thoughts, plans, or attempt; a previous suicide attempt by you or a family member  · an unusual or allergic reaction to tramadol, codeine, other medicines, foods, dyes, or preservatives  · pregnant or trying to get pregnant  · breast-feeding  How should I use this medicine?  Take this medicine by mouth with a full glass of water. Follow the directions on the prescription  label. You can take it with or without food. If it upsets your stomach, take it with food. Do not take your medicine more often than directed.  A special MedGuide will be given to you by the pharmacist with each prescription and refill. Be sure to read this information carefully each time.  Talk to your pediatrician regarding the use of this medicine in children. Special care may be needed.  Overdosage: If you think you have taken too much of this medicine contact a poison control center or emergency room at once.  NOTE: This medicine is only for you. Do not share this medicine with others.  What if I miss a dose?  If you miss a dose, take it as soon as you can. If it is almost time for your next dose, take only that dose. Do not take double or extra doses.  What may interact with this medicine?  Do not take this medication with any of the following medicines:  · MAOIs like Carbex, Eldepryl, Marplan, Nardil, and Parnate  This medicine may also interact with the following medications:  · alcohol  · antihistamines for allergy, cough and cold  · certain medicines for anxiety or sleep  · certain medicines for depression like amitriptyline, fluoxetine, sertraline  · certain medicines for migraine headache like almotriptan, eletriptan, frovatriptan, naratriptan, rizatriptan, sumatriptan, zolmitriptan  · certain medicines for seizures like carbamazepine, oxcarbazepine, phenobarbital, primidone  · certain medicines that treat or prevent blood clots like warfarin  · digoxin  · furazolidone  · general anesthetics like halothane, isoflurane, methoxyflurane, propofol  · linezolid  · local anesthetics like lidocaine, pramoxine, tetracaine  · medicines that relax muscles for surgery  · other narcotic medicines for pain or cough  · phenothiazines like chlorpromazine, mesoridazine, prochlorperazine, thioridazine  · procarbazine  This list may not describe all possible interactions. Give your health care provider a list of all the  medicines, herbs, non-prescription drugs, or dietary supplements you use. Also tell them if you smoke, drink alcohol, or use illegal drugs. Some items may interact with your medicine.  What should I watch for while using this medicine?  Tell your doctor or health care provider if your pain does not go away, if it gets worse, or if you have new or a different type of pain. You may develop tolerance to the medicine. Tolerance means that you will need a higher dose of the medicine for pain relief. Tolerance is normal and is expected if you take this medicine for a long time.  This medicine may cause serious skin reactions. They can happen weeks to months after starting the medicine. Contact your health care provider right away if you notice fevers or flu-like symptoms with a rash. The rash may be red or purple and then turn into blisters or peeling of the skin. Or, you might notice a red rash with swelling of the face, lips or lymph nodes in your neck or under your arms.  Do not suddenly stop taking your medicine because you may develop a severe reaction. Your body becomes used to the medicine. This does NOT mean you are addicted. Addiction is a behavior related to getting and using a drug for a non-medical reason. If you have pain, you have a medical reason to take pain medicine. Your doctor will tell you how much medicine to take. If your doctor wants you to stop the medicine, the dose will be slowly lowered over time to avoid any side effects.  There are different types of narcotic medicines (opiates). If you take more than one type at the same time or if you are taking another medicine that also causes drowsiness, you may have more side effects. Give your health care provider a list of all medicines you use. Your doctor will tell you how much medicine to take. Do not take more medicine than directed. Call emergency for help if you have problems breathing or unusual sleepiness.  You may get drowsy or dizzy. Do not  drive, use machinery, or do anything that needs mental alertness until you know how this medicine affects you. Do not stand or sit up quickly, especially if you are an older patient. This reduces the risk of dizzy or fainting spells. Alcohol can increase or decrease the effects of this medicine. Avoid alcoholic drinks.  You may have constipation. Try to have a bowel movement at least every 2 to 3 days. If you do not have a bowel movement for 3 days, call your doctor or health care provider.  Your mouth may get dry. Chewing sugarless gum or sucking hard candy, and drinking plenty of water may help. Contact your doctor if the problem does not go away or is severe.  What side effects may I notice from receiving this medicine?  Side effects that you should report to your doctor or health care professional as soon as possible:  · allergic reactions like skin rash, itching or hives, swelling of the face, lips, or tongue  · breathing problems  · confusion  · redness, blistering, peeling or loosening of the skin, including inside the mouth  · seizures  · signs and symptoms of low blood pressure like dizziness; feeling faint or lightheaded, falls; unusually weak or tired  · trouble passing urine or change in the amount of urine  Side effects that usually do not require medical attention (report to your doctor or health care professional if they continue or are bothersome):  · constipation  · dry mouth  · nausea, vomiting  · tiredness  This list may not describe all possible side effects. Call your doctor for medical advice about side effects. You may report side effects to FDA at 3-498-FDA-8599.  Where should I keep my medicine?  Keep out of the reach of children.  This medicine may cause accidental overdose and death if it taken by other adults, children, or pets. Mix any unused medicine with a substance like cat litter or coffee grounds. Then throw the medicine away in a sealed container like a sealed bag or a coffee can  with a lid. Do not use the medicine after the expiration date.  Store at room temperature between 15 and 30 degrees C (59 and 86 degrees F).  NOTE: This sheet is a summary. It may not cover all possible information. If you have questions about this medicine, talk to your doctor, pharmacist, or health care provider.  © 2020 Elsevier/Gold Standard (2020-03-27 15:56:48)    Depression / Suicide Risk    As you are discharged from this [facility] facility, it is important to learn how to keep safe from harming yourself.    Recognize the warning signs:  · Abrupt changes in personality, positive or negative- including increase in energy   · Giving away possessions  · Change in eating patterns- significant weight changes-  positive or negative  · Change in sleeping patterns- unable to sleep or sleeping all the time   · Unwillingness or inability to communicate  · Depression  · Unusual sadness, discouragement and loneliness  · Talk of wanting to die  · Neglect of personal appearance   · Rebelliousness- reckless behavior  · Withdrawal from people/activities they love  · Confusion- inability to concentrate     If you or a loved one observes any of these behaviors or has concerns about self-harm, here's what you can do:  · Talk about it- your feelings and reasons for harming yourself  · Remove any means that you might use to hurt yourself (examples: pills, rope, extension cords, firearm)  · Get professional help from the community (Mental Health, Substance Abuse, psychological counseling)  · Do not be alone:Call your Safe Contact- someone whom you trust who will be there for you.  · Call your local CRISIS HOTLINE 450-9449 or 569-687-7876  · Call your local Children's Mobile Crisis Response Team [place] (246) 904-1198 or www.[place]  · Call the toll free National Suicide Prevention Hotlines   · National Suicide Prevention Lifeline 717-460-WCMO (0833)  · National Hope Line Network 800-SUICIDE (120-6493)

## 2020-11-15 NOTE — PROGRESS NOTES
DISCHARGE INSTRUCTIONS  ----------------------------------------------  Procedure: Aortobifemoral bypass    1. DIET: Upon discharge from the hospital it is recommended you eat a soft diet for 1-2 weeks.  After that you may resume your normal preoperative diet.    2. ACTIVITIES: After discharge from the hospital, you may resume full routine activities. However, there should be no heavy lifting (greater than 15 pounds) and no strenuous activities for 6 weeks after your operation. Otherwise, routine activities of daily living such as walking and going up and down stairs are acceptable.    3. DRIVING: You may drive whenever you are off pain medications and are able to perform the activities needed to drive, i.e. turning, bending, twisting, etc.    4. BATHING/WOUND: OK to shower anytime, bandages are waterproof. OK to remove your bandages 1 day after discharge and leave bandages off. You only need to cover the incisions again if there is drainage from the incisions. Also apply betadine to left toe ulceration once or twice a day.    5. BOWEL FUNCTION: Constipation is common after an operation, especially with pain medications. The combination of pain medication and decreased activity level can cause constipation in otherwise normal patients. If you feel this is occurring, take a laxative (Milk of Magnesia, Ex-Lax, Senokot, Miralax, Magnesium Citrate) until the problem has resolved.    6. PAIN MEDICATION: You will be given a prescription for pain medication at discharge. Please take these as directed. It is important to remember not to take medications on an empty stomach as this may cause nausea.    7.CALL IF YOU HAVE: (1) Fevers to more than 101.5F, (2) Unusual chest or leg pain, (3) Drainage or fluid from incision that may be foul smelling, increased tenderness or soreness at the wound or the wound edges are no longer together, redness or swelling at the incision site. Please do not hesitate to call with any other  questions.     8. APPOINTMENT: Please call our office at [phone] to schedule your first follow-up visit with Dr. Morrison on 11/19/20    Office address:  [facility] [address], [address]   [place] [address]  [phone]

## 2020-11-15 NOTE — PROGRESS NOTES
"Vascular    Events  11/14: MARQUES, alert, conversant, pain controlled, tolerating PO, BM normal now, CDiff negative   11/15: doing well, no complaints, ambulating independently    Vitals  /51   Pulse 79   Temp 36.9 °C (98.5 °F) (Temporal)   Resp 16   Ht 1.727 m (5' 7.99\")   Wt 92 kg (202 lb 13.2 oz)   SpO2 99%   BMI 30.85 kg/m²     Exam  Alert, conversant   Bandages changed, incisions CDI  Feet well perfused    Labs  WBC 5 -> 4.6 -> 4.5 today  Hgb stable at 11  Creatinine normal    A/P)  11/8/20: ABFB with 16x8 dacron     Doing well  Diet as tolerated  Ambulate as tolerated    Home today if cleared by medicine team and home health arranged  Will be on Bactrim and Augmentin for 5 weeks  Follow-up 11/19 with Dr. Morrison - needs to call to schedule the appointment    Surgical discharge instructions and tramadol prescription printed and in the chart.    Any questions or concerns please text or call me directly at [phone] or try my office at [phone]. Thanks, Dr. Luciano (General&Vascular Surgery)    Calvin Luciano MD  [facility] (General and Vascular Surgery)  Cell: [phone] (text or call is fine, if you don't reach me please try my office)  Office: [phone]  __________________________________________________________________  Patient:Ronal Hair   MRN:[id number]   CSN:[id number]      "

## 2020-11-15 NOTE — PROGRESS NOTES
Patient discharged home with friend. All personal belongings collected. IV access removed. Discharge instructions discussed. Medications reviewed; new medication printed education provided. Follow up appointments to be scheduled by patient at earliest convenience. Patient escorted off unit via wheelchair with all personal belongings without incident.

## 2020-11-15 NOTE — PROGRESS NOTES
Patient reports little to no sleep for the past 2 nights. Paged hospitalist, tramadol 50 mg nightly ordered.

## 2020-11-17 ENCOUNTER — HOME CARE VISIT (OUTPATIENT)
Dept: HOME HEALTH SERVICES | Facility: HOME HEALTHCARE | Age: 75
End: 2020-11-17

## 2020-12-03 ENCOUNTER — HOSPITAL ENCOUNTER (OUTPATIENT)
Dept: LAB | Facility: MEDICAL CENTER | Age: 75
End: 2020-12-03
Attending: NURSE PRACTITIONER
Payer: MEDICARE

## 2020-12-03 ENCOUNTER — OFFICE VISIT (OUTPATIENT)
Dept: INFECTIOUS DISEASES | Facility: MEDICAL CENTER | Age: 75
End: 2020-12-03
Payer: MEDICARE

## 2020-12-03 VITALS
HEIGHT: 68 IN | BODY MASS INDEX: 26.92 KG/M2 | SYSTOLIC BLOOD PRESSURE: 92 MMHG | WEIGHT: 177.6 LBS | OXYGEN SATURATION: 96 % | TEMPERATURE: 98.2 F | DIASTOLIC BLOOD PRESSURE: 58 MMHG | HEART RATE: 100 BPM

## 2020-12-03 DIAGNOSIS — M86.272 SUBACUTE OSTEOMYELITIS OF LEFT FOOT (HCC): ICD-10-CM

## 2020-12-03 DIAGNOSIS — I73.9 PVD (PERIPHERAL VASCULAR DISEASE) (HCC): ICD-10-CM

## 2020-12-03 DIAGNOSIS — N17.9 AKI (ACUTE KIDNEY INJURY) (HCC): ICD-10-CM

## 2020-12-03 DIAGNOSIS — F17.200 TOBACCO DEPENDENCE: ICD-10-CM

## 2020-12-03 PROBLEM — M79.676 PAIN OF TOE: Status: ACTIVE | Noted: 2020-11-03

## 2020-12-03 PROBLEM — N40.0 BENIGN PROSTATIC HYPERPLASIA: Status: ACTIVE | Noted: 2020-02-26

## 2020-12-03 LAB
ALBUMIN SERPL BCP-MCNC: 4.2 G/DL (ref 3.2–4.9)
ALBUMIN/GLOB SERPL: 1.6 G/DL
ALP SERPL-CCNC: 75 U/L (ref 30–99)
ALT SERPL-CCNC: 21 U/L (ref 2–50)
ANION GAP SERPL CALC-SCNC: 9 MMOL/L (ref 7–16)
AST SERPL-CCNC: 20 U/L (ref 12–45)
BASOPHILS # BLD AUTO: 0.7 % (ref 0–1.8)
BASOPHILS # BLD: 0.03 K/UL (ref 0–0.12)
BILIRUB SERPL-MCNC: 0.5 MG/DL (ref 0.1–1.5)
BUN SERPL-MCNC: 18 MG/DL (ref 8–22)
CALCIUM SERPL-MCNC: 9.7 MG/DL (ref 8.5–10.5)
CHLORIDE SERPL-SCNC: 101 MMOL/L (ref 96–112)
CO2 SERPL-SCNC: 27 MMOL/L (ref 20–33)
CREAT SERPL-MCNC: 1.03 MG/DL (ref 0.5–1.4)
EOSINOPHIL # BLD AUTO: 0.36 K/UL (ref 0–0.51)
EOSINOPHIL NFR BLD: 8.3 % (ref 0–6.9)
ERYTHROCYTE [DISTWIDTH] IN BLOOD BY AUTOMATED COUNT: 42.4 FL (ref 35.9–50)
GLOBULIN SER CALC-MCNC: 2.7 G/DL (ref 1.9–3.5)
GLUCOSE SERPL-MCNC: 96 MG/DL (ref 65–99)
HCT VFR BLD AUTO: 38 % (ref 42–52)
HGB BLD-MCNC: 12.8 G/DL (ref 14–18)
IMM GRANULOCYTES # BLD AUTO: 0.02 K/UL (ref 0–0.11)
IMM GRANULOCYTES NFR BLD AUTO: 0.5 % (ref 0–0.9)
LYMPHOCYTES # BLD AUTO: 0.68 K/UL (ref 1–4.8)
LYMPHOCYTES NFR BLD: 15.6 % (ref 22–41)
MCH RBC QN AUTO: 31.3 PG (ref 27–33)
MCHC RBC AUTO-ENTMCNC: 33.7 G/DL (ref 33.7–35.3)
MCV RBC AUTO: 92.9 FL (ref 81.4–97.8)
MONOCYTES # BLD AUTO: 0.44 K/UL (ref 0–0.85)
MONOCYTES NFR BLD AUTO: 10.1 % (ref 0–13.4)
NEUTROPHILS # BLD AUTO: 2.83 K/UL (ref 1.82–7.42)
NEUTROPHILS NFR BLD: 64.8 % (ref 44–72)
NRBC # BLD AUTO: 0 K/UL
NRBC BLD-RTO: 0 /100 WBC
PLATELET # BLD AUTO: 193 K/UL (ref 164–446)
PMV BLD AUTO: 9.8 FL (ref 9–12.9)
POTASSIUM SERPL-SCNC: 4.5 MMOL/L (ref 3.6–5.5)
PROT SERPL-MCNC: 6.9 G/DL (ref 6–8.2)
RBC # BLD AUTO: 4.09 M/UL (ref 4.7–6.1)
SODIUM SERPL-SCNC: 137 MMOL/L (ref 135–145)
WBC # BLD AUTO: 4.4 K/UL (ref 4.8–10.8)

## 2020-12-03 PROCEDURE — 85025 COMPLETE CBC W/AUTO DIFF WBC: CPT

## 2020-12-03 PROCEDURE — 99214 OFFICE O/P EST MOD 30 MIN: CPT | Performed by: NURSE PRACTITIONER

## 2020-12-03 PROCEDURE — 80053 COMPREHEN METABOLIC PANEL: CPT

## 2020-12-03 PROCEDURE — 36415 COLL VENOUS BLD VENIPUNCTURE: CPT

## 2020-12-03 ASSESSMENT — ENCOUNTER SYMPTOMS
WEAKNESS: 0
ABDOMINAL PAIN: 1
VOMITING: 0
SHORTNESS OF BREATH: 0
CHILLS: 0
CONSTIPATION: 0
SORE THROAT: 0
MYALGIAS: 0
FEVER: 0
COUGH: 1
DIARRHEA: 0
NAUSEA: 0
SPUTUM PRODUCTION: 1
NERVOUS/ANXIOUS: 1
HEADACHES: 0

## 2020-12-03 ASSESSMENT — FIBROSIS 4 INDEX: FIB4 SCORE: 1.84

## 2020-12-03 ASSESSMENT — PAIN SCALES - GENERAL: PAINLEVEL: NO PAIN

## 2020-12-03 NOTE — PROGRESS NOTES
Infectious Disease Clinic    Subjective:     Chief Complaint   Patient presents with   • Hospital Follow-up     osteomyelitis of the distal phananx of the great toe     This is my first time meeting Mr. Hair.      Interval History: 74 yo male with a PMH of lymphoma, peripheral vascular disease and tobacco abuse.  Hospitalized from 11/3/2020 - 11/15/2020, admitted for persistent left great toe pain.  Underwent left great toenail removal on 10/21/2020 secondary to an ingrown toenail.  Since that time he had been complaining of increasing pain and swelling.  The pain had been so severe that it had been keeping him up at night.  On 10/30/2020, he presented to an Urgent Care facility, was given a dose of IM ceftriaxone and then prescribed Bactrim.  Despite taking a short course of Bactrim, patient did not note any clinical improvement. Given persistent pain, he presented to the ED for further evaluation and management.  On presentation, he was afebrile with a mild leukopenia of 3.6.  X-ray showed findings concerning for osteomyelitis of the left great toe.  MRI did confirm osteomyelitis of the distal phalanx of the left great toe.  HUGO showed significant peripheral vascular disease.  Status post aortobifemoral bypass on 11/8 with Dr. Morrison.  Discharged home on PO Bactrim DS BID and PO Augmentin 875/125 mg BID x 6 weeks, estimated end date 12/20/2020.       Hospital records reviewed    Today, 12/3/2020: Patient reports feeling well.  Patient states that when he was discharged on 7/15, he took the p.o. Augmentin and Bactrim that night, which made him throw up.  Says he is very sensitive to medications and because he threw up after having the abdominal/groin surgery, it was very painful and he chose not to continue with any more antibiotics until this past Monday.  On 11/30 he resumed taking the p.o. Bactrim and has a plan to restart the p.o. Augmentin next Monday.  He acknowledges that he never contacted ID to inform us  regarding his choice on how to proceed with the antibiotics.  Since resuming the p.o. Bactrim, he states that he has had occasional nausea if he has not had enough food in his system, otherwise he has tolerated it with minimal issue.  Denies feeling generally ill, fevers/chills, general malaise, headache, v/d, abdominal pain, chest pain or shortness of breath.  Says that his left great toe is doing much better, in general he does not have any pain to the toe, unless it is pushed upon.  He says that the redness typically goes away; however, he was told by Dr. Morrison to put on some kind of antibiotic liquid, after which causes redness to his toe.  Patient is unsure what medication he is putting on his toe.  Has a follow-up with Dr. Morrison this Tuesday.  Reports the abdominal and groin surgical sites are well-healed without any breakdown or drainage, denies pain overall, but notes that he has a little tenderness every now and then and with bending over.  He does have a smokers cough that brings up some clearish brown sputum occasionally.  He denies smoking since being in the hospital.    Review of Systems   Constitutional: Negative for chills, fever and malaise/fatigue.   HENT: Negative for sore throat.    Respiratory: Positive for cough (Smoker's cough) and sputum production (Occasional clear to brown). Negative for shortness of breath.    Cardiovascular: Negative for chest pain and leg swelling.   Gastrointestinal: Positive for abdominal pain. Negative for constipation, diarrhea, nausea and vomiting.   Genitourinary: Negative for dysuria.   Musculoskeletal: Positive for joint pain. Negative for myalgias.   Skin: Negative for rash.   Neurological: Negative for weakness and headaches.   Psychiatric/Behavioral: The patient is nervous/anxious.        Past Medical History:   Diagnosis Date   • Back pain    • Cancer (HCC)    • Cataract    • Hypertension    • Infectious disease        Social History     Tobacco Use   •  "Smoking status: Current Every Day Smoker     Packs/day: 1.00     Years: 54.00     Pack years: 54.00     Types: Cigarettes     Start date: 1966   Substance Use Topics   • Alcohol use: Not Currently   • Drug use: Never       Allergies: Vicodin [hydrocodone-acetaminophen]    Pt's medication and problem list reviewed.     Objective:     BP (!) 92/58 (BP Location: Left arm, Patient Position: Sitting, BP Cuff Size: Adult)   Pulse 100   Temp 36.8 °C (98.2 °F) (Temporal)   Ht 1.727 m (5' 8\") Comment: PT reported  Wt 80.6 kg (177 lb 9.6 oz)   SpO2 96%   BMI 27.00 kg/m²     Physical Exam   Constitutional: He is oriented to person, place, and time and well-developed, well-nourished, and in no distress. No distress.   Elderly   HENT:   Head: Normocephalic and atraumatic.   Right Ear: External ear normal.   Left Ear: External ear normal.   Eyes: Pupils are equal, round, and reactive to light. Conjunctivae and EOM are normal. No scleral icterus.   Neck: Normal range of motion. Neck supple. No JVD present. No tracheal deviation present.   Cardiovascular: Regular rhythm and normal heart sounds. Tachycardia present.   No murmur heard.  +1 LLE distal pulse   Pulmonary/Chest: Effort normal and breath sounds normal. No respiratory distress. He has no wheezes. He has no rales.   Abdominal: Soft. Bowel sounds are normal. He exhibits no distension. There is no abdominal tenderness. There is no rebound and no guarding.   Midline abdomen and bilateral groin surgical sites are well-healed and approximated without any breakdown or drainage, no erythema to surrounding tissue, slightly tender to palpation, no induration or fluctuance.   Musculoskeletal:         General: Tenderness present. No edema.      Comments: Left great toe-scab at site of toenail without any active drainage, trace erythema extending to mid toe, cool to touch, slightly tender to palpation, diffuse skin peeling throughout left foot.   Neurological: He is alert and " oriented to person, place, and time. No cranial nerve deficit. He exhibits normal muscle tone. Gait normal.   Skin: Skin is warm and dry. No rash noted. He is not diaphoretic. There is erythema.   Psychiatric: Mood, memory, affect and judgment normal.   Vitals reviewed.      Labs:  WBC   Date/Time Value Ref Range Status   11/15/2020 03:13 AM 4.5 (L) 4.8 - 10.8 K/uL Final     RBC   Date/Time Value Ref Range Status   11/15/2020 03:13 AM 3.65 (L) 4.70 - 6.10 M/uL Final     Hemoglobin   Date/Time Value Ref Range Status   11/15/2020 03:13 AM 11.5 (L) 14.0 - 18.0 g/dL Final     Hematocrit   Date/Time Value Ref Range Status   11/15/2020 03:13 AM 33.6 (L) 42.0 - 52.0 % Final     MCV   Date/Time Value Ref Range Status   11/15/2020 03:13 AM 92.1 81.4 - 97.8 fL Final     MCH   Date/Time Value Ref Range Status   11/15/2020 03:13 AM 31.5 27.0 - 33.0 pg Final     MCHC   Date/Time Value Ref Range Status   11/15/2020 03:13 AM 34.2 33.7 - 35.3 g/dL Final     MPV   Date/Time Value Ref Range Status   11/15/2020 03:13 AM 9.7 9.0 - 12.9 fL Final        Sodium   Date/Time Value Ref Range Status   11/15/2020 03:13  (L) 135 - 145 mmol/L Final     Potassium   Date/Time Value Ref Range Status   11/15/2020 03:13 AM 3.5 (L) 3.6 - 5.5 mmol/L Final     Chloride   Date/Time Value Ref Range Status   11/15/2020 03:13  96 - 112 mmol/L Final     Co2   Date/Time Value Ref Range Status   11/15/2020 03:13 AM 24 20 - 33 mmol/L Final     Glucose   Date/Time Value Ref Range Status   11/15/2020 03:13  (H) 65 - 99 mg/dL Final     Bun   Date/Time Value Ref Range Status   11/15/2020 03:13 AM 14 8 - 22 mg/dL Final     Creatinine   Date/Time Value Ref Range Status   11/15/2020 03:13 AM 0.73 0.50 - 1.40 mg/dL Final       Alkaline Phosphatase   Date/Time Value Ref Range Status   11/12/2020 03:46 AM 55 30 - 99 U/L Final     AST(SGOT)   Date/Time Value Ref Range Status   11/12/2020 03:46 AM 34 12 - 45 U/L Final     ALT(SGPT)   Date/Time Value Ref  Range Status   11/12/2020 03:46 AM 36 2 - 50 U/L Final     Total Bilirubin   Date/Time Value Ref Range Status   11/12/2020 03:46 AM 0.8 0.1 - 1.5 mg/dL Final        Assessment and Plan:   The following treatment plan was discussed with patient at length:    1. Subacute osteomyelitis of left foot (HCC)  CBC WITH DIFFERENTIAL    Comp Metabolic Panel    -Continue p.o. Bactrim.  Would like for patient to trial Augmentin once again, recommend taking 2 hours separate from Bactrim.  If unable to tolerate, patient to contact ID office immediately.  -Due to patient not taking antibiotics as directed, and date will now be readjusted to roughly 1/3/2021; however, duration of treatment will depend upon response to therapy.  -CBC and CMP to monitor for leukocytosis, thrombocytopenia and hepatonephrotoxicity.  Would like CBC and CMP to be done today and then repeat CMP a day or 2 prior to next follow-up appointment.  -Follow-up with Dr. Morrison as directed.   2. JULIA (acute kidney injury) (HCC)      At risk for injury to kidneys from Bactrim, CMP as above   3. PVD (peripheral vascular disease) (HCC)      Follow-up with Dr. Rincon as directed   4. Tobacco dependence      Continue with cessation as it will hinder wound healing     Follow up: 2 weeks, RTC sooner if needed. FU with PCP for ongoing chronic medical conditions.     NIRALI Rutherford.    Patient was seen for 25 minutes face to face of which > 50% of appointment time was spent on counseling and coordination of care regarding the above.       Please note that this dictation was created using voice recognition software. I have  worked with technical experts from [facility] to optimize the interface.  I have made every reasonable attempt to correct obvious errors, but there may be errors of grammar and possibly content that I did not discover before finalizing the note.

## 2020-12-16 ENCOUNTER — HOSPITAL ENCOUNTER (OUTPATIENT)
Dept: LAB | Facility: MEDICAL CENTER | Age: 75
End: 2020-12-16
Attending: NURSE PRACTITIONER
Payer: MEDICARE

## 2020-12-16 DIAGNOSIS — M86.272 SUBACUTE OSTEOMYELITIS OF LEFT FOOT (HCC): ICD-10-CM

## 2020-12-16 LAB
ALBUMIN SERPL BCP-MCNC: 4.4 G/DL (ref 3.2–4.9)
ALBUMIN/GLOB SERPL: 1.5 G/DL
ALP SERPL-CCNC: 78 U/L (ref 30–99)
ALT SERPL-CCNC: 22 U/L (ref 2–50)
ANION GAP SERPL CALC-SCNC: 11 MMOL/L (ref 7–16)
AST SERPL-CCNC: 19 U/L (ref 12–45)
BILIRUB SERPL-MCNC: 0.5 MG/DL (ref 0.1–1.5)
BUN SERPL-MCNC: 16 MG/DL (ref 8–22)
CALCIUM SERPL-MCNC: 9.4 MG/DL (ref 8.5–10.5)
CHLORIDE SERPL-SCNC: 98 MMOL/L (ref 96–112)
CO2 SERPL-SCNC: 25 MMOL/L (ref 20–33)
CREAT SERPL-MCNC: 0.72 MG/DL (ref 0.5–1.4)
GLOBULIN SER CALC-MCNC: 2.9 G/DL (ref 1.9–3.5)
GLUCOSE SERPL-MCNC: 100 MG/DL (ref 65–99)
POTASSIUM SERPL-SCNC: 4 MMOL/L (ref 3.6–5.5)
PROT SERPL-MCNC: 7.3 G/DL (ref 6–8.2)
SODIUM SERPL-SCNC: 134 MMOL/L (ref 135–145)

## 2020-12-16 PROCEDURE — 36415 COLL VENOUS BLD VENIPUNCTURE: CPT

## 2020-12-16 PROCEDURE — 80053 COMPREHEN METABOLIC PANEL: CPT

## 2020-12-17 ENCOUNTER — OFFICE VISIT (OUTPATIENT)
Dept: INFECTIOUS DISEASES | Facility: MEDICAL CENTER | Age: 75
End: 2020-12-17
Payer: MEDICARE

## 2020-12-17 VITALS
TEMPERATURE: 98.3 F | BODY MASS INDEX: 26.98 KG/M2 | DIASTOLIC BLOOD PRESSURE: 64 MMHG | OXYGEN SATURATION: 97 % | HEIGHT: 68 IN | SYSTOLIC BLOOD PRESSURE: 118 MMHG | RESPIRATION RATE: 16 BRPM | WEIGHT: 178 LBS | HEART RATE: 92 BPM

## 2020-12-17 DIAGNOSIS — N17.9 AKI (ACUTE KIDNEY INJURY) (HCC): ICD-10-CM

## 2020-12-17 DIAGNOSIS — M86.272 SUBACUTE OSTEOMYELITIS OF LEFT FOOT (HCC): ICD-10-CM

## 2020-12-17 DIAGNOSIS — I73.9 PVD (PERIPHERAL VASCULAR DISEASE) (HCC): ICD-10-CM

## 2020-12-17 DIAGNOSIS — F17.200 TOBACCO DEPENDENCE: ICD-10-CM

## 2020-12-17 PROCEDURE — 99214 OFFICE O/P EST MOD 30 MIN: CPT | Performed by: NURSE PRACTITIONER

## 2020-12-17 ASSESSMENT — ENCOUNTER SYMPTOMS
HEADACHES: 0
ABDOMINAL PAIN: 0
VOMITING: 0
SPUTUM PRODUCTION: 1
WEAKNESS: 0
CONSTIPATION: 0
SORE THROAT: 0
NAUSEA: 0
CHILLS: 0
FEVER: 0
DIARRHEA: 0
SHORTNESS OF BREATH: 0
COUGH: 1
MYALGIAS: 0
NERVOUS/ANXIOUS: 0

## 2020-12-17 ASSESSMENT — FIBROSIS 4 INDEX: FIB4 SCORE: 1.53

## 2020-12-17 NOTE — PROGRESS NOTES
Infectious Disease Clinic    Subjective:     Chief Complaint   Patient presents with   • Follow-Up     OM left foot     Interval History: 76 yo male with a PMH of lymphoma, peripheral vascular disease and tobacco abuse.  Hospitalized from 11/3/2020 - 11/15/2020, admitted for persistent left great toe pain.  Underwent left great toenail removal on 10/21/2020 secondary to an ingrown toenail.  Since that time he had been complaining of increasing pain and swelling.  The pain had been so severe that it had been keeping him up at night.  On 10/30/2020, he presented to an Urgent Care facility, was given a dose of IM ceftriaxone and then prescribed Bactrim.  Despite taking a short course of Bactrim, patient did not note any clinical improvement. Given persistent pain, he presented to the ED for further evaluation and management.  On presentation, he was afebrile with a mild leukopenia of 3.6.  X-ray showed findings concerning for osteomyelitis of the left great toe.  MRI did confirm osteomyelitis of the distal phalanx of the left great toe.  HUGO showed significant peripheral vascular disease.  Status post aortobifemoral bypass on 11/8 with Dr. Morrison.  Discharged home on PO Bactrim DS BID and PO Augmentin 875/125 mg BID x 6 weeks, estimated end date 12/20/2020.       12/3/2020: Seen by CHRISTOPHER Che.  Patient states that when he was discharged on 7/15, he took the p.o. Augmentin and Bactrim that night, which made him throw up.  Says he is very sensitive to medications and because he threw up after having the abdominal/groin surgery, it was very painful and he chose not to continue with any more antibiotics until this past Monday.  On 11/30 he resumed taking the p.o. Bactrim.  Says that his left great toe is doing much better, in general he does not have any pain to the toe, unless it is pushed upon.  He says that the redness typically goes away; however, he was told by Dr. Morrison to put on some kind of antibiotic  "liquid, after which causes redness to his toe.  Patient is unsure what medication he is putting on his toe.  Has a follow-up with Dr. Morrison this Tuesday.  Reports the abdominal and groin surgical sites are well-healed without any breakdown or drainage.  Continue p.o. Bactrim.  Would like for patient to trial Augmentin once again, recommend taking 2 hours separate from Bactrim.  If unable to tolerate, patient to contact ID office immediately.  Due to patient not taking antibiotics as directed, end date will now be readjusted to roughly 1/3/2021; however, duration of treatment will depend upon response to therapy.    Today, 12/17/2020: Has been taking both the p.o. Augmentin and Bactrim for the past couple of weeks, stating that he is tolerating both of them without issue and without any nausea or vomiting.  Denies feeling generally ill, fevers/chills, general malaise, headache, diarrhea, abdominal pain, chest pain or shortness of breath.  States that he feels about 95% overall being on the medications, \"can fill them in my body.\"  Was seen by Dr. Morrison on Tuesday, was told that everything is looking fine and that the scab to the left great toe will fall off eventually.  Was told that he no longer needs to do the Betadine over the scab, but patient states that he likes to continue to do it.  He denies any active drainage.  He states that there is no pain to the toe overall, but will get 201/10 when it is pushed upon.  All surgical sites remain well-healed.  Has another follow-up with Dr. Morrison in 6 months.  Reports smoking 4 to 5 cigarettes in the past 2 weeks, an improvement from the tube PPD history.    Review of Systems   Constitutional: Negative for chills, fever and malaise/fatigue.   HENT: Negative for sore throat.    Respiratory: Positive for cough (Smoker's cough) and sputum production (Occasional clear to brown). Negative for shortness of breath.    Cardiovascular: Negative for chest pain and leg swelling. " "  Gastrointestinal: Negative for abdominal pain, constipation, diarrhea, nausea and vomiting.   Genitourinary: Negative for dysuria.   Musculoskeletal: Positive for joint pain. Negative for myalgias.   Skin: Negative for rash.   Neurological: Negative for weakness and headaches.   Psychiatric/Behavioral: The patient is not nervous/anxious.        Past Medical History:   Diagnosis Date   • Back pain    • Cancer (HCC)    • Cataract    • Hypertension    • Infectious disease        Social History     Tobacco Use   • Smoking status: Current Every Day Smoker     Packs/day: 2.00     Years: 54.00     Pack years: 108.00     Types: Cigarettes     Start date:      Last attempt to quit: 11/3/2020     Years since quittin.1   • Smokeless tobacco: Never Used   Substance Use Topics   • Alcohol use: Not Currently   • Drug use: Never       Allergies: Vicodin [hydrocodone-acetaminophen]    Pt's medication and problem list reviewed.     Objective:     /64 (BP Location: Left arm, Patient Position: Sitting, BP Cuff Size: Adult)   Pulse 92   Temp 36.8 °C (98.3 °F) (Temporal)   Resp 16   Ht 1.727 m (5' 8\") Comment: patient reported  Wt 80.7 kg (178 lb)   SpO2 97%   BMI 27.06 kg/m²     Physical Exam   Constitutional: He is oriented to person, place, and time and well-developed, well-nourished, and in no distress. No distress.   Elderly  Smells like smoke   HENT:   Head: Normocephalic and atraumatic.   Eyes: Pupils are equal, round, and reactive to light. EOM are normal. No scleral icterus.   Neck: Normal range of motion. Neck supple. No tracheal deviation present.   Cardiovascular: Normal rate, regular rhythm and normal heart sounds.   No murmur heard.  +1 LLE distal pulse   Pulmonary/Chest: Effort normal and breath sounds normal. No respiratory distress. He has no wheezes. He has no rales.   Abdominal: Soft. Bowel sounds are normal. He exhibits no distension. There is no abdominal tenderness.   Midline abdomen and " bilateral groin surgical sites remain well-healed and approximated without any breakdown or drainage, no erythema to surrounding tissue.   Musculoskeletal:         General: Tenderness present. No edema.      Comments: Left great toe-large scab remains over nailbed without any active drainage, trace erythema to mid toe, cool to touch, slightly tender to palpation, diffuse skin peeling throughout left right toe.   Neurological: He is alert and oriented to person, place, and time. No cranial nerve deficit. He exhibits normal muscle tone. Gait normal.   Skin: Skin is warm and dry. No rash noted. He is not diaphoretic. There is erythema.   Psychiatric: Mood, memory, affect and judgment normal.   Vitals reviewed.      Labs:  WBC   Date/Time Value Ref Range Status   12/03/2020 11:26 AM 4.4 (L) 4.8 - 10.8 K/uL Final     RBC   Date/Time Value Ref Range Status   12/03/2020 11:26 AM 4.09 (L) 4.70 - 6.10 M/uL Final     Hemoglobin   Date/Time Value Ref Range Status   12/03/2020 11:26 AM 12.8 (L) 14.0 - 18.0 g/dL Final     Hematocrit   Date/Time Value Ref Range Status   12/03/2020 11:26 AM 38.0 (L) 42.0 - 52.0 % Final     MCV   Date/Time Value Ref Range Status   12/03/2020 11:26 AM 92.9 81.4 - 97.8 fL Final     MCH   Date/Time Value Ref Range Status   12/03/2020 11:26 AM 31.3 27.0 - 33.0 pg Final     MCHC   Date/Time Value Ref Range Status   12/03/2020 11:26 AM 33.7 33.7 - 35.3 g/dL Final     MPV   Date/Time Value Ref Range Status   12/03/2020 11:26 AM 9.8 9.0 - 12.9 fL Final        Sodium   Date/Time Value Ref Range Status   12/16/2020 06:44  (L) 135 - 145 mmol/L Final     Potassium   Date/Time Value Ref Range Status   12/16/2020 06:44 AM 4.0 3.6 - 5.5 mmol/L Final     Chloride   Date/Time Value Ref Range Status   12/16/2020 06:44 AM 98 96 - 112 mmol/L Final     Co2   Date/Time Value Ref Range Status   12/16/2020 06:44 AM 25 20 - 33 mmol/L Final     Glucose   Date/Time Value Ref Range Status   12/16/2020 06:44  (H)  65 - 99 mg/dL Final     Bun   Date/Time Value Ref Range Status   12/16/2020 06:44 AM 16 8 - 22 mg/dL Final     Creatinine   Date/Time Value Ref Range Status   12/16/2020 06:44 AM 0.72 0.50 - 1.40 mg/dL Final       Alkaline Phosphatase   Date/Time Value Ref Range Status   12/16/2020 06:44 AM 78 30 - 99 U/L Final     AST(SGOT)   Date/Time Value Ref Range Status   12/16/2020 06:44 AM 19 12 - 45 U/L Final     ALT(SGPT)   Date/Time Value Ref Range Status   12/16/2020 06:44 AM 22 2 - 50 U/L Final     Total Bilirubin   Date/Time Value Ref Range Status   12/16/2020 06:44 AM 0.5 0.1 - 1.5 mg/dL Final        Assessment and Plan:   The following treatment plan was discussed with patient at length:    1. Subacute osteomyelitis of left foot (HCC)  Basic Metabolic Panel    -Continue p.o. Bactrim and Augmentin, estimated end date 1/3/2021.  Patient noting that he would not want the antibiotics to be extended if felt to be warranted, at that time he would just like to have the toe amputated.  -BMP to be done in about 10 to 14 days to continue to monitor for nephrotoxicity on Bactrim.   2. JULIA (acute kidney injury) ([facility])      Stable, no need for renal adjustment to antibiotics at this time.  BMP as above.   3. PVD (peripheral vascular disease) ([facility])      Follow-up with Dr. Morrison as directed.  Can hinder wound healing and resolution of infection.   4. Tobacco dependence      Complete cessation encouraged as it can hinder wound healing and resolution of infection.     Follow up: 4 weeks or as needed if doing well off antibiotics without issue. RTC sooner if needed. FU with PCP for ongoing chronic medical conditions.     Martina Muñoz, [email or web address].[email or web address].       Please note that this dictation was created using voice recognition software. I have  worked with technical experts from [employer] to optimize the interface.  I have made every reasonable attempt to correct obvious errors, but there may be errors of grammar and possibly content  that I did not discover before finalizing the note.

## 2021-01-13 ASSESSMENT — ENCOUNTER SYMPTOMS
HEADACHES: 0
DIARRHEA: 0
ABDOMINAL PAIN: 0
MYALGIAS: 0
NERVOUS/ANXIOUS: 0
WEAKNESS: 0
CHILLS: 0
SORE THROAT: 0
SPUTUM PRODUCTION: 1
COUGH: 1
NAUSEA: 0
SHORTNESS OF BREATH: 0
CONSTIPATION: 0
VOMITING: 0
FEVER: 0

## 2021-01-13 NOTE — PROGRESS NOTES
[facility]    Subjective:     No chief complaint on file.    Interval History: 76 yo male with a PMH of lymphoma, peripheral vascular disease and tobacco abuse.  Hospitalized from 11/3/2020 - 11/15/2020, admitted for persistent left great toe pain.  Underwent left great toenail removal on 10/21/2020 secondary to an ingrown toenail.  Since that time he had been complaining of increasing pain and swelling.  The pain had been so severe that it had been keeping him up at night.  On 10/30/2020, he presented to an Urgent Care facility, was given a dose of IM ceftriaxone and then prescribed Bactrim.  Despite taking a short course of Bactrim, patient did not note any clinical improvement. Given persistent pain, he presented to the ED for further evaluation and management.  On presentation, he was afebrile with a mild leukopenia of 3.6.  X-ray showed findings concerning for osteomyelitis of the left great toe.  MRI did confirm osteomyelitis of the distal phalanx of the left great toe.  HUGO showed significant peripheral vascular disease.  Status post aortobifemoral bypass on 11/8 with Dr. Morrison.  Discharged home on PO Bactrim DS BID and PO Augmentin 875/125 mg BID x 6 weeks, estimated end date 12/20/2020.       12/3/2020: Seen by CHRISTOPHER Che.  Patient states that when he was discharged on 7/15, he took the p.o. Augmentin and Bactrim that night, which made him throw up.  Says he is very sensitive to medications and because he threw up after having the abdominal/groin surgery, it was very painful and he chose not to continue with any more antibiotics until this past Monday.  On 11/30 he resumed taking the p.o. Bactrim.  Says that his left great toe is doing much better, in general he does not have any pain to the toe, unless it is pushed upon.  He says that the redness typically goes away; however, he was told by Dr. Morrison to put on some kind of antibiotic liquid, after which causes redness to his  "toe.  Patient is unsure what medication he is putting on his toe.  Has a follow-up with Dr. Morrison this Tuesday.  Reports the abdominal and groin surgical sites are well-healed without any breakdown or drainage.  Continue p.o. Bactrim.  Would like for patient to trial Augmentin once again, recommend taking 2 hours separate from Bactrim.  If unable to tolerate, patient to contact ID office immediately.  Due to patient not taking antibiotics as directed, end date will now be readjusted to roughly 1/3/2021; however, duration of treatment will depend upon response to therapy.    Today, 12/17/2020: Has been taking both the p.o. Augmentin and Bactrim for the past couple of weeks, stating that he is tolerating both of them without issue and without any nausea or vomiting.  Denies feeling generally ill, fevers/chills, general malaise, headache, diarrhea, abdominal pain, chest pain or shortness of breath.  States that he feels about 95% overall being on the medications, \"can fill them in my body.\"  Was seen by Dr. Morrison on Tuesday, was told that everything is looking fine and that the scab to the left great toe will fall off eventually.  Was told that he no longer needs to do the Betadine over the scab, but patient states that he likes to continue to do it.  He denies any active drainage.  He states that there is no pain to the toe overall, but will get 201/10 when it is pushed upon.  All surgical sites remain well-healed.  Has another follow-up with Dr. Morrison in 6 months.  Reports smoking 4 to 5 cigarettes in the past 2 weeks, an improvement from the tube PPD history.    Continue p.o. Bactrim and Augmentin, estimated end date 1/3/2021.  Patient noting that he would not want the antibiotics to be extended if felt to be warranted, at that time he would just like to have the toe amputated.        Review of Systems   Constitutional: Negative for chills, fever and malaise/fatigue.   HENT: Negative for sore throat.  "   Respiratory: Positive for cough (Smoker's cough) and sputum production (Occasional clear to brown). Negative for shortness of breath.    Cardiovascular: Negative for chest pain and leg swelling.   Gastrointestinal: Negative for abdominal pain, constipation, diarrhea, nausea and vomiting.   Genitourinary: Negative for dysuria.   Musculoskeletal: Positive for joint pain. Negative for myalgias.   Skin: Negative for rash.   Neurological: Negative for weakness and headaches.   Psychiatric/Behavioral: The patient is not nervous/anxious.        Past Medical History:   Diagnosis Date   • Back pain    • Cancer (HCC)    • Cataract    • Hypertension    • Infectious disease        Social History     Tobacco Use   • Smoking status: Current Every Day Smoker     Packs/day: 2.00     Years: 54.00     Pack years: 108.00     Types: Cigarettes     Start date:      Last attempt to quit: 11/3/2020     Years since quittin.1   • Smokeless tobacco: Never Used   Substance Use Topics   • Alcohol use: Not Currently   • Drug use: Never       Allergies: Vicodin [hydrocodone-acetaminophen]    Pt's medication and problem list reviewed.     Objective:     There were no vitals taken for this visit.    Physical Exam   Constitutional: He is oriented to person, place, and time and well-developed, well-nourished, and in no distress. No distress.   Elderly  Smells like smoke   HENT:   Head: Normocephalic and atraumatic.   Eyes: Pupils are equal, round, and reactive to light. EOM are normal. No scleral icterus.   Neck: Normal range of motion. Neck supple. No tracheal deviation present.   Cardiovascular: Normal rate, regular rhythm and normal heart sounds.   No murmur heard.  +1 LLE distal pulse   Pulmonary/Chest: Effort normal and breath sounds normal. No respiratory distress. He has no wheezes. He has no rales.   Abdominal: Soft. Bowel sounds are normal. He exhibits no distension. There is no abdominal tenderness.   Midline abdomen and  bilateral groin surgical sites remain well-healed and approximated without any breakdown or drainage, no erythema to surrounding tissue.   Musculoskeletal:         General: Tenderness present. No edema.      Comments: Left great toe-large scab remains over nailbed without any active drainage, trace erythema to mid toe, cool to touch, slightly tender to palpation, diffuse skin peeling throughout left right toe.   Neurological: He is alert and oriented to person, place, and time. No cranial nerve deficit. He exhibits normal muscle tone. Gait normal.   Skin: Skin is warm and dry. No rash noted. He is not diaphoretic. There is erythema.   Psychiatric: Mood, memory, affect and judgment normal.   Vitals reviewed.    Assessment and Plan:   The following treatment plan was discussed with patient at length:    No diagnosis found.    1. Subacute osteomyelitis of left foot (HCC)  Basic Metabolic Panel    -  -BMP to be done in about 10 to 14 days to continue to monitor for nephrotoxicity on Bactrim.   2. JULIA (acute kidney injury) (Former[facility])      Stable, no need for renal adjustment to antibiotics at this time.  BMP as above.   3. PVD (peripheral vascular disease) ([facility])      Follow-up with Dr. Morrison as directed.  Can hinder wound healing and resolution of infection.   4. Tobacco dependence      Complete cessation encouraged as it can hinder wound healing and resolution of infection.     Follow up: ***, RTC sooner if needed. FU with PCP for ongoing chronic medical conditions.     NIRALI Rutherford.       Please note that this dictation was created using voice recognition software. I have  worked with technical experts from [facility] to optimize the interface.  I have made every reasonable attempt to correct obvious errors, but there may be errors of grammar and possibly content that I did not discover before finalizing the note.

## 2021-01-14 ENCOUNTER — APPOINTMENT (OUTPATIENT)
Dept: INFECTIOUS DISEASES | Facility: MEDICAL CENTER | Age: 76
End: 2021-01-14
Payer: MEDICARE

## 2021-01-15 DIAGNOSIS — Z23 NEED FOR VACCINATION: ICD-10-CM

## 2021-01-18 ENCOUNTER — OFFICE VISIT (OUTPATIENT)
Dept: URGENT CARE | Facility: CLINIC | Age: 76
End: 2021-01-18
Payer: MEDICARE

## 2021-01-18 ENCOUNTER — HOSPITAL ENCOUNTER (OUTPATIENT)
Facility: MEDICAL CENTER | Age: 76
End: 2021-01-18
Attending: PHYSICIAN ASSISTANT
Payer: MEDICARE

## 2021-01-18 VITALS
SYSTOLIC BLOOD PRESSURE: 106 MMHG | DIASTOLIC BLOOD PRESSURE: 70 MMHG | OXYGEN SATURATION: 100 % | TEMPERATURE: 97.8 F | BODY MASS INDEX: 27.89 KG/M2 | HEIGHT: 68 IN | RESPIRATION RATE: 16 BRPM | WEIGHT: 184 LBS | HEART RATE: 88 BPM

## 2021-01-18 DIAGNOSIS — N30.01 ACUTE CYSTITIS WITH HEMATURIA: ICD-10-CM

## 2021-01-18 LAB
APPEARANCE UR: NORMAL
BILIRUB UR STRIP-MCNC: NEGATIVE MG/DL
COLOR UR AUTO: NORMAL
GLUCOSE UR STRIP.AUTO-MCNC: NEGATIVE MG/DL
KETONES UR STRIP.AUTO-MCNC: NEGATIVE MG/DL
LEUKOCYTE ESTERASE UR QL STRIP.AUTO: NORMAL
NITRITE UR QL STRIP.AUTO: NEGATIVE
PH UR STRIP.AUTO: 6 [PH] (ref 5–8)
PROT UR QL STRIP: 100 MG/DL
RBC UR QL AUTO: NORMAL
SP GR UR STRIP.AUTO: 1.03
UROBILINOGEN UR STRIP-MCNC: 0.2 MG/DL

## 2021-01-18 PROCEDURE — 81002 URINALYSIS NONAUTO W/O SCOPE: CPT | Performed by: PHYSICIAN ASSISTANT

## 2021-01-18 PROCEDURE — 99214 OFFICE O/P EST MOD 30 MIN: CPT | Performed by: PHYSICIAN ASSISTANT

## 2021-01-18 PROCEDURE — 87086 URINE CULTURE/COLONY COUNT: CPT

## 2021-01-18 RX ORDER — SULFAMETHOXAZOLE AND TRIMETHOPRIM 800; 160 MG/1; MG/1
1 TABLET ORAL 2 TIMES DAILY
Qty: 14 TAB | Refills: 0 | Status: SHIPPED | OUTPATIENT
Start: 2021-01-18 | End: 2021-01-25

## 2021-01-18 ASSESSMENT — FIBROSIS 4 INDEX: FIB4 SCORE: 1.53

## 2021-01-18 NOTE — PROGRESS NOTES
"Subjective:   Ronal Hair is a 75 y.o. male who presents for Blood in Urine (symptoms started a week ago, possibly prostate had history, \"feels like the tip of penis has been cut\")      HPI  Patient is a 75-year-old male who presents with hematuria, dysuria, urinary frequency onset 1 week ago.  He also noticed he had passed very small round white hard in texture material such as possible stones.  He has been having regular bowel movements without any blood or black stools.  He denies any fever, chills, chest pain, shortness of breath, abdominal pain, nausea, vomiting, back pain or flank pain.  Reports a history of prostatitis and BPH.  Currently takes tamsulosin.  He also reports history of PAD followed by surgical aortobifemoral bypass November of 2020.  He has been catheterized at that time.  He was on Bactrim and Augmentin for some time after his surgery.  He had Discontinued Bactrim early this month.  He admits he is sexually active with oral sex, however denies any intravaginal intercourse.  He is not concerned about sexually transmitted infection.      ROS    Dysuria, hematuria, urinary frequency.  Negative fever, chills, chest pain, shortness of breath, abdominal pain, nausea, vomiting, back pain or flank pain.      Medications:    • acetaminophen Tabs  • atorvastatin Tabs    Allergies: Vicodin [hydrocodone-acetaminophen]    Problem List: Ronal Hair has Follicular lymphoma (HCC); Peripheral arterial disease (HCC); Osteomyelitis of left foot (HCC); Abdominal aortic aneurysm (AAA) without rupture (HCC); Tobacco dependence; Benign prostatic hyperplasia; Lymphoma in remission (HCC); and Pain of toe on their problem list.    Surgical History:  Past Surgical History:   Procedure Laterality Date   • AORTOBIFEM BYPASS  11/8/2020    Procedure: CREATION, BYPASS, ARTERIAL, AORTA TO FEMORAL, BILATERAL;  Surgeon: Jimi Morrison M.D.;  Location: SURGERY [facility];  Service: General   • OTHER ABDOMINAL " "SURGERY     • OTHER ORTHOPEDIC SURGERY         Past Social Hx: Ronal Hair  reports that he has been smoking cigarettes. He started smoking about 55 years ago. He has a 108.00 pack-year smoking history. He has never used smokeless tobacco. He reports previous alcohol use. He reports that he does not use drugs.     Past Family Hx:  Ronal Hair family history is not on file.     Problem list, medications, and allergies reviewed by myself today in Epic.     Objective:     /70 (BP Location: Left arm, Patient Position: Sitting, BP Cuff Size: Adult)   Pulse 88   Temp 36.6 °C (97.8 °F) (Temporal)   Resp 16   Ht 1.727 m (5' 8\")   Wt 83.5 kg (184 lb)   SpO2 100%   BMI 27.98 kg/m²     Physical Exam  Vitals signs reviewed.   Constitutional:       General: He is not in acute distress.     Appearance: Normal appearance. He is not ill-appearing or toxic-appearing.   HENT:      Head: Normocephalic.      Right Ear: External ear normal.      Left Ear: External ear normal.   Eyes:      Conjunctiva/sclera: Conjunctivae normal.      Pupils: Pupils are equal, round, and reactive to light.   Neck:      Musculoskeletal: Neck supple.   Cardiovascular:      Rate and Rhythm: Normal rate and regular rhythm.      Heart sounds: Normal heart sounds.   Pulmonary:      Effort: Pulmonary effort is normal. No respiratory distress.      Breath sounds: Normal breath sounds. No wheezing, rhonchi or rales.   Abdominal:      General: Abdomen is flat. Bowel sounds are normal. There is no distension.      Palpations: Abdomen is soft. There is no mass.      Tenderness: There is no abdominal tenderness. There is no right CVA tenderness, left CVA tenderness, guarding or rebound.   Genitourinary:     Penis: Normal and circumcised. No erythema, discharge or lesions.       Testes: Normal.   Lymphadenopathy:      Cervical: No cervical adenopathy.   Skin:     General: Skin is warm and dry.   Neurological:      General: No focal deficit " "present.      Mental Status: He is alert and oriented to person, place, and time.   Psychiatric:         Mood and Affect: Mood normal.         Behavior: Behavior normal.         Assessment/Associated Orders     1. Acute cystitis with hematuria  URINE CULTURE(NEW)    sulfamethoxazole-trimethoprim (BACTRIM DS) 800-160 MG tablet       Medical Decision Making      UA: Large blood, Large Leukocyte   Discussed with patient differential of sexually transmitted infections such as chlamydia/GC even with oral sexual contact.  Patient states \"it is impossible\" for him to have a sexually transmitted infection and is not concerned.  He declines any test for chlamydia/GC.    Discussed with patient signs and symptoms are consistent with a urinary tract infection.   They are overall very well-appearing with normal vital signs and benign examination findings.   Discussed treatment of Bactrim for 7 days, increased fluid intake, AZO per manufacturers instructions for burning urination.  Urine culture: will call back only if positive and if necessary change in therapy.     Advised to return to the Urgent Care or follow up with their PCP if symptoms are not improving in 2-3 days or sooner if any worsening symptoms such as fever, chills, abdominal pain, back/flank pain, nausea, vomiting, or any other concerns.     I personally reviewed prior external notes and test results pertinent to today's visit.   Supportive care, differential diagnoses, and indications for immediate follow-up discussed with patient.    Patient expresses understanding and agrees to plan. Patient denies any other questions or concerns.     Advised the patient to follow-up with the primary care physician for recheck, reevaluation, and consideration of further management.    Please note that this dictation was created using voice recognition software. I have made a reasonable attempt to correct obvious errors, but I expect that there are errors of grammar and possibly " content that I did not discover before finalizing the note.    This note was electronically signed by Mohan Rae PA-C

## 2021-01-21 LAB
BACTERIA UR CULT: NORMAL
SIGNIFICANT IND 70042: NORMAL
SITE SITE: NORMAL
SOURCE SOURCE: NORMAL

## 2021-01-22 ENCOUNTER — HOSPITAL ENCOUNTER (OUTPATIENT)
Facility: MEDICAL CENTER | Age: 76
End: 2021-01-22
Attending: NURSE PRACTITIONER
Payer: MEDICARE

## 2021-01-22 ENCOUNTER — OFFICE VISIT (OUTPATIENT)
Dept: URGENT CARE | Facility: CLINIC | Age: 76
End: 2021-01-22
Payer: MEDICARE

## 2021-01-22 VITALS
SYSTOLIC BLOOD PRESSURE: 118 MMHG | OXYGEN SATURATION: 100 % | DIASTOLIC BLOOD PRESSURE: 64 MMHG | HEART RATE: 85 BPM | TEMPERATURE: 97.1 F

## 2021-01-22 DIAGNOSIS — Z87.438 HISTORY OF BPH: ICD-10-CM

## 2021-01-22 DIAGNOSIS — Z87.438 HISTORY OF PROSTATITIS: ICD-10-CM

## 2021-01-22 DIAGNOSIS — R39.9 UTI SYMPTOMS: ICD-10-CM

## 2021-01-22 DIAGNOSIS — N41.9 PROSTATITIS, UNSPECIFIED PROSTATITIS TYPE: ICD-10-CM

## 2021-01-22 LAB
APPEARANCE UR: NORMAL
BILIRUB UR STRIP-MCNC: NEGATIVE MG/DL
COLOR UR AUTO: YELLOW
GLUCOSE UR STRIP.AUTO-MCNC: NEGATIVE MG/DL
KETONES UR STRIP.AUTO-MCNC: NEGATIVE MG/DL
LEUKOCYTE ESTERASE UR QL STRIP.AUTO: NORMAL
NITRITE UR QL STRIP.AUTO: NEGATIVE
PH UR STRIP.AUTO: 6 [PH] (ref 5–8)
PROT UR QL STRIP: NORMAL MG/DL
RBC UR QL AUTO: NORMAL
SP GR UR STRIP.AUTO: 1.02
UROBILINOGEN UR STRIP-MCNC: 0.2 MG/DL

## 2021-01-22 PROCEDURE — 81002 URINALYSIS NONAUTO W/O SCOPE: CPT | Performed by: NURSE PRACTITIONER

## 2021-01-22 PROCEDURE — 99000 SPECIMEN HANDLING OFFICE-LAB: CPT | Performed by: NURSE PRACTITIONER

## 2021-01-22 PROCEDURE — 99214 OFFICE O/P EST MOD 30 MIN: CPT | Performed by: NURSE PRACTITIONER

## 2021-01-22 PROCEDURE — 87086 URINE CULTURE/COLONY COUNT: CPT

## 2021-01-22 RX ORDER — CIPROFLOXACIN 500 MG/1
500 TABLET, FILM COATED ORAL 2 TIMES DAILY
Qty: 28 TAB | Refills: 0 | Status: SHIPPED | OUTPATIENT
Start: 2021-01-22 | End: 2021-02-05

## 2021-01-22 ASSESSMENT — VISUAL ACUITY: OU: 1

## 2021-01-22 ASSESSMENT — ENCOUNTER SYMPTOMS
VOMITING: 0
CHILLS: 0
NAUSEA: 0
FEVER: 0
ABDOMINAL PAIN: 0
CONSTITUTIONAL NEGATIVE: 1

## 2021-01-22 NOTE — PROGRESS NOTES
Subjective:     Ronal Hair is a 75 y.o. male who presents for UTI (x2weeks, seen monday, took 3 of the medication the first day to try to speed up the process and 1 every 8 hours instead of every 12, feeling worse now and only has a few left)       UTI  This is a new problem. Episode onset: almost 2 weeks ago. The problem has been gradually worsening. Associated symptoms include urinary symptoms. Pertinent negatives include no abdominal pain, chills, fever, nausea or vomiting.     Patient reporting reoccuring dysuria.    Patient seen in urgent care on 1/18/2021 with complaints of dysuria, frequency, and hematuria. Patient had a urinalysis which came back with large blood and LE.    Review of unique result dated 1/18/2021: Urine culture negative.    Patient was started on a course of Bactrim DS to be taken BID for 7 days.  Patient reports he has been taking it 3 times a day for the first 2 days before going back down to 2 times a day as directed.  Reports he wanted to get a head start on the antibiotics based on past experience with the medication.  Patient reports that he initially had improvement during the first 2 days, but started to notice a worsening of symptoms when he went back to twice a day dosing.    Reports a history of prostatitis. History of BPH. Not currently seeing urology.    Denies discharge. Denies concern for STDs as he receives only oral sex from his girlfriend.    Unique result from 11/27/2014 reviewed: chlamydia/gonorrhea urine test negative.    Patient was screened prior to rooming and denied COVID-19 diagnosis or contact with a person who has been diagnosed or is suspected to have COVID-19. During this visit, appropriate PPE was worn, hand hygiene was performed, and the patient and any visitors were masked.     PMH:  has a past medical history of Back pain, Cancer (Former[facility]), Cataract, Hypertension, and Infectious disease. He also has no past medical history of Anginal syndrome (Former[facility]),  Arrhythmia, Arthritis, Asthma, At risk for sleep apnea, Blood clotting disorder (HCC), Bronchitis, Congestive heart failure (HCC), COPD (chronic obstructive pulmonary disease) (HCC), Diabetes (HCC), Dialysis patient (HCC), Disorder of thyroid, Fall, Glaucoma, Gynecological disorder, Heart murmur, Heart valve disease, Hemorrhagic disorder (HCC), Indigestion, Jaundice, Myocardial infarct (HCC), Pacemaker, Pneumonia, Psychiatric problem, Renal disorder, Rheumatic fever, Seizure (HCC), Stroke (HCC), or Urinary incontinence.    MEDS:   Current Outpatient Medications:   •  ciprofloxacin (CIPRO) 500 MG Tab, Take 1 Tab by mouth 2 times a day for 14 days., Disp: 28 Tab, Rfl: 0  •  acetaminophen (TYLENOL) 325 MG Tab, Take 2 Tabs by mouth every 6 hours as needed (Mild Pain; (Pain scale 1-3); Temp greater than 100.5 F)., Disp: 30 Tab, Rfl: 0  •  atorvastatin (LIPITOR) 40 MG Tab, Take 40 mg by mouth every evening., Disp: , Rfl:     ALLERGIES:   Allergies   Allergen Reactions   • Vicodin [Hydrocodone-Acetaminophen]      nausea     SURGHX:   Past Surgical History:   Procedure Laterality Date   • AORTOBIFEM BYPASS  11/8/2020    Procedure: CREATION, BYPASS, ARTERIAL, AORTA TO FEMORAL, BILATERAL;  Surgeon: Jimi Morrison M.D.;  Location: SURGERY [facility];  Service: General   • OTHER ABDOMINAL SURGERY     • OTHER ORTHOPEDIC SURGERY       SOCHX:  reports that he has been smoking cigarettes. He started smoking about 55 years ago. He has a 108.00 pack-year smoking history. He has never used smokeless tobacco. He reports previous alcohol use. He reports that he does not use drugs.     FH: Reviewed with patient, not pertinent to this visit.    Review of Systems   Constitutional: Negative.  Negative for chills, fever and malaise/fatigue.   Gastrointestinal: Negative for abdominal pain, nausea and vomiting.   Genitourinary: Positive for dysuria, hematuria and urgency.   All other systems reviewed and are negative.    Additional details  per HPI.      Objective:     /64   Pulse 85   Temp 36.2 °C (97.1 °F) (Temporal)   SpO2 100%     Physical Exam  Vitals signs reviewed.   Constitutional:       General: He is not in acute distress.     Appearance: He is well-developed. He is not ill-appearing or toxic-appearing.   HENT:      Head: Normocephalic.      Right Ear: External ear normal.      Left Ear: External ear normal.      Nose: Nose normal.   Eyes:      General: Vision grossly intact.      Extraocular Movements: Extraocular movements intact.      Conjunctiva/sclera: Conjunctivae normal.   Neck:      Musculoskeletal: Normal range of motion.   Cardiovascular:      Rate and Rhythm: Normal rate.   Pulmonary:      Effort: Pulmonary effort is normal. No respiratory distress.   Abdominal:      Palpations: Abdomen is soft.      Tenderness: There is abdominal tenderness in the suprapubic area.   Musculoskeletal: Normal range of motion.         General: No deformity.   Skin:     General: Skin is warm and dry.      Coloration: Skin is not pale.   Neurological:      Mental Status: He is alert and oriented to person, place, and time.      Sensory: No sensory deficit.      Motor: No weakness.      Coordination: Coordination normal.   Psychiatric:         Behavior: Behavior normal. Behavior is cooperative.        Results for orders placed or performed in visit on 01/22/21   POCT Urinalysis   Result Value Ref Range    POC Color yellow Negative    POC Appearance cloudy Negative    POC Leukocyte Esterase large Negative    POC Nitrites negative Negative    POC Urobiligen 0.2 Negative (0.2) mg/dL    POC Protein trace Negative mg/dL    POC Urine PH 6.0 5.0 - 8.0    POC Blood large Negative    POC Specific Gravity 1.025 <1.005 - >1.030    POC Ketones negative Negative mg/dL    POC Bilirubin negative Negative mg/dL    POC Glucose negative Negative mg/dL       Assessment/Plan:     1. UTI symptoms  - POCT Urinalysis  - URINE CULTURE(NEW); Future    2. History of  prostatitis    3. History of BPH    4. Prostatitis, unspecified prostatitis type  - ciprofloxacin (CIPRO) 500 MG Tab; Take 1 Tab by mouth 2 times a day for 14 days.  Dispense: 28 Tab; Refill: 0    UA positive for large blood and LE similar to UA obtained 1/18/2021. A urine culture 1/18/2021 came back negative.  Will obtain repeat culture.    Symptoms seem consistent with prostatitis. Patient has a history of BPH as well as prostatitis.    Rx as above sent electronically ([facility] Advised to take as directed.    Patient declines chlamydia/gonorrhea testing at this time.  Patient also declines referral to urology at this time.    Differential diagnosis, natural history, supportive care, increased fluids, over-the-counter symptom management per 's instructions, close monitoring, and indications for immediate follow-up discussed.     Patient advised to: Return for 1) Symptoms that worsen/don't improve, or go to ER, 2) Follow up with primary care in 7-10 days.    All questions answered. Patient agrees with the plan of care.

## 2021-01-24 LAB
BACTERIA UR CULT: NORMAL
SIGNIFICANT IND 70042: NORMAL
SITE SITE: NORMAL
SOURCE SOURCE: NORMAL

## 2021-02-17 ENCOUNTER — IMMUNIZATION (OUTPATIENT)
Dept: FAMILY PLANNING/WOMEN'S HEALTH CLINIC | Facility: IMMUNIZATION CENTER | Age: 76
End: 2021-02-17
Attending: INTERNAL MEDICINE
Payer: MEDICARE

## 2021-02-17 DIAGNOSIS — Z23 ENCOUNTER FOR VACCINATION: Primary | ICD-10-CM

## 2021-02-17 DIAGNOSIS — Z23 NEED FOR VACCINATION: ICD-10-CM

## 2021-02-17 PROCEDURE — 0001A PFIZER SARS-COV-2 VACCINE: CPT

## 2021-02-17 PROCEDURE — 91300 PFIZER SARS-COV-2 VACCINE: CPT

## 2021-03-01 ENCOUNTER — APPOINTMENT (RX ONLY)
Dept: URBAN - METROPOLITAN AREA CLINIC 4 | Facility: CLINIC | Age: 76
Setting detail: DERMATOLOGY
End: 2021-03-01

## 2021-03-01 DIAGNOSIS — D22 MELANOCYTIC NEVI: ICD-10-CM

## 2021-03-01 DIAGNOSIS — L57.0 ACTINIC KERATOSIS: ICD-10-CM

## 2021-03-01 DIAGNOSIS — Z85.828 PERSONAL HISTORY OF OTHER MALIGNANT NEOPLASM OF SKIN: ICD-10-CM

## 2021-03-01 DIAGNOSIS — D18.0 HEMANGIOMA: ICD-10-CM

## 2021-03-01 DIAGNOSIS — L81.4 OTHER MELANIN HYPERPIGMENTATION: ICD-10-CM

## 2021-03-01 DIAGNOSIS — L82.1 OTHER SEBORRHEIC KERATOSIS: ICD-10-CM

## 2021-03-01 DIAGNOSIS — L82.0 INFLAMED SEBORRHEIC KERATOSIS: ICD-10-CM

## 2021-03-01 DIAGNOSIS — L72.8 OTHER FOLLICULAR CYSTS OF THE SKIN AND SUBCUTANEOUS TISSUE: ICD-10-CM

## 2021-03-01 PROBLEM — D18.01 HEMANGIOMA OF SKIN AND SUBCUTANEOUS TISSUE: Status: ACTIVE | Noted: 2021-03-01

## 2021-03-01 PROBLEM — D22.5 MELANOCYTIC NEVI OF TRUNK: Status: ACTIVE | Noted: 2021-03-01

## 2021-03-01 PROCEDURE — 17000 DESTRUCT PREMALG LESION: CPT

## 2021-03-01 PROCEDURE — ? LIQUID NITROGEN

## 2021-03-01 PROCEDURE — 17003 DESTRUCT PREMALG LES 2-14: CPT

## 2021-03-01 PROCEDURE — 99213 OFFICE O/P EST LOW 20 MIN: CPT | Mod: 25

## 2021-03-01 PROCEDURE — ? LIQUID NITROGEN (COSMETIC)

## 2021-03-01 PROCEDURE — ? COUNSELING

## 2021-03-01 ASSESSMENT — LOCATION SIMPLE DESCRIPTION DERM
LOCATION SIMPLE: LEFT LOWER BACK
LOCATION SIMPLE: LEFT FOREARM
LOCATION SIMPLE: LEFT FOREHEAD
LOCATION SIMPLE: LEFT CHEEK
LOCATION SIMPLE: ABDOMEN
LOCATION SIMPLE: RIGHT FOREARM
LOCATION SIMPLE: UPPER BACK
LOCATION SIMPLE: RIGHT UPPER BACK
LOCATION SIMPLE: LEFT UPPER BACK
LOCATION SIMPLE: SCALP
LOCATION SIMPLE: RIGHT FOREHEAD
LOCATION SIMPLE: RIGHT ELBOW

## 2021-03-01 ASSESSMENT — LOCATION DETAILED DESCRIPTION DERM
LOCATION DETAILED: RIGHT INFERIOR FOREHEAD
LOCATION DETAILED: LEFT INFERIOR CENTRAL MALAR CHEEK
LOCATION DETAILED: LEFT INFERIOR LATERAL MALAR CHEEK
LOCATION DETAILED: RIGHT ELBOW
LOCATION DETAILED: LEFT SUPERIOR MEDIAL MIDBACK
LOCATION DETAILED: LEFT SUPERIOR PARIETAL SCALP
LOCATION DETAILED: RIGHT MEDIAL UPPER BACK
LOCATION DETAILED: LEFT DISTAL ULNAR DORSAL FOREARM
LOCATION DETAILED: LEFT MEDIAL UPPER BACK
LOCATION DETAILED: LEFT PROXIMAL DORSAL FOREARM
LOCATION DETAILED: RIGHT PROXIMAL DORSAL FOREARM
LOCATION DETAILED: INFERIOR THORACIC SPINE
LOCATION DETAILED: LEFT FOREHEAD
LOCATION DETAILED: RIGHT DISTAL DORSAL FOREARM
LOCATION DETAILED: XIPHOID

## 2021-03-01 ASSESSMENT — LOCATION ZONE DERM
LOCATION ZONE: FACE
LOCATION ZONE: ARM
LOCATION ZONE: SCALP
LOCATION ZONE: TRUNK

## 2021-03-01 NOTE — PROCEDURE: LIQUID NITROGEN (COSMETIC)
Render Post-Care Instructions In Note?: no
Billing Information: Bill by Static Price
Consent: The patient's consent was obtained including but not limited to risks of crusting, scabbing, blistering, scarring, darker or lighter pigmentary change, recurrence, incomplete removal and infection. The patient understands that the procedure is cosmetic in nature and is not covered by insurance.
Price (Use Numbers Only, No Special Characters Or $): 0
Detail Level: Detailed
Post-Care Instructions: I reviewed with the patient in detail post-care instructions. Patient is to wear sunprotection, and avoid picking at any of the treated lesions. Pt may apply Vaseline to crusted or scabbing areas.

## 2021-03-06 ENCOUNTER — IMMUNIZATION (OUTPATIENT)
Dept: FAMILY PLANNING/WOMEN'S HEALTH CLINIC | Facility: IMMUNIZATION CENTER | Age: 76
End: 2021-03-06
Attending: INTERNAL MEDICINE
Payer: MEDICARE

## 2021-03-06 DIAGNOSIS — Z23 ENCOUNTER FOR VACCINATION: Primary | ICD-10-CM

## 2021-03-06 PROCEDURE — 0002A PFIZER SARS-COV-2 VACCINE: CPT

## 2021-03-06 PROCEDURE — 91300 PFIZER SARS-COV-2 VACCINE: CPT

## 2021-03-17 ENCOUNTER — TELEPHONE (OUTPATIENT)
Dept: INFECTIOUS DISEASES | Facility: MEDICAL CENTER | Age: 76
End: 2021-03-17

## 2021-03-17 NOTE — TELEPHONE ENCOUNTER
Scheduled patient first available next week and asked to go to ED if at any point develops signs of infection

## 2021-03-17 NOTE — TELEPHONE ENCOUNTER
----- Message from EVONNE Rutherford sent at 3/17/2021 12:04 PM PDT -----  Have him see me next week  ----- Message -----  From: Ricarda Tucker, Med Ass't  Sent: 3/17/2021  11:30 AM PDT  To: EVONNE Rutherford    Patient walked in today reporting his infection on his toe is back. Patient is having pain and has been taking Augmentin that he had left over from previous rx. Please let me know how to proceed.

## 2021-03-20 ENCOUNTER — HOSPITAL ENCOUNTER (OUTPATIENT)
Dept: LAB | Facility: MEDICAL CENTER | Age: 76
End: 2021-03-20
Attending: NURSE PRACTITIONER
Payer: MEDICARE

## 2021-03-20 DIAGNOSIS — M86.272 SUBACUTE OSTEOMYELITIS OF LEFT FOOT (HCC): ICD-10-CM

## 2021-03-20 LAB
ANION GAP SERPL CALC-SCNC: 8 MMOL/L (ref 7–16)
BUN SERPL-MCNC: 13 MG/DL (ref 8–22)
CALCIUM SERPL-MCNC: 9.3 MG/DL (ref 8.5–10.5)
CHLORIDE SERPL-SCNC: 105 MMOL/L (ref 96–112)
CO2 SERPL-SCNC: 26 MMOL/L (ref 20–33)
CREAT SERPL-MCNC: 0.81 MG/DL (ref 0.5–1.4)
GLUCOSE SERPL-MCNC: 99 MG/DL (ref 65–99)
POTASSIUM SERPL-SCNC: 4.5 MMOL/L (ref 3.6–5.5)
SODIUM SERPL-SCNC: 139 MMOL/L (ref 135–145)

## 2021-03-20 PROCEDURE — 36415 COLL VENOUS BLD VENIPUNCTURE: CPT

## 2021-03-20 PROCEDURE — 80048 BASIC METABOLIC PNL TOTAL CA: CPT

## 2021-03-23 ENCOUNTER — OFFICE VISIT (OUTPATIENT)
Dept: INFECTIOUS DISEASES | Facility: MEDICAL CENTER | Age: 76
End: 2021-03-23
Payer: MEDICARE

## 2021-03-23 VITALS
DIASTOLIC BLOOD PRESSURE: 60 MMHG | HEART RATE: 65 BPM | SYSTOLIC BLOOD PRESSURE: 110 MMHG | BODY MASS INDEX: 29.51 KG/M2 | HEIGHT: 67 IN | OXYGEN SATURATION: 97 % | WEIGHT: 188 LBS | TEMPERATURE: 98.3 F | RESPIRATION RATE: 16 BRPM

## 2021-03-23 DIAGNOSIS — F17.200 TOBACCO DEPENDENCE: ICD-10-CM

## 2021-03-23 DIAGNOSIS — M86.272 SUBACUTE OSTEOMYELITIS OF LEFT FOOT (HCC): ICD-10-CM

## 2021-03-23 PROCEDURE — 99213 OFFICE O/P EST LOW 20 MIN: CPT | Performed by: NURSE PRACTITIONER

## 2021-03-23 RX ORDER — IBUPROFEN 400 MG/1
800 TABLET ORAL EVERY 8 HOURS PRN
Status: ON HOLD | COMMUNITY
End: 2023-12-13

## 2021-03-23 RX ORDER — TAMSULOSIN HYDROCHLORIDE 0.4 MG/1
0.4 CAPSULE ORAL
COMMUNITY
Start: 2021-02-14 | End: 2022-08-26 | Stop reason: SDUPTHER

## 2021-03-23 ASSESSMENT — FIBROSIS 4 INDEX: FIB4 SCORE: 1.53

## 2021-03-23 ASSESSMENT — PAIN SCALES - GENERAL: PAINLEVEL: NO PAIN

## 2021-03-23 NOTE — PROGRESS NOTES
[facility]    Subjective:     Chief Complaint   Patient presents with   • Follow-Up     Ostemyelitis     Interval History: 74 yo male with a PMH of lymphoma, peripheral vascular disease and tobacco abuse.  Hospitalized from 11/3/2020 - 11/15/2020, admitted for persistent left great toe pain.  Underwent left great toenail removal on 10/21/2020 secondary to an ingrown toenail.  Since that time he had been complaining of increasing pain and swelling.  The pain had been so severe that it had been keeping him up at night.  On 10/30/2020, he presented to an Urgent Care facility, was given a dose of IM ceftriaxone and then prescribed Bactrim.  Despite taking a short course of Bactrim, patient did not note any clinical improvement. Given persistent pain, he presented to the ED for further evaluation and management.  On presentation, he was afebrile with a mild leukopenia of 3.6.  X-ray showed findings concerning for osteomyelitis of the left great toe.  MRI did confirm osteomyelitis of the distal phalanx of the left great toe.  HUGO showed significant peripheral vascular disease.  Status post aortobifemoral bypass on 11/8 with Dr. Morrison.  Discharged home on PO Bactrim DS BID and PO Augmentin 875/125 mg BID x 6 weeks, estimated end date 12/20/2020.       12/3/2020: Seen by CHRISTOPHER Che.  Patient states that when he was discharged on 7/15, he took the p.o. Augmentin and Bactrim that night, which made him throw up.  Says he is very sensitive to medications and because he threw up after having the abdominal/groin surgery, it was very painful and he chose not to continue with any more antibiotics until this past Monday.  On 11/30 he resumed taking the p.o. Bactrim.  Says that his left great toe is doing much better, in general he does not have any pain to the toe, unless it is pushed upon.  He says that the redness typically goes away; however, he was told by Dr. Morrison to put on some kind of antibiotic  "liquid, after which causes redness to his toe.  Patient is unsure what medication he is putting on his toe.  Has a follow-up with Dr. Morrison this Tuesday.  Reports the abdominal and groin surgical sites are well-healed without any breakdown or drainage.  Continue p.o. Bactrim.  Would like for patient to trial Augmentin once again, recommend taking 2 hours separate from Bactrim.  If unable to tolerate, patient to contact ID office immediately.  Due to patient not taking antibiotics as directed, end date will now be readjusted to roughly 1/3/2021; however, duration of treatment will depend upon response to therapy.    12/17/2020: Seen by CHRISTOPHER Che.  States that he feels about 95% overall being on the medications, \"can fill them in my body.\"  Was seen by Dr. Morrison on Tuesday, was told that everything is looking fine and that the scab to the left great toe will fall off eventually.  Was told that he no longer needs to do the Betadine over the scab, but patient states that he likes to continue to do it.  He denies any active drainage.  Continue p.o. Bactrim and Augmentin, estimated end date 1/3/2021.  Patient noting that he would not want the antibiotics to be extended if felt to be warranted, at that time he would just like to have the toe amputated.    Today, 3/23/2021: Patient states that at the beginning of last week his left toe started to hurt him quite a bit with some swelling and redness.  He found some leftover Augmentin in his back covered, states that he initially took it every 8 hours, then every 12 hours and then had the last dose last Friday.  He also notes that he soaked his left great toe and hydrogen peroxide for 2 days, was able to get a scab that had been present for the last 5 months off on Saturday, also noting that there had been a huge buildup of callus tissue underneath that he also got off.  Once he got the scab and the callus tissue off of the left great toe, the toe began to feel " "much better immediately.  Swelling and redness have come down.  He states that he does not have concerns for infection at this time, but wanted to keep his appointment just to \"be safe.\"    ROS as above in HPI    Past Medical History:   Diagnosis Date   • Back pain    • Cancer (HCC)    • Cataract    • Hypertension    • Infectious disease        Social History     Tobacco Use   • Smoking status: Current Every Day Smoker     Packs/day: 2.00     Years: 54.00     Pack years: 108.00     Types: Cigarettes     Start date:      Last attempt to quit: 11/3/2020     Years since quittin.3   • Smokeless tobacco: Never Used   Substance Use Topics   • Alcohol use: Not Currently   • Drug use: Never       Allergies: Vicodin [hydrocodone-acetaminophen]    Pt's medication and problem list reviewed.     Objective:     /60 (BP Location: Left arm, Patient Position: Sitting, BP Cuff Size: Adult)   Pulse 65   Temp 36.8 °C (98.3 °F) (Temporal)   Resp 16   Ht 1.702 m (5' 7\") Comment: patient reported  Wt 85.3 kg (188 lb)   SpO2 97%   BMI 29.44 kg/m²     Physical Exam   Constitutional: He is oriented to person, place, and time and well-developed, well-nourished, and in no distress. No distress.   Elderly  Smells like smoke   HENT:   Head: Normocephalic and atraumatic.   Right Ear: External ear normal.   Left Ear: External ear normal.   Eyes: Pupils are equal, round, and reactive to light. Conjunctivae and EOM are normal. No scleral icterus.   Neck: No JVD present. No tracheal deviation present.   Cardiovascular: Normal rate.   +1 LLE distal pulse   Pulmonary/Chest: Effort normal. No respiratory distress.   Abdominal: He exhibits no distension.   Musculoskeletal:         General: Edema (Trace left great toe) present. No tenderness.      Cervical back: Normal range of motion and neck supple.      Comments: Left great toe-well-healed without any active drainage, trace erythema to tip of toe, nontender to palpation. "   Neurological: He is alert and oriented to person, place, and time. No cranial nerve deficit. He exhibits normal muscle tone. Gait normal.   Skin: Skin is warm and dry. No rash noted. He is not diaphoretic. There is erythema.   Psychiatric: Mood, memory, affect and judgment normal.   Pleasant   Vitals reviewed.      Labs:  Sodium   Date/Time Value Ref Range Status   03/20/2021 08:31  135 - 145 mmol/L Final     Potassium   Date/Time Value Ref Range Status   03/20/2021 08:31 AM 4.5 3.6 - 5.5 mmol/L Final     Chloride   Date/Time Value Ref Range Status   03/20/2021 08:31  96 - 112 mmol/L Final     Co2   Date/Time Value Ref Range Status   03/20/2021 08:31 AM 26 20 - 33 mmol/L Final     Glucose   Date/Time Value Ref Range Status   03/20/2021 08:31 AM 99 65 - 99 mg/dL Final     Bun   Date/Time Value Ref Range Status   03/20/2021 08:31 AM 13 8 - 22 mg/dL Final     Creatinine   Date/Time Value Ref Range Status   03/20/2021 08:31 AM 0.81 0.50 - 1.40 mg/dL Final     Assessment and Plan:   The following treatment plan was discussed with patient at length:    1. Subacute osteomyelitis of left foot (HCC)      No signs of active infection.  No need for antibiotics.  Continue to monitor closely off antibiotics.   2. Tobacco dependence      Patient is back to smoking, cessation encouraged.     Follow up: PRN, RTC sooner if needed. FU with PCP for ongoing chronic medical conditions.     Martina Muñoz, STEPHANIE.P.R.N.    My total time spent caring for the patient on the day of the encounter was 27 minutes.   This does not include time spent on separately billable procedures/tests.       Please note that this dictation was created using voice recognition software. I have  worked with technical experts from [facility] [employer]  [facility] to optimize the interface.  I have made every reasonable attempt to correct obvious errors, but there may be errors of grammar and possibly content that I did not discover before finalizing the note.

## 2021-05-18 ENCOUNTER — PATIENT MESSAGE (OUTPATIENT)
Dept: HEALTH INFORMATION MANAGEMENT | Facility: OTHER | Age: 76
End: 2021-05-18

## 2021-05-28 ENCOUNTER — PATIENT MESSAGE (OUTPATIENT)
Dept: HEALTH INFORMATION MANAGEMENT | Facility: OTHER | Age: 76
End: 2021-05-28

## 2021-05-28 ENCOUNTER — PATIENT OUTREACH (OUTPATIENT)
Dept: HEALTH INFORMATION MANAGEMENT | Facility: OTHER | Age: 76
End: 2021-05-28

## 2021-05-28 NOTE — PROGRESS NOTES
Outreach-Comprehensive Health Assessment. Scheduled for 6/8/21 at 9:00 am with Dr Zepeda at [address] [facility]. Spoke to member and he did not have time to go over all details of appointment. He was currently in a restaurant. He stated to send information to his [employer].  HIPAA verified, no questions or concerns.    Attempt #1

## 2021-06-21 ENCOUNTER — APPOINTMENT (RX ONLY)
Dept: URBAN - METROPOLITAN AREA CLINIC 4 | Facility: CLINIC | Age: 76
Setting detail: DERMATOLOGY
End: 2021-06-21

## 2021-06-21 DIAGNOSIS — Z85.828 PERSONAL HISTORY OF OTHER MALIGNANT NEOPLASM OF SKIN: ICD-10-CM

## 2021-06-21 DIAGNOSIS — L57.0 ACTINIC KERATOSIS: ICD-10-CM

## 2021-06-21 PROBLEM — D48.5 NEOPLASM OF UNCERTAIN BEHAVIOR OF SKIN: Status: ACTIVE | Noted: 2021-06-21

## 2021-06-21 PROCEDURE — ? BIOPSY BY SHAVE METHOD

## 2021-06-21 PROCEDURE — ? LIQUID NITROGEN

## 2021-06-21 PROCEDURE — ? DIAGNOSIS COMMENT

## 2021-06-21 PROCEDURE — 99212 OFFICE O/P EST SF 10 MIN: CPT | Mod: 25

## 2021-06-21 PROCEDURE — 17000 DESTRUCT PREMALG LESION: CPT | Mod: 59

## 2021-06-21 PROCEDURE — 11102 TANGNTL BX SKIN SINGLE LES: CPT

## 2021-06-21 ASSESSMENT — LOCATION DETAILED DESCRIPTION DERM: LOCATION DETAILED: RIGHT PROXIMAL DORSAL FOREARM

## 2021-06-21 ASSESSMENT — LOCATION ZONE DERM: LOCATION ZONE: ARM

## 2021-06-21 ASSESSMENT — LOCATION SIMPLE DESCRIPTION DERM: LOCATION SIMPLE: RIGHT FOREARM

## 2021-06-21 NOTE — PROCEDURE: LIQUID NITROGEN
Render Note In Bullet Format When Appropriate: No
Detail Level: Detailed
Number Of Freeze-Thaw Cycles: 1 freeze-thaw cycle
Duration Of Freeze Thaw-Cycle (Seconds): 5
Post-Care Instructions: I reviewed with the patient in detail post-care instructions. Patient is to wear sunprotection, and avoid picking at any of the treated lesions. Pt may apply Vaseline to crusted or scabbing areas.
Consent: The patient's consent was obtained including but not limited to risks of crusting, scabbing, blistering, scarring, darker or lighter pigmentary change, recurrence, incomplete removal and infection.

## 2021-06-21 NOTE — PROCEDURE: BIOPSY BY SHAVE METHOD
Detail Level: Detailed
Depth Of Biopsy: dermis
Was A Bandage Applied: Yes
Size Of Lesion In Cm: 0
Anticipated Plan (Based On Presumed Biopsy Results): Follow up. As scheduled 9/7/21
Biopsy Type: H and E
Biopsy Method: Personna blade
Anesthesia Type: 1% lidocaine with epinephrine and a 1:10 solution of 8.4% sodium bicarbonate
Anesthesia Volume In Cc: 3
Hemostasis: Drysol and Electrocautery
Wound Care: Vaseline
Dressing: Band-Aid
Type Of Destruction Used: Curettage
Curettage Text: The wound bed was treated with curettage after the biopsy was performed.
Electrodesiccation Text: The wound bed was treated with electrodesiccation after the biopsy was performed.
Electrodesiccation And Curettage Text: The wound bed was treated with electrodesiccation and curettage after the biopsy was performed.
Lab: [id number]
Lab Facility: 
Render Path Notes In Note?: No
Consent: Verbal consent was obtained and risks were reviewed including but not limited to scarring, infection, bleeding, scabbing, incomplete removal, nerve damage and allergy to anesthesia.
Post-Care Instructions: I reviewed with the patient in detail post-care instructions. Patient is to keep the biopsy site dry overnight, and then apply vasaline twice daily until healed.
Notification Instructions: Patient will be notified of biopsy results. However, patient instructed to call the office if not contacted within 2 weeks.
Billing Type: Third-Party Bill
Information: Selecting Yes will display possible errors in your note based on the variables you have selected. This validation is only offered as a suggestion for you. PLEASE NOTE THAT THE VALIDATION TEXT WILL BE REMOVED WHEN YOU FINALIZE YOUR NOTE. IF YOU WANT TO FAX A PRELIMINARY NOTE YOU WILL NEED TO TOGGLE THIS TO 'NO' IF YOU DO NOT WANT IT IN YOUR FAXED NOTE.

## 2021-08-14 NOTE — CARE PLAN
Problem: Safety  Goal: Will remain free from injury  Outcome: PROGRESSING AS EXPECTED   Bed in lowest position, call light within reach, non slip  socks on, upper 2 side rails in use.    Problem: Mobility  Goal: Risk for activity intolerance will decrease  Outcome: PROGRESSING AS EXPECTED   Up to chair for meals, ambulate to bathroom, turn in bed every 2 hours.   Cellulitis

## 2021-08-19 ENCOUNTER — OFFICE VISIT (OUTPATIENT)
Dept: OCCUPATIONAL MEDICINE | Facility: CLINIC | Age: 76
End: 2021-08-19

## 2021-08-19 ENCOUNTER — NON-PROVIDER VISIT (OUTPATIENT)
Dept: OCCUPATIONAL MEDICINE | Facility: CLINIC | Age: 76
End: 2021-08-19

## 2021-08-19 VITALS
WEIGHT: 188 LBS | SYSTOLIC BLOOD PRESSURE: 114 MMHG | HEART RATE: 74 BPM | HEIGHT: 68 IN | DIASTOLIC BLOOD PRESSURE: 62 MMHG | BODY MASS INDEX: 28.49 KG/M2 | RESPIRATION RATE: 16 BRPM

## 2021-08-19 DIAGNOSIS — Z02.1 PRE-EMPLOYMENT DRUG SCREENING: Primary | ICD-10-CM

## 2021-08-19 DIAGNOSIS — Z02.4 ENCOUNTER FOR COMMERCIAL DRIVER MEDICAL EXAMINATION (CDME): ICD-10-CM

## 2021-08-19 PROCEDURE — 80305 DRUG TEST PRSMV DIR OPT OBS: CPT | Performed by: NURSE PRACTITIONER

## 2021-08-19 PROCEDURE — 7100 PR DOT PHYSICAL: Performed by: NURSE PRACTITIONER

## 2021-08-19 ASSESSMENT — FIBROSIS 4 INDEX: FIB4 SCORE: 1.53

## 2021-08-19 NOTE — PROGRESS NOTES
Patient comes in for a DOTphysical. No major medical history, no chronic conditions. He reports that he takes medications for high cholesterol. No history of asthma, heart disease, seizure disorder or syncopal episodes with activity. Please see the chart for further information, however cleared for 's license for 2 years without restrictions.

## 2021-10-04 ENCOUNTER — PATIENT MESSAGE (OUTPATIENT)
Dept: HEALTH INFORMATION MANAGEMENT | Facility: OTHER | Age: 76
End: 2021-10-04

## 2021-11-22 ENCOUNTER — HOSPITAL ENCOUNTER (OUTPATIENT)
Dept: LAB | Facility: MEDICAL CENTER | Age: 76
End: 2021-11-22
Attending: INTERNAL MEDICINE
Payer: MEDICARE

## 2021-11-22 LAB
ALBUMIN SERPL BCP-MCNC: 4.4 G/DL (ref 3.2–4.9)
ALBUMIN/GLOB SERPL: 1.8 G/DL
ALP SERPL-CCNC: 78 U/L (ref 30–99)
ALT SERPL-CCNC: 20 U/L (ref 2–50)
ANION GAP SERPL CALC-SCNC: 10 MMOL/L (ref 7–16)
AST SERPL-CCNC: 21 U/L (ref 12–45)
BASOPHILS # BLD AUTO: 0.3 % (ref 0–1.8)
BASOPHILS # BLD: 0.02 K/UL (ref 0–0.12)
BILIRUB SERPL-MCNC: 0.9 MG/DL (ref 0.1–1.5)
BUN SERPL-MCNC: 17 MG/DL (ref 8–22)
CALCIUM SERPL-MCNC: 9.6 MG/DL (ref 8.5–10.5)
CHLORIDE SERPL-SCNC: 101 MMOL/L (ref 96–112)
CO2 SERPL-SCNC: 26 MMOL/L (ref 20–33)
CREAT SERPL-MCNC: 0.87 MG/DL (ref 0.5–1.4)
EOSINOPHIL # BLD AUTO: 0.17 K/UL (ref 0–0.51)
EOSINOPHIL NFR BLD: 3 % (ref 0–6.9)
ERYTHROCYTE [DISTWIDTH] IN BLOOD BY AUTOMATED COUNT: 43.8 FL (ref 35.9–50)
GLOBULIN SER CALC-MCNC: 2.4 G/DL (ref 1.9–3.5)
GLUCOSE SERPL-MCNC: 100 MG/DL (ref 65–99)
HCT VFR BLD AUTO: 44.2 % (ref 42–52)
HGB BLD-MCNC: 15.4 G/DL (ref 14–18)
IMM GRANULOCYTES # BLD AUTO: 0.01 K/UL (ref 0–0.11)
IMM GRANULOCYTES NFR BLD AUTO: 0.2 % (ref 0–0.9)
LDH SERPL L TO P-CCNC: 164 U/L (ref 107–266)
LYMPHOCYTES # BLD AUTO: 0.87 K/UL (ref 1–4.8)
LYMPHOCYTES NFR BLD: 15.2 % (ref 22–41)
MCH RBC QN AUTO: 32.4 PG (ref 27–33)
MCHC RBC AUTO-ENTMCNC: 34.8 G/DL (ref 33.7–35.3)
MCV RBC AUTO: 92.9 FL (ref 81.4–97.8)
MONOCYTES # BLD AUTO: 0.53 K/UL (ref 0–0.85)
MONOCYTES NFR BLD AUTO: 9.3 % (ref 0–13.4)
NEUTROPHILS # BLD AUTO: 4.12 K/UL (ref 1.82–7.42)
NEUTROPHILS NFR BLD: 72 % (ref 44–72)
NRBC # BLD AUTO: 0 K/UL
NRBC BLD-RTO: 0 /100 WBC
PLATELET # BLD AUTO: 179 K/UL (ref 164–446)
PMV BLD AUTO: 10.1 FL (ref 9–12.9)
POTASSIUM SERPL-SCNC: 4.3 MMOL/L (ref 3.6–5.5)
PROT SERPL-MCNC: 6.8 G/DL (ref 6–8.2)
RBC # BLD AUTO: 4.76 M/UL (ref 4.7–6.1)
SODIUM SERPL-SCNC: 137 MMOL/L (ref 135–145)
WBC # BLD AUTO: 5.7 K/UL (ref 4.8–10.8)

## 2021-11-22 PROCEDURE — 36415 COLL VENOUS BLD VENIPUNCTURE: CPT

## 2021-11-22 PROCEDURE — 80053 COMPREHEN METABOLIC PANEL: CPT

## 2021-11-22 PROCEDURE — 85025 COMPLETE CBC W/AUTO DIFF WBC: CPT

## 2021-11-22 PROCEDURE — 83615 LACTATE (LD) (LDH) ENZYME: CPT

## 2021-12-02 ENCOUNTER — HOSPITAL ENCOUNTER (OUTPATIENT)
Dept: RADIOLOGY | Facility: MEDICAL CENTER | Age: 76
End: 2021-12-02
Attending: INTERNAL MEDICINE
Payer: MEDICARE

## 2021-12-02 DIAGNOSIS — C82.05 FOLICLAR LYMPH GRADE I, NODES OF ING REGION AND LOWER LIMB (HCC): ICD-10-CM

## 2021-12-02 DIAGNOSIS — Z79.899 NEED FOR PROPHYLACTIC CHEMOTHERAPY: ICD-10-CM

## 2021-12-02 DIAGNOSIS — R59.9 SWELLING OF LYMPH NODES: ICD-10-CM

## 2021-12-02 DIAGNOSIS — R53.83 OTHER FATIGUE: ICD-10-CM

## 2021-12-02 DIAGNOSIS — R68.89 MECHANICAL AND MOTOR PROBLEMS WITH INTERNAL ORGANS: ICD-10-CM

## 2021-12-02 DIAGNOSIS — Z85.72 HISTORY OF NON-HODGKIN'S LYMPHOMA: ICD-10-CM

## 2021-12-02 PROCEDURE — 71260 CT THORAX DX C+: CPT

## 2021-12-02 PROCEDURE — 700117 HCHG RX CONTRAST REV CODE 255: Performed by: INTERNAL MEDICINE

## 2021-12-02 RX ADMIN — IOHEXOL 100 ML: 350 INJECTION, SOLUTION INTRAVENOUS at 15:02

## 2021-12-02 RX ADMIN — IOHEXOL 25 ML: 240 INJECTION, SOLUTION INTRATHECAL; INTRAVASCULAR; INTRAVENOUS; ORAL at 14:52

## 2021-12-06 ENCOUNTER — APPOINTMENT (RX ONLY)
Dept: URBAN - METROPOLITAN AREA CLINIC 4 | Facility: CLINIC | Age: 76
Setting detail: DERMATOLOGY
End: 2021-12-06

## 2021-12-06 DIAGNOSIS — L57.0 ACTINIC KERATOSIS: ICD-10-CM

## 2021-12-06 DIAGNOSIS — L82.1 OTHER SEBORRHEIC KERATOSIS: ICD-10-CM

## 2021-12-06 DIAGNOSIS — L81.4 OTHER MELANIN HYPERPIGMENTATION: ICD-10-CM

## 2021-12-06 DIAGNOSIS — L82.0 INFLAMED SEBORRHEIC KERATOSIS: ICD-10-CM

## 2021-12-06 DIAGNOSIS — L72.8 OTHER FOLLICULAR CYSTS OF THE SKIN AND SUBCUTANEOUS TISSUE: ICD-10-CM

## 2021-12-06 PROBLEM — C44.629 SQUAMOUS CELL CARCINOMA OF SKIN OF LEFT UPPER LIMB, INCLUDING SHOULDER: Status: ACTIVE | Noted: 2021-12-06

## 2021-12-06 PROCEDURE — ? DEFER

## 2021-12-06 PROCEDURE — ? LIQUID NITROGEN (COSMETIC)

## 2021-12-06 PROCEDURE — ? COUNSELING

## 2021-12-06 PROCEDURE — 99213 OFFICE O/P EST LOW 20 MIN: CPT | Mod: 25

## 2021-12-06 PROCEDURE — 17000 DESTRUCT PREMALG LESION: CPT

## 2021-12-06 PROCEDURE — 17003 DESTRUCT PREMALG LES 2-14: CPT

## 2021-12-06 PROCEDURE — ? LIQUID NITROGEN

## 2021-12-06 PROCEDURE — ? OBSERVATION

## 2021-12-06 ASSESSMENT — LOCATION SIMPLE DESCRIPTION DERM
LOCATION SIMPLE: RIGHT CHEEK
LOCATION SIMPLE: LEFT CHEEK
LOCATION SIMPLE: UPPER BACK
LOCATION SIMPLE: RIGHT ANTERIOR NECK
LOCATION SIMPLE: ABDOMEN
LOCATION SIMPLE: RIGHT FOREARM
LOCATION SIMPLE: POSTERIOR NECK
LOCATION SIMPLE: SCALP
LOCATION SIMPLE: RIGHT UPPER ARM
LOCATION SIMPLE: LEFT LOWER BACK
LOCATION SIMPLE: LEFT FOREARM
LOCATION SIMPLE: RIGHT ELBOW
LOCATION SIMPLE: RIGHT ZYGOMA

## 2021-12-06 ASSESSMENT — LOCATION DETAILED DESCRIPTION DERM
LOCATION DETAILED: RIGHT ANTERIOR DISTAL UPPER ARM
LOCATION DETAILED: LEFT INFERIOR OCCIPITAL SCALP
LOCATION DETAILED: LEFT INFERIOR NASAL CHEEK
LOCATION DETAILED: LEFT PROXIMAL DORSAL FOREARM
LOCATION DETAILED: XIPHOID
LOCATION DETAILED: LEFT DISTAL ULNAR DORSAL FOREARM
LOCATION DETAILED: LEFT INFERIOR LATERAL MALAR CHEEK
LOCATION DETAILED: RIGHT ELBOW
LOCATION DETAILED: RIGHT CENTRAL MANDIBULAR CHEEK
LOCATION DETAILED: LEFT SUPERIOR POSTERIOR NECK
LOCATION DETAILED: LEFT INFERIOR CENTRAL MALAR CHEEK
LOCATION DETAILED: LEFT SUPERIOR PARIETAL SCALP
LOCATION DETAILED: LEFT SUPERIOR MEDIAL MIDBACK
LOCATION DETAILED: RIGHT CENTRAL SUBMANDIBULAR CHEEK
LOCATION DETAILED: RIGHT LATERAL SUBMANDIBULAR CHEEK
LOCATION DETAILED: INFERIOR THORACIC SPINE
LOCATION DETAILED: RIGHT MEDIAL ZYGOMA
LOCATION DETAILED: RIGHT CLAVICULAR NECK
LOCATION DETAILED: RIGHT DISTAL DORSAL FOREARM

## 2021-12-06 ASSESSMENT — LOCATION ZONE DERM
LOCATION ZONE: SCALP
LOCATION ZONE: NECK
LOCATION ZONE: TRUNK
LOCATION ZONE: FACE
LOCATION ZONE: ARM

## 2021-12-06 NOTE — PROCEDURE: LIQUID NITROGEN
Duration Of Freeze Thaw-Cycle (Seconds): 5
Show Aperture Variable?: Yes
Consent: The patient's consent was obtained including but not limited to risks of crusting, scabbing, blistering, scarring, darker or lighter pigmentary change, recurrence, incomplete removal and infection.
Render Post-Care Instructions In Note?: no
Post-Care Instructions: I reviewed with the patient in detail post-care instructions. Patient is to wear sunprotection, and avoid picking at any of the treated lesions. Pt may apply Vaseline to crusted or scabbing areas.
Detail Level: Detailed
Number Of Freeze-Thaw Cycles: 1 freeze-thaw cycle

## 2021-12-20 ENCOUNTER — APPOINTMENT (RX ONLY)
Dept: URBAN - METROPOLITAN AREA CLINIC 4 | Facility: CLINIC | Age: 76
Setting detail: DERMATOLOGY
End: 2021-12-20

## 2021-12-20 DIAGNOSIS — L72.8 OTHER FOLLICULAR CYSTS OF THE SKIN AND SUBCUTANEOUS TISSUE: ICD-10-CM

## 2021-12-20 DIAGNOSIS — L82.0 INFLAMED SEBORRHEIC KERATOSIS: ICD-10-CM

## 2021-12-20 PROCEDURE — ? EXCISION

## 2021-12-20 PROCEDURE — ? LIQUID NITROGEN (COSMETIC)

## 2021-12-20 PROCEDURE — 11403 EXC TR-EXT B9+MARG 2.1-3CM: CPT

## 2021-12-20 PROCEDURE — ? PUNCH EXCISION

## 2021-12-20 PROCEDURE — 11421 EXC H-F-NK-SP B9+MARG 0.6-1: CPT

## 2021-12-20 ASSESSMENT — LOCATION DETAILED DESCRIPTION DERM
LOCATION DETAILED: XIPHOID
LOCATION DETAILED: LEFT INFERIOR NASAL CHEEK
LOCATION DETAILED: RIGHT CLAVICULAR NECK

## 2021-12-20 ASSESSMENT — LOCATION SIMPLE DESCRIPTION DERM
LOCATION SIMPLE: RIGHT ANTERIOR NECK
LOCATION SIMPLE: LEFT CHEEK
LOCATION SIMPLE: ABDOMEN

## 2021-12-20 ASSESSMENT — LOCATION ZONE DERM
LOCATION ZONE: FACE
LOCATION ZONE: NECK
LOCATION ZONE: TRUNK

## 2021-12-20 NOTE — PROCEDURE: EXCISION
Medical Necessity Information: It is in your best interest to select a reason for this procedure from the list below. All of these items fulfill various CMS LCD requirements except lesion extends to a margin.
Include Z78.9 (Other Specified Conditions Influencing Health Status) As An Associated Diagnosis?: Yes
Add Nub Associated Diagnoses If Applicable When Selecting Medical Necessity: No
Medical Necessity Clause: This procedure was medically necessary because the lesion that was treated was:
Lab: [id number]
Lab Facility: 
Surgeon Performing Repair (Optional): Pramod
Size Of Lesion In Cm: 2.6
X Size Of Lesion In Cm (Optional): 0
Size Of Margin In Cm: 0.1
Anesthesia Volume In Cc: 6
Eye Clamp Note Details: An eye clamp was used during the procedure.
Excision Method: Fusiform
Saucerization Depth: dermis and superficial adipose tissue
Repair Type: None (Simple)
Suturegard Retention Suture: 2-0 Nylon
Retention Suture Bite Size: 3 mm
Length To Time In Minutes Device Was In Place: 10
Number Of Hemigard Strips Per Side: 1
Undermining Type: Entire Wound
Debridement Text: The wound edges were debrided prior to proceeding with the closure to facilitate wound healing.
Helical Rim Text: The closure involved the helical rim.
Vermilion Border Text: The closure involved the vermilion border.
Nostril Rim Text: The closure involved the nostril rim.
Retention Suture Text: Retention sutures were placed to support the closure and prevent dehiscence.
Suture Removal: 14 days
Epidermal Closure Graft Donor Site (Optional): simple interrupted
Graft Donor Site Bandage (Optional-Leave Blank If You Don't Want In Note): Steri-strips and a pressure bandage were applied to the donor site.
Detail Level: Detailed
Excision Depth: adipose tissue
Scalpel Size: 15 blade
Anesthesia Type: 1% lidocaine with 1:100,000 epinephrine and a 1:10 solution of 8.4% sodium bicarbonate
Hemostasis: Electrocautery
Estimated Blood Loss (Cc): minimal
Epidermal Sutures: 4-0 Nylon
Epidermal Closure: running cuticular
Wound Care: Vaseline
Dressing: pressure dressing
Suturegard Intro: Intraoperative tissue expansion was performed, utilizing the SUTUREGARD device, in order to reduce wound tension.
Suturegard Body: The suture ends were repeatedly re-tightened and re-clamped to achieve the desired tissue expansion.
Hemigard Intro: Due to skin fragility and wound tension, it was decided to use HEMIGARD adhesive retention suture devices to permit a linear closure. The skin was cleaned and dried for a 6cm distance away from the wound. Excessive hair, if present, was removed to allow for adhesion.
Hemigard Postcare Instructions: The HEMIGARD strips are to remain completely dry for at least 5-7 days.
Positioning (Leave Blank If You Do Not Want): The patient was placed in a comfortable position exposing the surgical site.
Complex Repair Preamble Text (Leave Blank If You Do Not Want): Extensive wide undermining was performed.
Intermediate Repair Preamble Text (Leave Blank If You Do Not Want): Undermining was performed with blunt dissection.
Fusiform Excision Additional Text (Leave Blank If You Do Not Want): The margin was drawn around the clinically apparent lesion.  A fusiform shape was then drawn on the skin incorporating the lesion and margins.  Incisions were then made along these lines to the appropriate tissue plane and the lesion was extirpated.
Eliptical Excision Additional Text (Leave Blank If You Do Not Want): The margin was drawn around the clinically apparent lesion.  An elliptical shape was then drawn on the skin incorporating the lesion and margins.  Incisions were then made along these lines to the appropriate tissue plane and the lesion was extirpated.
Saucerization Excision Additional Text (Leave Blank If You Do Not Want): The margin was drawn around the clinically apparent lesion.  Incisions were then made along these lines, in a tangential fashion, to the appropriate tissue plane and the lesion was extirpated.
Slit Excision Additional Text (Leave Blank If You Do Not Want): A linear line was drawn on the skin overlying the lesion. An incision was made slowly until the lesion was visualized.  Once visualized, the lesion was removed with blunt dissection.
Excisional Biopsy Additional Text (Leave Blank If You Do Not Want): The margin was drawn around the clinically apparent lesion. An elliptical shape was then drawn on the skin incorporating the lesion and margins.  Incisions were then made along these lines to the appropriate tissue plane and the lesion was extirpated.
Perilesional Excision Additional Text (Leave Blank If You Do Not Want): The margin was drawn around the clinically apparent lesion. Incisions were then made along these lines to the appropriate tissue plane and the lesion was extirpated.
Repair Performed By Another Provider Text (Leave Blank If You Do Not Want): After the tissue was excised the defect was repaired by another provider.
No Repair - Repaired With Adjacent Surgical Defect Text (Leave Blank If You Do Not Want): After the excision the defect was repaired concurrently with another surgical defect which was in close approximation.
Adjacent Tissue Transfer Text: The defect edges were debeveled with a #15 scalpel blade.  Given the location of the defect and the proximity to free margins an adjacent tissue transfer was deemed most appropriate.  Using a sterile surgical marker, an appropriate flap was drawn incorporating the defect and placing the expected incisions within the relaxed skin tension lines where possible.    The area thus outlined was incised deep to adipose tissue with a #15 scalpel blade.  The skin margins were undermined to an appropriate distance in all directions utilizing iris scissors.
Advancement Flap (Single) Text: The defect edges were debeveled with a #15 scalpel blade.  Given the location of the defect and the proximity to free margins a single advancement flap was deemed most appropriate.  Using a sterile surgical marker, an appropriate advancement flap was drawn incorporating the defect and placing the expected incisions within the relaxed skin tension lines where possible.    The area thus outlined was incised deep to adipose tissue with a #15 scalpel blade.  The skin margins were undermined to an appropriate distance in all directions utilizing iris scissors.
Advancement Flap (Double) Text: The defect edges were debeveled with a #15 scalpel blade.  Given the location of the defect and the proximity to free margins a double advancement flap was deemed most appropriate.  Using a sterile surgical marker, the appropriate advancement flaps were drawn incorporating the defect and placing the expected incisions within the relaxed skin tension lines where possible.    The area thus outlined was incised deep to adipose tissue with a #15 scalpel blade.  The skin margins were undermined to an appropriate distance in all directions utilizing iris scissors.
Burow's Advancement Flap Text: The defect edges were debeveled with a #15 scalpel blade.  Given the location of the defect and the proximity to free margins a Burow's advancement flap was deemed most appropriate.  Using a sterile surgical marker, the appropriate advancement flap was drawn incorporating the defect and placing the expected incisions within the relaxed skin tension lines where possible.    The area thus outlined was incised deep to adipose tissue with a #15 scalpel blade.  The skin margins were undermined to an appropriate distance in all directions utilizing iris scissors.
Chonodrocutaneous Helical Advancement Flap Text: The defect edges were debeveled with a #15 scalpel blade.  Given the location of the defect and the proximity to free margins a chondrocutaneous helical advancement flap was deemed most appropriate.  Using a sterile surgical marker, the appropriate advancement flap was drawn incorporating the defect and placing the expected incisions within the relaxed skin tension lines where possible.    The area thus outlined was incised deep to adipose tissue with a #15 scalpel blade.  The skin margins were undermined to an appropriate distance in all directions utilizing iris scissors.
Crescentic Advancement Flap Text: The defect edges were debeveled with a #15 scalpel blade.  Given the location of the defect and the proximity to free margins a crescentic advancement flap was deemed most appropriate.  Using a sterile surgical marker, the appropriate advancement flap was drawn incorporating the defect and placing the expected incisions within the relaxed skin tension lines where possible.    The area thus outlined was incised deep to adipose tissue with a #15 scalpel blade.  The skin margins were undermined to an appropriate distance in all directions utilizing iris scissors.
A-T Advancement Flap Text: The defect edges were debeveled with a #15 scalpel blade.  Given the location of the defect, shape of the defect and the proximity to free margins an A-T advancement flap was deemed most appropriate.  Using a sterile surgical marker, an appropriate advancement flap was drawn incorporating the defect and placing the expected incisions within the relaxed skin tension lines where possible.    The area thus outlined was incised deep to adipose tissue with a #15 scalpel blade.  The skin margins were undermined to an appropriate distance in all directions utilizing iris scissors.
O-T Advancement Flap Text: The defect edges were debeveled with a #15 scalpel blade.  Given the location of the defect, shape of the defect and the proximity to free margins an O-T advancement flap was deemed most appropriate.  Using a sterile surgical marker, an appropriate advancement flap was drawn incorporating the defect and placing the expected incisions within the relaxed skin tension lines where possible.    The area thus outlined was incised deep to adipose tissue with a #15 scalpel blade.  The skin margins were undermined to an appropriate distance in all directions utilizing iris scissors.
O-L Flap Text: The defect edges were debeveled with a #15 scalpel blade.  Given the location of the defect, shape of the defect and the proximity to free margins an O-L flap was deemed most appropriate.  Using a sterile surgical marker, an appropriate advancement flap was drawn incorporating the defect and placing the expected incisions within the relaxed skin tension lines where possible.    The area thus outlined was incised deep to adipose tissue with a #15 scalpel blade.  The skin margins were undermined to an appropriate distance in all directions utilizing iris scissors.
O-Z Flap Text: The defect edges were debeveled with a #15 scalpel blade.  Given the location of the defect, shape of the defect and the proximity to free margins an O-Z flap was deemed most appropriate.  Using a sterile surgical marker, an appropriate transposition flap was drawn incorporating the defect and placing the expected incisions within the relaxed skin tension lines where possible. The area thus outlined was incised deep to adipose tissue with a #15 scalpel blade.  The skin margins were undermined to an appropriate distance in all directions utilizing iris scissors.
Double O-Z Flap Text: The defect edges were debeveled with a #15 scalpel blade.  Given the location of the defect, shape of the defect and the proximity to free margins a Double O-Z flap was deemed most appropriate.  Using a sterile surgical marker, an appropriate transposition flap was drawn incorporating the defect and placing the expected incisions within the relaxed skin tension lines where possible. The area thus outlined was incised deep to adipose tissue with a #15 scalpel blade.  The skin margins were undermined to an appropriate distance in all directions utilizing iris scissors.
V-Y Flap Text: The defect edges were debeveled with a #15 scalpel blade.  Given the location of the defect, shape of the defect and the proximity to free margins a V-Y flap was deemed most appropriate.  Using a sterile surgical marker, an appropriate advancement flap was drawn incorporating the defect and placing the expected incisions within the relaxed skin tension lines where possible.    The area thus outlined was incised deep to adipose tissue with a #15 scalpel blade.  The skin margins were undermined to an appropriate distance in all directions utilizing iris scissors.
Advancement-Rotation Flap Text: The defect edges were debeveled with a #15 scalpel blade.  Given the location of the defect, shape of the defect and the proximity to free margins an advancement-rotation flap was deemed most appropriate.  Using a sterile surgical marker, an appropriate flap was drawn incorporating the defect and placing the expected incisions within the relaxed skin tension lines where possible. The area thus outlined was incised deep to adipose tissue with a #15 scalpel blade.  The skin margins were undermined to an appropriate distance in all directions utilizing iris scissors.
Mercedes Flap Text: The defect edges were debeveled with a #15 scalpel blade.  Given the location of the defect, shape of the defect and the proximity to free margins a Mercedes flap was deemed most appropriate.  Using a sterile surgical marker, an appropriate advancement flap was drawn incorporating the defect and placing the expected incisions within the relaxed skin tension lines where possible. The area thus outlined was incised deep to adipose tissue with a #15 scalpel blade.  The skin margins were undermined to an appropriate distance in all directions utilizing iris scissors.
Modified Advancement Flap Text: The defect edges were debeveled with a #15 scalpel blade.  Given the location of the defect, shape of the defect and the proximity to free margins a modified advancement flap was deemed most appropriate.  Using a sterile surgical marker, an appropriate advancement flap was drawn incorporating the defect and placing the expected incisions within the relaxed skin tension lines where possible.    The area thus outlined was incised deep to adipose tissue with a #15 scalpel blade.  The skin margins were undermined to an appropriate distance in all directions utilizing iris scissors.
Mucosal Advancement Flap Text: Given the location of the defect, shape of the defect and the proximity to free margins a mucosal advancement flap was deemed most appropriate. Incisions were made with a 15 blade scalpel in the appropriate fashion along the cutaneous vermillion border and the mucosal lip. The remaining actinically damaged mucosal tissue was excised.  The mucosal advancement flap was then elevated to the gingival sulcus with care taken to preserve the neurovascular structures and advanced into the primary defect. Care was taken to ensure that precise realignment of the vermilion border was achieved.
Peng Advancement Flap Text: The defect edges were debeveled with a #15 scalpel blade.  Given the location of the defect, shape of the defect and the proximity to free margins a Peng advancement flap was deemed most appropriate.  Using a sterile surgical marker, an appropriate advancement flap was drawn incorporating the defect and placing the expected incisions within the relaxed skin tension lines where possible. The area thus outlined was incised deep to adipose tissue with a #15 scalpel blade.  The skin margins were undermined to an appropriate distance in all directions utilizing iris scissors.
Hatchet Flap Text: The defect edges were debeveled with a #15 scalpel blade.  Given the location of the defect, shape of the defect and the proximity to free margins a hatchet flap was deemed most appropriate.  Using a sterile surgical marker, an appropriate hatchet flap was drawn incorporating the defect and placing the expected incisions within the relaxed skin tension lines where possible.    The area thus outlined was incised deep to adipose tissue with a #15 scalpel blade.  The skin margins were undermined to an appropriate distance in all directions utilizing iris scissors.
Rotation Flap Text: The defect edges were debeveled with a #15 scalpel blade.  Given the location of the defect, shape of the defect and the proximity to free margins a rotation flap was deemed most appropriate.  Using a sterile surgical marker, an appropriate rotation flap was drawn incorporating the defect and placing the expected incisions within the relaxed skin tension lines where possible.    The area thus outlined was incised deep to adipose tissue with a #15 scalpel blade.  The skin margins were undermined to an appropriate distance in all directions utilizing iris scissors.
Spiral Flap Text: The defect edges were debeveled with a #15 scalpel blade.  Given the location of the defect, shape of the defect and the proximity to free margins a spiral flap was deemed most appropriate.  Using a sterile surgical marker, an appropriate rotation flap was drawn incorporating the defect and placing the expected incisions within the relaxed skin tension lines where possible. The area thus outlined was incised deep to adipose tissue with a #15 scalpel blade.  The skin margins were undermined to an appropriate distance in all directions utilizing iris scissors.
Staged Advancement Flap Text: The defect edges were debeveled with a #15 scalpel blade.  Given the location of the defect, shape of the defect and the proximity to free margins a staged advancement flap was deemed most appropriate.  Using a sterile surgical marker, an appropriate advancement flap was drawn incorporating the defect and placing the expected incisions within the relaxed skin tension lines where possible. The area thus outlined was incised deep to adipose tissue with a #15 scalpel blade.  The skin margins were undermined to an appropriate distance in all directions utilizing iris scissors.
Star Wedge Flap Text: The defect edges were debeveled with a #15 scalpel blade.  Given the location of the defect, shape of the defect and the proximity to free margins a star wedge flap was deemed most appropriate.  Using a sterile surgical marker, an appropriate rotation flap was drawn incorporating the defect and placing the expected incisions within the relaxed skin tension lines where possible. The area thus outlined was incised deep to adipose tissue with a #15 scalpel blade.  The skin margins were undermined to an appropriate distance in all directions utilizing iris scissors.
Transposition Flap Text: The defect edges were debeveled with a #15 scalpel blade.  Given the location of the defect and the proximity to free margins a transposition flap was deemed most appropriate.  Using a sterile surgical marker, an appropriate transposition flap was drawn incorporating the defect.    The area thus outlined was incised deep to adipose tissue with a #15 scalpel blade.  The skin margins were undermined to an appropriate distance in all directions utilizing iris scissors.
Muscle Hinge Flap Text: The defect edges were debeveled with a #15 scalpel blade.  Given the size, depth and location of the defect and the proximity to free margins a muscle hinge flap was deemed most appropriate.  Using a sterile surgical marker, an appropriate hinge flap was drawn incorporating the defect. The area thus outlined was incised with a #15 scalpel blade.  The skin margins were undermined to an appropriate distance in all directions utilizing iris scissors.
Mustarde Flap Text: The defect edges were debeveled with a #15 scalpel blade.  Given the size, depth and location of the defect and the proximity to free margins a Mustarde flap was deemed most appropriate.  Using a sterile surgical marker, an appropriate flap was drawn incorporating the defect. The area thus outlined was incised with a #15 scalpel blade.  The skin margins were undermined to an appropriate distance in all directions utilizing iris scissors.
Nasal Turnover Hinge Flap Text: The defect edges were debeveled with a #15 scalpel blade.  Given the size, depth, location of the defect and the defect being full thickness a nasal turnover hinge flap was deemed most appropriate.  Using a sterile surgical marker, an appropriate hinge flap was drawn incorporating the defect. The area thus outlined was incised with a #15 scalpel blade. The flap was designed to recreate the nasal mucosal lining and the alar rim. The skin margins were undermined to an appropriate distance in all directions utilizing iris scissors.
Nasalis-Muscle-Based Myocutaneous Island Pedicle Flap Text: Using a #15 blade, an incision was made around the donor flap to the level of the nasalis muscle. Wide lateral undermining was then performed in both the subcutaneous plane above the nasalis muscle, and in a submuscular plane just above periosteum. This allowed the formation of a free nasalis muscle axial pedicle (based on the angular artery) which was still attached to the actual cutaneous flap, increasing its mobility and vascular viability. Hemostasis was obtained with pinpoint electrocoagulation. The flap was mobilized into position and the pivotal anchor points positioned and stabilized with buried interrupted sutures. Subcutaneous and dermal tissues were closed in a multilayered fashion with sutures. Tissue redundancies were excised, and the epidermal edges were apposed without significant tension and sutured with sutures.
Orbicularis Oris Muscle Flap Text: The defect edges were debeveled with a #15 scalpel blade.  Given that the defect affected the competency of the oral sphincter an orbicularis oris muscle flap was deemed most appropriate to restore this competency and normal muscle function.  Using a sterile surgical marker, an appropriate flap was drawn incorporating the defect. The area thus outlined was incised with a #15 scalpel blade.
Melolabial Transposition Flap Text: The defect edges were debeveled with a #15 scalpel blade.  Given the location of the defect and the proximity to free margins a melolabial flap was deemed most appropriate.  Using a sterile surgical marker, an appropriate melolabial transposition flap was drawn incorporating the defect.    The area thus outlined was incised deep to adipose tissue with a #15 scalpel blade.  The skin margins were undermined to an appropriate distance in all directions utilizing iris scissors.
Rhombic Flap Text: The defect edges were debeveled with a #15 scalpel blade.  Given the location of the defect and the proximity to free margins a rhombic flap was deemed most appropriate.  Using a sterile surgical marker, an appropriate rhombic flap was drawn incorporating the defect.    The area thus outlined was incised deep to adipose tissue with a #15 scalpel blade.  The skin margins were undermined to an appropriate distance in all directions utilizing iris scissors.
Rhomboid Transposition Flap Text: The defect edges were debeveled with a #15 scalpel blade.  Given the location of the defect and the proximity to free margins a rhomboid transposition flap was deemed most appropriate.  Using a sterile surgical marker, an appropriate rhomboid flap was drawn incorporating the defect.    The area thus outlined was incised deep to adipose tissue with a #15 scalpel blade.  The skin margins were undermined to an appropriate distance in all directions utilizing iris scissors.
Bi-Rhombic Flap Text: The defect edges were debeveled with a #15 scalpel blade.  Given the location of the defect and the proximity to free margins a bi-rhombic flap was deemed most appropriate.  Using a sterile surgical marker, an appropriate rhombic flap was drawn incorporating the defect. The area thus outlined was incised deep to adipose tissue with a #15 scalpel blade.  The skin margins were undermined to an appropriate distance in all directions utilizing iris scissors.
Helical Rim Advancement Flap Text: The defect edges were debeveled with a #15 blade scalpel.  Given the location of the defect and the proximity to free margins (helical rim) a double helical rim advancement flap was deemed most appropriate.  Using a sterile surgical marker, the appropriate advancement flaps were drawn incorporating the defect and placing the expected incisions between the helical rim and antihelix where possible.  The area thus outlined was incised through and through with a #15 scalpel blade.  With a skin hook and iris scissors, the flaps were gently and sharply undermined and freed up.
Bilateral Helical Rim Advancement Flap Text: The defect edges were debeveled with a #15 blade scalpel.  Given the location of the defect and the proximity to free margins (helical rim) a bilateral helical rim advancement flap was deemed most appropriate.  Using a sterile surgical marker, the appropriate advancement flaps were drawn incorporating the defect and placing the expected incisions between the helical rim and antihelix where possible.  The area thus outlined was incised through and through with a #15 scalpel blade.  With a skin hook and iris scissors, the flaps were gently and sharply undermined and freed up.
Ear Star Wedge Flap Text: The defect edges were debeveled with a #15 blade scalpel.  Given the location of the defect and the proximity to free margins (helical rim) an ear star wedge flap was deemed most appropriate.  Using a sterile surgical marker, the appropriate flap was drawn incorporating the defect and placing the expected incisions between the helical rim and antihelix where possible.  The area thus outlined was incised through and through with a #15 scalpel blade.
Banner Transposition Flap Text: The defect edges were debeveled with a #15 scalpel blade.  Given the location of the defect and the proximity to free margins a Banner transposition flap was deemed most appropriate.  Using a sterile surgical marker, an appropriate flap drawn around the defect. The area thus outlined was incised deep to adipose tissue with a #15 scalpel blade.  The skin margins were undermined to an appropriate distance in all directions utilizing iris scissors.
Bilobed Flap Text: The defect edges were debeveled with a #15 scalpel blade.  Given the location of the defect and the proximity to free margins a bilobe flap was deemed most appropriate.  Using a sterile surgical marker, an appropriate bilobe flap drawn around the defect.    The area thus outlined was incised deep to adipose tissue with a #15 scalpel blade.  The skin margins were undermined to an appropriate distance in all directions utilizing iris scissors.
Bilobed Transposition Flap Text: The defect edges were debeveled with a #15 scalpel blade.  Given the location of the defect and the proximity to free margins a bilobed transposition flap was deemed most appropriate.  Using a sterile surgical marker, an appropriate bilobe flap drawn around the defect.    The area thus outlined was incised deep to adipose tissue with a #15 scalpel blade.  The skin margins were undermined to an appropriate distance in all directions utilizing iris scissors.
Trilobed Flap Text: The defect edges were debeveled with a #15 scalpel blade.  Given the location of the defect and the proximity to free margins a trilobed flap was deemed most appropriate.  Using a sterile surgical marker, an appropriate trilobed flap drawn around the defect.    The area thus outlined was incised deep to adipose tissue with a #15 scalpel blade.  The skin margins were undermined to an appropriate distance in all directions utilizing iris scissors.
Dorsal Nasal Flap Text: The defect edges were debeveled with a #15 scalpel blade.  Given the location of the defect and the proximity to free margins a dorsal nasal flap was deemed most appropriate.  Using a sterile surgical marker, an appropriate dorsal nasal flap was drawn around the defect.    The area thus outlined was incised deep to adipose tissue with a #15 scalpel blade.  The skin margins were undermined to an appropriate distance in all directions utilizing iris scissors.
Island Pedicle Flap Text: The defect edges were debeveled with a #15 scalpel blade.  Given the location of the defect, shape of the defect and the proximity to free margins an island pedicle advancement flap was deemed most appropriate.  Using a sterile surgical marker, an appropriate advancement flap was drawn incorporating the defect, outlining the appropriate donor tissue and placing the expected incisions within the relaxed skin tension lines where possible.    The area thus outlined was incised deep to adipose tissue with a #15 scalpel blade.  The skin margins were undermined to an appropriate distance in all directions around the primary defect and laterally outward around the island pedicle utilizing iris scissors.  There was minimal undermining beneath the pedicle flap.
Island Pedicle Flap With Canthal Suspension Text: The defect edges were debeveled with a #15 scalpel blade.  Given the location of the defect, shape of the defect and the proximity to free margins an island pedicle advancement flap was deemed most appropriate.  Using a sterile surgical marker, an appropriate advancement flap was drawn incorporating the defect, outlining the appropriate donor tissue and placing the expected incisions within the relaxed skin tension lines where possible. The area thus outlined was incised deep to adipose tissue with a #15 scalpel blade.  The skin margins were undermined to an appropriate distance in all directions around the primary defect and laterally outward around the island pedicle utilizing iris scissors.  There was minimal undermining beneath the pedicle flap. A suspension suture was placed in the canthal tendon to prevent tension and prevent ectropion.
Alar Island Pedicle Flap Text: The defect edges were debeveled with a #15 scalpel blade.  Given the location of the defect, shape of the defect and the proximity to the alar rim an island pedicle advancement flap was deemed most appropriate.  Using a sterile surgical marker, an appropriate advancement flap was drawn incorporating the defect, outlining the appropriate donor tissue and placing the expected incisions within the nasal ala running parallel to the alar rim. The area thus outlined was incised with a #15 scalpel blade.  The skin margins were undermined minimally to an appropriate distance in all directions around the primary defect and laterally outward around the island pedicle utilizing iris scissors.  There was minimal undermining beneath the pedicle flap.
Double Island Pedicle Flap Text: The defect edges were debeveled with a #15 scalpel blade.  Given the location of the defect, shape of the defect and the proximity to free margins a double island pedicle advancement flap was deemed most appropriate.  Using a sterile surgical marker, an appropriate advancement flap was drawn incorporating the defect, outlining the appropriate donor tissue and placing the expected incisions within the relaxed skin tension lines where possible.    The area thus outlined was incised deep to adipose tissue with a #15 scalpel blade.  The skin margins were undermined to an appropriate distance in all directions around the primary defect and laterally outward around the island pedicle utilizing iris scissors.  There was minimal undermining beneath the pedicle flap.
Island Pedicle Flap-Requiring Vessel Identification Text: The defect edges were debeveled with a #15 scalpel blade.  Given the location of the defect, shape of the defect and the proximity to free margins an island pedicle advancement flap was deemed most appropriate.  Using a sterile surgical marker, an appropriate advancement flap was drawn, based on the axial vessel mentioned above, incorporating the defect, outlining the appropriate donor tissue and placing the expected incisions within the relaxed skin tension lines where possible.    The area thus outlined was incised deep to adipose tissue with a #15 scalpel blade.  The skin margins were undermined to an appropriate distance in all directions around the primary defect and laterally outward around the island pedicle utilizing iris scissors.  There was minimal undermining beneath the pedicle flap.
Keystone Flap Text: The defect edges were debeveled with a #15 scalpel blade.  Given the location of the defect, shape of the defect a keystone flap was deemed most appropriate.  Using a sterile surgical marker, an appropriate keystone flap was drawn incorporating the defect, outlining the appropriate donor tissue and placing the expected incisions within the relaxed skin tension lines where possible. The area thus outlined was incised deep to adipose tissue with a #15 scalpel blade.  The skin margins were undermined to an appropriate distance in all directions around the primary defect and laterally outward around the flap utilizing iris scissors.
O-T Plasty Text: The defect edges were debeveled with a #15 scalpel blade.  Given the location of the defect, shape of the defect and the proximity to free margins an O-T plasty was deemed most appropriate.  Using a sterile surgical marker, an appropriate O-T plasty was drawn incorporating the defect and placing the expected incisions within the relaxed skin tension lines where possible.    The area thus outlined was incised deep to adipose tissue with a #15 scalpel blade.  The skin margins were undermined to an appropriate distance in all directions utilizing iris scissors.
O-Z Plasty Text: The defect edges were debeveled with a #15 scalpel blade.  Given the location of the defect, shape of the defect and the proximity to free margins an O-Z plasty (double transposition flap) was deemed most appropriate.  Using a sterile surgical marker, the appropriate transposition flaps were drawn incorporating the defect and placing the expected incisions within the relaxed skin tension lines where possible.    The area thus outlined was incised deep to adipose tissue with a #15 scalpel blade.  The skin margins were undermined to an appropriate distance in all directions utilizing iris scissors.  Hemostasis was achieved with electrocautery.  The flaps were then transposed into place, one clockwise and the other counterclockwise, and anchored with interrupted buried subcutaneous sutures.
Double O-Z Plasty Text: The defect edges were debeveled with a #15 scalpel blade.  Given the location of the defect, shape of the defect and the proximity to free margins a Double O-Z plasty (double transposition flap) was deemed most appropriate.  Using a sterile surgical marker, the appropriate transposition flaps were drawn incorporating the defect and placing the expected incisions within the relaxed skin tension lines where possible. The area thus outlined was incised deep to adipose tissue with a #15 scalpel blade.  The skin margins were undermined to an appropriate distance in all directions utilizing iris scissors.  Hemostasis was achieved with electrocautery.  The flaps were then transposed into place, one clockwise and the other counterclockwise, and anchored with interrupted buried subcutaneous sutures.
V-Y Plasty Text: The defect edges were debeveled with a #15 scalpel blade.  Given the location of the defect, shape of the defect and the proximity to free margins an V-Y advancement flap was deemed most appropriate.  Using a sterile surgical marker, an appropriate advancement flap was drawn incorporating the defect and placing the expected incisions within the relaxed skin tension lines where possible.    The area thus outlined was incised deep to adipose tissue with a #15 scalpel blade.  The skin margins were undermined to an appropriate distance in all directions utilizing iris scissors.
H Plasty Text: Given the location of the defect, shape of the defect and the proximity to free margins a H-plasty was deemed most appropriate for repair.  Using a sterile surgical marker, the appropriate advancement arms of the H-plasty were drawn incorporating the defect and placing the expected incisions within the relaxed skin tension lines where possible. The area thus outlined was incised deep to adipose tissue with a #15 scalpel blade. The skin margins were undermined to an appropriate distance in all directions utilizing iris scissors.  The opposing advancement arms were then advanced into place in opposite direction and anchored with interrupted buried subcutaneous sutures.
W Plasty Text: The lesion was extirpated to the level of the fat with a #15 scalpel blade.  Given the location of the defect, shape of the defect and the proximity to free margins a W-plasty was deemed most appropriate for repair.  Using a sterile surgical marker, the appropriate transposition arms of the W-plasty were drawn incorporating the defect and placing the expected incisions within the relaxed skin tension lines where possible.    The area thus outlined was incised deep to adipose tissue with a #15 scalpel blade.  The skin margins were undermined to an appropriate distance in all directions utilizing iris scissors.  The opposing transposition arms were then transposed into place in opposite direction and anchored with interrupted buried subcutaneous sutures.
Z Plasty Text: The lesion was extirpated to the level of the fat with a #15 scalpel blade.  Given the location of the defect, shape of the defect and the proximity to free margins a Z-plasty was deemed most appropriate for repair.  Using a sterile surgical marker, the appropriate transposition arms of the Z-plasty were drawn incorporating the defect and placing the expected incisions within the relaxed skin tension lines where possible.    The area thus outlined was incised deep to adipose tissue with a #15 scalpel blade.  The skin margins were undermined to an appropriate distance in all directions utilizing iris scissors.  The opposing transposition arms were then transposed into place in opposite direction and anchored with interrupted buried subcutaneous sutures.
Zygomaticofacial Flap Text: Given the location of the defect, shape of the defect and the proximity to free margins a zygomaticofacial flap was deemed most appropriate for repair.  Using a sterile surgical marker, the appropriate flap was drawn incorporating the defect and placing the expected incisions within the relaxed skin tension lines where possible. The area thus outlined was incised deep to adipose tissue with a #15 scalpel blade with preservation of a vascular pedicle.  The skin margins were undermined to an appropriate distance in all directions utilizing iris scissors.  The flap was then placed into the defect and anchored with interrupted buried subcutaneous sutures.
Cheek Interpolation Flap Text: A decision was made to reconstruct the defect utilizing an interpolation axial flap and a staged reconstruction.  A telfa template was made of the defect.  This telfa template was then used to outline the Cheek Interpolation flap.  The donor area for the pedicle flap was then injected with anesthesia.  The flap was excised through the skin and subcutaneous tissue down to the layer of the underlying musculature.  The interpolation flap was carefully excised within this deep plane to maintain its blood supply.  The edges of the donor site were undermined.   The donor site was closed in a primary fashion.  The pedicle was then rotated into position and sutured.  Once the tube was sutured into place, adequate blood supply was confirmed with blanching and refill.  The pedicle was then wrapped with xeroform gauze and dressed appropriately with a telfa and gauze bandage to ensure continued blood supply and protect the attached pedicle.
Cheek-To-Nose Interpolation Flap Text: A decision was made to reconstruct the defect utilizing an interpolation axial flap and a staged reconstruction.  A telfa template was made of the defect.  This telfa template was then used to outline the Cheek-To-Nose Interpolation flap.  The donor area for the pedicle flap was then injected with anesthesia.  The flap was excised through the skin and subcutaneous tissue down to the layer of the underlying musculature.  The interpolation flap was carefully excised within this deep plane to maintain its blood supply.  The edges of the donor site were undermined.   The donor site was closed in a primary fashion.  The pedicle was then rotated into position and sutured.  Once the tube was sutured into place, adequate blood supply was confirmed with blanching and refill.  The pedicle was then wrapped with xeroform gauze and dressed appropriately with a telfa and gauze bandage to ensure continued blood supply and protect the attached pedicle.
Interpolation Flap Text: A decision was made to reconstruct the defect utilizing an interpolation axial flap and a staged reconstruction.  A telfa template was made of the defect.  This telfa template was then used to outline the interpolation flap.  The donor area for the pedicle flap was then injected with anesthesia.  The flap was excised through the skin and subcutaneous tissue down to the layer of the underlying musculature.  The interpolation flap was carefully excised within this deep plane to maintain its blood supply.  The edges of the donor site were undermined.   The donor site was closed in a primary fashion.  The pedicle was then rotated into position and sutured.  Once the tube was sutured into place, adequate blood supply was confirmed with blanching and refill.  The pedicle was then wrapped with xeroform gauze and dressed appropriately with a telfa and gauze bandage to ensure continued blood supply and protect the attached pedicle.
Melolabial Interpolation Flap Text: A decision was made to reconstruct the defect utilizing an interpolation axial flap and a staged reconstruction.  A telfa template was made of the defect.  This telfa template was then used to outline the melolabial interpolation flap.  The donor area for the pedicle flap was then injected with anesthesia.  The flap was excised through the skin and subcutaneous tissue down to the layer of the underlying musculature.  The pedicle flap was carefully excised within this deep plane to maintain its blood supply.  The edges of the donor site were undermined.   The donor site was closed in a primary fashion.  The pedicle was then rotated into position and sutured.  Once the tube was sutured into place, adequate blood supply was confirmed with blanching and refill.  The pedicle was then wrapped with xeroform gauze and dressed appropriately with a telfa and gauze bandage to ensure continued blood supply and protect the attached pedicle.
Mastoid Interpolation Flap Text: A decision was made to reconstruct the defect utilizing an interpolation axial flap and a staged reconstruction.  A telfa template was made of the defect.  This telfa template was then used to outline the mastoid interpolation flap.  The donor area for the pedicle flap was then injected with anesthesia.  The flap was excised through the skin and subcutaneous tissue down to the layer of the underlying musculature.  The pedicle flap was carefully excised within this deep plane to maintain its blood supply.  The edges of the donor site were undermined.   The donor site was closed in a primary fashion.  The pedicle was then rotated into position and sutured.  Once the tube was sutured into place, adequate blood supply was confirmed with blanching and refill.  The pedicle was then wrapped with xeroform gauze and dressed appropriately with a telfa and gauze bandage to ensure continued blood supply and protect the attached pedicle.
Posterior Auricular Interpolation Flap Text: A decision was made to reconstruct the defect utilizing an interpolation axial flap and a staged reconstruction.  A telfa template was made of the defect.  This telfa template was then used to outline the posterior auricular interpolation flap.  The donor area for the pedicle flap was then injected with anesthesia.  The flap was excised through the skin and subcutaneous tissue down to the layer of the underlying musculature.  The pedicle flap was carefully excised within this deep plane to maintain its blood supply.  The edges of the donor site were undermined.   The donor site was closed in a primary fashion.  The pedicle was then rotated into position and sutured.  Once the tube was sutured into place, adequate blood supply was confirmed with blanching and refill.  The pedicle was then wrapped with xeroform gauze and dressed appropriately with a telfa and gauze bandage to ensure continued blood supply and protect the attached pedicle.
Paramedian Forehead Flap Text: A decision was made to reconstruct the defect utilizing an interpolation axial flap and a staged reconstruction.  A telfa template was made of the defect.  This telfa template was then used to outline the paramedian forehead pedicle flap.  The donor area for the pedicle flap was then injected with anesthesia.  The flap was excised through the skin and subcutaneous tissue down to the layer of the underlying musculature.  The pedicle flap was carefully excised within this deep plane to maintain its blood supply.  The edges of the donor site were undermined.   The donor site was closed in a primary fashion.  The pedicle was then rotated into position and sutured.  Once the tube was sutured into place, adequate blood supply was confirmed with blanching and refill.  The pedicle was then wrapped with xeroform gauze and dressed appropriately with a telfa and gauze bandage to ensure continued blood supply and protect the attached pedicle.
Lip Wedge Excision Repair Text: Given the location of the defect and the proximity to free margins a full thickness wedge repair was deemed most appropriate.  Using a sterile surgical marker, the appropriate repair was drawn incorporating the defect and placing the expected incisions perpendicular to the vermilion border.  The vermilion border was also meticulously outlined to ensure appropriate reapproximation during the repair.  The area thus outlined was incised through and through with a #15 scalpel blade.  The muscularis and dermis were reaproximated with deep sutures following hemostasis. Care was taken to realign the vermilion border before proceeding with the superficial closure.  Once the vermilion was realigned the superfical and mucosal closure was finished.
Ftsg Text: The defect edges were debeveled with a #15 scalpel blade.  Given the location of the defect, shape of the defect and the proximity to free margins a full thickness skin graft was deemed most appropriate.  Using a sterile surgical marker, the primary defect shape was transferred to the donor site. The area thus outlined was incised deep to adipose tissue with a #15 scalpel blade.  The harvested graft was then trimmed of adipose tissue until only dermis and epidermis was left.  The skin margins of the secondary defect were undermined to an appropriate distance in all directions utilizing iris scissors.  The secondary defect was closed with interrupted buried subcutaneous sutures.  The skin edges were then re-apposed with running  sutures.  The skin graft was then placed in the primary defect and oriented appropriately.
Split-Thickness Skin Graft Text: The defect edges were debeveled with a #15 scalpel blade.  Given the location of the defect, shape of the defect and the proximity to free margins a split thickness skin graft was deemed most appropriate.  Using a sterile surgical marker, the primary defect shape was transferred to the donor site. The split thickness graft was then harvested.  The skin graft was then placed in the primary defect and oriented appropriately.
Burow's Graft Text: The defect edges were debeveled with a #15 scalpel blade.  Given the location of the defect, shape of the defect, the proximity to free margins and the presence of a standing cone deformity a Burow's skin graft was deemed most appropriate. The standing cone was removed and this tissue was then trimmed to the shape of the primary defect. The adipose tissue was also removed until only dermis and epidermis were left.  The skin margins of the secondary defect were undermined to an appropriate distance in all directions utilizing iris scissors.  The secondary defect was closed with interrupted buried subcutaneous sutures.  The skin edges were then re-apposed with running  sutures.  The skin graft was then placed in the primary defect and oriented appropriately.
Cartilage Graft Text: The defect edges were debeveled with a #15 scalpel blade.  Given the location of the defect, shape of the defect, the fact the defect involved a full thickness cartilage defect a cartilage graft was deemed most appropriate.  An appropriate donor site was identified, cleansed, and anesthetized. The cartilage graft was then harvested and transferred to the recipient site, oriented appropriately and then sutured into place.  The secondary defect was then repaired using a primary closure.
Composite Graft Text: The defect edges were debeveled with a #15 scalpel blade.  Given the location of the defect, shape of the defect, the proximity to free margins and the fact the defect was full thickness a composite graft was deemed most appropriate.  The defect was outline and then transferred to the donor site.  A full thickness graft was then excised from the donor site. The graft was then placed in the primary defect, oriented appropriately and then sutured into place.  The secondary defect was then repaired using a primary closure.
Epidermal Autograft Text: The defect edges were debeveled with a #15 scalpel blade.  Given the location of the defect, shape of the defect and the proximity to free margins an epidermal autograft was deemed most appropriate.  Using a sterile surgical marker, the primary defect shape was transferred to the donor site. The epidermal graft was then harvested.  The skin graft was then placed in the primary defect and oriented appropriately.
Dermal Autograft Text: The defect edges were debeveled with a #15 scalpel blade.  Given the location of the defect, shape of the defect and the proximity to free margins a dermal autograft was deemed most appropriate.  Using a sterile surgical marker, the primary defect shape was transferred to the donor site. The area thus outlined was incised deep to adipose tissue with a #15 scalpel blade.  The harvested graft was then trimmed of adipose and epidermal tissue until only dermis was left.  The skin graft was then placed in the primary defect and oriented appropriately.
Skin Substitute Text: The defect edges were debeveled with a #15 scalpel blade.  Given the location of the defect, shape of the defect and the proximity to free margins a skin substitute graft was deemed most appropriate.  The graft material was trimmed to fit the size of the defect. The graft was then placed in the primary defect and oriented appropriately.
Tissue Cultured Epidermal Autograft Text: The defect edges were debeveled with a #15 scalpel blade.  Given the location of the defect, shape of the defect and the proximity to free margins a tissue cultured epidermal autograft was deemed most appropriate.  The graft was then trimmed to fit the size of the defect.  The graft was then placed in the primary defect and oriented appropriately.
Xenograft Text: The defect edges were debeveled with a #15 scalpel blade.  Given the location of the defect, shape of the defect and the proximity to free margins a xenograft was deemed most appropriate.  The graft was then trimmed to fit the size of the defect.  The graft was then placed in the primary defect and oriented appropriately.
Purse String (Intermediate) Text: Given the location of the defect and the characteristics of the surrounding skin a purse string intermediate closure was deemed most appropriate.  Undermining was performed circumferentially around the surgical defect.  A purse string suture was then placed and tightened.
Purse String (Simple) Text: Given the location of the defect and the characteristics of the surrounding skin a purse string simple closure was deemed most appropriate.  Undermining was performed circumferentially around the surgical defect.  A purse string suture was then placed and tightened.
Complex Repair And Single Advancement Flap Text: The defect edges were debeveled with a #15 scalpel blade.  The primary defect was closed partially with a complex linear closure.  Given the location of the remaining defect, shape of the defect and the proximity to free margins a single advancement flap was deemed most appropriate for complete closure of the defect.  Using a sterile surgical marker, an appropriate advancement flap was drawn incorporating the defect and placing the expected incisions within the relaxed skin tension lines where possible.    The area thus outlined was incised deep to adipose tissue with a #15 scalpel blade.  The skin margins were undermined to an appropriate distance in all directions utilizing iris scissors.
Complex Repair And Double Advancement Flap Text: The defect edges were debeveled with a #15 scalpel blade.  The primary defect was closed partially with a complex linear closure.  Given the location of the remaining defect, shape of the defect and the proximity to free margins a double advancement flap was deemed most appropriate for complete closure of the defect.  Using a sterile surgical marker, an appropriate advancement flap was drawn incorporating the defect and placing the expected incisions within the relaxed skin tension lines where possible.    The area thus outlined was incised deep to adipose tissue with a #15 scalpel blade.  The skin margins were undermined to an appropriate distance in all directions utilizing iris scissors.
Complex Repair And Modified Advancement Flap Text: The defect edges were debeveled with a #15 scalpel blade.  The primary defect was closed partially with a complex linear closure.  Given the location of the remaining defect, shape of the defect and the proximity to free margins a modified advancement flap was deemed most appropriate for complete closure of the defect.  Using a sterile surgical marker, an appropriate advancement flap was drawn incorporating the defect and placing the expected incisions within the relaxed skin tension lines where possible.    The area thus outlined was incised deep to adipose tissue with a #15 scalpel blade.  The skin margins were undermined to an appropriate distance in all directions utilizing iris scissors.
Complex Repair And A-T Advancement Flap Text: The defect edges were debeveled with a #15 scalpel blade.  The primary defect was closed partially with a complex linear closure.  Given the location of the remaining defect, shape of the defect and the proximity to free margins an A-T advancement flap was deemed most appropriate for complete closure of the defect.  Using a sterile surgical marker, an appropriate advancement flap was drawn incorporating the defect and placing the expected incisions within the relaxed skin tension lines where possible.    The area thus outlined was incised deep to adipose tissue with a #15 scalpel blade.  The skin margins were undermined to an appropriate distance in all directions utilizing iris scissors.
Complex Repair And O-T Advancement Flap Text: The defect edges were debeveled with a #15 scalpel blade.  The primary defect was closed partially with a complex linear closure.  Given the location of the remaining defect, shape of the defect and the proximity to free margins an O-T advancement flap was deemed most appropriate for complete closure of the defect.  Using a sterile surgical marker, an appropriate advancement flap was drawn incorporating the defect and placing the expected incisions within the relaxed skin tension lines where possible.    The area thus outlined was incised deep to adipose tissue with a #15 scalpel blade.  The skin margins were undermined to an appropriate distance in all directions utilizing iris scissors.
Complex Repair And O-L Flap Text: The defect edges were debeveled with a #15 scalpel blade.  The primary defect was closed partially with a complex linear closure.  Given the location of the remaining defect, shape of the defect and the proximity to free margins an O-L flap was deemed most appropriate for complete closure of the defect.  Using a sterile surgical marker, an appropriate flap was drawn incorporating the defect and placing the expected incisions within the relaxed skin tension lines where possible.    The area thus outlined was incised deep to adipose tissue with a #15 scalpel blade.  The skin margins were undermined to an appropriate distance in all directions utilizing iris scissors.
Complex Repair And Bilobe Flap Text: The defect edges were debeveled with a #15 scalpel blade.  The primary defect was closed partially with a complex linear closure.  Given the location of the remaining defect, shape of the defect and the proximity to free margins a bilobe flap was deemed most appropriate for complete closure of the defect.  Using a sterile surgical marker, an appropriate advancement flap was drawn incorporating the defect and placing the expected incisions within the relaxed skin tension lines where possible.    The area thus outlined was incised deep to adipose tissue with a #15 scalpel blade.  The skin margins were undermined to an appropriate distance in all directions utilizing iris scissors.
Complex Repair And Melolabial Flap Text: The defect edges were debeveled with a #15 scalpel blade.  The primary defect was closed partially with a complex linear closure.  Given the location of the remaining defect, shape of the defect and the proximity to free margins a melolabial flap was deemed most appropriate for complete closure of the defect.  Using a sterile surgical marker, an appropriate advancement flap was drawn incorporating the defect and placing the expected incisions within the relaxed skin tension lines where possible.    The area thus outlined was incised deep to adipose tissue with a #15 scalpel blade.  The skin margins were undermined to an appropriate distance in all directions utilizing iris scissors.
Complex Repair And Rotation Flap Text: The defect edges were debeveled with a #15 scalpel blade.  The primary defect was closed partially with a complex linear closure.  Given the location of the remaining defect, shape of the defect and the proximity to free margins a rotation flap was deemed most appropriate for complete closure of the defect.  Using a sterile surgical marker, an appropriate advancement flap was drawn incorporating the defect and placing the expected incisions within the relaxed skin tension lines where possible.    The area thus outlined was incised deep to adipose tissue with a #15 scalpel blade.  The skin margins were undermined to an appropriate distance in all directions utilizing iris scissors.
Complex Repair And Rhombic Flap Text: The defect edges were debeveled with a #15 scalpel blade.  The primary defect was closed partially with a complex linear closure.  Given the location of the remaining defect, shape of the defect and the proximity to free margins a rhombic flap was deemed most appropriate for complete closure of the defect.  Using a sterile surgical marker, an appropriate advancement flap was drawn incorporating the defect and placing the expected incisions within the relaxed skin tension lines where possible.    The area thus outlined was incised deep to adipose tissue with a #15 scalpel blade.  The skin margins were undermined to an appropriate distance in all directions utilizing iris scissors.
Complex Repair And Transposition Flap Text: The defect edges were debeveled with a #15 scalpel blade.  The primary defect was closed partially with a complex linear closure.  Given the location of the remaining defect, shape of the defect and the proximity to free margins a transposition flap was deemed most appropriate for complete closure of the defect.  Using a sterile surgical marker, an appropriate advancement flap was drawn incorporating the defect and placing the expected incisions within the relaxed skin tension lines where possible.    The area thus outlined was incised deep to adipose tissue with a #15 scalpel blade.  The skin margins were undermined to an appropriate distance in all directions utilizing iris scissors.
Complex Repair And V-Y Plasty Text: The defect edges were debeveled with a #15 scalpel blade.  The primary defect was closed partially with a complex linear closure.  Given the location of the remaining defect, shape of the defect and the proximity to free margins a V-Y plasty was deemed most appropriate for complete closure of the defect.  Using a sterile surgical marker, an appropriate advancement flap was drawn incorporating the defect and placing the expected incisions within the relaxed skin tension lines where possible.    The area thus outlined was incised deep to adipose tissue with a #15 scalpel blade.  The skin margins were undermined to an appropriate distance in all directions utilizing iris scissors.
Complex Repair And M Plasty Text: The defect edges were debeveled with a #15 scalpel blade.  The primary defect was closed partially with a complex linear closure.  Given the location of the remaining defect, shape of the defect and the proximity to free margins an M plasty was deemed most appropriate for complete closure of the defect.  Using a sterile surgical marker, an appropriate advancement flap was drawn incorporating the defect and placing the expected incisions within the relaxed skin tension lines where possible.    The area thus outlined was incised deep to adipose tissue with a #15 scalpel blade.  The skin margins were undermined to an appropriate distance in all directions utilizing iris scissors.
Complex Repair And Double M Plasty Text: The defect edges were debeveled with a #15 scalpel blade.  The primary defect was closed partially with a complex linear closure.  Given the location of the remaining defect, shape of the defect and the proximity to free margins a double M plasty was deemed most appropriate for complete closure of the defect.  Using a sterile surgical marker, an appropriate advancement flap was drawn incorporating the defect and placing the expected incisions within the relaxed skin tension lines where possible.    The area thus outlined was incised deep to adipose tissue with a #15 scalpel blade.  The skin margins were undermined to an appropriate distance in all directions utilizing iris scissors.
Complex Repair And W Plasty Text: The defect edges were debeveled with a #15 scalpel blade.  The primary defect was closed partially with a complex linear closure.  Given the location of the remaining defect, shape of the defect and the proximity to free margins a W plasty was deemed most appropriate for complete closure of the defect.  Using a sterile surgical marker, an appropriate advancement flap was drawn incorporating the defect and placing the expected incisions within the relaxed skin tension lines where possible.    The area thus outlined was incised deep to adipose tissue with a #15 scalpel blade.  The skin margins were undermined to an appropriate distance in all directions utilizing iris scissors.
Complex Repair And Z Plasty Text: The defect edges were debeveled with a #15 scalpel blade.  The primary defect was closed partially with a complex linear closure.  Given the location of the remaining defect, shape of the defect and the proximity to free margins a Z plasty was deemed most appropriate for complete closure of the defect.  Using a sterile surgical marker, an appropriate advancement flap was drawn incorporating the defect and placing the expected incisions within the relaxed skin tension lines where possible.    The area thus outlined was incised deep to adipose tissue with a #15 scalpel blade.  The skin margins were undermined to an appropriate distance in all directions utilizing iris scissors.
Complex Repair And Dorsal Nasal Flap Text: The defect edges were debeveled with a #15 scalpel blade.  The primary defect was closed partially with a complex linear closure.  Given the location of the remaining defect, shape of the defect and the proximity to free margins a dorsal nasal flap was deemed most appropriate for complete closure of the defect.  Using a sterile surgical marker, an appropriate flap was drawn incorporating the defect and placing the expected incisions within the relaxed skin tension lines where possible.    The area thus outlined was incised deep to adipose tissue with a #15 scalpel blade.  The skin margins were undermined to an appropriate distance in all directions utilizing iris scissors.
Complex Repair And Ftsg Text: The defect edges were debeveled with a #15 scalpel blade.  The primary defect was closed partially with a complex linear closure.  Given the location of the defect, shape of the defect and the proximity to free margins a full thickness skin graft was deemed most appropriate to repair the remaining defect.  The graft was trimmed to fit the size of the remaining defect.  The graft was then placed in the primary defect, oriented appropriately, and sutured into place.
Complex Repair And Burow's Graft Text: The defect edges were debeveled with a #15 scalpel blade.  The primary defect was closed partially with a complex linear closure.  Given the location of the defect, shape of the defect, the proximity to free margins and the presence of a standing cone deformity a Burow's graft was deemed most appropriate to repair the remaining defect.  The graft was trimmed to fit the size of the remaining defect.  The graft was then placed in the primary defect, oriented appropriately, and sutured into place.
Complex Repair And Split-Thickness Skin Graft Text: The defect edges were debeveled with a #15 scalpel blade.  The primary defect was closed partially with a complex linear closure.  Given the location of the defect, shape of the defect and the proximity to free margins a split thickness skin graft was deemed most appropriate to repair the remaining defect.  The graft was trimmed to fit the size of the remaining defect.  The graft was then placed in the primary defect, oriented appropriately, and sutured into place.
Complex Repair And Epidermal Autograft Text: The defect edges were debeveled with a #15 scalpel blade.  The primary defect was closed partially with a complex linear closure.  Given the location of the defect, shape of the defect and the proximity to free margins an epidermal autograft was deemed most appropriate to repair the remaining defect.  The graft was trimmed to fit the size of the remaining defect.  The graft was then placed in the primary defect, oriented appropriately, and sutured into place.
Complex Repair And Dermal Autograft Text: The defect edges were debeveled with a #15 scalpel blade.  The primary defect was closed partially with a complex linear closure.  Given the location of the defect, shape of the defect and the proximity to free margins an dermal autograft was deemed most appropriate to repair the remaining defect.  The graft was trimmed to fit the size of the remaining defect.  The graft was then placed in the primary defect, oriented appropriately, and sutured into place.
Complex Repair And Tissue Cultured Epidermal Autograft Text: The defect edges were debeveled with a #15 scalpel blade.  The primary defect was closed partially with a complex linear closure.  Given the location of the defect, shape of the defect and the proximity to free margins an tissue cultured epidermal autograft was deemed most appropriate to repair the remaining defect.  The graft was trimmed to fit the size of the remaining defect.  The graft was then placed in the primary defect, oriented appropriately, and sutured into place.
Complex Repair And Xenograft Text: The defect edges were debeveled with a #15 scalpel blade.  The primary defect was closed partially with a complex linear closure.  Given the location of the defect, shape of the defect and the proximity to free margins a xenograft was deemed most appropriate to repair the remaining defect.  The graft was trimmed to fit the size of the remaining defect.  The graft was then placed in the primary defect, oriented appropriately, and sutured into place.
Complex Repair And Skin Substitute Graft Text: The defect edges were debeveled with a #15 scalpel blade.  The primary defect was closed partially with a complex linear closure.  Given the location of the remaining defect, shape of the defect and the proximity to free margins a skin substitute graft was deemed most appropriate to repair the remaining defect.  The graft was trimmed to fit the size of the remaining defect.  The graft was then placed in the primary defect, oriented appropriately, and sutured into place.
Path Notes (To The Dermatopathologist): Please check margins.
Consent: Verbal consent was obtained from the patient. The risks and benefits to therapy were discussed in detail. Specifically, the risks of infection, scarring, bleeding, prolonged wound healing, incomplete removal, allergy to anesthesia, nerve injury and recurrence were addressed. Prior to the procedure, the treatment site was clearly identified and confirmed by the patient. All components of Universal Protocol/PAUSE Rule completed.
Post-Care Instructions: I reviewed with the patient in detail post-care instructions. Patient is not to engage in any heavy lifting, exercise, or swimming for the next 14 days. Should the patient develop any fevers, chills, bleeding, severe pain patient will contact the office immediately.
Where Do You Want The Question To Include Opioid Counseling Located?: Case Summary Tab
Billing Type: Third-Party Bill
Information: Selecting Yes will display possible errors in your note based on the variables you have selected. This validation is only offered as a suggestion for you. PLEASE NOTE THAT THE VALIDATION TEXT WILL BE REMOVED WHEN YOU FINALIZE YOUR NOTE. IF YOU WANT TO FAX A PRELIMINARY NOTE YOU WILL NEED TO TOGGLE THIS TO 'NO' IF YOU DO NOT WANT IT IN YOUR FAXED NOTE.

## 2021-12-20 NOTE — PROCEDURE: PUNCH EXCISION
Size Of Lesion In Cm: 0.6
X Size Of Lesion In Cm (Optional): 0
Excision Method: 5 mm Punch
Repair Type: None (Simple)
Complex Requirements: Extensive Undermining Performed?: No
Undermining Type: Entire Wound
Debridement Text: The wound edges were debrided prior to proceeding with the closure to facilitate wound healing.
Helical Rim Text: The closure involved the helical rim.
Vermilion Border Text: The closure involved the vermilion border.
Nostril Rim Text: The closure involved the nostril rim.
Retention Suture Text: Retention sutures were placed to support the closure and prevent dehiscence.
Suture Removal: 10 days
Lab: [id number]
Lab Facility: 
Detail Level: Detailed
Excision Depth: adipose tissue
Medical Necessity Clause: The excision was medically necessary because the lesion which was excised was
Anesthesia Type: 1% lidocaine with epinephrine
Additional Anesthesia Volume In Cc: 3
Hemostasis: Electrocautery
Estimated Blood Loss (Cc): minimal
Epidermal Sutures: 4-0 Nylon
Epidermal Closure: simple interrupted
Wound Care: Petrolatum
Dressing: dry sterile dressing
Complex Repair Preamble Text (Leave Blank If You Do Not Want): Extensive wide undermining was performed.
Intermediate Repair Preamble Text (Leave Blank If You Do Not Want): Undermining was performed with blunt dissection.
1.5 Mm Punch Excision Text: A 1.5 mm punch was used to make an initial incision over the lesion.  After this overlying column of skin was removed, blunt dissection was used to free the lesion from the surrounding tissues and the lesion was extirpated through the surgical opening made by the punch biopsy.
2 Mm Punch Excision Text: A 2 mm punch was used to make an initial incision over the lesion.  After this overlying column of skin was removed, blunt dissection was used to free the lesion from the surrounding tissues and the lesion was extirpated through the surgical opening made by the punch biopsy.
2.5 Mm Punch Excision Text: A 2.5 mm punch was used to make an initial incision over the lesion.  After this overlying column of skin was removed, blunt dissection was used to free the lesion from the surrounding tissues and the lesion was extirpated through the surgical opening made by the punch biopsy.
3 Mm Punch Excision Text: A 3 mm punch was used to make an initial incision over the lesion.  After this overlying column of skin was removed, blunt dissection was used to free the lesion from the surrounding tissues and the lesion was extirpated through the surgical opening made by the punch biopsy.
3.5 Mm Punch Excision Text: A 3.5 mm punch was used to make an initial incision over the lesion.  After this overlying column of skin was removed, blunt dissection was used to free the lesion from the surrounding tissues and the lesion was extirpated through the surgical opening made by the punch biopsy.
4 Mm Punch Excision Text: A 4 mm punch was used to make an initial incision over the lesion.  After this overlying column of skin was removed, blunt dissection was used to free the lesion from the surrounding tissues and the lesion was extirpated through the surgical opening made by the punch biopsy.
4.5 Mm Punch Excision Text: A 4.5 mm punch was used to make an initial incision over the lesion.  After this overlying column of skin was removed, blunt dissection was used to free the lesion from the surrounding tissues and the lesion was extirpated through the surgical opening made by the punch biopsy.
5 Mm Punch Excision Text: A 5 mm punch was used to make an initial incision over the lesion.  After this overlying column of skin was removed, blunt dissection was used to free the lesion from the surrounding tissues and the lesion was extirpated through the surgical opening made by the punch biopsy.
6 Mm Punch Excision Text: A 6 mm punch was used to make an initial incision over the lesion.  After this overlying column of skin was removed, blunt dissection was used to free the lesion from the surrounding tissues and the lesion was extirpated through the surgical opening made by the punch biopsy.
7 Mm Punch Excision Text: A 7 mm punch was used to make an initial incision over the lesion.  After this overlying column of skin was removed, blunt dissection was used to free the lesion from the surrounding tissues and the lesion was extirpated through the surgical opening made by the punch biopsy.
8 Mm Punch Excision Text: A 8 mm punch was used to make an initial incision over the lesion.  After this overlying column of skin was removed, blunt dissection was used to free the lesion from the surrounding tissues and the lesion was extirpated through the surgical opening made by the punch biopsy.
10 Mm Punch Excision Text: A 10 mm punch was used to make an initial incision over the lesion.  After this overlying column of skin was removed, blunt dissection was used to free the lesion from the surrounding tissues and the lesion was extirpated through the surgical opening made by the punch biopsy.
12 Mm Punch Excision Text: A 12 mm punch was used to make an initial incision over the lesion.  After this overlying column of skin was removed, blunt dissection was used to free the lesion from the surrounding tissues and the lesion was extirpated through the surgical opening made by the punch biopsy.
Purse String (Intermediate) Text: Given the location of the defect and the characteristics of the surrounding skin a purse string intermediate closure was deemed most appropriate.  Undermining was performed circumfirentially around the surgical defect.  A purse string suture was then placed and tightened.
Path Notes (To The Dermatopathologist): Please check margins.
Medical Necessity Clause: This procedure was medically necessary because the lesion that was treated was:
Consent was obtained from the patient. The risks and benefits to therapy were discussed in detail. Specifically, the risks of infection, scarring, bleeding, prolonged wound healing, incomplete removal, allergy to anesthesia, nerve injury and recurrence were addressed. Prior to the procedure, the treatment site was clearly identified and confirmed by the patient. All components of Universal Protocol/PAUSE Rule completed.
Render Post-Care Instructions In Note?: yes
Post-Care Instructions: I reviewed with the patient in detail post-care instructions. Patient is not to engage in any heavy lifting, exercise, or swimming for the next 14 days. Should the patient develop any fevers, chills, bleeding, severe pain patient will contact the office immediately.
Billing Type: Third-Party Bill

## 2021-12-30 ENCOUNTER — APPOINTMENT (RX ONLY)
Dept: URBAN - METROPOLITAN AREA CLINIC 4 | Facility: CLINIC | Age: 76
Setting detail: DERMATOLOGY
End: 2021-12-30

## 2021-12-30 DIAGNOSIS — Z48.02 ENCOUNTER FOR REMOVAL OF SUTURES: ICD-10-CM

## 2021-12-30 PROCEDURE — 99024 POSTOP FOLLOW-UP VISIT: CPT

## 2021-12-30 PROCEDURE — ? SUTURE REMOVAL (GLOBAL PERIOD)

## 2021-12-30 ASSESSMENT — LOCATION SIMPLE DESCRIPTION DERM
LOCATION SIMPLE: ABDOMEN
LOCATION SIMPLE: RIGHT ANTERIOR NECK

## 2021-12-30 ASSESSMENT — LOCATION DETAILED DESCRIPTION DERM
LOCATION DETAILED: XIPHOID
LOCATION DETAILED: RIGHT CLAVICULAR NECK

## 2021-12-30 ASSESSMENT — LOCATION ZONE DERM
LOCATION ZONE: NECK
LOCATION ZONE: TRUNK

## 2021-12-30 NOTE — PROCEDURE: SUTURE REMOVAL (GLOBAL PERIOD)
Detail Level: Detailed
Add 11851 Cpt? (Important Note: In 2017 The Use Of 96100 Is Being Tracked By Cms To Determine Future Global Period Reimbursement For Global Periods): yes

## 2022-01-21 ENCOUNTER — OFFICE VISIT (OUTPATIENT)
Dept: URGENT CARE | Facility: CLINIC | Age: 77
End: 2022-01-21
Payer: MEDICARE

## 2022-01-21 ENCOUNTER — HOSPITAL ENCOUNTER (OUTPATIENT)
Facility: MEDICAL CENTER | Age: 77
End: 2022-01-21
Attending: NURSE PRACTITIONER
Payer: MEDICARE

## 2022-01-21 VITALS
DIASTOLIC BLOOD PRESSURE: 70 MMHG | SYSTOLIC BLOOD PRESSURE: 110 MMHG | HEART RATE: 82 BPM | BODY MASS INDEX: 28.72 KG/M2 | OXYGEN SATURATION: 97 % | RESPIRATION RATE: 16 BRPM | WEIGHT: 183 LBS | HEIGHT: 67 IN | TEMPERATURE: 97.1 F

## 2022-01-21 DIAGNOSIS — R35.0 URINARY FREQUENCY: ICD-10-CM

## 2022-01-21 DIAGNOSIS — N30.01 ACUTE CYSTITIS WITH HEMATURIA: ICD-10-CM

## 2022-01-21 DIAGNOSIS — Z87.438 HISTORY OF BPH: ICD-10-CM

## 2022-01-21 DIAGNOSIS — R30.0 DYSURIA: ICD-10-CM

## 2022-01-21 DIAGNOSIS — Z87.438 HISTORY OF PROSTATITIS: ICD-10-CM

## 2022-01-21 LAB
APPEARANCE UR: CLEAR
BILIRUB UR STRIP-MCNC: NEGATIVE MG/DL
COLOR UR AUTO: NORMAL
GLUCOSE UR STRIP.AUTO-MCNC: NEGATIVE MG/DL
KETONES UR STRIP.AUTO-MCNC: NEGATIVE MG/DL
LEUKOCYTE ESTERASE UR QL STRIP.AUTO: NEGATIVE
NITRITE UR QL STRIP.AUTO: NEGATIVE
PH UR STRIP.AUTO: 5.5 [PH] (ref 5–8)
PROT UR QL STRIP: NEGATIVE MG/DL
RBC UR QL AUTO: NEGATIVE
SP GR UR STRIP.AUTO: 1.02
UROBILINOGEN UR STRIP-MCNC: 0.2 MG/DL

## 2022-01-21 PROCEDURE — 87086 URINE CULTURE/COLONY COUNT: CPT

## 2022-01-21 PROCEDURE — 99214 OFFICE O/P EST MOD 30 MIN: CPT | Performed by: NURSE PRACTITIONER

## 2022-01-21 PROCEDURE — 81002 URINALYSIS NONAUTO W/O SCOPE: CPT | Performed by: NURSE PRACTITIONER

## 2022-01-21 RX ORDER — CIPROFLOXACIN 500 MG/1
500 TABLET, FILM COATED ORAL EVERY 12 HOURS
Qty: 14 TABLET | Refills: 0 | Status: SHIPPED | OUTPATIENT
Start: 2022-01-21 | End: 2022-01-28

## 2022-01-21 ASSESSMENT — ENCOUNTER SYMPTOMS
EYES NEGATIVE: 1
CONSTITUTIONAL NEGATIVE: 1
FLANK PAIN: 0
PSYCHIATRIC NEGATIVE: 1
NEUROLOGICAL NEGATIVE: 1
RESPIRATORY NEGATIVE: 1
GASTROINTESTINAL NEGATIVE: 1
MUSCULOSKELETAL NEGATIVE: 1
CARDIOVASCULAR NEGATIVE: 1

## 2022-01-21 ASSESSMENT — FIBROSIS 4 INDEX: FIB4 SCORE: 1.94

## 2022-01-21 NOTE — PROGRESS NOTES
"Subjective:   Ronal Hair is a 76 y.o. male who presents for UTI (couple days )       HPI  Pt presents for evaluation of a new problem, reports a 1 to 2-week history of urinary frequency and burning.  Patient an ADA , often has to hold his urine for several hours at a time.  Patient has a history of recurrent UTI, however is also on was approximately 1 year ago.  Patient has not tried anything for his symptoms.  Denies fever, chills, low back pain.    Review of Systems   Constitutional: Negative.    HENT: Negative.    Eyes: Negative.    Respiratory: Negative.    Cardiovascular: Negative.    Gastrointestinal: Negative.    Genitourinary: Positive for dysuria, frequency and urgency. Negative for flank pain and hematuria.   Musculoskeletal: Negative.    Skin: Negative.    Neurological: Negative.    Endo/Heme/Allergies: Negative.    Psychiatric/Behavioral: Negative.    All other systems reviewed and are negative.      MEDS:   Current Outpatient Medications:   •  atorvastatin (LIPITOR) 40 MG Tab, Take 40 mg by mouth every evening., Disp: , Rfl:   •  tamsulosin (FLOMAX) 0.4 MG capsule, Take 0.4 mg by mouth., Disp: , Rfl:   •  ibuprofen (MOTRIN) 400 MG Tab, Take 400 mg by mouth every 6 hours as needed., Disp: , Rfl:   •  acetaminophen (TYLENOL) 325 MG Tab, Take 2 Tabs by mouth every 6 hours as needed (Mild Pain; (Pain scale 1-3); Temp greater than 100.5 F)., Disp: 30 Tab, Rfl: 0  ALLERGIES: No Known Allergies    Patient's PMH, SocHx, SurgHx, FamHx, Drug allergies and medications were reviewed.     Objective:   /70 (BP Location: Left arm, Patient Position: Sitting, BP Cuff Size: Adult long)   Pulse 82   Temp 36.2 °C (97.1 °F) (Temporal)   Resp 16   Ht 1.702 m (5' 7\")   Wt 83 kg (183 lb)   SpO2 97%   BMI 28.66 kg/m²     Physical Exam  Vitals and nursing note reviewed.   Constitutional:       General: He is awake.      Appearance: Normal appearance. He is well-developed.   HENT:      Head: Normocephalic " and atraumatic.      Right Ear: External ear normal.      Left Ear: External ear normal.      Nose: Nose normal.      Mouth/Throat:      Mouth: Mucous membranes are moist.      Pharynx: Oropharynx is clear.   Eyes:      Extraocular Movements: Extraocular movements intact.      Conjunctiva/sclera: Conjunctivae normal.   Cardiovascular:      Rate and Rhythm: Normal rate and regular rhythm.      Pulses: Normal pulses.      Heart sounds: Normal heart sounds.   Pulmonary:      Effort: Pulmonary effort is normal.      Breath sounds: Normal breath sounds.   Abdominal:      General: Bowel sounds are normal.      Tenderness: There is no right CVA tenderness or left CVA tenderness.   Musculoskeletal:         General: Normal range of motion.      Cervical back: Normal range of motion.   Skin:     General: Skin is warm and dry.   Neurological:      Mental Status: He is alert and oriented to person, place, and time.   Psychiatric:         Mood and Affect: Mood normal.         Thought Content: Thought content normal.         Judgment: Judgment normal.         Assessment/Plan:   Assessment    1. Acute cystitis with hematuria  - POCT Urinalysis  - Urine Culture; Future  - ciprofloxacin (CIPRO) 500 MG Tab; Take 1 Tablet by mouth every 12 hours for 7 days.  Dispense: 14 Tablet; Refill: 0    2. Urinary frequency  - POCT Urinalysis  - Urine Culture; Future    3. Dysuria  - POCT Urinalysis  - Urine Culture; Future    4. History of prostatitis    5. History of BPH      Vital signs stable at today's acute urgent care visit.  Reviewed test results that were completed in the clinic.  Send urine for culture.  Begin antibiotics as listed, will treat empirically due to symptoms that have continued over the past 1 to 2 weeks and seem to be worsening.  Additionally, patient has a history of prostatitis and recurrent UTI that did not respond to Bactrim..  Recommend begin cranberry tablets as well as pushing fluids. Discussed management options  (risks, benefits, and alternatives to treatment).     Advised the patient to follow-up with the primary care provider for recheck, reevaluation, and/or consideration of further management if necessary. Return to urgent care with any worsening symptoms or if there is no improvement in their current condition. Red flags discussed and indications to immediately call 911 or present to the Emergency Department.  All questions were encouraged and answered to the patient's satisfaction and understanding, and they agree to the plan of care.     I personally reviewed prior external notes and test results pertinent to today's visit.  I have independently reviewed and interpreted all diagnostics ordered during this urgent care acute visit. Time spent evaluating this patient was a minimum of 30 minutes and includes preparing for visit, counseling/education, exam and evaluation, obtaining history, independent interpretation and ordering lab/test/procedures.      Please note that this dictation was created using voice recognition software. I have made a reasonable attempt to correct obvious errors, but I expect that there are errors of grammar and possibly content that I did not discover before finalizing the note.

## 2022-01-24 LAB
BACTERIA UR CULT: NORMAL
SIGNIFICANT IND 70042: NORMAL
SITE SITE: NORMAL
SOURCE SOURCE: NORMAL

## 2022-03-18 PROBLEM — I71.40 ABDOMINAL AORTIC ANEURYSM (AAA) WITHOUT RUPTURE (HCC): Status: RESOLVED | Noted: 2020-11-11 | Resolved: 2022-03-18

## 2022-03-18 PROBLEM — Z87.39 HISTORY OF OSTEOMYELITIS: Status: ACTIVE | Noted: 2022-03-18

## 2022-03-18 PROBLEM — Z86.79 HISTORY OF ABDOMINAL AORTIC ANEURYSM: Status: ACTIVE | Noted: 2022-03-18

## 2022-03-18 PROBLEM — R73.9 HYPERGLYCEMIA: Status: ACTIVE | Noted: 2022-03-18

## 2022-03-18 PROBLEM — Z85.72 HISTORY OF LYMPHOMA: Status: ACTIVE | Noted: 2022-03-18

## 2022-03-18 PROBLEM — M79.676 PAIN OF TOE: Status: RESOLVED | Noted: 2020-11-03 | Resolved: 2022-03-18

## 2022-03-18 PROBLEM — M86.9 OSTEOMYELITIS OF LEFT FOOT (HCC): Status: RESOLVED | Noted: 2020-11-11 | Resolved: 2022-03-18

## 2022-03-18 PROBLEM — C82.90 FOLLICULAR LYMPHOMA (HCC): Status: RESOLVED | Noted: 2019-10-09 | Resolved: 2022-03-18

## 2022-03-18 PROBLEM — J45.909 REACTIVE AIRWAY DISEASE WITHOUT COMPLICATION: Status: ACTIVE | Noted: 2022-03-18

## 2022-04-08 NOTE — PROGRESS NOTES
"Pharmacy Chemotherapy Calculation    Dx: follicular lymphoma         Protocol: Rituxan + Bendeka  *Dosing Reference*  Rituximab 375 mg/m² IV on Day 1  Bendamustine  IV over 10 min on IV over 30 min daily on Days 1-2   -- dose reduced to 70 mg/m² for anticipated tolerance  28-day cycle for 6 cycles  NCCN Guidelines for B-Cell Lymphomas V.3.2019  Jacqueline GREEN, et al. Lancet. 2013;381(9566):6733-10    Allergies:  Vicodin [hydrocodone-acetaminophen]     /59   Pulse 87   Temp 36.8 °C (98.3 °F) (Temporal)   Resp 18   Ht 1.695 m (5' 6.73\")   Wt 84.5 kg (186 lb 4.6 oz)   SpO2 93%   BMI 29.41 kg/m²  Body surface area is 1.99 meters squared.    10/3/19: Negative HepB Panel     CBC (12/3/19 @ [facility]) and CMP (12/3/19 @ [facility]) within treatment plan parameters.      Drug Order   (Drug name, dose, route, IV Fluid & volume, frequency, number of doses) Cycle 3, Day 2 of 2  Previous treatment: C2D1-2 11/6/19-11/7/19   Medication = Bendamustine (Bendeka)  Base Dose = 70 mg/m²  Calc Dose: Base Dose x 1.99 m² = 139.3 mg  Final Dose = 139.3 mg  Route = IV  Fluid & Volume = NS 50 mL  Admin Duration = Over 10 min          <10% difference, okay to treat with final dose   By my signature below, I confirm this process was performed independently with the BSA and all final chemotherapy dosing calculations congruent. I have reviewed the above chemotherapy order and that my calculation of the final dose and BSA (when applicable) corroborate those calculations of the  pharmacist. Discrepancies of 10% or greater in the written dose have been addressed and documented within the [facility] Progress notes.      Bay Davidson, PharmD  "
Chemotherapy Verification - PRIMARY RN      Height = 1.695 m  Weight = 84.5 kg  BSA = 1.99 m^2       Medication: bendamustine HCL  Dose: 70 mg/m^2  Calculated Dose: 139.3 mg                             (In mg/m2, AUC, mg/kg)         I confirm this process was performed independently with the BSA and all final chemotherapy dosing calculations congruent.  Any discrepancies of 10% or greater have been addressed with the chemotherapy pharmacist. The resolution of the discrepancy has been documented in the EP[facility] progress notes.       
Chemotherapy Verification - SECONDARY RN       Height = 84.5 cm  Weight = 169.5 kg  BSA = 1.99 m^2       Medication: Bendeka  Dose: 70 mg/m^2  Calculated Dose: 139.3 mg                             (In mg/m2, AUC, mg/kg)     I confirm that this process was performed independently.   
[facility] for Day 2/ Cycle 3 of bendamustine HCl for follicular lymphoma. Pt denied having any new or acute complaints today, reports tolerating past treatments well. PIV started, had + blood return flushed briskly.  Pt given ondansetron and bendamustine as prescribed, tolerated well, denied having any complaints during or after infusion. PIV discontinued, bleeding controlled with gauze and coban. Patient discharged home to self-care.  
yes

## 2022-04-19 RX ORDER — DOXAZOSIN 2 MG/1
TABLET ORAL
COMMUNITY
Start: 2021-06-21 | End: 2022-04-19 | Stop reason: SDUPTHER

## 2022-04-19 RX ORDER — ALBUTEROL SULFATE 90 UG/1
AEROSOL, METERED RESPIRATORY (INHALATION)
Status: ON HOLD | COMMUNITY
Start: 2021-06-21 | End: 2023-12-13

## 2022-04-19 RX ORDER — SILDENAFIL 25 MG/1
TABLET, FILM COATED ORAL
COMMUNITY
Start: 2022-03-15 | End: 2022-04-22 | Stop reason: SDUPTHER

## 2022-04-19 NOTE — TELEPHONE ENCOUNTER
Received request via: Pharmacy    Was the patient seen in the last year in this department? Unknown    Does the patient have an active prescription (recently filled or refills available) for medication(s) requested? No

## 2022-04-20 RX ORDER — DOXAZOSIN 2 MG/1
TABLET ORAL
Qty: 90 TABLET | Refills: 1 | Status: SHIPPED | OUTPATIENT
Start: 2022-04-20 | End: 2022-08-26

## 2022-04-22 RX ORDER — SILDENAFIL 25 MG/1
TABLET, FILM COATED ORAL
Qty: 30 TABLET | Refills: 3 | Status: SHIPPED | OUTPATIENT
Start: 2022-04-22 | End: 2023-01-06

## 2022-04-22 NOTE — TELEPHONE ENCOUNTER
Received request via: Pharmacy    Was the patient seen in the last year in this department? Unknown    Does the patient have an active prescription (recently filled or refills available) for medication(s) requested? No     It's not clear that this is your patient, but the script came from the pharmacy with your name on it, so I'll rout it to you for review.

## 2022-04-28 NOTE — TELEPHONE ENCOUNTER
Phone Number Called: [phone] (home)     Call outcome: Spoke to patient regarding message below.    Message: Called patient to let him know prescription was approved and he also scheduled an appointment on July 19.

## 2022-06-06 ENCOUNTER — APPOINTMENT (RX ONLY)
Dept: URBAN - METROPOLITAN AREA CLINIC 4 | Facility: CLINIC | Age: 77
Setting detail: DERMATOLOGY
End: 2022-06-06

## 2022-06-06 DIAGNOSIS — L82.1 OTHER SEBORRHEIC KERATOSIS: ICD-10-CM

## 2022-06-06 DIAGNOSIS — Z85.828 PERSONAL HISTORY OF OTHER MALIGNANT NEOPLASM OF SKIN: ICD-10-CM

## 2022-06-06 DIAGNOSIS — L57.0 ACTINIC KERATOSIS: ICD-10-CM

## 2022-06-06 DIAGNOSIS — L81.4 OTHER MELANIN HYPERPIGMENTATION: ICD-10-CM

## 2022-06-06 PROBLEM — D48.5 NEOPLASM OF UNCERTAIN BEHAVIOR OF SKIN: Status: ACTIVE | Noted: 2022-06-06

## 2022-06-06 PROCEDURE — 99213 OFFICE O/P EST LOW 20 MIN: CPT | Mod: 25

## 2022-06-06 PROCEDURE — 17000 DESTRUCT PREMALG LESION: CPT | Mod: 59

## 2022-06-06 PROCEDURE — ? BIOPSY BY SHAVE METHOD

## 2022-06-06 PROCEDURE — 17003 DESTRUCT PREMALG LES 2-14: CPT

## 2022-06-06 PROCEDURE — ? LIQUID NITROGEN

## 2022-06-06 PROCEDURE — ? COUNSELING

## 2022-06-06 PROCEDURE — 11102 TANGNTL BX SKIN SINGLE LES: CPT

## 2022-06-06 ASSESSMENT — LOCATION DETAILED DESCRIPTION DERM
LOCATION DETAILED: LEFT PROXIMAL DORSAL FOREARM
LOCATION DETAILED: LEFT NASAL ALA
LOCATION DETAILED: LEFT INFERIOR CENTRAL MALAR CHEEK
LOCATION DETAILED: LEFT SUPERIOR HELIX
LOCATION DETAILED: LEFT INFERIOR LATERAL MALAR CHEEK
LOCATION DETAILED: LEFT SUPERIOR PARIETAL SCALP
LOCATION DETAILED: LEFT DISTAL ULNAR DORSAL FOREARM
LOCATION DETAILED: INFERIOR THORACIC SPINE
LOCATION DETAILED: LEFT SUPERIOR MEDIAL MIDBACK
LOCATION DETAILED: RIGHT SUPERIOR LATERAL NECK
LOCATION DETAILED: RIGHT DISTAL DORSAL FOREARM
LOCATION DETAILED: RIGHT ELBOW

## 2022-06-06 ASSESSMENT — LOCATION ZONE DERM
LOCATION ZONE: EAR
LOCATION ZONE: ARM
LOCATION ZONE: SCALP
LOCATION ZONE: NOSE
LOCATION ZONE: FACE
LOCATION ZONE: NECK
LOCATION ZONE: TRUNK

## 2022-06-06 ASSESSMENT — LOCATION SIMPLE DESCRIPTION DERM
LOCATION SIMPLE: RIGHT FOREARM
LOCATION SIMPLE: UPPER BACK
LOCATION SIMPLE: RIGHT ANTERIOR NECK
LOCATION SIMPLE: SCALP
LOCATION SIMPLE: LEFT NOSE
LOCATION SIMPLE: LEFT EAR
LOCATION SIMPLE: RIGHT ELBOW
LOCATION SIMPLE: LEFT LOWER BACK
LOCATION SIMPLE: LEFT FOREARM
LOCATION SIMPLE: LEFT CHEEK

## 2022-06-06 NOTE — PROCEDURE: LIQUID NITROGEN
Duration Of Freeze Thaw-Cycle (Seconds): 5
Number Of Freeze-Thaw Cycles: 1 freeze-thaw cycle
Render Note In Bullet Format When Appropriate: No
Detail Level: Detailed
Show Applicator Variable?: Yes
Consent: The patient's consent was obtained including but not limited to risks of crusting, scabbing, blistering, scarring, darker or lighter pigmentary change, recurrence, incomplete removal and infection.
Post-Care Instructions: I reviewed with the patient in detail post-care instructions. Patient is to wear sunprotection, and avoid picking at any of the treated lesions. Pt may apply Vaseline to crusted or scabbing areas.

## 2022-06-15 ENCOUNTER — APPOINTMENT (RX ONLY)
Dept: URBAN - METROPOLITAN AREA CLINIC 4 | Facility: CLINIC | Age: 77
Setting detail: DERMATOLOGY
End: 2022-06-15

## 2022-06-15 PROBLEM — C44.311 BASAL CELL CARCINOMA OF SKIN OF NOSE: Status: ACTIVE | Noted: 2022-06-15

## 2022-06-15 PROCEDURE — 99213 OFFICE O/P EST LOW 20 MIN: CPT | Mod: 24

## 2022-06-15 PROCEDURE — ? PRESCRIPTION

## 2022-06-15 PROCEDURE — ? MEDICATION COUNSELING

## 2022-06-15 RX ORDER — IMIQUIMOD 12.5 MG/.25G
1 CREAM TOPICAL DAILY
Qty: 6 | Refills: 1 | Status: ERX | COMMUNITY
Start: 2022-06-15

## 2022-06-15 RX ADMIN — IMIQUIMOD 1: 12.5 CREAM TOPICAL at 00:00

## 2022-06-15 NOTE — PROCEDURE: MEDICATION COUNSELING
Oral Minoxidil Pregnancy And Lactation Text: This medication should only be used when clearly needed if you are pregnant, attempting to become pregnant or breast feeding.
Imiquimod Pregnancy And Lactation Text: This medication is Pregnancy Category C. It is unknown if this medication is excreted in breast milk.
Tetracycline Pregnancy And Lactation Text: This medication is Pregnancy Category D and not consider safe during pregnancy. It is also excreted in breast milk.
Opioid Counseling: I discussed with the patient the potential side effects of opioids including but not limited to addiction, altered mental status, and depression. I stressed avoiding alcohol, benzodiazepines, muscle relaxants and sleep aids unless specifically okayed by a physician. The patient verbalized understanding of the proper use and possible adverse effects of opioids. All of the patient's questions and concerns were addressed. They were instructed to flush the remaining pills down the toilet if they did not need them for pain.
Taltz Counseling: I discussed with the patient the risks of ixekizumab including but not limited to immunosuppression, serious infections, worsening of inflammatory bowel disease and drug reactions.  The patient understands that monitoring is required including a PPD at baseline and must alert us or the primary physician if symptoms of infection or other concerning signs are noted.
Erivedge Counseling- I discussed with the patient the risks of Erivedge including but not limited to nausea, vomiting, diarrhea, constipation, weight loss, changes in the sense of taste, decreased appetite, muscle spasms, and hair loss.  The patient verbalized understanding of the proper use and possible adverse effects of Erivedge.  All of the patient's questions and concerns were addressed.
Hydroxyzine Pregnancy And Lactation Text: This medication is not safe during pregnancy and should not be taken. It is also excreted in breast milk and breast feeding isn't recommended.
Benzoyl Peroxide Counseling: Patient counseled that medicine may cause skin irritation and bleach clothing.  In the event of skin irritation, the patient was advised to reduce the amount of the drug applied or use it less frequently.   The patient verbalized understanding of the proper use and possible adverse effects of benzoyl peroxide.  All of the patient's questions and concerns were addressed.
Methotrexate Pregnancy And Lactation Text: This medication is Pregnancy Category X and is known to cause fetal harm. This medication is excreted in breast milk.
Cephalexin Counseling: I counseled the patient regarding use of cephalexin as an antibiotic for prophylactic and/or therapeutic purposes. Cephalexin (commonly prescribed under brand name Keflex) is a cephalosporin antibiotic which is active against numerous classes of bacteria, including most skin bacteria. Side effects may include nausea, diarrhea, gastrointestinal upset, rash, hives, yeast infections, and in rare cases, hepatitis, kidney disease, seizures, fever, confusion, neurologic symptoms, and others. Patients with severe allergies to penicillin medications are cautioned that there is about a 10% incidence of cross-reactivity with cephalosporins. When possible, patients with penicillin allergies should use alternatives to cephalosporins for antibiotic therapy.
Valtrex Counseling: I discussed with the patient the risks of valacyclovir including but not limited to kidney damage, nausea, vomiting and severe allergy.  The patient understands that if the infection seems to be worsening or is not improving, they are to call.
Solaraze Counseling:  I discussed with the patient the risks of Solaraze including but not limited to erythema, scaling, itching, weeping, crusting, and pain.
Oral Minoxidil Counseling- I discussed with the patient the risks of oral minoxidil including but not limited to shortness of breath, swelling of the feet or ankles, dizziness, lightheadedness, unwanted hair growth and allergic reaction.  The patient verbalized understanding of the proper use and possible adverse effects of oral minoxidil.  All of the patient's questions and concerns were addressed.
Imiquimod Counseling:  I discussed with the patient the risks of imiquimod including but not limited to erythema, scaling, itching, weeping, crusting, and pain.  Patient understands that the inflammatory response to imiquimod is variable from person to person and was educated regarded proper titration schedule.  If flu-like symptoms develop, patient knows to discontinue the medication and contact us.
Humira Pregnancy And Lactation Text: This medication is Pregnancy Category B and is considered safe during pregnancy. It is unknown if this medication is excreted in breast milk.
Tetracycline Counseling: Patient counseled regarding possible photosensitivity and increased risk for sunburn.  Patient instructed to avoid sunlight, if possible.  When exposed to sunlight, patients should wear protective clothing, sunglasses, and sunscreen.  The patient was instructed to call the office immediately if the following severe adverse effects occur:  hearing changes, easy bruising/bleeding, severe headache, or vision changes.  The patient verbalized understanding of the proper use and possible adverse effects of tetracycline.  All of the patient's questions and concerns were addressed. Patient understands to avoid pregnancy while on therapy due to potential birth defects.
Methotrexate Counseling:  Patient counseled regarding adverse effects of methotrexate including but not limited to nausea, vomiting, abnormalities in liver function tests. Patients may develop mouth sores, rash, diarrhea, and abnormalities in blood counts. The patient understands that monitoring is required including LFT's and blood counts.  There is a rare possibility of scarring of the liver and lung problems that can occur when taking methotrexate. Persistent nausea, loss of appetite, pale stools, dark urine, cough, and shortness of breath should be reported immediately. Patient advised to discontinue methotrexate treatment at least three months before attempting to become pregnant.  I discussed the need for folate supplements while taking methotrexate.  These supplements can decrease side effects during methotrexate treatment. The patient verbalized understanding of the proper use and possible adverse effects of methotrexate.  All of the patient's questions and concerns were addressed.
Hydroxyzine Counseling: Patient advised that the medication is sedating and not to drive a car after taking this medication.  Patient informed of potential adverse effects including but not limited to dry mouth, urinary retention, and blurry vision.  The patient verbalized understanding of the proper use and possible adverse effects of hydroxyzine.  All of the patient's questions and concerns were addressed.
Azelaic Acid Pregnancy And Lactation Text: This medication is considered safe during pregnancy and breast feeding.
Dutasteride Pregnancy And Lactation Text: This medication is absolutely contraindicated in women, especially during pregnancy and breast feeding. Feminization of male fetuses is possible if taking while pregnant.
Hydroquinone Pregnancy And Lactation Text: This medication has not been assigned a Pregnancy Risk Category but animal studies failed to show danger with the topical medication. It is unknown if the medication is excreted in breast milk.
Odomzo Pregnancy And Lactation Text: This medication is Pregnancy Category X and is absolutely contraindicated during pregnancy. It is unknown if it is excreted in breast milk.
Stelara Counseling:  I discussed with the patient the risks of ustekinumab including but not limited to immunosuppression, malignancy, posterior leukoencephalopathy syndrome, and serious infections.  The patient understands that monitoring is required including a PPD at baseline and must alert us or the primary physician if symptoms of infection or other concerning signs are noted.
Rhofade Pregnancy And Lactation Text: This medication has not been assigned a Pregnancy Risk Category. It is unknown if the medication is excreted in breast milk.
Bactrim Pregnancy And Lactation Text: This medication is Pregnancy Category D and is known to cause fetal risk.  It is also excreted in breast milk.
Tranexamic Acid Pregnancy And Lactation Text: It is unknown if this medication is safe during pregnancy or breast feeding.
Azelaic Acid Counseling: Patient counseled that medicine may cause skin irritation and to avoid applying near the eyes.  In the event of skin irritation, the patient was advised to reduce the amount of the drug applied or use it less frequently.   The patient verbalized understanding of the proper use and possible adverse effects of azelaic acid.  All of the patient's questions and concerns were addressed.
Humira Counseling:  I discussed with the patient the risks of adalimumab including but not limited to myelosuppression, immunosuppression, autoimmune hepatitis, demyelinating diseases, lymphoma, and serious infections.  The patient understands that monitoring is required including a PPD at baseline and must alert us or the primary physician if symptoms of infection or other concerning signs are noted.
Doxepin Pregnancy And Lactation Text: This medication is Pregnancy Category C and it isn't known if it is safe during pregnancy. It is also excreted in breast milk and breast feeding isn't recommended.
Cyclosporine Pregnancy And Lactation Text: This medication is Pregnancy Category C and it isn't know if it is safe during pregnancy. This medication is excreted in breast milk.
Dutasteride Male Counseling: Dustasteride Counseling:  I discussed with the patient the risks of use of dutasteride including but not limited to decreased libido, decreased ejaculate volume, and gynecomastia. Women who can become pregnant should not handle medication.  All of the patient's questions and concerns were addressed.
Odomzo Counseling- I discussed with the patient the risks of Odomzo including but not limited to nausea, vomiting, diarrhea, constipation, weight loss, changes in the sense of taste, decreased appetite, muscle spasms, and hair loss.  The patient verbalized understanding of the proper use and possible adverse effects of Odomzo.  All of the patient's questions and concerns were addressed.
Zyclara Counseling:  I discussed with the patient the risks of imiquimod including but not limited to erythema, scaling, itching, weeping, crusting, and pain.  Patient understands that the inflammatory response to imiquimod is variable from person to person and was educated regarded proper titration schedule.  If flu-like symptoms develop, patient knows to discontinue the medication and contact us.
Hydroquinone Counseling:  Patient advised that medication may result in skin irritation, lightening (hypopigmentation), dryness, and burning.  In the event of skin irritation, the patient was advised to reduce the amount of the drug applied or use it less frequently.  Rarely, spots that are treated with hydroquinone can become darker (pseudoochronosis).  Should this occur, patient instructed to stop medication and call the office. The patient verbalized understanding of the proper use and possible adverse effects of hydroquinone.  All of the patient's questions and concerns were addressed.
Sarecycline Counseling: Patient advised regarding possible photosensitivity and discoloration of the teeth, skin, lips, tongue and gums.  Patient instructed to avoid sunlight, if possible.  When exposed to sunlight, patients should wear protective clothing, sunglasses, and sunscreen.  The patient was instructed to call the office immediately if the following severe adverse effects occur:  hearing changes, easy bruising/bleeding, severe headache, or vision changes.  The patient verbalized understanding of the proper use and possible adverse effects of sarecycline.  All of the patient's questions and concerns were addressed.
Tranexamic Acid Counseling:  Patient advised of the small risk of bleeding problems with tranexamic acid. They were also instructed to call if they developed any nausea, vomiting or diarrhea. All of the patient's questions and concerns were addressed.
Bactrim Counseling:  I discussed with the patient the risks of sulfa antibiotics including but not limited to GI upset, allergic reaction, drug rash, diarrhea, dizziness, photosensitivity, and yeast infections.  Rarely, more serious reactions can occur including but not limited to aplastic anemia, agranulocytosis, methemoglobinemia, blood dyscrasias, liver or kidney failure, lung infiltrates or desquamative/blistering drug rashes.
Rhofade Counseling: Rhofade is a topical medication which can decrease superficial blood flow where applied. Side effects are uncommon and include stinging, redness and allergic reactions.
Skyrizi Pregnancy And Lactation Text: The risk during pregnancy and breastfeeding is uncertain with this medication.
Doxepin Counseling:  Patient advised that the medication is sedating and not to drive a car after taking this medication. Patient informed of potential adverse effects including but not limited to dry mouth, urinary retention, and blurry vision.  The patient verbalized understanding of the proper use and possible adverse effects of doxepin.  All of the patient's questions and concerns were addressed.
Cyclosporine Counseling:  I discussed with the patient the risks of cyclosporine including but not limited to hypertension, gingival hyperplasia,myelosuppression, immunosuppression, liver damage, kidney damage, neurotoxicity, lymphoma, and serious infections. The patient understands that monitoring is required including baseline blood pressure, CBC, CMP, lipid panel and uric acid, and then 1-2 times monthly CMP and blood pressure.
Aklief Pregnancy And Lactation Text: It is unknown if this medication is safe to use during pregnancy.  It is unknown if this medication is excreted in breast milk.  Breastfeeding women should use the topical cream on the smallest area of the skin for the shortest time needed while breastfeeding.  Do not apply to nipple and areola.
Nsaids Pregnancy And Lactation Text: These medications are considered safe up to 30 weeks gestation. It is excreted in breast milk.
Dapsone Pregnancy And Lactation Text: This medication is Pregnancy Category C and is not considered safe during pregnancy or breast feeding.
Enbrel Counseling:  I discussed with the patient the risks of etanercept including but not limited to myelosuppression, immunosuppression, autoimmune hepatitis, demyelinating diseases, lymphoma, and infections.  The patient understands that monitoring is required including a PPD at baseline and must alert us or the primary physician if symptoms of infection or other concerning signs are noted.
Qbrexza Pregnancy And Lactation Text: There is no available data on Qbrexza use in pregnant women.  There is no available data on Qbrexza use in lactation.
Rifampin Pregnancy And Lactation Text: This medication is Pregnancy Category C and it isn't know if it is safe during pregnancy. It is also excreted in breast milk and should not be used if you are breast feeding.
Azithromycin Pregnancy And Lactation Text: This medication is considered safe during pregnancy and is also secreted in breast milk.
Cyclophosphamide Pregnancy And Lactation Text: This medication is Pregnancy Category D and it isn't considered safe during pregnancy. This medication is excreted in breast milk.
Winlevi Pregnancy And Lactation Text: This medication is considered safe during pregnancy and breastfeeding.
Cimetidine Pregnancy And Lactation Text: This medication is Pregnancy Category B and is considered safe during pregnancy. It is also excreted in breast milk and breast feeding isn't recommended.
Eucrisa Counseling: Patient may experience a mild burning sensation during topical application. Eucrisa is not approved in children less than 2 years of age.
Aklief counseling:  Patient advised to apply a pea-sized amount only at bedtime and wait 30 minutes after washing their face before applying.  If too drying, patient may add a non-comedogenic moisturizer.  The most commonly reported side effects including irritation, redness, scaling, dryness, stinging, burning, itching, and increased risk of sunburn.  The patient verbalized understanding of the proper use and possible adverse effects of retinoids.  All of the patient's questions and concerns were addressed.
Nsaids Counseling: NSAID Counseling: I discussed with the patient that NSAIDs should be taken with food. Prolonged use of NSAIDs can result in the development of stomach ulcers.  Patient advised to stop taking NSAIDs if abdominal pain occurs.  The patient verbalized understanding of the proper use and possible adverse effects of NSAIDs.  All of the patient's questions and concerns were addressed.
Skyrizi Counseling: I discussed with the patient the risks of risankizumab-rzaa including but not limited to immunosuppression, and serious infections.  The patient understands that monitoring is required including a PPD at baseline and must alert us or the primary physician if symptoms of infection or other concerning signs are noted.
Qbrexza Counseling:  I discussed with the patient the risks of Qbrexza including but not limited to headache, mydriasis, blurred vision, dry eyes, nasal dryness, dry mouth, dry throat, dry skin, urinary hesitation, and constipation.  Local skin reactions including erythema, burning, stinging, and itching can also occur.
Dapsone Counseling: I discussed with the patient the risks of dapsone including but not limited to hemolytic anemia, agranulocytosis, rashes, methemoglobinemia, kidney failure, peripheral neuropathy, headaches, GI upset, and liver toxicity.  Patients who start dapsone require monitoring including baseline LFTs and weekly CBCs for the first month, then every month thereafter.  The patient verbalized understanding of the proper use and possible adverse effects of dapsone.  All of the patient's questions and concerns were addressed.
Azithromycin Counseling:  I discussed with the patient the risks of azithromycin including but not limited to GI upset, allergic reaction, drug rash, diarrhea, and yeast infections.
Thalidomide Counseling: I discussed with the patient the risks of thalidomide including but not limited to birth defects, anxiety, weakness, chest pain, dizziness, cough and severe allergy.
Dupixent Pregnancy And Lactation Text: This medication likely crosses the placenta but the risk for the fetus is uncertain. This medication is excreted in breast milk.
Cimetidine Counseling:  I discussed with the patient the risks of Cimetidine including but not limited to gynecomastia, headache, diarrhea, nausea, drowsiness, arrhythmias, pancreatitis, skin rashes, psychosis, bone marrow suppression and kidney toxicity.
Rifampin Counseling: I discussed with the patient the risks of rifampin including but not limited to liver damage, kidney damage, red-orange body fluids, nausea/vomiting and severe allergy.
Winlevi Counseling:  I discussed with the patient the risks of topical clascoterone including but not limited to erythema, scaling, itching, and stinging. Patient voiced their understanding.
Cyclophosphamide Counseling:  I discussed with the patient the risks of cyclophosphamide including but not limited to hair loss, hormonal abnormalities, decreased fertility, abdominal pain, diarrhea, nausea and vomiting, bone marrow suppression and infection. The patient understands that monitoring is required while taking this medication.
Colchicine Pregnancy And Lactation Text: This medication is Pregnancy Category C and isn't considered safe during pregnancy. It is excreted in breast milk.
Sski Pregnancy And Lactation Text: This medication is Pregnancy Category D and isn't considered safe during pregnancy. It is excreted in breast milk.
Protopic Pregnancy And Lactation Text: This medication is Pregnancy Category C. It is unknown if this medication is excreted in breast milk when applied topically.
Dupixent Counseling: I discussed with the patient the risks of dupilumab including but not limited to eye infection and irritation, cold sores, injection site reactions, worsening of asthma, allergic reactions and increased risk of parasitic infection.  Live vaccines should be avoided while taking dupilumab. Dupilumab will also interact with certain medications such as warfarin and cyclosporine. The patient understands that monitoring is required and they must alert us or the primary physician if symptoms of infection or other concerning signs are noted.
Niacinamide Pregnancy And Lactation Text: These medications are considered safe during pregnancy.
Simponi Counseling:  I discussed with the patient the risks of golimumab including but not limited to myelosuppression, immunosuppression, autoimmune hepatitis, demyelinating diseases, lymphoma, and serious infections.  The patient understands that monitoring is required including a PPD at baseline and must alert us or the primary physician if symptoms of infection or other concerning signs are noted.
Quinolones Pregnancy And Lactation Text: This medication is Pregnancy Category C and it isn't know if it is safe during pregnancy. It is also excreted in breast milk.
Cellcept Pregnancy And Lactation Text: This medication is Pregnancy Category D and isn't considered safe during pregnancy. It is unknown if this medication is excreted in breast milk.
Wartpeel Pregnancy And Lactation Text: This medication is Pregnancy Category X and contraindicated in pregnancy and in women who may become pregnant. It is unknown if this medication is excreted in breast milk.
Colchicine Counseling:  Patient counseled regarding adverse effects including but not limited to stomach upset (nausea, vomiting, stomach pain, or diarrhea).  Patient instructed to limit alcohol consumption while taking this medication.  Colchicine may reduce blood counts especially with prolonged use.  The patient understands that monitoring of kidney function and blood counts may be required, especially at baseline. The patient verbalized understanding of the proper use and possible adverse effects of colchicine.  All of the patient's questions and concerns were addressed.
Protopic Counseling: Patient may experience a mild burning sensation during topical application. Protopic is not approved in children less than 2 years of age. There have been case reports of hematologic and skin malignancies in patients using topical calcineurin inhibitors although causality is questionable.
Elidel Counseling: Patient may experience a mild burning sensation during topical application. Elidel is not approved in children less than 2 years of age. There have been case reports of hematologic and skin malignancies in patients using topical calcineurin inhibitors although causality is questionable.
Niacinamide Counseling: I recommended taking niacin or niacinamide, also know as vitamin B3, twice daily. Recent evidence suggests that taking vitamin B3 (500 mg twice daily) can reduce the risk of actinic keratoses and non-melanoma skin cancers. Side effects of vitamin B3 include flushing and headache.
SSKI Counseling:  I discussed with the patient the risks of SSKI including but not limited to thyroid abnormalities, metallic taste, GI upset, fever, headache, acne, arthralgias, paraesthesias, lymphadenopathy, easy bleeding, arrhythmias, and allergic reaction.
Wartpeel Counseling:  I discussed with the patient the risks of Wartpeel including but not limited to erythema, scaling, itching, weeping, crusting, and pain.
Quinolones Counseling:  I discussed with the patient the risks of fluoroquinolones including but not limited to GI upset, allergic reaction, drug rash, diarrhea, dizziness, photosensitivity, yeast infections, liver function test abnormalities, tendonitis/tendon rupture.
High Dose Vitamin A Pregnancy And Lactation Text: High dose vitamin A therapy is contraindicated during pregnancy and breast feeding.
Cellcept Counseling:  I discussed with the patient the risks of mycophenolate mofetil including but not limited to infection/immunosuppression, GI upset, hypokalemia, hypercholesterolemia, bone marrow suppression, lymphoproliferative disorders, malignancy, GI ulceration/bleed/perforation, colitis, interstitial lung disease, kidney failure, progressive multifocal leukoencephalopathy, and birth defects.  The patient understands that monitoring is required including a baseline creatinine and regular CBC testing. In addition, patient must alert us immediately if symptoms of infection or other concerning signs are noted.
Spironolactone Pregnancy And Lactation Text: This medication can cause feminization of the male fetus and should be avoided during pregnancy. The active metabolite is also found in breast milk.
Libtayo Pregnancy And Lactation Text: This medication is contraindicated in pregnancy and when breast feeding.
Cosentyx Counseling:  I discussed with the patient the risks of Cosentyx including but not limited to worsening of Crohn's disease, immunosuppression, allergic reactions and infections.  The patient understands that monitoring is required including a PPD at baseline and must alert us or the primary physician if symptoms of infection or other concerning signs are noted.
Siliq Counseling:  I discussed with the patient the risks of Siliq including but not limited to new or worsening depression, suicidal thoughts and behavior, immunosuppression, malignancy, posterior leukoencephalopathy syndrome, and serious infections.  The patient understands that monitoring is required including a PPD at baseline and must alert us or the primary physician if symptoms of infection or other concerning signs are noted. There is also a special program designed to monitor depression which is required with Siliq.
Topical Sulfur Applications Pregnancy And Lactation Text: This medication is Pregnancy Category C and has an unknown safety profile during pregnancy. It is unknown if this topical medication is excreted in breast milk.
Clofazimine Counseling:  I discussed with the patient the risks of clofazimine including but not limited to skin and eye pigmentation, liver damage, nausea/vomiting, gastrointestinal bleeding and allergy.
Terbinafine Counseling: Patient counseling regarding adverse effects of terbinafine including but not limited to headache, diarrhea, rash, upset stomach, liver function test abnormalities, itching, taste/smell disturbance, nausea, abdominal pain, and flatulence.  There is a rare possibility of liver failure that can occur when taking terbinafine.  The patient understands that a baseline LFT and kidney function test may be required. The patient verbalized understanding of the proper use and possible adverse effects of terbinafine.  All of the patient's questions and concerns were addressed.
High Dose Vitamin A Counseling: Side effects reviewed, pt to contact office should one occur.
Drysol Counseling:  I discussed with the patient the risks of drysol/aluminum chloride including but not limited to skin rash, itching, irritation, burning.
Libtayo Counseling- I discussed with the patient the risks of Libtayo including but not limited to nausea, vomiting, diarrhea, and bone or muscle pain.  The patient verbalized understanding of the proper use and possible adverse effects of Libtayo.  All of the patient's questions and concerns were addressed.
Picato Counseling:  I discussed with the patient the risks of Picato including but not limited to erythema, scaling, itching, weeping, crusting, and pain.
Cimzia Pregnancy And Lactation Text: This medication crosses the placenta but can be considered safe in certain situations. Cimzia may be excreted in breast milk.
Rituxan Pregnancy And Lactation Text: This medication is Pregnancy Category C and it isn't know if it is safe during pregnancy. It is unknown if this medication is excreted in breast milk but similar antibodies are known to be excreted.
Spironolactone Counseling: Patient advised regarding risks of diarrhea, abdominal pain, hyperkalemia, birth defects (for female patients), liver toxicity and renal toxicity. The patient may need blood work to monitor liver and kidney function and potassium levels while on therapy. The patient verbalized understanding of the proper use and possible adverse effects of spironolactone.  All of the patient's questions and concerns were addressed.
Include Pregnancy/Lactation Warning?: No
Minocycline Counseling: Patient advised regarding possible photosensitivity and discoloration of the teeth, skin, lips, tongue and gums.  Patient instructed to avoid sunlight, if possible.  When exposed to sunlight, patients should wear protective clothing, sunglasses, and sunscreen.  The patient was instructed to call the office immediately if the following severe adverse effects occur:  hearing changes, easy bruising/bleeding, severe headache, or vision changes.  The patient verbalized understanding of the proper use and possible adverse effects of minocycline.  All of the patient's questions and concerns were addressed.
Topical Sulfur Applications Counseling: Topical Sulfur Counseling: Patient counseled that this medication may cause skin irritation or allergic reactions.  In the event of skin irritation, the patient was advised to reduce the amount of the drug applied or use it less frequently.   The patient verbalized understanding of the proper use and possible adverse effects of topical sulfur application.  All of the patient's questions and concerns were addressed.
Isotretinoin Pregnancy And Lactation Text: This medication is Pregnancy Category X and is considered extremely dangerous during pregnancy. It is unknown if it is excreted in breast milk.
Azathioprine Counseling:  I discussed with the patient the risks of azathioprine including but not limited to myelosuppression, immunosuppression, hepatotoxicity, lymphoma, and infections.  The patient understands that monitoring is required including baseline LFTs, Creatinine, possible TPMP genotyping and weekly CBCs for the first month and then every 2 weeks thereafter.  The patient verbalized understanding of the proper use and possible adverse effects of azathioprine.  All of the patient's questions and concerns were addressed.
Ketoconazole Pregnancy And Lactation Text: This medication is Pregnancy Category C and it isn't know if it is safe during pregnancy. It is also excreted in breast milk and breast feeding isn't recommended.
Hydroxychloroquine Pregnancy And Lactation Text: This medication has been shown to cause fetal harm but it isn't assigned a Pregnancy Risk Category. There are small amounts excreted in breast milk.
Rituxan Counseling:  I discussed with the patient the risks of Rituxan infusions. Side effects can include infusion reactions, severe drug rashes including mucocutaneous reactions, reactivation of latent hepatitis and other infections and rarely progressive multifocal leukoencephalopathy.  All of the patient's questions and concerns were addressed.
Topical Ketoconazole Pregnancy And Lactation Text: This medication is Pregnancy Category B and is considered safe during pregnancy. It is unknown if it is excreted in breast milk.
Metronidazole Pregnancy And Lactation Text: This medication is Pregnancy Category B and considered safe during pregnancy.  It is also excreted in breast milk.
Birth Control Pills Pregnancy And Lactation Text: This medication should be avoided if pregnant and for the first 30 days post-partum.
Opzelura Pregnancy And Lactation Text: There is insufficient data to evaluate drug-associated risk for major birth defects, miscarriage, or other adverse maternal or fetal outcomes.  There is a pregnancy registry that monitors pregnancy outcomes in pregnant persons exposed to the medication during pregnancy.  It is unknown if this medication is excreted in breast milk.  Do not breastfeed during treatment and for about 4 weeks after the last dose.
Cimzia Counseling:  I discussed with the patient the risks of Cimzia including but not limited to immunosuppression, allergic reactions and infections.  The patient understands that monitoring is required including a PPD at baseline and must alert us or the primary physician if symptoms of infection or other concerning signs are noted.
Ketoconazole Counseling:   Patient counseled regarding improving absorption with orange juice.  Adverse effects include but are not limited to breast enlargement, headache, diarrhea, nausea, upset stomach, liver function test abnormalities, taste disturbance, and stomach pain.  There is a rare possibility of liver failure that can occur when taking ketoconazole. The patient understands that monitoring of LFTs may be required, especially at baseline. The patient verbalized understanding of the proper use and possible adverse effects of ketoconazole.  All of the patient's questions and concerns were addressed.
Hydroxychloroquine Counseling:  I discussed with the patient that a baseline ophthalmologic exam is needed at the start of therapy and every year thereafter while on therapy. A CBC may also be warranted for monitoring.  The side effects of this medication were discussed with the patient, including but not limited to agranulocytosis, aplastic anemia, seizures, rashes, retinopathy, and liver toxicity. Patient instructed to call the office should any adverse effect occur.  The patient verbalized understanding of the proper use and possible adverse effects of Plaquenil.  All the patient's questions and concerns were addressed.
5-Fu Counseling: 5-Fluorouracil Counseling:  I discussed with the patient the risks of 5-fluorouracil including but not limited to erythema, scaling, itching, weeping, crusting, and pain.
Isotretinoin Counseling: Patient should get monthly blood tests, not donate blood, not drive at night if vision affected, not share medication, and not undergo elective surgery for 6 months after tx completed. Side effects reviewed, pt to contact office should one occur.
Metronidazole Counseling:  I discussed with the patient the risks of metronidazole including but not limited to seizures, nausea/vomiting, a metallic taste in the mouth, nausea/vomiting and severe allergy.
Birth Control Pills Counseling: Birth Control Pill Counseling: I discussed with the patient the potential side effects of OCPs including but not limited to increased risk of stroke, heart attack, thrombophlebitis, deep venous thrombosis, hepatic adenomas, breast changes, GI upset, headaches, and depression.  The patient verbalized understanding of the proper use and possible adverse effects of OCPs. All of the patient's questions and concerns were addressed.
Detail Level: Detailed
Cibinqo Pregnancy And Lactation Text: It is unknown if this medication will adversely affect pregnancy or breast feeding.  You should not take this medication if you are currently pregnant or planning a pregnancy or while breastfeeding.
Topical Ketoconazole Counseling: Patient counseled that this medication may cause skin irritation or allergic reactions.  In the event of skin irritation, the patient was advised to reduce the amount of the drug applied or use it less frequently.   The patient verbalized understanding of the proper use and possible adverse effects of ketoconazole.  All of the patient's questions and concerns were addressed.
Opzelura Counseling:  I discussed with the patient the risks of Opzelura including but not limited to nasopharngitis, bronchitis, ear infection, eosinophila, hives, diarrhea, folliculitis, tonsillitis, and rhinorrhea.  Taken orally, this medication has been linked to serious infections; higher rate of mortality; malignancy and lymphoproliferative disorders; major adverse cardiovascular events; thrombosis; thrombocytopenia, anemia, and neutropenia; and lipid elevations.
Rinvoq Pregnancy And Lactation Text: Based on animal studies, Rinvoq may cause embryo-fetal harm when administered to pregnant women.  The medication should not be used in pregnancy.  Breastfeeding is not recommended during treatment and for 6 days after the last dose.
Xolair Pregnancy And Lactation Text: This medication is Pregnancy Category B and is considered safe during pregnancy. This medication is excreted in breast milk.
Bexarotene Pregnancy And Lactation Text: This medication is Pregnancy Category X and should not be given to women who are pregnant or may become pregnant. This medication should not be used if you are breast feeding.
Cantharidin Pregnancy And Lactation Text: The use of this medication during pregnancy or lactation is not recommended as there is insufficient data.
Glycopyrrolate Pregnancy And Lactation Text: This medication is Pregnancy Category B and is considered safe during pregnancy. It is unknown if it is excreted breast milk.
Cibinqo Counseling: I discussed with the patient the risks of Cibinqo therapy including but not limited to common cold, nausea, headache, cold sores, increased blood CPK levels, dizziness, UTIs, fatigue, acne, and vomitting. Live vaccines should be avoided.  This medication has been linked to serious infections; higher rate of mortality; malignancy and lymphoproliferative disorders; major adverse cardiovascular events; thrombosis; thrombocytopenia and lymphopenia; lipid elevations; and retinal detachment.
Erythromycin Pregnancy And Lactation Text: This medication is Pregnancy Category B and is considered safe during pregnancy. It is also excreted in breast milk.
Propranolol Pregnancy And Lactation Text: This medication is Pregnancy Category C and it isn't known if it is safe during pregnancy. It is excreted in breast milk.
Xolair Counseling:  Patient informed of potential adverse effects including but not limited to fever, muscle aches, rash and allergic reactions.  The patient verbalized understanding of the proper use and possible adverse effects of Xolair.  All of the patient's questions and concerns were addressed.
Itraconazole Counseling:  I discussed with the patient the risks of itraconazole including but not limited to liver damage, nausea/vomiting, neuropathy, and severe allergy.  The patient understands that this medication is best absorbed when taken with acidic beverages such as non-diet cola or ginger ale.  The patient understands that monitoring is required including baseline LFTs and repeat LFTs at intervals.  The patient understands that they are to contact us or the primary physician if concerning signs are noted.
Cantharidin Counseling: Calcipotriene Counseling:  I discussed with the patient the risks of calcipotriene including but not limited to erythema, scaling, itching, and irritation.
Rinvoq Counseling: I discussed with the patient the risks of Rinvoq therapy including but not limited to upper respiratory tract infections, shingles, cold sores, bronchitis, nausea, cough, fever, acne, and headache. Live vaccines should be avoided.  This medication has been linked to serious infections; higher rate of mortality; malignancy and lymphoproliferative disorders; major adverse cardiovascular events; thrombosis; thrombocytopenia, anemia, and neutropenia; lipid elevations; liver enzyme elevations; and gastrointestinal perforations.
Mirvaso Counseling: Mirvaso is a topical medication which can decrease superficial blood flow where applied. Side effects are uncommon and include stinging, redness and allergic reactions.
Bexarotene Counseling:  I discussed with the patient the risks of bexarotene including but not limited to hair loss, dry lips/skin/eyes, liver abnormalities, hyperlipidemia, pancreatitis, depression/suicidal ideation, photosensitivity, drug rash/allergic reactions, hypothyroidism, anemia, leukopenia, infection, cataracts, and teratogenicity.  Patient understands that they will need regular blood tests to check lipid profile, liver function tests, white blood cell count, thyroid function tests and pregnancy test if applicable.
Glycopyrrolate Counseling:  I discussed with the patient the risks of glycopyrrolate including but not limited to skin rash, drowsiness, dry mouth, difficulty urinating, and blurred vision.
Propranolol Counseling:  I discussed with the patient the risks of propranolol including but not limited to low heart rate, low blood pressure, low blood sugar, restlessness and increased cold sensitivity. They should call the office if they experience any of these side effects.
Adbry Pregnancy And Lactation Text: It is unknown if this medication will adversely affect pregnancy or breast feeding.
Griseofulvin Pregnancy And Lactation Text: This medication is Pregnancy Category X and is known to cause serious birth defects. It is unknown if this medication is excreted in breast milk but breast feeding should be avoided.
Erythromycin Counseling:  I discussed with the patient the risks of erythromycin including but not limited to GI upset, allergic reaction, drug rash, diarrhea, increase in liver enzymes, and yeast infections.
Olumiant Pregnancy And Lactation Text: Based on animal studies, Olumiant may cause embryo-fetal harm when administered to pregnant women.  The medication should not be used in pregnancy.  Breastfeeding is not recommended during treatment.
Xelfunmiz Pregnancy And Lactation Text: This medication is Pregnancy Category D and is not considered safe during pregnancy.  The risk during breast feeding is also uncertain.
Topical Clindamycin Counseling: Patient counseled that this medication may cause skin irritation or allergic reactions.  In the event of skin irritation, the patient was advised to reduce the amount of the drug applied or use it less frequently.   The patient verbalized understanding of the proper use and possible adverse effects of clindamycin.  All of the patient's questions and concerns were addressed.
Ivermectin Pregnancy And Lactation Text: This medication is Pregnancy Category C and it isn't known if it is safe during pregnancy. It is also excreted in breast milk.
Acitretin Pregnancy And Lactation Text: This medication is Pregnancy Category X and should not be given to women who are pregnant or may become pregnant in the future. This medication is excreted in breast milk.
Calcipotriene Pregnancy And Lactation Text: This medication has not been proven safe during pregnancy. It is unknown if this medication is excreted in breast milk.
Adbry Counseling: I discussed with the patient the risks of tralokinumab including but not limited to eye infection and irritation, cold sores, injection site reactions, worsening of asthma, allergic reactions and increased risk of parasitic infection.  Live vaccines should be avoided while taking tralokinumab. The patient understands that monitoring is required and they must alert us or the primary physician if symptoms of infection or other concerning signs are noted.
Tazorac Pregnancy And Lactation Text: This medication is not safe during pregnancy. It is unknown if this medication is excreted in breast milk.
Griseofulvin Counseling:  I discussed with the patient the risks of griseofulvin including but not limited to photosensitivity, cytopenia, liver damage, nausea/vomiting and severe allergy.  The patient understands that this medication is best absorbed when taken with a fatty meal (e.g., ice cream or french fries).
Olumiant Counseling: I discussed with the patient the risks of Olumiant therapy including but not limited to upper respiratory tract infections, shingles, cold sores, and nausea. Live vaccines should be avoided.  This medication has been linked to serious infections; higher rate of mortality; malignancy and lymphoproliferative disorders; major adverse cardiovascular events; thrombosis; gastrointestinal perforations; neutropenia; lymphopenia; anemia; liver enzyme elevations; and lipid elevations.
Doxycycline Pregnancy And Lactation Text: This medication is Pregnancy Category D and not consider safe during pregnancy. It is also excreted in breast milk but is considered safe for shorter treatment courses.
Xeljanz Counseling: I discussed with the patient the risks of Xeljanz therapy including increased risk of infection, liver issues, headache, diarrhea, or cold symptoms. Live vaccines should be avoided. They were instructed to call if they have any problems.
Acitretin Counseling:  I discussed with the patient the risks of acitretin including but not limited to hair loss, dry lips/skin/eyes, liver damage, hyperlipidemia, depression/suicidal ideation, photosensitivity.  Serious rare side effects can include but are not limited to pancreatitis, pseudotumor cerebri, bony changes, clot formation/stroke/heart attack.  Patient understands that alcohol is contraindicated since it can result in liver toxicity and significantly prolong the elimination of the drug by many years.
Ivermectin Counseling:  Patient instructed to take medication on an empty stomach with a full glass of water.  Patient informed of potential adverse effects including but not limited to nausea, diarrhea, dizziness, itching, and swelling of the extremities or lymph nodes.  The patient verbalized understanding of the proper use and possible adverse effects of ivermectin.  All of the patient's questions and concerns were addressed.
Minoxidil Counseling: Minoxidil is a topical medication which can increase blood flow where it is applied. It is uncertain how this medication increases hair growth. Side effects are uncommon and include stinging and allergic reactions.
Tazorac Counseling:  Patient advised that medication is irritating and drying.  Patient may need to apply sparingly and wash off after an hour before eventually leaving it on overnight.  The patient verbalized understanding of the proper use and possible adverse effects of tazorac.  All of the patient's questions and concerns were addressed.
Gabapentin Counseling: I discussed with the patient the risks of gabapentin including but not limited to dizziness, somnolence, fatigue and ataxia.
Doxycycline Counseling:  Patient counseled regarding possible photosensitivity and increased risk for sunburn.  Patient instructed to avoid sunlight, if possible.  When exposed to sunlight, patients should wear protective clothing, sunglasses, and sunscreen.  The patient was instructed to call the office immediately if the following severe adverse effects occur:  hearing changes, easy bruising/bleeding, severe headache, or vision changes.  The patient verbalized understanding of the proper use and possible adverse effects of doxycycline.  All of the patient's questions and concerns were addressed.
Oxybutynin Counseling:  I discussed with the patient the risks of oxybutynin including but not limited to skin rash, drowsiness, dry mouth, difficulty urinating, and blurred vision.
Arava Counseling:  Patient counseled regarding adverse effects of Arava including but not limited to nausea, vomiting, abnormalities in liver function tests. Patients may develop mouth sores, rash, diarrhea, and abnormalities in blood counts. The patient understands that monitoring is required including LFTs and blood counts.  There is a rare possibility of scarring of the liver and lung problems that can occur when taking methotrexate. Persistent nausea, loss of appetite, pale stools, dark urine, cough, and shortness of breath should be reported immediately. Patient advised to discontinue Arava treatment and consult with a physician prior to attempting conception. The patient will have to undergo a treatment to eliminate Arava from the body prior to conception.
Klisyri Pregnancy And Lactation Text: It is unknown if this medication can harm a developing fetus or if it is excreted in breast milk.
Finasteride Pregnancy And Lactation Text: This medication is absolutely contraindicated during pregnancy. It is unknown if it is excreted in breast milk.
Clindamycin Pregnancy And Lactation Text: This medication can be used in pregnancy if certain situations. Clindamycin is also present in breast milk.
Infliximab Counseling:  I discussed with the patient the risks of infliximab including but not limited to myelosuppression, immunosuppression, autoimmune hepatitis, demyelinating diseases, lymphoma, and serious infections.  The patient understands that monitoring is required including a PPD at baseline and must alert us or the primary physician if symptoms of infection or other concerning signs are noted.
Otezla Pregnancy And Lactation Text: This medication is Pregnancy Category C and it isn't known if it is safe during pregnancy. It is unknown if it is excreted in breast milk.
Albendazole Counseling:  I discussed with the patient the risks of albendazole including but not limited to cytopenia, kidney damage, nausea/vomiting and severe allergy.  The patient understands that this medication is being used in an off-label manner.
Tremfya Counseling: I discussed with the patient the risks of guselkumab including but not limited to immunosuppression, serious infections, worsening of inflammatory bowel disease and drug reactions.  The patient understands that monitoring is required including a PPD at baseline and must alert us or the primary physician if symptoms of infection or other concerning signs are noted.
Fluconazole Counseling:  Patient counseled regarding adverse effects of fluconazole including but not limited to headache, diarrhea, nausea, upset stomach, liver function test abnormalities, taste disturbance, and stomach pain.  There is a rare possibility of liver failure that can occur when taking fluconazole.  The patient understands that monitoring of LFTs and kidney function test may be required, especially at baseline. The patient verbalized understanding of the proper use and possible adverse effects of fluconazole.  All of the patient's questions and concerns were addressed.
Carac Counseling:  I discussed with the patient the risks of Carac including but not limited to erythema, scaling, itching, weeping, crusting, and pain.
Finasteride Male Counseling: Finasteride Counseling:  I discussed with the patient the risks of use of finasteride including but not limited to decreased libido, decreased ejaculate volume, gynecomastia, and depression. Women should not handle medication.  All of the patient's questions and concerns were addressed.
Klisyri Counseling:  I discussed with the patient the risks of Klisyri including but not limited to erythema, scaling, itching, weeping, crusting, and pain.
Otezla Counseling: The side effects of Otezla were discussed with the patient, including but not limited to worsening or new depression, weight loss, diarrhea, nausea, upper respiratory tract infection, and headache. Patient instructed to call the office should any adverse effect occur.  The patient verbalized understanding of the proper use and possible adverse effects of Otezla.  All the patient's questions and concerns were addressed.
Clindamycin Counseling: I counseled the patient regarding use of clindamycin as an antibiotic for prophylactic and/or therapeutic purposes. Clindamycin is active against numerous classes of bacteria, including skin bacteria. Side effects may include nausea, diarrhea, gastrointestinal upset, rash, hives, yeast infections, and in rare cases, colitis.
Topical Retinoid counseling:  Patient advised to apply a pea-sized amount only at bedtime and wait 30 minutes after washing their face before applying.  If too drying, patient may add a non-comedogenic moisturizer. The patient verbalized understanding of the proper use and possible adverse effects of retinoids.  All of the patient's questions and concerns were addressed.
Prednisone Counseling:  I discussed with the patient the risks of prolonged use of prednisone including but not limited to weight gain, insomnia, osteoporosis, mood changes, diabetes, susceptibility to infection, glaucoma and high blood pressure.  In cases where prednisone use is prolonged, patients should be monitored with blood pressure checks, serum glucose levels and an eye exam.  Additionally, the patient may need to be placed on GI prophylaxis, PCP prophylaxis, and calcium and vitamin D supplementation and/or a bisphosphonate.  The patient verbalized understanding of the proper use and the possible adverse effects of prednisone.  All of the patient's questions and concerns were addressed.
Opioid Pregnancy And Lactation Text: These medications can lead to premature delivery and should be avoided during pregnancy. These medications are also present in breast milk in small amounts.
Benzoyl Peroxide Pregnancy And Lactation Text: This medication is Pregnancy Category C. It is unknown if benzoyl peroxide is excreted in breast milk.
Ilumya Counseling: I discussed with the patient the risks of tildrakizumab including but not limited to immunosuppression, malignancy, posterior leukoencephalopathy syndrome, and serious infections.  The patient understands that monitoring is required including a PPD at baseline and must alert us or the primary physician if symptoms of infection or other concerning signs are noted.
Solaraze Pregnancy And Lactation Text: This medication is Pregnancy Category B and is considered safe. There is some data to suggest avoiding during the third trimester. It is unknown if this medication is excreted in breast milk.
Valtrex Pregnancy And Lactation Text: this medication is Pregnancy Category B and is considered safe during pregnancy. This medication is not directly found in breast milk but it's metabolite acyclovir is present.
Cephalexin Pregnancy And Lactation Text: This medication is Pregnancy Category B and considered safe during pregnancy.  It is also excreted in breast milk but can be used safely for shorter doses.

## 2022-08-26 ENCOUNTER — OFFICE VISIT (OUTPATIENT)
Dept: MEDICAL GROUP | Facility: CLINIC | Age: 77
End: 2022-08-26
Payer: MEDICARE

## 2022-08-26 VITALS — BODY MASS INDEX: 27 KG/M2 | WEIGHT: 172 LBS | RESPIRATION RATE: 17 BRPM | HEIGHT: 67 IN

## 2022-08-26 DIAGNOSIS — J44.9 CHRONIC OBSTRUCTIVE PULMONARY DISEASE, UNSPECIFIED COPD TYPE (HCC): ICD-10-CM

## 2022-08-26 DIAGNOSIS — N40.1 BENIGN PROSTATIC HYPERPLASIA WITH INCOMPLETE BLADDER EMPTYING: ICD-10-CM

## 2022-08-26 DIAGNOSIS — Z85.72 HISTORY OF LYMPHOMA: ICD-10-CM

## 2022-08-26 DIAGNOSIS — F17.200 TOBACCO DEPENDENCE: ICD-10-CM

## 2022-08-26 DIAGNOSIS — I73.9 PERIPHERAL ARTERY DISEASE (HCC): ICD-10-CM

## 2022-08-26 DIAGNOSIS — Z00.00 PREVENTATIVE HEALTH CARE: ICD-10-CM

## 2022-08-26 DIAGNOSIS — R39.14 BENIGN PROSTATIC HYPERPLASIA WITH INCOMPLETE BLADDER EMPTYING: ICD-10-CM

## 2022-08-26 PROCEDURE — 99406 BEHAV CHNG SMOKING 3-10 MIN: CPT | Performed by: FAMILY MEDICINE

## 2022-08-26 PROCEDURE — 99214 OFFICE O/P EST MOD 30 MIN: CPT | Mod: 25 | Performed by: FAMILY MEDICINE

## 2022-08-26 RX ORDER — ATORVASTATIN CALCIUM 40 MG/1
40 TABLET, FILM COATED ORAL EVERY EVENING
Qty: 90 TABLET | Refills: 3 | Status: SHIPPED | OUTPATIENT
Start: 2022-08-26 | End: 2023-09-22 | Stop reason: SDUPTHER

## 2022-08-26 RX ORDER — TIOTROPIUM BROMIDE 18 UG/1
18 CAPSULE ORAL; RESPIRATORY (INHALATION) DAILY
Qty: 30 CAPSULE | Refills: 11 | Status: SHIPPED | OUTPATIENT
Start: 2022-08-26 | End: 2023-09-22

## 2022-08-26 RX ORDER — VARENICLINE TARTRATE 1 MG/1
TABLET, FILM COATED ORAL
Qty: 60 TABLET | Refills: 2 | Status: SHIPPED | OUTPATIENT
Start: 2022-08-26 | End: 2023-01-06

## 2022-08-26 RX ORDER — TAMSULOSIN HYDROCHLORIDE 0.4 MG/1
0.8 CAPSULE ORAL DAILY
Qty: 180 CAPSULE | Refills: 3 | Status: SHIPPED | OUTPATIENT
Start: 2022-08-26 | End: 2023-09-22 | Stop reason: SDUPTHER

## 2022-08-26 ASSESSMENT — FIBROSIS 4 INDEX: FIB4 SCORE: 1.94

## 2022-08-26 NOTE — ASSESSMENT & PLAN NOTE
Patient has enlarged prostate seen on his routine CT abdomen scans which he gets for his history of lymphoma.  He also has a weak stream with voiding.  Reports he had a previous prescription of tamsulosin 0.4 mg which he has been taking 2 of and it has worked well for his urinary symptoms.  Refilled his tamsulosin.

## 2022-08-26 NOTE — ASSESSMENT & PLAN NOTE
Plans to get colonoscopy next year  Declines vaccines at this time  Reports he has already had hep C screening which was negative  Declines STD testing  He does get annual chest abdomen and pelvis CT scans.  He also had an aortogram at the time of his vascular surgery intervention in 2020.  Do not think he needs additional AAA screening at this time.  Patient also reports he is up-to-date on his annual well visit which was done earlier this year  Discussed prostate cancer screening including the pros and cons of PSA testing which patient has declined at this time.

## 2022-08-26 NOTE — PROGRESS NOTES
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  Wants to get referral to repeat it next year at 77 yrs old  Reports he had negative hep C screening in the past  Declines STD testing    Problem   Copd (Chronic Obstructive Pulmonary Disease) (Hcc)   Preventative Health Care   History of Lymphoma   Peripheral Artery Disease (Hcc)   Tobacco Dependence   Benign Prostatic Hyperplasia       Current Outpatient Medications Ordered in Epic   Medication Sig Dispense Refill    atorvastatin (LIPITOR) 40 MG Tab Take 1 Tablet by mouth every evening. 90 Tablet 3    tamsulosin (FLOMAX) 0.4 MG capsule Take 2 Capsules by mouth every day. 180 Capsule 3    varenicline (CHANTIX TORITO) 0.5 MG X 11 & 1 MG X 42 tablet Take 0.5mg PO qD x 3 days, then 0.5mg PO BID x 4 days, then increase to 1mg PO BID 56 Each 3    varenicline (CHANTIX) 1 MG tablet Take 1 tablet PO BID, start after completion of chantix starter pack. 60 Tablet 2    tiotropium (SPIRIVA HANDIHALER) 18 MCG Cap Place 1 Capsule into inhaler and inhale every day. 30 Capsule 11    sildenafil citrate (VIAGRA) 25 MG Tab TAKE 1 TO 2 TABLETS BY MOUTH ONCE DAILY AS NEEDED FOR SEXUAL ACTIVITY 30 Tablet 3    albuterol 108 (90 Base) MCG/ACT Aero Soln inhalation aerosol ALBUTEROL SULFATE  (90 Base) MCG/ACT AERS      albuterol (PROVENTIL) 2.5mg/0.5ml Nebu Soln Take 2.5 mg by nebulization every four  "hours as needed.      ibuprofen (MOTRIN) 400 MG Tab Take 400 mg by mouth every 6 hours as needed.      acetaminophen (TYLENOL) 325 MG Tab Take 2 Tabs by mouth every 6 hours as needed (Mild Pain; (Pain scale 1-3); Temp greater than 100.5 F). 30 Tab 0     No current Epic-ordered facility-administered medications on file.       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       ROS:  Gen: no fevers/chills, no weight loss  Pulm: no sob, no cough, +wheezing  CV: no chest pain, no palpitations  GI: no nausea/vomiting, no diarrhea  : no dysuria, no hematuria, +weak stream  MSk: n+chronic back pain  Skin: no rash  Neuro: no headaches, no weakness      Objective:     Exam:  BP (P) 126/64 (BP Location: Right arm, Patient Position: Sitting, BP Cuff Size: Adult)   Pulse (P) 77   Resp 17   Ht 1.702 m (5' 7\")   Wt 78 kg (172 lb)   SpO2 (P) 95%   BMI 26.94 kg/m²  Body mass index is 26.94 kg/m².    Gen: Alert and oriented, No apparent distress.  Head:  NCAT, EOMI, sclera clear without discharge  Neck: Neck is supple without lymphadenopathy.  Lungs: Normal effort, CTA bilaterally, +diffuse wheezing bilateral lung fields, no rhonchi, or rales  CV: Regular rate and rhythm. No murmurs, rubs, or gallops.  Abd:   Non-distended, soft  Ext: +clubbing, no edema. "  Unable to palpate pedal pulses.  Sluggish cap refills and poor hair growth on feet.  S/p great toenail removals.  Skin is pink and warm.    MSK: Unassisted gait  Derm: +solar lentigines on face  Psych: normal mood and affect        Assessment & Plan:     76 y.o. male with the following -     Problem List Items Addressed This Visit       Peripheral artery disease (HCC)     Known history of peripheral artery disease  Has had femoral arterial bypasses in both lower extremities in 2020  Unable to palpate pulses in the lower extremities though feet are warm and pink.  Sluggish cap refill without hair growth on the feet.  Denies significant claudication.  Working on quitting smoking.  Educated on the need to be on atorvastatin long-term.         Relevant Medications    atorvastatin (LIPITOR) 40 MG Tab    Tobacco dependence     Patient has a heavy smoking history.  He is currently smoking 1 to 2 packs/day.  He is interested in quitting smoking due to the cost of smoking at this time.  Counseled him on the benefits of smoking cessation as well as treatment options.  Patient took a flyer from the waiting room and plans to call the support line.  He also has nicotine patches at home that he is considering using.  We also discussed Chantix and I sent in a prescription for him.  8 minutes of face-to-face tobacco cessation counseling was provided today.  Follow-up appointment was offered.         Relevant Orders    Comp Metabolic Panel    HEMOGLOBIN A1C    Lipid Profile    Benign prostatic hyperplasia     Patient has enlarged prostate seen on his routine CT abdomen scans which he gets for his history of lymphoma.  He also has a weak stream with voiding.  Reports he had a previous prescription of tamsulosin 0.4 mg which he has been taking 2 of and it has worked well for his urinary symptoms.  Refilled his tamsulosin.         Relevant Medications    tamsulosin (FLOMAX) 0.4 MG capsule    History of lymphoma     Follows with  heme-onc, and has routine screening chest abdomen and pelvis CT every year.  This is scheduled for December of this year.  Patient request labs at this time.         Relevant Orders    CBC WITH DIFFERENTIAL    COPD (chronic obstructive pulmonary disease) (HCC)     Suspect COPD given patient's extensive smoking history and diffuse wheezing on physical exam.  Patient does not feel short of breath or chest tightness.  He does use occasional albuterol inhaler as needed though not daily.  We discussed the difference between a controller inhaler and rescue inhaler.  Prescribed Spiriva today.  Patient is worried about the cost of his medications including this inhaler.  Advised him to check with the pharmacist to see if it is covered.  If too expensive he does not plan to start it.         Relevant Medications    varenicline (CHANTIX TORITO) 0.5 MG X 11 & 1 MG X 42 tablet    varenicline (CHANTIX) 1 MG tablet    tiotropium (SPIRIVA HANDIHALER) 18 MCG Cap    Preventative health care     Plans to get colonoscopy next year  Declines vaccines at this time  Reports he has already had hep C screening which was negative  Declines STD testing  He does get annual chest abdomen and pelvis CT scans.  He also had an aortogram at the time of his vascular surgery intervention in 2020.  Do not think he needs additional AAA screening at this time.  Patient also reports he is up-to-date on his annual well visit which was done earlier this year  Discussed prostate cancer screening including the pros and cons of PSA testing which patient has declined at this time.              Follow-up: 3-6 months

## 2022-08-26 NOTE — ASSESSMENT & PLAN NOTE
Known history of peripheral artery disease  Has had femoral arterial bypasses in both lower extremities in 2020  Unable to palpate pulses in the lower extremities though feet are warm and pink.  Sluggish cap refill without hair growth on the feet.  Denies significant claudication.  Working on quitting smoking.  Educated on the need to be on atorvastatin long-term.

## 2022-08-26 NOTE — PATIENT INSTRUCTIONS
Congratulations on your desire to quit smoking  Recommend calling 1 800 quit now  I sent in a prescription for Chantix, use as directed when you are ready to quit.  You can use this with nicotine patches as well to help wean the level of nicotine down slowly.  I also sent in a prescription for Spiriva, which is a controller inhaler to take every day due to your smoking history and the wheezing in your lungs.  You can still use your albuterol as needed anytime you are feeling short of breath or chest tightness.  I ordered labs, please get them done fasting.  We will contact you with the results within 2 weeks.  Follow-up in December with your hematologist and routine CT scan.  Refilled your tamsulosin (Flomax) for enlarged prostate and weak stream.  Refilled your atorvastatin which is your cholesterol medicine.  You should be on this for life due to your vascular disease regardless of what your cholesterol shows on your lab test.

## 2022-08-26 NOTE — ASSESSMENT & PLAN NOTE
Suspect COPD given patient's extensive smoking history and diffuse wheezing on physical exam.  Patient does not feel short of breath or chest tightness.  He does use occasional albuterol inhaler as needed though not daily.  We discussed the difference between a controller inhaler and rescue inhaler.  Prescribed Spiriva today.  Patient is worried about the cost of his medications including this inhaler.  Advised him to check with the pharmacist to see if it is covered.  If too expensive he does not plan to start it.

## 2022-08-26 NOTE — ASSESSMENT & PLAN NOTE
Follows with heme-onc, and has routine screening chest abdomen and pelvis CT every year.  This is scheduled for December of this year.  Patient request labs at this time.

## 2022-08-26 NOTE — ASSESSMENT & PLAN NOTE
Patient has a heavy smoking history.  He is currently smoking 1 to 2 packs/day.  He is interested in quitting smoking due to the cost of smoking at this time.  Counseled him on the benefits of smoking cessation as well as treatment options.  Patient took a flyer from the waiting room and plans to call the support line.  He also has nicotine patches at home that he is considering using.  We also discussed Chantix and I sent in a prescription for him.  8 minutes of face-to-face tobacco cessation counseling was provided today.  Follow-up appointment was offered.

## 2022-09-14 ENCOUNTER — APPOINTMENT (RX ONLY)
Dept: URBAN - METROPOLITAN AREA CLINIC 4 | Facility: CLINIC | Age: 77
Setting detail: DERMATOLOGY
End: 2022-09-14

## 2022-09-14 DIAGNOSIS — Z09 ENCOUNTER FOR FOLLOW-UP EXAMINATION AFTER COMPLETED TREATMENT FOR CONDITIONS OTHER THAN MALIGNANT NEOPLASM: ICD-10-CM | Status: STABLE

## 2022-09-14 DIAGNOSIS — L81.4 OTHER MELANIN HYPERPIGMENTATION: ICD-10-CM

## 2022-09-14 DIAGNOSIS — L82.0 INFLAMED SEBORRHEIC KERATOSIS: ICD-10-CM

## 2022-09-14 DIAGNOSIS — L82.1 OTHER SEBORRHEIC KERATOSIS: ICD-10-CM

## 2022-09-14 PROBLEM — D48.5 NEOPLASM OF UNCERTAIN BEHAVIOR OF SKIN: Status: ACTIVE | Noted: 2022-09-14

## 2022-09-14 PROCEDURE — ? COUNSELING

## 2022-09-14 PROCEDURE — ? BIOPSY BY SHAVE METHOD

## 2022-09-14 PROCEDURE — 11102 TANGNTL BX SKIN SINGLE LES: CPT

## 2022-09-14 PROCEDURE — 99213 OFFICE O/P EST LOW 20 MIN: CPT | Mod: 25

## 2022-09-14 PROCEDURE — ? ADDITIONAL NOTES

## 2022-09-14 PROCEDURE — ? OBSERVATION

## 2022-09-14 PROCEDURE — ? LIQUID NITROGEN (COSMETIC)

## 2022-09-14 ASSESSMENT — LOCATION SIMPLE DESCRIPTION DERM
LOCATION SIMPLE: RIGHT UPPER ARM
LOCATION SIMPLE: LEFT NOSE
LOCATION SIMPLE: LEFT EAR

## 2022-09-14 ASSESSMENT — LOCATION DETAILED DESCRIPTION DERM
LOCATION DETAILED: RIGHT ANTERIOR DISTAL UPPER ARM
LOCATION DETAILED: LEFT ANTERIOR EARLOBE
LOCATION DETAILED: LEFT NASAL ALA

## 2022-09-14 ASSESSMENT — LOCATION ZONE DERM
LOCATION ZONE: EAR
LOCATION ZONE: NOSE
LOCATION ZONE: ARM

## 2022-09-14 NOTE — PROCEDURE: BIOPSY BY SHAVE METHOD
Detail Level: Detailed
Depth Of Biopsy: dermis
Was A Bandage Applied: Yes
Size Of Lesion In Cm: 0
Anticipated Plan (Based On Presumed Biopsy Results): Imiquimod. Patient has used it previously.
Biopsy Type: H and E
Biopsy Method: Personna blade
Anesthesia Type: 1% lidocaine with epinephrine and a 1:10 solution of 8.4% sodium bicarbonate
Anesthesia Volume In Cc: 0.5
Hemostasis: Drysol and Electrocautery
Wound Care: Vaseline
Dressing: Band-Aid
Destruction After The Procedure: No
Type Of Destruction Used: Curettage
Curettage Text: The wound bed was treated with curettage after the biopsy was performed.
Electrodesiccation Text: The wound bed was treated with electrodesiccation after the biopsy was performed.
Electrodesiccation And Curettage Text: The wound bed was treated with electrodesiccation and curettage after the biopsy was performed.
Lab: [id number]
Lab Facility: 
Consent: Verbal consent was obtained and risks were reviewed including but not limited to scarring, infection, bleeding, scabbing, incomplete removal, nerve damage and allergy to anesthesia.
Post-Care Instructions: I reviewed with the patient in detail post-care instructions. Patient is to keep the biopsy site dry overnight, and then apply vasaline twice daily until healed.
Notification Instructions: Patient will be notified of biopsy results. However, patient instructed to call the office if not contacted within 2 weeks.
Billing Type: Third-Party Bill
Information: Selecting Yes will display possible errors in your note based on the variables you have selected. This validation is only offered as a suggestion for you. PLEASE NOTE THAT THE VALIDATION TEXT WILL BE REMOVED WHEN YOU FINALIZE YOUR NOTE. IF YOU WANT TO FAX A PRELIMINARY NOTE YOU WILL NEED TO TOGGLE THIS TO 'NO' IF YOU DO NOT WANT IT IN YOUR FAXED NOTE.

## 2022-09-14 NOTE — PROCEDURE: LIQUID NITROGEN (COSMETIC)
Billing Information: Bill by Static Price
Spray Paint Technique: No
Consent: The patient's consent was obtained including but not limited to risks of crusting, scabbing, blistering, scarring, darker or lighter pigmentary change, recurrence, incomplete removal and infection. The patient understands that the procedure is cosmetic in nature and is not covered by insurance.
Price (Use Numbers Only, No Special Characters Or $): 0
Post-Care Instructions: I reviewed with the patient in detail post-care instructions. Patient is to wear sunprotection, and avoid picking at any of the treated lesions. Pt may apply Vaseline to crusted or scabbing areas.
Detail Level: Detailed
Spray Paint Text: The liquid nitrogen was applied to the skin utilizing a spray paint frosting technique.
Show Spray Paint Technique Variable?: Yes

## 2022-09-14 NOTE — PROCEDURE: ADDITIONAL NOTES
Additional Notes: Patient states that if this lesion comes back he will be okay having the Mohs procedure.
Render Risk Assessment In Note?: no
Detail Level: Generalized

## 2022-12-21 ENCOUNTER — HOSPITAL ENCOUNTER (OUTPATIENT)
Dept: LAB | Facility: MEDICAL CENTER | Age: 77
End: 2022-12-21
Attending: INTERNAL MEDICINE
Payer: MEDICARE

## 2022-12-21 ENCOUNTER — HOSPITAL ENCOUNTER (OUTPATIENT)
Dept: LAB | Facility: MEDICAL CENTER | Age: 77
End: 2022-12-21
Attending: FAMILY MEDICINE
Payer: MEDICARE

## 2022-12-21 DIAGNOSIS — F17.200 TOBACCO DEPENDENCE: ICD-10-CM

## 2022-12-21 DIAGNOSIS — Z85.72 HISTORY OF LYMPHOMA: ICD-10-CM

## 2022-12-21 LAB
ALBUMIN SERPL BCP-MCNC: 4.1 G/DL (ref 3.2–4.9)
ALBUMIN SERPL BCP-MCNC: 4.2 G/DL (ref 3.2–4.9)
ALBUMIN/GLOB SERPL: 1.6 G/DL
ALBUMIN/GLOB SERPL: 1.8 G/DL
ALP SERPL-CCNC: 85 U/L (ref 30–99)
ALP SERPL-CCNC: 86 U/L (ref 30–99)
ALT SERPL-CCNC: 20 U/L (ref 2–50)
ALT SERPL-CCNC: 22 U/L (ref 2–50)
ANION GAP SERPL CALC-SCNC: 10 MMOL/L (ref 7–16)
ANION GAP SERPL CALC-SCNC: 8 MMOL/L (ref 7–16)
AST SERPL-CCNC: 21 U/L (ref 12–45)
AST SERPL-CCNC: 22 U/L (ref 12–45)
BASOPHILS # BLD AUTO: 0.8 % (ref 0–1.8)
BASOPHILS # BLD AUTO: 0.9 % (ref 0–1.8)
BASOPHILS # BLD: 0.04 K/UL (ref 0–0.12)
BASOPHILS # BLD: 0.04 K/UL (ref 0–0.12)
BILIRUB SERPL-MCNC: 0.7 MG/DL (ref 0.1–1.5)
BILIRUB SERPL-MCNC: 0.7 MG/DL (ref 0.1–1.5)
BUN SERPL-MCNC: 29 MG/DL (ref 8–22)
BUN SERPL-MCNC: 29 MG/DL (ref 8–22)
CALCIUM ALBUM COR SERPL-MCNC: 8.9 MG/DL (ref 8.5–10.5)
CALCIUM ALBUM COR SERPL-MCNC: 8.9 MG/DL (ref 8.5–10.5)
CALCIUM SERPL-MCNC: 9 MG/DL (ref 8.5–10.5)
CALCIUM SERPL-MCNC: 9.1 MG/DL (ref 8.5–10.5)
CHLORIDE SERPL-SCNC: 107 MMOL/L (ref 96–112)
CHLORIDE SERPL-SCNC: 107 MMOL/L (ref 96–112)
CHOLEST SERPL-MCNC: 124 MG/DL (ref 100–199)
CO2 SERPL-SCNC: 24 MMOL/L (ref 20–33)
CO2 SERPL-SCNC: 25 MMOL/L (ref 20–33)
CREAT SERPL-MCNC: 0.98 MG/DL (ref 0.5–1.4)
CREAT SERPL-MCNC: 1.01 MG/DL (ref 0.5–1.4)
EOSINOPHIL # BLD AUTO: 0.24 K/UL (ref 0–0.51)
EOSINOPHIL # BLD AUTO: 0.29 K/UL (ref 0–0.51)
EOSINOPHIL NFR BLD: 5 % (ref 0–6.9)
EOSINOPHIL NFR BLD: 6.2 % (ref 0–6.9)
ERYTHROCYTE [DISTWIDTH] IN BLOOD BY AUTOMATED COUNT: 44.3 FL (ref 35.9–50)
ERYTHROCYTE [DISTWIDTH] IN BLOOD BY AUTOMATED COUNT: 45.4 FL (ref 35.9–50)
EST. AVERAGE GLUCOSE BLD GHB EST-MCNC: 114 MG/DL
GFR SERPLBLD CREATININE-BSD FMLA CKD-EPI: 77 ML/MIN/1.73 M 2
GFR SERPLBLD CREATININE-BSD FMLA CKD-EPI: 80 ML/MIN/1.73 M 2
GLOBULIN SER CALC-MCNC: 2.4 G/DL (ref 1.9–3.5)
GLOBULIN SER CALC-MCNC: 2.5 G/DL (ref 1.9–3.5)
GLUCOSE SERPL-MCNC: 91 MG/DL (ref 65–99)
GLUCOSE SERPL-MCNC: 91 MG/DL (ref 65–99)
HBA1C MFR BLD: 5.6 % (ref 4–5.6)
HCT VFR BLD AUTO: 46 % (ref 42–52)
HCT VFR BLD AUTO: 46.9 % (ref 42–52)
HDLC SERPL-MCNC: 45 MG/DL
HGB BLD-MCNC: 15.6 G/DL (ref 14–18)
HGB BLD-MCNC: 15.8 G/DL (ref 14–18)
IMM GRANULOCYTES # BLD AUTO: 0 K/UL (ref 0–0.11)
IMM GRANULOCYTES # BLD AUTO: 0.02 K/UL (ref 0–0.11)
IMM GRANULOCYTES NFR BLD AUTO: 0 % (ref 0–0.9)
IMM GRANULOCYTES NFR BLD AUTO: 0.4 % (ref 0–0.9)
LDH SERPL L TO P-CCNC: 187 U/L (ref 107–266)
LDLC SERPL CALC-MCNC: 66 MG/DL
LYMPHOCYTES # BLD AUTO: 1.12 K/UL (ref 1–4.8)
LYMPHOCYTES # BLD AUTO: 1.12 K/UL (ref 1–4.8)
LYMPHOCYTES NFR BLD: 23.3 % (ref 22–41)
LYMPHOCYTES NFR BLD: 24.1 % (ref 22–41)
MCH RBC QN AUTO: 31.6 PG (ref 27–33)
MCH RBC QN AUTO: 32.3 PG (ref 27–33)
MCHC RBC AUTO-ENTMCNC: 33.3 G/DL (ref 33.7–35.3)
MCHC RBC AUTO-ENTMCNC: 34.3 G/DL (ref 33.7–35.3)
MCV RBC AUTO: 94.1 FL (ref 81.4–97.8)
MCV RBC AUTO: 94.9 FL (ref 81.4–97.8)
MONOCYTES # BLD AUTO: 0.5 K/UL (ref 0–0.85)
MONOCYTES # BLD AUTO: 0.51 K/UL (ref 0–0.85)
MONOCYTES NFR BLD AUTO: 10.6 % (ref 0–13.4)
MONOCYTES NFR BLD AUTO: 10.8 % (ref 0–13.4)
NEUTROPHILS # BLD AUTO: 2.68 K/UL (ref 1.82–7.42)
NEUTROPHILS # BLD AUTO: 2.89 K/UL (ref 1.82–7.42)
NEUTROPHILS NFR BLD: 57.6 % (ref 44–72)
NEUTROPHILS NFR BLD: 60.3 % (ref 44–72)
NRBC # BLD AUTO: 0 K/UL
NRBC # BLD AUTO: 0 K/UL
NRBC BLD-RTO: 0 /100 WBC
NRBC BLD-RTO: 0 /100 WBC
PLATELET # BLD AUTO: 172 K/UL (ref 164–446)
PLATELET # BLD AUTO: 178 K/UL (ref 164–446)
PMV BLD AUTO: 10.1 FL (ref 9–12.9)
PMV BLD AUTO: 10.2 FL (ref 9–12.9)
POTASSIUM SERPL-SCNC: 4.1 MMOL/L (ref 3.6–5.5)
POTASSIUM SERPL-SCNC: 4.1 MMOL/L (ref 3.6–5.5)
PROT SERPL-MCNC: 6.6 G/DL (ref 6–8.2)
PROT SERPL-MCNC: 6.6 G/DL (ref 6–8.2)
RBC # BLD AUTO: 4.89 M/UL (ref 4.7–6.1)
RBC # BLD AUTO: 4.94 M/UL (ref 4.7–6.1)
SODIUM SERPL-SCNC: 140 MMOL/L (ref 135–145)
SODIUM SERPL-SCNC: 141 MMOL/L (ref 135–145)
TRIGL SERPL-MCNC: 65 MG/DL (ref 0–149)
WBC # BLD AUTO: 4.7 K/UL (ref 4.8–10.8)
WBC # BLD AUTO: 4.8 K/UL (ref 4.8–10.8)

## 2022-12-21 PROCEDURE — 80053 COMPREHEN METABOLIC PANEL: CPT

## 2022-12-21 PROCEDURE — 83615 LACTATE (LD) (LDH) ENZYME: CPT

## 2022-12-21 PROCEDURE — 80061 LIPID PANEL: CPT

## 2022-12-21 PROCEDURE — 36415 COLL VENOUS BLD VENIPUNCTURE: CPT

## 2022-12-21 PROCEDURE — 85025 COMPLETE CBC W/AUTO DIFF WBC: CPT | Mod: 91

## 2022-12-21 PROCEDURE — 83036 HEMOGLOBIN GLYCOSYLATED A1C: CPT

## 2022-12-21 PROCEDURE — 80053 COMPREHEN METABOLIC PANEL: CPT | Mod: 91

## 2022-12-21 PROCEDURE — 85025 COMPLETE CBC W/AUTO DIFF WBC: CPT

## 2023-01-06 ENCOUNTER — HOSPITAL ENCOUNTER (OUTPATIENT)
Facility: MEDICAL CENTER | Age: 78
End: 2023-01-07
Attending: EMERGENCY MEDICINE | Admitting: STUDENT IN AN ORGANIZED HEALTH CARE EDUCATION/TRAINING PROGRAM
Payer: MEDICARE

## 2023-01-06 ENCOUNTER — APPOINTMENT (OUTPATIENT)
Dept: RADIOLOGY | Facility: MEDICAL CENTER | Age: 78
End: 2023-01-06
Attending: EMERGENCY MEDICINE
Payer: MEDICARE

## 2023-01-06 DIAGNOSIS — R09.02 HYPOXIA: ICD-10-CM

## 2023-01-06 DIAGNOSIS — R11.2 NAUSEA AND VOMITING, UNSPECIFIED VOMITING TYPE: ICD-10-CM

## 2023-01-06 DIAGNOSIS — N20.1 URETEROLITHIASIS: ICD-10-CM

## 2023-01-06 DIAGNOSIS — Z71.6 ENCOUNTER FOR TOBACCO USE CESSATION COUNSELING: ICD-10-CM

## 2023-01-06 DIAGNOSIS — R10.9 FLANK PAIN: ICD-10-CM

## 2023-01-06 DIAGNOSIS — N20.0 KIDNEY STONE: Primary | ICD-10-CM

## 2023-01-06 DIAGNOSIS — E86.0 DEHYDRATION: ICD-10-CM

## 2023-01-06 LAB
ALBUMIN SERPL BCP-MCNC: 4.2 G/DL (ref 3.2–4.9)
ALBUMIN/GLOB SERPL: 1.6 G/DL
ALP SERPL-CCNC: 89 U/L (ref 30–99)
ALT SERPL-CCNC: 16 U/L (ref 2–50)
ANION GAP SERPL CALC-SCNC: 11 MMOL/L (ref 7–16)
APPEARANCE UR: CLEAR
AST SERPL-CCNC: 22 U/L (ref 12–45)
BACTERIA #/AREA URNS HPF: NEGATIVE /HPF
BASOPHILS # BLD AUTO: 0.5 % (ref 0–1.8)
BASOPHILS # BLD: 0.04 K/UL (ref 0–0.12)
BILIRUB SERPL-MCNC: 0.7 MG/DL (ref 0.1–1.5)
BILIRUB UR QL STRIP.AUTO: NEGATIVE
BUN SERPL-MCNC: 19 MG/DL (ref 8–22)
CALCIUM ALBUM COR SERPL-MCNC: 9.1 MG/DL (ref 8.5–10.5)
CALCIUM SERPL-MCNC: 9.3 MG/DL (ref 8.5–10.5)
CHLORIDE SERPL-SCNC: 101 MMOL/L (ref 96–112)
CO2 SERPL-SCNC: 23 MMOL/L (ref 20–33)
COLOR UR: YELLOW
CREAT SERPL-MCNC: 1.09 MG/DL (ref 0.5–1.4)
EOSINOPHIL # BLD AUTO: 0.12 K/UL (ref 0–0.51)
EOSINOPHIL NFR BLD: 1.6 % (ref 0–6.9)
EPI CELLS #/AREA URNS HPF: ABNORMAL /HPF
ERYTHROCYTE [DISTWIDTH] IN BLOOD BY AUTOMATED COUNT: 42.5 FL (ref 35.9–50)
GFR SERPLBLD CREATININE-BSD FMLA CKD-EPI: 71 ML/MIN/1.73 M 2
GLOBULIN SER CALC-MCNC: 2.7 G/DL (ref 1.9–3.5)
GLUCOSE SERPL-MCNC: 113 MG/DL (ref 65–99)
GLUCOSE UR STRIP.AUTO-MCNC: NEGATIVE MG/DL
HCT VFR BLD AUTO: 45.2 % (ref 42–52)
HGB BLD-MCNC: 16.2 G/DL (ref 14–18)
HYALINE CASTS #/AREA URNS LPF: ABNORMAL /LPF
IMM GRANULOCYTES # BLD AUTO: 0.03 K/UL (ref 0–0.11)
IMM GRANULOCYTES NFR BLD AUTO: 0.4 % (ref 0–0.9)
KETONES UR STRIP.AUTO-MCNC: ABNORMAL MG/DL
LEUKOCYTE ESTERASE UR QL STRIP.AUTO: ABNORMAL
LIPASE SERPL-CCNC: 22 U/L (ref 11–82)
LYMPHOCYTES # BLD AUTO: 0.65 K/UL (ref 1–4.8)
LYMPHOCYTES NFR BLD: 8.6 % (ref 22–41)
MCH RBC QN AUTO: 32.7 PG (ref 27–33)
MCHC RBC AUTO-ENTMCNC: 35.8 G/DL (ref 33.7–35.3)
MCV RBC AUTO: 91.1 FL (ref 81.4–97.8)
MICRO URNS: ABNORMAL
MONOCYTES # BLD AUTO: 0.66 K/UL (ref 0–0.85)
MONOCYTES NFR BLD AUTO: 8.7 % (ref 0–13.4)
NEUTROPHILS # BLD AUTO: 6.08 K/UL (ref 1.82–7.42)
NEUTROPHILS NFR BLD: 80.2 % (ref 44–72)
NITRITE UR QL STRIP.AUTO: NEGATIVE
NRBC # BLD AUTO: 0 K/UL
NRBC BLD-RTO: 0 /100 WBC
PH UR STRIP.AUTO: 5 [PH] (ref 5–8)
PLATELET # BLD AUTO: 165 K/UL (ref 164–446)
PMV BLD AUTO: 9.9 FL (ref 9–12.9)
POTASSIUM SERPL-SCNC: 4 MMOL/L (ref 3.6–5.5)
PROT SERPL-MCNC: 6.9 G/DL (ref 6–8.2)
PROT UR QL STRIP: 30 MG/DL
RBC # BLD AUTO: 4.96 M/UL (ref 4.7–6.1)
RBC # URNS HPF: ABNORMAL /HPF
RBC UR QL AUTO: NEGATIVE
SODIUM SERPL-SCNC: 135 MMOL/L (ref 135–145)
SP GR UR STRIP.AUTO: 1.02
UROBILINOGEN UR STRIP.AUTO-MCNC: 0.2 MG/DL
WBC # BLD AUTO: 7.6 K/UL (ref 4.8–10.8)
WBC #/AREA URNS HPF: ABNORMAL /HPF

## 2023-01-06 PROCEDURE — 700105 HCHG RX REV CODE 258: Performed by: EMERGENCY MEDICINE

## 2023-01-06 PROCEDURE — 99222 1ST HOSP IP/OBS MODERATE 55: CPT | Mod: GC | Performed by: STUDENT IN AN ORGANIZED HEALTH CARE EDUCATION/TRAINING PROGRAM

## 2023-01-06 PROCEDURE — 36415 COLL VENOUS BLD VENIPUNCTURE: CPT

## 2023-01-06 PROCEDURE — 85025 COMPLETE CBC W/AUTO DIFF WBC: CPT

## 2023-01-06 PROCEDURE — A9270 NON-COVERED ITEM OR SERVICE: HCPCS | Performed by: STUDENT IN AN ORGANIZED HEALTH CARE EDUCATION/TRAINING PROGRAM

## 2023-01-06 PROCEDURE — 96375 TX/PRO/DX INJ NEW DRUG ADDON: CPT

## 2023-01-06 PROCEDURE — 81001 URINALYSIS AUTO W/SCOPE: CPT

## 2023-01-06 PROCEDURE — 74176 CT ABD & PELVIS W/O CONTRAST: CPT

## 2023-01-06 PROCEDURE — G0378 HOSPITAL OBSERVATION PER HR: HCPCS

## 2023-01-06 PROCEDURE — 99285 EMERGENCY DEPT VISIT HI MDM: CPT

## 2023-01-06 PROCEDURE — 83690 ASSAY OF LIPASE: CPT

## 2023-01-06 PROCEDURE — 700111 HCHG RX REV CODE 636 W/ 250 OVERRIDE (IP): Performed by: EMERGENCY MEDICINE

## 2023-01-06 PROCEDURE — 96374 THER/PROPH/DIAG INJ IV PUSH: CPT

## 2023-01-06 PROCEDURE — 700102 HCHG RX REV CODE 250 W/ 637 OVERRIDE(OP): Performed by: STUDENT IN AN ORGANIZED HEALTH CARE EDUCATION/TRAINING PROGRAM

## 2023-01-06 PROCEDURE — 80053 COMPREHEN METABOLIC PANEL: CPT

## 2023-01-06 PROCEDURE — 700105 HCHG RX REV CODE 258: Performed by: STUDENT IN AN ORGANIZED HEALTH CARE EDUCATION/TRAINING PROGRAM

## 2023-01-06 RX ORDER — AMOXICILLIN 250 MG
2 CAPSULE ORAL 2 TIMES DAILY
Status: DISCONTINUED | OUTPATIENT
Start: 2023-01-06 | End: 2023-01-07 | Stop reason: HOSPADM

## 2023-01-06 RX ORDER — SODIUM CHLORIDE 9 MG/ML
1000 INJECTION, SOLUTION INTRAVENOUS ONCE
Status: COMPLETED | OUTPATIENT
Start: 2023-01-06 | End: 2023-01-06

## 2023-01-06 RX ORDER — SILDENAFIL 25 MG/1
12.5 TABLET, FILM COATED ORAL
Status: SHIPPED | COMMUNITY
End: 2023-04-10 | Stop reason: SDUPTHER

## 2023-01-06 RX ORDER — ONDANSETRON 2 MG/ML
4 INJECTION INTRAMUSCULAR; INTRAVENOUS EVERY 4 HOURS PRN
Status: DISCONTINUED | OUTPATIENT
Start: 2023-01-06 | End: 2023-01-07 | Stop reason: HOSPADM

## 2023-01-06 RX ORDER — BISACODYL 10 MG
10 SUPPOSITORY, RECTAL RECTAL
Status: DISCONTINUED | OUTPATIENT
Start: 2023-01-06 | End: 2023-01-07 | Stop reason: HOSPADM

## 2023-01-06 RX ORDER — POLYETHYLENE GLYCOL 3350 17 G/17G
1 POWDER, FOR SOLUTION ORAL
Status: DISCONTINUED | OUTPATIENT
Start: 2023-01-06 | End: 2023-01-07 | Stop reason: HOSPADM

## 2023-01-06 RX ORDER — TAMSULOSIN HYDROCHLORIDE 0.4 MG/1
0.8 CAPSULE ORAL DAILY
Status: DISCONTINUED | OUTPATIENT
Start: 2023-01-07 | End: 2023-01-07 | Stop reason: HOSPADM

## 2023-01-06 RX ORDER — HYDROMORPHONE HYDROCHLORIDE 1 MG/ML
0.5 INJECTION, SOLUTION INTRAMUSCULAR; INTRAVENOUS; SUBCUTANEOUS ONCE
Status: COMPLETED | OUTPATIENT
Start: 2023-01-06 | End: 2023-01-06

## 2023-01-06 RX ORDER — MORPHINE SULFATE 4 MG/ML
4 INJECTION INTRAVENOUS EVERY 4 HOURS PRN
Status: DISCONTINUED | OUTPATIENT
Start: 2023-01-06 | End: 2023-01-07

## 2023-01-06 RX ORDER — ONDANSETRON 4 MG/1
4 TABLET, ORALLY DISINTEGRATING ORAL EVERY 4 HOURS PRN
Status: DISCONTINUED | OUTPATIENT
Start: 2023-01-06 | End: 2023-01-07 | Stop reason: HOSPADM

## 2023-01-06 RX ORDER — SODIUM CHLORIDE, SODIUM LACTATE, POTASSIUM CHLORIDE, CALCIUM CHLORIDE 600; 310; 30; 20 MG/100ML; MG/100ML; MG/100ML; MG/100ML
INJECTION, SOLUTION INTRAVENOUS CONTINUOUS
Status: DISCONTINUED | OUTPATIENT
Start: 2023-01-06 | End: 2023-01-07

## 2023-01-06 RX ORDER — LABETALOL HYDROCHLORIDE 5 MG/ML
10 INJECTION, SOLUTION INTRAVENOUS EVERY 4 HOURS PRN
Status: DISCONTINUED | OUTPATIENT
Start: 2023-01-06 | End: 2023-01-07 | Stop reason: HOSPADM

## 2023-01-06 RX ORDER — KETOROLAC TROMETHAMINE 30 MG/ML
15 INJECTION, SOLUTION INTRAMUSCULAR; INTRAVENOUS ONCE
Status: COMPLETED | OUTPATIENT
Start: 2023-01-06 | End: 2023-01-06

## 2023-01-06 RX ORDER — ACETAMINOPHEN 325 MG/1
650 TABLET ORAL EVERY 6 HOURS PRN
Status: DISCONTINUED | OUTPATIENT
Start: 2023-01-06 | End: 2023-01-07 | Stop reason: HOSPADM

## 2023-01-06 RX ORDER — ALBUTEROL SULFATE 90 UG/1
2 AEROSOL, METERED RESPIRATORY (INHALATION)
Status: DISCONTINUED | OUTPATIENT
Start: 2023-01-06 | End: 2023-01-07 | Stop reason: HOSPADM

## 2023-01-06 RX ORDER — ATORVASTATIN CALCIUM 40 MG/1
40 TABLET, FILM COATED ORAL EVERY EVENING
Status: DISCONTINUED | OUTPATIENT
Start: 2023-01-06 | End: 2023-01-07 | Stop reason: HOSPADM

## 2023-01-06 RX ORDER — GUAIFENESIN/DEXTROMETHORPHAN 100-10MG/5
10 SYRUP ORAL EVERY 6 HOURS PRN
Status: DISCONTINUED | OUTPATIENT
Start: 2023-01-06 | End: 2023-01-07 | Stop reason: HOSPADM

## 2023-01-06 RX ORDER — ONDANSETRON 2 MG/ML
4 INJECTION INTRAMUSCULAR; INTRAVENOUS ONCE
Status: COMPLETED | OUTPATIENT
Start: 2023-01-06 | End: 2023-01-06

## 2023-01-06 RX ORDER — MORPHINE SULFATE 4 MG/ML
4 INJECTION INTRAVENOUS ONCE
Status: COMPLETED | OUTPATIENT
Start: 2023-01-06 | End: 2023-01-06

## 2023-01-06 RX ADMIN — ONDANSETRON 4 MG: 2 INJECTION INTRAMUSCULAR; INTRAVENOUS at 19:44

## 2023-01-06 RX ADMIN — HYDROMORPHONE HYDROCHLORIDE 0.5 MG: 1 INJECTION, SOLUTION INTRAMUSCULAR; INTRAVENOUS; SUBCUTANEOUS at 23:22

## 2023-01-06 RX ADMIN — SODIUM CHLORIDE 1000 ML: 9 INJECTION, SOLUTION INTRAVENOUS at 19:55

## 2023-01-06 RX ADMIN — ATORVASTATIN CALCIUM 40 MG: 40 TABLET, FILM COATED ORAL at 23:21

## 2023-01-06 RX ADMIN — MORPHINE SULFATE 4 MG: 4 INJECTION, SOLUTION INTRAMUSCULAR; INTRAVENOUS at 19:51

## 2023-01-06 RX ADMIN — SODIUM CHLORIDE, POTASSIUM CHLORIDE, SODIUM LACTATE AND CALCIUM CHLORIDE 1000 ML: 600; 310; 30; 20 INJECTION, SOLUTION INTRAVENOUS at 23:28

## 2023-01-06 RX ADMIN — KETOROLAC TROMETHAMINE 15 MG: 30 INJECTION, SOLUTION INTRAMUSCULAR; INTRAVENOUS at 19:52

## 2023-01-06 ASSESSMENT — FIBROSIS 4 INDEX: FIB4 SCORE: 1.98

## 2023-01-07 ENCOUNTER — APPOINTMENT (OUTPATIENT)
Dept: RADIOLOGY | Facility: MEDICAL CENTER | Age: 78
End: 2023-01-07
Attending: STUDENT IN AN ORGANIZED HEALTH CARE EDUCATION/TRAINING PROGRAM
Payer: MEDICARE

## 2023-01-07 ENCOUNTER — ANESTHESIA EVENT (OUTPATIENT)
Dept: SURGERY | Facility: MEDICAL CENTER | Age: 78
End: 2023-01-07
Payer: MEDICARE

## 2023-01-07 ENCOUNTER — ANESTHESIA (OUTPATIENT)
Dept: SURGERY | Facility: MEDICAL CENTER | Age: 78
End: 2023-01-07
Payer: MEDICARE

## 2023-01-07 ENCOUNTER — PHARMACY VISIT (OUTPATIENT)
Dept: PHARMACY | Facility: MEDICAL CENTER | Age: 78
End: 2023-01-07
Payer: MEDICARE

## 2023-01-07 VITALS
SYSTOLIC BLOOD PRESSURE: 118 MMHG | RESPIRATION RATE: 16 BRPM | HEART RATE: 82 BPM | TEMPERATURE: 99 F | DIASTOLIC BLOOD PRESSURE: 66 MMHG | OXYGEN SATURATION: 91 % | BODY MASS INDEX: 29.24 KG/M2 | HEIGHT: 67 IN | WEIGHT: 186.29 LBS

## 2023-01-07 LAB
BASOPHILS # BLD AUTO: 0.1 % (ref 0–1.8)
BASOPHILS # BLD: 0.01 K/UL (ref 0–0.12)
EKG IMPRESSION: NORMAL
EOSINOPHIL # BLD AUTO: 0.05 K/UL (ref 0–0.51)
EOSINOPHIL NFR BLD: 0.7 % (ref 0–6.9)
ERYTHROCYTE [DISTWIDTH] IN BLOOD BY AUTOMATED COUNT: 42.8 FL (ref 35.9–50)
HCT VFR BLD AUTO: 42.3 % (ref 42–52)
HGB BLD-MCNC: 14.6 G/DL (ref 14–18)
IMM GRANULOCYTES # BLD AUTO: 0.04 K/UL (ref 0–0.11)
IMM GRANULOCYTES NFR BLD AUTO: 0.6 % (ref 0–0.9)
LYMPHOCYTES # BLD AUTO: 0.96 K/UL (ref 1–4.8)
LYMPHOCYTES NFR BLD: 14 % (ref 22–41)
MCH RBC QN AUTO: 31.8 PG (ref 27–33)
MCHC RBC AUTO-ENTMCNC: 34.5 G/DL (ref 33.7–35.3)
MCV RBC AUTO: 92.2 FL (ref 81.4–97.8)
MONOCYTES # BLD AUTO: 0.63 K/UL (ref 0–0.85)
MONOCYTES NFR BLD AUTO: 9.2 % (ref 0–13.4)
NEUTROPHILS # BLD AUTO: 5.17 K/UL (ref 1.82–7.42)
NEUTROPHILS NFR BLD: 75.4 % (ref 44–72)
NRBC # BLD AUTO: 0 K/UL
NRBC BLD-RTO: 0 /100 WBC
PLATELET # BLD AUTO: 133 K/UL (ref 164–446)
PMV BLD AUTO: 9.6 FL (ref 9–12.9)
RBC # BLD AUTO: 4.59 M/UL (ref 4.7–6.1)
WBC # BLD AUTO: 6.9 K/UL (ref 4.8–10.8)

## 2023-01-07 PROCEDURE — 160035 HCHG PACU - 1ST 60 MINS PHASE I: Performed by: STUDENT IN AN ORGANIZED HEALTH CARE EDUCATION/TRAINING PROGRAM

## 2023-01-07 PROCEDURE — 700102 HCHG RX REV CODE 250 W/ 637 OVERRIDE(OP): Performed by: STUDENT IN AN ORGANIZED HEALTH CARE EDUCATION/TRAINING PROGRAM

## 2023-01-07 PROCEDURE — RXMED WILLOW AMBULATORY MEDICATION CHARGE: Performed by: STUDENT IN AN ORGANIZED HEALTH CARE EDUCATION/TRAINING PROGRAM

## 2023-01-07 PROCEDURE — 00918 ANES TRURL PX URTRL CAL RMVL: CPT | Performed by: ANESTHESIOLOGY

## 2023-01-07 PROCEDURE — 700102 HCHG RX REV CODE 250 W/ 637 OVERRIDE(OP): Performed by: ANESTHESIOLOGY

## 2023-01-07 PROCEDURE — 93010 ELECTROCARDIOGRAM REPORT: CPT | Performed by: INTERNAL MEDICINE

## 2023-01-07 PROCEDURE — G0378 HOSPITAL OBSERVATION PER HR: HCPCS

## 2023-01-07 PROCEDURE — 99140 ANES COMP EMERGENCY COND: CPT | Performed by: ANESTHESIOLOGY

## 2023-01-07 PROCEDURE — C1747 HCHG SHELL REV 278 C1747: HCPCS | Performed by: STUDENT IN AN ORGANIZED HEALTH CARE EDUCATION/TRAINING PROGRAM

## 2023-01-07 PROCEDURE — 700101 HCHG RX REV CODE 250: Performed by: ANESTHESIOLOGY

## 2023-01-07 PROCEDURE — 160039 HCHG SURGERY MINUTES - EA ADDL 1 MIN LEVEL 3: Performed by: STUDENT IN AN ORGANIZED HEALTH CARE EDUCATION/TRAINING PROGRAM

## 2023-01-07 PROCEDURE — 160028 HCHG SURGERY MINUTES - 1ST 30 MINS LEVEL 3: Performed by: STUDENT IN AN ORGANIZED HEALTH CARE EDUCATION/TRAINING PROGRAM

## 2023-01-07 PROCEDURE — 85025 COMPLETE CBC W/AUTO DIFF WBC: CPT

## 2023-01-07 PROCEDURE — 99239 HOSP IP/OBS DSCHRG MGMT >30: CPT | Performed by: STUDENT IN AN ORGANIZED HEALTH CARE EDUCATION/TRAINING PROGRAM

## 2023-01-07 PROCEDURE — C1769 GUIDE WIRE: HCPCS | Performed by: STUDENT IN AN ORGANIZED HEALTH CARE EDUCATION/TRAINING PROGRAM

## 2023-01-07 PROCEDURE — 700111 HCHG RX REV CODE 636 W/ 250 OVERRIDE (IP): Performed by: ANESTHESIOLOGY

## 2023-01-07 PROCEDURE — A9270 NON-COVERED ITEM OR SERVICE: HCPCS | Performed by: STUDENT IN AN ORGANIZED HEALTH CARE EDUCATION/TRAINING PROGRAM

## 2023-01-07 PROCEDURE — 160002 HCHG RECOVERY MINUTES (STAT): Performed by: STUDENT IN AN ORGANIZED HEALTH CARE EDUCATION/TRAINING PROGRAM

## 2023-01-07 PROCEDURE — 96376 TX/PRO/DX INJ SAME DRUG ADON: CPT

## 2023-01-07 PROCEDURE — 93005 ELECTROCARDIOGRAM TRACING: CPT | Performed by: STUDENT IN AN ORGANIZED HEALTH CARE EDUCATION/TRAINING PROGRAM

## 2023-01-07 PROCEDURE — C2617 STENT, NON-COR, TEM W/O DEL: HCPCS | Performed by: STUDENT IN AN ORGANIZED HEALTH CARE EDUCATION/TRAINING PROGRAM

## 2023-01-07 PROCEDURE — 99100 ANES PT EXTEME AGE<1 YR&>70: CPT | Performed by: ANESTHESIOLOGY

## 2023-01-07 PROCEDURE — A9270 NON-COVERED ITEM OR SERVICE: HCPCS | Performed by: ANESTHESIOLOGY

## 2023-01-07 PROCEDURE — 160009 HCHG ANES TIME/MIN: Performed by: STUDENT IN AN ORGANIZED HEALTH CARE EDUCATION/TRAINING PROGRAM

## 2023-01-07 PROCEDURE — 700111 HCHG RX REV CODE 636 W/ 250 OVERRIDE (IP): Performed by: STUDENT IN AN ORGANIZED HEALTH CARE EDUCATION/TRAINING PROGRAM

## 2023-01-07 PROCEDURE — 160048 HCHG OR STATISTICAL LEVEL 1-5: Performed by: STUDENT IN AN ORGANIZED HEALTH CARE EDUCATION/TRAINING PROGRAM

## 2023-01-07 DEVICE — STENT UROLOGICAL POLARIS 6X26  ULTRA: Type: IMPLANTABLE DEVICE | Site: URETER | Status: FUNCTIONAL

## 2023-01-07 RX ORDER — OXYCODONE HCL 5 MG/5 ML
10 SOLUTION, ORAL ORAL
Status: COMPLETED | OUTPATIENT
Start: 2023-01-07 | End: 2023-01-07

## 2023-01-07 RX ORDER — DIPHENHYDRAMINE HYDROCHLORIDE 50 MG/ML
12.5 INJECTION INTRAMUSCULAR; INTRAVENOUS
Status: DISCONTINUED | OUTPATIENT
Start: 2023-01-07 | End: 2023-01-07 | Stop reason: HOSPADM

## 2023-01-07 RX ORDER — MORPHINE SULFATE 4 MG/ML
2 INJECTION INTRAVENOUS
Status: DISCONTINUED | OUTPATIENT
Start: 2023-01-07 | End: 2023-01-07 | Stop reason: HOSPADM

## 2023-01-07 RX ORDER — LIDOCAINE HYDROCHLORIDE 20 MG/ML
INJECTION, SOLUTION EPIDURAL; INFILTRATION; INTRACAUDAL; PERINEURAL PRN
Status: DISCONTINUED | OUTPATIENT
Start: 2023-01-07 | End: 2023-01-07 | Stop reason: SURG

## 2023-01-07 RX ORDER — OXYCODONE HYDROCHLORIDE 5 MG/1
2.5 TABLET ORAL
Status: DISCONTINUED | OUTPATIENT
Start: 2023-01-07 | End: 2023-01-07 | Stop reason: HOSPADM

## 2023-01-07 RX ORDER — OXYCODONE HYDROCHLORIDE AND ACETAMINOPHEN 5; 325 MG/1; MG/1
1 TABLET ORAL EVERY 8 HOURS PRN
Qty: 9 TABLET | Refills: 0 | Status: SHIPPED | OUTPATIENT
Start: 2023-01-07 | End: 2023-01-10

## 2023-01-07 RX ORDER — SODIUM CHLORIDE, SODIUM LACTATE, POTASSIUM CHLORIDE, CALCIUM CHLORIDE 600; 310; 30; 20 MG/100ML; MG/100ML; MG/100ML; MG/100ML
INJECTION, SOLUTION INTRAVENOUS CONTINUOUS
Status: DISCONTINUED | OUTPATIENT
Start: 2023-01-07 | End: 2023-01-07 | Stop reason: HOSPADM

## 2023-01-07 RX ORDER — OXYCODONE HCL 5 MG/5 ML
5 SOLUTION, ORAL ORAL
Status: COMPLETED | OUTPATIENT
Start: 2023-01-07 | End: 2023-01-07

## 2023-01-07 RX ORDER — PHENAZOPYRIDINE HYDROCHLORIDE 200 MG/1
200 TABLET, FILM COATED ORAL 3 TIMES DAILY PRN
Qty: 6 TABLET | Refills: 0 | Status: SHIPPED | OUTPATIENT
Start: 2023-01-07 | End: 2023-08-03

## 2023-01-07 RX ORDER — OXYCODONE HYDROCHLORIDE 5 MG/1
5 TABLET ORAL
Status: DISCONTINUED | OUTPATIENT
Start: 2023-01-07 | End: 2023-01-07 | Stop reason: HOSPADM

## 2023-01-07 RX ORDER — HALOPERIDOL 5 MG/ML
1 INJECTION INTRAMUSCULAR
Status: DISCONTINUED | OUTPATIENT
Start: 2023-01-07 | End: 2023-01-07 | Stop reason: HOSPADM

## 2023-01-07 RX ORDER — MEPERIDINE HYDROCHLORIDE 25 MG/ML
6.25 INJECTION INTRAMUSCULAR; INTRAVENOUS; SUBCUTANEOUS
Status: DISCONTINUED | OUTPATIENT
Start: 2023-01-07 | End: 2023-01-07 | Stop reason: HOSPADM

## 2023-01-07 RX ORDER — CEFAZOLIN SODIUM 1 G/3ML
INJECTION, POWDER, FOR SOLUTION INTRAMUSCULAR; INTRAVENOUS PRN
Status: DISCONTINUED | OUTPATIENT
Start: 2023-01-07 | End: 2023-01-07 | Stop reason: SURG

## 2023-01-07 RX ORDER — ONDANSETRON 2 MG/ML
4 INJECTION INTRAMUSCULAR; INTRAVENOUS
Status: DISCONTINUED | OUTPATIENT
Start: 2023-01-07 | End: 2023-01-07 | Stop reason: HOSPADM

## 2023-01-07 RX ADMIN — OXYCODONE 2.5 MG: 5 TABLET ORAL at 08:51

## 2023-01-07 RX ADMIN — TAMSULOSIN HYDROCHLORIDE 0.8 MG: 0.4 CAPSULE ORAL at 05:38

## 2023-01-07 RX ADMIN — PROPOFOL 200 MG: 10 INJECTION, EMULSION INTRAVENOUS at 12:11

## 2023-01-07 RX ADMIN — FENTANYL CITRATE 100 MCG: 50 INJECTION, SOLUTION INTRAMUSCULAR; INTRAVENOUS at 12:11

## 2023-01-07 RX ADMIN — LIDOCAINE HYDROCHLORIDE 100 MG: 20 INJECTION, SOLUTION EPIDURAL; INFILTRATION; INTRACAUDAL at 12:11

## 2023-01-07 RX ADMIN — CEFAZOLIN 2 G: 330 INJECTION, POWDER, FOR SOLUTION INTRAMUSCULAR; INTRAVENOUS at 12:11

## 2023-01-07 RX ADMIN — OXYCODONE HYDROCHLORIDE 10 MG: 5 SOLUTION ORAL at 13:05

## 2023-01-07 RX ADMIN — TIOTROPIUM BROMIDE INHALATION SPRAY 5 MCG: 3.12 SPRAY, METERED RESPIRATORY (INHALATION) at 05:44

## 2023-01-07 RX ADMIN — ONDANSETRON 4 MG: 2 INJECTION INTRAMUSCULAR; INTRAVENOUS at 08:56

## 2023-01-07 ASSESSMENT — LIFESTYLE VARIABLES
EVER HAD A DRINK FIRST THING IN THE MORNING TO STEADY YOUR NERVES TO GET RID OF A HANGOVER: NO
HAVE YOU EVER FELT YOU SHOULD CUT DOWN ON YOUR DRINKING: NO
TOTAL SCORE: 0
ALCOHOL_USE: NO
AVERAGE NUMBER OF DAYS PER WEEK YOU HAVE A DRINK CONTAINING ALCOHOL: 0
CONSUMPTION TOTAL: NEGATIVE
ON A TYPICAL DAY WHEN YOU DRINK ALCOHOL HOW MANY DRINKS DO YOU HAVE: 0
EVER FELT BAD OR GUILTY ABOUT YOUR DRINKING: NO
HOW MANY TIMES IN THE PAST YEAR HAVE YOU HAD 5 OR MORE DRINKS IN A DAY: 0
HAVE PEOPLE ANNOYED YOU BY CRITICIZING YOUR DRINKING: NO

## 2023-01-07 ASSESSMENT — PAIN DESCRIPTION - PAIN TYPE
TYPE: SURGICAL PAIN
TYPE: SURGICAL PAIN
TYPE: ACUTE PAIN
TYPE: SURGICAL PAIN
TYPE: ACUTE PAIN
TYPE: ACUTE PAIN
TYPE: SURGICAL PAIN

## 2023-01-07 ASSESSMENT — PATIENT HEALTH QUESTIONNAIRE - PHQ9
SUM OF ALL RESPONSES TO PHQ9 QUESTIONS 1 AND 2: 0
1. LITTLE INTEREST OR PLEASURE IN DOING THINGS: NOT AT ALL
2. FEELING DOWN, DEPRESSED, IRRITABLE, OR HOPELESS: NOT AT ALL

## 2023-01-07 ASSESSMENT — FIBROSIS 4 INDEX: FIB4 SCORE: 2.5

## 2023-01-07 ASSESSMENT — PAIN SCALES - PAIN ASSESSMENT IN ADVANCED DEMENTIA (PAINAD)
BODYLANGUAGE: RELAXED
BREATHING: NORMAL
FACIALEXPRESSION: SMILING OR INEXPRESSIVE
TOTALSCORE: 0
CONSOLABILITY: NO NEED TO CONSOLE

## 2023-01-07 ASSESSMENT — PAIN SCALES - WONG BAKER: WONGBAKER_NUMERICALRESPONSE: DOESN'T HURT AT ALL

## 2023-01-07 NOTE — ANESTHESIA PROCEDURE NOTES
Airway    Date/Time: 1/7/2023 12:07 PM  Performed by: Yunior Escoto M.D.  Authorized by: Yunior Escoto M.D.     Location:  OR  Urgency:  Elective  Indications for Airway Management:  Anesthesia      Spontaneous Ventilation: absent    Sedation Level:  Deep  Preoxygenated: Yes    Final Airway Type:  Supraglottic airway  Final Supraglottic Airway:  Standard LMA    SGA Size:  4  Number of Attempts at Approach:  1

## 2023-01-07 NOTE — CONSULTS
[facility] [place] Consult/H&P Note    Primary Service: Medicine  Attending: Woody Brown M.D.  Patient's Name/MRN: Ronal Hair, [id number]    Admit Date:2023  Today's Date: 2023   Length of stay:  LOS: 0 days   Room #: T206/00      Reason for consult/chief complaint: Left ureteral stone    ID/HPI: Ronal Hair is a 77 y.o. male patient who presented to ED w/ left flank pain. Found to have left 9 mm ureteral stone on CT. Admitted for pain control.       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 No pertinent family history.      Social History:   Social History     Tobacco Use    Smoking status: Every Day     Packs/day: 2.00     Years: 54.00     Pack years: 108.00     Types: Cigarettes     Start date:      Last attempt to quit: 11/3/2020     Years since quittin.1    Smokeless tobacco: Never   Vaping Use    Vaping Use: Never used   Substance Use Topics    Alcohol use: Not Currently    Drug use: Never      Social History     Social History Narrative    Not on file        Allergies: he Patient has no known allergies.    Medications:   Medications Prior to Admission   Medication Sig Dispense Refill Last Dose    sildenafil citrate (VIAGRA) 25 MG Tab Take  "12.5 mg by mouth 1 time a day as needed for Erectile Dysfunction. 12.5 mg = 1/2 tablet   \"FEW DAYS AGO\" at PRN    Multiple Vitamins-Minerals (MULTIVITAMIN ADULTS 50+ PO) Take 1 Tablet by mouth every day.   1/6/2023 at AM    atorvastatin (LIPITOR) 40 MG Tab Take 1 Tablet by mouth every evening. 90 Tablet 3 1/6/2023 at AM    tamsulosin (FLOMAX) 0.4 MG capsule Take 2 Capsules by mouth every day. 180 Capsule 3 1/6/2023 at AM    tiotropium (SPIRIVA HANDIHALER) 18 MCG Cap Place 1 Capsule into inhaler and inhale every day. 30 Capsule 11 1/6/2023 at AM    albuterol 108 (90 Base) MCG/ACT Aero Soln inhalation aerosol ALBUTEROL SULFATE  (90 Base) MCG/ACT AERS   1/6/2023 at AFTERNOON    ibuprofen (MOTRIN) 400 MG Tab Take 800 mg by mouth every 8 hours as needed for Mild Pain.   1/6/2023 at AM         Review of Systems  As per HPI     Physical Exam  VITAL SIGNS: /56   Pulse 74   Temp 36.8 °C (98.3 °F) (Temporal)   Resp 18   Ht 1.702 m (5' 7\")   Wt 84.5 kg (186 lb 4.6 oz)   SpO2 96%   BMI 29.18 kg/m²   Gen: No acute distress  Card: Regular rate  Pulm: Breathing comfortably     Labs:  Recent Labs     01/06/23 1743 01/07/23  0119   WBC 7.6 6.9   RBC 4.96 4.59*   HEMOGLOBIN 16.2 14.6   HEMATOCRIT 45.2 42.3   MCV 91.1 92.2   MCH 32.7 31.8   MCHC 35.8* 34.5   RDW 42.5 42.8   PLATELETCT 165 133*   MPV 9.9 9.6     Recent Labs     01/06/23  1743   SODIUM 135   POTASSIUM 4.0   CHLORIDE 101   CO2 23   GLUCOSE 113*   BUN 19   CREATININE 1.09   CALCIUM 9.3         Glucose:  Recent Labs     01/06/23  1743   GLUCOSE 113*     Coags:  No results for input(s): INR in the last 72 hours.      Urinalysis:   Recent Labs     01/06/23 2120   COLORURINE Yellow   CLARITY Clear   SPECGRAVITY 1.025   PHURINE 5.0   GLUCOSEUR Negative   KETONES Trace*   NITRITE Negative   OCCULTBLOOD Negative   RBCURINE 2-5*   BACTERIA Negative   EPITHELCELL Few       Imaging:  CT-RENAL COLIC EVALUATION(A/P W/O)   Final Result         1.  Bilateral " nephrolithiasis with a 8.6 mm left mid ureteral stone resulting in left urinary outflow tract obstructive changes.   2.  Fat-containing umbilical hernia   3.  Atherosclerosis and atherosclerotic coronary artery disease      DX-CYSTO FLUORO > 1 HOUR    (Results Pending)        Assessment/Recommendation   77 y.o. male admitted w/ left flank pain in setting of left 9 mm ureteral stone. Risks and benefits of left CULTS were discussed and patient agreed to proceed.    To OR for left CULTS

## 2023-01-07 NOTE — ANESTHESIA PREPROCEDURE EVALUATION
Case: [id number] Date/Time: 01/07/23 1058    Procedures:       CYSTOSCOPY, WITH URETERAL STENT INSERTION (Left)      LITHOTRIPSY, USING LASER (Left)      URETEROSCOPY (Left)    Location: [facility] / SURGERY [facility]    Surgeons: Addison Seymour M.D.          Relevant Problems   PULMONARY   (positive) COPD (chronic obstructive pulmonary disease) (HCC)      CARDIAC   (positive) Peripheral artery disease (HCC)       Physical Exam    Airway   Mallampati: II  TM distance: >3 FB  Neck ROM: full       Cardiovascular - normal exam  Rhythm: regular  Rate: normal  (-) murmur     Dental - normal exam           Pulmonary - normal exam  Breath sounds clear to auscultation     Abdominal    Neurological - normal exam                 Anesthesia Plan    ASA 2- EMERGENT   ASA physical status emergent criteria: sepsis    Plan - general       Airway plan will be LMA          Induction: intravenous    Postoperative Plan: Postoperative administration of opioids is intended.    Pertinent diagnostic labs and testing reviewed    Informed Consent:    Anesthetic plan and risks discussed with patient.    Use of blood products discussed with: patient whom consented to blood products.

## 2023-01-07 NOTE — PROGRESS NOTES
4 Eyes Skin Assessment Completed by DREW Sullivan and DREW Espinoza.    Head WDL  Ears WDL  Nose WDL  Mouth WDL  Neck WDL  Breast/Chest WDL  Shoulder Blades WDL  Spine WDL  (R) Arm/Elbow/Hand WDL  (L) Arm/Elbow/Hand WDL  Abdomen WDL  Groin WDL  Scrotum/Coccyx/Buttocks WDL  (R) Leg WDL  (L) Leg WDL  (R) Heel/Foot/Toe WDL  (L) Heel/Foot/Toe WDL          Devices In Places Nasal Cannula      Interventions In Place NC W/Ear Foams    Possible Skin Injury No    Pictures Uploaded Into Epic N/A  Wound Consult Placed N/A  RN Wound Prevention Protocol Ordered No

## 2023-01-07 NOTE — ED NOTES
Pt moved from ER lobby to pt room via wheelchair. Pt asked to dress in gown. BP cuff and pulse ox attached to cardiac monitor. Chart up for ERP.

## 2023-01-07 NOTE — ANESTHESIA POSTPROCEDURE EVALUATION
Patient: Ronal Hair    Procedure Summary     Date: 01/07/23 Room / Location: [place] [address] / SURGERY [facility]    Anesthesia Start: 1201 Anesthesia Stop: 1241    Procedures:       CYSTOSCOPY, WITH URETERAL STENT INSERTION (Left: Ureter)      LITHOTRIPSY, USING LASER (Left: Ureter)      URETEROSCOPY (Left: Ureter) Diagnosis: (Left ureteral stone)    Surgeons: Addison Seymour M.D. Responsible Provider: Yunior Escoto M.D.    Anesthesia Type: general ASA Status: 2 - Emergent          Final Anesthesia Type: general  Last vitals  BP   Blood Pressure : 129/60    Temp   36.2 °C (97.1 °F)    Pulse   78   Resp   15    SpO2   96 %      Anesthesia Post Evaluation    Patient location during evaluation: PACU  Patient participation: complete - patient participated  Level of consciousness: awake and alert    Airway patency: patent  Anesthetic complications: no  Cardiovascular status: hemodynamically stable  Respiratory status: acceptable  Hydration status: euvolemic    PONV: none          There were no known notable events for this encounter.     Nurse Pain Score: 0 (NPRS)

## 2023-01-07 NOTE — DISCHARGE PLANNING
"HTH/SCP TCN chart review completed. Collaborated with MD and RASHAAD post meeting with the pt. The most current review of medical record, knowledge of pt's PLOF and social support, LACE+ score of 74, no 6 clicks score.    Pt seen at bedside. Introduced TCN program. Provided education regarding post acute levels of care. Discussed HTH/SCP plan benefits (Meds to Beds, medical uber and [facility] transitional care). Pt verbalizes understanding.     Patient reports being up to the bathroom, denies mobility concerns.  Denies transportation or food concerns.  Reports won't use DME for O2 if he is connected to it.  Will only use portable one.  Per discussion with MD, hoping to d/c with no O2, as weaning currently.  Per MD Sanderson's note on 1/6/2023 at 10:38, \"Pulse Oximetry: 91 %, O2 Delivery Device: None - Room Air\" Rosalio was utilizing O2 when speaking to TCN, as he was trying to sleep.  Patient reports he does not use home O2 to sleep.  Patient refusing choice from this TCN, when asked what if he has hypoxic issues, reports he won't.  Explained purpose of DME is to prevent readmission, patient reports he won't need to be readmitted as he usually \"feels like (he's) 27 years old.\"  This is consistent with MD Medeiros's note from 1/6 at 7:43 PM, \"I discussed that we would likely need to have him on home oxygen he refused at this time.\"  Additionally, per chart review, patient has a past history of refusing home O2.    No choice provided today, secondary to refusal of DME and no needs for services.  TCN will continue to follow and collaborate with discharge planning team as additional post acute needs arise. Thank you.     Completed today:  Choice obtained: DME O2 refused by patient   [facility] referral sent    "

## 2023-01-07 NOTE — H&P
[facility] MEDICINE HISTORY AND PHYSICAL     PATIENT ID:  NAME:  Ronal Hair  MRN:               [id number]  YOB: 1945    Date of Admission: 1/6/2023     Attending: Elizabeth Hilario MD    Resident: Hai Sanderson MD    Primary Care Physician:  Shakira Henriquez M.D.    CC:  Flank and testicular pain    HPI: Ronal Hair is a 77 y.o. male who presented with acute onset L sided abdominal and testicular pain with associated difficulty urinating starting this morning.  Was in his usual state of health, denies injuries or changes today, was asymptomatic prior to sudden onset severe L abdominal pain that started around 11:00 and progressively worsened throughout the day.  Reports nausea, vomiting, severe pain, urinary retention.  Has never had a kidney stone in the past.    ED Course: In the ED, CT renal demonstrated ~9mm stone in the L ureter just outside kidney.  Urology consulted and plan for lithotomy tomorrow am, requesting [facility] admit.    REVIEW OF SYSTEMS:   Ten systems reviewed and were negative except as noted in the HPI.                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    SOCIAL HISTORY:   Pt lives in home with girlfriend.  Smoking Active  tobacco use for the last 57 years. >100 pack-year smoking history, denies interest in quitting. Tried chantix from PCP, but this made him feel worse so he d/c'd it  Etoh use Denies  Drug use Denies    DIET:   Orders Placed This Encounter   Procedures    Diet NPO Restrict to: Sips with Medications     Standing Status:   Standing     Number of Occurrences:   8     Order Specific Question:   Diet NPO Restrict to:     Answer:   Sips with Medications [3]       ALLERGIES:  No Known Allergies    OUTPATIENT MEDICATIONS:    Current Facility-Administered Medications:     albuterol inhaler 2 Puff, 2 Puff, Inhalation, Q4H PRN (RT), Rustam Sanderson M.D.    atorvastatin (LIPITOR) tablet 40 mg, 40 mg, Oral, Q EVENING, Rustam Sanderson M.D.    [START ON 1/7/2023] tamsulosin (FLOMAX) capsule 0.8 mg, 0.8 mg, Oral, DAILY, Rustam Sanderson M.D.    [START ON 1/7/2023] tiotropium (Spiriva Respimat) 2.5 mcg/Act inhalation spray 5 mcg, 5 mcg, Inhalation, DAILY, Rustam Sanderson M.D.    senna-docusate (PERICOLACE or SENOKOT S) 8.6-50 MG per tablet 2 Tablet, 2 Tablet, Oral, BID **AND** polyethylene glycol/lytes (MIRALAX) PACKET 1 Packet, 1 Packet, Oral, QDAY PRN **AND** magnesium hydroxide (MILK OF MAGNESIA) suspension 30 mL, 30 mL, Oral, QDAY PRN **AND** bisacodyl (DULCOLAX) suppository 10 mg, 10 mg, Rectal, QDAY PRN, Rustam Sanderson M.D.    lactated ringers infusion, , Intravenous, Continuous, Rustam Sanderson M.D.    acetaminophen (Tylenol) tablet 650 mg, 650 mg, Oral, Q6HRS PRN, Rustam Sanderson M.D.    labetalol (NORMODYNE/TRANDATE) injection 10 mg, 10 mg, Intravenous, Q4HRS PRN, Rustam Sanderson M.D.    ondansetron (ZOFRAN) syringe/vial injection 4 mg, 4 mg, Intravenous, Q4HRS PRN, Rustam Sanderson M.D.    ondansetron (ZOFRAN ODT) dispertab 4 mg, 4 mg, Oral, Q4HRS PRN, Rustam Sanderson M.D.    Notify provider if pain remains uncontrolled, , , CONTINUOUS **AND** Use the Numeric Rating Scale (NRS), Owens-Baker Faces  (WBF), or FLACC on regular floors and Critical-Care Pain Observation Tool (CPOT) on ICUs/Trauma to assess pain, , , CONTINUOUS **AND** Pulse Ox, , , CONTINUOUS **AND** Pharmacy Consult Request ...Pain Management Review 1 Each, 1 Each, Other, PHARMACY TO DOSE **AND** If patient difficult to arouse and/or has respiratory depression (respiratory rate of 10 or less), stop any opiates that are currently infusing and call a Rapid Response., , , CONTINUOUS, Rustma Sanderson M.D.    guaiFENesin dextromethorphan (ROBITUSSIN DM) 100-10 MG/5ML syrup 10 mL, 10 mL, Oral, Q6HRS PRN, Rustam Sanderson M.D.    morphine 4 MG/ML injection 4 mg, 4 mg, Intravenous, Q4HRS PRN, Rustam Sanderson M.D.    Current Outpatient Medications:     atorvastatin (LIPITOR) 40 MG Tab, Take 1 Tablet by mouth every evening., Disp: 90 Tablet, Rfl: 3    tamsulosin (FLOMAX) 0.4 MG capsule, Take 2 Capsules by mouth every day., Disp: 180 Capsule, Rfl: 3    varenicline (CHANTIX TORITO) 0.5 MG X 11 & 1 MG X 42 tablet, Take 0.5mg PO qD x 3 days, then 0.5mg PO BID x 4 days, then increase to 1mg PO BID, Disp: 56 Each, Rfl: 3    varenicline (CHANTIX) 1 MG tablet, Take 1 tablet PO BID, start after completion of chantix starter pack., Disp: 60 Tablet, Rfl: 2    tiotropium (SPIRIVA HANDIHALER) 18 MCG Cap, Place 1 Capsule into inhaler and inhale every day., Disp: 30 Capsule, Rfl: 11    sildenafil citrate (VIAGRA) 25 MG Tab, TAKE 1 TO 2 TABLETS BY MOUTH ONCE DAILY AS NEEDED FOR SEXUAL ACTIVITY, Disp: 30 Tablet, Rfl: 3    albuterol 108 (90 Base) MCG/ACT Aero Soln inhalation aerosol, ALBUTEROL SULFATE  (90 Base) MCG/ACT AERS, Disp: , Rfl:     albuterol (PROVENTIL) 2.5mg/0.5ml Nebu Soln, Take 2.5 mg by nebulization every four hours as needed., Disp: , Rfl:     ibuprofen (MOTRIN) 400 MG Tab, Take 400 mg by mouth every 6 hours as needed., Disp: , Rfl:     acetaminophen (TYLENOL) 325 MG Tab, Take 2 Tabs by mouth every 6 hours as needed (Mild Pain; (Pain scale 1-3);  Temp greater than 100.5 F)., Disp: 30 Tab, Rfl: 0    PHYSICAL EXAM:  Vitals:    23 1730 23 1930 23 2100   BP:  (!) 165/76 (!) 158/85 (!) 144/70   Pulse:  81 89 87   Resp:  14 19 20   Temp:       TempSrc:       SpO2:  93% 92% 91%   Weight: 85.9 kg (189 lb 6 oz)      , Temp (24hrs), Av.3 °C (97.4 °F), Min:36.3 °C (97.4 °F), Max:36.3 °C (97.4 °F)  , Pulse Oximetry: 91 %, O2 Delivery Device: None - Room Air    General: Pt resting in NAD, cooperative.  Laying on R side to minimize pain.  Skin:  Pink, warm and dry.  No rashes  HEENT: NC/AT. PERRL. EOMI. MMM. No nasal discharge. Oropharynx nonerythematous without exudate/plaques  Neck:  Supple without lymphadenopathy or rigidity.  Lungs:  Symmetrical.  CTAB with no adventitious breath sounds.  Good air movement   Cardiovascular:  Normal S1/S2, RRR without M/R/G.  Abdomen:  BS+, Soft, mild tenderness to palpation L groin, otherwise nontender, nondistended No masses noted.  Extremities:  Full range of motion. No gross deformities noted. 2+ pulses in all extremities. No C/C/E   Spine:  Straight without vertebral anomalies.  CNS:  A&Ox4, follows commands, Strength 5/5 in all extremities.         LAB TESTS:   Recent Labs     23  1743   WBC 7.6   RBC 4.96   HEMOGLOBIN 16.2   HEMATOCRIT 45.2   MCV 91.1   MCH 32.7   RDW 42.5   PLATELETCT 165   MPV 9.9   NEUTSPOLYS 80.20*   LYMPHOCYTES 8.60*   MONOCYTES 8.70   EOSINOPHILS 1.60   BASOPHILS 0.50         Recent Labs     23  1743   SODIUM 135   POTASSIUM 4.0   CHLORIDE 101   CO2 23   BUN 19   CREATININE 1.09   CALCIUM 9.3   ALBUMIN 4.2       CULTURES:   Results       Procedure Component Value Units Date/Time    URINALYSIS [[id number]]  (Abnormal) Collected: 23    Order Status: Completed Specimen: Urine Updated: 23     Color Yellow     Character Clear     Specific Gravity 1.025     Ph 5.0     Glucose Negative mg/dL      Ketones Trace mg/dL      Protein 30 mg/dL       Bilirubin Negative     Urobilinogen, Urine 0.2     Nitrite Negative     Leukocyte Esterase Trace     Occult Blood Negative     Micro Urine Req Microscopic            IMAGES:  CT-RENAL COLIC EVALUATION(A/P W/O)   Final Result         1.  Bilateral nephrolithiasis with a 8.6 mm left mid ureteral stone resulting in left urinary outflow tract obstructive changes.   2.  Fat-containing umbilical hernia   3.  Atherosclerosis and atherosclerotic coronary artery disease            CONSULTS:   Urology    ASSESSMENT/PLAN: 77 y.o. male admitted for Left ureterolithiasis requiring lithotomy.    #Left Ureterolithiasis  Bilateral nephrolithiasis with 8.6mm L ureteral stone just outside kidney with resultant obstructive changes  Urology consulted from the ED, recommending admission and will remove in the am  UA with RBC and WBC from stone, no evidence of infection, septic stone    Plan  Admit to med/surg  NPO at midnight  MIVF with LR  Urology following - appreciate recs  Lithotomy in the am  Pain control with IV morphine  N/V control with IV zofran     #COPD  #Tobacco Use  Over 100pack-year smoking history with resultant COPD  Continue home inhalers  Does not use O2 at home, has declined Rx for O2 in the past  Not interested in quitting at this time  Satting well 92-93% in RA in the ED  Titrate O2 PRN    #BPH  #Peripheral Arterial Disease  Continue home medications    Core Measures:  Fluids: LR at maintenance  Lines: PIV  Abx: None indicated  Diet: NPO at midnight  PPX: None, procedure in the am  DISPO: Observation for lithotomy in the am    CODE STATUS: FULL CODE      Hai Sanderson MD  PGY4  [facility]

## 2023-01-07 NOTE — OP REPORT
Urology Operative Report    Date: 1/7/2023    Pre-operative diagnosis: Left ureteral stone    Post-operative diagnosis: Left ureteral stone    Procedures:  Cystoscopy, left ureteroscopy with laser lithotripsy, left ureteral stent placement    Surgeon: Surgeon(s) and Role:     * Addison Seymour M.D. - Primary    Anesthesia: General    EBL: Minimal    IV fluids: Per anesthesia.    Specimens: None    Complications: None    Drains: 6 Burmese by 26 cm stent on left side on string    Disposition:  PACU, return to inpatient [facility]  Follow up 5 days in office for stent on string removal    Findings:  9 mm left ureteral stone migrated into midpole calyx    Indications for procedure:    Ronal Hair is a 77 y.o. male who presented with left flank pain in the setting of an obstructing 9 mm left proximal ureteral stone.  After lengthy discussion of risks and benefits, patient agreed to proceed with cystoscopy, left ureteroscopy with laser lithotripsy, left ureteral stent placement.    Details of procedure:    Patient was seen in the preoperative area and informed consent was obtained.  They were transported to the operating room and underwent general anesthesia without complication.  Ancef 2 g was administered for antibiotic prophylaxis.  Timeout was completed.  Patient was positioned in dorsal lithotomy and then prepped and draped.    22 Burmese cystoscope was inserted into the bladder per urethra. Cystoscopy was unremarkable.  Left ureteral orifice was identified and cannulated with a sensor wire which was advanced into the left kidney under fluoroscopic guidance.  Digital flexible ureteroscope was threaded over the wire into the left kidney under fluoroscopic guidance.  Pyeloscopy was notable for a 9 mm stone which had migrated into a midpole calyx.  200 µm holmium laser fiber was inserted and the stone was fragmented into small pieces.  After satisfactory lithotripsy, remainder of calyces were explored and noted to be  stone free.  Sensor wire was placed through the scope.  Scope was gently withdrawn and the ureter was noted to be patent and stone free.  Cystoscope was once again reinserted and a 6 Slovenian by 26 cm stent was placed into the left kidney under fluoroscopic guidance with the string left on.  Good curl was noted on fluoroscopy and directly visualized in the bladder.  Bladder was emptied and scope was withdrawn.  String was secured to the penis with Steri-Strips.  Urojet was administered.  This concluded the procedure.  Patient was awoken from anesthesia and transported to the PACU for recovery.

## 2023-01-07 NOTE — DISCHARGE SUMMARY
Discharge Summary    CHIEF COMPLAINT ON ADMISSION  Chief Complaint   Patient presents with    Flank Pain     Pt c/o sudden onset left sided flank pain with radiation to left abd/testicle and difficulty urinating     Testicle Pain    Abdominal Pain    N/V       Reason for Admission  Left ureterolithiasis    Admission Date  1/6/2023    CODE STATUS  Full Code    HPI & HOSPITAL COURSE  77 y.o. male with HLD, COPD and BPH presented with acute onset L sided abdominal and testicular pain with associated difficulty urinating starting in the morning of discharge. He was in his usual state of health; denies injuries or changes - was asymptomatic prior to sudden onset severe L abdominal pain that started around 11:00 and progressively worsened throughout the day.  Reported nausea, vomiting, severe pain, urinary retention.  Has never had a kidney stone in the past.    In the ED, CT renal demonstrated ~9mm stone in the L ureter just outside kidney.  Urology consulted and planned for OR on 1/7 AM.     1/7 today: No acute events overnight before the OR. Pt underwent Cystoscopy, left ureteroscopy with laser lithotripsy, left ureteral stent placement. Pt tolerated the procedure. Post op course - pain adequately controlled, voided and vitals wnl. At this point, he was deemed stable for DC with a close outpatient follow up with PCP and Urology.     Therefore, he is discharged in fair and stable condition to home with close outpatient follow-up.    The patient recovered much more quickly than anticipated on admission.    Discharge Date  01/07/23    FOLLOW UP ITEMS POST DISCHARGE  Follow up 5 days in Urology [place] office for stent on string removal    DISCHARGE DIAGNOSES  Principal Problem:    Ureterolithiasis POA: Yes  Active Problems:    Peripheral artery disease (HCC) POA: Yes    Benign prostatic hyperplasia POA: Yes    COPD (chronic obstructive pulmonary disease) ([facility]) POA: Yes  Resolved Problems:    * No resolved hospital  problems. *      FOLLOW UP  Please follow up with your primary care physician within 1 to 2 weeks of discharge. Tele health if available.      MEDICATIONS ON DISCHARGE     Medication List        START taking these medications        Instructions   oxyCODONE-acetaminophen 5-325 MG Tabs  Commonly known as: PERCOCET   Take 1 Tablet by mouth every 8 hours as needed for Severe Pain for up to 3 days.  Dose: 1 Tablet     phenazopyridine 200 MG Tabs  Commonly known as: PYRIDIUM   Take 1 Tablet by mouth 3 times a day as needed (Bladder spasm).  Dose: 200 mg            CONTINUE taking these medications        Instructions   albuterol 108 (90 Base) MCG/ACT Aers inhalation aerosol   ALBUTEROL SULFATE  (90 Base) MCG/ACT AERS     atorvastatin 40 MG Tabs  Commonly known as: LIPITOR   Take 1 Tablet by mouth every evening.  Dose: 40 mg     ibuprofen 400 MG Tabs  Commonly known as: MOTRIN   Take 800 mg by mouth every 8 hours as needed for Mild Pain.  Dose: 800 mg     MULTIVITAMIN ADULTS 50+ PO   Take 1 Tablet by mouth every day.  Dose: 1 Tablet     sildenafil citrate 25 MG Tabs  Commonly known as: VIAGRA   Take 12.5 mg by mouth 1 time a day as needed for Erectile Dysfunction. 12.5 mg = 1/2 tablet  Dose: 12.5 mg     Spiriva HandiHaler 18 MCG Caps  Generic drug: tiotropium   Place 1 Capsule into inhaler and inhale every day.  Dose: 18 mcg     tamsulosin 0.4 MG capsule  Commonly known as: FLOMAX   Take 2 Capsules by mouth every day.  Dose: 0.8 mg              Allergies  No Known Allergies    DIET  Orders Placed This Encounter   Procedures    Diet NPO Restrict to: Sips with Medications     Standing Status:   Standing     Number of Occurrences:   8     Order Specific Question:   Diet NPO Restrict to:     Answer:   Sips with Medications [3]       ACTIVITY  As tolerated.  Weight bearing as tolerated    CONSULTATIONS  Urology    PROCEDURES  Cystoscopy, left ureteroscopy with laser lithotripsy, left ureteral stent  placement    LABORATORY  Lab Results   Component Value Date    SODIUM 135 01/06/2023    POTASSIUM 4.0 01/06/2023    CHLORIDE 101 01/06/2023    CO2 23 01/06/2023    GLUCOSE 113 (H) 01/06/2023    BUN 19 01/06/2023    CREATININE 1.09 01/06/2023        Lab Results   Component Value Date    WBC 6.9 01/07/2023    HEMOGLOBIN 14.6 01/07/2023    HEMATOCRIT 42.3 01/07/2023    PLATELETCT 133 (L) 01/07/2023        Total time of the discharge process exceeds 36 minutes.

## 2023-01-07 NOTE — OR NURSING
Pt became very agitated because PACU staff wouldn't allow pt to get out of bed to urinate.  A urinal was provided for the patient but patient refused to even try to use it. Advised pt that due to medications and anesthesia it was not safe to get up. Pt used many curse words and was screaming in the PACU and was still trying to get out of bed. Pt was sent back to room on CDU.

## 2023-01-07 NOTE — ANESTHESIA TIME REPORT
Anesthesia Start and Stop Event Times     Date Time Event    1/7/2023 1158 Ready for Procedure     1201 Anesthesia Start     1247 Anesthesia Stop        Responsible Staff  01/07/23    Name Role Begin End    Yunior Escoto M.D. Anesth 1201 1247        Overtime Reason:  overtime    Comments:

## 2023-01-07 NOTE — ED PROVIDER NOTES
ER Provider Note    Scribed for Junaid Medeiros by Calvin Samson. 1/6/2023  7:43 PM    Primary Care Provider: Shakira Henriquez M.D.    CHIEF COMPLAINT  Chief Complaint   Patient presents with    Flank Pain     Pt c/o sudden onset left sided flank pain with radiation to left abd/testicle and difficulty urinating     Testicle Pain    Abdominal Pain    N/V       HPI/ROS  LIMITATION TO HISTORY   Select: : None    Ronal Hair is a 77 y.o. male who presents to the ED complaining of flank pain onset earlier this morning. He states that his pain started in the lower left abdomen around 11 AM and moved to his left testicle. His pain is now worse on the left testicle than the right. He notes associated nausea and vomiting, he is currently vomiting right now. He endorses associated urinary retention. He denies any hematuria or diarrhea. He denies any kidney stone history.    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 He started smoking about 57 years ago. He has a 108.00 pack-year smoking history. He has never used smokeless tobacco. He reports that he does not currently use alcohol. He reports that he does not use  drugs.    CURRENT MEDICATIONS  Previous Medications    ACETAMINOPHEN (TYLENOL) 325 MG TAB    Take 2 Tabs by mouth every 6 hours as needed (Mild Pain; (Pain scale 1-3); Temp greater than 100.5 F).    ALBUTEROL (PROVENTIL) 2.5MG/0.5ML NEBU SOLN    Take 2.5 mg by nebulization every four hours as needed.    ALBUTEROL 108 (90 BASE) MCG/ACT AERO SOLN INHALATION AEROSOL    ALBUTEROL SULFATE  (90 Base) MCG/ACT AERS    ATORVASTATIN (LIPITOR) 40 MG TAB    Take 1 Tablet by mouth every evening.    IBUPROFEN (MOTRIN) 400 MG TAB    Take 400 mg by mouth every 6 hours as needed.    SILDENAFIL CITRATE (VIAGRA) 25 MG TAB    TAKE 1 TO 2 TABLETS BY MOUTH ONCE DAILY AS NEEDED FOR SEXUAL ACTIVITY    TAMSULOSIN (FLOMAX) 0.4 MG CAPSULE    Take 2 Capsules by mouth every day.    TIOTROPIUM (SPIRIVA HANDIHALER) 18 MCG CAP    Place 1 Capsule into inhaler and inhale every day.    VARENICLINE (CHANTIX TORITO) 0.5 MG X 11 & 1 MG X 42 TABLET    Take 0.5mg PO qD x 3 days, then 0.5mg PO BID x 4 days, then increase to 1mg PO BID    VARENICLINE (CHANTIX) 1 MG TABLET    Take 1 tablet PO BID, start after completion of chantix starter pack.     ALLERGIES  Patient has no known allergies.    PHYSICAL EXAM  Vitals:    01/06/23 1730 01/06/23 1930 01/06/23 2000 01/06/23 2100   BP:  (!) 165/76 (!) 158/85 (!) 144/70   Pulse:  81 89 87   Resp:  14 19 20   Temp:       TempSrc:       SpO2:  93% 92% 91%   Weight: 85.9 kg (189 lb 6 oz)         Vitals: hypertension, not tachycardic, not hypoxic, afebrile    Constitutional: Well developed, Well nourished, No acute distress, Non-toxic appearance.   HENT: Normocephalic, Atraumatic, Bilateral external ears normal, Oropharynx is clear mucous membranes are moist. No oral exudates or nasal discharge.   Eyes: Pupils are equal round and reactive, EOMI, Conjunctiva normal, No discharge.   Neck: Normal range of motion, No tenderness, Supple, No stridor. No meningismus.  Lymphatic: No lymphadenopathy noted.   Cardiovascular:  Regular rate and rhythm without murmur rub or gallop.  Thorax & Lungs: Clear breath sounds bilaterally without wheezes, rhonchi or rales. There is no chest wall tenderness.   Abdomen: Soft non-tender non-distended. There is no rebound or guarding. No organomegaly is appreciated. Bowel sounds are normal.  Skin: Normal without rash.   Back: No CVA or spinal tenderness.   Extremities: Intact distal pulses, No edema, No tenderness, No cyanosis, No clubbing. Capillary refill is less than 2 seconds.  Musculoskeletal: Good range of motion in all major joints. No tenderness to palpation or major deformities noted.   Neurologic: Alert & oriented x 3, Normal motor function, Normal sensory function, No focal deficits noted. Reflexes are normal.  Psychiatric: Affect normal, Judgment normal, Mood normal. There is no suicidal ideation or patient reported hallucinations.     DIAGNOSTIC STUDIES    Labs:   Results for orders placed or performed during the hospital encounter of 01/06/23   CBC WITH DIFFERENTIAL   Result Value Ref Range    WBC 7.6 4.8 - 10.8 K/uL    RBC 4.96 4.70 - 6.10 M/uL    Hemoglobin 16.2 14.0 - 18.0 g/dL    Hematocrit 45.2 42.0 - 52.0 %    MCV 91.1 81.4 - 97.8 fL    MCH 32.7 27.0 - 33.0 pg    MCHC 35.8 (H) 33.7 - 35.3 g/dL    RDW 42.5 35.9 - 50.0 fL    Platelet Count 165 164 - 446 K/uL    MPV 9.9 9.0 - 12.9 fL    Neutrophils-Polys 80.20 (H) 44.00 - 72.00 %    Lymphocytes 8.60 (L) 22.00 - 41.00 %    Monocytes 8.70 0.00 - 13.40 %    Eosinophils 1.60 0.00 - 6.90 %    Basophils 0.50 0.00 - 1.80 %    Immature Granulocytes 0.40 0.00 - 0.90 %    Nucleated RBC 0.00 /100 WBC    Neutrophils (Absolute) 6.08 1.82 - 7.42 K/uL    Lymphs (Absolute) 0.65 (L) 1.00 - 4.80 K/uL    Monos (Absolute) 0.66 0.00 - 0.85 K/uL    Eos (Absolute) 0.12 0.00 - 0.51 K/uL    Baso (Absolute) 0.04 0.00 - 0.12 K/uL    Immature Granulocytes (abs) 0.03 0.00 - 0.11 K/uL    NRBC (Absolute) 0.00 K/uL   COMP METABOLIC PANEL   Result Value Ref Range     Sodium 135 135 - 145 mmol/L    Potassium 4.0 3.6 - 5.5 mmol/L    Chloride 101 96 - 112 mmol/L    Co2 23 20 - 33 mmol/L    Anion Gap 11.0 7.0 - 16.0    Glucose 113 (H) 65 - 99 mg/dL    Bun 19 8 - 22 mg/dL    Creatinine 1.09 0.50 - 1.40 mg/dL    Calcium 9.3 8.5 - 10.5 mg/dL    AST(SGOT) 22 12 - 45 U/L    ALT(SGPT) 16 2 - 50 U/L    Alkaline Phosphatase 89 30 - 99 U/L    Total Bilirubin 0.7 0.1 - 1.5 mg/dL    Albumin 4.2 3.2 - 4.9 g/dL    Total Protein 6.9 6.0 - 8.2 g/dL    Globulin 2.7 1.9 - 3.5 g/dL    A-G Ratio 1.6 g/dL   LIPASE   Result Value Ref Range    Lipase 22 11 - 82 U/L   URINALYSIS    Specimen: Urine   Result Value Ref Range    Color Yellow     Character Clear     Specific Gravity 1.025 <1.035    Ph 5.0 5.0 - 8.0    Glucose Negative Negative mg/dL    Ketones Trace (A) Negative mg/dL    Protein 30 (A) Negative mg/dL    Bilirubin Negative Negative    Urobilinogen, Urine 0.2 Negative    Nitrite Negative Negative    Leukocyte Esterase Trace (A) Negative    Occult Blood Negative Negative    Micro Urine Req Microscopic    CORRECTED CALCIUM   Result Value Ref Range    Correct Calcium 9.1 8.5 - 10.5 mg/dL   ESTIMATED GFR   Result Value Ref Range    GFR (CKD-EPI) 71 >60 mL/min/1.73 m 2   URINE MICROSCOPIC (W/UA)   Result Value Ref Range    WBC 5-10 (A) /hpf    RBC 2-5 (A) /hpf    Bacteria Negative None /hpf    Epithelial Cells Few /hpf    Hyaline Cast 0-2 /lpf      Labs reviewed by me. Significant for no anemia, no leukocytosis, normal electrolytes, normal renal function, normal liver enzymes, normal bilirubin, lipase normal, urinalysis shows no signs of infection.     Radiology:   The attending emergency physician has independently interpreted the diagnostic imaging associated with this visit and am waiting the final reading from the radiologist.   CT-RENAL COLIC EVALUATION(A/P W/O)   Final Result         1.  Bilateral nephrolithiasis with a 8.6 mm left mid ureteral stone resulting in left urinary outflow tract  obstructive changes.   2.  Fat-containing umbilical hernia   3.  Atherosclerosis and atherosclerotic coronary artery disease          COURSE & MEDICAL DECISION MAKING     ED Observation Status? No; Patient does not meet criteria for ED Observation.     INITIAL ASSESSMENT AND PLAN    MDM: This is a 77-year-old male who had sudden onset of left flank pain.  Presents extremely uncomfortable, 10 of 10 pain, constantly having persistent intractable nausea and vomiting.  He started IV fluids, morphine and Zofran immediately.  Work-up initiated.  Denies chest pain or shortness of breath.  No lightheadedness.  Vital signs unremarkable other mild hypertension he is afebrile.  Physical exam shows he is uncomfortable but no significant reproducible tenderness,  exam is unremarkable.  Labs and CT imaging initiated.  Pain controlled after pain medications, nausea improved with Zofran. Labs reviewed by me. Significant for no anemia, no leukocytosis, normal electrolytes, normal renal function, normal liver enzymes, normal bilirubin, lipase normal, urinalysis shows no signs of infection.  CT imaging shows an 8.6 mm mid ureteral stone in the left with urinary outflow tract obstructive changes including hydronephrosis.  Patient still having intractable pain nausea and vomiting, although improved with medications, he is still requiring frequent rescue medications.  Discussed with urology at this time they would prefer to bring him in for evaluation of lithotripsy in the morning.  This is what patient would wish also and he is amenable to admission.  Discussed admission process with him and he verbalized understanding plan.  Admitted to the hospitalist for continued monitoring.  Of note, patient was borderline hypoxic throughout his stay here in the mid 80s, he says this is normal for him, he has refused oxygen in the past.  He still continues to smoke about a pack a day.  I counseled him for 5 minutes on his tobacco use and this  time he is not willing to quit.  I discussed that we would likely need to have him on home oxygen he refused at this time.  He is willing to wear oxygen here in the hospital.    7:47 PM - Patient seen and evaluated at bedside. Patient will be medicated with Zofran 4 mg, morphine 4 mg, and Toradol 15 mg. Ordered CT-renal colic eval, corrected calcium, estimated GFR, CBC w/ diff, CMP, lipase and UA to evaluate his symptoms.    9:09 PM - Patient was reevaluated at bedside. Patient states that his pain is now completely resolved. Ordered urine microscopic to evaluate his symptoms.    10:08 PM - Paged Urology.     ADDITIONAL PROBLEM LIST AND DISPOSITION    Differential diagnoses include but not limited to: kidney stone vs AAA vs diverticulitis vs UTI     I have discussed management of the patient with the following physicians and ISAIAH's: Discussed with urology, discussed with hospitalist    Discussion of management with other QHP or appropriate source(s): Select: None     Barriers to care at this time, including but not limited to: Select: None .     Decision tools and prescription drugs considered including, but not limited to: Select: Pain Medications morphine, Zofran .    HYDRATION: Based on the patient's presentation of Acute Vomiting the patient was given IV fluids. IV Hydration was used because oral hydration was not adequate alone. Upon recheck following hydration, the patient was improved.    FINAL DIANGOSIS  1. Kidney stone Acute   2. Nausea and vomiting, unspecified vomiting type Acute   3. Dehydration Acute   4. Flank pain Acute     Calvin JUNG ([place]), am scribing for, and in the presence of, Junaid Medeiros.    Electronically signed by: Calvin Samson ([place]), 1/6/2023    IJunaid personally performed the services described in this documentation, as scribed by Calvin Samson in my presence, and it is both accurate and complete.     The note accurately reflects work and decisions made by  me.  Junaid Medeiros  1/6/2023  9:34 PM

## 2023-01-07 NOTE — ED TRIAGE NOTES
Pt to triage .  Chief Complaint   Patient presents with    Flank Pain     Pt c/o sudden onset left sided flank pain with radiation to left abd/testicle and difficulty urinating     Testicle Pain    Abdominal Pain    N/V

## 2023-01-08 NOTE — DISCHARGE INSTRUCTIONS
Discharge Instructions    Discharged to home by car with self. Discharged via walking, hospital escort: Yes.  Special equipment needed: Not Applicable    Be sure to schedule a follow-up appointment with your primary care doctor or any specialists as instructed.     Discharge Plan:   Diet Plan: Discussed  Activity Level: Discussed  Confirmed Follow up Appointment: Patient to Call and Schedule Appointment  Confirmed Symptoms Management: Discussed  Medication Reconciliation Updated: Yes  Influenza Vaccine Indication: Patient Refuses    I understand that a diet low in cholesterol, fat, and sodium is recommended for good health. Unless I have been given specific instructions below for another diet, I accept this instruction as my diet prescription.   Other diet: Regular     Special Instructions: None    -Is this patient being discharged with medication to prevent blood clots?  No    Is patient discharged on Warfarin / Coumadin?   No

## 2023-01-08 NOTE — CARE PLAN
The patient is stable     Shift Goals  Clinical Goals: pain control and Lithotripsy  Patient Goals: Pain control and discharg  Family Goals: No family present    Progress made toward(s) clinical / shift goals:  Patient denied need for medication for pain and Lithotripsy completed. Education provided     Patient is not progressing towards the following goals:

## 2023-04-10 ENCOUNTER — TELEPHONE (OUTPATIENT)
Dept: MEDICAL GROUP | Facility: CLINIC | Age: 78
End: 2023-04-10

## 2023-04-10 RX ORDER — SILDENAFIL 25 MG/1
12.5 TABLET, FILM COATED ORAL
Qty: 10 TABLET | Refills: 5 | Status: SHIPPED | OUTPATIENT
Start: 2023-04-10 | End: 2023-09-22 | Stop reason: SDUPTHER

## 2023-04-12 NOTE — PROGRESS NOTES
Chemotherapy Verification - SECONDARY RN     C2    Height = 169.5 cm  Weight = 83.5 kg  BSA = 1.98 m2       Medication: Bendamustine HCl (Bendeka)  Dose: 70 mg/m2  Calculated Dose: 138.6 mg                               Medication: Rituximab (Rituxan)  Dose: 375 mg/m2  Calculated Dose: 742.5 mg                              I confirm that this process was performed independently.    DISPLAY PLAN FREE TEXT DISPLAY PLAN FREE TEXT

## 2023-04-20 ENCOUNTER — APPOINTMENT (RX ONLY)
Dept: URBAN - METROPOLITAN AREA CLINIC 4 | Facility: CLINIC | Age: 78
Setting detail: DERMATOLOGY
End: 2023-04-20

## 2023-04-20 DIAGNOSIS — L82.0 INFLAMED SEBORRHEIC KERATOSIS: ICD-10-CM

## 2023-04-20 DIAGNOSIS — L57.0 ACTINIC KERATOSIS: ICD-10-CM

## 2023-04-20 PROBLEM — D48.5 NEOPLASM OF UNCERTAIN BEHAVIOR OF SKIN: Status: ACTIVE | Noted: 2023-04-20

## 2023-04-20 PROCEDURE — 17000 DESTRUCT PREMALG LESION: CPT | Mod: 59

## 2023-04-20 PROCEDURE — ? LIQUID NITROGEN

## 2023-04-20 PROCEDURE — 17110 DESTRUCTION B9 LES UP TO 14: CPT

## 2023-04-20 PROCEDURE — ? COUNSELING

## 2023-04-20 PROCEDURE — ? BIOPSY BY SHAVE METHOD

## 2023-04-20 PROCEDURE — 11102 TANGNTL BX SKIN SINGLE LES: CPT | Mod: 59

## 2023-04-20 PROCEDURE — 11103 TANGNTL BX SKIN EA SEP/ADDL: CPT | Mod: 59

## 2023-04-20 PROCEDURE — 17003 DESTRUCT PREMALG LES 2-14: CPT | Mod: 59

## 2023-04-20 ASSESSMENT — LOCATION SIMPLE DESCRIPTION DERM
LOCATION SIMPLE: NOSE
LOCATION SIMPLE: RIGHT ZYGOMA
LOCATION SIMPLE: RIGHT HAND
LOCATION SIMPLE: RIGHT FOREHEAD
LOCATION SIMPLE: SUBMENTAL CHIN
LOCATION SIMPLE: RIGHT SUBMANDIBULAR AREA
LOCATION SIMPLE: LEFT CHEEK
LOCATION SIMPLE: RIGHT CHEEK
LOCATION SIMPLE: RIGHT EAR
LOCATION SIMPLE: LEFT EAR

## 2023-04-20 ASSESSMENT — LOCATION DETAILED DESCRIPTION DERM
LOCATION DETAILED: RIGHT SUPERIOR PREAURICULAR CHEEK
LOCATION DETAILED: RIGHT SUBMANDIBULAR AREA
LOCATION DETAILED: SUBMENTAL CHIN
LOCATION DETAILED: RIGHT SUPERIOR MEDIAL FOREHEAD
LOCATION DETAILED: RIGHT SUPERIOR FOREHEAD
LOCATION DETAILED: LEFT INFERIOR CENTRAL MALAR CHEEK
LOCATION DETAILED: NASAL DORSUM
LOCATION DETAILED: RIGHT LATERAL ZYGOMA
LOCATION DETAILED: RIGHT SUPERIOR HELIX
LOCATION DETAILED: LEFT INFERIOR LATERAL MALAR CHEEK
LOCATION DETAILED: NASAL ROOT
LOCATION DETAILED: RIGHT RADIAL DORSAL HAND
LOCATION DETAILED: LEFT SUPERIOR CENTRAL BUCCAL CHEEK
LOCATION DETAILED: LEFT ANTERIOR EARLOBE

## 2023-04-20 ASSESSMENT — LOCATION ZONE DERM
LOCATION ZONE: NOSE
LOCATION ZONE: FACE
LOCATION ZONE: HAND
LOCATION ZONE: EAR

## 2023-04-20 NOTE — PROCEDURE: LIQUID NITROGEN
Detail Level: Detailed
Show Spray Paint Technique Variable?: Yes
Post-Care Instructions: I reviewed with the patient in detail post-care instructions. Patient is to wear sunprotection, and avoid picking at any of the treated lesions. Pt may apply Vaseline to crusted or scabbing areas.
Spray Paint Text: The liquid nitrogen was applied to the skin utilizing a spray paint frosting technique.
Add 52 Modifier (Optional): no
Medical Necessity Information: It is in your best interest to select a reason for this procedure from the list below. All of these items fulfill various CMS LCD requirements except the new and changing color options.
Consent: The patient's consent was obtained including but not limited to risks of crusting, scabbing, blistering, scarring, darker or lighter pigmentary change, recurrence, incomplete removal and infection.
Medical Necessity Clause: This procedure was medically necessary because the lesions that were treated were:
Number Of Freeze-Thaw Cycles: 1 freeze-thaw cycle
Duration Of Freeze Thaw-Cycle (Seconds): 3
Aperture Size (Optional): C
Application Tool (Optional): Cry-AC

## 2023-04-20 NOTE — HPI: SKIN LESIONS
Is This A New Presentation, Or A Follow-Up?: Skin Lesions
How Severe Is Your Skin Lesion?: mild
Have Your Skin Lesions Been Treated?: not been treated
Which Family Member (Optional)?: Aunt-Paternal
Which Family Member (Optional)?: Father

## 2023-04-26 NOTE — PROGRESS NOTES
Chemotherapy Verification - SECONDARY RN       Height = 66.93 inches  Weight = 83.6 kg  BSA = 1.98 m2       Medication: Bendamustine HCl (Bendeka)  Dose: 70 mg/m2  Calculated Dose: 138.6 mg                             (In mg/m2, AUC, mg/kg)     Medication: Rituximab (Rituxan)  Dose: 375 mg/m2  Calculated Dose: 742.5 mg                             (In mg/m2, AUC, mg/kg)      I confirm that this process was performed independently.    with patient

## 2023-06-01 ENCOUNTER — APPOINTMENT (RX ONLY)
Dept: URBAN - METROPOLITAN AREA CLINIC 36 | Facility: CLINIC | Age: 78
Setting detail: DERMATOLOGY
End: 2023-06-01

## 2023-06-01 PROBLEM — C44.229 SQUAMOUS CELL CARCINOMA OF SKIN OF LEFT EAR AND EXTERNAL AURICULAR CANAL: Status: ACTIVE | Noted: 2023-06-01

## 2023-06-01 PROCEDURE — ? MOHS SURGERY

## 2023-06-01 PROCEDURE — 17311 MOHS 1 STAGE H/N/HF/G: CPT

## 2023-06-01 PROCEDURE — 17312 MOHS ADDL STAGE: CPT

## 2023-06-01 NOTE — PROCEDURE: MOHS SURGERY
Mohs Case Number: 
Previous Accession (Optional): [id number]
Biopsy Photograph Reviewed: Yes
Referring Physician (Optional): Rodriguez
Consent Type: Consent 1 (Standard)
Eye Shield Used: No
Surgeon Performing Repair (Optional): José
Initial Size Of Lesion: 0.7
X Size Of Lesion In Cm (Optional): 0.6
Number Of Stages: 2
Primary Defect Length In Cm (Final Defect Size - Required For Flaps/Grafts): 1.4
Primary Defect Width In Cm (Final Defect Size - Required For Flaps/Grafts): 1.1
Repair Type: Repair deferred to allow adequate granulation to occur
Oculoplastic Surgeon (A): Eben
Oculoplastic Surgeon Procedure Text (A): After obtaining clear surgical margins the patient was sent to oculoplastics for surgical repair.  The patient understands they will receive post-surgical care and follow-up from the referring physician's office.
Otolaryngologist Procedure Text (A): After obtaining clear surgical margins the patient was sent to otolaryngology for surgical repair.  The patient understands they will receive post-surgical care and follow-up from the referring physician's office.
Plastic Surgeon Procedure Text (A): After obtaining clear surgical margins the patient was sent to plastics for surgical repair.  The patient understands they will receive post-surgical care and follow-up from the referring physician's office.
Mid-Level (A): did
Mid-Level Procedure Text (A): After obtaining clear surgical margins the patient was sent to a mid-level provider for surgical repair.  The patient understands they will receive post-surgical care and follow-up from the mid-level provider.
Provider Procedure Text (A): After obtaining clear surgical margins the defect was repaired by another provider.
Asc Procedure Text (A): After obtaining clear surgical margins the patient was sent to an ASC for surgical repair.  The patient understands they will receive post-surgical care and follow-up from the ASC physician.
Simple / Intermediate / Complex Repair - Final Wound Length In Cm: 0
Suturegard Retention Suture: 2-0 Nylon
Retention Suture Bite Size: 3 mm
Length To Time In Minutes Device Was In Place: 10
Number Of Hemigard Strips Per Side: 1
Undermining Type: Entire Wound
Debridement Text: The wound edges were debrided prior to proceeding with the closure to facilitate wound healing.
Helical Rim Text: The closure involved the helical rim.
Vermilion Border Text: The closure involved the vermilion border.
Nostril Rim Text: The closure involved the nostril rim.
Retention Suture Text: Retention sutures were placed to support the closure and prevent dehiscence.
Area H Indication Text: Tumors in this location are included in Area H (eyelids, eyebrows, nose, lips, chin, ear, pre-auricular, post-auricular, temple, genitalia, hands, feet, ankles and areola).  Tissue conservation is critical in these anatomic locations.
Area M Indication Text: Tumors in this location are included in Area M (cheek, forehead, scalp, neck, jawline and pretibial skin).  Mohs surgery is indicated for tumors in these anatomic locations.
Area L Indication Text: Tumors in this location are included in Area L (trunk and extremities).  Mohs surgery is indicated for larger tumors, or tumors with aggressive histologic features, in these anatomic locations.
Depth Of Tumor Invasion (For Histology): dermis
Perineural Invasion (For Histology - Be Specific If Possible): absent
Presence Of Scar Tissue (For Histology): present
Surgical Defect Length In Cm (Optional): 1.2
Surgical Defect Width In Cm (Optional): 0.9
Special Stains Stage 1 - Results: Base On Clearance Noted Above
Stage 2: Additional Anesthesia Type: 1% lidocaine with 1:100,000 epinephrine and 408mcg clindamycin/ml and a 1:10 solution of 8.4% sodium bicarbonate
Stage 4: Additional Anesthesia Type: 1% lidocaine with epinephrine
Staging Info: By selecting yes to the question above you will include information on AJCC 8 tumor staging in your Mohs note. Information on tumor staging will be automatically added for SCCs on the head and neck. AJCC 8 includes tumor size, tumor depth, perineural involvement and bone invasion.
Tumor Depth: Less than 6mm from granular layer and no invasion beyond the subcutaneous fat
Was The Patient On Physician Recommended Anticoagulation Therapy?: Please Select the Appropriate Response
Medical Necessity Statement: Based on my medical judgement, Mohs surgery is the most appropriate treatment for this cancer compared to other treatments.
Alternatives Discussed Intro (Do Not Add Period): I discussed alternative treatments to Mohs surgery and specifically discussed the risks and benefits of
Consent 1/Introductory Paragraph: The rationale for Mohs was explained to the patient and consent was obtained. The risks, benefits and alternatives to therapy were discussed in detail. Specifically, the risks of infection, scarring, bleeding, prolonged wound healing, incomplete removal, allergy to anesthesia, nerve injury and recurrence were addressed. Prior to the procedure, the treatment site was clearly identified and confirmed by the patient. All components of Universal Protocol/PAUSE Rule completed.
Consent 2/Introductory Paragraph: Mohs surgery was explained to the patient and consent was obtained. The risks, benefits and alternatives to therapy were discussed in detail. Specifically, the risks of infection, scarring, bleeding, prolonged wound healing, incomplete removal, allergy to anesthesia, nerve injury and recurrence were addressed. Prior to the procedure, the treatment site was clearly identified and confirmed by the patient. All components of Universal Protocol/PAUSE Rule completed.
Consent 3/Introductory Paragraph: I gave the patient a chance to ask questions they had about the procedure.  Following this I explained the Mohs procedure and consent was obtained. The risks, benefits and alternatives to therapy were discussed in detail. Specifically, the risks of infection, scarring, bleeding, prolonged wound healing, incomplete removal, allergy to anesthesia, nerve injury and recurrence were addressed. Prior to the procedure, the treatment site was clearly identified and confirmed by the patient. All components of Universal Protocol/PAUSE Rule completed.
Consent (Temporal Branch)/Introductory Paragraph: The rationale for Mohs was explained to the patient and consent was obtained. The risks, benefits and alternatives to therapy were discussed in detail. Specifically, the risks of damage to the temporal branch of the facial nerve, infection, scarring, bleeding, prolonged wound healing, incomplete removal, allergy to anesthesia, and recurrence were addressed. Prior to the procedure, the treatment site was clearly identified and confirmed by the patient. All components of Universal Protocol/PAUSE Rule completed.
Consent (Marginal Mandibular)/Introductory Paragraph: The rationale for Mohs was explained to the patient and consent was obtained. The risks, benefits and alternatives to therapy were discussed in detail. Specifically, the risks of damage to the marginal mandibular branch of the facial nerve, infection, scarring, bleeding, prolonged wound healing, incomplete removal, allergy to anesthesia, and recurrence were addressed. Prior to the procedure, the treatment site was clearly identified and confirmed by the patient. All components of Universal Protocol/PAUSE Rule completed.
Consent (Spinal Accessory)/Introductory Paragraph: The rationale for Mohs was explained to the patient and consent was obtained. The risks, benefits and alternatives to therapy were discussed in detail. Specifically, the risks of damage to the spinal accessory nerve, infection, scarring, bleeding, prolonged wound healing, incomplete removal, allergy to anesthesia, and recurrence were addressed. Prior to the procedure, the treatment site was clearly identified and confirmed by the patient. All components of Universal Protocol/PAUSE Rule completed.
Consent (Near Eyelid Margin)/Introductory Paragraph: The rationale for Mohs was explained to the patient and consent was obtained. The risks, benefits and alternatives to therapy were discussed in detail. Specifically, the risks of ectropion or eyelid deformity, infection, scarring, bleeding, prolonged wound healing, incomplete removal, allergy to anesthesia, nerve injury and recurrence were addressed. Prior to the procedure, the treatment site was clearly identified and confirmed by the patient. All components of Universal Protocol/PAUSE Rule completed.
Consent (Ear)/Introductory Paragraph: The rationale for Mohs was explained to the patient and consent was obtained. The risks, benefits and alternatives to therapy were discussed in detail. Specifically, the risks of ear deformity, infection, scarring, bleeding, prolonged wound healing, incomplete removal, allergy to anesthesia, nerve injury and recurrence were addressed. Prior to the procedure, the treatment site was clearly identified and confirmed by the patient. All components of Universal Protocol/PAUSE Rule completed.
Consent (Nose)/Introductory Paragraph: The rationale for Mohs was explained to the patient and consent was obtained. The risks, benefits and alternatives to therapy were discussed in detail. Specifically, the risks of nasal deformity, changes in the flow of air through the nose, infection, scarring, bleeding, prolonged wound healing, incomplete removal, allergy to anesthesia, nerve injury and recurrence were addressed. Prior to the procedure, the treatment site was clearly identified and confirmed by the patient. All components of Universal Protocol/PAUSE Rule completed.
Consent (Lip)/Introductory Paragraph: The rationale for Mohs was explained to the patient and consent was obtained. The risks, benefits and alternatives to therapy were discussed in detail. Specifically, the risks of lip deformity, changes in the oral aperture, infection, scarring, bleeding, prolonged wound healing, incomplete removal, allergy to anesthesia, nerve injury and recurrence were addressed. Prior to the procedure, the treatment site was clearly identified and confirmed by the patient. All components of Universal Protocol/PAUSE Rule completed.
Consent (Scalp)/Introductory Paragraph: The rationale for Mohs was explained to the patient and consent was obtained. The risks, benefits and alternatives to therapy were discussed in detail. Specifically, the risks of changes in hair growth pattern secondary to repair, infection, scarring, bleeding, prolonged wound healing, incomplete removal, allergy to anesthesia, nerve injury and recurrence were addressed. Prior to the procedure, the treatment site was clearly identified and confirmed by the patient. All components of Universal Protocol/PAUSE Rule completed.
Detail Level: Detailed
Postop Diagnosis: same
Anesthesia Type: 1% lidocaine with 1:100,000 epinephrine and a 1:10 solution of 8.4% sodium bicarbonate
Anesthesia Volume In Cc: 1.5
Additional Anesthesia Volume In Cc: 6
Hemostasis: Electrocautery
Estimated Blood Loss (Cc): less than 5 cc
Repair Anesthesia Method: local infiltration
Brow Lift Text: A midfrontal incision was made medially to the defect to allow access to the tissues just superior to the left eyebrow. Following careful dissection inferiorly in a supraperiosteal plane to the level of the left eyebrow, several 3-0 monocryl sutures were used to resuspend the eyebrow orbicularis oculi muscular unit to the superior frontal bone periosteum. This resulted in an appropriate reapproximation of static eyebrow symmetry and correction of the left brow ptosis.
Deep Sutures: 5-0 Polysorb
Epidermal Sutures: 5-0 Prolene
Epidermal Closure: running cuticular
Suturegard Intro: Intraoperative tissue expansion was performed, utilizing the SUTUREGARD device, in order to reduce wound tension.
Suturegard Body: The suture ends were repeatedly re-tightened and re-clamped to achieve the desired tissue expansion.
Hemigard Intro: Due to skin fragility and wound tension, it was decided to use HEMIGARD adhesive retention suture devices to permit a linear closure. The skin was cleaned and dried for a 6cm distance away from the wound. Excessive hair, if present, was removed to allow for adhesion.
Hemigard Postcare Instructions: The HEMIGARD strips are to remain completely dry for at least 5-7 days.
Donor Site Anesthesia Type: same as repair anesthesia
Graft Basting Suture (Optional): 5-0 Fast Absorbing Gut
Graft Donor Site Epidermal Sutures (Optional): 5-0 Ethibond
Epidermal Closure Graft Donor Site (Optional): simple interrupted
Graft Donor Site Bandage (Optional-Leave Blank If You Don't Want In Note): Aquaphor and telefa placed on wound. Pressure dressing applied to donor site
Closure 2 Information: This tab is for additional flaps and grafts, including complex repair and grafts and complex repair and flaps. You can also specify a different location for the additional defect, if the location is the same you do not need to select a new one. We will insert the automated text for the repair you select below just as we do for solitary flaps and grafts. Please note that at this time if you select a location with a different insurance zone you will need to override the ICD10 and CPT if appropriate.
Closure 3 Information: This tab is for additional flaps and grafts above and beyond our usual structured repairs.  Please note if you enter information here it will not currently bill and you will need to add the billing information manually.
Wound Care: Aquaphor
Dressing: dry sterile dressing
Wound Care (No Sutures): Petrolatum
Suture Removal: 7 days
Unna Boot Text: An Unna boot was placed to help immobilize the limb and facilitate more rapid healing.
Home Suture Removal Text: Patient was provided instructions on removing sutures and will remove their sutures at home.  If they have any questions or difficulties they will call the office.
Post-Care Instructions: I reviewed with the patient in detail post-care instructions. Patient is not to engage in any heavy lifting, exercise, or swimming for the next 14 days. Should the patient develop any fevers, chills, bleeding, severe pain patient will contact the office immediately.
Pain Refusal Text: I offered to prescribe pain medication but the patient refused to take this medication.
Mauc Instructions: By selecting yes to the question below the MAUC number will be added into the note.  This will be calculated automatically based on the diagnosis chosen, the size entered, the body zone selected (H,M,L) and the specific indications you chose. You will also have the option to override the Mohs AUC if you disagree with the automatically calculated number and this option is found in the Case Summary tab.
Where Do You Want The Question To Include Opioid Counseling Located?: Case Summary Tab
Eye Protection Verbiage: Before proceeding with the stage, a plastic scleral shield was inserted. The globe was anesthetized with a few drops of 1% lidocaine with 1:100,000 epinephrine. Then, an appropriate sized scleral shield was chosen and coated with lacrilube ointment. The shield was gently inserted and left in place for the duration of each stage. After the stage was completed, the shield was gently removed.
Mohs Method Verbiage: An incision at a 45 degree angle following the standard Mohs approach was done and the specimen was harvested as a microscopic controlled layer.
Surgeon/Pathologist Verbiage (Will Incorporate Name Of Surgeon From Intro If Not Blank): operated in two distinct and integrated capacities as the surgeon and pathologist.
Mohs Histo Method Verbiage: Each section was then chromacoded and processed in the Mohs lab using the Mohs protocol and submitted for frozen section.
Subsequent Stages Histo Method Verbiage: Using a similar technique to that described above, a thin layer of tissue was removed from all areas where tumor was visible on the previous stage.  The tissue was again oriented, mapped, dyed, and processed as above.
Mohs Rapid Report Verbiage: The area of clinically evident tumor was marked with skin marking ink and appropriately hatched.  The initial incision was made following the Mohs approach through the skin.  The specimen was taken to the lab, divided into the necessary number of pieces, chromacoded and processed according to the Mohs protocol.  This was repeated in successive stages until a tumor free defect was achieved.
Complex Repair Preamble Text (Leave Blank If You Do Not Want): Extensive wide undermining was performed at least 2 cm in all directions.
Intermediate Repair Preamble Text (Leave Blank If You Do Not Want): Undermining was performed with blunt dissection.
M-Plasty Complex Repair Preamble Text (Leave Blank If You Do Not Want): Extensive wide undermining was performed.
Non-Graft Cartilage Fenestration Text: The cartilage was fenestrated with a 2mm punch biopsy to help facilitate healing.
Graft Cartilage Fenestration Text: The cartilage was fenestrated with a 2mm punch biopsy to help facilitate graft survival and healing.
Secondary Intention Text (Leave Blank If You Do Not Want): The defect will heal with secondary intention.
No Repair - Repaired With Adjacent Surgical Defect Text (Leave Blank If You Do Not Want): After obtaining clear surgical margins the defect was repaired concurrently with another surgical defect which was in close approximation.
Adjacent Tissue Transfer Text: The defect edges were debeveled with a #15 scalpel blade.  Given the location of the defect and the proximity to free margins an adjacent tissue transfer was deemed most appropriate.  Using a sterile surgical marker, an appropriate flap was drawn incorporating the defect and placing the expected incisions within the relaxed skin tension lines where possible.    The area thus outlined was incised deep to adipose tissue with a #15 scalpel blade.  The skin margins were undermined to an appropriate distance in all directions utilizing iris scissors.
Advancement Flap (Single) Text: The defect edges were debeveled with a #15 scalpel blade.  Given the location of the defect and the proximity to free margins a single advancement flap was deemed most appropriate.  Using a sterile surgical marker, an appropriate advancement flap was drawn incorporating the defect and placing the expected incisions within the relaxed skin tension lines where possible.    The area thus outlined was incised deep to adipose tissue with a #15 scalpel blade.  The skin margins were undermined to an appropriate distance in all directions utilizing iris scissors.
Advancement Flap (Double) Text: The defect edges were debeveled with a #15 scalpel blade.  Given the location of the defect and the proximity to free margins a double advancement flap was deemed most appropriate.  Using a sterile surgical marker, the appropriate advancement flaps were drawn incorporating the defect and placing the expected incisions within the relaxed skin tension lines where possible.    The area thus outlined was incised deep to adipose tissue with a #15 scalpel blade.  The skin margins were undermined to an appropriate distance in all directions utilizing iris scissors.
Burow's Advancement Flap Text: The defect edges were debeveled with a #15 scalpel blade.  Given the location of the defect and the proximity to free margins a Burow's advancement flap was deemed most appropriate.  Using a sterile surgical marker, the appropriate advancement flap was drawn incorporating the defect and placing the expected incisions within the relaxed skin tension lines where possible.    The area thus outlined was incised deep to adipose tissue with a #15 scalpel blade.  The skin margins were undermined to an appropriate distance in all directions utilizing iris scissors.
Chonodrocutaneous Helical Advancement Flap Text: The defect edges were debeveled with a #15 scalpel blade.  Given the location of the defect and the proximity to free margins a chondrocutaneous helical advancement flap was deemed most appropriate.  Using a sterile surgical marker, the appropriate advancement flap was drawn incorporating the defect and placing the expected incisions within the relaxed skin tension lines where possible.    The area thus outlined was incised deep to adipose tissue with a #15 scalpel blade.  The skin margins were undermined to an appropriate distance in all directions utilizing iris scissors.
Crescentic Advancement Flap Text: The defect edges were debeveled with a #15 scalpel blade.  Given the location of the defect and the proximity to free margins a crescentic advancement flap was deemed most appropriate.  Using a sterile surgical marker, the appropriate advancement flap was drawn incorporating the defect and placing the expected incisions within the relaxed skin tension lines where possible.    The area thus outlined was incised deep to adipose tissue with a #15 scalpel blade.  The skin margins were undermined to an appropriate distance in all directions utilizing iris scissors.
A-T Advancement Flap Text: The defect edges were debeveled with a #15 scalpel blade.  Given the location of the defect, shape of the defect and the proximity to free margins an A-T advancement flap was deemed most appropriate.  Using a sterile surgical marker, an appropriate advancement flap was drawn incorporating the defect and placing the expected incisions within the relaxed skin tension lines where possible.    The area thus outlined was incised deep to adipose tissue with a #15 scalpel blade.  The skin margins were undermined to an appropriate distance in all directions utilizing iris scissors.
O-T Advancement Flap Text: The defect edges were debeveled with a #15 scalpel blade.  Given the location of the defect, shape of the defect and the proximity to free margins an O-T advancement flap was deemed most appropriate.  Using a sterile surgical marker, an appropriate advancement flap was drawn incorporating the defect and placing the expected incisions within the relaxed skin tension lines where possible.    The area thus outlined was incised deep to adipose tissue with a #15 scalpel blade.  The skin margins were undermined to an appropriate distance in all directions utilizing iris scissors.
O-L Flap Text: The defect edges were debeveled with a #15 scalpel blade.  Given the location of the defect, shape of the defect and the proximity to free margins an O-L flap was deemed most appropriate.  Using a sterile surgical marker, an appropriate advancement flap was drawn incorporating the defect and placing the expected incisions within the relaxed skin tension lines where possible.    The area thus outlined was incised deep to adipose tissue with a #15 scalpel blade.  The skin margins were undermined to an appropriate distance in all directions utilizing iris scissors.
O-Z Flap Text: The defect edges were debeveled with a #15 scalpel blade.  Given the location of the defect, shape of the defect and the proximity to free margins an O-Z flap was deemed most appropriate.  Using a sterile surgical marker, an appropriate transposition flap was drawn incorporating the defect and placing the expected incisions within the relaxed skin tension lines where possible. The area thus outlined was incised deep to adipose tissue with a #15 scalpel blade.  The skin margins were undermined to an appropriate distance in all directions utilizing iris scissors.
Double O-Z Flap Text: The defect edges were debeveled with a #15 scalpel blade.  Given the location of the defect, shape of the defect and the proximity to free margins a Double O-Z flap was deemed most appropriate.  Using a sterile surgical marker, an appropriate transposition flap was drawn incorporating the defect and placing the expected incisions within the relaxed skin tension lines where possible. The area thus outlined was incised deep to adipose tissue with a #15 scalpel blade.  The skin margins were undermined to an appropriate distance in all directions utilizing iris scissors.
V-Y Flap Text: The defect edges were debeveled with a #15 scalpel blade.  Given the location of the defect, shape of the defect and the proximity to free margins a V-Y flap was deemed most appropriate.  Using a sterile surgical marker, an appropriate advancement flap was drawn incorporating the defect and placing the expected incisions within the relaxed skin tension lines where possible.    The area thus outlined was incised deep to adipose tissue with a #15 scalpel blade.  The skin margins were undermined to an appropriate distance in all directions utilizing iris scissors.
Advancement-Rotation Flap Text: The defect edges were debeveled with a #15 scalpel blade.  Given the location of the defect, shape of the defect and the proximity to free margins an advancement-rotation flap was deemed most appropriate.  Using a sterile surgical marker, an appropriate flap was drawn incorporating the defect and placing the expected incisions within the relaxed skin tension lines where possible. The area thus outlined was incised deep to adipose tissue with a #15 scalpel blade.  The skin margins were undermined to an appropriate distance in all directions utilizing iris scissors.
Mercedes Flap Text: The defect edges were debeveled with a #15 scalpel blade.  Given the location of the defect, shape of the defect and the proximity to free margins a Mercedes flap was deemed most appropriate.  Using a sterile surgical marker, an appropriate advancement flap was drawn incorporating the defect and placing the expected incisions within the relaxed skin tension lines where possible. The area thus outlined was incised deep to adipose tissue with a #15 scalpel blade.  The skin margins were undermined to an appropriate distance in all directions utilizing iris scissors.
Modified Advancement Flap Text: The defect edges were debeveled with a #15 scalpel blade.  Given the location of the defect, shape of the defect and the proximity to free margins a modified advancement flap was deemed most appropriate.  Using a sterile surgical marker, an appropriate advancement flap was drawn incorporating the defect and placing the expected incisions within the relaxed skin tension lines where possible.    The area thus outlined was incised deep to adipose tissue with a #15 scalpel blade.  The skin margins were undermined to an appropriate distance in all directions utilizing iris scissors.
Mucosal Advancement Flap Text: Given the location of the defect, shape of the defect and the proximity to free margins a mucosal advancement flap was deemed most appropriate. Incisions were made with a 15 blade scalpel in the appropriate fashion along the cutaneous vermilion border and the mucosal lip. The remaining actinically damaged mucosal tissue was excised.  The mucosal advancement flap was then elevated to the gingival sulcus with care taken to preserve the neurovascular structures and advanced into the primary defect. Care was taken to ensure that precise realignment of the vermilion border was achieved.
Peng Advancement Flap Text: The defect edges were debeveled with a #15 scalpel blade.  Given the location of the defect, shape of the defect and the proximity to free margins a Peng advancement flap was deemed most appropriate.  Using a sterile surgical marker, an appropriate advancement flap was drawn incorporating the defect and placing the expected incisions within the relaxed skin tension lines where possible. The area thus outlined was incised deep to adipose tissue with a #15 scalpel blade.  The skin margins were undermined to an appropriate distance in all directions utilizing iris scissors.
Hatchet Flap Text: The defect edges were debeveled with a #15 scalpel blade.  Given the location of the defect, shape of the defect and the proximity to free margins a hatchet flap based from the glabella was deemed most appropriate.  Using a sterile surgical marker, an appropriate glabellar hatchet flap was drawn incorporating the defect and placing the expected incisions within the relaxed skin tension lines where possible.    The area thus outlined was incised deep to adipose tissue with a #15 scalpel blade.  The skin margins were undermined to an appropriate distance in all directions utilizing iris scissors.
Rotation Flap Text: The defect edges were debeveled with a #15 scalpel blade.  Given the location of the defect, shape of the defect and the proximity to free margins a rotation flap was deemed most appropriate.  Using a sterile surgical marker, an appropriate rotation flap was drawn incorporating the defect and placing the expected incisions within the relaxed skin tension lines where possible.    The area thus outlined was incised deep to adipose tissue with a #15 scalpel blade.  The skin margins were undermined to an appropriate distance in all directions utilizing iris scissors.
Bilateral Rotation Flap Text: The defect edges were debeveled with a #15 scalpel blade. Given the location of the defect, shape of the defect and the proximity to free margins a bilateral rotation flap was deemed most appropriate. Using a sterile surgical marker, an appropriate rotation flap was drawn incorporating the defect and placing the expected incisions within the relaxed skin tension lines where possible. The area thus outlined was incised deep to adipose tissue with a #15 scalpel blade. The skin margins were undermined to an appropriate distance in all directions utilizing iris scissors. Following this, the designed flap was carried over into the primary defect and sutured into place.
Spiral Flap Text: The defect edges were debeveled with a #15 scalpel blade.  Given the location of the defect, shape of the defect and the proximity to free margins a spiral flap was deemed most appropriate.  Using a sterile surgical marker, an appropriate rotation flap was drawn incorporating the defect and placing the expected incisions within the relaxed skin tension lines where possible. The area thus outlined was incised deep to adipose tissue with a #15 scalpel blade.  The skin margins were undermined to an appropriate distance in all directions utilizing iris scissors.
Staged Advancement Flap Text: The defect edges were debeveled with a #15 scalpel blade.  Given the location of the defect, shape of the defect and the proximity to free margins a staged advancement flap was deemed most appropriate.  Using a sterile surgical marker, an appropriate advancement flap was drawn incorporating the defect and placing the expected incisions within the relaxed skin tension lines where possible. The area thus outlined was incised deep to adipose tissue with a #15 scalpel blade.  The skin margins were undermined to an appropriate distance in all directions utilizing iris scissors.
Star Wedge Flap Text: The defect edges were debeveled with a #15 scalpel blade.  Given the location of the defect, shape of the defect and the proximity to free margins a star wedge flap was deemed most appropriate.  Using a sterile surgical marker, an appropriate rotation flap was drawn incorporating the defect and placing the expected incisions within the relaxed skin tension lines where possible. The area thus outlined was incised deep to adipose tissue with a #15 scalpel blade.  The skin margins were undermined to an appropriate distance in all directions utilizing iris scissors.
Transposition Flap Text: The defect edges were debeveled with a #15 scalpel blade.  Given the location of the defect and the proximity to free margins a transposition flap was deemed most appropriate.  Using a sterile surgical marker, an appropriate transposition flap was drawn incorporating the defect.    The area thus outlined was incised deep to adipose tissue with a #15 scalpel blade.  The skin margins were undermined to an appropriate distance in all directions utilizing iris scissors.
Muscle Hinge Flap Text: The defect edges were debeveled with a #15 scalpel blade.  Given the size, depth and location of the defect and the proximity to free margins a muscle hinge flap was deemed most appropriate.  Using a sterile surgical marker, an appropriate hinge flap was drawn incorporating the defect. The area thus outlined was incised with a #15 scalpel blade.  The skin margins were undermined to an appropriate distance in all directions utilizing iris scissors.
Mustarde Flap Text: The defect edges were debeveled with a #15 scalpel blade.  Given the size, depth and location of the defect and the proximity to free margins a Mustarde flap was deemed most appropriate.  Using a sterile surgical marker, an appropriate flap was drawn incorporating the defect. The area thus outlined was incised with a #15 scalpel blade.  The skin margins were undermined to an appropriate distance in all directions utilizing iris scissors.
Nasal Turnover Hinge Flap Text: The defect edges were debeveled with a #15 scalpel blade.  Given the size, depth, location of the defect and the defect being full thickness a nasal turnover hinge flap was deemed most appropriate.  Using a sterile surgical marker, an appropriate hinge flap was drawn incorporating the defect. The area thus outlined was incised with a #15 scalpel blade. The flap was designed to recreate the nasal mucosal lining and the alar rim. The skin margins were undermined to an appropriate distance in all directions utilizing iris scissors.
Nasalis-Muscle-Based Myocutaneous Island Pedicle Flap Text: Using a #15 blade, an incision was made around the donor flap to the level of the nasalis muscle. Wide lateral undermining was then performed in both the subcutaneous plane above the nasalis muscle, and in a submuscular plane just above periosteum. This allowed the formation of a free nasalis muscle axial pedicle (based on the angular artery) which was still attached to the actual cutaneous flap, increasing its mobility and vascular viability. Hemostasis was obtained with pinpoint electrocoagulation. The flap was mobilized into position and the pivotal anchor points positioned and stabilized with buried interrupted sutures. Subcutaneous and dermal tissues were closed in a multilayered fashion with sutures. Tissue redundancies were excised, and the epidermal edges were apposed without significant tension and sutured with sutures.
Orbicularis Oris Muscle Flap Text: The defect edges were debeveled with a #15 scalpel blade.  Given that the defect affected the competency of the oral sphincter an orbicularis oris muscle flap was deemed most appropriate to restore this competency and normal muscle function.  Using a sterile surgical marker, an appropriate flap was drawn incorporating the defect. The area thus outlined was incised with a #15 scalpel blade.
Melolabial Transposition Flap Text: The defect edges were debeveled with a #15 scalpel blade.  Given the location of the defect and the proximity to free margins a melolabial flap was deemed most appropriate.  Using a sterile surgical marker, an appropriate melolabial transposition flap was drawn incorporating the defect.    The area thus outlined was incised deep to adipose tissue with a #15 scalpel blade.  The skin margins were undermined to an appropriate distance in all directions utilizing iris scissors.
Rhombic Flap Text: The defect edges were debeveled with a #15 scalpel blade.  Given the location of the defect and the proximity to free margins a rhombic flap was deemed most appropriate.  Using a sterile surgical marker, an appropriate rhombic flap was drawn incorporating the defect.    The area thus outlined was incised deep to adipose tissue with a #15 scalpel blade.  The skin margins were undermined to an appropriate distance in all directions utilizing iris scissors.
Rhomboid Transposition Flap Text: The defect edges were debeveled with a #15 scalpel blade.  Given the location of the defect and the proximity to free margins a rhomboid transposition flap was deemed most appropriate.  Using a sterile surgical marker, an appropriate rhomboid flap was drawn incorporating the defect.    The area thus outlined was incised deep to adipose tissue with a #15 scalpel blade.  The skin margins were undermined to an appropriate distance in all directions utilizing iris scissors.
Bi-Rhombic Flap Text: The defect edges were debeveled with a #15 scalpel blade.  Given the location of the defect and the proximity to free margins a bi-rhombic flap was deemed most appropriate.  Using a sterile surgical marker, an appropriate rhombic flap was drawn incorporating the defect. The area thus outlined was incised deep to adipose tissue with a #15 scalpel blade.  The skin margins were undermined to an appropriate distance in all directions utilizing iris scissors.
Helical Rim Advancement Flap Text: The defect edges were debeveled with a #15 blade scalpel.  Given the location of the defect and the proximity to free margins (helical rim) a double helical rim advancement flap was deemed most appropriate.  Using a sterile surgical marker, the appropriate advancement flaps were drawn incorporating the defect and placing the expected incisions between the helical rim and antihelix where possible.  The area thus outlined was incised through and through with a #15 scalpel blade.  With a skin hook and iris scissors, the flaps were gently and sharply undermined and freed up.
Bilateral Helical Rim Advancement Flap Text: The defect edges were debeveled with a #15 blade scalpel.  Given the location of the defect and the proximity to free margins (helical rim) a bilateral helical rim advancement flap was deemed most appropriate.  Using a sterile surgical marker, the appropriate advancement flaps were drawn incorporating the defect and placing the expected incisions between the helical rim and antihelix where possible.  The area thus outlined was incised through and through with a #15 scalpel blade.  With a skin hook and iris scissors, the flaps were gently and sharply undermined and freed up.
Ear Star Wedge Flap Text: The defect edges were debeveled with a #15 blade scalpel.  Given the location of the defect and the proximity to free margins (helical rim) an ear star wedge flap was deemed most appropriate.  Using a sterile surgical marker, the appropriate flap was drawn incorporating the defect and placing the expected incisions between the helical rim and antihelix where possible.  The area thus outlined was incised through and through with a #15 scalpel blade.
Banner Transposition Flap Text: The defect edges were debeveled with a #15 scalpel blade.  Given the location of the defect and the proximity to free margins a Banner transposition flap was deemed most appropriate.  Using a sterile surgical marker, an appropriate flap drawn around the defect. The area thus outlined was incised deep to adipose tissue with a #15 scalpel blade.  The skin margins were undermined to an appropriate distance in all directions utilizing iris scissors.
Bilobed Flap Text: The defect edges were debeveled with a #15 scalpel blade.  Given the location of the defect and the proximity to free margins a bilobe flap was deemed most appropriate.  Using a sterile surgical marker, an appropriate bilobe flap drawn around the defect.    The area thus outlined was incised deep to adipose tissue with a #15 scalpel blade.  The skin margins were undermined to an appropriate distance in all directions utilizing iris scissors.
Bilobed Transposition Flap Text: The defect edges were debeveled with a #15 scalpel blade.  Given the location of the defect and the proximity to free margins a bilobed transposition flap was deemed most appropriate.  Using a sterile surgical marker, an appropriate bilobe flap drawn around the defect.    The area thus outlined was incised deep to adipose tissue with a #15 scalpel blade.  The skin margins were undermined to an appropriate distance in all directions utilizing iris scissors.
Trilobed Flap Text: The defect edges were debeveled with a #15 scalpel blade.  Given the location of the defect and the proximity to free margins a trilobed flap was deemed most appropriate.  Using a sterile surgical marker, an appropriate trilobed flap drawn around the defect.    The area thus outlined was incised deep to adipose tissue with a #15 scalpel blade.  The skin margins were undermined to an appropriate distance in all directions utilizing iris scissors.
Dorsal Nasal Flap Text: The defect edges were debeveled with a #15 scalpel blade.  Given the location of the defect and the proximity to free margins a dorsal nasal flap,based upon the glabellar folds, was deemed most appropriate.  Using a sterile surgical marker, an appropriate dorsal nasal flap was drawn around the defect.    The area thus outlined was incised deep to adipose tissue with a #15 scalpel blade.  The skin margins were undermined to an appropriate distance in all directions utilizing iris scissors.
Island Pedicle Flap Text: The defect edges were debeveled with a #15 scalpel blade.  Given the location of the defect, shape of the defect and the proximity to free margins an island pedicle advancement flap was deemed most appropriate.  Using a sterile surgical marker, an appropriate advancement flap was drawn incorporating the defect, outlining the appropriate donor tissue and placing the expected incisions within the relaxed skin tension lines where possible.    The area thus outlined was incised deep to adipose tissue with a #15 scalpel blade.  The skin margins were undermined to an appropriate distance in all directions around the primary defect and laterally outward around the island pedicle utilizing iris scissors.  There was minimal undermining beneath the pedicle flap.
Island Pedicle Flap With Canthal Suspension Text: The defect edges were debeveled with a #15 scalpel blade.  Given the location of the defect, shape of the defect and the proximity to free margins an island pedicle advancement flap was deemed most appropriate.  Using a sterile surgical marker, an appropriate advancement flap was drawn incorporating the defect, outlining the appropriate donor tissue and placing the expected incisions within the relaxed skin tension lines where possible. The area thus outlined was incised deep to adipose tissue with a #15 scalpel blade.  The skin margins were undermined to an appropriate distance in all directions around the primary defect and laterally outward around the island pedicle utilizing iris scissors.  There was minimal undermining beneath the pedicle flap. A suspension suture was placed in the canthal tendon to prevent tension and prevent ectropion.
Alar Island Pedicle Flap Text: The defect edges were debeveled with a #15 scalpel blade.  Given the location of the defect, shape of the defect and the proximity to the alar rim an island pedicle advancement flap was deemed most appropriate.  Using a sterile surgical marker, an appropriate advancement flap was drawn incorporating the defect, outlining the appropriate donor tissue and placing the expected incisions within the nasal ala running parallel to the alar rim. The area thus outlined was incised with a #15 scalpel blade.  The skin margins were undermined minimally to an appropriate distance in all directions around the primary defect and laterally outward around the island pedicle utilizing iris scissors.  There was minimal undermining beneath the pedicle flap.
Double Island Pedicle Flap Text: The defect edges were debeveled with a #15 scalpel blade.  Given the location of the defect, shape of the defect and the proximity to free margins a double island pedicle advancement flap was deemed most appropriate.  Using a sterile surgical marker, an appropriate advancement flap was drawn incorporating the defect, outlining the appropriate donor tissue and placing the expected incisions within the relaxed skin tension lines where possible.    The area thus outlined was incised deep to adipose tissue with a #15 scalpel blade.  The skin margins were undermined to an appropriate distance in all directions around the primary defect and laterally outward around the island pedicle utilizing iris scissors.  There was minimal undermining beneath the pedicle flap.
Island Pedicle Flap-Requiring Vessel Identification Text: The defect edges were debeveled with a #15 scalpel blade.  Given the location of the defect, shape of the defect and the proximity to free margins an island pedicle advancement flap was deemed most appropriate.  Using a sterile surgical marker, an appropriate advancement flap was drawn, based on the axial vessel mentioned above, incorporating the defect, outlining the appropriate donor tissue and placing the expected incisions within the relaxed skin tension lines where possible.    The area thus outlined was incised deep to adipose tissue with a #15 scalpel blade.  The skin margins were undermined to an appropriate distance in all directions around the primary defect and laterally outward around the island pedicle utilizing iris scissors.  There was minimal undermining beneath the pedicle flap.
Keystone Flap Text: The defect edges were debeveled with a #15 scalpel blade.  Given the location of the defect, shape of the defect a keystone flap was deemed most appropriate.  Using a sterile surgical marker, an appropriate keystone flap was drawn incorporating the defect, outlining the appropriate donor tissue and placing the expected incisions within the relaxed skin tension lines where possible. The area thus outlined was incised deep to adipose tissue with a #15 scalpel blade.  The skin margins were undermined to an appropriate distance in all directions around the primary defect and laterally outward around the flap utilizing iris scissors.
O-T Plasty Text: The defect edges were debeveled with a #15 scalpel blade.  Given the location of the defect, shape of the defect and the proximity to free margins an O-T plasty was deemed most appropriate.  Using a sterile surgical marker, an appropriate O-T plasty was drawn incorporating the defect and placing the expected incisions within the relaxed skin tension lines where possible.    The area thus outlined was incised deep to adipose tissue with a #15 scalpel blade.  The skin margins were undermined to an appropriate distance in all directions utilizing iris scissors.
O-Z Plasty Text: The defect edges were debeveled with a #15 scalpel blade.  Given the location of the defect, shape of the defect and the proximity to free margins an O-Z plasty (double transposition flap) was deemed most appropriate.  Using a sterile surgical marker, the appropriate transposition flaps were drawn incorporating the defect and placing the expected incisions within the relaxed skin tension lines where possible.    The area thus outlined was incised deep to adipose tissue with a #15 scalpel blade.  The skin margins were undermined to an appropriate distance in all directions utilizing iris scissors.  Hemostasis was achieved with electrocautery.  The flaps were then transposed into place, one clockwise and the other counterclockwise, and anchored with interrupted buried subcutaneous sutures.
Double O-Z Plasty Text: The defect edges were debeveled with a #15 scalpel blade.  Given the location of the defect, shape of the defect and the proximity to free margins a Double O-Z plasty (double transposition flap) was deemed most appropriate.  Using a sterile surgical marker, the appropriate transposition flaps were drawn incorporating the defect and placing the expected incisions within the relaxed skin tension lines where possible. The area thus outlined was incised deep to adipose tissue with a #15 scalpel blade.  The skin margins were undermined to an appropriate distance in all directions utilizing iris scissors.  Hemostasis was achieved with electrocautery.  The flaps were then transposed into place, one clockwise and the other counterclockwise, and anchored with interrupted buried subcutaneous sutures.
V-Y Plasty Text: The defect edges were debeveled with a #15 scalpel blade.  Given the location of the defect, shape of the defect and the proximity to free margins an V-Y advancement flap was deemed most appropriate.  Using a sterile surgical marker, an appropriate advancement flap was drawn incorporating the defect and placing the expected incisions within the relaxed skin tension lines where possible.    The area thus outlined was incised deep to adipose tissue with a #15 scalpel blade.  The skin margins were undermined to an appropriate distance in all directions utilizing iris scissors.
H Plasty Text: Given the location of the defect, shape of the defect and the proximity to free margins a H-plasty was deemed most appropriate for repair.  Using a sterile surgical marker, the appropriate advancement arms of the H-plasty were drawn incorporating the defect and placing the expected incisions within the relaxed skin tension lines where possible. The area thus outlined was incised deep to adipose tissue with a #15 scalpel blade. The skin margins were undermined to an appropriate distance in all directions utilizing iris scissors.  The opposing advancement arms were then advanced into place in opposite direction and anchored with interrupted buried subcutaneous sutures.
W Plasty Text: The lesion was extirpated to the level of the fat with a #15 scalpel blade.  Given the location of the defect, shape of the defect and the proximity to free margins a W-plasty was deemed most appropriate for repair.  Using a sterile surgical marker, the appropriate transposition arms of the W-plasty were drawn incorporating the defect and placing the expected incisions within the relaxed skin tension lines where possible.    The area thus outlined was incised deep to adipose tissue with a #15 scalpel blade.  The skin margins were undermined to an appropriate distance in all directions utilizing iris scissors.  The opposing transposition arms were then transposed into place in opposite direction and anchored with interrupted buried subcutaneous sutures.
Z Plasty Text: The lesion was extirpated to the level of the fat with a #15 scalpel blade.  Given the location of the defect, shape of the defect and the proximity to free margins a Z-plasty was deemed most appropriate for repair.  Using a sterile surgical marker, the appropriate transposition arms of the Z-plasty were drawn incorporating the defect and placing the expected incisions within the relaxed skin tension lines where possible.    The area thus outlined was incised deep to adipose tissue with a #15 scalpel blade.  The skin margins were undermined to an appropriate distance in all directions utilizing iris scissors.  The opposing transposition arms were then transposed into place in opposite direction and anchored with interrupted buried subcutaneous sutures.
Double Z Plasty Text: The lesion was extirpated to the level of the fat with a #15 scalpel blade. Given the location of the defect, shape of the defect and the proximity to free margins a double Z-plasty was deemed most appropriate for repair. Using a sterile surgical marker, the appropriate transposition arms of the double Z-plasty were drawn incorporating the defect and placing the expected incisions within the relaxed skin tension lines where possible. The area thus outlined was incised deep to adipose tissue with a #15 scalpel blade. The skin margins were undermined to an appropriate distance in all directions utilizing iris scissors. The opposing transposition arms were then transposed and carried over into place in opposite direction and anchored with interrupted buried subcutaneous sutures.
Zygomaticofacial Flap Text: Given the location of the defect, shape of the defect and the proximity to free margins a zygomaticofacial flap was deemed most appropriate for repair.  Using a sterile surgical marker, the appropriate flap was drawn incorporating the defect and placing the expected incisions within the relaxed skin tension lines where possible. The area thus outlined was incised deep to adipose tissue with a #15 scalpel blade with preservation of a vascular pedicle.  The skin margins were undermined to an appropriate distance in all directions utilizing iris scissors.  The flap was then placed into the defect and anchored with interrupted buried subcutaneous sutures.
Cheek Interpolation Flap Text: A decision was made to reconstruct the defect utilizing an interpolation axial flap and a staged reconstruction.  A telfa template was made of the defect.  This telfa template was then used to outline the Cheek Interpolation flap.  The donor area for the pedicle flap was then injected with anesthesia.  The flap was excised through the skin and subcutaneous tissue down to the layer of the underlying musculature.  The interpolation flap was carefully excised within this deep plane to maintain its blood supply.  The edges of the donor site were undermined.   The donor site was closed in a primary fashion.  The pedicle was then rotated into position and sutured.  Once the tube was sutured into place, adequate blood supply was confirmed with blanching and refill.  The pedicle was then wrapped with xeroform gauze and dressed appropriately with a telfa and gauze bandage to ensure continued blood supply and protect the attached pedicle.
Cheek-To-Nose Interpolation Flap Text: A decision was made to reconstruct the defect utilizing an interpolation axial flap and a staged reconstruction.  A telfa template was made of the defect.  This telfa template was then used to outline the Cheek-To-Nose Interpolation flap.  The donor area for the pedicle flap was then injected with anesthesia.  The flap was excised through the skin and subcutaneous tissue down to the layer of the underlying musculature.  The interpolation flap was carefully excised within this deep plane to maintain its blood supply.  The edges of the donor site were undermined.   The donor site was closed in a primary fashion.  The pedicle was then rotated into position and sutured.  Once the tube was sutured into place, adequate blood supply was confirmed with blanching and refill.  The pedicle was then wrapped with xeroform gauze and dressed appropriately with a telfa and gauze bandage to ensure continued blood supply and protect the attached pedicle.
Interpolation Flap Text: A decision was made to reconstruct the defect utilizing an interpolation axial flap and a staged reconstruction.  A telfa template was made of the defect.  This telfa template was then used to outline the interpolation flap.  The donor area for the pedicle flap was then injected with anesthesia.  The flap was excised through the skin and subcutaneous tissue down to the layer of the underlying musculature.  The interpolation flap was carefully excised within this deep plane to maintain its blood supply.  The edges of the donor site were undermined.   The donor site was closed in a primary fashion.  The pedicle was then rotated into position and sutured.  Once the tube was sutured into place, adequate blood supply was confirmed with blanching and refill.  The pedicle was then wrapped with xeroform gauze and dressed appropriately with a telfa and gauze bandage to ensure continued blood supply and protect the attached pedicle.
Melolabial Interpolation Flap Text: A decision was made to reconstruct the defect utilizing an interpolation axial flap and a staged reconstruction.  A telfa template was made of the defect.  This telfa template was then used to outline the melolabial interpolation flap.  The donor area for the pedicle flap was then injected with anesthesia.  The flap was excised through the skin and subcutaneous tissue down to the layer of the underlying musculature.  The pedicle flap was carefully excised within this deep plane to maintain its blood supply.  The edges of the donor site were undermined.   The donor site was closed in a primary fashion.  The pedicle was then rotated into position and sutured.  Once the tube was sutured into place, adequate blood supply was confirmed with blanching and refill.  The pedicle was then wrapped with xeroform gauze and dressed appropriately with a telfa and gauze bandage to ensure continued blood supply and protect the attached pedicle.
Mastoid Interpolation Flap Text: A decision was made to reconstruct the defect utilizing an interpolation axial flap and a staged reconstruction.  A telfa template was made of the defect.  This telfa template was then used to outline the mastoid interpolation flap.  The donor area for the pedicle flap was then injected with anesthesia.  The flap was excised through the skin and subcutaneous tissue down to the layer of the underlying musculature.  The pedicle flap was carefully excised within this deep plane to maintain its blood supply.  The edges of the donor site were undermined.   The donor site was closed in a primary fashion.  The pedicle was then rotated into position and sutured.  Once the tube was sutured into place, adequate blood supply was confirmed with blanching and refill.  The pedicle was then wrapped with xeroform gauze and dressed appropriately with a telfa and gauze bandage to ensure continued blood supply and protect the attached pedicle.
Posterior Auricular Interpolation Flap Text: A decision was made to reconstruct the defect utilizing an interpolation axial flap and a staged reconstruction.  A telfa template was made of the defect.  This telfa template was then used to outline the posterior auricular interpolation flap.  The donor area for the pedicle flap was then injected with anesthesia.  The flap was excised through the skin and subcutaneous tissue down to the layer of the underlying musculature.  The pedicle flap was carefully excised within this deep plane to maintain its blood supply.  The edges of the donor site were undermined.   The donor site was closed in a primary fashion.  The pedicle was then rotated into position and sutured.  Once the tube was sutured into place, adequate blood supply was confirmed with blanching and refill.  The pedicle was then wrapped with xeroform gauze and dressed appropriately with a telfa and gauze bandage to ensure continued blood supply and protect the attached pedicle.
Paramedian Forehead Flap Text: A decision was made to reconstruct the defect utilizing an interpolation axial flap and a staged reconstruction.  A telfa template was made of the defect.  This telfa template was then used to outline the paramedian forehead pedicle flap.  The donor area for the pedicle flap was then injected with anesthesia.  The flap was excised through the skin and subcutaneous tissue down to the layer of the underlying musculature.  The pedicle flap was carefully excised within this deep plane to maintain its blood supply.  The edges of the donor site were undermined.   The donor site was closed in a primary fashion.  The pedicle was then rotated into position and sutured.  Once the tube was sutured into place, adequate blood supply was confirmed with blanching and refill.  The pedicle was then wrapped with xeroform gauze and dressed appropriately with a telfa and gauze bandage to ensure continued blood supply and protect the attached pedicle.
Abbe Flap (Upper To Lower Lip) Text: The defect of the lower lip was assessed and measured.  Given the location and size of the defect, an Abbe flap was deemed most appropriate.  Using a sterile surgical marker, an appropriate Abbe flap was measured and drawn on the upper lip. Local anesthesia was then infiltrated.  A scalpel was then used to incise the upper lip through and through the skin, vermilion, muscle and mucosa, leaving the flap pedicled on the opposite side.  The flap was then rotated and transferred to the lower lip defect.  The flap was then sutured into place with a three layer technique, closing the orbicularis oris muscle layer with subcutaneous buried sutures, followed by a mucosal layer and an epidermal layer.
Abbe Flap (Lower To Upper Lip) Text: The defect of the upper lip was assessed and measured.  Given the location and size of the defect, an Abbe flap was deemed most appropriate.  Using a sterile surgical marker, an appropriate Abbe flap was measured and drawn on the lower lip. Local anesthesia was then infiltrated. A scalpel was then used to incise the upper lip through and through the skin, vermilion, muscle and mucosa, leaving the flap pedicled on the opposite side.  The flap was then rotated and transferred to the lower lip defect.  The flap was then sutured into place with a three layer technique, closing the orbicularis oris muscle layer with subcutaneous buried sutures, followed by a mucosal layer and an epidermal layer.
Estlander Flap (Upper To Lower Lip) Text: The defect of the lower lip was assessed and measured.  Given the location and size of the defect, an Estlander flap was deemed most appropriate.  Using a sterile surgical marker, an appropriate Estlander flap was measured and drawn on the upper lip. Local anesthesia was then infiltrated. A scalpel was then used to incise the lateral aspect of the flap, through skin, muscle and mucosa, leaving the flap pedicled medially.  The flap was then rotated and positioned to fill the lower lip defect.  The flap was then sutured into place with a three layer technique, closing the orbicularis oris muscle layer with subcutaneous buried sutures, followed by a mucosal layer and an epidermal layer.
Cheiloplasty (Less Than 50%) Text: A decision was made to reconstruct the defect with a  cheiloplasty.  The defect was undermined extensively.  Additional obicularis oris muscle was excised with a 15 blade scalpel.  The defect was converted into a full thickness wedge, of less than 50% of the vertical height of the lip, to facilite a better cosmetic result.  Small vessels were then tied off with 5-0 monocyrl. The obicularis oris, superficial fascia, adipose and dermis were then reapproximated.  After the deeper layers were approximated the epidermis was reapproximated with particular care given to realign the vermilion border.
Cheiloplasty (Complex) Text: A decision was made to reconstruct the defect with a  cheiloplasty.  The defect was undermined extensively.  Additional obicularis oris muscle was excised with a 15 blade scalpel.  The defect was converted into a full thickness wedge to facilite a better cosmetic result.  Small vessels were then tied off with 5-0 monocyrl. The obicularis oris, superficial fascia, adipose and dermis were then reapproximated.  After the deeper layers were approximated the epidermis was reapproximated with particular care given to realign the vermilion border.
Ear Wedge Repair Text: A wedge excision was completed by carrying down an excision through the full thickness of the ear and cartilage with an inward facing Burow's triangle. The wound was then closed in a layered fashion.
Full Thickness Lip Wedge Repair (Flap) Text: Given the location of the defect and the proximity to free margins a full thickness wedge repair was deemed most appropriate.  Using a sterile surgical marker, the appropriate repair was drawn incorporating the defect and placing the expected incisions perpendicular to the vermilion border.  The vermilion border was also meticulously outlined to ensure appropriate reapproximation during the repair.  The area thus outlined was incised through and through with a #15 scalpel blade.  The muscularis and dermis were reaproximated with deep sutures following hemostasis. Care was taken to realign the vermilion border before proceeding with the superficial closure.  Once the vermilion was realigned the superfical and mucosal closure was finished.
Ftsg Text: The defect edges were debeveled with a #15 scalpel blade.  Given the location of the defect, shape of the defect and the proximity to free margins a full thickness skin graft was deemed most appropriate.  Using a sterile surgical marker, the primary defect shape was transferred to the donor site. The area thus outlined was incised deep to adipose tissue with a #15 scalpel blade.  The harvested graft was then trimmed of adipose tissue until only dermis and epidermis was left.  The skin margins of the secondary defect were undermined to an appropriate distance in all directions utilizing iris scissors.  The secondary defect was closed with interrupted buried subcutaneous sutures.  The skin edges were then re-apposed with running  sutures.  The skin graft was then placed in the primary defect and oriented appropriately.
Split-Thickness Skin Graft Text: The defect edges were debeveled with a #15 scalpel blade.  Given the location of the defect, shape of the defect and the proximity to free margins a split thickness skin graft was deemed most appropriate.  Using a sterile surgical marker, the primary defect shape was transferred to the donor site. The split thickness graft was then harvested.  The skin graft was then placed in the primary defect and oriented appropriately.
Burow's Graft Text: The defect edges were debeveled with a #15 scalpel blade.  Given the location of the defect, shape of the defect, the proximity to free margins and the presence of a standing cone deformity a Burow's skin graft was deemed most appropriate. The standing cone was removed and this tissue was then trimmed to the shape of the primary defect. The adipose tissue was also removed until only dermis and epidermis were left.  The skin margins of the secondary defect were undermined to an appropriate distance in all directions utilizing iris scissors.  The secondary defect was closed with interrupted buried subcutaneous sutures.  The skin edges were then re-apposed with running  sutures.  The skin graft was then placed in the primary defect and oriented appropriately.
Cartilage Graft Text: The defect edges were debeveled with a #15 scalpel blade.  Given the location of the defect, shape of the defect, the fact the defect involved a full thickness cartilage defect a cartilage graft was deemed most appropriate.  An appropriate donor site was identified, cleansed, and anesthetized. The cartilage graft was then harvested and transferred to the recipient site, oriented appropriately and then sutured into place.  The secondary defect was then repaired using a primary closure.
Composite Graft Text: The defect edges were debeveled with a #15 scalpel blade.  Given the location of the defect, shape of the defect, the proximity to free margins and the fact the defect was full thickness a composite graft was deemed most appropriate.  The defect was outline and then transferred to the donor site.  A full thickness graft was then excised from the donor site. The graft was then placed in the primary defect, oriented appropriately and then sutured into place.  The secondary defect was then repaired using a primary closure.
Epidermal Autograft Text: The defect edges were debeveled with a #15 scalpel blade.  Given the location of the defect, shape of the defect and the proximity to free margins an epidermal autograft was deemed most appropriate.  Using a sterile surgical marker, the primary defect shape was transferred to the donor site. The epidermal graft was then harvested.  The skin graft was then placed in the primary defect and oriented appropriately.
Dermal Autograft Text: The defect edges were debeveled with a #15 scalpel blade.  Given the location of the defect, shape of the defect and the proximity to free margins a dermal autograft was deemed most appropriate.  Using a sterile surgical marker, the primary defect shape was transferred to the donor site. The area thus outlined was incised deep to adipose tissue with a #15 scalpel blade.  The harvested graft was then trimmed of adipose and epidermal tissue until only dermis was left.  The skin graft was then placed in the primary defect and oriented appropriately.
Skin Substitute Text: The defect edges were debeveled with a #15 scalpel blade.  Given the location of the defect, shape of the defect and the proximity to free margins a skin substitute graft was deemed most appropriate.  The graft material was trimmed to fit the size of the defect. The graft was then placed in the primary defect and oriented appropriately.
Tissue Cultured Epidermal Autograft Text: The defect edges were debeveled with a #15 scalpel blade.  Given the location of the defect, shape of the defect and the proximity to free margins a tissue cultured epidermal autograft was deemed most appropriate.  The graft was then trimmed to fit the size of the defect.  The graft was then placed in the primary defect and oriented appropriately.
Xenograft Text: The defect edges were debeveled with a #15 scalpel blade.  Given the location of the defect, shape of the defect and the proximity to free margins a xenograft was deemed most appropriate.  The graft was then trimmed to fit the size of the defect.  The graft was then placed in the primary defect and oriented appropriately.
Purse String (Simple) Text: Given the location of the defect and the characteristics of the surrounding skin a purse string closure was deemed most appropriate.  Undermining was performed circumfirentially around the surgical defect.  A purse string suture was then placed and tightened.
Purse String (Intermediate) Text: Given the location of the defect and the characteristics of the surrounding skin a purse string intermediate closure was deemed most appropriate.  Undermining was performed circumfirentially around the surgical defect.  A purse string suture was then placed and tightened.
Partial Purse String (Simple) Text: Given the location of the defect and the characteristics of the surrounding skin a simple purse string closure was deemed most appropriate.  Undermining was performed circumfirentially around the surgical defect.  A purse string suture was then placed and tightened. Wound tension only allowed a partial closure of the circular defect.
Partial Purse String (Intermediate) Text: Given the location of the defect and the characteristics of the surrounding skin an intermediate purse string closure was deemed most appropriate.  Undermining was performed circumfirentially around the surgical defect.  A purse string suture was then placed and tightened. Wound tension only allowed a partial closure of the circular defect.
Localized Dermabrasion With Wire Brush Text: The patient was draped in routine manner.  Localized dermabrasion using 3 x 17 mm wire brush was performed in routine manner to papillary dermis. This spot dermabrasion is being performed to complete skin cancer reconstruction. It also will eliminate the other sun damaged precancerous cells that are known to be part of the regional effect of a lifetime's worth of sun exposure. This localized dermabrasion is therapeutic and should not be considered cosmetic in any regard.
Tarsorrhaphy Text: A tarsorrhaphy was performed using Frost sutures.
Intermediate Repair And Flap Additional Text (Will Appearing After The Standard Complex Repair Text): The intermediate repair was not sufficient to completely close the primary defect. The remaining additional defect was repaired with the flap mentioned below.
Intermediate Repair And Graft Additional Text (Will Appearing After The Standard Complex Repair Text): The intermediate repair was not sufficient to completely close the primary defect. The remaining additional defect was repaired with the graft mentioned below.
Complex Repair And Flap Additional Text (Will Appearing After The Standard Complex Repair Text): The complex repair was not sufficient to completely close the primary defect. The remaining additional defect was repaired with the flap mentioned below.
Complex Repair And Graft Additional Text (Will Appearing After The Standard Complex Repair Text): The complex repair was not sufficient to completely close the primary defect. The remaining additional defect was repaired with the graft mentioned below.
Unique Flap 1 Name: Myocutaneous Island pedicle Flap
Unique Flap 2 Name: Peng Flap
Unique Flap 3 Name: Mercedes Flap
Unique Flap 4 Name: Banner Flap
Unique Flap 5 Name: tunneled myocutaneous flap
Unique Flap 6 Name: Celeste-B?ch flap
Unique Flap 7 Name: Mustarde flap
Unique Flap 8 Name: East to West Flap
Unique Flap 1 Text: A decision was made to reconstruct the defect utilizing a myocutaneous Island pedicle Flap based on the levator labii superioris muscle.  A telfa template was made of the defect.  This telfa template was then used to outline the myocutaneous flap, based along the meilolabial fold.  The donor area for the pedicle flap was then injected with anesthesia.  The flap was excised through the skin and subcutaneous tissue down to the layer of the underlying musculature.  The myocutaneous flap was carefully excised within this deep plane to maintain its blood supply. Based on the muscle. The edges of the donor site were undermined.   The donor site was closed in a primary fashion to the point of transposition.  The pedicle was then transposed into position and sutured.  Once the flap was sutured into place, adequate blood supply was confirmed with blanching and refill.
Unique Flap 2 Text: A decision was made to reconstruct the defect utilizing a Peng Flap (Bilateral Advancement Rotation Flap). Given the location of the defect and the proximity to free margins, this flap was deemed most appropriate.  Using a sterile surgical marker, the appropriate rotation flaps were drawn incorporating the defect and placing the expected incisions within the relaxed skin tension lines where possible.    The area thus outlined was incised deep to adipose tissue with a #15 scalpel blade.  The skin margins were undermined to an appropriate distance in all directions utilizing iris scissors.
Unique Flap 3 Text: The defect edges were debeveled with a #15 scalpel blade.  Given the location of the defect, shape of the defect and the proximity to free margins a Mercedes (double advancement flap) was deemed most appropriate.  Using a sterile surgical marker, the appropriate transposition flaps were drawn incorporating the defect and placing the expected incisions within the relaxed skin tension lines where possible.    The area thus outlined was incised deep to adipose tissue with a #15 scalpel blade.  The skin margins were undermined to an appropriate distance in all directions utilizing iris scissors.  Hemostasis was achieved with electrocautery.  The flaps were then advanced into the defect and anchored with interrupted buried subcutaneous sutures.
Unique Flap 4 Text: The defect edges were debeveled with a #15 scalpel blade.  Given the location of the defect and the proximity to free margins a Banner transposition flap was deemed most appropriate.  Using a sterile surgical marker, an appropriate Banner transposition flap was drawn incorporating the defect.    The area thus outlined was incised deep to adipose tissue with a #15 scalpel blade.  The skin margins were undermined to an appropriate distance in all directions utilizing iris scissors.
Unique Flap 5 Text: A decision was made to reconstruct the defect utilizing a tunneled myocutaneous Island pedicle Flap based on the anterior auricularis muscle.  A telfa template was made of the defect.  This telfa template was then used to outline the myocutaneous flap, based along the preauricular fold.  The donor area for the pedicle flap was then injected with anesthesia.  The flap was excised through the skin and subcutaneous tissue down to the layer of the underlying musculature.  The myocutaneous flap was carefully excised within this deep plane to maintain its blood supply based on the muscle. The edges of the donor site were undermined.   The donor site was closed in a primary fashion to the point of transposition.  The pedicle was then transposed through a tunnel into position and sutured.  Once the flap was sutured into place, adequate blood supply was confirmed with blanching and refill.
Unique Flap 6 Text: A decision was made to reconstruct the defect utilizing an Anti-aging-B?ch Flap (Bilateral helical Advancement Rotation Flap). Given the location of the defect and the proximity to free margins, this flap was deemed most appropriate.  Using a sterile surgical marker, the appropriate flaps were drawn incorporating the defect and placing the expected incisions within the relaxed skin tension lines where possible.  The area thus outlined was incised deep to adipose tissue with a #15 scalpel blade.  The skin margins were undermined to an appropriate distance in all directions utilizing iris scissors. Cartilage was incorporated into the flap arms to maintain helical anatomy.
Unique Flap 7 Text: A decision was made to reconstruct the defect utilizing a Mustarde Flap (Advancement Rotation Flap). Given the location of the defect and the proximity to free margins, this flap was deemed most appropriate.  Using a sterile surgical marker, the appropriate rotation flap was drawn incorporating the defect and placing the expected incisions within the relaxed skin tension lines where possible.    The area thus outlined was incised deep to adipose tissue with a #15 scalpel blade.  The skin margins were undermined to an appropriate distance in all directions utilizing iris scissors. The flap was advanced and rotated under the eyelid with a sling created laterally to keep ectropion minimal.
Unique Flap 8 Text: A decision was made to reconstruct the defect utilizing an East to West Flap (Modified Burows Advancement Flap). Given the location of the defect and the proximity to free margins, this flap was deemed most appropriate.  Using a sterile surgical marker, the appropriate advancement flaps were drawn incorporating the defect and placing the expected incisions within the relaxed skin tension lines where possible.    The area thus outlined was incised deep to adipose tissue with a #15 scalpel blade.  The skin margins were undermined to an appropriate distance in all directions utilizing iris scissors. Minimal alar distortion was created with flap approximation.
Manual Repair Warning Statement: We plan on removing the manually selected variable below in favor of our much easier automatic structured text blocks found in the previous tab. We decided to do this to help make the flow better and give you the full power of structured data. Manual selection is never going to be ideal in our platform and I would encourage you to avoid using manual selection from this point on, especially since I will be sunsetting this feature. It is important that you do one of two things with the customized text below. First, you can save all of the text in a word file so you can have it for future reference. Second, transfer the text to the appropriate area in the Library tab. Lastly, if there is a flap or graft type which we do not have you need to let us know right away so I can add it in before the variable is hidden. No need to panic, we plan to give you roughly 6 months to make the change.
Same Histology In Subsequent Stages Text: The pattern and morphology of the tumor is as described in the first stage.
No Residual Tumor Seen Histology Text: There were no malignant cells seen in the sections examined.
Inflammation Suggestive Of Cancer Camouflage Histology Text: There was a dense lymphocytic infiltrate which prevented adequate histologic evaluation of adjacent structures.
Bcc Histology Text: There were numerous aggregates of basaloid cells.
Bcc Infiltrative Histology Text: There were numerous aggregates of basaloid cells demonstrating an infiltrative pattern.
Mart-1 - Positive Histology Text: MART-1 staining demonstrates areas of higher density and clustering of melanocytes with Pagetoid spread upwards within the epidermis. The surgical margins are positive for tumor cells.
Mart-1 - Negative Histology Text: MART-1 staining demonstrates a normal density and pattern of melanocytes along the dermal-epidermal junction. The surgical margins are negative for tumor cells.
Information: Selecting Yes will display possible errors in your note based on the variables you have selected. This validation is only offered as a suggestion for you. PLEASE NOTE THAT THE VALIDATION TEXT WILL BE REMOVED WHEN YOU FINALIZE YOUR NOTE. IF YOU WANT TO FAX A PRELIMINARY NOTE YOU WILL NEED TO TOGGLE THIS TO 'NO' IF YOU DO NOT WANT IT IN YOUR FAXED NOTE.

## 2023-06-08 ENCOUNTER — APPOINTMENT (RX ONLY)
Dept: URBAN - METROPOLITAN AREA CLINIC 36 | Facility: CLINIC | Age: 78
Setting detail: DERMATOLOGY
End: 2023-06-08

## 2023-06-08 PROBLEM — C44.229 SQUAMOUS CELL CARCINOMA OF SKIN OF LEFT EAR AND EXTERNAL AURICULAR CANAL: Status: ACTIVE | Noted: 2023-06-08

## 2023-06-08 PROCEDURE — 15275 SKIN SUB GRAFT FACE/NK/HF/G: CPT

## 2023-06-08 PROCEDURE — ? REPAIR NOTE

## 2023-06-08 NOTE — PROCEDURE: REPAIR NOTE

## 2023-06-15 ENCOUNTER — APPOINTMENT (RX ONLY)
Dept: URBAN - METROPOLITAN AREA CLINIC 36 | Facility: CLINIC | Age: 78
Setting detail: DERMATOLOGY
End: 2023-06-15

## 2023-06-15 DIAGNOSIS — Z48.817 ENCOUNTER FOR SURGICAL AFTERCARE FOLLOWING SURGERY ON THE SKIN AND SUBCUTANEOUS TISSUE: ICD-10-CM

## 2023-06-15 PROCEDURE — ? DRESSING CHANGE (GLOBAL PERIOD)

## 2023-06-15 PROCEDURE — 99024 POSTOP FOLLOW-UP VISIT: CPT

## 2023-06-15 ASSESSMENT — LOCATION SIMPLE DESCRIPTION DERM: LOCATION SIMPLE: LEFT EAR

## 2023-06-15 ASSESSMENT — LOCATION DETAILED DESCRIPTION DERM: LOCATION DETAILED: LEFT ANTIHELIX

## 2023-06-15 ASSESSMENT — LOCATION ZONE DERM: LOCATION ZONE: EAR

## 2023-06-15 NOTE — PROCEDURE: DRESSING CHANGE (GLOBAL PERIOD)
Detail Level: Detailed
Add 45448 Cpt? (Important Note: In 2017 The Use Of 26880 Is Being Tracked By Cms To Determine Future Global Period Reimbursement For Global Periods): yes

## 2023-06-21 ENCOUNTER — APPOINTMENT (RX ONLY)
Dept: URBAN - METROPOLITAN AREA CLINIC 36 | Facility: CLINIC | Age: 78
Setting detail: DERMATOLOGY
End: 2023-06-21

## 2023-06-21 DIAGNOSIS — Z48.817 ENCOUNTER FOR SURGICAL AFTERCARE FOLLOWING SURGERY ON THE SKIN AND SUBCUTANEOUS TISSUE: ICD-10-CM

## 2023-06-21 PROCEDURE — 99024 POSTOP FOLLOW-UP VISIT: CPT

## 2023-06-21 PROCEDURE — ? POST-OP WOUND CHECK

## 2023-06-21 ASSESSMENT — LOCATION SIMPLE DESCRIPTION DERM: LOCATION SIMPLE: LEFT EAR

## 2023-06-21 ASSESSMENT — LOCATION DETAILED DESCRIPTION DERM: LOCATION DETAILED: LEFT ANTIHELIX

## 2023-06-21 ASSESSMENT — LOCATION ZONE DERM: LOCATION ZONE: EAR

## 2023-06-21 NOTE — PROCEDURE: POST-OP WOUND CHECK
Detail Level: Detailed
Add 90427 Cpt? (Important Note: In 2017 The Use Of 09044 Is Being Tracked By Cms To Determine Future Global Period Reimbursement For Global Periods): yes

## 2023-07-10 ENCOUNTER — APPOINTMENT (RX ONLY)
Dept: URBAN - METROPOLITAN AREA CLINIC 36 | Facility: CLINIC | Age: 78
Setting detail: DERMATOLOGY
End: 2023-07-10

## 2023-07-10 DIAGNOSIS — Z48.817 ENCOUNTER FOR SURGICAL AFTERCARE FOLLOWING SURGERY ON THE SKIN AND SUBCUTANEOUS TISSUE: ICD-10-CM

## 2023-07-10 PROCEDURE — ? POST-OP WOUND CHECK

## 2023-07-10 ASSESSMENT — LOCATION DETAILED DESCRIPTION DERM: LOCATION DETAILED: LEFT ANTIHELIX

## 2023-07-10 ASSESSMENT — LOCATION ZONE DERM: LOCATION ZONE: EAR

## 2023-07-10 ASSESSMENT — LOCATION SIMPLE DESCRIPTION DERM: LOCATION SIMPLE: LEFT EAR

## 2023-07-10 NOTE — PROCEDURE: POST-OP WOUND CHECK
Body Location Override (Optional - Billing Will Still Be Based On Selected Body Map Location If Applicable): left ear
Detail Level: Detailed
Add 68624 Cpt? (Important Note: In 2017 The Use Of 66991 Is Being Tracked By Cms To Determine Future Global Period Reimbursement For Global Periods): no

## 2023-07-17 ENCOUNTER — DOCUMENTATION (OUTPATIENT)
Dept: HEALTH INFORMATION MANAGEMENT | Facility: OTHER | Age: 78
End: 2023-07-17
Payer: MEDICARE

## 2023-07-20 ENCOUNTER — OFFICE VISIT (OUTPATIENT)
Dept: OCCUPATIONAL MEDICINE | Facility: CLINIC | Age: 78
End: 2023-07-20

## 2023-07-20 DIAGNOSIS — Z02.4 ENCOUNTER FOR COMMERCIAL DRIVER MEDICAL EXAMINATION (CDME): ICD-10-CM

## 2023-07-20 PROCEDURE — 7100 PR DOT PHYSICAL: Performed by: PREVENTIVE MEDICINE

## 2023-08-03 ENCOUNTER — HOSPITAL ENCOUNTER (OUTPATIENT)
Facility: MEDICAL CENTER | Age: 78
End: 2023-08-03
Payer: MEDICARE

## 2023-08-03 ENCOUNTER — OFFICE VISIT (OUTPATIENT)
Dept: URGENT CARE | Facility: CLINIC | Age: 78
End: 2023-08-03
Payer: MEDICARE

## 2023-08-03 VITALS
HEIGHT: 68 IN | RESPIRATION RATE: 20 BRPM | OXYGEN SATURATION: 97 % | TEMPERATURE: 97.8 F | BODY MASS INDEX: 28.9 KG/M2 | DIASTOLIC BLOOD PRESSURE: 58 MMHG | WEIGHT: 190.7 LBS | HEART RATE: 82 BPM | SYSTOLIC BLOOD PRESSURE: 120 MMHG

## 2023-08-03 DIAGNOSIS — R30.0 DYSURIA: ICD-10-CM

## 2023-08-03 DIAGNOSIS — N30.90 CYSTITIS: ICD-10-CM

## 2023-08-03 LAB
APPEARANCE UR: CLEAR
BILIRUB UR STRIP-MCNC: NEGATIVE MG/DL
COLOR UR AUTO: NORMAL
GLUCOSE UR STRIP.AUTO-MCNC: NEGATIVE MG/DL
KETONES UR STRIP.AUTO-MCNC: NEGATIVE MG/DL
LEUKOCYTE ESTERASE UR QL STRIP.AUTO: NORMAL
NITRITE UR QL STRIP.AUTO: NEGATIVE
PH UR STRIP.AUTO: 5.5 [PH] (ref 5–8)
PROT UR QL STRIP: NORMAL MG/DL
RBC UR QL AUTO: NORMAL
SP GR UR STRIP.AUTO: 1.01
UROBILINOGEN UR STRIP-MCNC: NORMAL MG/DL

## 2023-08-03 PROCEDURE — 3078F DIAST BP <80 MM HG: CPT

## 2023-08-03 PROCEDURE — 99213 OFFICE O/P EST LOW 20 MIN: CPT

## 2023-08-03 PROCEDURE — 3074F SYST BP LT 130 MM HG: CPT

## 2023-08-03 PROCEDURE — 81002 URINALYSIS NONAUTO W/O SCOPE: CPT

## 2023-08-03 PROCEDURE — 87086 URINE CULTURE/COLONY COUNT: CPT

## 2023-08-03 RX ORDER — SULFAMETHOXAZOLE AND TRIMETHOPRIM 800; 160 MG/1; MG/1
1 TABLET ORAL 3 TIMES DAILY
Qty: 9 TABLET | Refills: 0 | Status: SHIPPED | OUTPATIENT
Start: 2023-08-03 | End: 2023-08-06

## 2023-08-03 RX ORDER — PHENAZOPYRIDINE HYDROCHLORIDE 100 MG/1
100 TABLET, FILM COATED ORAL 3 TIMES DAILY PRN
Qty: 6 TABLET | Refills: 0 | Status: SHIPPED | OUTPATIENT
Start: 2023-08-03 | End: 2023-11-26

## 2023-08-03 ASSESSMENT — FIBROSIS 4 INDEX: FIB4 SCORE: 3.1

## 2023-08-03 NOTE — PROGRESS NOTES
Chief Complaint   Patient presents with    UTI     Pain on the inside of the penis on the right side, discomfort in the pelvic area x 1 week       HISTORY OF PRESENT ILLNESS: Patient is a pleasant 77 y.o. male who presents to urgent care today complaints of pain with urination along with some pelvic discomfort for the last week.  Denies any nausea or vomiting, he does have low back pain but he states this is chronic.  Patient has a history of kidney stones.  He has not taken anything for this.    Patient Active Problem List    Diagnosis Date Noted    Ureterolithiasis 01/06/2023    COPD (chronic obstructive pulmonary disease) ([facility]) 08/26/2022    Preventative health care 08/26/2022    Hyperglycemia 03/18/2022    BMI 26.0-26.9,adult 03/18/2022    History of abdominal aortic aneurysm 03/18/2022    History of lymphoma 03/18/2022    History of osteomyelitis 03/18/2022    Reactive airway disease without complication 03/18/2022    Peripheral artery disease ([facility]) 11/11/2020    Tobacco dependence 11/11/2020    Benign prostatic hyperplasia 02/26/2020       Allergies:Patient has no known allergies.    Current Outpatient Medications Ordered in Epic   Medication Sig Dispense Refill    sulfamethoxazole-trimethoprim (BACTRIM DS) 800-160 MG tablet Take 1 Tablet by mouth 3 times a day for 3 days. 9 Tablet 0    phenazopyridine (PYRIDIUM) 100 MG Tab Take 1 Tablet by mouth 3 times a day as needed for Mild Pain. 6 Tablet 0    sildenafil citrate (VIAGRA) 25 MG Tab Take 0.5 Tablets by mouth 1 time a day as needed for Erectile Dysfunction. 12.5 mg = 1/2 tablet 10 Tablet 5    Multiple Vitamins-Minerals (MULTIVITAMIN ADULTS 50+ PO) Take 1 Tablet by mouth every day.      atorvastatin (LIPITOR) 40 MG Tab Take 1 Tablet by mouth every evening. 90 Tablet 3    tamsulosin (FLOMAX) 0.4 MG capsule Take 2 Capsules by mouth every day. 180 Capsule 3    tiotropium (SPIRIVA HANDIHALER) 18 MCG Cap Place 1 Capsule into inhaler and inhale every day. 30  Capsule 11    albuterol 108 (90 Base) MCG/ACT Aero Soln inhalation aerosol ALBUTEROL SULFATE  (90 Base) MCG/ACT AERS      ibuprofen (MOTRIN) 400 MG Tab Take 800 mg by mouth every 8 hours as needed for Mild Pain.       No current Epic-ordered facility-administered medications on file.       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   No family history on file.    ROS:  Review of Systems   Constitutional: Negative for fever, chills, weight loss, malaise, and fatigue.   HENT: Negative for ear pain, nosebleeds, congestion, sore throat and neck pain.    Eyes: Negative for vision changes.   Neuro: Negative for headache, sensory changes, weakness, seizure, LOC.   Cardiovascular: Negative for chest pain, palpitations, orthopnea and leg swelling.   Respiratory: Negative for cough, sputum production, shortness of breath and wheezing.   Gastrointestinal: Positive for abdominal pain, negative nausea, vomiting or diarrhea.   Genitourinary: Positive for dysuria, urgency and frequency.  Musculoskeletal: Negative for falls, neck pain, back pain, joint pain, myalgias.   Skin: Negative for rash, diaphoresis.     Exam:  /58 (BP Location:  "Right arm, Patient Position: Sitting, BP Cuff Size: Adult)   Pulse 82   Temp 36.6 °C (97.8 °F) (Temporal)   Resp 20   Ht 1.715 m (5' 7.5\")   Wt 86.5 kg (190 lb 11.2 oz)   SpO2 97%   General: well-nourished, well-developed male in NAD  Head: normocephalic, atraumatic  Eyes: PERRLA, no conjunctival injection, acuity grossly intact, lids normal.  Ears: normal shape and symmetry, no tenderness, no discharge. External canals are without any significant edema or erythema. Tympanic membranes are without any inflammation, no effusion. Gross auditory acuity is intact.  Nose: symmetrical without tenderness, no discharge.  Mouth/Throat: reasonable hygiene, no erythema, exudates or tonsillar enlargement.  Neck: no masses, range of motion within normal limits, no tracheal deviation. No obvious thyroid enlargement.   Lymph: no cervical adenopathy. No supraclavicular adenopathy.   Neuro: alert and oriented. Cranial nerves 1-12 grossly intact. No sensory deficit.   Cardiovascular: regular rate and rhythm. No edema.  Pulmonary: no distress. Chest is symmetrical with respiration, no wheezes, crackles, or rhonchi.   Abdomen: soft, non-tender, no guarding, no hepatosplenomegaly.  Negative CVA tenderness  Musculoskeletal: no clubbing, appropriate muscle tone, gait is stable.  Skin: warm, dry, intact, no clubbing, no cyanosis, no rashes.   Psych: appropriate mood, affect, judgement.     Assessment/Plan:  1. Dysuria  sulfamethoxazole-trimethoprim (BACTRIM DS) 800-160 MG tablet    URINE CULTURE(NEW)    phenazopyridine (PYRIDIUM) 100 MG Tab    POCT Urinalysis      2. Cystitis  sulfamethoxazole-trimethoprim (BACTRIM DS) 800-160 MG tablet    URINE CULTURE(NEW)        This is a 77-year-old male who comes in today with ongoing complaints of frequency, urgency and pain with urination for the last week.  He also notes some lower abdominal pain.  Patient denies any fevers, no nausea or vomiting.  He does note low back pain however this is " chronic and reproducible.  Physical exam his exam is otherwise normal, negative CVA tenderness, abdomen is soft and nontender.  POCT urinalysis reveals trace blood along with leukocytes, urine will ultimately be sent for culture.  As patient is currently symptomatic I did go ahead and start antibiotics to include Bactrim, patient states he has extensive history of UTIs, he states he normally takes Bactrim 3 times a day.  Although this is outside of the standard in looking at the patient's chart I did find Bactrim 3 times a day.  Medication was ultimately called in, patient advised to drink plenty of fluids, Pyridium for comfort measures.  Advised patient to follow-up if he continues to get worse or does not improve.    Supportive care, differential diagnoses, and indications for immediate follow-up discussed with patient.   Pathogenesis of diagnosis discussed including typical length and natural progression.   Instructed to return to clinic or nearest emergency department for any change in condition, further concerns, or worsening of symptoms.  Patient states understanding of the plan of care and discharge instructions.  Instructed to make an appointment, for follow up, with  primary care provider.    Please note that this dictation was created using voice recognition software. I have made every reasonable attempt to correct obvious errors, but I expect that there are errors of grammar and possibly content that I did not discover before finalizing the note.      Melody WHITE

## 2023-08-05 LAB
BACTERIA UR CULT: NORMAL
SIGNIFICANT IND 70042: NORMAL
SITE SITE: NORMAL
SOURCE SOURCE: NORMAL

## 2023-08-14 ENCOUNTER — OFFICE VISIT (OUTPATIENT)
Dept: URGENT CARE | Facility: CLINIC | Age: 78
End: 2023-08-14
Payer: MEDICARE

## 2023-08-14 VITALS
WEIGHT: 190 LBS | HEIGHT: 68 IN | BODY MASS INDEX: 28.79 KG/M2 | HEART RATE: 70 BPM | RESPIRATION RATE: 16 BRPM | OXYGEN SATURATION: 98 % | SYSTOLIC BLOOD PRESSURE: 118 MMHG | DIASTOLIC BLOOD PRESSURE: 68 MMHG | TEMPERATURE: 97.6 F

## 2023-08-14 DIAGNOSIS — R30.0 DYSURIA: ICD-10-CM

## 2023-08-14 LAB
APPEARANCE UR: CLEAR
BILIRUB UR STRIP-MCNC: NEGATIVE MG/DL
COLOR UR AUTO: YELLOW
GLUCOSE UR STRIP.AUTO-MCNC: NEGATIVE MG/DL
KETONES UR STRIP.AUTO-MCNC: NEGATIVE MG/DL
LEUKOCYTE ESTERASE UR QL STRIP.AUTO: NEGATIVE
NITRITE UR QL STRIP.AUTO: NEGATIVE
PH UR STRIP.AUTO: 5.5 [PH] (ref 5–8)
PROT UR QL STRIP: NEGATIVE MG/DL
RBC UR QL AUTO: NORMAL
SP GR UR STRIP.AUTO: 1.01
UROBILINOGEN UR STRIP-MCNC: 0.2 MG/DL

## 2023-08-14 PROCEDURE — 3074F SYST BP LT 130 MM HG: CPT

## 2023-08-14 PROCEDURE — 99213 OFFICE O/P EST LOW 20 MIN: CPT

## 2023-08-14 PROCEDURE — 3078F DIAST BP <80 MM HG: CPT

## 2023-08-14 PROCEDURE — 81002 URINALYSIS NONAUTO W/O SCOPE: CPT

## 2023-08-14 ASSESSMENT — ENCOUNTER SYMPTOMS
FEVER: 0
FLANK PAIN: 0

## 2023-08-14 ASSESSMENT — FIBROSIS 4 INDEX: FIB4 SCORE: 3.1

## 2023-08-14 NOTE — PROGRESS NOTES
Subjective:   Ronal aHir is a 77 y.o. male who presents for UTI (S/s have not improved since last visit )      HPI: This is a 77-year-old male who presents today for reevaluation of dysuria.  Patient reports that he was seen here on 8\3\23 and was placed on antibiotics for suspected acute cystitis.  He reports that he has had no improvement since his last visit.  Urine culture was negative for bacteria.  He denies fevers, chills, body aches.  No back pain.  He does report history of kidney stones in the past.      Review of Systems   Constitutional:  Negative for fever.   Genitourinary:  Positive for dysuria. Negative for flank pain, frequency, hematuria and urgency.   All other systems reviewed and are negative.      Medications:    Current Outpatient Medications on File Prior to Visit   Medication Sig Dispense Refill    sildenafil citrate (VIAGRA) 25 MG Tab Take 0.5 Tablets by mouth 1 time a day as needed for Erectile Dysfunction. 12.5 mg = 1/2 tablet 10 Tablet 5    Multiple Vitamins-Minerals (MULTIVITAMIN ADULTS 50+ PO) Take 1 Tablet by mouth every day.      atorvastatin (LIPITOR) 40 MG Tab Take 1 Tablet by mouth every evening. 90 Tablet 3    tamsulosin (FLOMAX) 0.4 MG capsule Take 2 Capsules by mouth every day. 180 Capsule 3    tiotropium (SPIRIVA HANDIHALER) 18 MCG Cap Place 1 Capsule into inhaler and inhale every day. 30 Capsule 11    albuterol 108 (90 Base) MCG/ACT Aero Soln inhalation aerosol ALBUTEROL SULFATE  (90 Base) MCG/ACT AERS      ibuprofen (MOTRIN) 400 MG Tab Take 800 mg by mouth every 8 hours as needed for Mild Pain.      phenazopyridine (PYRIDIUM) 100 MG Tab Take 1 Tablet by mouth 3 times a day as needed for Mild Pain. (Patient not taking: Reported on 8/14/2023) 6 Tablet 0     No current facility-administered medications on file prior to visit.        Allergies:   Patient has no known allergies.    Problem List:   Patient Active Problem List   Diagnosis    Peripheral artery disease  "([facility])    Tobacco dependence    Benign prostatic hyperplasia    Hyperglycemia    BMI 26.0-26.9,adult    History of abdominal aortic aneurysm    History of lymphoma    History of osteomyelitis    Reactive airway disease without complication    COPD (chronic obstructive pulmonary disease) ([facility])    Preventative health care    Ureterolithiasis        Surgical History:  Past Surgical History:   Procedure Laterality Date    SC CYSTOSCOPY,INSERT URETERAL STENT Left 2023    Procedure: CYSTOSCOPY, WITH URETERAL STENT INSERTION;  Surgeon: Addison Seymour M.D.;  Location: SURGERY [facility];  Service: Urology    SC CYSTO/URETERO/PYELOSCOPY, DX Left 2023    Procedure: URETEROSCOPY;  Surgeon: Addison Seymour M.D.;  Location: SURGERY [facility];  Service: Urology    LASERTRIPSY Left 2023    Procedure: LITHOTRIPSY, USING LASER;  Surgeon: Addison Seymour M.D.;  Location: SURGERY [facility];  Service: Urology    AORTOBIFEM BYPASS  2020    Procedure: CREATION, BYPASS, ARTERIAL, AORTA TO FEMORAL, BILATERAL;  Surgeon: Jimi Morrison M.D.;  Location: SURGERY [facility];  Service: General    OTHER ABDOMINAL SURGERY      OTHER ORTHOPEDIC SURGERY         Past Social Hx:   Social History     Tobacco Use    Smoking status: Every Day     Packs/day: 2.00     Years: 54.00     Pack years: 108.00     Types: Cigarettes     Start date:      Last attempt to quit: 11/3/2020     Years since quittin.7    Smokeless tobacco: Never   Vaping Use    Vaping Use: Never used   Substance Use Topics    Alcohol use: Not Currently    Drug use: Never          Problem list, medications, and allergies reviewed by myself today in Epic.     Objective:     /68   Pulse 70   Temp 36.4 °C (97.6 °F)   Resp 16   Ht 1.727 m (5' 8\")   Wt 86.2 kg (190 lb)   SpO2 98%   BMI 28.89 kg/m²     Physical Exam  Vitals and nursing note reviewed.   Constitutional:       General: He is not in acute distress.     Appearance: Normal " appearance. He is normal weight. He is not ill-appearing, toxic-appearing or diaphoretic.   HENT:      Head: Normocephalic.   Cardiovascular:      Rate and Rhythm: Normal rate.   Pulmonary:      Effort: Pulmonary effort is normal.   Skin:     General: Skin is warm and dry.   Neurological:      Mental Status: He is alert.         Assessment/Plan:     Diagnosis and associated orders:   1. Dysuria               Comments/MDM:   Pt is clinically stable at today's acute urgent care visit.  No acute distress noted. Appropriate for outpatient management at this time.     Acute problem.  Unable to complete full physical exam as patient left urgent care clinic prior to finishing exam secondary to having to go to work.  POC UA in clinic is unremarkable.  I have discussed these results with patient.  Patient also noted to have negative urine culture on his last visit.  I also discussed this with patient.  There is a decreased concern and suspicion for acute UTI.  Patient does report that he has a urologist that he will follow-up with.  I have advised patient to return for any new or worsening signs or symptoms and follow-up with urology for recheck.           Discussed DDx, management options (risks,benefits, and alternatives to planned treatment), natural progression and supportive care.  Expressed understanding and the treatment plan was agreed upon. Questions were encouraged and answered   Return to urgent care prn if new or worsening sx or if there is no improvement in condition prn.    Educated in Red flags and indications to immediately call 911 or present to the Emergency Department.   Advised the patient to follow-up with the primary care physician for recheck, reevaluation, and consideration of further management.    I personally reviewed prior external notes and test results pertinent to today's visit.  I have independently reviewed and interpreted all diagnostics ordered during this urgent care acute visit.          Please note that this dictation was created using voice recognition software. I have made a reasonable attempt to correct obvious errors, but I expect that there are errors of grammar and possibly content that I did not discover before finalizing the note.    This note was electronically signed by CHRISTOPHER Ochoa

## 2023-08-31 ENCOUNTER — APPOINTMENT (RX ONLY)
Dept: URBAN - METROPOLITAN AREA CLINIC 4 | Facility: CLINIC | Age: 78
Setting detail: DERMATOLOGY
End: 2023-08-31

## 2023-08-31 DIAGNOSIS — L57.0 ACTINIC KERATOSIS: ICD-10-CM

## 2023-08-31 DIAGNOSIS — Z85.828 PERSONAL HISTORY OF OTHER MALIGNANT NEOPLASM OF SKIN: ICD-10-CM | Status: STABLE

## 2023-08-31 DIAGNOSIS — D49.2 NEOPLASM OF UNSPECIFIED BEHAVIOR OF BONE, SOFT TISSUE, AND SKIN: ICD-10-CM

## 2023-08-31 DIAGNOSIS — Z71.89 OTHER SPECIFIED COUNSELING: ICD-10-CM

## 2023-08-31 PROCEDURE — 17000 DESTRUCT PREMALG LESION: CPT | Mod: 59

## 2023-08-31 PROCEDURE — ? COUNSELING

## 2023-08-31 PROCEDURE — 17003 DESTRUCT PREMALG LES 2-14: CPT

## 2023-08-31 PROCEDURE — 11102 TANGNTL BX SKIN SINGLE LES: CPT

## 2023-08-31 PROCEDURE — ? PRESCRIPTION

## 2023-08-31 PROCEDURE — ? MEDICATION COUNSELING

## 2023-08-31 PROCEDURE — ? LIQUID NITROGEN

## 2023-08-31 PROCEDURE — 99213 OFFICE O/P EST LOW 20 MIN: CPT | Mod: 25

## 2023-08-31 PROCEDURE — ? BIOPSY BY SHAVE METHOD

## 2023-08-31 PROCEDURE — ? SUNSCREEN RECOMMENDATIONS

## 2023-08-31 RX ORDER — FLUOROURACIL 2 G/40G
1 CREAM TOPICAL BID
Qty: 40 | Refills: 0 | Status: ERX | COMMUNITY
Start: 2023-08-31

## 2023-08-31 RX ADMIN — FLUOROURACIL 1: 2 CREAM TOPICAL at 00:00

## 2023-08-31 ASSESSMENT — LOCATION DETAILED DESCRIPTION DERM
LOCATION DETAILED: LEFT PROXIMAL DORSAL FOREARM
LOCATION DETAILED: LEFT SUPERIOR FOREHEAD
LOCATION DETAILED: LEFT NASAL ALA
LOCATION DETAILED: LEFT LATERAL MALAR CHEEK
LOCATION DETAILED: RIGHT RADIAL DORSAL HAND
LOCATION DETAILED: SUPERIOR MID FOREHEAD
LOCATION DETAILED: LEFT SUPERIOR CENTRAL MALAR CHEEK
LOCATION DETAILED: RIGHT PROXIMAL RADIAL DORSAL FOREARM
LOCATION DETAILED: LEFT ANTIHELIX
LOCATION DETAILED: LEFT INFERIOR FOREHEAD
LOCATION DETAILED: RIGHT ANTERIOR DISTAL UPPER ARM
LOCATION DETAILED: LEFT VENTRAL PROXIMAL FOREARM
LOCATION DETAILED: RIGHT ELBOW
LOCATION DETAILED: RIGHT PROXIMAL DORSAL FOREARM
LOCATION DETAILED: LEFT ANTECUBITAL SKIN

## 2023-08-31 ASSESSMENT — LOCATION SIMPLE DESCRIPTION DERM
LOCATION SIMPLE: LEFT NOSE
LOCATION SIMPLE: LEFT EAR
LOCATION SIMPLE: LEFT ELBOW
LOCATION SIMPLE: LEFT FOREARM
LOCATION SIMPLE: RIGHT HAND
LOCATION SIMPLE: LEFT CHEEK
LOCATION SIMPLE: RIGHT ELBOW
LOCATION SIMPLE: LEFT FOREHEAD
LOCATION SIMPLE: RIGHT UPPER ARM
LOCATION SIMPLE: RIGHT FOREARM
LOCATION SIMPLE: SUPERIOR FOREHEAD

## 2023-08-31 ASSESSMENT — LOCATION ZONE DERM
LOCATION ZONE: EAR
LOCATION ZONE: HAND
LOCATION ZONE: NOSE
LOCATION ZONE: FACE
LOCATION ZONE: ARM

## 2023-08-31 NOTE — PROCEDURE: LIQUID NITROGEN
Render Post-Care Instructions In Note?: no
Number Of Freeze-Thaw Cycles: 1 freeze-thaw cycle
Detail Level: Detailed
Show Aperture Variable?: Yes
Aperture Size (Optional): C
Application Tool (Optional): Cry-AC
Duration Of Freeze Thaw-Cycle (Seconds): 3
Post-Care Instructions: I reviewed with the patient in detail post-care instructions. Patient is to wear sunprotection, and avoid picking at any of the treated lesions. Pt may apply Vaseline to crusted or scabbing areas.
Consent: The patient's consent was obtained including but not limited to risks of crusting, scabbing, blistering, scarring, darker or lighter pigmentary change, recurrence, incomplete removal and infection.

## 2023-08-31 NOTE — HPI: SKIN LESIONS
Is This A New Presentation, Or A Follow-Up?: Skin Lesions
How Severe Is Your Skin Lesion?: mild
Have Your Skin Lesions Been Treated?: not been treated
Which Family Member (Optional)?: Aunt (Paternal)

## 2023-08-31 NOTE — PROCEDURE: MEDICATION COUNSELING
Clindamycin Pregnancy And Lactation Text: This medication can be used in pregnancy if certain situations. Clindamycin is also present in breast milk.
5-Fu Pregnancy And Lactation Text: This medication is Pregnancy Category X and contraindicated in pregnancy and in women who may become pregnant. It is unknown if this medication is excreted in breast milk.
Finasteride Pregnancy And Lactation Text: This medication is absolutely contraindicated during pregnancy. It is unknown if it is excreted in breast milk.
Fluconazole Pregnancy And Lactation Text: This medication is Pregnancy Category C and it isn't know if it is safe during pregnancy. It is also excreted in breast milk.
Erivedge Counseling- I discussed with the patient the risks of Erivedge including but not limited to nausea, vomiting, diarrhea, constipation, weight loss, changes in the sense of taste, decreased appetite, muscle spasms, and hair loss.  The patient verbalized understanding of the proper use and possible adverse effects of Erivedge.  All of the patient's questions and concerns were addressed.
Low Dose Naltrexone Counseling- I discussed with the patient the potential risks and side effects of low dose naltrexone including but not limited to: more vivid dreams, headaches, nausea, vomiting, abdominal pain, fatigue, dizziness, and anxiety.
Cimetidine Counseling:  I discussed with the patient the risks of Cimetidine including but not limited to gynecomastia, headache, diarrhea, nausea, drowsiness, arrhythmias, pancreatitis, skin rashes, psychosis, bone marrow suppression and kidney toxicity.
Colchicine Pregnancy And Lactation Text: This medication is Pregnancy Category C and isn't considered safe during pregnancy. It is excreted in breast milk.
Topical Ketoconazole Counseling: Patient counseled that this medication may cause skin irritation or allergic reactions.  In the event of skin irritation, the patient was advised to reduce the amount of the drug applied or use it less frequently.   The patient verbalized understanding of the proper use and possible adverse effects of ketoconazole.  All of the patient's questions and concerns were addressed.
Dupixent Pregnancy And Lactation Text: This medication likely crosses the placenta but the risk for the fetus is uncertain. This medication is excreted in breast milk.
Quinolones Counseling:  I discussed with the patient the risks of fluoroquinolones including but not limited to GI upset, allergic reaction, drug rash, diarrhea, dizziness, photosensitivity, yeast infections, liver function test abnormalities, tendonitis/tendon rupture.
Skyrizi Pregnancy And Lactation Text: The risk during pregnancy and breastfeeding is uncertain with this medication.
Solaraze Pregnancy And Lactation Text: This medication is Pregnancy Category B and is considered safe. There is some data to suggest avoiding during the third trimester. It is unknown if this medication is excreted in breast milk.
Stelara Counseling:  I discussed with the patient the risks of ustekinumab including but not limited to immunosuppression, malignancy, posterior leukoencephalopathy syndrome, and serious infections.  The patient understands that monitoring is required including a PPD at baseline and must alert us or the primary physician if symptoms of infection or other concerning signs are noted.
Cibinqo Pregnancy And Lactation Text: It is unknown if this medication will adversely affect pregnancy or breast feeding.  You should not take this medication if you are currently pregnant or planning a pregnancy or while breastfeeding.
Ivermectin Pregnancy And Lactation Text: This medication is Pregnancy Category C and it isn't known if it is safe during pregnancy. It is also excreted in breast milk.
Rituxan Pregnancy And Lactation Text: This medication is Pregnancy Category C and it isn't know if it is safe during pregnancy. It is unknown if this medication is excreted in breast milk but similar antibodies are known to be excreted.
Detail Level: Simple
Aklief counseling:  Patient advised to apply a pea-sized amount only at bedtime and wait 30 minutes after washing their face before applying.  If too drying, patient may add a non-comedogenic moisturizer.  The most commonly reported side effects including irritation, redness, scaling, dryness, stinging, burning, itching, and increased risk of sunburn.  The patient verbalized understanding of the proper use and possible adverse effects of retinoids.  All of the patient's questions and concerns were addressed.
Opioid Pregnancy And Lactation Text: These medications can lead to premature delivery and should be avoided during pregnancy. These medications are also present in breast milk in small amounts.
Imiquimod Pregnancy And Lactation Text: This medication is Pregnancy Category C. It is unknown if this medication is excreted in breast milk.
Otezla Counseling: The side effects of Otezla were discussed with the patient, including but not limited to worsening or new depression, weight loss, diarrhea, nausea, upper respiratory tract infection, and headache. Patient instructed to call the office should any adverse effect occur.  The patient verbalized understanding of the proper use and possible adverse effects of Otezla.  All the patient's questions and concerns were addressed.
Acitretin Counseling:  I discussed with the patient the risks of acitretin including but not limited to hair loss, dry lips/skin/eyes, liver damage, hyperlipidemia, depression/suicidal ideation, photosensitivity.  Serious rare side effects can include but are not limited to pancreatitis, pseudotumor cerebri, bony changes, clot formation/stroke/heart attack.  Patient understands that alcohol is contraindicated since it can result in liver toxicity and significantly prolong the elimination of the drug by many years.
Topical Ketoconazole Pregnancy And Lactation Text: This medication is Pregnancy Category B and is considered safe during pregnancy. It is unknown if it is excreted in breast milk.
Thalidomide Pregnancy And Lactation Text: This medication is Pregnancy Category X and is absolutely contraindicated during pregnancy. It is unknown if it is excreted in breast milk.
Cyclophosphamide Counseling:  I discussed with the patient the risks of cyclophosphamide including but not limited to hair loss, hormonal abnormalities, decreased fertility, abdominal pain, diarrhea, nausea and vomiting, bone marrow suppression and infection. The patient understands that monitoring is required while taking this medication.
Picato Counseling:  I discussed with the patient the risks of Picato including but not limited to erythema, scaling, itching, weeping, crusting, and pain.
Winlevi Counseling:  I discussed with the patient the risks of topical clascoterone including but not limited to erythema, scaling, itching, and stinging. Patient voiced their understanding.
Birth Control Pills Counseling: Birth Control Pill Counseling: I discussed with the patient the potential side effects of OCPs including but not limited to increased risk of stroke, heart attack, thrombophlebitis, deep venous thrombosis, hepatic adenomas, breast changes, GI upset, headaches, and depression.  The patient verbalized understanding of the proper use and possible adverse effects of OCPs. All of the patient's questions and concerns were addressed.
Doxycycline Counseling:  Patient counseled regarding possible photosensitivity and increased risk for sunburn.  Patient instructed to avoid sunlight, if possible.  When exposed to sunlight, patients should wear protective clothing, sunglasses, and sunscreen.  The patient was instructed to call the office immediately if the following severe adverse effects occur:  hearing changes, easy bruising/bleeding, severe headache, or vision changes.  The patient verbalized understanding of the proper use and possible adverse effects of doxycycline.  All of the patient's questions and concerns were addressed.
Dapsone Counseling: I discussed with the patient the risks of dapsone including but not limited to hemolytic anemia, agranulocytosis, rashes, methemoglobinemia, kidney failure, peripheral neuropathy, headaches, GI upset, and liver toxicity.  Patients who start dapsone require monitoring including baseline LFTs and weekly CBCs for the first month, then every month thereafter.  The patient verbalized understanding of the proper use and possible adverse effects of dapsone.  All of the patient's questions and concerns were addressed.
Tranexamic Acid Counseling:  Patient advised of the small risk of bleeding problems with tranexamic acid. They were also instructed to call if they developed any nausea, vomiting or diarrhea. All of the patient's questions and concerns were addressed.
Cyclophosphamide Pregnancy And Lactation Text: This medication is Pregnancy Category D and it isn't considered safe during pregnancy. This medication is excreted in breast milk.
Cimetidine Pregnancy And Lactation Text: This medication is Pregnancy Category B and is considered safe during pregnancy. It is also excreted in breast milk and breast feeding isn't recommended.
Low Dose Naltrexone Pregnancy And Lactation Text: Naltrexone is pregnancy category C.  There have been no adequate and well-controlled studies in pregnant women.  It should be used in pregnancy only if the potential benefit justifies the potential risk to the fetus.   Limited data indicates that naltrexone is minimally excreted into breastmilk.
Drysol Counseling:  I discussed with the patient the risks of drysol/aluminum chloride including but not limited to skin rash, itching, irritation, burning.
Griseofulvin Counseling:  I discussed with the patient the risks of griseofulvin including but not limited to photosensitivity, cytopenia, liver damage, nausea/vomiting and severe allergy.  The patient understands that this medication is best absorbed when taken with a fatty meal (e.g., ice cream or french fries).
Enbrel Counseling:  I discussed with the patient the risks of etanercept including but not limited to myelosuppression, immunosuppression, autoimmune hepatitis, demyelinating diseases, lymphoma, and infections.  The patient understands that monitoring is required including a PPD at baseline and must alert us or the primary physician if symptoms of infection or other concerning signs are noted.
Soolantra Counseling: I discussed with the patients the risks of topial Soolantra. This is a medicine which decreases the number of mites and inflammation in the skin. You experience burning, stinging, eye irritation or allergic reactions.  Please call our office if you develop any problems from using this medication.
Stelara Pregnancy And Lactation Text: This medication is Pregnancy Category B and is considered safe during pregnancy. It is unknown if this medication is excreted in breast milk.
Rifampin Counseling: I discussed with the patient the risks of rifampin including but not limited to liver damage, kidney damage, red-orange body fluids, nausea/vomiting and severe allergy.
Klisyri Counseling:  I discussed with the patient the risks of Klisyri including but not limited to erythema, scaling, itching, weeping, crusting, and pain.
Otezla Pregnancy And Lactation Text: This medication is Pregnancy Category C and it isn't known if it is safe during pregnancy. It is unknown if it is excreted in breast milk.
Drysol Pregnancy And Lactation Text: This medication is considered safe during pregnancy and breast feeding.
Niacinamide Counseling: I recommended taking niacin or niacinamide, also know as vitamin B3, twice daily. Recent evidence suggests that taking vitamin B3 (500 mg twice daily) can reduce the risk of actinic keratoses and non-melanoma skin cancers. Side effects of vitamin B3 include flushing and headache.
Libtayo Counseling- I discussed with the patient the risks of Libtayo including but not limited to nausea, vomiting, diarrhea, and bone or muscle pain.  The patient verbalized understanding of the proper use and possible adverse effects of Libtayo.  All of the patient's questions and concerns were addressed.
Winlevi Pregnancy And Lactation Text: This medication is considered safe during pregnancy and breastfeeding.
Siliq Counseling:  I discussed with the patient the risks of Siliq including but not limited to new or worsening depression, suicidal thoughts and behavior, immunosuppression, malignancy, posterior leukoencephalopathy syndrome, and serious infections.  The patient understands that monitoring is required including a PPD at baseline and must alert us or the primary physician if symptoms of infection or other concerning signs are noted. There is also a special program designed to monitor depression which is required with Siliq.
Include Pregnancy/Lactation Warning?: No
Aklief Pregnancy And Lactation Text: It is unknown if this medication is safe to use during pregnancy.  It is unknown if this medication is excreted in breast milk.  Breastfeeding women should use the topical cream on the smallest area of the skin for the shortest time needed while breastfeeding.  Do not apply to nipple and areola.
Acitretin Pregnancy And Lactation Text: This medication is Pregnancy Category X and should not be given to women who are pregnant or may become pregnant in the future. This medication is excreted in breast milk.
Topical Metronidazole Counseling: Metronidazole is a topical antibiotic medication. You may experience burning, stinging, redness, or allergic reactions.  Please call our office if you develop any problems from using this medication.
Olumiant Counseling: I discussed with the patient the risks of Olumiant therapy including but not limited to upper respiratory tract infections, shingles, cold sores, and nausea. Live vaccines should be avoided.  This medication has been linked to serious infections; higher rate of mortality; malignancy and lymphoproliferative disorders; major adverse cardiovascular events; thrombosis; gastrointestinal perforations; neutropenia; lymphopenia; anemia; liver enzyme elevations; and lipid elevations.
Doxepin Counseling:  Patient advised that the medication is sedating and not to drive a car after taking this medication. Patient informed of potential adverse effects including but not limited to dry mouth, urinary retention, and blurry vision.  The patient verbalized understanding of the proper use and possible adverse effects of doxepin.  All of the patient's questions and concerns were addressed.
Dapsone Pregnancy And Lactation Text: This medication is Pregnancy Category C and is not considered safe during pregnancy or breast feeding.
Tranexamic Acid Pregnancy And Lactation Text: It is unknown if this medication is safe during pregnancy or breast feeding.
Azelaic Acid Counseling: Patient counseled that medicine may cause skin irritation and to avoid applying near the eyes.  In the event of skin irritation, the patient was advised to reduce the amount of the drug applied or use it less frequently.   The patient verbalized understanding of the proper use and possible adverse effects of azelaic acid.  All of the patient's questions and concerns were addressed.
VTAMA Counseling: I discussed with the patient that VTAMA is not for use in the eyes, mouth or mouth. They should call the office if they develop any signs of allergic reactions to VTAMA. The patient verbalized understanding of the proper use and possible adverse effects of VTAMA.  All of the patient's questions and concerns were addressed.
Griseofulvin Pregnancy And Lactation Text: This medication is Pregnancy Category X and is known to cause serious birth defects. It is unknown if this medication is excreted in breast milk but breast feeding should be avoided.
Doxycycline Pregnancy And Lactation Text: This medication is Pregnancy Category D and not consider safe during pregnancy. It is also excreted in breast milk but is considered safe for shorter treatment courses.
Adbry Counseling: I discussed with the patient the risks of tralokinumab including but not limited to eye infection and irritation, cold sores, injection site reactions, worsening of asthma, allergic reactions and increased risk of parasitic infection.  Live vaccines should be avoided while taking tralokinumab. The patient understands that monitoring is required and they must alert us or the primary physician if symptoms of infection or other concerning signs are noted.
Soolantra Pregnancy And Lactation Text: This medication is Pregnancy Category C. This medication is considered safe during breast feeding.
Cyclosporine Counseling:  I discussed with the patient the risks of cyclosporine including but not limited to hypertension, gingival hyperplasia,myelosuppression, immunosuppression, liver damage, kidney damage, neurotoxicity, lymphoma, and serious infections. The patient understands that monitoring is required including baseline blood pressure, CBC, CMP, lipid panel and uric acid, and then 1-2 times monthly CMP and blood pressure.
Arava Counseling:  Patient counseled regarding adverse effects of Arava including but not limited to nausea, vomiting, abnormalities in liver function tests. Patients may develop mouth sores, rash, diarrhea, and abnormalities in blood counts. The patient understands that monitoring is required including LFTs and blood counts.  There is a rare possibility of scarring of the liver and lung problems that can occur when taking methotrexate. Persistent nausea, loss of appetite, pale stools, dark urine, cough, and shortness of breath should be reported immediately. Patient advised to discontinue Arava treatment and consult with a physician prior to attempting conception. The patient will have to undergo a treatment to eliminate Arava from the body prior to conception.
Birth Control Pills Pregnancy And Lactation Text: This medication should be avoided if pregnant and for the first 30 days post-partum.
Protopic Counseling: Patient may experience a mild burning sensation during topical application. Protopic is not approved in children less than 2 years of age. There have been case reports of hematologic and skin malignancies in patients using topical calcineurin inhibitors although causality is questionable.
Rifampin Pregnancy And Lactation Text: This medication is Pregnancy Category C and it isn't know if it is safe during pregnancy. It is also excreted in breast milk and should not be used if you are breast feeding.
Niacinamide Pregnancy And Lactation Text: These medications are considered safe during pregnancy.
Libtayo Pregnancy And Lactation Text: This medication is contraindicated in pregnancy and when breast feeding.
Humira Counseling:  I discussed with the patient the risks of adalimumab including but not limited to myelosuppression, immunosuppression, autoimmune hepatitis, demyelinating diseases, lymphoma, and serious infections.  The patient understands that monitoring is required including a PPD at baseline and must alert us or the primary physician if symptoms of infection or other concerning signs are noted.
Itraconazole Counseling:  I discussed with the patient the risks of itraconazole including but not limited to liver damage, nausea/vomiting, neuropathy, and severe allergy.  The patient understands that this medication is best absorbed when taken with acidic beverages such as non-diet cola or ginger ale.  The patient understands that monitoring is required including baseline LFTs and repeat LFTs at intervals.  The patient understands that they are to contact us or the primary physician if concerning signs are noted.
Adbry Pregnancy And Lactation Text: It is unknown if this medication will adversely affect pregnancy or breast feeding.
Erythromycin Counseling:  I discussed with the patient the risks of erythromycin including but not limited to GI upset, allergic reaction, drug rash, diarrhea, increase in liver enzymes, and yeast infections.
Topical Retinoid counseling:  Patient advised to apply a pea-sized amount only at bedtime and wait 30 minutes after washing their face before applying.  If too drying, patient may add a non-comedogenic moisturizer. The patient verbalized understanding of the proper use and possible adverse effects of retinoids.  All of the patient's questions and concerns were addressed.
Oxybutynin Counseling:  I discussed with the patient the risks of oxybutynin including but not limited to skin rash, drowsiness, dry mouth, difficulty urinating, and blurred vision.
Bexarotene Counseling:  I discussed with the patient the risks of bexarotene including but not limited to hair loss, dry lips/skin/eyes, liver abnormalities, hyperlipidemia, pancreatitis, depression/suicidal ideation, photosensitivity, drug rash/allergic reactions, hypothyroidism, anemia, leukopenia, infection, cataracts, and teratogenicity.  Patient understands that they will need regular blood tests to check lipid profile, liver function tests, white blood cell count, thyroid function tests and pregnancy test if applicable.
Olumiant Pregnancy And Lactation Text: Based on animal studies, Olumiant may cause embryo-fetal harm when administered to pregnant women.  The medication should not be used in pregnancy.  Breastfeeding is not recommended during treatment.
Topical Metronidazole Pregnancy And Lactation Text: This medication is Pregnancy Category B and considered safe during pregnancy.  It is also considered safe to use while breastfeeding.
Klisyri Pregnancy And Lactation Text: It is unknown if this medication can harm a developing fetus or if it is excreted in breast milk.
Taltz Counseling: I discussed with the patient the risks of ixekizumab including but not limited to immunosuppression, serious infections, worsening of inflammatory bowel disease and drug reactions.  The patient understands that monitoring is required including a PPD at baseline and must alert us or the primary physician if symptoms of infection or other concerning signs are noted.
Elidel Counseling: Patient may experience a mild burning sensation during topical application. Elidel is not approved in children less than 2 years of age. There have been case reports of hematologic and skin malignancies in patients using topical calcineurin inhibitors although causality is questionable.
Rinvoq Counseling: I discussed with the patient the risks of Rinvoq therapy including but not limited to upper respiratory tract infections, shingles, cold sores, bronchitis, nausea, cough, fever, acne, and headache. Live vaccines should be avoided.  This medication has been linked to serious infections; higher rate of mortality; malignancy and lymphoproliferative disorders; major adverse cardiovascular events; thrombosis; thrombocytopenia, anemia, and neutropenia; lipid elevations; liver enzyme elevations; and gastrointestinal perforations.
Protopic Pregnancy And Lactation Text: This medication is Pregnancy Category C. It is unknown if this medication is excreted in breast milk when applied topically.
Minoxidil Counseling: Minoxidil is a topical medication which can increase blood flow where it is applied. It is uncertain how this medication increases hair growth. Side effects are uncommon and include stinging and allergic reactions.
Simponi Counseling:  I discussed with the patient the risks of golimumab including but not limited to myelosuppression, immunosuppression, autoimmune hepatitis, demyelinating diseases, lymphoma, and serious infections.  The patient understands that monitoring is required including a PPD at baseline and must alert us or the primary physician if symptoms of infection or other concerning signs are noted.
Valtrex Counseling: I discussed with the patient the risks of valacyclovir including but not limited to kidney damage, nausea, vomiting and severe allergy.  The patient understands that if the infection seems to be worsening or is not improving, they are to call.
Cyclosporine Pregnancy And Lactation Text: This medication is Pregnancy Category C and it isn't know if it is safe during pregnancy. This medication is excreted in breast milk.
Doxepin Pregnancy And Lactation Text: This medication is Pregnancy Category C and it isn't known if it is safe during pregnancy. It is also excreted in breast milk and breast feeding isn't recommended.
Spironolactone Counseling: Patient advised regarding risks of diarrhea, abdominal pain, hyperkalemia, birth defects (for female patients), liver toxicity and renal toxicity. The patient may need blood work to monitor liver and kidney function and potassium levels while on therapy. The patient verbalized understanding of the proper use and possible adverse effects of spironolactone.  All of the patient's questions and concerns were addressed.
Vtama Pregnancy And Lactation Text: It is unknown if this medication can cause problems during pregnancy and breastfeeding.
Gabapentin Counseling: I discussed with the patient the risks of gabapentin including but not limited to dizziness, somnolence, fatigue and ataxia.
Erythromycin Pregnancy And Lactation Text: This medication is Pregnancy Category B and is considered safe during pregnancy. It is also excreted in breast milk.
Spironolactone Pregnancy And Lactation Text: This medication can cause feminization of the male fetus and should be avoided during pregnancy. The active metabolite is also found in breast milk.
Odomzo Counseling- I discussed with the patient the risks of Odomzo including but not limited to nausea, vomiting, diarrhea, constipation, weight loss, changes in the sense of taste, decreased appetite, muscle spasms, and hair loss.  The patient verbalized understanding of the proper use and possible adverse effects of Odomzo.  All of the patient's questions and concerns were addressed.
Nsaids Counseling: NSAID Counseling: I discussed with the patient that NSAIDs should be taken with food. Prolonged use of NSAIDs can result in the development of stomach ulcers.  Patient advised to stop taking NSAIDs if abdominal pain occurs.  The patient verbalized understanding of the proper use and possible adverse effects of NSAIDs.  All of the patient's questions and concerns were addressed.
Hydroxyzine Counseling: Patient advised that the medication is sedating and not to drive a car after taking this medication.  Patient informed of potential adverse effects including but not limited to dry mouth, urinary retention, and blurry vision.  The patient verbalized understanding of the proper use and possible adverse effects of hydroxyzine.  All of the patient's questions and concerns were addressed.
Cimzia Counseling:  I discussed with the patient the risks of Cimzia including but not limited to immunosuppression, allergic reactions and infections.  The patient understands that monitoring is required including a PPD at baseline and must alert us or the primary physician if symptoms of infection or other concerning signs are noted.
Topical Steroids Counseling: I discussed with the patient that prolonged use of topical steroids can result in the increased appearance of superficial blood vessels (telangiectasias), lightening (hypopigmentation) and thinning of the skin (atrophy).  Patient understands to avoid using high potency steroids in skin folds, the groin or the face.  The patient verbalized understanding of the proper use and possible adverse effects of topical steroids.  All of the patient's questions and concerns were addressed.
Bexarotene Pregnancy And Lactation Text: This medication is Pregnancy Category X and should not be given to women who are pregnant or may become pregnant. This medication should not be used if you are breast feeding.
Sarecycline Counseling: Patient advised regarding possible photosensitivity and discoloration of the teeth, skin, lips, tongue and gums.  Patient instructed to avoid sunlight, if possible.  When exposed to sunlight, patients should wear protective clothing, sunglasses, and sunscreen.  The patient was instructed to call the office immediately if the following severe adverse effects occur:  hearing changes, easy bruising/bleeding, severe headache, or vision changes.  The patient verbalized understanding of the proper use and possible adverse effects of sarecycline.  All of the patient's questions and concerns were addressed.
Tremfya Counseling: I discussed with the patient the risks of guselkumab including but not limited to immunosuppression, serious infections, and drug reactions.  The patient understands that monitoring is required including a PPD at baseline and must alert us or the primary physician if symptoms of infection or other concerning signs are noted.
Azithromycin Counseling:  I discussed with the patient the risks of azithromycin including but not limited to GI upset, allergic reaction, drug rash, diarrhea, and yeast infections.
Propranolol Counseling:  I discussed with the patient the risks of propranolol including but not limited to low heart rate, low blood pressure, low blood sugar, restlessness and increased cold sensitivity. They should call the office if they experience any of these side effects.
Ilumya Counseling: I discussed with the patient the risks of tildrakizumab including but not limited to immunosuppression, malignancy, posterior leukoencephalopathy syndrome, and serious infections.  The patient understands that monitoring is required including a PPD at baseline and must alert us or the primary physician if symptoms of infection or other concerning signs are noted.
Sarecycline Pregnancy And Lactation Text: This medication is Pregnancy Category D and not consider safe during pregnancy. It is also excreted in breast milk.
Isotretinoin Counseling: Patient should get monthly blood tests, not donate blood, not drive at night if vision affected, not share medication, and not undergo elective surgery for 6 months after tx completed. Side effects reviewed, pt to contact office should one occur.
Topical Steroids Applications Pregnancy And Lactation Text: Most topical steroids are considered safe to use during pregnancy and lactation.  Any topical steroid applied to the breast or nipple should be washed off before breastfeeding.
Methotrexate Counseling:  Patient counseled regarding adverse effects of methotrexate including but not limited to nausea, vomiting, abnormalities in liver function tests. Patients may develop mouth sores, rash, diarrhea, and abnormalities in blood counts. The patient understands that monitoring is required including LFT's and blood counts.  There is a rare possibility of scarring of the liver and lung problems that can occur when taking methotrexate. Persistent nausea, loss of appetite, pale stools, dark urine, cough, and shortness of breath should be reported immediately. Patient advised to discontinue methotrexate treatment at least three months before attempting to become pregnant.  I discussed the need for folate supplements while taking methotrexate.  These supplements can decrease side effects during methotrexate treatment. The patient verbalized understanding of the proper use and possible adverse effects of methotrexate.  All of the patient's questions and concerns were addressed.
Valtrex Pregnancy And Lactation Text: this medication is Pregnancy Category B and is considered safe during pregnancy. This medication is not directly found in breast milk but it's metabolite acyclovir is present.
Qbrexza Counseling:  I discussed with the patient the risks of Qbrexza including but not limited to headache, mydriasis, blurred vision, dry eyes, nasal dryness, dry mouth, dry throat, dry skin, urinary hesitation, and constipation.  Local skin reactions including erythema, burning, stinging, and itching can also occur.
Zoryve Counseling:  I discussed with the patient that Zoryve is not for use in the eyes, mouth or vagina. The most commonly reported side effects include diarrhea, headache, insomnia, application site pain, upper respiratory tract infections, and urinary tract infections.  All of the patient's questions and concerns were addressed.
Benzoyl Peroxide Counseling: Patient counseled that medicine may cause skin irritation and bleach clothing.  In the event of skin irritation, the patient was advised to reduce the amount of the drug applied or use it less frequently.   The patient verbalized understanding of the proper use and possible adverse effects of benzoyl peroxide.  All of the patient's questions and concerns were addressed.
Rinvoq Pregnancy And Lactation Text: Based on animal studies, Rinvoq may cause embryo-fetal harm when administered to pregnant women.  The medication should not be used in pregnancy.  Breastfeeding is not recommended during treatment and for 6 days after the last dose.
Minoxidil Pregnancy And Lactation Text: This medication has not been assigned a Pregnancy Risk Category but animal studies failed to show danger with the topical medication. It is unknown if the medication is excreted in breast milk.
Hydroxyzine Pregnancy And Lactation Text: This medication is not safe during pregnancy and should not be taken. It is also excreted in breast milk and breast feeding isn't recommended.
Cimzia Pregnancy And Lactation Text: This medication crosses the placenta but can be considered safe in certain situations. Cimzia may be excreted in breast milk.
Glycopyrrolate Counseling:  I discussed with the patient the risks of glycopyrrolate including but not limited to skin rash, drowsiness, dry mouth, difficulty urinating, and blurred vision.
Sotyktu Counseling:  I discussed the most common side effects of Sotyktu including: common cold, sore throat, sinus infections, cold sores, canker sores, folliculitis, and acne.  I also discussed more serious side effects of Sotyktu including but not limited to: serious allergic reactions; increased risk for infections such as TB; cancers such as lymphomas; rhabdomyolysis and elevated CPK; and elevated triglycerides and liver enzymes. 
Qbrexza Pregnancy And Lactation Text: There is no available data on Qbrexza use in pregnant women.  There is no available data on Qbrexza use in lactation.
Methotrexate Pregnancy And Lactation Text: This medication is Pregnancy Category X and is known to cause fetal harm. This medication is excreted in breast milk.
Tazorac Counseling:  Patient advised that medication is irritating and drying.  Patient may need to apply sparingly and wash off after an hour before eventually leaving it on overnight.  The patient verbalized understanding of the proper use and possible adverse effects of tazorac.  All of the patient's questions and concerns were addressed.
Eucrisa Counseling: Patient may experience a mild burning sensation during topical application. Eucrisa is not approved in children less than 3 months of age.
Metronidazole Counseling:  I discussed with the patient the risks of metronidazole including but not limited to seizures, nausea/vomiting, a metallic taste in the mouth, nausea/vomiting and severe allergy.
Nsaids Pregnancy And Lactation Text: These medications are considered safe up to 30 weeks gestation. It is excreted in breast milk.
Ketoconazole Counseling:   Patient counseled regarding improving absorption with orange juice.  Adverse effects include but are not limited to breast enlargement, headache, diarrhea, nausea, upset stomach, liver function test abnormalities, taste disturbance, and stomach pain.  There is a rare possibility of liver failure that can occur when taking ketoconazole. The patient understands that monitoring of LFTs may be required, especially at baseline. The patient verbalized understanding of the proper use and possible adverse effects of ketoconazole.  All of the patient's questions and concerns were addressed.
Tetracycline Counseling: Patient counseled regarding possible photosensitivity and increased risk for sunburn.  Patient instructed to avoid sunlight, if possible.  When exposed to sunlight, patients should wear protective clothing, sunglasses, and sunscreen.  The patient was instructed to call the office immediately if the following severe adverse effects occur:  hearing changes, easy bruising/bleeding, severe headache, or vision changes.  The patient verbalized understanding of the proper use and possible adverse effects of tetracycline.  All of the patient's questions and concerns were addressed. Patient understands to avoid pregnancy while on therapy due to potential birth defects.
Olanzapine Counseling- I discussed with the patient the common side effects of olanzapine including but are not limited to: lack of energy, dry mouth, increased appetite, sleepiness, tremor, constipation, dizziness, changes in behavior, or restlessness.  Explained that teenagers are more likely to experience headaches, abdominal pain, pain in the arms or legs, tiredness, and sleepiness.  Serious side effects include but are not limited: increased risk of death in elderly patients who are confused, have memory loss, or dementia-related psychosis; hyperglycemia; increased cholesterol and triglycerides; and weight gain.
Propranolol Pregnancy And Lactation Text: This medication is Pregnancy Category C and it isn't known if it is safe during pregnancy. It is excreted in breast milk.
Azithromycin Pregnancy And Lactation Text: This medication is considered safe during pregnancy and is also secreted in breast milk.
Skyrizi Counseling: I discussed with the patient the risks of risankizumab-rzaa including but not limited to immunosuppression, and serious infections.  The patient understands that monitoring is required including a PPD at baseline and must alert us or the primary physician if symptoms of infection or other concerning signs are noted.
Azathioprine Counseling:  I discussed with the patient the risks of azathioprine including but not limited to myelosuppression, immunosuppression, hepatotoxicity, lymphoma, and infections.  The patient understands that monitoring is required including baseline LFTs, Creatinine, possible TPMP genotyping and weekly CBCs for the first month and then every 2 weeks thereafter.  The patient verbalized understanding of the proper use and possible adverse effects of azathioprine.  All of the patient's questions and concerns were addressed.
Mirvaso Counseling: Mirvaso is a topical medication which can decrease superficial blood flow where applied. Side effects are uncommon and include stinging, redness and allergic reactions.
Isotretinoin Pregnancy And Lactation Text: This medication is Pregnancy Category X and is considered extremely dangerous during pregnancy. It is unknown if it is excreted in breast milk.
Topical Sulfur Applications Counseling: Topical Sulfur Counseling: Patient counseled that this medication may cause skin irritation or allergic reactions.  In the event of skin irritation, the patient was advised to reduce the amount of the drug applied or use it less frequently.   The patient verbalized understanding of the proper use and possible adverse effects of topical sulfur application.  All of the patient's questions and concerns were addressed.
Benzoyl Peroxide Pregnancy And Lactation Text: This medication is Pregnancy Category C. It is unknown if benzoyl peroxide is excreted in breast milk.
Carac Counseling:  I discussed with the patient the risks of Carac including but not limited to erythema, scaling, itching, weeping, crusting, and pain.
Dutasteride Male Counseling: Dustasteride Counseling:  I discussed with the patient the risks of use of dutasteride including but not limited to decreased libido, decreased ejaculate volume, and gynecomastia. Women who can become pregnant should not handle medication.  All of the patient's questions and concerns were addressed.
Prednisone Counseling:  I discussed with the patient the risks of prolonged use of prednisone including but not limited to weight gain, insomnia, osteoporosis, mood changes, diabetes, susceptibility to infection, glaucoma and high blood pressure.  In cases where prednisone use is prolonged, patients should be monitored with blood pressure checks, serum glucose levels and an eye exam.  Additionally, the patient may need to be placed on GI prophylaxis, PCP prophylaxis, and calcium and vitamin D supplementation and/or a bisphosphonate.  The patient verbalized understanding of the proper use and the possible adverse effects of prednisone.  All of the patient's questions and concerns were addressed.
Clofazimine Counseling:  I discussed with the patient the risks of clofazimine including but not limited to skin and eye pigmentation, liver damage, nausea/vomiting, gastrointestinal bleeding and allergy.
Azathioprine Pregnancy And Lactation Text: This medication is Pregnancy Category D and isn't considered safe during pregnancy. It is unknown if this medication is excreted in breast milk.
Bactrim Counseling:  I discussed with the patient the risks of sulfa antibiotics including but not limited to GI upset, allergic reaction, drug rash, diarrhea, dizziness, photosensitivity, and yeast infections.  Rarely, more serious reactions can occur including but not limited to aplastic anemia, agranulocytosis, methemoglobinemia, blood dyscrasias, liver or kidney failure, lung infiltrates or desquamative/blistering drug rashes.
Calcipotriene Pregnancy And Lactation Text: The use of this medication during pregnancy or lactation is not recommended as there is insufficient data.
Zyclara Counseling:  I discussed with the patient the risks of imiquimod including but not limited to erythema, scaling, itching, weeping, crusting, and pain.  Patient understands that the inflammatory response to imiquimod is variable from person to person and was educated regarded proper titration schedule.  If flu-like symptoms develop, patient knows to discontinue the medication and contact us.
SSKI Counseling:  I discussed with the patient the risks of SSKI including but not limited to thyroid abnormalities, metallic taste, GI upset, fever, headache, acne, arthralgias, paraesthesias, lymphadenopathy, easy bleeding, arrhythmias, and allergic reaction.
Ketoconazole Pregnancy And Lactation Text: This medication is Pregnancy Category C and it isn't know if it is safe during pregnancy. It is also excreted in breast milk and breast feeding isn't recommended.
Glycopyrrolate Pregnancy And Lactation Text: This medication is Pregnancy Category B and is considered safe during pregnancy. It is unknown if it is excreted breast milk.
Cosentyx Counseling:  I discussed with the patient the risks of Cosentyx including but not limited to worsening of Crohn's disease, immunosuppression, allergic reactions and infections.  The patient understands that monitoring is required including a PPD at baseline and must alert us or the primary physician if symptoms of infection or other concerning signs are noted.
Metronidazole Pregnancy And Lactation Text: This medication is Pregnancy Category B and considered safe during pregnancy.  It is also excreted in breast milk.
Calcipotriene Counseling:  I discussed with the patient the risks of calcipotriene including but not limited to erythema, scaling, itching, and irritation.
Tazorac Pregnancy And Lactation Text: This medication is not safe during pregnancy. It is unknown if this medication is excreted in breast milk.
Rhofade Counseling: Rhofade is a topical medication which can decrease superficial blood flow where applied. Side effects are uncommon and include stinging, redness and allergic reactions.
Olanzapine Pregnancy And Lactation Text: This medication is pregnancy category C.   There are no adequate and well controlled trials with olanzapine in pregnant females.  Olanzapine should be used during pregnancy only if the potential benefit justifies the potential risk to the fetus.   In a study in lactating healthy women, olanzapine was excreted in breast milk.  It is recommended that women taking olanzapine should not breast feed.
Terbinafine Counseling: Patient counseling regarding adverse effects of terbinafine including but not limited to headache, diarrhea, rash, upset stomach, liver function test abnormalities, itching, taste/smell disturbance, nausea, abdominal pain, and flatulence.  There is a rare possibility of liver failure that can occur when taking terbinafine.  The patient understands that a baseline LFT and kidney function test may be required. The patient verbalized understanding of the proper use and possible adverse effects of terbinafine.  All of the patient's questions and concerns were addressed.
Infliximab Counseling:  I discussed with the patient the risks of infliximab including but not limited to myelosuppression, immunosuppression, autoimmune hepatitis, demyelinating diseases, lymphoma, and serious infections.  The patient understands that monitoring is required including a PPD at baseline and must alert us or the primary physician if symptoms of infection or other concerning signs are noted.
Topical Clindamycin Counseling: Patient counseled that this medication may cause skin irritation or allergic reactions.  In the event of skin irritation, the patient was advised to reduce the amount of the drug applied or use it less frequently.   The patient verbalized understanding of the proper use and possible adverse effects of clindamycin.  All of the patient's questions and concerns were addressed.
Cantharidin Counseling:  I discussed with the patient the risks of Cantharidin including but not limited to pain, redness, burning, itching, and blistering.
Minocycline Counseling: Patient advised regarding possible photosensitivity and discoloration of the teeth, skin, lips, tongue and gums.  Patient instructed to avoid sunlight, if possible.  When exposed to sunlight, patients should wear protective clothing, sunglasses, and sunscreen.  The patient was instructed to call the office immediately if the following severe adverse effects occur:  hearing changes, easy bruising/bleeding, severe headache, or vision changes.  The patient verbalized understanding of the proper use and possible adverse effects of minocycline.  All of the patient's questions and concerns were addressed.
High Dose Vitamin A Counseling: Side effects reviewed, pt to contact office should one occur.
Xolair Counseling:  Patient informed of potential adverse effects including but not limited to fever, muscle aches, rash and allergic reactions.  The patient verbalized understanding of the proper use and possible adverse effects of Xolair.  All of the patient's questions and concerns were addressed.
Mirvaso Pregnancy And Lactation Text: This medication has not been assigned a Pregnancy Risk Category. It is unknown if the medication is excreted in breast milk.
Sotyktu Pregnancy And Lactation Text: There is insufficient data to evaluate whether or not Sotyktu is safe to use during pregnancy.   It is not known if Sotyktu passes into breast milk and whether or not it is safe to use when breastfeeding.  
Topical Sulfur Applications Pregnancy And Lactation Text: This medication is considered safe during pregnancy and breast feeding secondary to limited systemic absorption.
Hydroquinone Counseling:  Patient advised that medication may result in skin irritation, lightening (hypopigmentation), dryness, and burning.  In the event of skin irritation, the patient was advised to reduce the amount of the drug applied or use it less frequently.  Rarely, spots that are treated with hydroquinone can become darker (pseudoochronosis).  Should this occur, patient instructed to stop medication and call the office. The patient verbalized understanding of the proper use and possible adverse effects of hydroquinone.  All of the patient's questions and concerns were addressed.
Albendazole Counseling:  I discussed with the patient the risks of albendazole including but not limited to cytopenia, kidney damage, nausea/vomiting and severe allergy.  The patient understands that this medication is being used in an off-label manner.
Xolair Pregnancy And Lactation Text: This medication is Pregnancy Category B and is considered safe during pregnancy. This medication is excreted in breast milk.
Xeljanz Counseling: I discussed with the patient the risks of Xeljanz therapy including increased risk of infection, liver issues, headache, diarrhea, or cold symptoms. Live vaccines should be avoided. They were instructed to call if they have any problems.
Bactrim Pregnancy And Lactation Text: This medication is Pregnancy Category D and is known to cause fetal risk.  It is also excreted in breast milk.
Opzelura Counseling:  I discussed with the patient the risks of Opzelura including but not limited to nasopharngitis, bronchitis, ear infection, eosinophila, hives, diarrhea, folliculitis, tonsillitis, and rhinorrhea.  Taken orally, this medication has been linked to serious infections; higher rate of mortality; malignancy and lymphoproliferative disorders; major adverse cardiovascular events; thrombosis; thrombocytopenia, anemia, and neutropenia; and lipid elevations.
Sski Pregnancy And Lactation Text: This medication is Pregnancy Category D and isn't considered safe during pregnancy. It is excreted in breast milk.
Wartpeel Counseling:  I discussed with the patient the risks of Wartpeel including but not limited to erythema, scaling, itching, weeping, crusting, and pain.
Cellcept Counseling:  I discussed with the patient the risks of mycophenolate mofetil including but not limited to infection/immunosuppression, GI upset, hypokalemia, hypercholesterolemia, bone marrow suppression, lymphoproliferative disorders, malignancy, GI ulceration/bleed/perforation, colitis, interstitial lung disease, kidney failure, progressive multifocal leukoencephalopathy, and birth defects.  The patient understands that monitoring is required including a baseline creatinine and regular CBC testing. In addition, patient must alert us immediately if symptoms of infection or other concerning signs are noted.
High Dose Vitamin A Pregnancy And Lactation Text: High dose vitamin A therapy is contraindicated during pregnancy and breast feeding.
Cephalexin Pregnancy And Lactation Text: This medication is Pregnancy Category B and considered safe during pregnancy.  It is also excreted in breast milk but can be used safely for shorter doses.
Hydroxychloroquine Counseling:  I discussed with the patient that a baseline ophthalmologic exam is needed at the start of therapy and every year thereafter while on therapy. A CBC may also be warranted for monitoring.  The side effects of this medication were discussed with the patient, including but not limited to agranulocytosis, aplastic anemia, seizures, rashes, retinopathy, and liver toxicity. Patient instructed to call the office should any adverse effect occur.  The patient verbalized understanding of the proper use and possible adverse effects of Plaquenil.  All the patient's questions and concerns were addressed.
Dutasteride Pregnancy And Lactation Text: This medication is absolutely contraindicated in women, especially during pregnancy and breast feeding. Feminization of male fetuses is possible if taking while pregnant.
Dupixent Counseling: I discussed with the patient the risks of dupilumab including but not limited to eye infection and irritation, cold sores, injection site reactions, worsening of asthma, allergic reactions and increased risk of parasitic infection.  Live vaccines should be avoided while taking dupilumab. Dupilumab will also interact with certain medications such as warfarin and cyclosporine. The patient understands that monitoring is required and they must alert us or the primary physician if symptoms of infection or other concerning signs are noted.
Hydroxychloroquine Pregnancy And Lactation Text: This medication has been shown to cause fetal harm but it isn't assigned a Pregnancy Risk Category. There are small amounts excreted in breast milk.
Clindamycin Counseling: I counseled the patient regarding use of clindamycin as an antibiotic for prophylactic and/or therapeutic purposes. Clindamycin is active against numerous classes of bacteria, including skin bacteria. Side effects may include nausea, diarrhea, gastrointestinal upset, rash, hives, yeast infections, and in rare cases, colitis.
5-Fu Counseling: 5-Fluorouracil Counseling:  I discussed with the patient the risks of 5-fluorouracil including but not limited to erythema, scaling, itching, weeping, crusting, and pain.
Solaraze Counseling:  I discussed with the patient the risks of Solaraze including but not limited to erythema, scaling, itching, weeping, crusting, and pain.
Oral Minoxidil Counseling- I discussed with the patient the risks of oral minoxidil including but not limited to shortness of breath, swelling of the feet or ankles, dizziness, lightheadedness, unwanted hair growth and allergic reaction.  The patient verbalized understanding of the proper use and possible adverse effects of oral minoxidil.  All of the patient's questions and concerns were addressed.
Cantharidin Pregnancy And Lactation Text: This medication has not been proven safe during pregnancy. It is unknown if this medication is excreted in breast milk.
Cibinqo Counseling: I discussed with the patient the risks of Cibinqo therapy including but not limited to common cold, nausea, headache, cold sores, increased blood CPK levels, dizziness, UTIs, fatigue, acne, and vomitting. Live vaccines should be avoided.  This medication has been linked to serious infections; higher rate of mortality; malignancy and lymphoproliferative disorders; major adverse cardiovascular events; thrombosis; thrombocytopenia and lymphopenia; lipid elevations; and retinal detachment.
Thalidomide Counseling: I discussed with the patient the risks of thalidomide including but not limited to birth defects, anxiety, weakness, chest pain, dizziness, cough and severe allergy.
Ivermectin Counseling:  Patient instructed to take medication on an empty stomach with a full glass of water.  Patient informed of potential adverse effects including but not limited to nausea, diarrhea, dizziness, itching, and swelling of the extremities or lymph nodes.  The patient verbalized understanding of the proper use and possible adverse effects of ivermectin.  All of the patient's questions and concerns were addressed.
Rituxan Counseling:  I discussed with the patient the risks of Rituxan infusions. Side effects can include infusion reactions, severe drug rashes including mucocutaneous reactions, reactivation of latent hepatitis and other infections and rarely progressive multifocal leukoencephalopathy.  All of the patient's questions and concerns were addressed.
Cephalexin Counseling: I counseled the patient regarding use of cephalexin as an antibiotic for prophylactic and/or therapeutic purposes. Cephalexin (commonly prescribed under brand name Keflex) is a cephalosporin antibiotic which is active against numerous classes of bacteria, including most skin bacteria. Side effects may include nausea, diarrhea, gastrointestinal upset, rash, hives, yeast infections, and in rare cases, hepatitis, kidney disease, seizures, fever, confusion, neurologic symptoms, and others. Patients with severe allergies to penicillin medications are cautioned that there is about a 10% incidence of cross-reactivity with cephalosporins. When possible, patients with penicillin allergies should use alternatives to cephalosporins for antibiotic therapy.
Oral Minoxidil Pregnancy And Lactation Text: This medication should only be used when clearly needed if you are pregnant, attempting to become pregnant or breast feeding.
Opioid Counseling: I discussed with the patient the potential side effects of opioids including but not limited to addiction, altered mental status, and depression. I stressed avoiding alcohol, benzodiazepines, muscle relaxants and sleep aids unless specifically okayed by a physician. The patient verbalized understanding of the proper use and possible adverse effects of opioids. All of the patient's questions and concerns were addressed. They were instructed to flush the remaining pills down the toilet if they did not need them for pain.
Imiquimod Counseling:  I discussed with the patient the risks of imiquimod including but not limited to erythema, scaling, itching, weeping, crusting, and pain.  Patient understands that the inflammatory response to imiquimod is variable from person to person and was educated regarded proper titration schedule.  If flu-like symptoms develop, patient knows to discontinue the medication and contact us.
Colchicine Counseling:  Patient counseled regarding adverse effects including but not limited to stomach upset (nausea, vomiting, stomach pain, or diarrhea).  Patient instructed to limit alcohol consumption while taking this medication.  Colchicine may reduce blood counts especially with prolonged use.  The patient understands that monitoring of kidney function and blood counts may be required, especially at baseline. The patient verbalized understanding of the proper use and possible adverse effects of colchicine.  All of the patient's questions and concerns were addressed.
Fluconazole Counseling:  Patient counseled regarding adverse effects of fluconazole including but not limited to headache, diarrhea, nausea, upset stomach, liver function test abnormalities, taste disturbance, and stomach pain.  There is a rare possibility of liver failure that can occur when taking fluconazole.  The patient understands that monitoring of LFTs and kidney function test may be required, especially at baseline. The patient verbalized understanding of the proper use and possible adverse effects of fluconazole.  All of the patient's questions and concerns were addressed.
Finasteride Male Counseling: Finasteride Counseling:  I discussed with the patient the risks of use of finasteride including but not limited to decreased libido, decreased ejaculate volume, gynecomastia, and depression. Women should not handle medication.  All of the patient's questions and concerns were addressed.
Opzelura Pregnancy And Lactation Text: There is insufficient data to evaluate drug-associated risk for major birth defects, miscarriage, or other adverse maternal or fetal outcomes.  There is a pregnancy registry that monitors pregnancy outcomes in pregnant persons exposed to the medication during pregnancy.  It is unknown if this medication is excreted in breast milk.  Do not breastfeed during treatment and for about 4 weeks after the last dose.
Xelfunmiz Pregnancy And Lactation Text: This medication is Pregnancy Category D and is not considered safe during pregnancy.  The risk during breast feeding is also uncertain.

## 2023-09-05 ENCOUNTER — HOSPITAL ENCOUNTER (OUTPATIENT)
Facility: MEDICAL CENTER | Age: 78
End: 2023-09-05
Attending: STUDENT IN AN ORGANIZED HEALTH CARE EDUCATION/TRAINING PROGRAM
Payer: MEDICARE

## 2023-09-05 PROCEDURE — 87086 URINE CULTURE/COLONY COUNT: CPT

## 2023-09-07 ENCOUNTER — HOSPITAL ENCOUNTER (OUTPATIENT)
Dept: RADIOLOGY | Facility: MEDICAL CENTER | Age: 78
End: 2023-09-07
Attending: INTERNAL MEDICINE
Payer: MEDICARE

## 2023-09-07 DIAGNOSIS — R59.9 SWELLING OF LYMPH NODES: ICD-10-CM

## 2023-09-07 DIAGNOSIS — C82.05 FOLICLAR LYMPH GRADE I, NODES OF ING REGION AND LOWER LIMB (HCC): ICD-10-CM

## 2023-09-07 DIAGNOSIS — Z79.899 NEED FOR PROPHYLACTIC CHEMOTHERAPY: ICD-10-CM

## 2023-09-07 DIAGNOSIS — Z85.72 PERSONAL HISTORY OF MALIGNANT LYMPHOMA: ICD-10-CM

## 2023-09-07 PROCEDURE — 71250 CT THORAX DX C-: CPT

## 2023-09-08 LAB
BACTERIA UR CULT: NORMAL
SIGNIFICANT IND 70042: NORMAL
SITE SITE: NORMAL
SOURCE SOURCE: NORMAL

## 2023-09-22 RX ORDER — ATORVASTATIN CALCIUM 40 MG/1
40 TABLET, FILM COATED ORAL EVERY EVENING
Qty: 90 TABLET | Refills: 3 | Status: SHIPPED | OUTPATIENT
Start: 2023-09-22

## 2023-09-22 RX ORDER — SILDENAFIL 25 MG/1
12.5 TABLET, FILM COATED ORAL
Qty: 10 TABLET | Refills: 5 | Status: SHIPPED | OUTPATIENT
Start: 2023-09-22 | End: 2024-01-08 | Stop reason: SDUPTHER

## 2023-09-22 RX ORDER — TAMSULOSIN HYDROCHLORIDE 0.4 MG/1
0.8 CAPSULE ORAL DAILY
Qty: 180 CAPSULE | Refills: 3 | Status: ON HOLD | OUTPATIENT
Start: 2023-09-22 | End: 2023-12-13

## 2023-10-30 ENCOUNTER — HOSPITAL ENCOUNTER (OUTPATIENT)
Facility: MEDICAL CENTER | Age: 78
End: 2023-10-30
Attending: STUDENT IN AN ORGANIZED HEALTH CARE EDUCATION/TRAINING PROGRAM
Payer: MEDICARE

## 2023-10-30 PROCEDURE — 87086 URINE CULTURE/COLONY COUNT: CPT

## 2023-11-02 ENCOUNTER — HOSPITAL ENCOUNTER (OUTPATIENT)
Dept: CARDIOLOGY | Facility: MEDICAL CENTER | Age: 78
End: 2023-11-02
Attending: STUDENT IN AN ORGANIZED HEALTH CARE EDUCATION/TRAINING PROGRAM
Payer: MEDICARE

## 2023-11-02 ENCOUNTER — HOSPITAL ENCOUNTER (OUTPATIENT)
Dept: LAB | Facility: MEDICAL CENTER | Age: 78
End: 2023-11-02
Attending: STUDENT IN AN ORGANIZED HEALTH CARE EDUCATION/TRAINING PROGRAM
Payer: MEDICARE

## 2023-11-02 DIAGNOSIS — Z01.810 PRE-OPERATIVE CARDIOVASCULAR EXAMINATION: Primary | ICD-10-CM

## 2023-11-02 LAB
ANION GAP SERPL CALC-SCNC: 12 MMOL/L (ref 7–16)
APPEARANCE UR: CLEAR
BACTERIA #/AREA URNS HPF: NEGATIVE /HPF
BACTERIA UR CULT: NORMAL
BASOPHILS # BLD AUTO: 0.6 % (ref 0–1.8)
BASOPHILS # BLD: 0.03 K/UL (ref 0–0.12)
BILIRUB UR QL STRIP.AUTO: NEGATIVE
BUN SERPL-MCNC: 19 MG/DL (ref 8–22)
CALCIUM SERPL-MCNC: 9.1 MG/DL (ref 8.5–10.5)
CAOX CRY #/AREA URNS HPF: ABNORMAL /HPF
CHLORIDE SERPL-SCNC: 105 MMOL/L (ref 96–112)
CO2 SERPL-SCNC: 23 MMOL/L (ref 20–33)
COLOR UR: ABNORMAL
CREAT SERPL-MCNC: 1.02 MG/DL (ref 0.5–1.4)
EKG IMPRESSION: NORMAL
EOSINOPHIL # BLD AUTO: 0.24 K/UL (ref 0–0.51)
EOSINOPHIL NFR BLD: 4.9 % (ref 0–6.9)
EPI CELLS #/AREA URNS HPF: ABNORMAL /HPF
ERYTHROCYTE [DISTWIDTH] IN BLOOD BY AUTOMATED COUNT: 44.2 FL (ref 35.9–50)
GFR SERPLBLD CREATININE-BSD FMLA CKD-EPI: 76 ML/MIN/1.73 M 2
GLUCOSE SERPL-MCNC: 130 MG/DL (ref 65–99)
GLUCOSE UR STRIP.AUTO-MCNC: NEGATIVE MG/DL
HCT VFR BLD AUTO: 42.1 % (ref 42–52)
HGB BLD-MCNC: 14.4 G/DL (ref 14–18)
HYALINE CASTS #/AREA URNS LPF: ABNORMAL /LPF
IMM GRANULOCYTES # BLD AUTO: 0.01 K/UL (ref 0–0.11)
IMM GRANULOCYTES NFR BLD AUTO: 0.2 % (ref 0–0.9)
KETONES UR STRIP.AUTO-MCNC: ABNORMAL MG/DL
LEUKOCYTE ESTERASE UR QL STRIP.AUTO: ABNORMAL
LYMPHOCYTES # BLD AUTO: 0.88 K/UL (ref 1–4.8)
LYMPHOCYTES NFR BLD: 17.8 % (ref 22–41)
MCH RBC QN AUTO: 32 PG (ref 27–33)
MCHC RBC AUTO-ENTMCNC: 34.2 G/DL (ref 32.3–36.5)
MCV RBC AUTO: 93.6 FL (ref 81.4–97.8)
MICRO URNS: ABNORMAL
MONOCYTES # BLD AUTO: 0.32 K/UL (ref 0–0.85)
MONOCYTES NFR BLD AUTO: 6.5 % (ref 0–13.4)
NEUTROPHILS # BLD AUTO: 3.46 K/UL (ref 1.82–7.42)
NEUTROPHILS NFR BLD: 70 % (ref 44–72)
NITRITE UR QL STRIP.AUTO: NEGATIVE
NRBC # BLD AUTO: 0 K/UL
NRBC BLD-RTO: 0 /100 WBC (ref 0–0.2)
PH UR STRIP.AUTO: 5.5 [PH] (ref 5–8)
PLATELET # BLD AUTO: 209 K/UL (ref 164–446)
PMV BLD AUTO: 10.3 FL (ref 9–12.9)
POTASSIUM SERPL-SCNC: 4.5 MMOL/L (ref 3.6–5.5)
PROT UR QL STRIP: 100 MG/DL
RBC # BLD AUTO: 4.5 M/UL (ref 4.7–6.1)
RBC # URNS HPF: ABNORMAL /HPF
RBC UR QL AUTO: NEGATIVE
SIGNIFICANT IND 70042: NORMAL
SITE SITE: NORMAL
SODIUM SERPL-SCNC: 140 MMOL/L (ref 135–145)
SOURCE SOURCE: NORMAL
SP GR UR STRIP.AUTO: 1.03
UROBILINOGEN UR STRIP.AUTO-MCNC: 0.2 MG/DL
WBC # BLD AUTO: 4.9 K/UL (ref 4.8–10.8)
WBC #/AREA URNS HPF: ABNORMAL /HPF

## 2023-11-02 PROCEDURE — 87086 URINE CULTURE/COLONY COUNT: CPT

## 2023-11-02 PROCEDURE — 85610 PROTHROMBIN TIME: CPT

## 2023-11-02 PROCEDURE — 93010 ELECTROCARDIOGRAM REPORT: CPT | Performed by: INTERNAL MEDICINE

## 2023-11-02 PROCEDURE — 81001 URINALYSIS AUTO W/SCOPE: CPT

## 2023-11-02 PROCEDURE — 36415 COLL VENOUS BLD VENIPUNCTURE: CPT

## 2023-11-02 PROCEDURE — 80048 BASIC METABOLIC PNL TOTAL CA: CPT

## 2023-11-02 PROCEDURE — 93005 ELECTROCARDIOGRAM TRACING: CPT

## 2023-11-02 PROCEDURE — 85730 THROMBOPLASTIN TIME PARTIAL: CPT

## 2023-11-02 PROCEDURE — 85025 COMPLETE CBC W/AUTO DIFF WBC: CPT

## 2023-11-03 LAB
APTT PPP: 36.8 SEC (ref 24.7–36)
INR PPP: 1.19 (ref 0.87–1.13)
PROTHROMBIN TIME: 15.3 SEC (ref 12–14.6)

## 2023-11-04 LAB
BACTERIA UR CULT: NORMAL
SIGNIFICANT IND 70042: NORMAL
SITE SITE: NORMAL
SOURCE SOURCE: NORMAL

## 2023-11-06 ENCOUNTER — APPOINTMENT (OUTPATIENT)
Dept: ADMISSIONS | Facility: MEDICAL CENTER | Age: 78
End: 2023-11-06
Attending: STUDENT IN AN ORGANIZED HEALTH CARE EDUCATION/TRAINING PROGRAM
Payer: MEDICARE

## 2023-11-09 ENCOUNTER — APPOINTMENT (RX ONLY)
Dept: URBAN - METROPOLITAN AREA CLINIC 4 | Facility: CLINIC | Age: 78
Setting detail: DERMATOLOGY
End: 2023-11-09

## 2023-11-09 PROBLEM — D04.61 CARCINOMA IN SITU OF SKIN OF RIGHT UPPER LIMB, INCLUDING SHOULDER: Status: ACTIVE | Noted: 2023-11-09

## 2023-11-09 PROCEDURE — 11604 EXC TR-EXT MAL+MARG 3.1-4 CM: CPT

## 2023-11-09 PROCEDURE — ? EXCISION

## 2023-11-09 PROCEDURE — 13122 CMPLX RPR S/A/L ADDL 5 CM/>: CPT

## 2023-11-09 PROCEDURE — 13121 CMPLX RPR S/A/L 2.6-7.5 CM: CPT

## 2023-11-09 NOTE — PROCEDURE: EXCISION
Referring Physician (Optional): ALLY Rodriguez
Surgeon (Optional): Sophia
Biopsy Photograph Reviewed: Yes
Previous Accession (Optional): [id number]
Size Of Lesion In Cm: 2.9
X Size Of Lesion In Cm (Optional): 0
Size Of Margin In Cm: 0.2
Anesthesia Volume In Cc: 12
Was An Eye Clamp Used?: No
Eye Clamp Note Details: An eye clamp was used during the procedure.
Excision Method: Elliptical
Saucerization Depth: dermis and superficial adipose tissue
Repair Type: Complex
Intermediate / Complex Repair - Final Wound Length In Cm: 7.8
Suturegard Retention Suture: 2-0 Nylon
Retention Suture Bite Size: 3 mm
Length To Time In Minutes Device Was In Place: 10
Number Of Hemigard Strips Per Side: 1
Width Of Defect Perpendicular To Closure In Cm (Required): 2.6
Distance Of Undermining In Cm (Required): 3.1
Undermining Type: Entire Wound
Debridement Text: The wound edges were debrided prior to proceeding with the closure to facilitate wound healing.
Helical Rim Text: The closure involved the helical rim.
Vermilion Border Text: The closure involved the vermilion border.
Nostril Rim Text: The closure involved the nostril rim.
Retention Suture Text: Retention sutures were placed to support the closure and prevent dehiscence.
Lab: [id number]
Lab Facility: 
Graft Donor Site Bandage (Optional-Leave Blank If You Don't Want In Note): Steri-strips and a pressure bandage were applied to the donor site.
Epidermal Closure Graft Donor Site (Optional): simple interrupted
Billing Type: Third-Party Bill
Excision Depth: adipose tissue
Scalpel Size: 15 blade
Anesthesia Type: 1% lidocaine with epinephrine
Additional Anesthesia Volume In Cc: 6
Hemostasis: Electrocautery
Estimated Blood Loss (Cc): minimal
Detail Level: Detailed
Repair Anesthesia Method: local infiltration
Deep Sutures: 2-0 Vicryl
Dermal Closure: buried vertical mattress
Epidermal Sutures: 5-0 Caprosyn
Epidermal Closure: running subcuticular
Wound Care: Bacitracin
Dressing: dry sterile dressing
Suturegard Intro: Intraoperative tissue expansion was performed, utilizing the SUTUREGARD device, in order to reduce wound tension.
Suturegard Body: The suture ends were repeatedly re-tightened and re-clamped to achieve the desired tissue expansion.
Hemigard Intro: Due to skin fragility and wound tension, it was decided to use HEMIGARD adhesive retention suture devices to permit a linear closure. The skin was cleaned and dried for a 6cm distance away from the wound. Excessive hair, if present, was removed to allow for adhesion.
Hemigard Postcare Instructions: The HEMIGARD strips are to remain completely dry for at least 5-7 days.
Positioning (Leave Blank If You Do Not Want): The patient was placed in a comfortable position exposing the surgical site.
Pre-Excision Curettage Text (Leave Blank If You Do Not Want): Prior to drawing the surgical margin the visible lesion was removed with electrodesiccation and curettage to clearly define the lesion size.
Complex Repair Preamble Text (Leave Blank If You Do Not Want): Extensive wide undermining was performed.
Intermediate Repair Preamble Text (Leave Blank If You Do Not Want): Undermining was performed with blunt dissection.
Curvilinear Excision Additional Text (Leave Blank If You Do Not Want): The margin was drawn around the clinically apparent lesion.  A curvilinear shape was then drawn on the skin incorporating the lesion and margins.  Incisions were then made along these lines to the appropriate tissue plane and the lesion was extirpated.
Fusiform Excision Additional Text (Leave Blank If You Do Not Want): The margin was drawn around the clinically apparent lesion.  A fusiform shape was then drawn on the skin incorporating the lesion and margins.  Incisions were then made along these lines to the appropriate tissue plane and the lesion was extirpated.
Elliptical Excision Additional Text (Leave Blank If You Do Not Want): The margin was drawn around the clinically apparent lesion.  An elliptical shape was then drawn on the skin incorporating the lesion and margins.  Incisions were then made along these lines to the appropriate tissue plane and the lesion was extirpated.
Saucerization Excision Additional Text (Leave Blank If You Do Not Want): The margin was drawn around the clinically apparent lesion.  Incisions were then made along these lines, in a tangential fashion, to the appropriate tissue plane and the lesion was extirpated.
Slit Excision Additional Text (Leave Blank If You Do Not Want): A linear line was drawn on the skin overlying the lesion. An incision was made slowly until the lesion was visualized.  Once visualized, the lesion was removed with blunt dissection.
Excisional Biopsy Additional Text (Leave Blank If You Do Not Want): The margin was drawn around the clinically apparent lesion. An elliptical shape was then drawn on the skin incorporating the lesion and margins.  Incisions were then made along these lines to the appropriate tissue plane and the lesion was extirpated.
Perilesional Excision Additional Text (Leave Blank If You Do Not Want): The margin was drawn around the clinically apparent lesion. Incisions were then made along these lines to the appropriate tissue plane and the lesion was extirpated.
Repair Performed By Another Provider Text (Leave Blank If You Do Not Want): After the tissue was excised the defect was repaired by another provider.
No Repair - Repaired With Adjacent Surgical Defect Text (Leave Blank If You Do Not Want): After the excision the defect was repaired concurrently with another surgical defect which was in close approximation.
Adjacent Tissue Transfer Text: The defect edges were debeveled with a #15 scalpel blade. Given the location of the defect and the proximity to free margins an adjacent tissue transfer was deemed most appropriate. Using a sterile surgical marker, an appropriate flap was drawn incorporating the defect and placing the expected incisions within the relaxed skin tension lines where possible. The area thus outlined was incised deep to adipose tissue with a #15 scalpel blade. The skin margins were undermined to an appropriate distance in all directions utilizing iris scissors and carried over to close the primary defect.
Advancement Flap (Single) Text: The defect edges were debeveled with a #15 scalpel blade.  Given the location of the defect and the proximity to free margins a single advancement flap was deemed most appropriate.  Using a sterile surgical marker, an appropriate advancement flap was drawn incorporating the defect and placing the expected incisions within the relaxed skin tension lines where possible.    The area thus outlined was incised deep to adipose tissue with a #15 scalpel blade.  The skin margins were undermined to an appropriate distance in all directions utilizing iris scissors.
Advancement Flap (Double) Text: The defect edges were debeveled with a #15 scalpel blade.  Given the location of the defect and the proximity to free margins a double advancement flap was deemed most appropriate.  Using a sterile surgical marker, the appropriate advancement flaps were drawn incorporating the defect and placing the expected incisions within the relaxed skin tension lines where possible.    The area thus outlined was incised deep to adipose tissue with a #15 scalpel blade.  The skin margins were undermined to an appropriate distance in all directions utilizing iris scissors.
Burow's Advancement Flap Text: The defect edges were debeveled with a #15 scalpel blade.  Given the location of the defect and the proximity to free margins a Burow's advancement flap was deemed most appropriate.  Using a sterile surgical marker, the appropriate advancement flap was drawn incorporating the defect and placing the expected incisions within the relaxed skin tension lines where possible.    The area thus outlined was incised deep to adipose tissue with a #15 scalpel blade.  The skin margins were undermined to an appropriate distance in all directions utilizing iris scissors.
Chonodrocutaneous Helical Advancement Flap Text: The defect edges were debeveled with a #15 scalpel blade. Given the location of the defect and the proximity to free margins a chondrocutaneous helical advancement flap was deemed most appropriate. Using a sterile surgical marker, the appropriate advancement flap was drawn incorporating the defect and placing the expected incisions within the relaxed skin tension lines where possible. The area thus outlined was incised deep to adipose tissue with a #15 scalpel blade. The skin margins were undermined to an appropriate distance in all directions utilizing iris scissors. Following this, the designed flap was advanced and carried over into the primary defect and sutured into place.
Crescentic Advancement Flap Text: The defect edges were debeveled with a #15 scalpel blade.  Given the location of the defect and the proximity to free margins a crescentic advancement flap was deemed most appropriate.  Using a sterile surgical marker, the appropriate advancement flap was drawn incorporating the defect and placing the expected incisions within the relaxed skin tension lines where possible.    The area thus outlined was incised deep to adipose tissue with a #15 scalpel blade.  The skin margins were undermined to an appropriate distance in all directions utilizing iris scissors.
A-T Advancement Flap Text: The defect edges were debeveled with a #15 scalpel blade.  Given the location of the defect, shape of the defect and the proximity to free margins an A-T advancement flap was deemed most appropriate.  Using a sterile surgical marker, an appropriate advancement flap was drawn incorporating the defect and placing the expected incisions within the relaxed skin tension lines where possible.    The area thus outlined was incised deep to adipose tissue with a #15 scalpel blade.  The skin margins were undermined to an appropriate distance in all directions utilizing iris scissors.
O-T Advancement Flap Text: The defect edges were debeveled with a #15 scalpel blade.  Given the location of the defect, shape of the defect and the proximity to free margins an O-T advancement flap was deemed most appropriate.  Using a sterile surgical marker, an appropriate advancement flap was drawn incorporating the defect and placing the expected incisions within the relaxed skin tension lines where possible.    The area thus outlined was incised deep to adipose tissue with a #15 scalpel blade.  The skin margins were undermined to an appropriate distance in all directions utilizing iris scissors.
O-L Flap Text: The defect edges were debeveled with a #15 scalpel blade.  Given the location of the defect, shape of the defect and the proximity to free margins an O-L flap was deemed most appropriate.  Using a sterile surgical marker, an appropriate advancement flap was drawn incorporating the defect and placing the expected incisions within the relaxed skin tension lines where possible.    The area thus outlined was incised deep to adipose tissue with a #15 scalpel blade.  The skin margins were undermined to an appropriate distance in all directions utilizing iris scissors.
O-Z Flap Text: The defect edges were debeveled with a #15 scalpel blade. Given the location of the defect, shape of the defect and the proximity to free margins an O-Z flap was deemed most appropriate. Using a sterile surgical marker, an appropriate transposition flap was drawn incorporating the defect and placing the expected incisions within the relaxed skin tension lines where possible. The area thus outlined was incised deep to adipose tissue with a #15 scalpel blade. The skin margins were undermined to an appropriate distance in all directions utilizing iris scissors. Following this, the designed flap was carried over into the primary defect and sutured into place.
Double O-Z Flap Text: The defect edges were debeveled with a #15 scalpel blade. Given the location of the defect, shape of the defect and the proximity to free margins a Double O-Z flap was deemed most appropriate. Using a sterile surgical marker, an appropriate transposition flap was drawn incorporating the defect and placing the expected incisions within the relaxed skin tension lines where possible. The area thus outlined was incised deep to adipose tissue with a #15 scalpel blade. The skin margins were undermined to an appropriate distance in all directions utilizing iris scissors. Following this, the designed flap was carried over into the primary defect and sutured into place.
V-Y Flap Text: The defect edges were debeveled with a #15 scalpel blade.  Given the location of the defect, shape of the defect and the proximity to free margins a V-Y flap was deemed most appropriate.  Using a sterile surgical marker, an appropriate advancement flap was drawn incorporating the defect and placing the expected incisions within the relaxed skin tension lines where possible.    The area thus outlined was incised deep to adipose tissue with a #15 scalpel blade.  The skin margins were undermined to an appropriate distance in all directions utilizing iris scissors.
Advancement-Rotation Flap Text: The defect edges were debeveled with a #15 scalpel blade.  Given the location of the defect, shape of the defect and the proximity to free margins an advancement-rotation flap was deemed most appropriate.  Using a sterile surgical marker, an appropriate flap was drawn incorporating the defect and placing the expected incisions within the relaxed skin tension lines where possible. The area thus outlined was incised deep to adipose tissue with a #15 scalpel blade.  The skin margins were undermined to an appropriate distance in all directions utilizing iris scissors.
Mercedes Flap Text: The defect edges were debeveled with a #15 scalpel blade. Given the location of the defect, shape of the defect and the proximity to free margins a Mercedes flap was deemed most appropriate. Using a sterile surgical marker, an appropriate advancement flap was drawn incorporating the defect and placing the expected incisions within the relaxed skin tension lines where possible. The area thus outlined was incised deep to adipose tissue with a #15 scalpel blade. The skin margins were undermined to an appropriate distance in all directions utilizing iris scissors. Following this, the designed flap was advanced and carried over into the primary defect and sutured into place.
Modified Advancement Flap Text: The defect edges were debeveled with a #15 scalpel blade.  Given the location of the defect, shape of the defect and the proximity to free margins a modified advancement flap was deemed most appropriate.  Using a sterile surgical marker, an appropriate advancement flap was drawn incorporating the defect and placing the expected incisions within the relaxed skin tension lines where possible.    The area thus outlined was incised deep to adipose tissue with a #15 scalpel blade.  The skin margins were undermined to an appropriate distance in all directions utilizing iris scissors.
Mucosal Advancement Flap Text: Given the location of the defect, shape of the defect and the proximity to free margins a mucosal advancement flap was deemed most appropriate. Incisions were made with a 15 blade scalpel in the appropriate fashion along the cutaneous vermilion border and the mucosal lip. The remaining actinically damaged mucosal tissue was excised.  The mucosal advancement flap was then elevated to the gingival sulcus with care taken to preserve the neurovascular structures and advanced into the primary defect. Care was taken to ensure that precise realignment of the vermilion border was achieved.
Peng Advancement Flap Text: The defect edges were debeveled with a #15 scalpel blade. Given the location of the defect, shape of the defect and the proximity to free margins a Peng advancement flap was deemed most appropriate. Using a sterile surgical marker, an appropriate advancement flap was drawn incorporating the defect and placing the expected incisions within the relaxed skin tension lines where possible. The area thus outlined was incised deep to adipose tissue with a #15 scalpel blade. The skin margins were undermined to an appropriate distance in all directions utilizing iris scissors. Following this, the designed flap was advanced and carried over into the primary defect and sutured into place.
Hatchet Flap Text: The defect edges were debeveled with a #15 scalpel blade.  Given the location of the defect, shape of the defect and the proximity to free margins a hatchet flap was deemed most appropriate.  Using a sterile surgical marker, an appropriate hatchet flap was drawn incorporating the defect and placing the expected incisions within the relaxed skin tension lines where possible.    The area thus outlined was incised deep to adipose tissue with a #15 scalpel blade.  The skin margins were undermined to an appropriate distance in all directions utilizing iris scissors.
Rotation Flap Text: The defect edges were debeveled with a #15 scalpel blade.  Given the location of the defect, shape of the defect and the proximity to free margins a rotation flap was deemed most appropriate.  Using a sterile surgical marker, an appropriate rotation flap was drawn incorporating the defect and placing the expected incisions within the relaxed skin tension lines where possible.    The area thus outlined was incised deep to adipose tissue with a #15 scalpel blade.  The skin margins were undermined to an appropriate distance in all directions utilizing iris scissors.
Bilateral Rotation Flap Text: The defect edges were debeveled with a #15 scalpel blade. Given the location of the defect, shape of the defect and the proximity to free margins a bilateral rotation flap was deemed most appropriate. Using a sterile surgical marker, an appropriate rotation flap was drawn incorporating the defect and placing the expected incisions within the relaxed skin tension lines where possible. The area thus outlined was incised deep to adipose tissue with a #15 scalpel blade. The skin margins were undermined to an appropriate distance in all directions utilizing iris scissors. Following this, the designed flap was carried over into the primary defect and sutured into place.
Spiral Flap Text: The defect edges were debeveled with a #15 scalpel blade.  Given the location of the defect, shape of the defect and the proximity to free margins a spiral flap was deemed most appropriate.  Using a sterile surgical marker, an appropriate rotation flap was drawn incorporating the defect and placing the expected incisions within the relaxed skin tension lines where possible. The area thus outlined was incised deep to adipose tissue with a #15 scalpel blade.  The skin margins were undermined to an appropriate distance in all directions utilizing iris scissors.
Staged Advancement Flap Text: The defect edges were debeveled with a #15 scalpel blade. Given the location of the defect, shape of the defect and the proximity to free margins a staged advancement flap was deemed most appropriate. Using a sterile surgical marker, an appropriate advancement flap was drawn incorporating the defect and placing the expected incisions within the relaxed skin tension lines where possible. The area thus outlined was incised deep to adipose tissue with a #15 scalpel blade. The skin margins were undermined to an appropriate distance in all directions utilizing iris scissors. Following this, the designed flap was carried over into the primary defect and sutured into place.
Star Wedge Flap Text: The defect edges were debeveled with a #15 scalpel blade.  Given the location of the defect, shape of the defect and the proximity to free margins a star wedge flap was deemed most appropriate.  Using a sterile surgical marker, an appropriate rotation flap was drawn incorporating the defect and placing the expected incisions within the relaxed skin tension lines where possible. The area thus outlined was incised deep to adipose tissue with a #15 scalpel blade.  The skin margins were undermined to an appropriate distance in all directions utilizing iris scissors.
Transposition Flap Text: The defect edges were debeveled with a #15 scalpel blade.  Given the location of the defect and the proximity to free margins a transposition flap was deemed most appropriate.  Using a sterile surgical marker, an appropriate transposition flap was drawn incorporating the defect.    The area thus outlined was incised deep to adipose tissue with a #15 scalpel blade.  The skin margins were undermined to an appropriate distance in all directions utilizing iris scissors.
Muscle Hinge Flap Text: The defect edges were debeveled with a #15 scalpel blade.  Given the size, depth and location of the defect and the proximity to free margins a muscle hinge flap was deemed most appropriate.  Using a sterile surgical marker, an appropriate hinge flap was drawn incorporating the defect. The area thus outlined was incised with a #15 scalpel blade.  The skin margins were undermined to an appropriate distance in all directions utilizing iris scissors.
Mustarde Flap Text: The defect edges were debeveled with a #15 scalpel blade.  Given the size, depth and location of the defect and the proximity to free margins a Mustarde flap was deemed most appropriate. Using a sterile surgical marker, an appropriate flap was drawn incorporating the defect. The area thus outlined was incised with a #15 scalpel blade. The skin margins were undermined to an appropriate distance in all directions utilizing iris scissors. Following this, the designed flap was carried into the primary defect and sutured into place.
Nasal Turnover Hinge Flap Text: The defect edges were debeveled with a #15 scalpel blade.  Given the size, depth, location of the defect and the defect being full thickness a nasal turnover hinge flap was deemed most appropriate. Using a sterile surgical marker, an appropriate hinge flap was drawn incorporating the defect. The area thus outlined was incised with a #15 scalpel blade. The flap was designed to recreate the nasal mucosal lining and the alar rim. The skin margins were undermined to an appropriate distance in all directions utilizing iris scissors. Following this, the designed flap was carried over into the primary defect and sutured into place
Nasalis-Muscle-Based Myocutaneous Island Pedicle Flap Text: Using a #15 blade, an incision was made around the donor flap to the level of the nasalis muscle. Wide lateral undermining was then performed in both the subcutaneous plane above the nasalis muscle, and in a submuscular plane just above periosteum. This allowed the formation of a free nasalis muscle axial pedicle (based on the angular artery) which was still attached to the actual cutaneous flap, increasing its mobility and vascular viability. Hemostasis was obtained with pinpoint electrocoagulation. The flap was mobilized into position and the pivotal anchor points positioned and stabilized with buried interrupted sutures. Subcutaneous and dermal tissues were closed in a multilayered fashion with sutures. Tissue redundancies were excised, and the epidermal edges were apposed without significant tension and sutured with sutures.
Orbicularis Oris Muscle Flap Text: The defect edges were debeveled with a #15 scalpel blade.  Given that the defect affected the competency of the oral sphincter an orbicularis oris muscle flap was deemed most appropriate to restore this competency and normal muscle function.  Using a sterile surgical marker, an appropriate flap was drawn incorporating the defect. The area thus outlined was incised with a #15 scalpel blade. Following this, the designed flap was carried over into the primary defect and sutured into place.
Melolabial Transposition Flap Text: The defect edges were debeveled with a #15 scalpel blade.  Given the location of the defect and the proximity to free margins a melolabial flap was deemed most appropriate.  Using a sterile surgical marker, an appropriate melolabial transposition flap was drawn incorporating the defect.    The area thus outlined was incised deep to adipose tissue with a #15 scalpel blade.  The skin margins were undermined to an appropriate distance in all directions utilizing iris scissors.
Rhombic Flap Text: The defect edges were debeveled with a #15 scalpel blade.  Given the location of the defect and the proximity to free margins a rhombic flap was deemed most appropriate.  Using a sterile surgical marker, an appropriate rhombic flap was drawn incorporating the defect.    The area thus outlined was incised deep to adipose tissue with a #15 scalpel blade.  The skin margins were undermined to an appropriate distance in all directions utilizing iris scissors.
Rhomboid Transposition Flap Text: The defect edges were debeveled with a #15 scalpel blade. Given the location of the defect and the proximity to free margins a rhomboid transposition flap was deemed most appropriate. Using a sterile surgical marker, an appropriate rhomboid flap was drawn incorporating the defect. The area thus outlined was incised deep to adipose tissue with a #15 scalpel blade. The skin margins were undermined to an appropriate distance in all directions utilizing iris scissors. Following this, the designed flap was carried over into the primary defect and sutured into place.
Bi-Rhombic Flap Text: The defect edges were debeveled with a #15 scalpel blade.  Given the location of the defect and the proximity to free margins a bi-rhombic flap was deemed most appropriate.  Using a sterile surgical marker, an appropriate rhombic flap was drawn incorporating the defect. The area thus outlined was incised deep to adipose tissue with a #15 scalpel blade.  The skin margins were undermined to an appropriate distance in all directions utilizing iris scissors.
Helical Rim Advancement Flap Text: The defect edges were debeveled with a #15 blade scalpel.  Given the location of the defect and the proximity to free margins (helical rim) a double helical rim advancement flap was deemed most appropriate.  Using a sterile surgical marker, the appropriate advancement flaps were drawn incorporating the defect and placing the expected incisions between the helical rim and antihelix where possible.  The area thus outlined was incised through and through with a #15 scalpel blade.  With a skin hook and iris scissors, the flaps were gently and sharply undermined and freed up.
Bilateral Helical Rim Advancement Flap Text: The defect edges were debeveled with a #15 blade scalpel.  Given the location of the defect and the proximity to free margins (helical rim) a bilateral helical rim advancement flap was deemed most appropriate.  Using a sterile surgical marker, the appropriate advancement flaps were drawn incorporating the defect and placing the expected incisions between the helical rim and antihelix where possible.  The area thus outlined was incised through and through with a #15 scalpel blade.  With a skin hook and iris scissors, the flaps were gently and sharply undermined and freed up.
Ear Star Wedge Flap Text: The defect edges were debeveled with a #15 blade scalpel.  Given the location of the defect and the proximity to free margins (helical rim) an ear star wedge flap was deemed most appropriate.  Using a sterile surgical marker, the appropriate flap was drawn incorporating the defect and placing the expected incisions between the helical rim and antihelix where possible.  The area thus outlined was incised through and through with a #15 scalpel blade.
Banner Transposition Flap Text: The defect edges were debeveled with a #15 scalpel blade. Given the location of the defect and the proximity to free margins a Banner transposition flap was deemed most appropriate. Using a sterile surgical marker, an appropriate flap was drawn around the defect. The area thus outlined was incised deep to adipose tissue with a #15 scalpel blade. The skin margins were undermined to an appropriate distance in all directions utilizing iris scissors. Following this, the designed flap was carried into the primary defect and sutured into place.
Bilobed Flap Text: The defect edges were debeveled with a #15 scalpel blade.  Given the location of the defect and the proximity to free margins a bilobe flap was deemed most appropriate.  Using a sterile surgical marker, an appropriate bilobe flap drawn around the defect.    The area thus outlined was incised deep to adipose tissue with a #15 scalpel blade.  The skin margins were undermined to an appropriate distance in all directions utilizing iris scissors.
Bilobed Transposition Flap Text: The defect edges were debeveled with a #15 scalpel blade.  Given the location of the defect and the proximity to free margins a bilobed transposition flap was deemed most appropriate.  Using a sterile surgical marker, an appropriate bilobe flap drawn around the defect.    The area thus outlined was incised deep to adipose tissue with a #15 scalpel blade.  The skin margins were undermined to an appropriate distance in all directions utilizing iris scissors.
Trilobed Flap Text: The defect edges were debeveled with a #15 scalpel blade.  Given the location of the defect and the proximity to free margins a trilobed flap was deemed most appropriate.  Using a sterile surgical marker, an appropriate trilobed flap drawn around the defect.    The area thus outlined was incised deep to adipose tissue with a #15 scalpel blade.  The skin margins were undermined to an appropriate distance in all directions utilizing iris scissors.
Dorsal Nasal Flap Text: The defect edges were debeveled with a #15 scalpel blade.  Given the location of the defect and the proximity to free margins a dorsal nasal flap was deemed most appropriate.  Using a sterile surgical marker, an appropriate dorsal nasal flap was drawn around the defect.    The area thus outlined was incised deep to adipose tissue with a #15 scalpel blade.  The skin margins were undermined to an appropriate distance in all directions utilizing iris scissors.
Island Pedicle Flap Text: The defect edges were debeveled with a #15 scalpel blade.  Given the location of the defect, shape of the defect and the proximity to free margins an island pedicle advancement flap was deemed most appropriate.  Using a sterile surgical marker, an appropriate advancement flap was drawn incorporating the defect, outlining the appropriate donor tissue and placing the expected incisions within the relaxed skin tension lines where possible.    The area thus outlined was incised deep to adipose tissue with a #15 scalpel blade.  The skin margins were undermined to an appropriate distance in all directions around the primary defect and laterally outward around the island pedicle utilizing iris scissors.  There was minimal undermining beneath the pedicle flap.
Island Pedicle Flap With Canthal Suspension Text: The defect edges were debeveled with a #15 scalpel blade.  Given the location of the defect, shape of the defect and the proximity to free margins an island pedicle advancement flap was deemed most appropriate.  Using a sterile surgical marker, an appropriate advancement flap was drawn incorporating the defect, outlining the appropriate donor tissue and placing the expected incisions within the relaxed skin tension lines where possible. The area thus outlined was incised deep to adipose tissue with a #15 scalpel blade.  The skin margins were undermined to an appropriate distance in all directions around the primary defect and laterally outward around the island pedicle utilizing iris scissors.  There was minimal undermining beneath the pedicle flap. A suspension suture was placed in the canthal tendon to prevent tension and prevent ectropion.
Alar Island Pedicle Flap Text: The defect edges were debeveled with a #15 scalpel blade.  Given the location of the defect, shape of the defect and the proximity to the alar rim an island pedicle advancement flap was deemed most appropriate.  Using a sterile surgical marker, an appropriate advancement flap was drawn incorporating the defect, outlining the appropriate donor tissue and placing the expected incisions within the nasal ala running parallel to the alar rim. The area thus outlined was incised with a #15 scalpel blade.  The skin margins were undermined minimally to an appropriate distance in all directions around the primary defect and laterally outward around the island pedicle utilizing iris scissors.  There was minimal undermining beneath the pedicle flap.
Double Island Pedicle Flap Text: The defect edges were debeveled with a #15 scalpel blade.  Given the location of the defect, shape of the defect and the proximity to free margins a double island pedicle advancement flap was deemed most appropriate.  Using a sterile surgical marker, an appropriate advancement flap was drawn incorporating the defect, outlining the appropriate donor tissue and placing the expected incisions within the relaxed skin tension lines where possible.    The area thus outlined was incised deep to adipose tissue with a #15 scalpel blade.  The skin margins were undermined to an appropriate distance in all directions around the primary defect and laterally outward around the island pedicle utilizing iris scissors.  There was minimal undermining beneath the pedicle flap.
Island Pedicle Flap-Requiring Vessel Identification Text: The defect edges were debeveled with a #15 scalpel blade.  Given the location of the defect, shape of the defect and the proximity to free margins an island pedicle advancement flap was deemed most appropriate.  Using a sterile surgical marker, an appropriate advancement flap was drawn, based on the axial vessel mentioned above, incorporating the defect, outlining the appropriate donor tissue and placing the expected incisions within the relaxed skin tension lines where possible.    The area thus outlined was incised deep to adipose tissue with a #15 scalpel blade.  The skin margins were undermined to an appropriate distance in all directions around the primary defect and laterally outward around the island pedicle utilizing iris scissors.  There was minimal undermining beneath the pedicle flap.
Keystone Flap Text: The defect edges were debeveled with a #15 scalpel blade.  Given the location of the defect, shape of the defect a keystone flap was deemed most appropriate.  Using a sterile surgical marker, an appropriate keystone flap was drawn incorporating the defect, outlining the appropriate donor tissue and placing the expected incisions within the relaxed skin tension lines where possible. The area thus outlined was incised deep to adipose tissue with a #15 scalpel blade.  The skin margins were undermined to an appropriate distance in all directions around the primary defect and laterally outward around the flap utilizing iris scissors.
O-T Plasty Text: The defect edges were debeveled with a #15 scalpel blade.  Given the location of the defect, shape of the defect and the proximity to free margins an O-T plasty was deemed most appropriate.  Using a sterile surgical marker, an appropriate O-T plasty was drawn incorporating the defect and placing the expected incisions within the relaxed skin tension lines where possible.    The area thus outlined was incised deep to adipose tissue with a #15 scalpel blade.  The skin margins were undermined to an appropriate distance in all directions utilizing iris scissors.
O-Z Plasty Text: The defect edges were debeveled with a #15 scalpel blade.  Given the location of the defect, shape of the defect and the proximity to free margins an O-Z plasty (double transposition flap) was deemed most appropriate.  Using a sterile surgical marker, the appropriate transposition flaps were drawn incorporating the defect and placing the expected incisions within the relaxed skin tension lines where possible.    The area thus outlined was incised deep to adipose tissue with a #15 scalpel blade.  The skin margins were undermined to an appropriate distance in all directions utilizing iris scissors.  Hemostasis was achieved with electrocautery.  The flaps were then transposed into place, one clockwise and the other counterclockwise, and anchored with interrupted buried subcutaneous sutures.
Double O-Z Plasty Text: The defect edges were debeveled with a #15 scalpel blade. Given the location of the defect, shape of the defect and the proximity to free margins a Double O-Z plasty (double transposition flap) was deemed most appropriate. Using a sterile surgical marker, the appropriate transposition flaps were drawn incorporating the defect and placing the expected incisions within the relaxed skin tension lines where possible. The area thus outlined was incised deep to adipose tissue with a #15 scalpel blade. The skin margins were undermined to an appropriate distance in all directions utilizing iris scissors. Hemostasis was achieved with electrocautery. The flaps were then transposed and carried over into place, one clockwise and the other counterclockwise, and anchored with interrupted buried subcutaneous sutures.
V-Y Plasty Text: The defect edges were debeveled with a #15 scalpel blade.  Given the location of the defect, shape of the defect and the proximity to free margins an V-Y advancement flap was deemed most appropriate.  Using a sterile surgical marker, an appropriate advancement flap was drawn incorporating the defect and placing the expected incisions within the relaxed skin tension lines where possible.    The area thus outlined was incised deep to adipose tissue with a #15 scalpel blade.  The skin margins were undermined to an appropriate distance in all directions utilizing iris scissors.
H Plasty Text: Given the location of the defect, shape of the defect and the proximity to free margins a H-plasty was deemed most appropriate for repair.  Using a sterile surgical marker, the appropriate advancement arms of the H-plasty were drawn incorporating the defect and placing the expected incisions within the relaxed skin tension lines where possible. The area thus outlined was incised deep to adipose tissue with a #15 scalpel blade. The skin margins were undermined to an appropriate distance in all directions utilizing iris scissors.  The opposing advancement arms were then advanced into place in opposite direction and anchored with interrupted buried subcutaneous sutures.
W Plasty Text: The lesion was extirpated to the level of the fat with a #15 scalpel blade.  Given the location of the defect, shape of the defect and the proximity to free margins a W-plasty was deemed most appropriate for repair.  Using a sterile surgical marker, the appropriate transposition arms of the W-plasty were drawn incorporating the defect and placing the expected incisions within the relaxed skin tension lines where possible.    The area thus outlined was incised deep to adipose tissue with a #15 scalpel blade.  The skin margins were undermined to an appropriate distance in all directions utilizing iris scissors.  The opposing transposition arms were then transposed into place in opposite direction and anchored with interrupted buried subcutaneous sutures.
Z Plasty Text: The lesion was extirpated to the level of the fat with a #15 scalpel blade.  Given the location of the defect, shape of the defect and the proximity to free margins a Z-plasty was deemed most appropriate for repair.  Using a sterile surgical marker, the appropriate transposition arms of the Z-plasty were drawn incorporating the defect and placing the expected incisions within the relaxed skin tension lines where possible.    The area thus outlined was incised deep to adipose tissue with a #15 scalpel blade.  The skin margins were undermined to an appropriate distance in all directions utilizing iris scissors.  The opposing transposition arms were then transposed into place in opposite direction and anchored with interrupted buried subcutaneous sutures.
Double Z Plasty Text: The lesion was extirpated to the level of the fat with a #15 scalpel blade. Given the location of the defect, shape of the defect and the proximity to free margins a double Z-plasty was deemed most appropriate for repair. Using a sterile surgical marker, the appropriate transposition arms of the double Z-plasty were drawn incorporating the defect and placing the expected incisions within the relaxed skin tension lines where possible. The area thus outlined was incised deep to adipose tissue with a #15 scalpel blade. The skin margins were undermined to an appropriate distance in all directions utilizing iris scissors. The opposing transposition arms were then transposed and carried over into place in opposite direction and anchored with interrupted buried subcutaneous sutures.
Zygomaticofacial Flap Text: Given the location of the defect, shape of the defect and the proximity to free margins a zygomaticofacial flap was deemed most appropriate for repair. Using a sterile surgical marker, the appropriate flap was drawn incorporating the defect and placing the expected incisions within the relaxed skin tension lines where possible. The area thus outlined was incised deep to adipose tissue with a #15 scalpel blade with preservation of a vascular pedicle.  The skin margins were undermined to an appropriate distance in all directions utilizing iris scissors. The flap was then carried over into the defect and anchored with interrupted buried subcutaneous sutures.
Cheek Interpolation Flap Text: A decision was made to reconstruct the defect utilizing an interpolation axial flap and a staged reconstruction.  A telfa template was made of the defect.  This telfa template was then used to outline the Cheek Interpolation flap.  The donor area for the pedicle flap was then injected with anesthesia.  The flap was excised through the skin and subcutaneous tissue down to the layer of the underlying musculature.  The interpolation flap was carefully excised within this deep plane to maintain its blood supply.  The edges of the donor site were undermined.   The donor site was closed in a primary fashion.  The pedicle was then rotated into position and sutured.  Once the tube was sutured into place, adequate blood supply was confirmed with blanching and refill.  The pedicle was then wrapped with xeroform gauze and dressed appropriately with a telfa and gauze bandage to ensure continued blood supply and protect the attached pedicle.
Cheek-To-Nose Interpolation Flap Text: A decision was made to reconstruct the defect utilizing an interpolation axial flap and a staged reconstruction.  A telfa template was made of the defect.  This telfa template was then used to outline the Cheek-To-Nose Interpolation flap.  The donor area for the pedicle flap was then injected with anesthesia.  The flap was excised through the skin and subcutaneous tissue down to the layer of the underlying musculature.  The interpolation flap was carefully excised within this deep plane to maintain its blood supply.  The edges of the donor site were undermined.   The donor site was closed in a primary fashion.  The pedicle was then rotated into position and sutured.  Once the tube was sutured into place, adequate blood supply was confirmed with blanching and refill.  The pedicle was then wrapped with xeroform gauze and dressed appropriately with a telfa and gauze bandage to ensure continued blood supply and protect the attached pedicle.
Interpolation Flap Text: A decision was made to reconstruct the defect utilizing an interpolation axial flap and a staged reconstruction.  A telfa template was made of the defect.  This telfa template was then used to outline the interpolation flap.  The donor area for the pedicle flap was then injected with anesthesia.  The flap was excised through the skin and subcutaneous tissue down to the layer of the underlying musculature.  The interpolation flap was carefully excised within this deep plane to maintain its blood supply.  The edges of the donor site were undermined.   The donor site was closed in a primary fashion.  The pedicle was then rotated into position and sutured.  Once the tube was sutured into place, adequate blood supply was confirmed with blanching and refill.  The pedicle was then wrapped with xeroform gauze and dressed appropriately with a telfa and gauze bandage to ensure continued blood supply and protect the attached pedicle.
Melolabial Interpolation Flap Text: A decision was made to reconstruct the defect utilizing an interpolation axial flap and a staged reconstruction.  A telfa template was made of the defect.  This telfa template was then used to outline the melolabial interpolation flap.  The donor area for the pedicle flap was then injected with anesthesia.  The flap was excised through the skin and subcutaneous tissue down to the layer of the underlying musculature.  The pedicle flap was carefully excised within this deep plane to maintain its blood supply.  The edges of the donor site were undermined.   The donor site was closed in a primary fashion.  The pedicle was then rotated into position and sutured.  Once the tube was sutured into place, adequate blood supply was confirmed with blanching and refill.  The pedicle was then wrapped with xeroform gauze and dressed appropriately with a telfa and gauze bandage to ensure continued blood supply and protect the attached pedicle.
Mastoid Interpolation Flap Text: A decision was made to reconstruct the defect utilizing an interpolation axial flap and a staged reconstruction.  A telfa template was made of the defect.  This telfa template was then used to outline the mastoid interpolation flap.  The donor area for the pedicle flap was then injected with anesthesia.  The flap was excised through the skin and subcutaneous tissue down to the layer of the underlying musculature.  The pedicle flap was carefully excised within this deep plane to maintain its blood supply.  The edges of the donor site were undermined.   The donor site was closed in a primary fashion.  The pedicle was then rotated into position and sutured.  Once the tube was sutured into place, adequate blood supply was confirmed with blanching and refill.  The pedicle was then wrapped with xeroform gauze and dressed appropriately with a telfa and gauze bandage to ensure continued blood supply and protect the attached pedicle.
Posterior Auricular Interpolation Flap Text: A decision was made to reconstruct the defect utilizing an interpolation axial flap and a staged reconstruction.  A telfa template was made of the defect.  This telfa template was then used to outline the posterior auricular interpolation flap.  The donor area for the pedicle flap was then injected with anesthesia.  The flap was excised through the skin and subcutaneous tissue down to the layer of the underlying musculature.  The pedicle flap was carefully excised within this deep plane to maintain its blood supply.  The edges of the donor site were undermined.   The donor site was closed in a primary fashion.  The pedicle was then rotated into position and sutured.  Once the tube was sutured into place, adequate blood supply was confirmed with blanching and refill.  The pedicle was then wrapped with xeroform gauze and dressed appropriately with a telfa and gauze bandage to ensure continued blood supply and protect the attached pedicle.
Paramedian Forehead Flap Text: A decision was made to reconstruct the defect utilizing an interpolation axial flap and a staged reconstruction.  A telfa template was made of the defect.  This telfa template was then used to outline the paramedian forehead pedicle flap.  The donor area for the pedicle flap was then injected with anesthesia.  The flap was excised through the skin and subcutaneous tissue down to the layer of the underlying musculature.  The pedicle flap was carefully excised within this deep plane to maintain its blood supply.  The edges of the donor site were undermined.   The donor site was closed in a primary fashion.  The pedicle was then rotated into position and sutured.  Once the tube was sutured into place, adequate blood supply was confirmed with blanching and refill.  The pedicle was then wrapped with xeroform gauze and dressed appropriately with a telfa and gauze bandage to ensure continued blood supply and protect the attached pedicle.
Abbe Flap (Upper To Lower Lip) Text: The defect of the lower lip was assessed and measured.  Given the location and size of the defect, an Abbe flap was deemed most appropriate. Using a sterile surgical marker, an appropriate Abbe flap was measured and drawn on the upper lip. Local anesthesia was then infiltrated.  A scalpel was then used to incise the upper lip through and through the skin, vermilion, muscle and mucosa, leaving the flap pedicled on the opposite side.  The flap was then rotated and transferred to the lower lip defect.  The flap was then sutured into place with a three layer technique, closing the orbicularis oris muscle layer with subcutaneous buried sutures, followed by a mucosal layer and an epidermal layer.
Abbe Flap (Lower To Upper Lip) Text: The defect of the upper lip was assessed and measured.  Given the location and size of the defect, an Abbe flap was deemed most appropriate. Using a sterile surgical marker, an appropriate Abbe flap was measured and drawn on the lower lip. Local anesthesia was then infiltrated. A scalpel was then used to incise the upper lip through and through the skin, vermilion, muscle and mucosa, leaving the flap pedicled on the opposite side.  The flap was then rotated and transferred to the lower lip defect.  The flap was then sutured into place with a three layer technique, closing the orbicularis oris muscle layer with subcutaneous buried sutures, followed by a mucosal layer and an epidermal layer.
Estlander Flap (Upper To Lower Lip) Text: The defect of the lower lip was assessed and measured.  Given the location and size of the defect, an Estlander flap was deemed most appropriate. Using a sterile surgical marker, an appropriate Estlander flap was measured and drawn on the upper lip. Local anesthesia was then infiltrated. A scalpel was then used to incise the lateral aspect of the flap, through skin, muscle and mucosa, leaving the flap pedicled medially.  The flap was then rotated and positioned to fill the lower lip defect.  The flap was then sutured into place with a three layer technique, closing the orbicularis oris muscle layer with subcutaneous buried sutures, followed by a mucosal layer and an epidermal layer.
Lip Wedge Excision Repair Text: Given the location of the defect and the proximity to free margins a full thickness wedge repair was deemed most appropriate.  Using a sterile surgical marker, the appropriate repair was drawn incorporating the defect and placing the expected incisions perpendicular to the vermilion border.  The vermilion border was also meticulously outlined to ensure appropriate reapproximation during the repair.  The area thus outlined was incised through and through with a #15 scalpel blade.  The muscularis and dermis were reaproximated with deep sutures following hemostasis. Care was taken to realign the vermilion border before proceeding with the superficial closure.  Once the vermilion was realigned the superfical and mucosal closure was finished.
Ftsg Text: The defect edges were debeveled with a #15 scalpel blade.  Given the location of the defect, shape of the defect and the proximity to free margins a full thickness skin graft was deemed most appropriate.  Using a sterile surgical marker, the primary defect shape was transferred to the donor site. The area thus outlined was incised deep to adipose tissue with a #15 scalpel blade.  The harvested graft was then trimmed of adipose tissue until only dermis and epidermis was left.  The skin margins of the secondary defect were undermined to an appropriate distance in all directions utilizing iris scissors.  The secondary defect was closed with interrupted buried subcutaneous sutures.  The skin edges were then re-apposed with running  sutures.  The skin graft was then placed in the primary defect and oriented appropriately.
Split-Thickness Skin Graft Text: The defect edges were debeveled with a #15 scalpel blade.  Given the location of the defect, shape of the defect and the proximity to free margins a split thickness skin graft was deemed most appropriate.  Using a sterile surgical marker, the primary defect shape was transferred to the donor site. The split thickness graft was then harvested.  The skin graft was then placed in the primary defect and oriented appropriately.
Pinch Graft Text: The defect edges were debeveled with a #15 scalpel blade. Given the location of the defect, shape of the defect and the proximity to free margins a pinch graft was deemed most appropriate. Using a sterile surgical marker, the primary defect shape was transferred to the donor site. The area thus outlined was incised deep to adipose tissue with a #15 scalpel blade.  The harvested graft was then trimmed of adipose tissue until only dermis and epidermis was left. The skin margins of the secondary defect were undermined to an appropriate distance in all directions utilizing iris scissors.  The secondary defect was closed with interrupted buried subcutaneous sutures.  The skin edges were then re-apposed with running  sutures.  The skin graft was then placed in the primary defect and oriented appropriately.
Burow's Graft Text: The defect edges were debeveled with a #15 scalpel blade. Given the location of the defect, shape of the defect, the proximity to free margins and the presence of a standing cone deformity a Burow's skin graft was deemed most appropriate. The standing cone was removed and this tissue was then trimmed to the shape of the primary defect. The adipose tissue was also removed until only dermis and epidermis were left.  The skin margins of the secondary defect were undermined to an appropriate distance in all directions utilizing iris scissors.  The secondary defect was closed with interrupted buried subcutaneous sutures.  The skin edges were then re-apposed with running  sutures.  The skin graft was then placed in the primary defect and oriented appropriately.
Cartilage Graft Text: The defect edges were debeveled with a #15 scalpel blade.  Given the location of the defect, shape of the defect, the fact the defect involved a full thickness cartilage defect a cartilage graft was deemed most appropriate.  An appropriate donor site was identified, cleansed, and anesthetized. The cartilage graft was then harvested and transferred to the recipient site, oriented appropriately and then sutured into place.  The secondary defect was then repaired using a primary closure.
Composite Graft Text: The defect edges were debeveled with a #15 scalpel blade.  Given the location of the defect, shape of the defect, the proximity to free margins and the fact the defect was full thickness a composite graft was deemed most appropriate.  The defect was outline and then transferred to the donor site.  A full thickness graft was then excised from the donor site. The graft was then placed in the primary defect, oriented appropriately and then sutured into place.  The secondary defect was then repaired using a primary closure.
Epidermal Autograft Text: The defect edges were debeveled with a #15 scalpel blade.  Given the location of the defect, shape of the defect and the proximity to free margins an epidermal autograft was deemed most appropriate.  Using a sterile surgical marker, the primary defect shape was transferred to the donor site. The epidermal graft was then harvested.  The skin graft was then placed in the primary defect and oriented appropriately.
Dermal Autograft Text: The defect edges were debeveled with a #15 scalpel blade.  Given the location of the defect, shape of the defect and the proximity to free margins a dermal autograft was deemed most appropriate.  Using a sterile surgical marker, the primary defect shape was transferred to the donor site. The area thus outlined was incised deep to adipose tissue with a #15 scalpel blade.  The harvested graft was then trimmed of adipose and epidermal tissue until only dermis was left.  The skin graft was then placed in the primary defect and oriented appropriately.
Skin Substitute Text: The defect edges were debeveled with a #15 scalpel blade.  Given the location of the defect, shape of the defect and the proximity to free margins a skin substitute graft was deemed most appropriate.  The graft material was trimmed to fit the size of the defect. The graft was then placed in the primary defect and oriented appropriately.
Tissue Cultured Epidermal Autograft Text: The defect edges were debeveled with a #15 scalpel blade.  Given the location of the defect, shape of the defect and the proximity to free margins a tissue cultured epidermal autograft was deemed most appropriate.  The graft was then trimmed to fit the size of the defect.  The graft was then placed in the primary defect and oriented appropriately.
Xenograft Text: The defect edges were debeveled with a #15 scalpel blade.  Given the location of the defect, shape of the defect and the proximity to free margins a xenograft was deemed most appropriate.  The graft was then trimmed to fit the size of the defect.  The graft was then placed in the primary defect and oriented appropriately.
Purse String (Intermediate) Text: Given the location of the defect and the characteristics of the surrounding skin a purse string intermediate closure was deemed most appropriate.  Undermining was performed circumfirentially around the surgical defect.  A purse string suture was then placed and tightened.
Purse String (Simple) Text: Given the location of the defect and the characteristics of the surrounding skin a purse string simple closure was deemed most appropriate.  Undermining was performed circumferentially around the surgical defect.  A purse string suture was then placed and tightened.
Partial Purse String (Intermediate) Text: Given the location of the defect and the characteristics of the surrounding skin an intermediate purse string closure was deemed most appropriate.  Undermining was performed circumferentially around the surgical defect.  A purse string suture was then placed and tightened. Wound tension of the circular defect prevented complete closure of the wound.
Partial Purse String (Simple) Text: Given the location of the defect and the characteristics of the surrounding skin a simple purse string closure was deemed most appropriate.  Undermining was performed circumferentially around the surgical defect.  A purse string suture was then placed and tightened. Wound tension of the circular defect prevented complete closure of the wound.
Complex Repair And Single Advancement Flap Text: The defect edges were debeveled with a #15 scalpel blade.  The primary defect was closed partially with a complex linear closure.  Given the location of the remaining defect, shape of the defect and the proximity to free margins a single advancement flap was deemed most appropriate for complete closure of the defect.  Using a sterile surgical marker, an appropriate advancement flap was drawn incorporating the defect and placing the expected incisions within the relaxed skin tension lines where possible.    The area thus outlined was incised deep to adipose tissue with a #15 scalpel blade.  The skin margins were undermined to an appropriate distance in all directions utilizing iris scissors.
Complex Repair And Double Advancement Flap Text: The defect edges were debeveled with a #15 scalpel blade.  The primary defect was closed partially with a complex linear closure.  Given the location of the remaining defect, shape of the defect and the proximity to free margins a double advancement flap was deemed most appropriate for complete closure of the defect.  Using a sterile surgical marker, an appropriate advancement flap was drawn incorporating the defect and placing the expected incisions within the relaxed skin tension lines where possible.    The area thus outlined was incised deep to adipose tissue with a #15 scalpel blade.  The skin margins were undermined to an appropriate distance in all directions utilizing iris scissors.
Complex Repair And Modified Advancement Flap Text: The defect edges were debeveled with a #15 scalpel blade.  The primary defect was closed partially with a complex linear closure.  Given the location of the remaining defect, shape of the defect and the proximity to free margins a modified advancement flap was deemed most appropriate for complete closure of the defect.  Using a sterile surgical marker, an appropriate advancement flap was drawn incorporating the defect and placing the expected incisions within the relaxed skin tension lines where possible.    The area thus outlined was incised deep to adipose tissue with a #15 scalpel blade.  The skin margins were undermined to an appropriate distance in all directions utilizing iris scissors.
Complex Repair And A-T Advancement Flap Text: The defect edges were debeveled with a #15 scalpel blade.  The primary defect was closed partially with a complex linear closure.  Given the location of the remaining defect, shape of the defect and the proximity to free margins an A-T advancement flap was deemed most appropriate for complete closure of the defect.  Using a sterile surgical marker, an appropriate advancement flap was drawn incorporating the defect and placing the expected incisions within the relaxed skin tension lines where possible.    The area thus outlined was incised deep to adipose tissue with a #15 scalpel blade.  The skin margins were undermined to an appropriate distance in all directions utilizing iris scissors.
Complex Repair And O-T Advancement Flap Text: The defect edges were debeveled with a #15 scalpel blade.  The primary defect was closed partially with a complex linear closure.  Given the location of the remaining defect, shape of the defect and the proximity to free margins an O-T advancement flap was deemed most appropriate for complete closure of the defect.  Using a sterile surgical marker, an appropriate advancement flap was drawn incorporating the defect and placing the expected incisions within the relaxed skin tension lines where possible.    The area thus outlined was incised deep to adipose tissue with a #15 scalpel blade.  The skin margins were undermined to an appropriate distance in all directions utilizing iris scissors.
Complex Repair And O-L Flap Text: The defect edges were debeveled with a #15 scalpel blade.  The primary defect was closed partially with a complex linear closure.  Given the location of the remaining defect, shape of the defect and the proximity to free margins an O-L flap was deemed most appropriate for complete closure of the defect.  Using a sterile surgical marker, an appropriate flap was drawn incorporating the defect and placing the expected incisions within the relaxed skin tension lines where possible.    The area thus outlined was incised deep to adipose tissue with a #15 scalpel blade.  The skin margins were undermined to an appropriate distance in all directions utilizing iris scissors.
Complex Repair And Bilobe Flap Text: The defect edges were debeveled with a #15 scalpel blade.  The primary defect was closed partially with a complex linear closure.  Given the location of the remaining defect, shape of the defect and the proximity to free margins a bilobe flap was deemed most appropriate for complete closure of the defect.  Using a sterile surgical marker, an appropriate advancement flap was drawn incorporating the defect and placing the expected incisions within the relaxed skin tension lines where possible.    The area thus outlined was incised deep to adipose tissue with a #15 scalpel blade.  The skin margins were undermined to an appropriate distance in all directions utilizing iris scissors.
Complex Repair And Melolabial Flap Text: The defect edges were debeveled with a #15 scalpel blade.  The primary defect was closed partially with a complex linear closure.  Given the location of the remaining defect, shape of the defect and the proximity to free margins a melolabial flap was deemed most appropriate for complete closure of the defect.  Using a sterile surgical marker, an appropriate advancement flap was drawn incorporating the defect and placing the expected incisions within the relaxed skin tension lines where possible.    The area thus outlined was incised deep to adipose tissue with a #15 scalpel blade.  The skin margins were undermined to an appropriate distance in all directions utilizing iris scissors.
Complex Repair And Rotation Flap Text: The defect edges were debeveled with a #15 scalpel blade.  The primary defect was closed partially with a complex linear closure.  Given the location of the remaining defect, shape of the defect and the proximity to free margins a rotation flap was deemed most appropriate for complete closure of the defect.  Using a sterile surgical marker, an appropriate advancement flap was drawn incorporating the defect and placing the expected incisions within the relaxed skin tension lines where possible.    The area thus outlined was incised deep to adipose tissue with a #15 scalpel blade.  The skin margins were undermined to an appropriate distance in all directions utilizing iris scissors.
Complex Repair And Rhombic Flap Text: The defect edges were debeveled with a #15 scalpel blade.  The primary defect was closed partially with a complex linear closure.  Given the location of the remaining defect, shape of the defect and the proximity to free margins a rhombic flap was deemed most appropriate for complete closure of the defect.  Using a sterile surgical marker, an appropriate advancement flap was drawn incorporating the defect and placing the expected incisions within the relaxed skin tension lines where possible.    The area thus outlined was incised deep to adipose tissue with a #15 scalpel blade.  The skin margins were undermined to an appropriate distance in all directions utilizing iris scissors.
Complex Repair And Transposition Flap Text: The defect edges were debeveled with a #15 scalpel blade.  The primary defect was closed partially with a complex linear closure.  Given the location of the remaining defect, shape of the defect and the proximity to free margins a transposition flap was deemed most appropriate for complete closure of the defect.  Using a sterile surgical marker, an appropriate advancement flap was drawn incorporating the defect and placing the expected incisions within the relaxed skin tension lines where possible.    The area thus outlined was incised deep to adipose tissue with a #15 scalpel blade.  The skin margins were undermined to an appropriate distance in all directions utilizing iris scissors.
Complex Repair And V-Y Plasty Text: The defect edges were debeveled with a #15 scalpel blade.  The primary defect was closed partially with a complex linear closure.  Given the location of the remaining defect, shape of the defect and the proximity to free margins a V-Y plasty was deemed most appropriate for complete closure of the defect.  Using a sterile surgical marker, an appropriate advancement flap was drawn incorporating the defect and placing the expected incisions within the relaxed skin tension lines where possible.    The area thus outlined was incised deep to adipose tissue with a #15 scalpel blade.  The skin margins were undermined to an appropriate distance in all directions utilizing iris scissors.
Complex Repair And M Plasty Text: The defect edges were debeveled with a #15 scalpel blade.  The primary defect was closed partially with a complex linear closure.  Given the location of the remaining defect, shape of the defect and the proximity to free margins an M plasty was deemed most appropriate for complete closure of the defect.  Using a sterile surgical marker, an appropriate advancement flap was drawn incorporating the defect and placing the expected incisions within the relaxed skin tension lines where possible.    The area thus outlined was incised deep to adipose tissue with a #15 scalpel blade.  The skin margins were undermined to an appropriate distance in all directions utilizing iris scissors.
Complex Repair And Double M Plasty Text: The defect edges were debeveled with a #15 scalpel blade.  The primary defect was closed partially with a complex linear closure.  Given the location of the remaining defect, shape of the defect and the proximity to free margins a double M plasty was deemed most appropriate for complete closure of the defect.  Using a sterile surgical marker, an appropriate advancement flap was drawn incorporating the defect and placing the expected incisions within the relaxed skin tension lines where possible.    The area thus outlined was incised deep to adipose tissue with a #15 scalpel blade.  The skin margins were undermined to an appropriate distance in all directions utilizing iris scissors.
Complex Repair And W Plasty Text: The defect edges were debeveled with a #15 scalpel blade.  The primary defect was closed partially with a complex linear closure.  Given the location of the remaining defect, shape of the defect and the proximity to free margins a W plasty was deemed most appropriate for complete closure of the defect.  Using a sterile surgical marker, an appropriate advancement flap was drawn incorporating the defect and placing the expected incisions within the relaxed skin tension lines where possible.    The area thus outlined was incised deep to adipose tissue with a #15 scalpel blade.  The skin margins were undermined to an appropriate distance in all directions utilizing iris scissors.
Complex Repair And Z Plasty Text: The defect edges were debeveled with a #15 scalpel blade.  The primary defect was closed partially with a complex linear closure.  Given the location of the remaining defect, shape of the defect and the proximity to free margins a Z plasty was deemed most appropriate for complete closure of the defect.  Using a sterile surgical marker, an appropriate advancement flap was drawn incorporating the defect and placing the expected incisions within the relaxed skin tension lines where possible.    The area thus outlined was incised deep to adipose tissue with a #15 scalpel blade.  The skin margins were undermined to an appropriate distance in all directions utilizing iris scissors.
Complex Repair And Dorsal Nasal Flap Text: The defect edges were debeveled with a #15 scalpel blade.  The primary defect was closed partially with a complex linear closure.  Given the location of the remaining defect, shape of the defect and the proximity to free margins a dorsal nasal flap was deemed most appropriate for complete closure of the defect.  Using a sterile surgical marker, an appropriate flap was drawn incorporating the defect and placing the expected incisions within the relaxed skin tension lines where possible.    The area thus outlined was incised deep to adipose tissue with a #15 scalpel blade.  The skin margins were undermined to an appropriate distance in all directions utilizing iris scissors.
Complex Repair And Ftsg Text: The defect edges were debeveled with a #15 scalpel blade.  The primary defect was closed partially with a complex linear closure.  Given the location of the defect, shape of the defect and the proximity to free margins a full thickness skin graft was deemed most appropriate to repair the remaining defect.  The graft was trimmed to fit the size of the remaining defect.  The graft was then placed in the primary defect, oriented appropriately, and sutured into place.
Complex Repair And Burow's Graft Text: The defect edges were debeveled with a #15 scalpel blade.  The primary defect was closed partially with a complex linear closure.  Given the location of the defect, shape of the defect, the proximity to free margins and the presence of a standing cone deformity a Burow's graft was deemed most appropriate to repair the remaining defect.  The graft was trimmed to fit the size of the remaining defect.  The graft was then placed in the primary defect, oriented appropriately, and sutured into place.
Complex Repair And Split-Thickness Skin Graft Text: The defect edges were debeveled with a #15 scalpel blade.  The primary defect was closed partially with a complex linear closure.  Given the location of the defect, shape of the defect and the proximity to free margins a split thickness skin graft was deemed most appropriate to repair the remaining defect.  The graft was trimmed to fit the size of the remaining defect.  The graft was then placed in the primary defect, oriented appropriately, and sutured into place.
Complex Repair And Epidermal Autograft Text: The defect edges were debeveled with a #15 scalpel blade.  The primary defect was closed partially with a complex linear closure.  Given the location of the defect, shape of the defect and the proximity to free margins an epidermal autograft was deemed most appropriate to repair the remaining defect.  The graft was trimmed to fit the size of the remaining defect.  The graft was then placed in the primary defect, oriented appropriately, and sutured into place.
Complex Repair And Dermal Autograft Text: The defect edges were debeveled with a #15 scalpel blade.  The primary defect was closed partially with a complex linear closure.  Given the location of the defect, shape of the defect and the proximity to free margins an dermal autograft was deemed most appropriate to repair the remaining defect.  The graft was trimmed to fit the size of the remaining defect.  The graft was then placed in the primary defect, oriented appropriately, and sutured into place.
Complex Repair And Tissue Cultured Epidermal Autograft Text: The defect edges were debeveled with a #15 scalpel blade.  The primary defect was closed partially with a complex linear closure.  Given the location of the defect, shape of the defect and the proximity to free margins an tissue cultured epidermal autograft was deemed most appropriate to repair the remaining defect.  The graft was trimmed to fit the size of the remaining defect.  The graft was then placed in the primary defect, oriented appropriately, and sutured into place.
Complex Repair And Xenograft Text: The defect edges were debeveled with a #15 scalpel blade.  The primary defect was closed partially with a complex linear closure.  Given the location of the defect, shape of the defect and the proximity to free margins a xenograft was deemed most appropriate to repair the remaining defect.  The graft was trimmed to fit the size of the remaining defect.  The graft was then placed in the primary defect, oriented appropriately, and sutured into place.
Complex Repair And Skin Substitute Graft Text: The defect edges were debeveled with a #15 scalpel blade.  The primary defect was closed partially with a complex linear closure.  Given the location of the remaining defect, shape of the defect and the proximity to free margins a skin substitute graft was deemed most appropriate to repair the remaining defect.  The graft was trimmed to fit the size of the remaining defect.  The graft was then placed in the primary defect, oriented appropriately, and sutured into place.
Path Notes (To The Dermatopathologist): Please check margins.
Consent was obtained from the patient. The risks and benefits to therapy were discussed in detail. Specifically, the risks of infection, scarring, bleeding, prolonged wound healing, incomplete removal, allergy to anesthesia, nerve injury and recurrence were addressed. Prior to the procedure, the treatment site was clearly identified and confirmed by the patient. All components of Universal Protocol/PAUSE Rule completed.
Post-Care Instructions: I reviewed with the patient in detail post-care instructions:\\n1. Apply bacitracin over the steri-strips.  \\n2. Cut non-stick pad (Telfa) to cover the steri-strips\\n3. Apply tape (hypafix) over the non-stick pad\\n4. Change once per day for 5 days\\n5. Shower with bandage on, change bandage after shower\\n\\nPatient is not to engage in any heavy lifting, exercise, hot tub, or swimming for the next 14 days. Should the patient develop any fevers, chills, bleeding, severe pain patient will contact the office immediately.
Home Suture Removal Text: Patient was provided a home suture removal kit and will remove their sutures at home.  If they have any questions or difficulties they will call the office.
Where Do You Want The Question To Include Opioid Counseling Located?: Case Summary Tab
Information: Selecting Yes will display possible errors in your note based on the variables you have selected. This validation is only offered as a suggestion for you. PLEASE NOTE THAT THE VALIDATION TEXT WILL BE REMOVED WHEN YOU FINALIZE YOUR NOTE. IF YOU WANT TO FAX A PRELIMINARY NOTE YOU WILL NEED TO TOGGLE THIS TO 'NO' IF YOU DO NOT WANT IT IN YOUR FAXED NOTE.

## 2023-11-20 ENCOUNTER — PRE-ADMISSION TESTING (OUTPATIENT)
Dept: ADMISSIONS | Facility: MEDICAL CENTER | Age: 78
End: 2023-11-20
Attending: STUDENT IN AN ORGANIZED HEALTH CARE EDUCATION/TRAINING PROGRAM
Payer: MEDICARE

## 2023-11-20 RX ORDER — FINASTERIDE 5 MG/1
5 TABLET, FILM COATED ORAL DAILY
COMMUNITY
Start: 2023-09-05

## 2023-11-21 NOTE — OR NURSING
Patient states he will be going home after surgery on 12/13/2023 with [employer]FABRICIO Sanches. 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 Informed MD Seymour's surgery scheduler Lynn of the above. Patient also states that he will have someone home with him overnight after surgery.

## 2023-11-26 ENCOUNTER — HOSPITAL ENCOUNTER (EMERGENCY)
Facility: MEDICAL CENTER | Age: 78
End: 2023-11-26
Attending: EMERGENCY MEDICINE
Payer: MEDICARE

## 2023-11-26 VITALS
RESPIRATION RATE: 16 BRPM | DIASTOLIC BLOOD PRESSURE: 82 MMHG | BODY MASS INDEX: 27.65 KG/M2 | TEMPERATURE: 97 F | OXYGEN SATURATION: 94 % | WEIGHT: 181.88 LBS | HEART RATE: 82 BPM | SYSTOLIC BLOOD PRESSURE: 162 MMHG

## 2023-11-26 DIAGNOSIS — N32.89 BLADDER SPASM: ICD-10-CM

## 2023-11-26 DIAGNOSIS — R30.0 DYSURIA: ICD-10-CM

## 2023-11-26 DIAGNOSIS — K59.00 CONSTIPATION, UNSPECIFIED CONSTIPATION TYPE: ICD-10-CM

## 2023-11-26 LAB
ALBUMIN SERPL BCP-MCNC: 4.3 G/DL (ref 3.2–4.9)
ALBUMIN/GLOB SERPL: 1.7 G/DL
ALP SERPL-CCNC: 98 U/L (ref 30–99)
ALT SERPL-CCNC: 14 U/L (ref 2–50)
ANION GAP SERPL CALC-SCNC: 11 MMOL/L (ref 7–16)
APPEARANCE UR: CLEAR
AST SERPL-CCNC: 19 U/L (ref 12–45)
BACTERIA #/AREA URNS HPF: NEGATIVE /HPF
BASOPHILS # BLD AUTO: 0.3 % (ref 0–1.8)
BASOPHILS # BLD: 0.02 K/UL (ref 0–0.12)
BILIRUB SERPL-MCNC: 0.8 MG/DL (ref 0.1–1.5)
BILIRUB UR QL STRIP.AUTO: NEGATIVE
BUN SERPL-MCNC: 12 MG/DL (ref 8–22)
CALCIUM ALBUM COR SERPL-MCNC: 8.8 MG/DL (ref 8.5–10.5)
CALCIUM SERPL-MCNC: 9 MG/DL (ref 8.5–10.5)
CHLORIDE SERPL-SCNC: 101 MMOL/L (ref 96–112)
CO2 SERPL-SCNC: 26 MMOL/L (ref 20–33)
COLOR UR: YELLOW
CREAT SERPL-MCNC: 0.75 MG/DL (ref 0.5–1.4)
EOSINOPHIL # BLD AUTO: 0.18 K/UL (ref 0–0.51)
EOSINOPHIL NFR BLD: 2.9 % (ref 0–6.9)
EPI CELLS #/AREA URNS HPF: NEGATIVE /HPF
ERYTHROCYTE [DISTWIDTH] IN BLOOD BY AUTOMATED COUNT: 40.5 FL (ref 35.9–50)
GFR SERPLBLD CREATININE-BSD FMLA CKD-EPI: 93 ML/MIN/1.73 M 2
GLOBULIN SER CALC-MCNC: 2.5 G/DL (ref 1.9–3.5)
GLUCOSE SERPL-MCNC: 113 MG/DL (ref 65–99)
GLUCOSE UR STRIP.AUTO-MCNC: NEGATIVE MG/DL
HCT VFR BLD AUTO: 41.7 % (ref 42–52)
HGB BLD-MCNC: 14.7 G/DL (ref 14–18)
HYALINE CASTS #/AREA URNS LPF: ABNORMAL /LPF
IMM GRANULOCYTES # BLD AUTO: 0.01 K/UL (ref 0–0.11)
IMM GRANULOCYTES NFR BLD AUTO: 0.2 % (ref 0–0.9)
KETONES UR STRIP.AUTO-MCNC: NEGATIVE MG/DL
LEUKOCYTE ESTERASE UR QL STRIP.AUTO: ABNORMAL
LIPASE SERPL-CCNC: 13 U/L (ref 11–82)
LYMPHOCYTES # BLD AUTO: 0.71 K/UL (ref 1–4.8)
LYMPHOCYTES NFR BLD: 11.4 % (ref 22–41)
MCH RBC QN AUTO: 31.7 PG (ref 27–33)
MCHC RBC AUTO-ENTMCNC: 35.3 G/DL (ref 32.3–36.5)
MCV RBC AUTO: 89.9 FL (ref 81.4–97.8)
MICRO URNS: ABNORMAL
MONOCYTES # BLD AUTO: 0.5 K/UL (ref 0–0.85)
MONOCYTES NFR BLD AUTO: 8 % (ref 0–13.4)
NEUTROPHILS # BLD AUTO: 4.83 K/UL (ref 1.82–7.42)
NEUTROPHILS NFR BLD: 77.2 % (ref 44–72)
NITRITE UR QL STRIP.AUTO: NEGATIVE
NRBC # BLD AUTO: 0 K/UL
NRBC BLD-RTO: 0 /100 WBC (ref 0–0.2)
PH UR STRIP.AUTO: 7 [PH] (ref 5–8)
PLATELET # BLD AUTO: 191 K/UL (ref 164–446)
PMV BLD AUTO: 9.8 FL (ref 9–12.9)
POTASSIUM SERPL-SCNC: 3.8 MMOL/L (ref 3.6–5.5)
PROT SERPL-MCNC: 6.8 G/DL (ref 6–8.2)
PROT UR QL STRIP: 30 MG/DL
RBC # BLD AUTO: 4.64 M/UL (ref 4.7–6.1)
RBC # URNS HPF: ABNORMAL /HPF
RBC UR QL AUTO: ABNORMAL
SODIUM SERPL-SCNC: 138 MMOL/L (ref 135–145)
SP GR UR STRIP.AUTO: 1.01
UROBILINOGEN UR STRIP.AUTO-MCNC: 0.2 MG/DL
WBC # BLD AUTO: 6.3 K/UL (ref 4.8–10.8)
WBC #/AREA URNS HPF: ABNORMAL /HPF

## 2023-11-26 PROCEDURE — 87086 URINE CULTURE/COLONY COUNT: CPT

## 2023-11-26 PROCEDURE — 81001 URINALYSIS AUTO W/SCOPE: CPT

## 2023-11-26 PROCEDURE — 80053 COMPREHEN METABOLIC PANEL: CPT

## 2023-11-26 PROCEDURE — 85025 COMPLETE CBC W/AUTO DIFF WBC: CPT

## 2023-11-26 PROCEDURE — 99284 EMERGENCY DEPT VISIT MOD MDM: CPT

## 2023-11-26 PROCEDURE — 36415 COLL VENOUS BLD VENIPUNCTURE: CPT

## 2023-11-26 PROCEDURE — A9270 NON-COVERED ITEM OR SERVICE: HCPCS | Performed by: EMERGENCY MEDICINE

## 2023-11-26 PROCEDURE — 83690 ASSAY OF LIPASE: CPT

## 2023-11-26 PROCEDURE — 700102 HCHG RX REV CODE 250 W/ 637 OVERRIDE(OP): Performed by: EMERGENCY MEDICINE

## 2023-11-26 RX ORDER — OXYBUTYNIN CHLORIDE 5 MG/1
5 TABLET, EXTENDED RELEASE ORAL DAILY
Qty: 30 TABLET | Refills: 0 | Status: SHIPPED | OUTPATIENT
Start: 2023-11-26 | End: 2024-01-12

## 2023-11-26 RX ORDER — PHENAZOPYRIDINE HYDROCHLORIDE 200 MG/1
200 TABLET, FILM COATED ORAL 3 TIMES DAILY PRN
Qty: 6 TABLET | Refills: 0 | Status: ON HOLD | OUTPATIENT
Start: 2023-11-26 | End: 2023-12-13

## 2023-11-26 RX ORDER — PHENAZOPYRIDINE HYDROCHLORIDE 200 MG/1
100 TABLET, FILM COATED ORAL ONCE
Status: COMPLETED | OUTPATIENT
Start: 2023-11-26 | End: 2023-11-26

## 2023-11-26 RX ORDER — AMOXICILLIN 250 MG
1 CAPSULE ORAL DAILY
Qty: 30 TABLET | Refills: 0 | Status: SHIPPED | OUTPATIENT
Start: 2023-11-26 | End: 2024-03-11

## 2023-11-26 RX ADMIN — PHENAZOPYRIDINE 100 MG: 200 TABLET ORAL at 09:45

## 2023-11-26 ASSESSMENT — LIFESTYLE VARIABLES: DO YOU DRINK ALCOHOL: NO

## 2023-11-26 ASSESSMENT — FIBROSIS 4 INDEX: FIB4 SCORE: 2

## 2023-11-26 NOTE — ED PROVIDER NOTES
ED Provider Note    CHIEF COMPLAINT  Chief Complaint   Patient presents with    Blood in Urine     Pt c/o bladder pain/blood in urine/frequent urination. Pt scheduled for surgery 12/13 to remove a tumor but states he is having increased pain and can not wait     Pain    Constipation       EXTERNAL RECORDS REVIEWED  Patient CT scan 9/2023, unremarkable    HPI/ROS      Ronal Orestes Hair is a 78 y.o. male who presents with frequent urination and hematuria and dysuria.  Patient with symptoms for months now.  He is followed by urology.  Patient had a recent CT scan which failed to reveal any evidence of stone in the setting of his pain.  He does have a history of kidney stones.  Patient reports that he had a cystoscopy by his urologist a few weeks ago which revealed a mass in his bladder, patient is scheduled for surgical intervention in approximately 3 weeks.  Patient reports that his dysuria urgency and frequency have persisted for months, and progressively worsened.  He is requesting something for relief.  He denies any associated fevers.  Denies associated abdominal pain.  Patient has a history of lymphoma but has Been cancer free for years.    PAST MEDICAL HISTORY   has a past medical history of Abdominal aortic aneurysm (AAA) without rupture ([facility]) (11/11/2020), Back pain, Cancer ([facility]), Cataract, COPD (chronic obstructive pulmonary disease) (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) (01/22/2016), Nephrolithiasis (01/06/2023), Osteomyelitis of left foot ([facility]) (11/11/2020), Pain of toe (11/03/2020), Reactive airway disease without complication (03/18/2022), Reactive airway disease without complication (03/18/2022), Scoliosis, and Snoring.    SURGICAL HISTORY   has a past surgical history that includes other orthopedic surgery; other abdominal surgery; aortobifem bypass (11/08/2020); cystoscopy,insert ureteral  stent (Left, 01/07/2023); cysto/uretero/pyeloscopy, dx (Left, 01/07/2023); lasertripsy (Left, 01/07/2023); and cataract extraction with iol (Bilateral).    FAMILY HISTORY  Family History   Problem Relation Age of Onset    Cancer Maternal Aunt         melanoma    Cancer Maternal Uncle         Throat cancer       SOCIAL HISTORY  Social History     Tobacco Use    Smoking status: Every Day     Current packs/day: 0.00     Average packs/day: 2.0 packs/day for 54.8 years (109.7 ttl pk-yrs)     Types: Cigarettes     Start date: 1/1/1966     Last attempt to quit: 11/3/2020     Years since quitting: 3.0    Smokeless tobacco: Never   Vaping Use    Vaping Use: Never used   Substance and Sexual Activity    Alcohol use: Not Currently    Drug use: Never    Sexual activity: Yes     Partners: Female       CURRENT MEDICATIONS  Home Medications       Reviewed by Payton Harris R.N. (Registered Nurse) on 11/26/23 at 0833  Med List Status: Not Addressed     Medication Last Dose Status   albuterol 108 (90 Base) MCG/ACT Aero Soln inhalation aerosol  Active   atorvastatin (LIPITOR) 40 MG Tab  Active   finasteride (PROSCAR) 5 MG Tab  Active   ibuprofen (MOTRIN) 400 MG Tab  Active   Multiple Vitamins-Minerals (MULTIVITAMIN ADULTS 50+ PO)  Active   phenazopyridine (PYRIDIUM) 100 MG Tab  Active   sildenafil citrate (VIAGRA) 25 MG Tab  Active   tamsulosin (FLOMAX) 0.4 MG capsule  Active   tiotropium (SPIRIVA) 18 MCG Cap  Active                    ALLERGIES  No Known Allergies    PHYSICAL EXAM  VITAL SIGNS: BP (!) 146/76   Pulse 89   Temp 36.3 °C (97.4 °F) (Temporal)   Resp 20   Wt 82.5 kg (181 lb 14.1 oz)   SpO2 96%   BMI 27.65 kg/m²    Physical Exam  Constitutional:       Appearance: Normal appearance.   HENT:      Mouth/Throat:      Mouth: Mucous membranes are moist.   Pulmonary:      Effort: Pulmonary effort is normal.   Abdominal:      General: Abdomen is flat. There is no distension.      Palpations: Abdomen is soft. There is no  mass.      Tenderness: There is no abdominal tenderness. There is no guarding or rebound.      Hernia: No hernia is present.   Neurological:      General: No focal deficit present.      Mental Status: He is alert and oriented to person, place, and time.   Psychiatric:         Mood and Affect: Mood normal.         DIAGNOSTIC STUDIES / PROCEDURES      LABS  Results for orders placed or performed during the hospital encounter of 11/26/23   CBC WITH DIFFERENTIAL   Result Value Ref Range    WBC 6.3 4.8 - 10.8 K/uL    RBC 4.64 (L) 4.70 - 6.10 M/uL    Hemoglobin 14.7 14.0 - 18.0 g/dL    Hematocrit 41.7 (L) 42.0 - 52.0 %    MCV 89.9 81.4 - 97.8 fL    MCH 31.7 27.0 - 33.0 pg    MCHC 35.3 32.3 - 36.5 g/dL    RDW 40.5 35.9 - 50.0 fL    Platelet Count 191 164 - 446 K/uL    MPV 9.8 9.0 - 12.9 fL    Neutrophils-Polys 77.20 (H) 44.00 - 72.00 %    Lymphocytes 11.40 (L) 22.00 - 41.00 %    Monocytes 8.00 0.00 - 13.40 %    Eosinophils 2.90 0.00 - 6.90 %    Basophils 0.30 0.00 - 1.80 %    Immature Granulocytes 0.20 0.00 - 0.90 %    Nucleated RBC 0.00 0.00 - 0.20 /100 WBC    Neutrophils (Absolute) 4.83 1.82 - 7.42 K/uL    Lymphs (Absolute) 0.71 (L) 1.00 - 4.80 K/uL    Monos (Absolute) 0.50 0.00 - 0.85 K/uL    Eos (Absolute) 0.18 0.00 - 0.51 K/uL    Baso (Absolute) 0.02 0.00 - 0.12 K/uL    Immature Granulocytes (abs) 0.01 0.00 - 0.11 K/uL    NRBC (Absolute) 0.00 K/uL   COMP METABOLIC PANEL   Result Value Ref Range    Sodium 138 135 - 145 mmol/L    Potassium 3.8 3.6 - 5.5 mmol/L    Chloride 101 96 - 112 mmol/L    Co2 26 20 - 33 mmol/L    Anion Gap 11.0 7.0 - 16.0    Glucose 113 (H) 65 - 99 mg/dL    Bun 12 8 - 22 mg/dL    Creatinine 0.75 0.50 - 1.40 mg/dL    Calcium 9.0 8.5 - 10.5 mg/dL    Correct Calcium 8.8 8.5 - 10.5 mg/dL    AST(SGOT) 19 12 - 45 U/L    ALT(SGPT) 14 2 - 50 U/L    Alkaline Phosphatase 98 30 - 99 U/L    Total Bilirubin 0.8 0.1 - 1.5 mg/dL    Albumin 4.3 3.2 - 4.9 g/dL    Total Protein 6.8 6.0 - 8.2 g/dL    Globulin 2.5  1.9 - 3.5 g/dL    A-G Ratio 1.7 g/dL   LIPASE   Result Value Ref Range    Lipase 13 11 - 82 U/L   URINALYSIS    Specimen: Blood   Result Value Ref Range    Color Yellow     Character Clear     Specific Gravity 1.007 <1.035    Ph 7.0 5.0 - 8.0    Glucose Negative Negative mg/dL    Ketones Negative Negative mg/dL    Protein 30 (A) Negative mg/dL    Bilirubin Negative Negative    Urobilinogen, Urine 0.2 Negative    Nitrite Negative Negative    Leukocyte Esterase Trace (A) Negative    Occult Blood Large (A) Negative    Micro Urine Req Microscopic    URINE MICROSCOPIC (W/UA)   Result Value Ref Range    WBC 5-10 (A) /hpf    -150 (A) /hpf    Bacteria Negative None /hpf    Epithelial Cells Negative /hpf    Hyaline Cast 0-2 /lpf   ESTIMATED GFR   Result Value Ref Range    GFR (CKD-EPI) 93 >60 mL/min/1.73 m 2           COURSE & MEDICAL DECISION MAKING      INITIAL ASSESSMENT, COURSE AND PLAN  Care Narrative: Patient here with ongoing dysuria.  Known bladder mass.  Likely the cause of patient's ongoing symptoms.  Will give him a dose of Pyridium here.  Will check PVR.  Check basic labs to ensure he has not developed any renal failure.  Will check urinalysis to evaluate for possible infection.  Will discuss the case with urology.  Consider starting oxybutynin.  Patient urinalysis reveals hematuria which is chronic for patient, patient with known bladder mass.  I believe patient's symptoms are most consistent likely bladder spasms, will start oxybutynin.  I discussed the case with Dr. Lr of urology who is comfortable with this and will have patient follow-up with Dr. Seymour        DISPOSITION AND DISCUSSIONS      Escalation of care considered, and ultimately not performed: Patient with identical symptoms in September, these are persistent, patient with CT at that time which failed to reveal any acute anatomical abnormality or stone, given symptoms have not significantly changed repeat CT was deferred      FINAL  DIAGNOSIS  1. Dysuria    2. Bladder spasm    3. Constipation, unspecified constipation type

## 2023-11-26 NOTE — ED TRIAGE NOTES
Pt to triage .  Chief Complaint   Patient presents with    Blood in Urine     Pt c/o bladder pain/blood in urine/frequent urination. Pt scheduled for surgery 12/13 to remove a tumor but states he is having increased pain and can not wait     Pain    Constipation

## 2023-11-26 NOTE — DISCHARGE INSTRUCTIONS
Workup today was very reassuring, I believe you are having bladder spasms, we will start you on some oxybutynin.  I would like you to take Pyridium for the next 3 days.  I discussed the case with ER urology team, they will try to move up your procedure to help with her symptoms.  For constipation I like you to take MiraLAX, 1 cap in the morning and 1 In the afternoon for the next 5 days.  Take senna docusate as prescribed for the next 5 days as well

## 2023-11-28 LAB
BACTERIA UR CULT: NORMAL
SIGNIFICANT IND 70042: NORMAL
SITE SITE: NORMAL
SOURCE SOURCE: NORMAL

## 2023-12-13 ENCOUNTER — ANESTHESIA EVENT (OUTPATIENT)
Dept: SURGERY | Facility: MEDICAL CENTER | Age: 78
End: 2023-12-13
Payer: MEDICARE

## 2023-12-13 ENCOUNTER — HOSPITAL ENCOUNTER (OUTPATIENT)
Facility: MEDICAL CENTER | Age: 78
End: 2023-12-14
Attending: STUDENT IN AN ORGANIZED HEALTH CARE EDUCATION/TRAINING PROGRAM | Admitting: STUDENT IN AN ORGANIZED HEALTH CARE EDUCATION/TRAINING PROGRAM
Payer: MEDICARE

## 2023-12-13 ENCOUNTER — ANESTHESIA (OUTPATIENT)
Dept: SURGERY | Facility: MEDICAL CENTER | Age: 78
End: 2023-12-13
Payer: MEDICARE

## 2023-12-13 PROBLEM — D49.4 BLADDER TUMOR: Status: ACTIVE | Noted: 2023-12-13

## 2023-12-13 LAB — PATHOLOGY CONSULT NOTE: NORMAL

## 2023-12-13 PROCEDURE — 96375 TX/PRO/DX INJ NEW DRUG ADDON: CPT | Mod: XU

## 2023-12-13 PROCEDURE — 96365 THER/PROPH/DIAG IV INF INIT: CPT | Mod: XU

## 2023-12-13 PROCEDURE — G0378 HOSPITAL OBSERVATION PER HR: HCPCS

## 2023-12-13 PROCEDURE — 700111 HCHG RX REV CODE 636 W/ 250 OVERRIDE (IP): Mod: JZ | Performed by: ANESTHESIOLOGY

## 2023-12-13 PROCEDURE — 160048 HCHG OR STATISTICAL LEVEL 1-5: Performed by: STUDENT IN AN ORGANIZED HEALTH CARE EDUCATION/TRAINING PROGRAM

## 2023-12-13 PROCEDURE — 700105 HCHG RX REV CODE 258: Performed by: STUDENT IN AN ORGANIZED HEALTH CARE EDUCATION/TRAINING PROGRAM

## 2023-12-13 PROCEDURE — 700102 HCHG RX REV CODE 250 W/ 637 OVERRIDE(OP): Performed by: ANESTHESIOLOGY

## 2023-12-13 PROCEDURE — 700102 HCHG RX REV CODE 250 W/ 637 OVERRIDE(OP): Performed by: STUDENT IN AN ORGANIZED HEALTH CARE EDUCATION/TRAINING PROGRAM

## 2023-12-13 PROCEDURE — 88307 TISSUE EXAM BY PATHOLOGIST: CPT

## 2023-12-13 PROCEDURE — 160009 HCHG ANES TIME/MIN: Performed by: STUDENT IN AN ORGANIZED HEALTH CARE EDUCATION/TRAINING PROGRAM

## 2023-12-13 PROCEDURE — 700101 HCHG RX REV CODE 250: Performed by: ANESTHESIOLOGY

## 2023-12-13 PROCEDURE — A9270 NON-COVERED ITEM OR SERVICE: HCPCS | Performed by: STUDENT IN AN ORGANIZED HEALTH CARE EDUCATION/TRAINING PROGRAM

## 2023-12-13 PROCEDURE — 160028 HCHG SURGERY MINUTES - 1ST 30 MINS LEVEL 3: Performed by: STUDENT IN AN ORGANIZED HEALTH CARE EDUCATION/TRAINING PROGRAM

## 2023-12-13 PROCEDURE — 160002 HCHG RECOVERY MINUTES (STAT): Performed by: STUDENT IN AN ORGANIZED HEALTH CARE EDUCATION/TRAINING PROGRAM

## 2023-12-13 PROCEDURE — A9270 NON-COVERED ITEM OR SERVICE: HCPCS | Performed by: ANESTHESIOLOGY

## 2023-12-13 PROCEDURE — 160035 HCHG PACU - 1ST 60 MINS PHASE I: Performed by: STUDENT IN AN ORGANIZED HEALTH CARE EDUCATION/TRAINING PROGRAM

## 2023-12-13 PROCEDURE — 700111 HCHG RX REV CODE 636 W/ 250 OVERRIDE (IP): Performed by: ANESTHESIOLOGY

## 2023-12-13 PROCEDURE — 700111 HCHG RX REV CODE 636 W/ 250 OVERRIDE (IP): Mod: JZ | Performed by: STUDENT IN AN ORGANIZED HEALTH CARE EDUCATION/TRAINING PROGRAM

## 2023-12-13 PROCEDURE — 160039 HCHG SURGERY MINUTES - EA ADDL 1 MIN LEVEL 3: Performed by: STUDENT IN AN ORGANIZED HEALTH CARE EDUCATION/TRAINING PROGRAM

## 2023-12-13 PROCEDURE — 160036 HCHG PACU - EA ADDL 30 MINS PHASE I: Performed by: STUDENT IN AN ORGANIZED HEALTH CARE EDUCATION/TRAINING PROGRAM

## 2023-12-13 RX ORDER — MIDAZOLAM HYDROCHLORIDE 1 MG/ML
1 INJECTION INTRAMUSCULAR; INTRAVENOUS ONCE
Status: COMPLETED | OUTPATIENT
Start: 2023-12-13 | End: 2023-12-13

## 2023-12-13 RX ORDER — OXYCODONE HYDROCHLORIDE 10 MG/1
10 TABLET ORAL
Status: DISCONTINUED | OUTPATIENT
Start: 2023-12-13 | End: 2023-12-14 | Stop reason: HOSPADM

## 2023-12-13 RX ORDER — ACETAMINOPHEN 500 MG
1000 TABLET ORAL EVERY 6 HOURS
Status: DISCONTINUED | OUTPATIENT
Start: 2023-12-14 | End: 2023-12-14 | Stop reason: HOSPADM

## 2023-12-13 RX ORDER — LIDOCAINE HYDROCHLORIDE 20 MG/ML
INJECTION, SOLUTION EPIDURAL; INFILTRATION; INTRACAUDAL; PERINEURAL PRN
Status: DISCONTINUED | OUTPATIENT
Start: 2023-12-13 | End: 2023-12-13 | Stop reason: SURG

## 2023-12-13 RX ORDER — CEFAZOLIN SODIUM 1 G/3ML
INJECTION, POWDER, FOR SOLUTION INTRAMUSCULAR; INTRAVENOUS PRN
Status: DISCONTINUED | OUTPATIENT
Start: 2023-12-13 | End: 2023-12-13 | Stop reason: SURG

## 2023-12-13 RX ORDER — OXYCODONE HCL 5 MG/5 ML
5 SOLUTION, ORAL ORAL
Status: COMPLETED | OUTPATIENT
Start: 2023-12-13 | End: 2023-12-13

## 2023-12-13 RX ORDER — DEXAMETHASONE SODIUM PHOSPHATE 4 MG/ML
4 INJECTION, SOLUTION INTRA-ARTICULAR; INTRALESIONAL; INTRAMUSCULAR; INTRAVENOUS; SOFT TISSUE
Status: DISCONTINUED | OUTPATIENT
Start: 2023-12-13 | End: 2023-12-14 | Stop reason: HOSPADM

## 2023-12-13 RX ORDER — ATORVASTATIN CALCIUM 40 MG/1
40 TABLET, FILM COATED ORAL EVERY EVENING
Status: DISCONTINUED | OUTPATIENT
Start: 2023-12-13 | End: 2023-12-14 | Stop reason: HOSPADM

## 2023-12-13 RX ORDER — OXYBUTYNIN CHLORIDE 5 MG/1
5 TABLET ORAL ONCE
Status: COMPLETED | OUTPATIENT
Start: 2023-12-13 | End: 2023-12-13

## 2023-12-13 RX ORDER — ROCURONIUM BROMIDE 10 MG/ML
INJECTION, SOLUTION INTRAVENOUS PRN
Status: DISCONTINUED | OUTPATIENT
Start: 2023-12-13 | End: 2023-12-13 | Stop reason: SURG

## 2023-12-13 RX ORDER — IBUPROFEN 200 MG
800 TABLET ORAL EVERY 6 HOURS PRN
COMMUNITY

## 2023-12-13 RX ORDER — MIDAZOLAM HYDROCHLORIDE 1 MG/ML
INJECTION INTRAMUSCULAR; INTRAVENOUS PRN
Status: DISCONTINUED | OUTPATIENT
Start: 2023-12-13 | End: 2023-12-13 | Stop reason: SURG

## 2023-12-13 RX ORDER — ONDANSETRON 2 MG/ML
4 INJECTION INTRAMUSCULAR; INTRAVENOUS EVERY 4 HOURS PRN
Status: DISCONTINUED | OUTPATIENT
Start: 2023-12-13 | End: 2023-12-14 | Stop reason: HOSPADM

## 2023-12-13 RX ORDER — HYDROMORPHONE HYDROCHLORIDE 1 MG/ML
0.5 INJECTION, SOLUTION INTRAMUSCULAR; INTRAVENOUS; SUBCUTANEOUS
Status: DISCONTINUED | OUTPATIENT
Start: 2023-12-13 | End: 2023-12-14 | Stop reason: HOSPADM

## 2023-12-13 RX ORDER — DOCUSATE SODIUM 100 MG/1
100 CAPSULE, LIQUID FILLED ORAL 2 TIMES DAILY
Status: DISCONTINUED | OUTPATIENT
Start: 2023-12-13 | End: 2023-12-14 | Stop reason: HOSPADM

## 2023-12-13 RX ORDER — POLYETHYLENE GLYCOL 3350 17 G/17G
1 POWDER, FOR SOLUTION ORAL DAILY
Status: DISCONTINUED | OUTPATIENT
Start: 2023-12-14 | End: 2023-12-14 | Stop reason: HOSPADM

## 2023-12-13 RX ORDER — HALOPERIDOL 5 MG/ML
1 INJECTION INTRAMUSCULAR
Status: DISCONTINUED | OUTPATIENT
Start: 2023-12-13 | End: 2023-12-13 | Stop reason: HOSPADM

## 2023-12-13 RX ORDER — SODIUM CHLORIDE 9 MG/ML
INJECTION, SOLUTION INTRAVENOUS CONTINUOUS
Status: ACTIVE | OUTPATIENT
Start: 2023-12-13 | End: 2023-12-13

## 2023-12-13 RX ORDER — SODIUM CHLORIDE, SODIUM LACTATE, POTASSIUM CHLORIDE, CALCIUM CHLORIDE 600; 310; 30; 20 MG/100ML; MG/100ML; MG/100ML; MG/100ML
INJECTION, SOLUTION INTRAVENOUS CONTINUOUS
Status: ACTIVE | OUTPATIENT
Start: 2023-12-13 | End: 2023-12-13

## 2023-12-13 RX ORDER — ONDANSETRON 2 MG/ML
4 INJECTION INTRAMUSCULAR; INTRAVENOUS
Status: COMPLETED | OUTPATIENT
Start: 2023-12-13 | End: 2023-12-13

## 2023-12-13 RX ORDER — HYDROXYZINE HYDROCHLORIDE 25 MG/1
25 TABLET, FILM COATED ORAL NIGHTLY PRN
Status: DISCONTINUED | OUTPATIENT
Start: 2023-12-13 | End: 2023-12-14 | Stop reason: HOSPADM

## 2023-12-13 RX ORDER — OXYCODONE HYDROCHLORIDE 5 MG/1
5 TABLET ORAL
Status: DISCONTINUED | OUTPATIENT
Start: 2023-12-13 | End: 2023-12-14 | Stop reason: HOSPADM

## 2023-12-13 RX ORDER — TAMSULOSIN HYDROCHLORIDE 0.4 MG/1
0.8 CAPSULE ORAL
COMMUNITY
End: 2024-03-21 | Stop reason: SDUPTHER

## 2023-12-13 RX ORDER — OXYCODONE HCL 5 MG/5 ML
10 SOLUTION, ORAL ORAL
Status: COMPLETED | OUTPATIENT
Start: 2023-12-13 | End: 2023-12-13

## 2023-12-13 RX ORDER — DIPHENHYDRAMINE HYDROCHLORIDE 50 MG/ML
25 INJECTION INTRAMUSCULAR; INTRAVENOUS EVERY 6 HOURS PRN
Status: DISCONTINUED | OUTPATIENT
Start: 2023-12-13 | End: 2023-12-14 | Stop reason: HOSPADM

## 2023-12-13 RX ORDER — ACETAMINOPHEN 500 MG
1000 TABLET ORAL EVERY 6 HOURS PRN
Status: DISCONTINUED | OUTPATIENT
Start: 2023-12-19 | End: 2023-12-14 | Stop reason: HOSPADM

## 2023-12-13 RX ORDER — CEFAZOLIN SODIUM 1 G/50ML
1 INJECTION, SOLUTION INTRAVENOUS EVERY 8 HOURS
Status: DISCONTINUED | OUTPATIENT
Start: 2023-12-13 | End: 2023-12-14 | Stop reason: HOSPADM

## 2023-12-13 RX ORDER — ACETAMINOPHEN 500 MG
1000 TABLET ORAL ONCE
Status: COMPLETED | OUTPATIENT
Start: 2023-12-13 | End: 2023-12-13

## 2023-12-13 RX ORDER — PHENAZOPYRIDINE HYDROCHLORIDE 100 MG/1
200 TABLET, FILM COATED ORAL ONCE
Status: COMPLETED | OUTPATIENT
Start: 2023-12-13 | End: 2023-12-13

## 2023-12-13 RX ORDER — DIPHENHYDRAMINE HYDROCHLORIDE 50 MG/ML
12.5 INJECTION INTRAMUSCULAR; INTRAVENOUS
Status: DISCONTINUED | OUTPATIENT
Start: 2023-12-13 | End: 2023-12-13 | Stop reason: HOSPADM

## 2023-12-13 RX ADMIN — PHENAZOPYRIDINE 200 MG: 100 TABLET ORAL at 17:57

## 2023-12-13 RX ADMIN — FENTANYL CITRATE 50 MCG: 50 INJECTION, SOLUTION INTRAMUSCULAR; INTRAVENOUS at 15:43

## 2023-12-13 RX ADMIN — OXYCODONE HYDROCHLORIDE 10 MG: 10 TABLET ORAL at 23:06

## 2023-12-13 RX ADMIN — ACETAMINOPHEN 1000 MG: 500 TABLET, FILM COATED ORAL at 14:26

## 2023-12-13 RX ADMIN — FENTANYL CITRATE 50 MCG: 50 INJECTION, SOLUTION INTRAMUSCULAR; INTRAVENOUS at 16:16

## 2023-12-13 RX ADMIN — HALOPERIDOL LACTATE 1 MG: 5 INJECTION, SOLUTION INTRAMUSCULAR at 16:34

## 2023-12-13 RX ADMIN — MIDAZOLAM HYDROCHLORIDE 2 MG: 1 INJECTION, SOLUTION INTRAMUSCULAR; INTRAVENOUS at 15:21

## 2023-12-13 RX ADMIN — ONDANSETRON 4 MG: 2 INJECTION INTRAMUSCULAR; INTRAVENOUS at 16:29

## 2023-12-13 RX ADMIN — FENTANYL CITRATE 50 MCG: 50 INJECTION, SOLUTION INTRAMUSCULAR; INTRAVENOUS at 16:21

## 2023-12-13 RX ADMIN — CEFAZOLIN SODIUM 1 G: 1 INJECTION, SOLUTION INTRAVENOUS at 22:15

## 2023-12-13 RX ADMIN — SUGAMMADEX 200 MG: 100 INJECTION, SOLUTION INTRAVENOUS at 15:54

## 2023-12-13 RX ADMIN — FENTANYL CITRATE 50 MCG: 50 INJECTION, SOLUTION INTRAMUSCULAR; INTRAVENOUS at 16:27

## 2023-12-13 RX ADMIN — ROCURONIUM BROMIDE 50 MG: 50 INJECTION, SOLUTION INTRAVENOUS at 15:21

## 2023-12-13 RX ADMIN — CEFAZOLIN 2 G: 1 INJECTION, POWDER, FOR SOLUTION INTRAMUSCULAR; INTRAVENOUS at 15:15

## 2023-12-13 RX ADMIN — OXYBUTYNIN CHLORIDE 5 MG: 5 TABLET ORAL at 17:57

## 2023-12-13 RX ADMIN — FENTANYL CITRATE 50 MCG: 50 INJECTION, SOLUTION INTRAMUSCULAR; INTRAVENOUS at 16:42

## 2023-12-13 RX ADMIN — LIDOCAINE HYDROCHLORIDE 100 MG: 20 INJECTION, SOLUTION EPIDURAL; INFILTRATION; INTRACAUDAL at 15:21

## 2023-12-13 RX ADMIN — ACETAMINOPHEN 1000 MG: 500 TABLET, FILM COATED ORAL at 23:06

## 2023-12-13 RX ADMIN — SODIUM CHLORIDE, POTASSIUM CHLORIDE, SODIUM LACTATE AND CALCIUM CHLORIDE: 600; 310; 30; 20 INJECTION, SOLUTION INTRAVENOUS at 14:25

## 2023-12-13 RX ADMIN — FENTANYL CITRATE 50 MCG: 50 INJECTION, SOLUTION INTRAMUSCULAR; INTRAVENOUS at 15:21

## 2023-12-13 RX ADMIN — MIDAZOLAM HYDROCHLORIDE 1 MG: 1 INJECTION, SOLUTION INTRAMUSCULAR; INTRAVENOUS at 16:48

## 2023-12-13 RX ADMIN — ATORVASTATIN CALCIUM 40 MG: 40 TABLET, FILM COATED ORAL at 21:56

## 2023-12-13 RX ADMIN — OXYCODONE HYDROCHLORIDE 10 MG: 5 SOLUTION ORAL at 16:16

## 2023-12-13 RX ADMIN — PROPOFOL 150 MG: 10 INJECTION, EMULSION INTRAVENOUS at 15:21

## 2023-12-13 ASSESSMENT — PAIN SCALES - GENERAL: PAIN_LEVEL: 2

## 2023-12-13 ASSESSMENT — LIFESTYLE VARIABLES
ALCOHOL_USE: NO
HAVE PEOPLE ANNOYED YOU BY CRITICIZING YOUR DRINKING: NO
AVERAGE NUMBER OF DAYS PER WEEK YOU HAVE A DRINK CONTAINING ALCOHOL: 0
HAVE YOU EVER FELT YOU SHOULD CUT DOWN ON YOUR DRINKING: NO
TOTAL SCORE: 0
HOW MANY TIMES IN THE PAST YEAR HAVE YOU HAD 5 OR MORE DRINKS IN A DAY: 0
ON A TYPICAL DAY WHEN YOU DRINK ALCOHOL HOW MANY DRINKS DO YOU HAVE: 0
EVER HAD A DRINK FIRST THING IN THE MORNING TO STEADY YOUR NERVES TO GET RID OF A HANGOVER: NO
CONSUMPTION TOTAL: NEGATIVE
DOES PATIENT WANT TO STOP DRINKING: NO
TOTAL SCORE: 0
EVER FELT BAD OR GUILTY ABOUT YOUR DRINKING: NO
TOTAL SCORE: 0

## 2023-12-13 ASSESSMENT — PAIN DESCRIPTION - PAIN TYPE
TYPE: ACUTE PAIN
TYPE: SURGICAL PAIN;CHRONIC PAIN
TYPE: CHRONIC PAIN

## 2023-12-13 ASSESSMENT — FIBROSIS 4 INDEX
FIB4 SCORE: 2.02
FIB4 SCORE: 2.02

## 2023-12-13 NOTE — ANESTHESIA PREPROCEDURE EVALUATION
Case: [id number] Date/Time: 12/13/23 1515    Procedure: BIPOLAR TRANSURETHRAL RESECTION OF BLADDER TUMOR    Pre-op diagnosis: lesion of bladder    Location: [facility] OR 18 / SURGERY [facility]    Surgeons: Addison Seymour M.D.            Relevant Problems   PULMONARY   (positive) COPD (chronic obstructive pulmonary disease) (HCC)      CARDIAC   (positive) Peripheral artery disease (HCC)       Physical Exam    Airway   Mallampati: II  TM distance: >3 FB  Neck ROM: full       Cardiovascular - normal exam  Rhythm: regular  Rate: normal  (-) murmur     Dental - normal exam           Pulmonary - normal exam  Breath sounds clear to auscultation     Abdominal    Neurological - normal exam                   Anesthesia Plan    ASA 2       Plan - general       Airway plan will be ETT          Induction: intravenous    Postoperative Plan: Postoperative administration of opioids is intended.    Pertinent diagnostic labs and testing reviewed    Informed Consent:    Anesthetic plan and risks discussed with patient.    Use of blood products discussed with: patient whom consented to blood products.

## 2023-12-13 NOTE — DISCHARGE INSTRUCTIONS
No heavy lifting or straining for 2 weeks.  May shower as normal.  Take over the counter pain medications.  Will arrange appointment for catheter removal.  Talk appointment scheduled in 2 weeks.

## 2023-12-13 NOTE — PROGRESS NOTES
Medication history reviewed with PT at bedside    Med rec is complete per PT reporting    Allergies reviewed.     Patient denies any outpatient antibiotics in the last 30 days.     Patient is not taking anticoagulants.    Preferred pharmacy for this visit - [facility] on [address] ([phone])

## 2023-12-13 NOTE — H&P
[facility] [place] Consult/H&P Note    Primary Service: Urology  Attending: Addison Seymour M.D.  Patient's Name/MRN: Ronal Hair, [id number]    Admit Date:12/13/2023  Today's Date: 12/13/2023   Length of stay:  LOS: 0 days   Room #: TPREPOOL/NONE      Reason for consult/chief complaint: presents for surgery: TURBT  ID/HPI: Ronal Hair is a 78 y.o. male patient who presents today for above case for bladder lesion.  They have had no changes in their health since last visit with us.       Past Medical History:   Past Medical History:   Diagnosis Date    Abdominal aortic aneurysm (AAA) without rupture ([facility]) 11/11/2020    Back pain     Cancer ([facility])     Cataract     IOL OU    COPD (chronic obstructive pulmonary disease) ([facility]) 08/26/2022    Erectile dysfunction     Follicular lymphoma ([facility]) 10/09/2019    High cholesterol     Hypertension     Infectious disease     Lymphoma ([facility]) 01/22/2016    Nephrolithiasis 01/06/2023    Osteomyelitis of left foot ([facility]) 11/11/2020    Pain of toe 11/03/2020    Reactive airway disease without complication 03/18/2022    Reactive airway disease without complication 03/18/2022    Scoliosis     Snoring         Past Surgical History:   Past Surgical History:   Procedure Laterality Date    [place] CYSTOSCOPY,INSERT URETERAL STENT Left 01/07/2023    Procedure: CYSTOSCOPY, WITH URETERAL STENT INSERTION;  Surgeon: Addison Seymour M.D.;  Location: [facility];  Service: Urology    [place] CYSTO/URETERO/PYELOSCOPY, DX Left 01/07/2023    Procedure: URETEROSCOPY;  Surgeon: Addison Seymour M.D.;  Location: [facility];  Service: Urology    LASERTRIPSY Left 01/07/2023    Procedure: LITHOTRIPSY, USING LASER;  Surgeon: Addison Seymour M.D.;  Location: SURGERY [facility];  Service: Urology    AORTOBIFEM BYPASS  11/08/2020    Procedure: CREATION, BYPASS, ARTERIAL, AORTA TO FEMORAL, BILATERAL;  Surgeon: Jimi Morrison M.D.;  Location: [facility];  Service: General     CATARACT EXTRACTION WITH IOL Bilateral     OTHER ABDOMINAL SURGERY      OTHER ORTHOPEDIC SURGERY          Family History:   Family History   Problem Relation Age of Onset    Cancer Maternal Aunt         melanoma    Cancer Maternal Uncle         Throat cancer         Social History:   Social History     Tobacco Use    Smoking status: Every Day     Current packs/day: 0.00     Average packs/day: 2.0 packs/day for 54.8 years (109.7 ttl pk-yrs)     Types: Cigarettes     Start date: 1/1/1966     Last attempt to quit: 11/3/2020     Years since quitting: 3.1    Smokeless tobacco: Never   Vaping Use    Vaping Use: Never used   Substance Use Topics    Alcohol use: Not Currently    Drug use: Never      Social History     Social History Narrative    Not on file        Allergies: he Patient has no known allergies.    Medications:   Medications Prior to Admission   Medication Sig Dispense Refill Last Dose    ibuprofen (MOTRIN) 200 MG Tab Take 800 mg by mouth every 6 hours as needed for Inflammation.   12/8/2023 at HELD    tamsulosin (FLOMAX) 0.4 MG capsule Take 0.8 mg by mouth 1/2 hour after breakfast.   12/12/2023 at PM    oxybutynin SR (DITROPAN-XL) 5 MG TABLET SR 24 HR Take 1 Tablet by mouth every day. 30 Tablet 0 12/6/2023 at UNK    senna-docusate (PERICOLACE OR SENOKOT S) 8.6-50 MG Tab Take 1 Tablet by mouth every day. 30 Tablet 0 12/9/2023 at PM    finasteride (PROSCAR) 5 MG Tab Take 5 mg by mouth every day.   12/11/2023 at DOESN'T LIKE    tiotropium (SPIRIVA) 18 MCG Cap Inhale 1 puff by mouth once daily (Patient taking differently: Place 18 mcg into inhaler and inhale every day.) 30 Capsule 11 12/11/2023 at PM    sildenafil citrate (VIAGRA) 25 MG Tab Take 0.5 Tablets by mouth 1 time a day as needed for Erectile Dysfunction. 12.5 mg = 1/2 tablet 10 Tablet 5 2 WEEKS AGO at PRN    atorvastatin (LIPITOR) 40 MG Tab Take 1 Tablet by mouth every evening. 90 Tablet 3 12/12/2023 at PM    Multiple Vitamins-Minerals (MULTIVITAMIN  "ADULTS 50+ PO) Take 1 Tablet by mouth every day.   12/6/2023 at HELD         Review of Systems  As per HPI     Physical Exam  VITAL SIGNS: BP (!) 163/74   Pulse 87   Temp 36.4 °C (97.5 °F) (Temporal)   Resp 16   Ht 1.727 m (5' 8\")   Wt 81.7 kg (180 lb 1.9 oz)   SpO2 98%   BMI 27.39 kg/m²     Gen: No acute distress  Card: regular rate  Pulm: breathing comfortably       Assessment/Recommendation   78 y.o. male here today for TURBT.  After a full discussion of the alternatives risks and benefits of the procedure, the patient consented to proceeding with the planned procedure.     Consent for planned procedure obtained.  Plan for home post op       Addison Seymour M.D.   [address] [place] [address]   [phone]            "

## 2023-12-13 NOTE — ANESTHESIA PROCEDURE NOTES
Airway    Date/Time: 12/13/2023 3:21 PM    Performed by: Luis Wilson M.D.  Authorized by: Luis Wilson M.D.    Location:  OR  Urgency:  Elective  Indications for Airway Management:  Anesthesia      Spontaneous Ventilation: absent    Sedation Level:  Deep  Preoxygenated: Yes    Patient Position:  Sniffing  Final Airway Type:  Endotracheal airway  Final Endotracheal Airway:  ETT  Cuffed: Yes    Technique Used for Successful ETT Placement:  Direct laryngoscopy    Insertion Site:  Oral  Blade Type:  Arvizu  Laryngoscope Blade/Videolaryngoscope Blade Size:  2  ETT Size (mm):  8.0  Measured from:  Teeth  ETT to Teeth (cm):  23  Placement Verified by: auscultation and capnometry    Cormack-Lehane Classification:  Grade I - full view of glottis  Number of Attempts at Approach:  1

## 2023-12-14 ENCOUNTER — HOSPITAL ENCOUNTER (EMERGENCY)
Facility: MEDICAL CENTER | Age: 78
End: 2023-12-14
Attending: EMERGENCY MEDICINE
Payer: MEDICARE

## 2023-12-14 VITALS
SYSTOLIC BLOOD PRESSURE: 158 MMHG | HEART RATE: 60 BPM | BODY MASS INDEX: 39.4 KG/M2 | RESPIRATION RATE: 16 BRPM | HEIGHT: 68 IN | OXYGEN SATURATION: 93 % | DIASTOLIC BLOOD PRESSURE: 80 MMHG | WEIGHT: 260 LBS | TEMPERATURE: 98 F

## 2023-12-14 VITALS
OXYGEN SATURATION: 89 % | DIASTOLIC BLOOD PRESSURE: 67 MMHG | BODY MASS INDEX: 26.4 KG/M2 | SYSTOLIC BLOOD PRESSURE: 149 MMHG | WEIGHT: 174.16 LBS | TEMPERATURE: 98.6 F | RESPIRATION RATE: 16 BRPM | HEART RATE: 78 BPM | HEIGHT: 68 IN

## 2023-12-14 DIAGNOSIS — T83.9XXA FOLEY CATHETER PROBLEM, INITIAL ENCOUNTER (HCC): ICD-10-CM

## 2023-12-14 LAB
ANION GAP SERPL CALC-SCNC: 9 MMOL/L (ref 7–16)
BUN SERPL-MCNC: 11 MG/DL (ref 8–22)
CALCIUM SERPL-MCNC: 8.7 MG/DL (ref 8.5–10.5)
CHLORIDE SERPL-SCNC: 99 MMOL/L (ref 96–112)
CO2 SERPL-SCNC: 25 MMOL/L (ref 20–33)
CREAT SERPL-MCNC: 0.72 MG/DL (ref 0.5–1.4)
ERYTHROCYTE [DISTWIDTH] IN BLOOD BY AUTOMATED COUNT: 40 FL (ref 35.9–50)
GFR SERPLBLD CREATININE-BSD FMLA CKD-EPI: 95 ML/MIN/1.73 M 2
GLUCOSE SERPL-MCNC: 98 MG/DL (ref 65–99)
HCT VFR BLD AUTO: 37.4 % (ref 42–52)
HGB BLD-MCNC: 13.2 G/DL (ref 14–18)
MCH RBC QN AUTO: 31.9 PG (ref 27–33)
MCHC RBC AUTO-ENTMCNC: 35.3 G/DL (ref 32.3–36.5)
MCV RBC AUTO: 90.3 FL (ref 81.4–97.8)
PLATELET # BLD AUTO: 194 K/UL (ref 164–446)
PMV BLD AUTO: 9.2 FL (ref 9–12.9)
POTASSIUM SERPL-SCNC: 3.8 MMOL/L (ref 3.6–5.5)
RBC # BLD AUTO: 4.14 M/UL (ref 4.7–6.1)
SODIUM SERPL-SCNC: 133 MMOL/L (ref 135–145)
WBC # BLD AUTO: 6.7 K/UL (ref 4.8–10.8)

## 2023-12-14 PROCEDURE — 700111 HCHG RX REV CODE 636 W/ 250 OVERRIDE (IP): Mod: JZ | Performed by: STUDENT IN AN ORGANIZED HEALTH CARE EDUCATION/TRAINING PROGRAM

## 2023-12-14 PROCEDURE — A9270 NON-COVERED ITEM OR SERVICE: HCPCS | Performed by: STUDENT IN AN ORGANIZED HEALTH CARE EDUCATION/TRAINING PROGRAM

## 2023-12-14 PROCEDURE — G0378 HOSPITAL OBSERVATION PER HR: HCPCS

## 2023-12-14 PROCEDURE — 85027 COMPLETE CBC AUTOMATED: CPT

## 2023-12-14 PROCEDURE — 80048 BASIC METABOLIC PNL TOTAL CA: CPT

## 2023-12-14 PROCEDURE — 99284 EMERGENCY DEPT VISIT MOD MDM: CPT

## 2023-12-14 PROCEDURE — 96365 THER/PROPH/DIAG IV INF INIT: CPT

## 2023-12-14 PROCEDURE — 700102 HCHG RX REV CODE 250 W/ 637 OVERRIDE(OP): Performed by: STUDENT IN AN ORGANIZED HEALTH CARE EDUCATION/TRAINING PROGRAM

## 2023-12-14 PROCEDURE — 96366 THER/PROPH/DIAG IV INF ADDON: CPT

## 2023-12-14 RX ADMIN — OXYCODONE HYDROCHLORIDE 10 MG: 10 TABLET ORAL at 08:28

## 2023-12-14 RX ADMIN — ACETAMINOPHEN 1000 MG: 500 TABLET, FILM COATED ORAL at 06:12

## 2023-12-14 RX ADMIN — CEFAZOLIN SODIUM 1 G: 1 INJECTION, SOLUTION INTRAVENOUS at 06:16

## 2023-12-14 RX ADMIN — ACETAMINOPHEN 1000 MG: 500 TABLET, FILM COATED ORAL at 12:28

## 2023-12-14 ASSESSMENT — FIBROSIS 4 INDEX
FIB4 SCORE: 1.99
FIB4 SCORE: 1.99

## 2023-12-14 NOTE — OR NURSING
1559: Pt arrived via gurney with anesthesia and RN. Pt BP hypertensive. Anesthesia aware. Orders reviewed and initiated.   1722: Dr. Seymour contacted regarding persistent pain. Orders received.   1956: Report called to DREW Zhao. All questions answered. VSS. No patient distress. CBI in place. Alert and oriented x4. Pt transported to room in stable condition on 3L oxymask with 1 bag personal belongings. Family updated.

## 2023-12-14 NOTE — ED TRIAGE NOTES
"Chief Complaint   Patient presents with    Unable to Urinate     Pt had a bladder tumor resection yesterday and now catheter that was placed yesterday has not been draining any urine x4-5 hours.      BP (!) 152/81   Pulse 79   Temp 36.7 °C (98 °F) (Temporal)   Resp 20   Ht 1.727 m (5' 8\")   Wt 118 kg (260 lb)   SpO2 96%   BMI 39.53 kg/m²     Pt by wheelchair to triage for above.   "

## 2023-12-14 NOTE — PROGRESS NOTES
4 Eyes Skin Assessment Completed by DREW Zhao and DREW Boyle.    Head WDL  Ears WDL  Nose WDL  Mouth WDL  Neck WDL  Breast/Chest WDL  Shoulder Blades WDL  Spine WDL  (R) Arm/Elbow/Hand WDL  (L) Arm/Elbow/Hand WDL  Abdomen WDL  Groin WDL  Scrotum/Coccyx/Buttocks Redness and Blanching  (R) Leg WDL  (L) Leg WDL  (R) Heel/Foot/Toe WDL  (L) Heel/Foot/Toe WDL          Devices In Places Blood Pressure Cuff, Pulse Ox, Martell, SCD's, and Oxy Mask      Interventions In Place Pillows    Possible Skin Injury No    Pictures Uploaded Into Epic N/A  Wound Consult Placed N/A  RN Wound Prevention Protocol Ordered No

## 2023-12-14 NOTE — DISCHARGE SUMMARY
Discharge Summary      Reason for Admission  lesion of bladder     Admission Date  12/13/2023    CODE STATUS  Full Code    HPI & HOSPITAL COURSE  This is a 78 y.o. male here with history of bladder tumor who is now POD#1 s/p transurethral resection of bladder tumor (large) by Dr. Seymour on 12/13/2023.    Today, patient is standing at bedside. He denies any pain or N/V. +flatus, -BM. He is ambulatory. AFVSS.    Urine is draining light clear orange on very slow flow CBI. Plan to wean and clamp today. I reviewed labs.     Anticipate discharge early this afternoon - cancino catheter to remain securely in place. Please plug the CBI port once CBI has been disconnected.     All questions and concerns have been addressed with the patient at this time.     Therefore, he is discharged in good and stable condition to home with close outpatient follow-up.    Discharge Date  12/14/2023    FOLLOW UP ITEMS POST DISCHARGE  Our office will call to arrange next appointments for catheter management and routine post-operative care.    DISCHARGE DIAGNOSES  Principal Problem:    Bladder tumor (POA: Yes)  Resolved Problems:    * No resolved hospital problems. *      FOLLOW UP  Our office will call to arrange follow up with Dr. Seymour at [facility].    MEDICATIONS ON DISCHARGE     Medication List        CONTINUE taking these medications        Instructions   atorvastatin 40 MG Tabs  Commonly known as: Lipitor   Take 1 Tablet by mouth every evening.  Dose: 40 mg     finasteride 5 MG Tabs  Commonly known as: Proscar   Take 5 mg by mouth every day.  Dose: 5 mg     ibuprofen 200 MG Tabs  Commonly known as: Motrin   Take 800 mg by mouth every 6 hours as needed for Inflammation.  Dose: 800 mg     MULTIVITAMIN ADULTS 50+ PO   Take 1 Tablet by mouth every day.  Dose: 1 Tablet     oxybutynin SR 5 MG Tb24  Commonly known as: Ditropan-XL   Take 1 Tablet by mouth every day.  Dose: 5 mg     senna-docusate 8.6-50 MG Tabs  Commonly known as: Pericolace Or  Senokot S   Take 1 Tablet by mouth every day.  Dose: 1 Tablet     sildenafil citrate 25 MG Tabs  Commonly known as: Viagra   Take 0.5 Tablets by mouth 1 time a day as needed for Erectile Dysfunction. 12.5 mg = 1/2 tablet  Dose: 12.5 mg     tamsulosin 0.4 MG capsule  Commonly known as: Flomax   Take 0.8 mg by mouth 1/2 hour after breakfast.  Dose: 0.8 mg     tiotropium 18 MCG Caps  Commonly known as: Spiriva   Inhale 1 puff by mouth once daily              Allergies  No Known Allergies    DIET  Orders Placed This Encounter   Procedures    Diet Order Diet: Regular     Standing Status:   Standing     Number of Occurrences:   1     Order Specific Question:   Diet:     Answer:   Regular [1]       ACTIVITY  As tolerated.  Weight bearing as tolerated  Regular diet as tolerated     CONSULTATIONS  None    PROCEDURES  Transurethral resection of bladder tumor (Large)    LABORATORY  Lab Results   Component Value Date    SODIUM 133 (L) 12/14/2023    POTASSIUM 3.8 12/14/2023    CHLORIDE 99 12/14/2023    CO2 25 12/14/2023    GLUCOSE 98 12/14/2023    BUN 11 12/14/2023    CREATININE 0.72 12/14/2023        Lab Results   Component Value Date    WBC 6.7 12/14/2023    HEMOGLOBIN 13.2 (L) 12/14/2023    HEMATOCRIT 37.4 (L) 12/14/2023    PLATELETCT 194 12/14/2023        Total time of the discharge process exceeds 30 minutes.    Lissette Hilario PA-C  [facility]

## 2023-12-14 NOTE — PROGRESS NOTES
Report received from DREW Pederson. Pt arrived via gurney,   ambulated to bed with standby assist.  Assessment complete.  A&Ox4. Denies CP/SOB.  Reporting 2/10 pain. Declined intervention at this time.  Educated patient regarding pharmacologic and non pharmacologic modalities for pain management.  Skin per flowsheet.  Tolerating regular diet.   Denies N/V at this time.   Last BM PTA. CBI 3 way cancino clean, dry, and intact.   All needs met at this time. Call light within reach. Pt calls appropriately.   Bed low and locked, non skid socks in place. Hourly rounding in place.

## 2023-12-14 NOTE — PROGRESS NOTES
Ronal Hair has been provided discharge instructions, to include follow up care, home medications, and activity/diet reviewed. Understanding verbalized. IV removed. Catheter tip intact, bleeding controlled. Arm band removed. Pt ride present. Pt out of DCL at this time with personal belongings.

## 2023-12-14 NOTE — ANESTHESIA POSTPROCEDURE EVALUATION
Patient: Ronal Hair    Procedure Summary       Date: 12/13/23 Room / Location: [place] [address] / SURGERY [facility]    Anesthesia Start: 1515 Anesthesia Stop: 1603    Procedure: BIPOLAR TRANSURETHRAL RESECTION OF BLADDER TUMOR (Bladder) Diagnosis: (lesion of bladder)    Surgeons: Addison Seymour M.D. Responsible Provider: Luis Wilson M.D.    Anesthesia Type: general ASA Status: 2            Final Anesthesia Type: general  Last vitals  BP   Blood Pressure : (!) 163/74    Temp   36.4 °C (97.5 °F)    Pulse   87   Resp   16    SpO2   98 %      Anesthesia Post Evaluation    Patient location during evaluation: PACU  Patient participation: complete - patient participated  Level of consciousness: awake and alert  Pain score: 2    Airway patency: patent  Anesthetic complications: no  Cardiovascular status: hemodynamically stable  Respiratory status: acceptable  Hydration status: euvolemic    PONV: none          There were no known notable events for this encounter.     Nurse Pain Score: 10 (NPRS)

## 2023-12-14 NOTE — ED NOTES
Pt presented with cancino catheter with leg bag in place. He was just release from CDU today. He reports no drainage into bag since release about 1430 today.     This ED RN flushed indwelling catheter with 30 ml of saline and exchanged bag to a down drain bag. +return of urine.

## 2023-12-14 NOTE — ANESTHESIA TIME REPORT
Anesthesia Start and Stop Event Times       Date Time Event    12/13/2023 1455 Ready for Procedure     1515 Anesthesia Start     1603 Anesthesia Stop          Responsible Staff  12/13/23      Name Role Begin End    Luis Wilson M.D. Anesth 1515 1603          Overtime Reason:  no overtime (within assigned shift)    Comments:

## 2023-12-14 NOTE — CARE PLAN
The patient is Watcher - Medium risk of patient condition declining or worsening    Shift Goals  Clinical Goals: Wean/manage CBI, fluid intake, stable vitals  Patient Goals: comfort, pain control  Family Goals: THOMAS    Progress made toward(s) clinical / shift goals:  CBI intact, weaning per guidelines, fluid intake adequate, stable vitals. Pt able to ambulate.      Problem: Knowledge Deficit - Standard  Goal: Patient and family/care givers will demonstrate understanding of plan of care, disease process/condition, diagnostic tests and medications  Outcome: Progressing     Problem: Pain - Standard  Goal: Alleviation of pain or a reduction in pain to the patient’s comfort goal  Outcome: Progressing

## 2023-12-14 NOTE — OP REPORT
Urology Operative Report    Date: 12/13/2023    Pre-operative diagnosis: Bladder tumor    Post-operative diagnosis: Bladder tumor    Procedures:  Transurethral resection of bladder tumor (Large)    Surgeon: Surgeon(s) and Role:     * Addison Seymour M.D. - Primary    Anesthesia: General    EBL: Minimal    IV fluids: Per anesthesia.    Specimens: Bladder tumor    Complications: None    Drains: 24 Polish three-way catheter on continuous bladder irrigation    Disposition:  PACU, overnight admission    Findings:  Large 5+ cm irregular area near dome concerning for possible sessile tumor. Large amount of surrounding papillary edema.  Trilobar prostate w/ significant oozing at conclusion of case.    Indications for procedure:    Ronal Hair is a 78 y.o. male who presented with concern for bladder mass.  After lengthy   discussion of risks and benefits, patient agreed to proceed with transurethral section of bladder tumor.    Details of procedure:    Patient was seen in the preoperative area and informed consent was obtained.  They were transported to the operating room and underwent general anesthesia without complication. Ancef was administered for antibiotic prophylaxis.  Timeout was completed.  Patient was positioned in dorsal lithotomy and then prepped and draped.    26 Polish resectoscope was inserted into the bladder atraumatically.  Cystoscopy is notable for a bladder dome lesion that was at least 5 cm in total size.  There was a large amount of surrounding papillary edema, therefore was difficult to ascertain whether this was all inflammatory in nature or a possible sessile tumor.  Bipolar resectoscope loop was inserted and a large portion of this irregular area was resected and sent for pathology.  Meticulous hemostasis was obtained using cautery.  Ellik bulb was used to evacuate the specimen which was sent for pathology.  Scope was withdrawn from the bladder where we encountered a significantly oozing  trilobar prostate.  Given the amount of hematuria, we elected to place a 24 Turkmen three-way catheter with 30 cc balloon.  Continuous bladder irrigation was initiated and traction was applied.  This concluded the procedure.  Patient was awoken from anesthesia and transported to the PACU for recovery.

## 2023-12-15 NOTE — ED NOTES
Patient has been provided DC instructions for indwelling catheter care and instructions on how to flush catheter at home. He has been instructed that if a genital flush of catheter does NOT relieve clog he needs to return to ED. Verbalized understanding. Ambulatory on DC. Escorted to nasrin via .

## 2023-12-15 NOTE — ED PROVIDER NOTES
ED Provider Note  CHIEF COMPLAINT  Chief Complaint   Patient presents with    Unable to Urinate     Pt had a bladder tumor resection yesterday and now catheter that was placed yesterday has not been draining any urine x4-5 hours.        HPI  Ronal Hair is a 78 y.o. male who presents for evaluation of Martell catheter issue.  Patient had a TURP performed yesterday on 13 December and had continuous bladder irrigation all night.  He was discharged this afternoon but felt that his Martell was not working when he got home.  He noted about 4 to 5 hours of lack of output and came here.  He was flushed prior to my evaluation and was draining clear yellow urine at the time of my evaluation.  He has no abdominal pain and no fevers or chills.  EXTERNAL RECORDS REVIEWED  Reviewed admission and discharge note as well as surgery note from yesterday.  ROS  Constitutional: No fevers or chills  Skin: No rashes  HEENT: No sore throat, or runny nose  Chest: No pain or rashes  Pulm: No shortness of breath, cough, wheezing, stridor, or pain with inspiration/expiration  Gastrointestinal: No nausea, vomiting, diarrhea,  or abdominal pain.  Genitourinary: Martell catheter not working.  Musculoskeletal: No pain, swelling, or weakness  Neurologic: No sensory or focal motor changes to extremities. No confusion or disorientation.  Heme: No bleeding or bruising problems.   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    SOCIAL HISTORY  Social History     Tobacco Use    Smoking status: Every Day     Current packs/day: 0.00     Average packs/day: 2.0 packs/day for 54.8 years (109.7 ttl pk-yrs)     Types: Cigarettes     Start date: 1/1/1966     Last attempt to quit: 11/3/2020     Years since quitting: 3.1    Smokeless tobacco: Never   Vaping Use    Vaping Use: Never used   Substance and Sexual Activity    Alcohol use: Not Currently    Drug use: Never    Sexual activity: Yes     Partners: Female       SURGICAL HISTORY   has a past surgical history that includes other orthopedic surgery; other abdominal surgery; aortobifem bypass (11/08/2020); cystoscopy,insert ureteral stent (Left, 01/07/2023); cysto/uretero/pyeloscopy, dx (Left, 01/07/2023); lasertripsy (Left, 01/07/2023); cataract extraction with iol (Bilateral); and turbt (transurethral resection of bladder tumor) (12/13/2023).    CURRENT MEDICATIONS  Home Medications       Reviewed by Janeth Salinas R.N. (Registered Nurse) on 12/14/23 at 1516  Med List Status: Partial     Medication Last Dose Status   atorvastatin (LIPITOR) 40 MG Tab  Active   finasteride (PROSCAR) 5 MG Tab  Active   ibuprofen (MOTRIN) 200 MG Tab  Active   Multiple Vitamins-Minerals (MULTIVITAMIN ADULTS 50+ PO)  Active   oxybutynin SR (DITROPAN-XL) 5 MG TABLET SR 24 HR  Active   senna-docusate (PERICOLACE OR SENOKOT S) 8.6-50 MG Tab  Active   sildenafil citrate (VIAGRA) 25 MG Tab  Active   tamsulosin (FLOMAX) 0.4 MG capsule  Active   tiotropium (SPIRIVA) 18 MCG Cap  Active                     ALLERGIES  No Known Allergies    PHYSICAL EXAM  VITAL SIGNS: BP (!) 158/80   Pulse 60   Temp 36.7 °C (98 °F)  "(Temporal)   Resp 16   Ht 1.727 m (5' 8\")   Wt 118 kg (260 lb)   SpO2 93%   BMI 39.53 kg/m²    Gen: Alert in no apparent distress.  HEENT: No signs of trauma, Bilateral external ears normal, Nose normal. Conjunctiva normal, Non-icteric.   Cardiovascular: Regular rate and rhythm, no murmurs.    Thorax & Lungs: Normal breath sounds, No increased work of breathing or tachypnea.  Abdomen: Bowel sounds normal, Soft, No tenderness  Skin: Warm, Dry  Extremities: Intact distal pulses, No edema  Neurologic: Alert , no facial droop, grossly normal coordination and strength  Psychiatric: Affect pleasant    ED observation? No          COURSE & MEDICAL DECISION MAKING  Pertinent Labs & Imaging studies reviewed. (See chart for details)  Patient arrives for evaluation of what appears to be a clogged Martell catheter.  This was temporary and was flushed by nursing staff prior to my evaluation.  On my evaluation, the patient was comfortable appearing and had no abdominal pain.  He notes that it seems to be flowing now and there was clear yellow urine in the tubing and in the bag.  After discussion with the patient, we will be discharged and I do not feel he needs any further screening labs or imaging.  Patient appears entirely comfortable and has no abdominal pain and no symptoms to suggest an infectious issue.  He will keep his follow-up with the urologist and return if his symptoms worsen or change in any way.    I have discussed management of the patient with the following physicians and ISAIAH's: None    Escalation of care considered, and ultimately not performed:blood analysis and diagnostic imaging    Barriers to care at this time, including but not limited to: None.     Decision tools and Rx drugs considered including, but not limited to : None    Discussion of management with other QHP or appropriate source(s): None     The patient will return for worsening symptoms and is stable at the time of discharge. The patient " verbalizes understanding and will comply.    FINAL IMPRESSION  1. Martell catheter problem, initial encounter (HCC)        Electronically signed by: Tristan Matthews M.D., 12/14/2023 4:33 PM

## 2023-12-23 NOTE — ED NOTES
Internal Medicine Resident Progress Note     Patient: Oriana Soriano Date: 12/23/2023   YOB: 1947 Admission Date: 12/21/2023   MRN: [id number] Attending: Nette Loera MD     Chief Complaint   Patient presents with    Abnormal Lab Results       Hospital Course:    Oriana Soriano is a 76 year old female with a PMH s/p mechanical MVR, AVR (2000; on coumadin), portal HTN with RV dysfunction, COPD, afib with RVR, tachy-carlyn syndrome s/p ppm placement (2003), CHF (on torsemide) who was admitted on 12/21/2023 with supratherapeutic INR (18) with no concern for active bleeding and hypothermia (94.2F).    Received PO Vit K given INR >10. Target is 2-3.0; achieved. Plans to restart warfarin prior to discharge.  Had external warming; now temperature wnl. Has been admitted with incidental hypothermia before as well. Treated with rocephin daily from 12/22-12/26 for poss. UTI in setting of hypothermia.  Known macrocytic anemia; b12, folate, iron panel pending. Takes PTA iron pills.  Dietitician consult for FTT and weightloss of unknown cause. Will require outpt work-up.     INTERVAL   The patient had no acute overnight events. Pt states she hopes to go home soon.  Denies any pain at this time.   LE edema and chronic skin changes that is non-tender to palpation.  Last BM was prior to admission; drinking miralax today.   Discussed that we will be restarting home medications.   Restarting warfarin and PTA meds, EP consult.    OBJECTIVE  Vital Last Value 24 Hour Range   Temperature 97.3 °F (36.3 °C) (12/23/23 0653) Temp  Min: 97.3 °F (36.3 °C)  Max: 98.4 °F (36.9 °C)   Pulse 74 (12/23/23 0653) Pulse  Min: 60  Max: 81   Respiratory 18 (12/23/23 0653) Resp  Min: 13  Max: 28   Non-Invasive  Blood Pressure 126/82 (12/23/23 0653) BP  Min: 83/53  Max: 126/82   Arterial   Blood Pressure   No data recorded   Pulse Oximetry 96 % (12/23/23 0653) SpO2  Min: 92 %  Max: 100 %     Height/Weight Today Admitted   Weight 54 kg (119  Xray bedside    lb) (12/23/23 0653) Weight: 53.1 kg (117 lb) (12/21/23 2139)   Height N/A Height: 5' 4\" (162.6 cm) (12/21/23 2139)   BMI N/A BMI (Calculated): 20.08 (12/21/23 2139)     Intake/Output  Last Stool Occurrence: 1 (12/22/23 1430)    Intake/Output Summary (Last 24 hours) at 12/23/2023 1028  Last data filed at 12/23/2023 0953  Gross per 24 hour   Intake 722 ml   Output 251 ml   Net 471 ml     I/O last 3 completed shifts:  In: 602 [P.O.:600; I.V.:2]  Out: 1 [Urine:1]  I/O this shift:  In: 240 [P.O.:240]  Out: 250 [Urine:250]    PHYSICAL EXAM  General: Alert, cooperative, conversive.  Skin: Cool to touch. Hematomas and superficial petichiae over bilat antecubital fossas and forearms. Chronic, dusky skin changes of LE bilaterally. Superficial nodules behind L shoulder; non-erythematous and solid.  Respiratory:  Bilaterally clear to auscultation. No SOB at rest or with conversation. No suppl O2.   Cardiovascular: Regular rate and rhythm and S1, S2 present. Heart sounds of mechanical valve.  Abdomen: Abdomen: soft, non-tender, distended.  Extremities and Spine: 2+ edema bilaterally with no pain to palpation up to mid-shin bilaterally. Pedal pulses not appreciated.  Back: Kyphosis  Neuro:  Alert, oriented x4.  Speech intact.  No dysphasia or dysarthria.  Psych:  Affect is  pleasant.    LABORATORY RESULTS     Recent Labs   Lab 12/23/23  0519 12/22/23  0604 12/22/23  0110 12/21/23  2331 12/21/23  1617   WBC 7.1 7.8 9.1 9.1 11.0   RBC 2.90* 2.66* 2.70* 2.67* 3.50*   HGB 9.3* 8.5* 8.7* 8.7* 11.1*   HCT 29.1* 26.6* 27.0* 26.5* 35.2*   .3* 100.0 100.0 99.3 100.6*    211 225 225 285       Recent Labs   Lab 12/23/23  0519 12/22/23  0604 12/21/23  1617 12/12/23  1105 12/11/23  1518 12/11/23  1200 12/11/23  0329 12/06/23  0430 12/05/23  0801 12/04/23  0418 12/03/23  0551 12/02/23  0413 12/01/23  0438 11/30/23  0404 11/29/23  1149 11/29/23  1148   Glucose 102* 118* 130* 151*  --   --  125*   < > 112*   < > 94 138* 93 188*  --   145*   Sodium 141 142 141 140  --   --  138   < > 145   < > 141 141 140 141  --  140   Potassium 3.7 4.0 4.1 4.5 3.9   < > 3.1*   < > 4.1   < > 4.1 4.0 4.0 4.1  --  4.5   Chloride 106 109 108 103  --   --  98   < > 109   < > 109 108 108 110  --  106   BUN 26* 24* 28* 32*  --   --  27*   < > 18   < > 17 17 20 20  --  25*   Creatinine 0.92 0.84 0.95 0.99*  --   --  0.88   < > 0.90   < > 0.76 0.84 0.86 0.87   < > 0.97*   Calcium 8.7 8.4 9.0 9.3  --   --  8.6   < > 8.8   < > 8.7 8.8 9.0 8.7  --  9.4   Protein, Total 5.7*  --  7.0 6.6  --   --   --   --  6.0*  --  6.2* 5.9* 6.2* 5.9*  --  7.2   Albumin 2.9*  --  3.4* 3.1*  --   --   --   --  2.8*  --  2.9* 2.8* 3.0* 2.9*  --  3.6   GOT/AST 26  --  35 42*  --   --   --   --  26  --  25 27 29 30  --  43*   Alkaline Phosphatase 89  --  134* 122*  --   --   --   --  105  --  108 104 113 103  --  136*   GPT 27  --  45 54  --   --   --   --  25  --  29 28 32 32  --  40   Globulin 2.8  --   --  3.5  --   --   --   --  3.2  --  3.3 3.1 3.2 3.0  --  3.6   Magnesium 1.9  --   --   --   --   --   --   --   --   --   --  1.9 2.0 2.0  --  2.2    < > = values in this interval not displayed.       No results found    EKG (Most recent)  Results for orders placed or performed during the hospital encounter of 12/12/23   Electrocardiogram 12-Lead   Result Value Ref Range    Systolic Blood Pressure 120     Diastolic Blood Pressure 83     Ventricular Rate EKG/Min (BPM) 62     Atrial Rate (BPM) 74     QRS-Interval (MSEC) 148     QT-Interval (MSEC) 428     QTc 435     R Axis (Degrees) 14     T Axis (Degrees) 122     REPORT TEXT       Wide QRS rhythm  with frequent  ventricular-paced complexes  Left bundle branch block  Abnormal ECG  When compared with ECG of  08-DEC-2023 04:55,  Vent. rate  has decreased  BY  20 BPM  Confirmed by DAKOTA GONZALES M.D. ([id number]),  Princess Judith ([id number]) on 12/12/2023 6:17:52 PM         IMAGING  XR CHEST PA OR AP 1 VIEW   Final Result   IMPRESSION:   Mild  diffuse interstitial coarsening, which may reflect interstitial edema   and/or an atypical infectious/inflammatory process. Findings are similar to   minimally progressed from 2 weeks ago.      Electronically Signed by: Deven Cruz DO   Signed on: 12/21/2023 6:53 PM   Created on Workstation ID: [id number]   Signed on Workstation ID: [id number]        Stroke measures indicated? no  AMI? No  DVT/VTE Prophylaxis:  VTE Pharmacologic Prophylaxis: Yes  VTE Mechanical Prophylaxis: Yes      ASSESSMENT AND PLAN  Oriana Soriano is a 76 year old female who was admitted on 12/21/2023. Working diagnosis is  supratherapeutic INR with no concern for active bleeding and hypothermia.   We plan to proceed as follows (by problem).    #Supratherapeutic INR - resolved; now therapeutic  # s/p mechanical MVR, AVR (2000; on coumadin)  Self-administers coumadin from bubble pack; no recent changes to diet, administration.  This is the second admission with supratherapeutic INR. Could be d/t PTA amiodarone (started Oct 2023).   Received PO Vit K; now within goal (INR 2-3.0)  - consulting EP for recs on other antiarrhythmics to replace amiodarone  - no concern for bleeding; will continue to monitor with daily CBC  - Restart warfarin; will bridge with heparin. consulting pharm for warfarin admin    #afib with RVR - resolved  #PMH Afib  #PMH tachy-carlyn syndrome s/p ppm placement (2003)  On PTA metoprolol XL 50mg daily, amiodarone (Starting 10/2023)  - Continue PTA meds  - EP consult for medication recs per above given INR  - On tele    #Macrocytic anemia  Has pmh of chronic/iron deficiency anemia. Takes Fe suppl.  MCV >100 in the last month. No PMH of macrocytosis. No neurologic deficits.  - B12, folate pending    #HFpEF (EF 57% in 11/2023); not in exacerbation  #Moderate Pulm HTN  On PTA toresmide 10mg daily; RVSP = 62 mmHg as seen on most recent echo.  Appears to have chronic LE edema, venous stasis changes.  - restarting PTA Torsemide; AM  CMP.    #Unexplained Weight Loss  #Failure to Thrive  Has been losing weight; no change to diet.  - Last colonoscopy 2012; +benign polyp. May consult GI outpt for f/u colonoscopy in setting of anemia.  - Last mammogram in 2019; negative.  - PMH tobacco smoker; quit 2000. Smoked >20 years.  - CT chest w/o contrast during last hospitalization (12/06) shows dense areas of consolidation in the lower lobes with some endobronchial debris in the medial right lower lobe; unable to identify if there are any lesions. May require outpatient f/u imaging.  - nothing in chart about cervical cancer screening  - dietician consulted; ensure with meals.    #Hypothermia - resolved  94.2F on admission; this is the second admission with hypothermia in the last month.  Pt lives at home and cares for self independently.   Treated with external reheating measures.    #Constipation  Last BM prior to admission. Last colonoscopy per above.  - miralax daily  - encourage PO intake for hydration    #hypothyroid  Resume PTA synthroid    #DVT Prophylaxis  -Lovenox  #Nutrition  -Regular diet with ensure supplements       Discharge Planning    Barriers to discharge: will require reinitiation of AC and EP consult  Anticipated discharge destination: Home  Expected Discharge Date: TBD        Signed,  Migdalia Bravo D.O.  Internal Medicine, PGY-1  Pager #[phone]     The patient's history and physical were discussed with Nette Loera MD, who upon seeing the patient agreed with the above assessment and plan.    Please contact the cross cover pager ([place] - , [facility] - [phone] / ) for questions between:  Mon-Fri: 7p-7a  Sat - Sun: 11a-7a

## 2024-01-05 ENCOUNTER — HOSPITAL ENCOUNTER (OUTPATIENT)
Dept: RADIOLOGY | Facility: MEDICAL CENTER | Age: 79
End: 2024-01-05
Attending: UROLOGY
Payer: MEDICARE

## 2024-01-05 DIAGNOSIS — C67.9 MALIGNANT NEOPLASM OF BLADDER WALL (HCC): ICD-10-CM

## 2024-01-05 PROCEDURE — 700117 HCHG RX CONTRAST REV CODE 255: Performed by: UROLOGY

## 2024-01-05 PROCEDURE — 74178 CT ABD&PLV WO CNTR FLWD CNTR: CPT

## 2024-01-05 PROCEDURE — 71260 CT THORAX DX C+: CPT

## 2024-01-05 RX ADMIN — IOHEXOL 100 ML: 350 INJECTION, SOLUTION INTRAVENOUS at 10:59

## 2024-01-08 ENCOUNTER — TELEPHONE (OUTPATIENT)
Dept: HEALTH INFORMATION MANAGEMENT | Facility: OTHER | Age: 79
End: 2024-01-08
Payer: MEDICARE

## 2024-01-08 RX ORDER — SILDENAFIL 25 MG/1
12.5 TABLET, FILM COATED ORAL
Qty: 10 TABLET | Refills: 5 | Status: SHIPPED | OUTPATIENT
Start: 2024-01-08

## 2024-01-09 ENCOUNTER — HOSPITAL ENCOUNTER (OUTPATIENT)
Dept: CARDIOLOGY | Facility: MEDICAL CENTER | Age: 79
End: 2024-01-09
Attending: STUDENT IN AN ORGANIZED HEALTH CARE EDUCATION/TRAINING PROGRAM
Payer: MEDICARE

## 2024-01-09 ENCOUNTER — PATIENT OUTREACH (OUTPATIENT)
Dept: ONCOLOGY | Facility: MEDICAL CENTER | Age: 79
End: 2024-01-09

## 2024-01-09 ENCOUNTER — PATIENT MESSAGE (OUTPATIENT)
Dept: ONCOLOGY | Facility: MEDICAL CENTER | Age: 79
End: 2024-01-09

## 2024-01-09 ENCOUNTER — HOSPITAL ENCOUNTER (OUTPATIENT)
Dept: HEMATOLOGY ONCOLOGY | Facility: MEDICAL CENTER | Age: 79
End: 2024-01-09
Attending: STUDENT IN AN ORGANIZED HEALTH CARE EDUCATION/TRAINING PROGRAM
Payer: MEDICARE

## 2024-01-09 VITALS
WEIGHT: 173.83 LBS | BODY MASS INDEX: 26.35 KG/M2 | HEART RATE: 83 BPM | HEIGHT: 68 IN | DIASTOLIC BLOOD PRESSURE: 58 MMHG | OXYGEN SATURATION: 96 % | SYSTOLIC BLOOD PRESSURE: 118 MMHG | TEMPERATURE: 99.1 F

## 2024-01-09 DIAGNOSIS — D49.4 BLADDER TUMOR: ICD-10-CM

## 2024-01-09 LAB
LV EJECT FRACT  99904: 65
LV EJECT FRACT MOD 2C 99903: 63.3
LV EJECT FRACT MOD 4C 99902: 59.06
LV EJECT FRACT MOD BP 99901: 62.11

## 2024-01-09 PROCEDURE — 99212 OFFICE O/P EST SF 10 MIN: CPT | Performed by: STUDENT IN AN ORGANIZED HEALTH CARE EDUCATION/TRAINING PROGRAM

## 2024-01-09 PROCEDURE — 93306 TTE W/DOPPLER COMPLETE: CPT | Mod: 26 | Performed by: INTERNAL MEDICINE

## 2024-01-09 PROCEDURE — 93306 TTE W/DOPPLER COMPLETE: CPT

## 2024-01-09 PROCEDURE — 99205 OFFICE O/P NEW HI 60 MIN: CPT | Performed by: STUDENT IN AN ORGANIZED HEALTH CARE EDUCATION/TRAINING PROGRAM

## 2024-01-09 RX ORDER — 0.9 % SODIUM CHLORIDE 0.9 %
VIAL (ML) INJECTION PRN
Status: CANCELLED | OUTPATIENT
Start: 2024-01-28

## 2024-01-09 RX ORDER — ONDANSETRON 8 MG/1
8 TABLET, ORALLY DISINTEGRATING ORAL PRN
Status: CANCELLED | OUTPATIENT
Start: 2024-01-29

## 2024-01-09 RX ORDER — 0.9 % SODIUM CHLORIDE 0.9 %
10 VIAL (ML) INJECTION PRN
Status: CANCELLED | OUTPATIENT
Start: 2024-01-28

## 2024-01-09 RX ORDER — 0.9 % SODIUM CHLORIDE 0.9 %
3 VIAL (ML) INJECTION PRN
Status: CANCELLED | OUTPATIENT
Start: 2024-01-28

## 2024-01-09 RX ORDER — SODIUM CHLORIDE 9 MG/ML
INJECTION, SOLUTION INTRAVENOUS CONTINUOUS
Status: CANCELLED | OUTPATIENT
Start: 2024-01-29

## 2024-01-09 RX ORDER — 0.9 % SODIUM CHLORIDE 0.9 %
3 VIAL (ML) INJECTION PRN
Status: CANCELLED | OUTPATIENT
Start: 2024-01-29

## 2024-01-09 RX ORDER — DIPHENHYDRAMINE HYDROCHLORIDE 50 MG/ML
50 INJECTION INTRAMUSCULAR; INTRAVENOUS PRN
Status: CANCELLED | OUTPATIENT
Start: 2024-01-29

## 2024-01-09 RX ORDER — 0.9 % SODIUM CHLORIDE 0.9 %
10 VIAL (ML) INJECTION PRN
Status: CANCELLED | OUTPATIENT
Start: 2024-01-29

## 2024-01-09 RX ORDER — SODIUM CHLORIDE 9 MG/ML
1000 INJECTION, SOLUTION INTRAVENOUS ONCE
Status: CANCELLED | OUTPATIENT
Start: 2024-01-29

## 2024-01-09 RX ORDER — ONDANSETRON 2 MG/ML
4 INJECTION INTRAMUSCULAR; INTRAVENOUS PRN
Status: CANCELLED | OUTPATIENT
Start: 2024-01-29

## 2024-01-09 RX ORDER — 0.9 % SODIUM CHLORIDE 0.9 %
VIAL (ML) INJECTION PRN
Status: CANCELLED | OUTPATIENT
Start: 2024-01-29

## 2024-01-09 RX ORDER — METHYLPREDNISOLONE SODIUM SUCCINATE 125 MG/2ML
125 INJECTION, POWDER, LYOPHILIZED, FOR SOLUTION INTRAMUSCULAR; INTRAVENOUS PRN
Status: CANCELLED | OUTPATIENT
Start: 2024-01-29

## 2024-01-09 RX ORDER — PROCHLORPERAZINE MALEATE 10 MG
10 TABLET ORAL EVERY 6 HOURS PRN
Status: CANCELLED | OUTPATIENT
Start: 2024-01-29

## 2024-01-09 RX ORDER — EPINEPHRINE 1 MG/ML(1)
0.5 AMPUL (ML) INJECTION PRN
Status: CANCELLED | OUTPATIENT
Start: 2024-01-29

## 2024-01-09 ASSESSMENT — ENCOUNTER SYMPTOMS
SENSORY CHANGE: 0
BLURRED VISION: 0
NAUSEA: 0
FEVER: 0
HEARTBURN: 0
BRUISES/BLEEDS EASILY: 0
WEIGHT LOSS: 0
WHEEZING: 0
PALPITATIONS: 0
FLANK PAIN: 1
ORTHOPNEA: 0
DEPRESSION: 0
SORE THROAT: 0
NECK PAIN: 0
CHILLS: 0
MEMORY LOSS: 0
SPUTUM PRODUCTION: 0
ABDOMINAL PAIN: 0
SHORTNESS OF BREATH: 0
VOMITING: 0
TINGLING: 0
FOCAL WEAKNESS: 0
COUGH: 0
DIZZINESS: 0
TREMORS: 0
HEADACHES: 0

## 2024-01-09 ASSESSMENT — FIBROSIS 4 INDEX: FIB4 SCORE: 1.99

## 2024-01-09 NOTE — PROGRESS NOTES
Consult Note: Hematology/Oncology     Referring Provider:Agustín Cardoso MD  Primary Care:  Shakira Henriquez M.D.    Chief Complaint   Patient presents with    New Patient     Malignant neoplasm of bladder        Current Treatment: None    Prior Treatment: None    Subjective:   History of Presenting Illness:  Ronal Hair is a 78 y.o. male with a past medical history of lymphoma who presents with a new diagnosis of T2N0 bladder cancer.    Patient reports that he has been struggling for months.  He notes that he has been having frequent urination for months.  He had a CT scan which did not reveal any evidence of stone in the setting of his pain.  He saw urologist and underwent a cystoscopy which revealed a mass in the bladder.  Patient was admitted for a TURBT on December 13.    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  He also has perineural invasion present.    Patient is here to discuss neoadjuvant chemotherapy.    He also works as a  for disabled individuals.  He reports that he can no longer work as he has to urinate every 15 minutes and has frequency and urgency.        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    High cholesterol     Hypertension     Infectious disease     Lymphoma (HCC) 01/22/2016    Nephrolithiasis 01/06/2023    Osteomyelitis of left foot (HCC) 11/11/2020    Pain of toe 11/03/2020    Reactive airway disease without complication 03/18/2022    Reactive airway disease without complication 03/18/2022    Scoliosis     Snoring         Past Surgical History:   Procedure Laterality Date    TURBT (TRANSURETHRAL RESECTION OF  BLADDER TUMOR)  12/13/2023    Procedure: BIPOLAR TRANSURETHRAL RESECTION OF BLADDER TUMOR;  Surgeon: Addison Seymour M.D.;  Location: SURGERY [facility];  Service: Urology    CO CYSTOSCOPY,INSERT URETERAL STENT Left 01/07/2023    Procedure: CYSTOSCOPY, WITH URETERAL STENT INSERTION;  Surgeon: Addison Seymour M.D.;  Location: SURGERY [facility];  Service: Urology    CO CYSTO/URETERO/PYELOSCOPY, DX Left 01/07/2023    Procedure: URETEROSCOPY;  Surgeon: Addison Seymour M.D.;  Location: SURGERY [facility];  Service: Urology    LASERTRIPSY Left 01/07/2023    Procedure: LITHOTRIPSY, USING LASER;  Surgeon: Addison Seymour M.D.;  Location: SURGERY [facility];  Service: Urology    AORTOBIFEM BYPASS  11/08/2020    Procedure: CREATION, BYPASS, ARTERIAL, AORTA TO FEMORAL, BILATERAL;  Surgeon: Jimi Morrison M.D.;  Location: SURGERY [facility];  Service: General    CATARACT EXTRACTION WITH IOL Bilateral     OTHER ABDOMINAL SURGERY      OTHER ORTHOPEDIC SURGERY         Social History     Tobacco Use    Smoking status: Every Day     Current packs/day: 0.00     Average packs/day: 2.0 packs/day for 54.8 years (109.7 ttl pk-yrs)     Types: Cigarettes     Start date: 1/1/1966     Last attempt to quit: 11/3/2020     Years since quitting: 3.1    Smokeless tobacco: Never   Vaping Use    Vaping Use: Never used   Substance Use Topics    Alcohol use: Not Currently    Drug use: Never        Family History   Problem Relation Age of Onset    Cancer Maternal Aunt         melanoma    Cancer Maternal Uncle         Throat cancer       No Known Allergies    Current Outpatient Medications   Medication Sig Dispense Refill    sildenafil citrate (VIAGRA) 25 MG Tab Take 0.5 Tablets by mouth 1 time a day as needed for Erectile Dysfunction. 12.5 mg = 1/2 tablet 10 Tablet 5    ibuprofen (MOTRIN) 200 MG Tab Take 800 mg by mouth every 6 hours as needed for Inflammation.      tamsulosin (FLOMAX) 0.4 MG capsule Take 0.8 mg by mouth 1/2 hour  "after breakfast.      senna-docusate (PERICOLACE OR SENOKOT S) 8.6-50 MG Tab Take 1 Tablet by mouth every day. 30 Tablet 0    finasteride (PROSCAR) 5 MG Tab Take 5 mg by mouth every day.      tiotropium (SPIRIVA) 18 MCG Cap Inhale 1 puff by mouth once daily (Patient taking differently: Place 18 mcg into inhaler and inhale every day.) 30 Capsule 11    atorvastatin (LIPITOR) 40 MG Tab Take 1 Tablet by mouth every evening. 90 Tablet 3    Multiple Vitamins-Minerals (MULTIVITAMIN ADULTS 50+ PO) Take 1 Tablet by mouth every day.      oxybutynin SR (DITROPAN-XL) 5 MG TABLET SR 24 HR Take 1 Tablet by mouth every day. 30 Tablet 0     No current facility-administered medications for this encounter.       Review of Systems   Constitutional:  Negative for chills, fever, malaise/fatigue and weight loss.   HENT:  Negative for congestion, ear pain, nosebleeds and sore throat.    Eyes:  Negative for blurred vision.   Respiratory:  Negative for cough, sputum production, shortness of breath and wheezing.    Cardiovascular:  Negative for chest pain, palpitations, orthopnea and leg swelling.   Gastrointestinal:  Negative for abdominal pain, heartburn, nausea and vomiting.   Genitourinary:  Positive for flank pain, frequency and urgency. Negative for dysuria.   Musculoskeletal:  Negative for neck pain.   Neurological:  Negative for dizziness, tingling, tremors, sensory change, focal weakness and headaches.   Endo/Heme/Allergies:  Does not bruise/bleed easily.   Psychiatric/Behavioral:  Negative for depression, memory loss and suicidal ideas.    All other systems reviewed and are negative.      Problem list, medications, and allergies reviewed by myself today in Epic.     Objective:     Vitals:    01/09/24 0850   BP: 118/58   BP Location: Right arm   Patient Position: Sitting   BP Cuff Size: Adult   Pulse: 83   Temp: 37.3 °C (99.1 °F)   TempSrc: Temporal   SpO2: 96%   Weight: 78.8 kg (173 lb 13.3 oz)   Height: 1.727 m (5' 7.99\") "       DESC; KARNOFSKY SCALE WITH ECOG EQUIVALENT: 80, Normal activity with effort; some signs or symptoms of disease (ECOG equivalent 1)    DISTRESS LEVEL: no apparent distress    Physical Exam  Constitutional:       General: He is not in acute distress.     Appearance: Normal appearance.   HENT:      Head: Normocephalic and atraumatic.      Nose: Nose normal. No congestion.      Mouth/Throat:      Mouth: Mucous membranes are moist.      Pharynx: Oropharynx is clear.   Eyes:      General: No scleral icterus.     Conjunctiva/sclera: Conjunctivae normal.      Pupils: Pupils are equal, round, and reactive to light.   Cardiovascular:      Rate and Rhythm: Normal rate and regular rhythm.      Pulses: Normal pulses.      Heart sounds: No murmur heard.     No friction rub.   Pulmonary:      Effort: Pulmonary effort is normal. No respiratory distress.      Breath sounds: Normal breath sounds. No stridor. No wheezing or rales.   Chest:      Chest wall: No tenderness.   Abdominal:      General: Abdomen is flat. Bowel sounds are normal. There is no distension.      Palpations: Abdomen is soft. There is no mass.      Tenderness: There is no abdominal tenderness. There is no guarding or rebound.   Musculoskeletal:         General: No swelling, tenderness or deformity. Normal range of motion.      Cervical back: Normal range of motion and neck supple. No rigidity or tenderness.      Right lower leg: No edema.      Left lower leg: No edema.   Skin:     General: Skin is warm.      Coloration: Skin is not jaundiced or pale.      Findings: No bruising or rash.   Neurological:      General: No focal deficit present.      Mental Status: He is alert and oriented to person, place, and time. Mental status is at baseline.      Motor: No weakness.   Psychiatric:         Mood and Affect: Mood normal.         Behavior: Behavior normal.         Thought Content: Thought content normal.         Judgment: Judgment normal.         Labs:   Most  recent labs reviewed.      Slightly anemic, no issues with Cr.      Imaging:   Most recent images below have been independently reviewed by me.     CT Chest  1. No evidence of metastatic disease.  2. No acute process.       CT AP  1. Severe irregular bladder wall thickening, consistent with bladder cancer. No regional adenopathy or distant metastatic disease identified.  2. 4 mm distal right ureteral stone.  3. Single bilateral nonobstructing renal stones measuring 2 mm.  4. Multiple subcentimeter simple renal cysts, which do not require follow-up.  5. Prostate is enlarged, 5.1 cm in diameter.    Pathology:  A. Bladder tumor:          High-grade urothelial carcinoma with focal squamous           differentiation, invasive into muscularis propria (pT2).          Perineural invasion present.       Assessment/Plan:     Cancer Staging   No matching staging information was found for the patient.     Mr. Hair 79 yo M with a new diagnosis of of high grade urothelial carcinoma with focal squamous differentiation, pT2 N0 who presents today to establish care.    We discussed that at minimum he has stage II disease.  Bone scan is indicated if he has any bone pain which he does not.     We then moved on to discuss neoadjuvant cisplatin based combination chemotherapy followed by radical cystectomy which is category 1 and the NCCN guidelines.     Discussed that treatment would consist of dose dense methotrexate, vinblastine, doxorubicin and cisplatin.  Growth factor support for cycle 3 onwards.  Her most recent estimated GFR was 95 which makes him a good candidate for cisplatin.     We discussed that the side effects of cisplatin include but are not limited to blood in stools and urine; burning, numbness and tingling; change in frequency of urination; cough or hoarseness; shortness of breath; dizziness; drowsiness; ringing in the eats; loss of appetite; loss of balance; loss of hearing; back/side pain; NVD; weakness.     We  discussed that for methotrexate renal function should be monitored prior to each cycle as well as liver function.  We discussed that this agent can cause skin toxicities and she will be evaluated prior to initiation of each treatment cycle.  For vinblastine neurotoxicity should be monitored prior to each cycle for potential dose modification or discontinuation.  This agent can cause significant peripheral neuropathy and she will be monitored closely for altered sensation.  Liver function should also be monitored     Doxorubicin can affect the ejection fraction and should be monitored prior to the initiation of treatment with an echocardiogram.     Plan  -chemo education  -echo to be done prior to treatment  -start ddMVAC   -will need port  -will see patient 1 week after treatment     Chemotherapy regimen  14-day cycles for 3-6 cycles methotrexate 30 mg/m² IV push on day 1  Vinblastine 3 mg/m² IV over 5 to 10 minutes on days 1  Doxorubicin 30 mg/m² IV push on day 1  Cisplatin 70 mg/m² over 2 hours on day 1      No follow-ups on file.     Any questions and concerns raised by the patient were addressed and answered. Patient denies any further questions.  Patient encouraged to call the office with any concerns or issues.     Judi Osborn M.D.  Hematology/Oncology

## 2024-01-09 NOTE — ADDENDUM NOTE
Encounter addended by: Jose Carlos Gould on: 1/9/2024 10:26 AM   Actions taken: Charge Capture section accepted

## 2024-01-10 ENCOUNTER — PATIENT OUTREACH (OUTPATIENT)
Dept: ONCOLOGY | Facility: MEDICAL CENTER | Age: 79
End: 2024-01-10
Payer: MEDICARE

## 2024-01-10 ENCOUNTER — APPOINTMENT (OUTPATIENT)
Dept: ADMISSIONS | Facility: MEDICAL CENTER | Age: 79
End: 2024-01-10
Attending: STUDENT IN AN ORGANIZED HEALTH CARE EDUCATION/TRAINING PROGRAM
Payer: MEDICARE

## 2024-01-12 ENCOUNTER — PRE-ADMISSION TESTING (OUTPATIENT)
Dept: ADMISSIONS | Facility: MEDICAL CENTER | Age: 79
End: 2024-01-12
Attending: STUDENT IN AN ORGANIZED HEALTH CARE EDUCATION/TRAINING PROGRAM
Payer: MEDICARE

## 2024-01-12 RX ORDER — OXYCODONE HYDROCHLORIDE 5 MG/1
5 TABLET ORAL
COMMUNITY
Start: 2024-01-08

## 2024-01-12 RX ORDER — MELOXICAM 15 MG/1
15 TABLET ORAL DAILY
COMMUNITY
Start: 2024-01-03 | End: 2024-01-29

## 2024-01-15 ENCOUNTER — HOSPITAL ENCOUNTER (OUTPATIENT)
Dept: HEMATOLOGY ONCOLOGY | Facility: MEDICAL CENTER | Age: 79
End: 2024-01-15
Attending: STUDENT IN AN ORGANIZED HEALTH CARE EDUCATION/TRAINING PROGRAM
Payer: MEDICARE

## 2024-01-15 VITALS
DIASTOLIC BLOOD PRESSURE: 60 MMHG | BODY MASS INDEX: 26.15 KG/M2 | TEMPERATURE: 98.2 F | HEART RATE: 71 BPM | WEIGHT: 172.51 LBS | SYSTOLIC BLOOD PRESSURE: 126 MMHG | OXYGEN SATURATION: 98 % | HEIGHT: 68 IN

## 2024-01-15 DIAGNOSIS — D49.4 BLADDER TUMOR: ICD-10-CM

## 2024-01-15 DIAGNOSIS — Z79.899 ENCOUNTER FOR LONG-TERM (CURRENT) USE OF HIGH-RISK MEDICATION: ICD-10-CM

## 2024-01-15 PROCEDURE — 99212 OFFICE O/P EST SF 10 MIN: CPT | Performed by: STUDENT IN AN ORGANIZED HEALTH CARE EDUCATION/TRAINING PROGRAM

## 2024-01-15 PROCEDURE — 99214 OFFICE O/P EST MOD 30 MIN: CPT | Performed by: STUDENT IN AN ORGANIZED HEALTH CARE EDUCATION/TRAINING PROGRAM

## 2024-01-15 ASSESSMENT — ENCOUNTER SYMPTOMS
MEMORY LOSS: 0
WHEEZING: 0
DEPRESSION: 0
FOCAL WEAKNESS: 0
ORTHOPNEA: 0
HEADACHES: 0
WEIGHT LOSS: 0
FLANK PAIN: 1
BRUISES/BLEEDS EASILY: 0
VOMITING: 0
PALPITATIONS: 0
TINGLING: 0
CHILLS: 0
COUGH: 0
DIZZINESS: 0
FEVER: 0
SORE THROAT: 0
SPUTUM PRODUCTION: 0
TREMORS: 0
ABDOMINAL PAIN: 0
NECK PAIN: 0
BLURRED VISION: 0
NAUSEA: 0
HEARTBURN: 0
SENSORY CHANGE: 0
SHORTNESS OF BREATH: 0

## 2024-01-15 ASSESSMENT — FIBROSIS 4 INDEX: FIB4 SCORE: 1.99

## 2024-01-15 NOTE — ADDENDUM NOTE
Encounter addended by: Jose Carlos Gould on: 1/15/2024 3:12 PM   Actions taken: Charge Capture section accepted

## 2024-01-15 NOTE — PROGRESS NOTES
Consult Note: Hematology/Oncology     Referring Provider:Agustín Cardoso MD  Primary Care:  Shakira Henriquez M.D.    Chief Complaint   Patient presents with    Cancer     1 week follow up        Current Treatment: None    Prior Treatment: None    Subjective:   History of Presenting Illness:  Ronal Hair is a 78 y.o. male with a past medical history of lymphoma who presents with a new diagnosis of T2N0 bladder cancer.    Patient reports that he has been struggling for months.  He notes that he has been having frequent urination for months.  He had a CT scan which did not reveal any evidence of stone in the setting of his pain.  He saw urologist and underwent a cystoscopy which revealed a mass in the bladder.  Patient was admitted for a TURBT on December 13.    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  He also has perineural invasion present.    Patient is here to discuss neoadjuvant chemotherapy.    He also works as a  for disabled individuals.  He reports that he can no longer work as he has to urinate every 15 minutes and has frequency and urgency.    Interval History    He is here to discuss treatment.   He has port scheduled to be placed on 1/26/24    Echo completed on 1/9/24, and EF was 65%.   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    High cholesterol     Hypertension 01/12/2024    pt denies    Infectious disease     Lymphoma ([facility]) 01/22/2016    Nephrolithiasis  01/06/2023    Osteomyelitis of left foot (HCC) 11/11/2020    Pain of toe 11/03/2020    Psychiatric problem 01/12/2024    anxiety related to diagnosis    Reactive airway disease without complication 03/18/2022    Reactive airway disease without complication 03/18/2022    Scoliosis     Snoring     Urinary incontinence 01/12/2024    wears depends when away from home        Past Surgical History:   Procedure Laterality Date    TURBT (TRANSURETHRAL RESECTION OF BLADDER TUMOR)  12/13/2023    Procedure: BIPOLAR TRANSURETHRAL RESECTION OF BLADDER TUMOR;  Surgeon: Addison Seymour M.D.;  Location: SURGERY [facility];  Service: Urology    WA CYSTOSCOPY,INSERT URETERAL STENT Left 01/07/2023    Procedure: CYSTOSCOPY, WITH URETERAL STENT INSERTION;  Surgeon: Addison Seymour M.D.;  Location: SURGERY [facility];  Service: Urology    WA CYSTO/URETERO/PYELOSCOPY, DX Left 01/07/2023    Procedure: URETEROSCOPY;  Surgeon: Addison Seymour M.D.;  Location: SURGERY [facility];  Service: Urology    LASERTRIPSY Left 01/07/2023    Procedure: LITHOTRIPSY, USING LASER;  Surgeon: Addison Seymour M.D.;  Location: SURGERY [facility];  Service: Urology    AORTOBIFEM BYPASS  11/08/2020    Procedure: CREATION, BYPASS, ARTERIAL, AORTA TO FEMORAL, BILATERAL;  Surgeon: Jimi Morrison M.D.;  Location: SURGERY [facility];  Service: General    KNEE ARTHROSCOPY Left 1968    APPENDECTOMY CHILD      CATARACT EXTRACTION WITH IOL Bilateral     OTHER ABDOMINAL SURGERY      OTHER ORTHOPEDIC SURGERY      SHOULDER ARTHROSCOPY Left        Social History     Tobacco Use    Smoking status: Every Day     Current packs/day: 0.50     Average packs/day: 2.0 packs/day for 58.0 years (115.6 ttl pk-yrs)     Types: Cigarettes     Start date: 1/1/1966     Last attempt to quit: 11/3/2020    Smokeless tobacco: Never   Vaping Use    Vaping Use: Never used   Substance Use Topics    Alcohol use: Not Currently    Drug use: Never        Family History   Problem  Relation Age of Onset    Cancer Maternal Aunt         melanoma    Cancer Maternal Uncle         Throat cancer       No Known Allergies    Current Outpatient Medications   Medication Sig Dispense Refill    NON SPECIFIED Laxative daily      Acetaminophen (TYLENOL PO) Take  by mouth.      meloxicam (MOBIC) 15 MG tablet Take 15 mg by mouth every day.      oxyCODONE immediate-release (ROXICODONE) 5 MG Tab Take 5 mg by mouth at bedtime.      sildenafil citrate (VIAGRA) 25 MG Tab Take 0.5 Tablets by mouth 1 time a day as needed for Erectile Dysfunction. 12.5 mg = 1/2 tablet 10 Tablet 5    ibuprofen (MOTRIN) 200 MG Tab Take 800 mg by mouth every 6 hours as needed for Inflammation.      tamsulosin (FLOMAX) 0.4 MG capsule Take 0.8 mg by mouth 1/2 hour after breakfast.      senna-docusate (PERICOLACE OR SENOKOT S) 8.6-50 MG Tab Take 1 Tablet by mouth every day. 30 Tablet 0    finasteride (PROSCAR) 5 MG Tab Take 5 mg by mouth every day.      tiotropium (SPIRIVA) 18 MCG Cap Inhale 1 puff by mouth once daily (Patient taking differently: Place 18 mcg into inhaler and inhale as needed (shortness or breath). Uses every 2-3 days) 30 Capsule 11    atorvastatin (LIPITOR) 40 MG Tab Take 1 Tablet by mouth every evening. 90 Tablet 3    Multiple Vitamins-Minerals (MULTIVITAMIN ADULTS 50+ PO) Take 1 Tablet by mouth every day. (Patient not taking: Reported on 1/12/2024)       No current facility-administered medications for this encounter.       Review of Systems   Constitutional:  Negative for chills, fever, malaise/fatigue and weight loss.   HENT:  Negative for congestion, ear pain, nosebleeds and sore throat.    Eyes:  Negative for blurred vision.   Respiratory:  Negative for cough, sputum production, shortness of breath and wheezing.    Cardiovascular:  Negative for chest pain, palpitations, orthopnea and leg swelling.   Gastrointestinal:  Negative for abdominal pain, heartburn, nausea and vomiting.   Genitourinary:  Positive for flank  "pain, frequency and urgency. Negative for dysuria.   Musculoskeletal:  Negative for neck pain.   Neurological:  Negative for dizziness, tingling, tremors, sensory change, focal weakness and headaches.   Endo/Heme/Allergies:  Does not bruise/bleed easily.   Psychiatric/Behavioral:  Negative for depression, memory loss and suicidal ideas.    All other systems reviewed and are negative.      Problem list, medications, and allergies reviewed by myself today in Epic.     Objective:     Vitals:    01/15/24 1441   BP: 126/60   BP Location: Right arm   Patient Position: Sitting   BP Cuff Size: Adult   Pulse: 71   Temp: 36.8 °C (98.2 °F)   TempSrc: Temporal   SpO2: 98%   Weight: 78.3 kg (172 lb 8.2 oz)   Height: 1.727 m (5' 7.99\")       DESC; KARNOFSKY SCALE WITH ECOG EQUIVALENT: 80, Normal activity with effort; some signs or symptoms of disease (ECOG equivalent 1)    DISTRESS LEVEL: no apparent distress    Physical Exam  Constitutional:       General: He is not in acute distress.     Appearance: Normal appearance.   HENT:      Head: Normocephalic and atraumatic.      Nose: Nose normal. No congestion.      Mouth/Throat:      Mouth: Mucous membranes are moist.      Pharynx: Oropharynx is clear.   Eyes:      General: No scleral icterus.     Conjunctiva/sclera: Conjunctivae normal.      Pupils: Pupils are equal, round, and reactive to light.   Cardiovascular:      Rate and Rhythm: Normal rate and regular rhythm.      Pulses: Normal pulses.      Heart sounds: No murmur heard.     No friction rub.   Pulmonary:      Effort: Pulmonary effort is normal. No respiratory distress.      Breath sounds: Normal breath sounds. No stridor. No wheezing or rales.   Chest:      Chest wall: No tenderness.   Abdominal:      General: Abdomen is flat. Bowel sounds are normal. There is no distension.      Palpations: Abdomen is soft. There is no mass.      Tenderness: There is no abdominal tenderness. There is no guarding or rebound. "   Musculoskeletal:         General: No swelling, tenderness or deformity. Normal range of motion.      Cervical back: Normal range of motion and neck supple. No rigidity or tenderness.      Right lower leg: No edema.      Left lower leg: No edema.   Skin:     General: Skin is warm.      Coloration: Skin is not jaundiced or pale.      Findings: No bruising or rash.   Neurological:      General: No focal deficit present.      Mental Status: He is alert and oriented to person, place, and time. Mental status is at baseline.      Motor: No weakness.   Psychiatric:         Mood and Affect: Mood normal.         Behavior: Behavior normal.         Thought Content: Thought content normal.         Judgment: Judgment normal.         Labs:   Most recent labs reviewed.      Slightly anemic, no issues with Cr.      Imaging:   Most recent images below have been independently reviewed by me.     CT Chest  1. No evidence of metastatic disease.  2. No acute process.       CT AP  1. Severe irregular bladder wall thickening, consistent with bladder cancer. No regional adenopathy or distant metastatic disease identified.  2. 4 mm distal right ureteral stone.  3. Single bilateral nonobstructing renal stones measuring 2 mm.  4. Multiple subcentimeter simple renal cysts, which do not require follow-up.  5. Prostate is enlarged, 5.1 cm in diameter.    Pathology:  A. Bladder tumor:          High-grade urothelial carcinoma with focal squamous           differentiation, invasive into muscularis propria (pT2).          Perineural invasion present.       Assessment/Plan:      Cancer Staging   No matching staging information was found for the patient.     Mr. Hair 79 yo M with a new diagnosis of of high grade urothelial carcinoma with focal squamous differentiation, pT2 N0 who presents today to establish care.    We discussed that at minimum he has stage II disease.  Bone scan is indicated if he has any bone pain which he does not.     We then  moved on to discuss neoadjuvant cisplatin based combination chemotherapy followed by radical cystectomy which is category 1 and the NCCN guidelines. We discussed the details of ddMVAC.      He is ready to start treatment     Plan  -chemo education to be done as well.  -start ddMVAC   -will need port 1/26  -chemo likely next Monday on 1/29  -will see patient 1 week after treatment     Chemotherapy regimen  14-day cycles for 3-6 cycles methotrexate 30 mg/m² IV push on day 1  Vinblastine 3 mg/m² IV over 5 to 10 minutes on days 1  Doxorubicin 30 mg/m² IV push on day 1  Cisplatin 70 mg/m² over 2 hours on day 1      No follow-ups on file.     Any questions and concerns raised by the patient were addressed and answered. Patient denies any further questions.  Patient encouraged to call the office with any concerns or issues.     Judi Osborn M.D.  Hematology/Oncology      33 minutes was spent on this visit

## 2024-01-17 ENCOUNTER — PATIENT MESSAGE (OUTPATIENT)
Dept: ONCOLOGY | Facility: MEDICAL CENTER | Age: 79
End: 2024-01-17
Payer: MEDICARE

## 2024-01-17 ENCOUNTER — PATIENT OUTREACH (OUTPATIENT)
Dept: ONCOLOGY | Facility: MEDICAL CENTER | Age: 79
End: 2024-01-17
Payer: MEDICARE

## 2024-01-17 NOTE — PROGRESS NOTES
Chart reviewed for POC. Pt has uncoming scheduled appts as follows: Chemo Education class with RN 1/23 @ 10:30 am. PAC placement 1/26 @ 1:00 pm. Pre-chemo lab draw 1/28 @ 10:30 am. C1D1 ddMVAC 1/29 @ 9:30 am. Also, of note there seems to be a Urology [facility] appt scheduled for 3/7/24 @ 12:30 pm and 1:15 pm. Message sent to patient to check in and be available for navigation needs.

## 2024-01-23 ENCOUNTER — HOSPITAL ENCOUNTER (OUTPATIENT)
Dept: HEMATOLOGY ONCOLOGY | Facility: MEDICAL CENTER | Age: 79
End: 2024-01-23
Attending: STUDENT IN AN ORGANIZED HEALTH CARE EDUCATION/TRAINING PROGRAM
Payer: MEDICARE

## 2024-01-23 DIAGNOSIS — D49.4 BLADDER TUMOR: ICD-10-CM

## 2024-01-23 RX ORDER — ONDANSETRON 4 MG/1
4 TABLET, FILM COATED ORAL EVERY 4 HOURS PRN
Qty: 30 TABLET | Refills: 6 | Status: SHIPPED | OUTPATIENT
Start: 2024-01-23

## 2024-01-23 RX ORDER — LIDOCAINE AND PRILOCAINE 25; 25 MG/G; MG/G
CREAM TOPICAL
Qty: 1 EACH | Refills: 3 | Status: SHIPPED | OUTPATIENT
Start: 2024-01-23

## 2024-01-23 RX ORDER — PROCHLORPERAZINE MALEATE 10 MG
10 TABLET ORAL EVERY 6 HOURS PRN
Qty: 30 TABLET | Refills: 6 | Status: SHIPPED | OUTPATIENT
Start: 2024-01-23

## 2024-01-23 NOTE — NON-PROVIDER
The following appointments, labs, and medications have been provided to the patient:  Line: Port, placement 1/26/24   ECHO: Completed 1/9/24  WBC Support (g-csf): Pegfilgrastim, ordered in treatment plan  Labs: CBC CMP Mag   Follow up appts: scheduled  Chemo/immunotherapy class: completed 1/23/24  Chemotherapy Regimen DD MVAC bladder  Nausea medication: zofran/compazine prescribed  Other treatment specific meds: NA  Pain medication: Per provider discretion  Nurse flor: Referred on 1/9/24 Established with Elizabeth  Dietician:  ENRRIQUE  SW: ENRRIQUE  FRA: Referred on 1/24/24    Additional info/teaching for immunotherapy patients:  If hospitalized, inform staff of immunotherapy treatment and have them contact oncologist - immunotherapy alert card provided.    Additional info/teaching for chemotherapy patients:  Neutropenia reminder card provided to patient      Additional teaching:  NCCN handouts      Patient was provided with drug specific handouts and [facility] side effects sheet. Patient verbalized that questions and concerns regarding treatment have been addressed. Consent to proceed with treatment was reviewed and signed during this visit.    Spent 60 minutes of continuous, non-interrupted, face-to-face patient contact in which greater than 50% of the visit was spent counseling and coordinating of care.

## 2024-01-26 ENCOUNTER — HOSPITAL ENCOUNTER (OUTPATIENT)
Facility: MEDICAL CENTER | Age: 79
End: 2024-01-26
Attending: STUDENT IN AN ORGANIZED HEALTH CARE EDUCATION/TRAINING PROGRAM | Admitting: STUDENT IN AN ORGANIZED HEALTH CARE EDUCATION/TRAINING PROGRAM
Payer: MEDICARE

## 2024-01-26 ENCOUNTER — APPOINTMENT (OUTPATIENT)
Dept: RADIOLOGY | Facility: MEDICAL CENTER | Age: 79
End: 2024-01-26
Attending: STUDENT IN AN ORGANIZED HEALTH CARE EDUCATION/TRAINING PROGRAM
Payer: MEDICARE

## 2024-01-26 VITALS
RESPIRATION RATE: 21 BRPM | BODY MASS INDEX: 26.68 KG/M2 | OXYGEN SATURATION: 98 % | HEIGHT: 67 IN | WEIGHT: 170 LBS | HEART RATE: 90 BPM | DIASTOLIC BLOOD PRESSURE: 68 MMHG | SYSTOLIC BLOOD PRESSURE: 141 MMHG

## 2024-01-26 DIAGNOSIS — D49.4 BLADDER TUMOR: ICD-10-CM

## 2024-01-26 LAB
INR PPP: 1.04 (ref 0.87–1.13)
PROTHROMBIN TIME: 13.7 SEC (ref 12–14.6)

## 2024-01-26 PROCEDURE — 36415 COLL VENOUS BLD VENIPUNCTURE: CPT

## 2024-01-26 PROCEDURE — 700111 HCHG RX REV CODE 636 W/ 250 OVERRIDE (IP): Performed by: RADIOLOGY

## 2024-01-26 PROCEDURE — 76937 US GUIDE VASCULAR ACCESS: CPT

## 2024-01-26 PROCEDURE — 85610 PROTHROMBIN TIME: CPT

## 2024-01-26 PROCEDURE — 700111 HCHG RX REV CODE 636 W/ 250 OVERRIDE (IP): Mod: JZ

## 2024-01-26 PROCEDURE — 700101 HCHG RX REV CODE 250

## 2024-01-26 PROCEDURE — 700105 HCHG RX REV CODE 258: Performed by: RADIOLOGY

## 2024-01-26 RX ORDER — OXYCODONE HYDROCHLORIDE 5 MG/1
5 TABLET ORAL
Status: DISCONTINUED | OUTPATIENT
Start: 2024-01-26 | End: 2024-01-26 | Stop reason: HOSPADM

## 2024-01-26 RX ORDER — MIDAZOLAM HYDROCHLORIDE 1 MG/ML
INJECTION INTRAMUSCULAR; INTRAVENOUS
Status: COMPLETED
Start: 2024-01-26 | End: 2024-01-26

## 2024-01-26 RX ORDER — SODIUM CHLORIDE 9 MG/ML
500 INJECTION, SOLUTION INTRAVENOUS
Status: DISCONTINUED | OUTPATIENT
Start: 2024-01-26 | End: 2024-01-26 | Stop reason: HOSPADM

## 2024-01-26 RX ORDER — LIDOCAINE HCL/EPINEPHRINE/PF 2%-1:200K
VIAL (ML) INJECTION
Status: COMPLETED
Start: 2024-01-26 | End: 2024-01-26

## 2024-01-26 RX ORDER — OXYCODONE HYDROCHLORIDE 5 MG/1
2.5 TABLET ORAL
Status: DISCONTINUED | OUTPATIENT
Start: 2024-01-26 | End: 2024-01-26 | Stop reason: HOSPADM

## 2024-01-26 RX ORDER — CEFAZOLIN SODIUM 1 G/3ML
INJECTION, POWDER, FOR SOLUTION INTRAMUSCULAR; INTRAVENOUS
Status: COMPLETED
Start: 2024-01-26 | End: 2024-01-26

## 2024-01-26 RX ORDER — ONDANSETRON 2 MG/ML
4 INJECTION INTRAMUSCULAR; INTRAVENOUS PRN
Status: DISCONTINUED | OUTPATIENT
Start: 2024-01-26 | End: 2024-01-26 | Stop reason: HOSPADM

## 2024-01-26 RX ORDER — MIDAZOLAM HYDROCHLORIDE 1 MG/ML
.5-2 INJECTION INTRAMUSCULAR; INTRAVENOUS PRN
Status: DISCONTINUED | OUTPATIENT
Start: 2024-01-26 | End: 2024-01-26 | Stop reason: HOSPADM

## 2024-01-26 RX ORDER — HYDROMORPHONE HYDROCHLORIDE 1 MG/ML
0.25 INJECTION, SOLUTION INTRAMUSCULAR; INTRAVENOUS; SUBCUTANEOUS
Status: DISCONTINUED | OUTPATIENT
Start: 2024-01-26 | End: 2024-01-26 | Stop reason: HOSPADM

## 2024-01-26 RX ADMIN — CEFAZOLIN 2 G: 1 INJECTION, POWDER, FOR SOLUTION INTRAMUSCULAR; INTRAVENOUS at 14:36

## 2024-01-26 RX ADMIN — MIDAZOLAM HYDROCHLORIDE 1 MG: 1 INJECTION, SOLUTION INTRAMUSCULAR; INTRAVENOUS at 14:51

## 2024-01-26 RX ADMIN — FENTANYL CITRATE 50 MCG: 50 INJECTION, SOLUTION INTRAMUSCULAR; INTRAVENOUS at 14:46

## 2024-01-26 RX ADMIN — MIDAZOLAM HYDROCHLORIDE 1 MG: 1 INJECTION, SOLUTION INTRAMUSCULAR; INTRAVENOUS at 14:46

## 2024-01-26 RX ADMIN — LIDOCAINE HYDROCHLORIDE,EPINEPHRINE BITARTRATE: 20; .005 INJECTION, SOLUTION EPIDURAL; INFILTRATION; INTRACAUDAL; PERINEURAL at 14:49

## 2024-01-26 RX ADMIN — HEPARIN 400 UNITS: 100 SYRINGE at 15:00

## 2024-01-26 RX ADMIN — FENTANYL CITRATE 25 MCG: 50 INJECTION, SOLUTION INTRAMUSCULAR; INTRAVENOUS at 13:49

## 2024-01-26 RX ADMIN — FENTANYL CITRATE 25 MCG: 50 INJECTION, SOLUTION INTRAMUSCULAR; INTRAVENOUS at 14:55

## 2024-01-26 RX ADMIN — FENTANYL CITRATE 25 MCG: 50 INJECTION, SOLUTION INTRAMUSCULAR; INTRAVENOUS at 14:49

## 2024-01-26 ASSESSMENT — FIBROSIS 4 INDEX: FIB4 SCORE: 1.99

## 2024-01-26 ASSESSMENT — PAIN DESCRIPTION - PAIN TYPE
TYPE: CHRONIC PAIN
TYPE: CHRONIC PAIN

## 2024-01-26 NOTE — PROGRESS NOTES
Pharmacy Chemotherapy Calculations    Dx: Bladder Cancer  Cycle 1, Day 1  Previous treatment = n/a    Protocol:DDMVAC Dose Dense Methotrexate + VinBLAStine + DOXOrubicin + CISplatin    Methotrexate 30 mg/m² IV push on Day 1   VinBLAStine 3 mg/m² IV over 5 - 10 minutes on Day 1 or on Day 2   DOXOrubicin 30 mg/m² IV push on Day 1 or on Day 2   CISplatin 70 mg/m² IV over 2 hours on Day 1 or on Day 2    14-day cycle for 3 - 6 cycles (neoadjuvant or adjuvant) or 4 - 6 cycles (locally advanced or metastatic)    NCCNGuidelines® for Bladder Cancer V.1.2023.   Alondra HORNE , et al. J Clin Oncol. 2014;32(18):1895-901.a   Kaycee SUE, et al. Eur J Cancer. 2006;42(1):50-4.a   Kaycee SUE, et al. J Clin Oncol. 2001;19(10):2638-46.a  Jose Miguel HB , et al. N Engl J Med. 2003;349(9):859-66.a   Dante T  et al. N Engl J Med. 2020;383(35):2481.a    Allergies:  Patient has no known allergies.       /63   Pulse 75   Temp 36.7 °C (98 °F) (Temporal)   Resp 18   Wt 77 kg (169 lb 12.1 oz)   SpO2 97%   BMI 26.59 kg/m²  Body surface area is 1.91 meters squared.    Labs 1/28/24:  ANC~ 4710 Plt = 247k   Hgb = 12.5     SCr = 0.69 mg/dL CrCl ~ 97.4 mL/min   LFT's=11/11/69  T bili=0.6  K = 3.9  Mag = 2.0    ECHO:  1/9/24= LVEF 65%    Methotrexate 30 mg/m² x 1.91 m² = 57.3 mg   <10% difference, OK to treat with final dose = 58.5 mg    VinBLAStine 3 mg/m² x 1.91 m² = 5.73 mg   <10% difference, OK to treat with final dose = 5.9 mg    DOXOrubicin 30 mg/m² x 1.91 m² = 57.3  mg   <10% difference, OK to treat with final dose = 58 mg    CISplatin 70 mg/m² x 1.91 m² = 133.7 mg   <10% difference, OK to treat with final dose = 137 mg  Doreen Alexandra, PharmD

## 2024-01-26 NOTE — PROGRESS NOTES
Pt presents to [facility]. Patient was consented by MD at bedside, confirmed by this RN and consent at bedside. Pt transferred to imaging table in supine position. Patient underwent a port placement by Dr. Colon. Procedure site was marked by MD and verified using imaging guidance. Pt placed on monitor, prepped and draped in a sterile fashion. Vitals were taken every 5 minutes and remained stable during procedure (see doc flow sheet for results). CO2 waveform capnography was monitored and remained WNL throughout procedure. Pt transported by stretcher with RN to recovery area.     [employer] Access System  Powerport Clearvue isp  Slim Implantable port With attacheable 8F Polyurethane Open ended Single Lumen Venous Catheter  Cut at 28cm      REF: [id number]  LOT: [id number]  EXP: 2025-06-30

## 2024-01-26 NOTE — PROGRESS NOTES
"Pharmacy Chemotherapy Calculation:    Dx: High-grade urothelial carcinoma       Protocol: DDMVAC (Dose-dense Methotrexate/VinBLAStine/DOXOrubicin/CISplatin)    *Dosing Reference*   Methotrexate 30 mg/m² IV push on Day 1   VinBLAStine 3 mg/m² IV over 5 - 10 minutes on Day 1 or on Day 2   DOXOrubicin 30 mg/m² IV push on Day 1 or on Day 2   CISplatin 70 mg/m² IV over 2 hours on Day 1 or on Day 2  14-day cycle for 3 - 6 cycles (neoadjuvant or adjuvant) or 4 - 6 cycles (locally advanced or metastatic)     NCCN Guidelines® for Bladder Cancer V.1.2023.   Alondra HORNE, et al. J Clin Oncol. 2014;32(18):1895-901.a   Kaycee SUE, et al. Eur J Cancer. 2006;42(1):50-4.a   Kaycee SUE, et al. J Clin Oncol. 2001;19(10):2638-46.a   Jose Miguel HB , et al. N Engl J Med. 2003;349(9):859-66.a   Dante T et al. N Engl J Med. 2020;383(22):2483.a    Allergies:  Patient has no known allergies.       /63   Pulse 75   Temp 36.7 °C (98 °F) (Temporal)   Resp 18   Ht 1.737 m (5' 8.4\")   Wt 77 kg (169 lb 12.1 oz)   SpO2 97%   BMI 25.51 kg/m²  Body surface area is 1.93 meters squared.    Labs 1/28/24:  ANC~ 4710 Plt = 247k   Hgb = 12.5     SCr = 0.69 mg/dL CrCl ~ 97.4 mL/min (min SCr 0.7 used)  AST/ALT/AP = 11/11/69 TBili = 0.6       01/09/24 16:19   Left Ventrical Ejection Fraction 65     Drug Order   (Drug name, dose, route, IV Fluid & volume, frequency, number of doses) Cycle 1  Previous treatment: n/a     Medication = Methotrexate  Base Dose = 30 mg/m  Calc Dose: Base Dose x 1.93 m2 = 57.9 mg  Final Dose = 58.5 mg  Route = IV  Fluid & Volume = NS 50 mL  Admin Duration = Over 15 minutes          <10% difference, OK to treat with final dose   Medication = VinBLAStine  Base Dose = 3 mg/m2  Calc Dose: Base Dose x 1.93 m2 = 5.79 mg  Final Dose = 5.9 mg  Route = IV  Fluid & Volume = NS 25 mL  Admin Duration = Over 10 minutes          <10% difference, OK to treat with final dose   Medication = DOXOrubicin  Base Dose = 30 mg/m2  Calc " Dose:Base Dose x 1.93 m2 = 57.9 mg  Final Dose = 58 mg  Route = IV  Fluid & Volume = 29 mL empty bag  Admin Duration = Over 20 minutes          <10% difference, OK to treat with final dose   Medication = CISplatin  Base Dose = 70 mg/m2  Calc Dose: Base Dose x 1.93 m2 = 135.1 mg  Final Dose = 137 mg  Route = IV  Fluid & Volume =  mL  Admin Duration = Over 2 hours          <10% difference, OK to treat with final dose     By my signature below, I confirm this process was performed independently with the BSA and all final chemotherapy dosing calculations congruent. I have reviewed the above chemotherapy order and that my calculation of the final dose and BSA (when applicable) corroborate those calculations of the  pharmacist. Discrepancies of 10% or greater in the written dose have been addressed and documented within the EPIC Progress notes.    Ann VallecilloD

## 2024-01-26 NOTE — OR SURGEON
Immediate Post- Operative Note        Findings: Needs port for chemotherphy      Procedure(s): Right IJ port placement       Estimated Blood Loss: Less than 5 ml        Complications: None            1/26/2024     3:02 PM     Brannon Colon M.D.

## 2024-01-27 NOTE — PROGRESS NOTES
Pt has met discharge criteria at this time. Pt was provided with discharge instructions, pt verbalized understanding and had no questions at this time. Pt's vitals WNL, dressing WNL, IV removed. Pt out of department at 1620 (accompanied outside till the ride showed up).

## 2024-01-28 ENCOUNTER — OUTPATIENT INFUSION SERVICES (OUTPATIENT)
Dept: ONCOLOGY | Facility: MEDICAL CENTER | Age: 79
End: 2024-01-28
Attending: STUDENT IN AN ORGANIZED HEALTH CARE EDUCATION/TRAINING PROGRAM
Payer: MEDICARE

## 2024-01-28 VITALS
HEART RATE: 77 BPM | DIASTOLIC BLOOD PRESSURE: 61 MMHG | TEMPERATURE: 97.4 F | RESPIRATION RATE: 16 BRPM | OXYGEN SATURATION: 97 % | SYSTOLIC BLOOD PRESSURE: 130 MMHG

## 2024-01-28 DIAGNOSIS — D49.4 BLADDER TUMOR: ICD-10-CM

## 2024-01-28 LAB
ALBUMIN SERPL BCP-MCNC: 3.6 G/DL (ref 3.2–4.9)
ALBUMIN/GLOB SERPL: 1.3 G/DL
ALP SERPL-CCNC: 69 U/L (ref 30–99)
ALT SERPL-CCNC: 11 U/L (ref 2–50)
ANION GAP SERPL CALC-SCNC: 9 MMOL/L (ref 7–16)
AST SERPL-CCNC: 11 U/L (ref 12–45)
BASOPHILS # BLD AUTO: 0.3 % (ref 0–1.8)
BASOPHILS # BLD: 0.02 K/UL (ref 0–0.12)
BILIRUB SERPL-MCNC: 0.6 MG/DL (ref 0.1–1.5)
BUN SERPL-MCNC: 11 MG/DL (ref 8–22)
CALCIUM ALBUM COR SERPL-MCNC: 9.6 MG/DL (ref 8.5–10.5)
CALCIUM SERPL-MCNC: 9.3 MG/DL (ref 8.5–10.5)
CHLORIDE SERPL-SCNC: 101 MMOL/L (ref 96–112)
CO2 SERPL-SCNC: 25 MMOL/L (ref 20–33)
CREAT SERPL-MCNC: 0.69 MG/DL (ref 0.5–1.4)
EOSINOPHIL # BLD AUTO: 0.27 K/UL (ref 0–0.51)
EOSINOPHIL NFR BLD: 4.4 % (ref 0–6.9)
ERYTHROCYTE [DISTWIDTH] IN BLOOD BY AUTOMATED COUNT: 39.7 FL (ref 35.9–50)
GFR SERPLBLD CREATININE-BSD FMLA CKD-EPI: 96 ML/MIN/1.73 M 2
GLOBULIN SER CALC-MCNC: 2.7 G/DL (ref 1.9–3.5)
GLUCOSE SERPL-MCNC: 110 MG/DL (ref 65–99)
HCT VFR BLD AUTO: 36 % (ref 42–52)
HGB BLD-MCNC: 12.5 G/DL (ref 14–18)
IMM GRANULOCYTES # BLD AUTO: 0.01 K/UL (ref 0–0.11)
IMM GRANULOCYTES NFR BLD AUTO: 0.2 % (ref 0–0.9)
LYMPHOCYTES # BLD AUTO: 0.59 K/UL (ref 1–4.8)
LYMPHOCYTES NFR BLD: 9.7 % (ref 22–41)
MAGNESIUM SERPL-MCNC: 2 MG/DL (ref 1.5–2.5)
MCH RBC QN AUTO: 30.5 PG (ref 27–33)
MCHC RBC AUTO-ENTMCNC: 34.7 G/DL (ref 32.3–36.5)
MCV RBC AUTO: 87.8 FL (ref 81.4–97.8)
MONOCYTES # BLD AUTO: 0.48 K/UL (ref 0–0.85)
MONOCYTES NFR BLD AUTO: 7.9 % (ref 0–13.4)
NEUTROPHILS # BLD AUTO: 4.71 K/UL (ref 1.82–7.42)
NEUTROPHILS NFR BLD: 77.5 % (ref 44–72)
NRBC # BLD AUTO: 0 K/UL
NRBC BLD-RTO: 0 /100 WBC (ref 0–0.2)
OUTPT INFUS CBC COMMENT OICOM: ABNORMAL
PLATELET # BLD AUTO: 247 K/UL (ref 164–446)
PMV BLD AUTO: 9.2 FL (ref 9–12.9)
POTASSIUM SERPL-SCNC: 3.9 MMOL/L (ref 3.6–5.5)
PROT SERPL-MCNC: 6.3 G/DL (ref 6–8.2)
RBC # BLD AUTO: 4.1 M/UL (ref 4.7–6.1)
SODIUM SERPL-SCNC: 135 MMOL/L (ref 135–145)
WBC # BLD AUTO: 6.1 K/UL (ref 4.8–10.8)

## 2024-01-28 PROCEDURE — 85025 COMPLETE CBC W/AUTO DIFF WBC: CPT

## 2024-01-28 PROCEDURE — 36415 COLL VENOUS BLD VENIPUNCTURE: CPT

## 2024-01-28 PROCEDURE — 83735 ASSAY OF MAGNESIUM: CPT

## 2024-01-28 PROCEDURE — 80053 COMPREHEN METABOLIC PANEL: CPT

## 2024-01-28 NOTE — PROGRESS NOTES
Pt arrived ambulatory to [facility] for pre-chemo lab draw. POC discussed with pt and he agrees with plan.    Pt had right upper chest port placed Friday 1/26/2024. Site with steri strips intact, bruising and swelling noted. Pt reports site is tender. Pt educated on port care. Pt states he has not gotten the EMLA cream yet for port access. This RN advised pt we can use lidocaine for port port access tomorrow. Pt verbalized understanding.    Pre-chemo labs drawn peripherally without difficulty. Pt tolerated lab drawn without s/s adverse reaction.     Pt discharged to [facility], [facility]. Pt's next appt confirmed 1/29/2024 @2921.

## 2024-01-29 ENCOUNTER — OUTPATIENT INFUSION SERVICES (OUTPATIENT)
Dept: ONCOLOGY | Facility: MEDICAL CENTER | Age: 79
End: 2024-01-29
Attending: STUDENT IN AN ORGANIZED HEALTH CARE EDUCATION/TRAINING PROGRAM
Payer: MEDICARE

## 2024-01-29 ENCOUNTER — PATIENT OUTREACH (OUTPATIENT)
Dept: ONCOLOGY | Facility: MEDICAL CENTER | Age: 79
End: 2024-01-29

## 2024-01-29 VITALS
SYSTOLIC BLOOD PRESSURE: 126 MMHG | BODY MASS INDEX: 25.73 KG/M2 | WEIGHT: 169.75 LBS | RESPIRATION RATE: 18 BRPM | TEMPERATURE: 98 F | DIASTOLIC BLOOD PRESSURE: 63 MMHG | HEIGHT: 68 IN | HEART RATE: 75 BPM | OXYGEN SATURATION: 97 %

## 2024-01-29 DIAGNOSIS — D49.4 BLADDER TUMOR: ICD-10-CM

## 2024-01-29 PROCEDURE — 700105 HCHG RX REV CODE 258: Performed by: STUDENT IN AN ORGANIZED HEALTH CARE EDUCATION/TRAINING PROGRAM

## 2024-01-29 PROCEDURE — 96366 THER/PROPH/DIAG IV INF ADDON: CPT

## 2024-01-29 PROCEDURE — A4212 NON CORING NEEDLE OR STYLET: HCPCS

## 2024-01-29 PROCEDURE — 96417 CHEMO IV INFUS EACH ADDL SEQ: CPT

## 2024-01-29 PROCEDURE — 700111 HCHG RX REV CODE 636 W/ 250 OVERRIDE (IP): Performed by: STUDENT IN AN ORGANIZED HEALTH CARE EDUCATION/TRAINING PROGRAM

## 2024-01-29 PROCEDURE — 96411 CHEMO IV PUSH ADDL DRUG: CPT

## 2024-01-29 PROCEDURE — 96367 TX/PROPH/DG ADDL SEQ IV INF: CPT

## 2024-01-29 PROCEDURE — 700101 HCHG RX REV CODE 250: Performed by: STUDENT IN AN ORGANIZED HEALTH CARE EDUCATION/TRAINING PROGRAM

## 2024-01-29 PROCEDURE — 96415 CHEMO IV INFUSION ADDL HR: CPT

## 2024-01-29 PROCEDURE — 96413 CHEMO IV INFUSION 1 HR: CPT

## 2024-01-29 PROCEDURE — 96375 TX/PRO/DX INJ NEW DRUG ADDON: CPT

## 2024-01-29 RX ORDER — SODIUM CHLORIDE 9 MG/ML
1000 INJECTION, SOLUTION INTRAVENOUS ONCE
Status: COMPLETED | OUTPATIENT
Start: 2024-01-29 | End: 2024-01-29

## 2024-01-29 RX ADMIN — FOSAPREPITANT 150 MG: 150 INJECTION, POWDER, LYOPHILIZED, FOR SOLUTION INTRAVENOUS at 10:05

## 2024-01-29 RX ADMIN — DOXORUBICIN HYDROCHLORIDE 58 MG: 2 INJECTION, SOLUTION INTRAVENOUS at 11:57

## 2024-01-29 RX ADMIN — HEPARIN 500 UNITS: 100 SYRINGE at 16:54

## 2024-01-29 RX ADMIN — METHOTREXATE 58.5 MG: 25 INJECTION, SOLUTION INTRA-ARTERIAL; INTRAMUSCULAR; INTRATHECAL; INTRAVENOUS at 10:53

## 2024-01-29 RX ADMIN — MAGNESIUM SULFATE HEPTAHYDRATE: 500 INJECTION, SOLUTION INTRAMUSCULAR; INTRAVENOUS at 14:49

## 2024-01-29 RX ADMIN — SODIUM CHLORIDE 1000 ML: 9 INJECTION, SOLUTION INTRAVENOUS at 09:35

## 2024-01-29 RX ADMIN — VINBLASTINE SULFATE 5.9 MG: 1 INJECTION INTRAVENOUS at 11:25

## 2024-01-29 RX ADMIN — LIDOCAINE HYDROCHLORIDE 0.5 ML: 10 INJECTION, SOLUTION EPIDURAL; INFILTRATION; INTRACAUDAL; PERINEURAL at 09:17

## 2024-01-29 RX ADMIN — DEXAMETHASONE SODIUM PHOSPHATE 12 MG: 4 INJECTION, SOLUTION INTRA-ARTICULAR; INTRALESIONAL; INTRAMUSCULAR; INTRAVENOUS; SOFT TISSUE at 09:35

## 2024-01-29 RX ADMIN — ONDANSETRON 16 MG: 2 INJECTION INTRAMUSCULAR; INTRAVENOUS at 09:48

## 2024-01-29 RX ADMIN — CISPLATIN 137 MG: 1 INJECTION, SOLUTION INTRAVENOUS at 12:37

## 2024-01-29 ASSESSMENT — FIBROSIS 4 INDEX: FIB4 SCORE: 1.02

## 2024-01-29 NOTE — PROGRESS NOTES
Chemotherapy Verification - SECONDARY RN       Height = 1.737 m  Weight = 77 kg  BSA = 1.93 m^2       Medication: methotrexate pf  Dose: 30 mg/m^2  Calculated Dose: 57.9 mg                             (In mg/m2, AUC, mg/kg)     Medication: vinblastine  Dose: 3 mg/m^2  Calculated Dose: 5.79 mg                             (In mg/m2, AUC, mg/kg)    Medication: doxorubicin  Dose: 30 mg/m^2  Calculated Dose: 57.9 mg                             (In mg/m2, AUC, mg/kg)    Medication: cisplatin  Dose: 70 mg/m^2  Calculated Dose: 135.1 mg                             (In mg/m2, AUC, mg/kg)    I confirm that this process was performed independently.

## 2024-01-29 NOTE — PROGRESS NOTES
Pt arrives to [facility] for cycle 1 day 1 of MTX/Vinblastine/Adriamycin/Ciplatin for bladder cancer.  Discussed plan of care and possible adverse reactions with pt.  Reviewed Rx for prn anti-emetics.  Pt denies s/sx of infection.  Pt reports fatigue and urinary frequency.   Pt sat closest to the bathroom and urinal was provided.  Pt had recent port placement on 1/26/2024.  Dressing to port site removed and port incisions have no s/sof infection.  Pt requests Lidocaine bleb to port site prior to port access.  Educated pt on how to apply EMLA cream 1 hour prior to next treatment appt.  Pt verbalized understanding.  [facility] port accessed with sterile technique, flushed with NS and brisk blood return observed.  Labs drawn on 1/28/2024 were reviewed and results meet parameters for treatment.  Echocardiogram on 1/09/2024 LVEF 65%.  Hydration given.  Pre-medications given.  All chemotherapies infused without adverse reaction. Post-hydration given.  Port flushed with NS and heparin locked.  Tatum needle removed intact.  Site covered with gauze/tape.  Confirmed tomorrow's appt for Udenyca with pt.  Pt dc home with scheduled transportation.

## 2024-01-29 NOTE — PROGRESS NOTES
"Call placed to patient to assess and see how C1D1 is going. Ronal answers, still in Infusion Services. He talks to ONN for a few minutes. Reports \"all is well\" and so far he is \"not sick or dead\". During this call, Ronal states many universal truths, such as \"we all have to die sometime.\" Patient reflects on his life, at 78, he has \"no regrets.\" Tells ONN that he has had more experiences than the average person. He has travelled the world (much thanks to the ) and has come to know a lot. 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 He also states in terms of treatment today, that he has \"been there, done that\" as he had chemotherapy before, 4 years ago. ONN will follow up with patient tomorrow to see how he tolerated treatment once completed.   "

## 2024-01-29 NOTE — PROGRESS NOTES
Chemotherapy Verification - PRIMARY RN    C1 D1    Height = 173.7 cm  Weight = 77 kg  BSA = 1.93 m^2       Medication: Methotrexate PF  Dose: 30 mg/m^2  Calculated Dose: 57.9 mg                             (In mg/m2, AUC, mg/kg)     Medication: Vinblastine  Dose: 3 mg/m^2  Calculated Dose: 5.79 mg                             (In mg/m2, AUC, mg/kg)    Medication: Doxorubicin (Adriamycin)  Dose: 30 mg/m^2  Calculated Dose: 57.9 mg                             (In mg/m2, AUC, mg/kg)    Medication: Cisplatin  Dose: 70 mg/m^2  Calculated Dose: 135.1 mg                             (In mg/m2, AUC, mg/kg)    I confirm this process was performed independently with the BSA and all final chemotherapy dosing calculations congruent.  Any discrepancies of 10% or greater have been addressed with the chemotherapy pharmacist. The resolution of the discrepancy has been documented in the EPIC progress notes.

## 2024-01-30 ENCOUNTER — TELEPHONE (OUTPATIENT)
Dept: ONCOLOGY | Facility: MEDICAL CENTER | Age: 79
End: 2024-01-30

## 2024-01-30 ENCOUNTER — OUTPATIENT INFUSION SERVICES (OUTPATIENT)
Dept: ONCOLOGY | Facility: MEDICAL CENTER | Age: 79
End: 2024-01-30
Attending: STUDENT IN AN ORGANIZED HEALTH CARE EDUCATION/TRAINING PROGRAM
Payer: MEDICARE

## 2024-01-30 VITALS
DIASTOLIC BLOOD PRESSURE: 55 MMHG | RESPIRATION RATE: 18 BRPM | BODY MASS INDEX: 26.73 KG/M2 | OXYGEN SATURATION: 94 % | HEIGHT: 68 IN | HEART RATE: 72 BPM | TEMPERATURE: 97.7 F | SYSTOLIC BLOOD PRESSURE: 134 MMHG | WEIGHT: 176.37 LBS

## 2024-01-30 DIAGNOSIS — D49.4 BLADDER TUMOR: ICD-10-CM

## 2024-01-30 PROCEDURE — 700111 HCHG RX REV CODE 636 W/ 250 OVERRIDE (IP): Mod: JZ | Performed by: STUDENT IN AN ORGANIZED HEALTH CARE EDUCATION/TRAINING PROGRAM

## 2024-01-30 PROCEDURE — 96372 THER/PROPH/DIAG INJ SC/IM: CPT

## 2024-01-30 RX ADMIN — PEGFILGRASTIM-CBQV 6 MG: 6 INJECTION, SOLUTION SUBCUTANEOUS at 17:10

## 2024-01-30 ASSESSMENT — FIBROSIS 4 INDEX: FIB4 SCORE: 1.02

## 2024-01-30 NOTE — TELEPHONE ENCOUNTER
Nutrition Services: RD Consultation/ New Chemotherapy Start  Ronal Hair is a 78 y.o. male with diagnosis of bladder tumor    RD called pt to assess current intake, appetite, and nutritional status.  Pt answers, states is managing fairly at this time. States biggest concern is financial. Mentions is obtaining food from [employer] mostly, though does visit grocery store for fresh produce as well. Sates is aiming fr high protein and feels he is choosing high protein sources, such as peanut butter and eggs. States otherwise is using resources, such as [employer] access to minimize cost on transportation. Pt did not express any further nutrition-related questions or concerns at this time.     Dietary intake pattern:  States consuming foods such as wheat toast with peanut butter and eggs. Mentions food from [employer] is not always high quality.     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    High cholesterol     Hypertension 01/12/2024    pt denies    Infectious disease     Lymphoma ([facility]) 01/22/2016    Nephrolithiasis 01/06/2023    Osteomyelitis of left foot (HCC) 11/11/2020    Pain of toe 11/03/2020    Psychiatric problem 01/12/2024    anxiety related to diagnosis    Reactive airway disease without complication 03/18/2022    Reactive airway disease without complication 03/18/2022    Scoliosis     Snoring     Urinary incontinence 01/12/2024    wears depends when away from home       Past Surgical History:   Procedure Laterality Date    TURBT (TRANSURETHRAL RESECTION OF BLADDER TUMOR)  12/13/2023     "Procedure: BIPOLAR TRANSURETHRAL RESECTION OF BLADDER TUMOR;  Surgeon: Addison Seymour M.D.;  Location: SURGERY [facility];  Service: Urology    DC CYSTOSCOPY,INSERT URETERAL STENT Left 01/07/2023    Procedure: CYSTOSCOPY, WITH URETERAL STENT INSERTION;  Surgeon: Addison Seymour M.D.;  Location: SURGERY [facility];  Service: Urology    DC CYSTO/URETERO/PYELOSCOPY, DX Left 01/07/2023    Procedure: URETEROSCOPY;  Surgeon: Addison Seymour M.D.;  Location: SURGERY [facility];  Service: Urology    LASERTRIPSY Left 01/07/2023    Procedure: LITHOTRIPSY, USING LASER;  Surgeon: Addison Seymour M.D.;  Location: SURGERY [facility];  Service: Urology    AORTOBIFEM BYPASS  11/08/2020    Procedure: CREATION, BYPASS, ARTERIAL, AORTA TO FEMORAL, BILATERAL;  Surgeon: Jimi Morrison M.D.;  Location: [facility];  Service: General    KNEE ARTHROSCOPY Left 1968    APPENDECTOMY CHILD      CATARACT EXTRACTION WITH IOL Bilateral     OTHER ABDOMINAL SURGERY      OTHER ORTHOPEDIC SURGERY      SHOULDER ARTHROSCOPY Left      Biochemical/ Anthropometric Assessment:  Treatment Regimen: OP Bladder (DDMVAC) Dose Dense Methotrexate + VinBLAStine + DOXOrubicin + CISplatin   Pertinent Labs: Glucose 110, RBC 4.10  Pertinent Meds: zofran, compazine, oxycodone, senokot, lipitor, spiriva, proscar   Wt Readings from Last 1 Encounters:   01/29/24 77 kg (169 lb 12.1 oz)      Ht Readings from Last 1 Encounters:   01/29/24 1.737 m (5' 8.4\")      BMI Readings from Last 1 Encounters:   01/29/24 25.51 kg/m²   BMI Classification: Suitable range for age group  Nutrition-Focused Physical Exam: Unable to assess given telephone encounter     Weight History:  Wt Readings from Last 6 Encounters:   01/29/24 77 kg (169 lb 12.1 oz)   01/26/24 77.1 kg (170 lb)   01/15/24 78.3 kg (172 lb 8.2 oz)   01/09/24 78.8 kg (173 lb 13.3 oz)   12/14/23 118 kg (260 lb)   12/13/23 79 kg (174 lb 2.6 oz)     Weight Change/Malnutrition Risk: wt appears to fluctuate " with some potential outliers. Potential likelihood of 2 kg/ 2.5% loss within past 1.5 months. Pt does not meet or present with malnutrition at this time.     Interventions:  Introduced self and discussed role of dietitian throughout treatment process   Encouraged balanced diet with high protein. RD offered information for [employer] to assist further in offsetting cost of food. Pt appreciative. RD to send information via email as requested.  RD observed current referral in place with financial resource advocate.     Pt reports understanding and was receptive to information provided.   RD provided contact information. Encouraged pt to reach out as questions/concerns arise.   RD available PRN   [phone]

## 2024-01-31 ENCOUNTER — PATIENT MESSAGE (OUTPATIENT)
Dept: ONCOLOGY | Facility: MEDICAL CENTER | Age: 79
End: 2024-01-31

## 2024-01-31 NOTE — PROGRESS NOTES
Ronal ambulated into department for Udenyca. Ronal completed chemo yesterday ad 1649 and tolerated well. Ronal is afebrile and denied S/S of infection at this time. Udenyca given as prescribed in back of left arm, adhesive bandage applied after. Education done regarding possible side effect of bone pain, Ronal verbalized understanding. Declined medication handout. Discharged to self care; no apparent distress noted.

## 2024-02-05 ENCOUNTER — HOSPITAL ENCOUNTER (OUTPATIENT)
Dept: HEMATOLOGY ONCOLOGY | Facility: MEDICAL CENTER | Age: 79
End: 2024-02-05
Attending: NURSE PRACTITIONER
Payer: MEDICARE

## 2024-02-05 VITALS
SYSTOLIC BLOOD PRESSURE: 118 MMHG | DIASTOLIC BLOOD PRESSURE: 58 MMHG | RESPIRATION RATE: 18 BRPM | HEIGHT: 68 IN | OXYGEN SATURATION: 97 % | WEIGHT: 167.55 LBS | HEART RATE: 80 BPM | BODY MASS INDEX: 25.39 KG/M2 | TEMPERATURE: 97.3 F

## 2024-02-05 DIAGNOSIS — D49.4 BLADDER TUMOR: ICD-10-CM

## 2024-02-05 DIAGNOSIS — Z79.899 ENCOUNTER FOR LONG-TERM (CURRENT) USE OF HIGH-RISK MEDICATION: ICD-10-CM

## 2024-02-05 PROCEDURE — 99212 OFFICE O/P EST SF 10 MIN: CPT | Performed by: NURSE PRACTITIONER

## 2024-02-05 PROCEDURE — 99214 OFFICE O/P EST MOD 30 MIN: CPT | Performed by: NURSE PRACTITIONER

## 2024-02-05 RX ORDER — ONDANSETRON 8 MG/1
8 TABLET, ORALLY DISINTEGRATING ORAL PRN
Status: CANCELLED | OUTPATIENT
Start: 2024-02-12

## 2024-02-05 RX ORDER — PROCHLORPERAZINE MALEATE 10 MG
10 TABLET ORAL EVERY 6 HOURS PRN
Status: CANCELLED | OUTPATIENT
Start: 2024-02-12

## 2024-02-05 RX ORDER — 0.9 % SODIUM CHLORIDE 0.9 %
3 VIAL (ML) INJECTION PRN
Status: CANCELLED | OUTPATIENT
Start: 2024-02-11

## 2024-02-05 RX ORDER — 0.9 % SODIUM CHLORIDE 0.9 %
10 VIAL (ML) INJECTION PRN
Status: CANCELLED | OUTPATIENT
Start: 2024-02-11

## 2024-02-05 RX ORDER — 0.9 % SODIUM CHLORIDE 0.9 %
VIAL (ML) INJECTION PRN
Status: CANCELLED | OUTPATIENT
Start: 2024-02-12

## 2024-02-05 RX ORDER — METHYLPREDNISOLONE SODIUM SUCCINATE 125 MG/2ML
125 INJECTION, POWDER, LYOPHILIZED, FOR SOLUTION INTRAMUSCULAR; INTRAVENOUS PRN
Status: CANCELLED | OUTPATIENT
Start: 2024-02-12

## 2024-02-05 RX ORDER — 0.9 % SODIUM CHLORIDE 0.9 %
VIAL (ML) INJECTION PRN
Status: CANCELLED | OUTPATIENT
Start: 2024-02-11

## 2024-02-05 RX ORDER — DIPHENHYDRAMINE HYDROCHLORIDE 50 MG/ML
50 INJECTION INTRAMUSCULAR; INTRAVENOUS PRN
Status: CANCELLED | OUTPATIENT
Start: 2024-02-12

## 2024-02-05 RX ORDER — 0.9 % SODIUM CHLORIDE 0.9 %
10 VIAL (ML) INJECTION PRN
Status: CANCELLED | OUTPATIENT
Start: 2024-02-12

## 2024-02-05 RX ORDER — ONDANSETRON 2 MG/ML
4 INJECTION INTRAMUSCULAR; INTRAVENOUS PRN
Status: CANCELLED | OUTPATIENT
Start: 2024-02-12

## 2024-02-05 RX ORDER — SODIUM CHLORIDE 9 MG/ML
1000 INJECTION, SOLUTION INTRAVENOUS ONCE
Status: CANCELLED | OUTPATIENT
Start: 2024-02-12

## 2024-02-05 RX ORDER — EPINEPHRINE 1 MG/ML(1)
0.5 AMPUL (ML) INJECTION PRN
Status: CANCELLED | OUTPATIENT
Start: 2024-02-12

## 2024-02-05 RX ORDER — SODIUM CHLORIDE 9 MG/ML
INJECTION, SOLUTION INTRAVENOUS CONTINUOUS
Status: CANCELLED | OUTPATIENT
Start: 2024-02-12

## 2024-02-05 RX ORDER — 0.9 % SODIUM CHLORIDE 0.9 %
3 VIAL (ML) INJECTION PRN
Status: CANCELLED | OUTPATIENT
Start: 2024-02-12

## 2024-02-05 ASSESSMENT — ENCOUNTER SYMPTOMS
WEIGHT LOSS: 1
CONSTIPATION: 0
SHORTNESS OF BREATH: 0
FEVER: 0
CHILLS: 0
DIARRHEA: 0
NAUSEA: 0
COUGH: 1
VOMITING: 0
PALPITATIONS: 0
ABDOMINAL PAIN: 0

## 2024-02-05 ASSESSMENT — PAIN SCALES - GENERAL: PAINLEVEL: NO PAIN

## 2024-02-05 ASSESSMENT — FIBROSIS 4 INDEX: FIB4 SCORE: 1.02

## 2024-02-05 NOTE — ADDENDUM NOTE
Encounter addended by: Nasra Ray, Med Ass't on: 2/5/2024 9:58 AM   Actions taken: Charge Capture section accepted

## 2024-02-05 NOTE — PROGRESS NOTES
"Subjective     Ronal Hair is a 78 y.o. male who presents with Bladder Cancer (Anurag / Tox check )        HPI    Patient seen today for evaluation after cycle 1 of chemotherapy employing DDMVAC for bladder cancer, for continued monitoring of symptoms and side effects of cancer treatments.    Oncology history of presenting illness:  Patient reports that he has been struggling for months.  He notes that he has been having frequent urination for months.  He had a CT scan which did not reveal any evidence of stone in the setting of his pain.  He saw urologist and underwent a cystoscopy which revealed a mass in the bladder.  Patient was admitted for a TURBT on December 13, 2023.     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  He also has perineural invasion present.    Treatment history:  01/29/24: C1D1 DDMVAC      Interval history:  Patient is doing very well.  He stated that the chemo helped stop the inflammation of his bladder.  He is urinating less frequently.  He is requiring ibuprofen less frequently.  He is only taking it twice a day now.  He is sleeping much better as he is able to go anywhere from 1-2 hours at a time without having to urinate.  He states during the day he is able to go anywhere from 2-3 hours without having to urinate.  He stated that he felt \"down\" for about 3-4 days but not too bad.  He vomited once and started taken the antiemetics with good results.  He stated he lost 10 pounds before starting chemotherapy but since getting his first cycle he is eating \"like a horse.\"  He states food tastes different but he is still able to eat.  He is not noticing any blood in his urine.    Patient's biggest stress at this time is money.  He recently moved into a cheaper location.  He is not working because of his cancer and not scheduled to go back until June 2024.  He uses the food bank " when he can.  He is overwhelmed and dealing with the multiple co-pays with all of his appointments.        No Known Allergies    Current Outpatient Medications on File Prior to Encounter   Medication Sig Dispense Refill    ondansetron (ZOFRAN) 4 MG Tab tablet Take 1 Tablet by mouth every four hours as needed for Nausea/Vomiting (for nausea, vomiting). 30 Tablet 6    prochlorperazine (COMPAZINE) 10 MG Tab Take 1 Tablet by mouth every 6 hours as needed (for nausea, vomiting). 30 Tablet 6    lidocaine-prilocaine (EMLA) 2.5-2.5 % Cream Apply to port one hour prior to access and cover with plastic wrap. 1 Each 3    NON SPECIFIED Laxative daily      Acetaminophen (TYLENOL PO) Take  by mouth.      oxyCODONE immediate-release (ROXICODONE) 5 MG Tab Take 5 mg by mouth at bedtime.      sildenafil citrate (VIAGRA) 25 MG Tab Take 0.5 Tablets by mouth 1 time a day as needed for Erectile Dysfunction. 12.5 mg = 1/2 tablet (Patient not taking: Reported on 1/29/2024) 10 Tablet 5    ibuprofen (MOTRIN) 200 MG Tab Take 800 mg by mouth every 6 hours as needed for Inflammation.      tamsulosin (FLOMAX) 0.4 MG capsule Take 0.8 mg by mouth 1/2 hour after breakfast.      senna-docusate (PERICOLACE OR SENOKOT S) 8.6-50 MG Tab Take 1 Tablet by mouth every day. 30 Tablet 0    finasteride (PROSCAR) 5 MG Tab Take 5 mg by mouth every day.      tiotropium (SPIRIVA) 18 MCG Cap Inhale 1 puff by mouth once daily (Patient taking differently: Place 18 mcg into inhaler and inhale as needed (shortness or breath). Uses every 2-3 days) 30 Capsule 11    atorvastatin (LIPITOR) 40 MG Tab Take 1 Tablet by mouth every evening. 90 Tablet 3    Multiple Vitamins-Minerals (MULTIVITAMIN ADULTS 50+ PO) Take 1 Tablet by mouth every day.       No current facility-administered medications on file prior to encounter.           Review of Systems   Constitutional:  Positive for malaise/fatigue (still present but improving overall) and weight loss. Negative for chills and  "fever.   Respiratory:  Positive for cough (noticed a little more lately - takes Spiriva). Negative for shortness of breath.    Cardiovascular:  Negative for chest pain and palpitations.   Gastrointestinal:  Negative for abdominal pain, constipation, diarrhea, nausea and vomiting.   Genitourinary:  Positive for frequency. Negative for dysuria.            Objective     /58 (BP Location: Right arm, Patient Position: Sitting, BP Cuff Size: Adult)   Pulse 80   Temp 36.3 °C (97.3 °F) (Temporal)   Resp 18   Ht 1.737 m (5' 8.39\")   Wt 76 kg (167 lb 8.8 oz)   SpO2 97%   BMI 25.19 kg/m²      Physical Exam  Vitals reviewed.   Constitutional:       General: He is not in acute distress.     Appearance: Normal appearance. He is not diaphoretic.   HENT:      Head: Normocephalic and atraumatic.   Cardiovascular:      Rate and Rhythm: Normal rate and regular rhythm.      Heart sounds: Normal heart sounds. No murmur heard.     No friction rub. No gallop.   Pulmonary:      Effort: Pulmonary effort is normal. No respiratory distress.      Breath sounds: Normal breath sounds. No wheezing.   Abdominal:      General: Bowel sounds are normal. There is no distension.      Palpations: Abdomen is soft.      Tenderness: There is no abdominal tenderness.   Musculoskeletal:         General: No swelling or tenderness. Normal range of motion.   Skin:     General: Skin is warm and dry.   Neurological:      Mental Status: He is alert and oriented to person, place, and time.   Psychiatric:         Mood and Affect: Mood normal.         Behavior: Behavior normal.                             Assessment & Plan       1. Bladder tumor        2. Encounter for long-term (current) use of high-risk medication                1. Patient with high-grade urothelial carcinoma with focal squamous differentiation, invasive into muscularis propria (pT2).  Patient recently started treatment with neoadjuvant DDMVAC with good tolerance.  Clinically he is " doing very well overall.  He has already noticed significant improvement in his overall clinical status with cycle 1 of treatment.    2.  Patient to follow-up in clinic in 1 week, prior to cycle 2, or sooner if needed.    I will reach out to our nurse navigator and financial assistance team to see if they can assist patient.      Please note that this dictation was created using voice recognition software. I have made every reasonable attempt to correct obvious errors, but I expect that there are errors of grammar and possibly content that I did not discover before finalizing the note.

## 2024-02-06 ENCOUNTER — TELEPHONE (OUTPATIENT)
Dept: HEMATOLOGY ONCOLOGY | Facility: MEDICAL CENTER | Age: 79
End: 2024-02-06
Payer: MEDICARE

## 2024-02-06 NOTE — TELEPHONE ENCOUNTER
Called and spoke to patient about his upcoming appointments. Let Ronal know that he will not need to have a pre chemo on 2/6 and in this time we will be having the financial department reach out to him to help pay for his co pays. Ronal stated that they have already reached out to him about help and they will be back in contact with him to let him know what the plan will be going forward. Ronal stated that he went to the food bank today and received 60 dollars in food . He says he is eating well and feeling well.

## 2024-02-08 NOTE — PROGRESS NOTES
"Pharmacy Chemotherapy Calculation:    Dx: High-grade urothelial carcinoma       Protocol: DDMVAC (Dose-dense Methotrexate/VinBLAStine/DOXOrubicin/CISplatin)    *Dosing Reference*   Methotrexate 30 mg/m² IV push on Day 1   VinBLAStine 3 mg/m² IV over 5 - 10 minutes on Day 1 or on Day 2   DOXOrubicin 30 mg/m² IV push on Day 1 or on Day 2   CISplatin 70 mg/m² IV over 2 hours on Day 1 or on Day 2  14-day cycle for 3 - 6 cycles (neoadjuvant or adjuvant) or 4 - 6 cycles (locally advanced or metastatic)     NCCN Guidelines® for Bladder Cancer V.1.2023.   Alondra HORNE, et al. J Clin Oncol. 2014;32(18):1895-901.a   Kaycee SUE, et al. Eur J Cancer. 2006;42(1):50-4.a   Kaycee SUE, et al. J Clin Oncol. 2001;19(10):2638-46.a   Jose Miguel HB , et al. N Engl J Med. 2003;349(9):859-66.a   Dante T et al. N Engl J Med. 2020;383(40):2483.a    Allergies:  Patient has no known allergies.       /63   Pulse 79   Temp 36.1 °C (97 °F) (Temporal)   Resp 18   Ht 1.737 m (5' 8.39\")   Wt 76.3 kg (168 lb 3.4 oz)   SpO2 97%   BMI 25.29 kg/m²  Body surface area is 1.92 meters squared.    Labs 2/11/24:  ANC~ 3620 Plt = 178k   Hgb = 12.4     SCr = 1.03 mg/dL CrCl ~ 65.6 mL/min (min SCr 0.7 used)  AST/ALT/AP = WNL TBili = 0.4       01/09/24 16:19   Left Ventrical Ejection Fraction 65     Drug Order   (Drug name, dose, route, IV Fluid & volume, frequency, number of doses) Cycle 1  Previous treatment: C1 1/29/24     Medication = Methotrexate  Base Dose = 30 mg/m  Calc Dose: Base Dose x 1.92 m2 = 57.6 mg  Final Dose = 58.5 mg  Route = IV  Fluid & Volume = NS 50 mL  Admin Duration = Over 15 minutes          <10% difference, OK to treat with final dose   Medication = VinBLAStine  Base Dose = 3 mg/m2  Calc Dose: Base Dose x 1.92 m2 = 5.76 mg  Final Dose = 5.9 mg  Route = IV  Fluid & Volume = NS 25 mL  Admin Duration = Over 10 minutes          <10% difference, OK to treat with final dose   Medication = DOXOrubicin  Base Dose = 30 mg/m2  Calc " Dose:Base Dose x 1.92 m2 = 57.6 mg  Final Dose = 58 mg  Route = IV  Fluid & Volume = 29 mL empty bag  Admin Duration = Over 20 minutes          <10% difference, OK to treat with final dose   Medication = CISplatin  Base Dose = 70 mg/m2  Calc Dose: Base Dose x 1.92 m2 = 134.4 mg  Final Dose = 137 mg  Route = IV  Fluid & Volume =  mL  Admin Duration = Over 2 hours          <10% difference, OK to treat with final dose     By my signature below, I confirm this process was performed independently with the BSA and all final chemotherapy dosing calculations congruent. I have reviewed the above chemotherapy order and that my calculation of the final dose and BSA (when applicable) corroborate those calculations of the  pharmacist. Discrepancies of 10% or greater in the written dose have been addressed and documented within the [facility] Progress notes.    Doreen Alexandra, PharmD

## 2024-02-11 ENCOUNTER — OUTPATIENT INFUSION SERVICES (OUTPATIENT)
Dept: ONCOLOGY | Facility: MEDICAL CENTER | Age: 79
End: 2024-02-11
Attending: STUDENT IN AN ORGANIZED HEALTH CARE EDUCATION/TRAINING PROGRAM
Payer: MEDICARE

## 2024-02-11 VITALS
TEMPERATURE: 97.3 F | SYSTOLIC BLOOD PRESSURE: 135 MMHG | RESPIRATION RATE: 18 BRPM | HEART RATE: 71 BPM | DIASTOLIC BLOOD PRESSURE: 64 MMHG | OXYGEN SATURATION: 97 %

## 2024-02-11 DIAGNOSIS — D49.4 BLADDER TUMOR: ICD-10-CM

## 2024-02-11 LAB
ALBUMIN SERPL BCP-MCNC: 3.8 G/DL (ref 3.2–4.9)
ALBUMIN/GLOB SERPL: 1.4 G/DL
ALP SERPL-CCNC: 92 U/L (ref 30–99)
ALT SERPL-CCNC: 9 U/L (ref 2–50)
ANION GAP SERPL CALC-SCNC: 11 MMOL/L (ref 7–16)
AST SERPL-CCNC: 18 U/L (ref 12–45)
BASOPHILS # BLD AUTO: 0.6 % (ref 0–1.8)
BASOPHILS # BLD: 0.03 K/UL (ref 0–0.12)
BILIRUB SERPL-MCNC: 0.4 MG/DL (ref 0.1–1.5)
BUN SERPL-MCNC: 17 MG/DL (ref 8–22)
CALCIUM ALBUM COR SERPL-MCNC: 9.3 MG/DL (ref 8.5–10.5)
CALCIUM SERPL-MCNC: 9.1 MG/DL (ref 8.5–10.5)
CHLORIDE SERPL-SCNC: 103 MMOL/L (ref 96–112)
CO2 SERPL-SCNC: 24 MMOL/L (ref 20–33)
CREAT SERPL-MCNC: 1.03 MG/DL (ref 0.5–1.4)
EOSINOPHIL # BLD AUTO: 0.13 K/UL (ref 0–0.51)
EOSINOPHIL NFR BLD: 2.6 % (ref 0–6.9)
ERYTHROCYTE [DISTWIDTH] IN BLOOD BY AUTOMATED COUNT: 39.6 FL (ref 35.9–50)
GFR SERPLBLD CREATININE-BSD FMLA CKD-EPI: 75 ML/MIN/1.73 M 2
GLOBULIN SER CALC-MCNC: 2.7 G/DL (ref 1.9–3.5)
GLUCOSE SERPL-MCNC: 109 MG/DL (ref 65–99)
HCT VFR BLD AUTO: 35 % (ref 42–52)
HGB BLD-MCNC: 12.4 G/DL (ref 14–18)
IMM GRANULOCYTES # BLD AUTO: 0.07 K/UL (ref 0–0.11)
IMM GRANULOCYTES NFR BLD AUTO: 1.4 % (ref 0–0.9)
LYMPHOCYTES # BLD AUTO: 0.75 K/UL (ref 1–4.8)
LYMPHOCYTES NFR BLD: 14.9 % (ref 22–41)
MAGNESIUM SERPL-MCNC: 1.8 MG/DL (ref 1.5–2.5)
MCH RBC QN AUTO: 31.4 PG (ref 27–33)
MCHC RBC AUTO-ENTMCNC: 35.4 G/DL (ref 32.3–36.5)
MCV RBC AUTO: 88.6 FL (ref 81.4–97.8)
MONOCYTES # BLD AUTO: 0.44 K/UL (ref 0–0.85)
MONOCYTES NFR BLD AUTO: 8.7 % (ref 0–13.4)
NEUTROPHILS # BLD AUTO: 3.62 K/UL (ref 1.82–7.42)
NEUTROPHILS NFR BLD: 71.8 % (ref 44–72)
NRBC # BLD AUTO: 0 K/UL
NRBC BLD-RTO: 0 /100 WBC (ref 0–0.2)
OUTPT INFUS CBC COMMENT OICOM: ABNORMAL
PLATELET # BLD AUTO: 178 K/UL (ref 164–446)
PMV BLD AUTO: 10 FL (ref 9–12.9)
POTASSIUM SERPL-SCNC: 4.1 MMOL/L (ref 3.6–5.5)
PROT SERPL-MCNC: 6.5 G/DL (ref 6–8.2)
RBC # BLD AUTO: 3.95 M/UL (ref 4.7–6.1)
SODIUM SERPL-SCNC: 138 MMOL/L (ref 135–145)
WBC # BLD AUTO: 5 K/UL (ref 4.8–10.8)

## 2024-02-11 PROCEDURE — 36415 COLL VENOUS BLD VENIPUNCTURE: CPT

## 2024-02-11 PROCEDURE — 83735 ASSAY OF MAGNESIUM: CPT

## 2024-02-11 PROCEDURE — 80053 COMPREHEN METABOLIC PANEL: CPT

## 2024-02-11 PROCEDURE — 85025 COMPLETE CBC W/AUTO DIFF WBC: CPT

## 2024-02-11 NOTE — PROGRESS NOTES
"Pharmacy Chemotherapy Calculations    Dx: Bladder Cancer  Cycle 2, Day 1  Previous treatment = C1 1/29/24    Protocol:DDMVAC Dose Dense Methotrexate + VinBLAStine + DOXOrubicin + CISplatin    Methotrexate 30 mg/m² IV push on Day 1   VinBLAStine 3 mg/m² IV over 5 - 10 minutes on Day 1 or on Day 2   DOXOrubicin 30 mg/m² IV push on Day 1 or on Day 2   CISplatin 70 mg/m² IV over 2 hours on Day 1 or on Day 2    14-day cycle for 3 - 6 cycles (neoadjuvant or adjuvant) or 4 - 6 cycles (locally advanced or metastatic)    NCCNGuidelines® for Bladder Cancer V.1.2023.   Alondra ER , et al. J Clin Oncol. 2014;32(18):1895-901.a   Kaycee SUE, et al. Eur J Cancer. 2006;42(1):50-4.a   Kaycee SUE, et al. J Clin Oncol. 2001;19(10):2638-46.a  Jose Miguel HB , et al. N Engl J Med. 2003;349(9):859-66.a   Dante T  et al. N Engl J Med. 2020;383(33):2483.a    Allergies:  Patient has no known allergies.       /63   Pulse 79   Temp 36.1 °C (97 °F) (Temporal)   Resp 18   Ht 1.737 m (5' 8.39\")   Wt 76.3 kg (168 lb 3.4 oz)   SpO2 97%   BMI 25.29 kg/m²  Body surface area is 1.92 meters squared.    ECHO:  1/9/24= LVEF 65%    Labs 2/11/24:  ANC~ 3620 Plt = 178k   Hgb = 12.4     SCr = 1.03 mg/dL CrCl ~ 66 mL/min   AST/ALT/AP = 18/9/92 TBili = 0.4   K = 4.1  Mag = 1.8    Methotrexate 30 mg/m² x 1.92 m² = 57.6 mg   <10% difference, OK to treat with final dose = 58.5 mg    VinBLAStine 3 mg/m² x 1.92 m² = 5.76 mg   <10% difference, OK to treat with final dose = 5.9 mg    DOXOrubicin 30 mg/m² x 1.92 m² = 57.6  mg   <10% difference, OK to treat with final dose = 58 mg    CISplatin 70 mg/m² x 1.92 m² = 134.4 mg   <10% difference, OK to treat with final dose = 137 mg    Kyle Holt, PharmD   "

## 2024-02-11 NOTE — PROGRESS NOTES
Patient arrived to unit for Labs. He denies any signs or symptoms, only chronic abdominal pain. VSS. Labs drawn peripherally.    Future appointments confirmed. Patient discharged home in stable condition.

## 2024-02-12 ENCOUNTER — APPOINTMENT (OUTPATIENT)
Dept: HEMATOLOGY ONCOLOGY | Facility: MEDICAL CENTER | Age: 79
End: 2024-02-12
Payer: MEDICARE

## 2024-02-12 ENCOUNTER — OUTPATIENT INFUSION SERVICES (OUTPATIENT)
Dept: ONCOLOGY | Facility: MEDICAL CENTER | Age: 79
End: 2024-02-12
Attending: STUDENT IN AN ORGANIZED HEALTH CARE EDUCATION/TRAINING PROGRAM
Payer: MEDICARE

## 2024-02-12 VITALS
HEIGHT: 68 IN | OXYGEN SATURATION: 97 % | SYSTOLIC BLOOD PRESSURE: 131 MMHG | BODY MASS INDEX: 25.49 KG/M2 | RESPIRATION RATE: 18 BRPM | HEART RATE: 79 BPM | WEIGHT: 168.21 LBS | TEMPERATURE: 97 F | DIASTOLIC BLOOD PRESSURE: 63 MMHG

## 2024-02-12 DIAGNOSIS — D49.4 BLADDER TUMOR: ICD-10-CM

## 2024-02-12 PROCEDURE — 96366 THER/PROPH/DIAG IV INF ADDON: CPT

## 2024-02-12 PROCEDURE — 96375 TX/PRO/DX INJ NEW DRUG ADDON: CPT

## 2024-02-12 PROCEDURE — 700105 HCHG RX REV CODE 258: Performed by: NURSE PRACTITIONER

## 2024-02-12 PROCEDURE — A4212 NON CORING NEEDLE OR STYLET: HCPCS

## 2024-02-12 PROCEDURE — 700111 HCHG RX REV CODE 636 W/ 250 OVERRIDE (IP): Mod: JZ | Performed by: NURSE PRACTITIONER

## 2024-02-12 PROCEDURE — 96367 TX/PROPH/DG ADDL SEQ IV INF: CPT

## 2024-02-12 PROCEDURE — 96413 CHEMO IV INFUSION 1 HR: CPT

## 2024-02-12 PROCEDURE — 96415 CHEMO IV INFUSION ADDL HR: CPT

## 2024-02-12 PROCEDURE — 96417 CHEMO IV INFUS EACH ADDL SEQ: CPT

## 2024-02-12 RX ORDER — SODIUM CHLORIDE 9 MG/ML
1000 INJECTION, SOLUTION INTRAVENOUS ONCE
Status: COMPLETED | OUTPATIENT
Start: 2024-02-12 | End: 2024-02-12

## 2024-02-12 RX ADMIN — VINBLASTINE SULFATE 5.9 MG: 1 INJECTION INTRAVENOUS at 13:02

## 2024-02-12 RX ADMIN — METHOTREXATE 58.5 MG: 25 INJECTION INTRA-ARTERIAL; INTRAMUSCULAR; INTRATHECAL; INTRAVENOUS at 12:39

## 2024-02-12 RX ADMIN — SODIUM CHLORIDE 1000 ML: 9 INJECTION, SOLUTION INTRAVENOUS at 11:30

## 2024-02-12 RX ADMIN — SODIUM CHLORIDE 137 MG: 9 INJECTION, SOLUTION INTRAVENOUS at 14:24

## 2024-02-12 RX ADMIN — MAGNESIUM SULFATE HEPTAHYDRATE: 500 INJECTION, SOLUTION INTRAMUSCULAR; INTRAVENOUS at 16:24

## 2024-02-12 RX ADMIN — SODIUM CHLORIDE 150 MG: 9 INJECTION, SOLUTION INTRAVENOUS at 11:56

## 2024-02-12 RX ADMIN — DEXAMETHASONE SODIUM PHOSPHATE 12 MG: 4 INJECTION, SOLUTION INTRA-ARTICULAR; INTRALESIONAL; INTRAMUSCULAR; INTRAVENOUS; SOFT TISSUE at 11:26

## 2024-02-12 RX ADMIN — HEPARIN 500 UNITS: 100 SYRINGE at 18:30

## 2024-02-12 RX ADMIN — ONDANSETRON 16 MG: 2 INJECTION INTRAMUSCULAR; INTRAVENOUS at 11:39

## 2024-02-12 RX ADMIN — DOXORUBICIN HYDROCHLORIDE 58 MG: 2 INJECTION, SOLUTION INTRAVENOUS at 13:40

## 2024-02-12 ASSESSMENT — PAIN DESCRIPTION - PAIN TYPE: TYPE: CHRONIC PAIN

## 2024-02-12 ASSESSMENT — FIBROSIS 4 INDEX: FIB4 SCORE: 2.56

## 2024-02-12 NOTE — PROGRESS NOTES
Ronal arrives ambulatory to IS for Day 1 cycle 2 DDMVAC for bladder tumor.  Ronal denies any new or acute changes, reports tolerating first cycle well.  Emla placed to right upper chest port PTA.  Port accessed in sterile fashion, brisk blood return observed.  Labs collected 2/11/2024 reviewed, parameters met for treatment today.  Pre-hydration and pre-medications administered per MAR.  Chemotherapy infused as prescribed.  Post hydration administered over 2 hours.  Ronal tolerated all treatments well.  Port flushed with NS, heparin instilled.  Port de-accessed, gauze and tape to site.  Ronal discharged in NAD, next appointment confirmed.

## 2024-02-12 NOTE — PROGRESS NOTES
Chemotherapy Verification - SECONDARY RN       Height = 173.7 cm  Weight = 76.3 kg  BSA = 1.92 m2       Medication: Methotrexate  Dose: 30 mg/m2  Calculated Dose: 57.6 mg                             (In mg/m2, AUC, mg/kg)     Medication: Vinblastine  Dose: 3 mg/m2  Calculated Dose: 5.76 mg                             (In mg/m2, AUC, mg/kg)    Medication: Doxorubicin  Dose: 30 mg/m2  Calculated Dose: 57.6 mg                             (In mg/m2, AUC, mg/kg)    Medication: Cisplatin  Dose: 70 mg/m2  Calculated Dose: 134.4 mg                             (In mg/m2, AUC, mg/kg)    I confirm that this process was performed independently.

## 2024-02-12 NOTE — PROGRESS NOTES
Chemotherapy Verification - PRIMARY RN      Height = 173.7 cm  Weight = 76.3 kg  BSA = 1.92 m2       Medication: methotrexate  Dose: 30 mg/m2  Calculated Dose: 57.6 mg                             (In mg/m2, AUC, mg/kg)     Medication: vinblastine  Dose: 3 mg/m2  Calculated Dose: 5.76 mg                             (In mg/m2, AUC, mg/kg)    Medication: doxorubicin  Dose: 30 mg/m2  Calculated Dose: 57.6 mg                             (In mg/m2, AUC, mg/kg)    Medication: cisplatin  Dose: 70 mg/m2  Calculated Dose: 134.4 mg                             (In mg/m2, AUC, mg/kg)          I confirm this process was performed independently with the BSA and all final chemotherapy dosing calculations congruent.  Any discrepancies of 10% or greater have been addressed with the chemotherapy pharmacist. The resolution of the discrepancy has been documented in the EPIC progress notes.

## 2024-02-13 ENCOUNTER — OUTPATIENT INFUSION SERVICES (OUTPATIENT)
Dept: ONCOLOGY | Facility: MEDICAL CENTER | Age: 79
End: 2024-02-13
Attending: STUDENT IN AN ORGANIZED HEALTH CARE EDUCATION/TRAINING PROGRAM
Payer: MEDICARE

## 2024-02-13 VITALS
TEMPERATURE: 97.4 F | DIASTOLIC BLOOD PRESSURE: 59 MMHG | OXYGEN SATURATION: 94 % | HEART RATE: 88 BPM | RESPIRATION RATE: 18 BRPM | SYSTOLIC BLOOD PRESSURE: 137 MMHG

## 2024-02-13 DIAGNOSIS — D49.4 BLADDER TUMOR: ICD-10-CM

## 2024-02-13 PROCEDURE — 96372 THER/PROPH/DIAG INJ SC/IM: CPT

## 2024-02-13 PROCEDURE — 700111 HCHG RX REV CODE 636 W/ 250 OVERRIDE (IP): Mod: JZ | Performed by: NURSE PRACTITIONER

## 2024-02-13 RX ADMIN — PEGFILGRASTIM-CBQV 6 MG: 6 INJECTION, SOLUTION SUBCUTANEOUS at 17:42

## 2024-02-13 ASSESSMENT — PAIN DESCRIPTION - PAIN TYPE: TYPE: CHRONIC PAIN

## 2024-02-14 NOTE — PROGRESS NOTES
Pt presented to Infusion for Udenyca injection. POC discussed and pt verbalized understanding. Pt  denies pain or s/s of acute illness today. Udenyca injection administered per MAR, band-aid applied to site and pt tolerated well. No s/s of reactions or complications noted. Pt educated on when to contact provider and pt verbalized understanding. Future appts confirmed and pt confirmed MyChart access to view appts. Pt discharged to self care in NAD. Pt accompanied by family.

## 2024-02-19 RX ORDER — 0.9 % SODIUM CHLORIDE 0.9 %
10 VIAL (ML) INJECTION PRN
Status: CANCELLED | OUTPATIENT
Start: 2024-02-26

## 2024-02-19 RX ORDER — DIPHENHYDRAMINE HYDROCHLORIDE 50 MG/ML
50 INJECTION INTRAMUSCULAR; INTRAVENOUS PRN
Status: CANCELLED | OUTPATIENT
Start: 2024-02-26

## 2024-02-19 RX ORDER — 0.9 % SODIUM CHLORIDE 0.9 %
VIAL (ML) INJECTION PRN
Status: CANCELLED | OUTPATIENT
Start: 2024-02-25

## 2024-02-19 RX ORDER — ONDANSETRON 8 MG/1
8 TABLET, ORALLY DISINTEGRATING ORAL PRN
Status: CANCELLED | OUTPATIENT
Start: 2024-02-26

## 2024-02-19 RX ORDER — ONDANSETRON 2 MG/ML
4 INJECTION INTRAMUSCULAR; INTRAVENOUS PRN
Status: CANCELLED | OUTPATIENT
Start: 2024-02-26

## 2024-02-19 RX ORDER — 0.9 % SODIUM CHLORIDE 0.9 %
3 VIAL (ML) INJECTION PRN
Status: CANCELLED | OUTPATIENT
Start: 2024-02-25

## 2024-02-19 RX ORDER — 0.9 % SODIUM CHLORIDE 0.9 %
3 VIAL (ML) INJECTION PRN
Status: CANCELLED | OUTPATIENT
Start: 2024-02-26

## 2024-02-19 RX ORDER — SODIUM CHLORIDE 9 MG/ML
1000 INJECTION, SOLUTION INTRAVENOUS ONCE
Status: CANCELLED | OUTPATIENT
Start: 2024-02-26

## 2024-02-19 RX ORDER — SODIUM CHLORIDE 9 MG/ML
INJECTION, SOLUTION INTRAVENOUS CONTINUOUS
Status: CANCELLED | OUTPATIENT
Start: 2024-02-26

## 2024-02-19 RX ORDER — PROCHLORPERAZINE MALEATE 10 MG
10 TABLET ORAL EVERY 6 HOURS PRN
Status: CANCELLED | OUTPATIENT
Start: 2024-02-26

## 2024-02-19 RX ORDER — 0.9 % SODIUM CHLORIDE 0.9 %
VIAL (ML) INJECTION PRN
Status: CANCELLED | OUTPATIENT
Start: 2024-02-26

## 2024-02-19 RX ORDER — METHYLPREDNISOLONE SODIUM SUCCINATE 125 MG/2ML
125 INJECTION, POWDER, LYOPHILIZED, FOR SOLUTION INTRAMUSCULAR; INTRAVENOUS PRN
Status: CANCELLED | OUTPATIENT
Start: 2024-02-26

## 2024-02-19 RX ORDER — 0.9 % SODIUM CHLORIDE 0.9 %
10 VIAL (ML) INJECTION PRN
Status: CANCELLED | OUTPATIENT
Start: 2024-02-25

## 2024-02-19 RX ORDER — EPINEPHRINE 1 MG/ML(1)
0.5 AMPUL (ML) INJECTION PRN
Status: CANCELLED | OUTPATIENT
Start: 2024-02-26

## 2024-02-24 NOTE — PROGRESS NOTES
"Pharmacy Chemotherapy Calculations    Dx: Bladder Cancer  Cycle 2, Day 1  Previous treatment = C1 2/12/24    Protocol:DDMVAC Dose Dense Methotrexate + VinBLAStine + DOXOrubicin + CISplatin    Methotrexate 30 mg/m² IV push on Day 1   VinBLAStine 3 mg/m² IV over 5 - 10 minutes on Day 1 or on Day 2   DOXOrubicin 30 mg/m² IV push on Day 1 or on Day 2   CISplatin 70 mg/m² IV over 2 hours on Day 1 or on Day 2    14-day cycle for 3 - 6 cycles (neoadjuvant or adjuvant) or 4 - 6 cycles (locally advanced or metastatic)    NCCNGuidelines® for Bladder Cancer V.1.2023.   Alondra ER , et al. J Clin Oncol. 2014;32(18):1895-901.a   Kaycee SUE, et al. Eur J Cancer. 2006;42(1):50-4.a   Kaycee SUE, et al. J Clin Oncol. 2001;19(10):2638-46.a  Jose Miguel HB , et al. N Engl J Med. 2003;349(9):859-66.a   Dante T  et al. N Engl J Med. 2020;383(25):2483.a    Allergies:  Patient has no known allergies.       /62   Pulse 79   Temp 36.1 °C (97 °F) (Temporal)   Resp 18   Ht 1.737 m (5' 8.39\")   Wt 74 kg (163 lb 2.3 oz)   SpO2 98%   BMI 24.53 kg/m²  Body surface area is 1.89 meters squared.    ECHO:  1/9/24 = LVEF 65%    Labs 2/25/24:  ANC~ 5960 Plt = 181k   Hgb = 11.3     SCr = 0.82 mg/dL CrCl ~ 80 mL/min   AST/ALT/AP = 17/22/115 TBili = 0.2     Methotrexate 30 mg/m² x 1.89 m² = 56.7 mg   <10% difference, OK to treat with final dose = 58.5 mg    VinBLAStine 3 mg/m² x 1.89 m² = 5.67 mg   <10% difference, OK to treat with final dose = 5.9 mg    DOXOrubicin 30 mg/m² x 1.89 m² = 56.7 mg   <10% difference, OK to treat with final dose = 58 mg    CISplatin 70 mg/m² x 1.89 m² = 132.3 mg   <10% difference, OK to treat with final dose = 137 mg    Kyle Holt, PharmD   "

## 2024-02-25 ENCOUNTER — OUTPATIENT INFUSION SERVICES (OUTPATIENT)
Dept: ONCOLOGY | Facility: MEDICAL CENTER | Age: 79
End: 2024-02-25
Attending: STUDENT IN AN ORGANIZED HEALTH CARE EDUCATION/TRAINING PROGRAM
Payer: MEDICARE

## 2024-02-25 VITALS
RESPIRATION RATE: 18 BRPM | HEART RATE: 76 BPM | OXYGEN SATURATION: 98 % | DIASTOLIC BLOOD PRESSURE: 54 MMHG | TEMPERATURE: 97.6 F | SYSTOLIC BLOOD PRESSURE: 109 MMHG

## 2024-02-25 DIAGNOSIS — D49.4 BLADDER TUMOR: ICD-10-CM

## 2024-02-25 LAB
ALBUMIN SERPL BCP-MCNC: 3.6 G/DL (ref 3.2–4.9)
ALBUMIN/GLOB SERPL: 1.3 G/DL
ALP SERPL-CCNC: 115 U/L (ref 30–99)
ALT SERPL-CCNC: 22 U/L (ref 2–50)
ANION GAP SERPL CALC-SCNC: 10 MMOL/L (ref 7–16)
AST SERPL-CCNC: 17 U/L (ref 12–45)
BASOPHILS # BLD AUTO: 0.9 % (ref 0–1.8)
BASOPHILS # BLD: 0.08 K/UL (ref 0–0.12)
BILIRUB SERPL-MCNC: 0.2 MG/DL (ref 0.1–1.5)
BUN SERPL-MCNC: 16 MG/DL (ref 8–22)
CALCIUM ALBUM COR SERPL-MCNC: 9.3 MG/DL (ref 8.5–10.5)
CALCIUM SERPL-MCNC: 9 MG/DL (ref 8.5–10.5)
CHLORIDE SERPL-SCNC: 100 MMOL/L (ref 96–112)
CO2 SERPL-SCNC: 25 MMOL/L (ref 20–33)
CREAT SERPL-MCNC: 0.82 MG/DL (ref 0.5–1.4)
EOSINOPHIL # BLD AUTO: 0.07 K/UL (ref 0–0.51)
EOSINOPHIL NFR BLD: 0.8 % (ref 0–6.9)
ERYTHROCYTE [DISTWIDTH] IN BLOOD BY AUTOMATED COUNT: 42.4 FL (ref 35.9–50)
GFR SERPLBLD CREATININE-BSD FMLA CKD-EPI: 91 ML/MIN/1.73 M 2
GLOBULIN SER CALC-MCNC: 2.8 G/DL (ref 1.9–3.5)
GLUCOSE SERPL-MCNC: 97 MG/DL (ref 65–99)
HCT VFR BLD AUTO: 32.9 % (ref 42–52)
HGB BLD-MCNC: 11.3 G/DL (ref 14–18)
IMM GRANULOCYTES # BLD AUTO: 0.74 K/UL (ref 0–0.11)
IMM GRANULOCYTES NFR BLD AUTO: 8.5 % (ref 0–0.9)
LYMPHOCYTES # BLD AUTO: 0.98 K/UL (ref 1–4.8)
LYMPHOCYTES NFR BLD: 11.3 % (ref 22–41)
MAGNESIUM SERPL-MCNC: 1.8 MG/DL (ref 1.5–2.5)
MCH RBC QN AUTO: 30.9 PG (ref 27–33)
MCHC RBC AUTO-ENTMCNC: 34.3 G/DL (ref 32.3–36.5)
MCV RBC AUTO: 89.9 FL (ref 81.4–97.8)
MONOCYTES # BLD AUTO: 0.84 K/UL (ref 0–0.85)
MONOCYTES NFR BLD AUTO: 9.7 % (ref 0–13.4)
NEUTROPHILS # BLD AUTO: 5.96 K/UL (ref 1.82–7.42)
NEUTROPHILS NFR BLD: 68.8 % (ref 44–72)
NRBC # BLD AUTO: 0.02 K/UL
NRBC BLD-RTO: 0.2 /100 WBC (ref 0–0.2)
OUTPT INFUS CBC COMMENT OICOM: ABNORMAL
PLATELET # BLD AUTO: 181 K/UL (ref 164–446)
PMV BLD AUTO: 10.1 FL (ref 9–12.9)
POTASSIUM SERPL-SCNC: 4.5 MMOL/L (ref 3.6–5.5)
PROT SERPL-MCNC: 6.4 G/DL (ref 6–8.2)
RBC # BLD AUTO: 3.66 M/UL (ref 4.7–6.1)
SODIUM SERPL-SCNC: 135 MMOL/L (ref 135–145)
WBC # BLD AUTO: 8.7 K/UL (ref 4.8–10.8)

## 2024-02-25 PROCEDURE — 36415 COLL VENOUS BLD VENIPUNCTURE: CPT

## 2024-02-25 PROCEDURE — 80053 COMPREHEN METABOLIC PANEL: CPT

## 2024-02-25 PROCEDURE — 85025 COMPLETE CBC W/AUTO DIFF WBC: CPT

## 2024-02-25 PROCEDURE — 83735 ASSAY OF MAGNESIUM: CPT

## 2024-02-25 NOTE — PROGRESS NOTES
Pt arrived ambulatory for lab draw, denies c/o, states he tolerated last cycle of chemo well, having hair loss.  Labs drawn from LAC via butterfly needle, gauze and coban applied after.  Pt Dc'd home without incident and will f/u tomorrow as scheduled to see Dr. Osborn followed by chemo infusion.

## 2024-02-26 ENCOUNTER — HOSPITAL ENCOUNTER (OUTPATIENT)
Dept: HEMATOLOGY ONCOLOGY | Facility: MEDICAL CENTER | Age: 79
End: 2024-02-26
Attending: STUDENT IN AN ORGANIZED HEALTH CARE EDUCATION/TRAINING PROGRAM
Payer: MEDICARE

## 2024-02-26 ENCOUNTER — OUTPATIENT INFUSION SERVICES (OUTPATIENT)
Dept: ONCOLOGY | Facility: MEDICAL CENTER | Age: 79
End: 2024-02-26
Attending: STUDENT IN AN ORGANIZED HEALTH CARE EDUCATION/TRAINING PROGRAM
Payer: MEDICARE

## 2024-02-26 VITALS
HEIGHT: 68 IN | OXYGEN SATURATION: 99 % | SYSTOLIC BLOOD PRESSURE: 104 MMHG | BODY MASS INDEX: 25.03 KG/M2 | WEIGHT: 165.12 LBS | TEMPERATURE: 99.5 F | DIASTOLIC BLOOD PRESSURE: 48 MMHG | HEART RATE: 81 BPM

## 2024-02-26 VITALS
WEIGHT: 163.14 LBS | RESPIRATION RATE: 18 BRPM | OXYGEN SATURATION: 98 % | HEIGHT: 68 IN | BODY MASS INDEX: 24.73 KG/M2 | SYSTOLIC BLOOD PRESSURE: 118 MMHG | HEART RATE: 79 BPM | DIASTOLIC BLOOD PRESSURE: 62 MMHG | TEMPERATURE: 97 F

## 2024-02-26 DIAGNOSIS — D49.4 BLADDER TUMOR: ICD-10-CM

## 2024-02-26 DIAGNOSIS — Z79.899 ENCOUNTER FOR LONG-TERM (CURRENT) USE OF HIGH-RISK MEDICATION: ICD-10-CM

## 2024-02-26 PROCEDURE — 96415 CHEMO IV INFUSION ADDL HR: CPT

## 2024-02-26 PROCEDURE — 96411 CHEMO IV PUSH ADDL DRUG: CPT

## 2024-02-26 PROCEDURE — 96413 CHEMO IV INFUSION 1 HR: CPT

## 2024-02-26 PROCEDURE — 96375 TX/PRO/DX INJ NEW DRUG ADDON: CPT

## 2024-02-26 PROCEDURE — 700111 HCHG RX REV CODE 636 W/ 250 OVERRIDE (IP): Mod: JZ | Performed by: NURSE PRACTITIONER

## 2024-02-26 PROCEDURE — 96366 THER/PROPH/DIAG IV INF ADDON: CPT

## 2024-02-26 PROCEDURE — 99214 OFFICE O/P EST MOD 30 MIN: CPT | Performed by: STUDENT IN AN ORGANIZED HEALTH CARE EDUCATION/TRAINING PROGRAM

## 2024-02-26 PROCEDURE — 96417 CHEMO IV INFUS EACH ADDL SEQ: CPT

## 2024-02-26 PROCEDURE — 96367 TX/PROPH/DG ADDL SEQ IV INF: CPT

## 2024-02-26 PROCEDURE — 700105 HCHG RX REV CODE 258: Performed by: NURSE PRACTITIONER

## 2024-02-26 PROCEDURE — 99212 OFFICE O/P EST SF 10 MIN: CPT | Performed by: STUDENT IN AN ORGANIZED HEALTH CARE EDUCATION/TRAINING PROGRAM

## 2024-02-26 PROCEDURE — A4212 NON CORING NEEDLE OR STYLET: HCPCS

## 2024-02-26 RX ORDER — SODIUM CHLORIDE 9 MG/ML
1000 INJECTION, SOLUTION INTRAVENOUS ONCE
Status: COMPLETED | OUTPATIENT
Start: 2024-02-26 | End: 2024-02-26

## 2024-02-26 RX ADMIN — MAGNESIUM SULFATE HEPTAHYDRATE: 500 INJECTION, SOLUTION INTRAMUSCULAR; INTRAVENOUS at 13:55

## 2024-02-26 RX ADMIN — HEPARIN 500 UNITS: 100 SYRINGE at 16:05

## 2024-02-26 RX ADMIN — FOSAPREPITANT 150 MG: 150 INJECTION, POWDER, LYOPHILIZED, FOR SOLUTION INTRAVENOUS at 09:34

## 2024-02-26 RX ADMIN — DEXAMETHASONE SODIUM PHOSPHATE 12 MG: 4 INJECTION, SOLUTION INTRA-ARTICULAR; INTRALESIONAL; INTRAMUSCULAR; INTRAVENOUS; SOFT TISSUE at 09:05

## 2024-02-26 RX ADMIN — VINBLASTINE SULFATE 5.9 MG: 1 INJECTION INTRAVENOUS at 10:52

## 2024-02-26 RX ADMIN — ONDANSETRON 16 MG: 2 INJECTION INTRAMUSCULAR; INTRAVENOUS at 09:18

## 2024-02-26 RX ADMIN — METHOTREXATE 58.5 MG: 25 INJECTION, SOLUTION INTRA-ARTERIAL; INTRAMUSCULAR; INTRATHECAL; INTRAVENOUS at 10:27

## 2024-02-26 RX ADMIN — DOXORUBICIN HYDROCHLORIDE 58 MG: 2 INJECTION, SOLUTION INTRAVENOUS at 11:25

## 2024-02-26 RX ADMIN — CISPLATIN 137 MG: 1 INJECTION INTRAVENOUS at 11:54

## 2024-02-26 RX ADMIN — SODIUM CHLORIDE 1000 ML: 9 INJECTION, SOLUTION INTRAVENOUS at 09:04

## 2024-02-26 ASSESSMENT — ENCOUNTER SYMPTOMS
COUGH: 0
FEVER: 0
BLURRED VISION: 0
SHORTNESS OF BREATH: 0
VOMITING: 0
PALPITATIONS: 0
NECK PAIN: 0
HEADACHES: 0
BRUISES/BLEEDS EASILY: 0
NAUSEA: 0
DEPRESSION: 0
FLANK PAIN: 1
ABDOMINAL PAIN: 0
SENSORY CHANGE: 0
WHEEZING: 0
WEIGHT LOSS: 0
HEARTBURN: 0
MEMORY LOSS: 0
ORTHOPNEA: 0
TINGLING: 0
FOCAL WEAKNESS: 0
DIZZINESS: 0
CHILLS: 0
SORE THROAT: 0
TREMORS: 0
SPUTUM PRODUCTION: 0

## 2024-02-26 ASSESSMENT — FIBROSIS 4 INDEX
FIB4 SCORE: 1.52
FIB4 SCORE: 1.52

## 2024-02-26 NOTE — PROGRESS NOTES
"Pharmacy Chemotherapy Calculation:    Dx: High-grade urothelial carcinoma       Protocol: DDMVAC (Dose-dense Methotrexate/VinBLAStine/DOXOrubicin/CISplatin)    *Dosing Reference*   Methotrexate 30 mg/m² IV push on Day 1   VinBLAStine 3 mg/m² IV over 5 - 10 minutes on Day 1 or on Day 2   DOXOrubicin 30 mg/m² IV push on Day 1 or on Day 2   CISplatin 70 mg/m² IV over 2 hours on Day 1 or on Day 2  14-day cycle for 3 - 6 cycles (neoadjuvant or adjuvant) or 4 - 6 cycles (locally advanced or metastatic)     NCCN Guidelines® for Bladder Cancer V.1.2023.   Alondra HORNE, et al. J Clin Oncol. 2014;32(18):1895-901.a   Kaycee SUE, et al. Eur J Cancer. 2006;42(1):50-4.a   Kaycee SUE, et al. J Clin Oncol. 2001;19(10):2638-46.a   Jose Miguel HB , et al. N Engl J Med. 2003;349(9):859-66.a   Dante T et al. N Engl J Med. 2020;383(34):2483.a    Allergies:  Patient has no known allergies.       /62   Pulse 79   Temp 36.1 °C (97 °F) (Temporal)   Resp 18   Ht 1.737 m (5' 8.39\")   Wt 74 kg (163 lb 2.3 oz)   SpO2 98%   BMI 24.53 kg/m²  Body surface area is 1.89 meters squared.    Labs 2/25/24:  ANC~ 5960 Plt = 181k   Hgb = 11.3     SCr = 0.82 mg/dL CrCl ~ 80 mL/min   AST/ALT/AP = 17/22/115 TBili = 0.2  Mag = 1.8  K+ = 4.5       01/09/24 16:19   Left Ventrical Ejection Fraction 65     Drug Order   (Drug name, dose, route, IV Fluid & volume, frequency, number of doses) Cycle 3  Previous treatment: C2 on 2/12/24     Medication = Methotrexate  Base Dose = 30 mg/m  Calc Dose: Base Dose x 1.89 m2 = 56.7 mg  Final Dose = 58.5 mg  Route = IV  Fluid & Volume = NS 50 mL  Admin Duration = Over 15 minutes          <10% difference, OK to treat with final dose   Medication = VinBLAStine  Base Dose = 3 mg/m2  Calc Dose: Base Dose x 1.89 m2 = 5.67 mg  Final Dose = 5.9 mg  Route = IV  Fluid & Volume = NS 25 mL  Admin Duration = Over 10 minutes          <10% difference, OK to treat with final dose   Medication = DOXOrubicin  Base Dose = 30 " mg/m2  Calc Dose:Base Dose x 1.89 m2 = 56.7 mg  Final Dose = 58 mg  Route = IV  Fluid & Volume = 29 mL empty bag  Admin Duration = Over 20 minutes          <10% difference, OK to treat with final dose   Medication = CISplatin  Base Dose = 70 mg/m2  Calc Dose: Base Dose x 1.89 m2 = 132.3 mg  Final Dose = 137 mg  Route = IV  Fluid & Volume =  mL  Admin Duration = Over 2 hours          <10% difference, OK to treat with final dose     By my signature below, I confirm this process was performed independently with the BSA and all final chemotherapy dosing calculations congruent. I have reviewed the above chemotherapy order and that my calculation of the final dose and BSA (when applicable) corroborate those calculations of the  pharmacist. Discrepancies of 10% or greater in the written dose have been addressed and documented within the [facility] Progress notes.    Steffi Jeronimo, PharmD, BCOP

## 2024-02-26 NOTE — ADDENDUM NOTE
Encounter addended by: Jose Carlos Gould on: 2/26/2024 8:42 AM   Actions taken: Charge Capture section accepted

## 2024-02-26 NOTE — PROGRESS NOTES
Chemotherapy Verification - PRIMARY RN    C3 D1    Height = 173.7 cm  Weight = 74 kg  BSA = 1.89 m^2       Medication: Methotrexate PF  Dose: 30 mg/m^2  Calculated Dose: 56.7 mg                             (In mg/m2, AUC, mg/kg)     Medication: vinblastine  Dose: 3 mg/m^2  Calculated Dose: 5.67 mg                             (In mg/m2, AUC, mg/kg)    Medication: Doxorubicin (Adriamycin)  Dose: 30 mg/m^2  Calculated Dose: 56.7 mg                             (In mg/m2, AUC, mg/kg)    Medication: Cisplatin  Dose: 70 mg/m^2  Calculated Dose: 132.3 mg                             (In mg/m2, AUC, mg/kg)    I confirm this process was performed independently with the BSA and all final chemotherapy dosing calculations congruent.  Any discrepancies of 10% or greater have been addressed with the chemotherapy pharmacist. The resolution of the discrepancy has been documented in the EPIC progress notes.

## 2024-02-26 NOTE — PROGRESS NOTES
Chemotherapy Verification - SECONDARY RN       Height = 173.7 cm  Weight = 74 kg  BSA = 1.89 m2       Medication: methotrexate  Dose: 30 mg/m2  Calculated Dose: 56.7 mg                             (In mg/m2, AUC, mg/kg)     Medication: vinblastine  Dose: 3 mg/m2  Calculated Dose: 5.67 mg                             (In mg/m2, AUC, mg/kg)    Medication: doxorubicin  Dose: 30 mg/m2  Calculated Dose: 56.7 mg                             (In mg/m2, AUC, mg/kg)    Medication: cisplatin  Dose: 70 mg/m2  Calculated Dose: 132.3 mg                             (In mg/m2, AUC, mg/kg)      I confirm that this process was performed independently.

## 2024-02-26 NOTE — PROGRESS NOTES
Consult Note: Hematology/Oncology     Referring Provider:Agustín Cardoso MD  Primary Care:  Shakira Henriquez M.D.    Chief Complaint   Patient presents with    Cancer     Prechemo      Current Treatment:  01/29/24: C1D1 DDMVAC   2/12/24: C2D1 DDMVAC   2/26/24: C3D1 DDMVAC     Prior Treatment: None    Subjective:   History of Presenting Illness:  Ronal Hair is a 78 y.o. male with a past medical history of lymphoma who presents with a new diagnosis of T2N0 bladder cancer.    Patient reports that he has been struggling for months.  He notes that he has been having frequent urination for months.  He had a CT scan which did not reveal any evidence of stone in the setting of his pain.  He saw urologist and underwent a cystoscopy which revealed a mass in the bladder.  Patient was admitted for a TURBT on December 13.    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  He also has perineural invasion present.    Patient is here to discuss neoadjuvant chemotherapy.    He also works as a  for disabled individuals.  He reports that he can no longer work as he has to urinate every 15 minutes and has frequency and urgency.    He has port scheduled to be placed on 1/26/24    Echo completed on 1/9/24, and EF was 65%.     Interval History    He reports that he has been feeling sick after chemo.  He denies any vomiting, but feels nauseous right after the chemotherapy.  He says he is eating like horse.  He can eat everything.   No issues with port.     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    High cholesterol     Hypertension 01/12/2024    pt denies    Infectious disease     Lymphoma (HCC) 01/22/2016    Nephrolithiasis 01/06/2023    Osteomyelitis of left foot (HCC) 11/11/2020    Pain of toe 11/03/2020    Psychiatric problem 01/12/2024    anxiety related to diagnosis    Reactive airway disease without complication 03/18/2022    Reactive airway disease without complication 03/18/2022    Scoliosis     Snoring     Urinary incontinence 01/12/2024    wears depends when away from home        Past Surgical History:   Procedure Laterality Date    TURBT (TRANSURETHRAL RESECTION OF BLADDER TUMOR)  12/13/2023    Procedure: BIPOLAR TRANSURETHRAL RESECTION OF BLADDER TUMOR;  Surgeon: Addison Seymour M.D.;  Location: [facility];  Service: Urology    ID CYSTOSCOPY,INSERT URETERAL STENT Left 01/07/2023    Procedure: CYSTOSCOPY, WITH URETERAL STENT INSERTION;  Surgeon: Addison Seymour M.D.;  Location: [facility];  Service: Urology    ID CYSTO/URETERO/PYELOSCOPY, DX Left 01/07/2023    Procedure: URETEROSCOPY;  Surgeon: Addison Seymour M.D.;  Location: [facility];  Service: Urology    LASERTRIPSY Left 01/07/2023    Procedure: LITHOTRIPSY, USING LASER;  Surgeon: Addison Seymour M.D.;  Location: [facility];  Service: Urology    AORTOBIFEM BYPASS  11/08/2020    Procedure: CREATION, BYPASS, ARTERIAL, AORTA TO FEMORAL, BILATERAL;  Surgeon: Jimi Morrison M.D.;  Location: [facility];  Service: General    KNEE ARTHROSCOPY Left 1968    APPENDECTOMY CHILD      CATARACT EXTRACTION WITH IOL Bilateral     OTHER ABDOMINAL SURGERY      OTHER ORTHOPEDIC SURGERY      SHOULDER ARTHROSCOPY Left        Social History     Tobacco Use    Smoking status: Every Day     Current packs/day: 0.50     Average packs/day: 2.0 packs/day for 58.1 years (115.6 ttl pk-yrs)     Types: Cigarettes      Start date: 1/1/1966     Last attempt to quit: 11/3/2020    Smokeless tobacco: Never    Tobacco comments:     Pt states he is still smoking / only 1/2 a pack per day now    Vaping Use    Vaping Use: Never used   Substance Use Topics    Alcohol use: Not Currently    Drug use: Never        Family History   Problem Relation Age of Onset    Cancer Maternal Aunt         melanoma    Cancer Maternal Uncle         Throat cancer       No Known Allergies    Current Outpatient Medications   Medication Sig Dispense Refill    ondansetron (ZOFRAN) 4 MG Tab tablet Take 1 Tablet by mouth every four hours as needed for Nausea/Vomiting (for nausea, vomiting). 30 Tablet 6    prochlorperazine (COMPAZINE) 10 MG Tab Take 1 Tablet by mouth every 6 hours as needed (for nausea, vomiting). 30 Tablet 6    lidocaine-prilocaine (EMLA) 2.5-2.5 % Cream Apply to port one hour prior to access and cover with plastic wrap. 1 Each 3    NON SPECIFIED Laxative daily      Acetaminophen (TYLENOL PO) Take  by mouth.      ibuprofen (MOTRIN) 200 MG Tab Take 800 mg by mouth every 6 hours as needed for Inflammation.      tamsulosin (FLOMAX) 0.4 MG capsule Take 0.8 mg by mouth 1/2 hour after breakfast.      finasteride (PROSCAR) 5 MG Tab Take 5 mg by mouth every day.      tiotropium (SPIRIVA) 18 MCG Cap Inhale 1 puff by mouth once daily (Patient taking differently: Place 18 mcg into inhaler and inhale as needed (shortness or breath). Uses every 2-3 days) 30 Capsule 11    atorvastatin (LIPITOR) 40 MG Tab Take 1 Tablet by mouth every evening. 90 Tablet 3    Multiple Vitamins-Minerals (MULTIVITAMIN ADULTS 50+ PO) Take 1 Tablet by mouth every day.      oxyCODONE immediate-release (ROXICODONE) 5 MG Tab Take 5 mg by mouth at bedtime. (Patient not taking: Reported on 2/5/2024)      sildenafil citrate (VIAGRA) 25 MG Tab Take 0.5 Tablets by mouth 1 time a day as needed for Erectile Dysfunction. 12.5 mg = 1/2 tablet (Patient not taking: Reported on 1/29/2024) 10 Tablet  "5    senna-docusate (PERICOLACE OR SENOKOT S) 8.6-50 MG Tab Take 1 Tablet by mouth every day. (Patient not taking: Reported on 2/5/2024) 30 Tablet 0     No current facility-administered medications for this encounter.       Review of Systems   Constitutional:  Negative for chills, fever, malaise/fatigue and weight loss.   HENT:  Negative for congestion, ear pain, nosebleeds and sore throat.    Eyes:  Negative for blurred vision.   Respiratory:  Negative for cough, sputum production, shortness of breath and wheezing.    Cardiovascular:  Negative for chest pain, palpitations, orthopnea and leg swelling.   Gastrointestinal:  Negative for abdominal pain, heartburn, nausea and vomiting.   Genitourinary:  Positive for flank pain, frequency and urgency. Negative for dysuria.   Musculoskeletal:  Negative for neck pain.   Neurological:  Negative for dizziness, tingling, tremors, sensory change, focal weakness and headaches.   Endo/Heme/Allergies:  Does not bruise/bleed easily.   Psychiatric/Behavioral:  Negative for depression, memory loss and suicidal ideas.    All other systems reviewed and are negative.      Problem list, medications, and allergies reviewed by myself today in Epic.     Objective:     Vitals:    02/26/24 0818   BP: 104/48   BP Location: Right arm   Patient Position: Sitting   BP Cuff Size: Adult   Pulse: 81   Temp: 37.5 °C (99.5 °F)   TempSrc: Temporal   SpO2: 99%   Weight: 74.9 kg (165 lb 2 oz)   Height: 1.737 m (5' 8.39\")       DESC; KARNOFSKY SCALE WITH ECOG EQUIVALENT: 80, Normal activity with effort; some signs or symptoms of disease (ECOG equivalent 1)    DISTRESS LEVEL: no apparent distress    Physical Exam  Constitutional:       General: He is not in acute distress.     Appearance: Normal appearance.      Comments: Port in place   HENT:      Head: Normocephalic and atraumatic.      Nose: Nose normal. No congestion.      Mouth/Throat:      Mouth: Mucous membranes are moist.      Pharynx: Oropharynx " is clear.   Eyes:      General: No scleral icterus.     Conjunctiva/sclera: Conjunctivae normal.      Pupils: Pupils are equal, round, and reactive to light.   Cardiovascular:      Rate and Rhythm: Normal rate and regular rhythm.      Pulses: Normal pulses.      Heart sounds: No murmur heard.     No friction rub.   Pulmonary:      Effort: Pulmonary effort is normal. No respiratory distress.      Breath sounds: Normal breath sounds. No stridor. No wheezing or rales.   Chest:      Chest wall: No tenderness.   Abdominal:      General: Abdomen is flat. Bowel sounds are normal. There is no distension.      Palpations: Abdomen is soft. There is no mass.      Tenderness: There is no abdominal tenderness. There is no guarding or rebound.   Musculoskeletal:         General: No swelling, tenderness or deformity. Normal range of motion.      Cervical back: Normal range of motion and neck supple. No rigidity or tenderness.      Right lower leg: No edema.      Left lower leg: No edema.   Skin:     General: Skin is warm.      Coloration: Skin is not jaundiced or pale.      Findings: No bruising or rash.   Neurological:      General: No focal deficit present.      Mental Status: He is alert and oriented to person, place, and time. Mental status is at baseline.      Motor: No weakness.   Psychiatric:         Mood and Affect: Mood normal.         Behavior: Behavior normal.         Thought Content: Thought content normal.         Judgment: Judgment normal.         Labs:   Most recent labs reviewed.      Slightly anemic, no issues with Cr.      Imaging:   Most recent images below have been independently reviewed by me.     CT Chest  1. No evidence of metastatic disease.  2. No acute process.       CT AP  1. Severe irregular bladder wall thickening, consistent with bladder cancer. No regional adenopathy or distant metastatic disease identified.  2. 4 mm distal right ureteral stone.  3. Single bilateral nonobstructing renal stones  measuring 2 mm.  4. Multiple subcentimeter simple renal cysts, which do not require follow-up.  5. Prostate is enlarged, 5.1 cm in diameter.    Pathology:  A. Bladder tumor:          High-grade urothelial carcinoma with focal squamous           differentiation, invasive into muscularis propria (pT2).          Perineural invasion present.       Assessment/Plan:      Cancer Staging   No matching staging information was found for the patient.       Mr. Hair 77 yo M with a new diagnosis of of high grade urothelial carcinoma with focal squamous differentiation, pT2 N0 (stage II) on ddMVAC.     Clinically he is doing well. Labs reviewed, no concerns.     Plan  -Continue ddMVAC   -he is to follow up with Dr. Cardoso       Chemotherapy regimen  14-day cycles for 3-6 cycles methotrexate 30 mg/m² IV push on day 1  Vinblastine 3 mg/m² IV over 5 to 10 minutes on days 1  Doxorubicin 30 mg/m² IV push on day 1  Cisplatin 70 mg/m² over 2 hours on day 1      No follow-ups on file.     Any questions and concerns raised by the patient were addressed and answered. Patient denies any further questions.  Patient encouraged to call the office with any concerns or issues.     uJdi Osborn M.D.  Hematology/Oncology      30 minutes was spent on this visit

## 2024-02-26 NOTE — PROGRESS NOTES
Pt arrives to [facility] for cycle 3 day 1 of MTX/Vinblastine/Adriamycin/Ciplatin for bladder cancer.  Discussed plan of care with pt.  Pt reports his urinary frequency has improved.  RUC port accessed with sterile technique, flushed with NS and brisk blood return observed.  Labs drawn on 2/25/2024 were reviewed and results meet parameters for treatment.  Echocardiogram on 1/09/2024 LVEF 65%.  Hydration given.  Pre-medications given.  All chemotherapies infused without adverse reaction.  Post-hydration given.  Port flushed with NS and heparin locked.  Tatum needle removed intact.  Site covered with gauze/tape.  Confirmed tomorrow's appt for Udenyca with pt.  Pt dc home with scheduled transportation.

## 2024-02-27 ENCOUNTER — OUTPATIENT INFUSION SERVICES (OUTPATIENT)
Dept: ONCOLOGY | Facility: MEDICAL CENTER | Age: 79
End: 2024-02-27
Attending: STUDENT IN AN ORGANIZED HEALTH CARE EDUCATION/TRAINING PROGRAM
Payer: MEDICARE

## 2024-02-27 VITALS
DIASTOLIC BLOOD PRESSURE: 51 MMHG | OXYGEN SATURATION: 96 % | TEMPERATURE: 97 F | HEART RATE: 73 BPM | HEIGHT: 68 IN | SYSTOLIC BLOOD PRESSURE: 108 MMHG | WEIGHT: 169.75 LBS | BODY MASS INDEX: 25.73 KG/M2 | RESPIRATION RATE: 18 BRPM

## 2024-02-27 DIAGNOSIS — D49.4 BLADDER TUMOR: ICD-10-CM

## 2024-02-27 PROCEDURE — 96372 THER/PROPH/DIAG INJ SC/IM: CPT

## 2024-02-27 PROCEDURE — 700111 HCHG RX REV CODE 636 W/ 250 OVERRIDE (IP): Mod: JZ | Performed by: NURSE PRACTITIONER

## 2024-02-27 RX ADMIN — PEGFILGRASTIM-CBQV 6 MG: 6 INJECTION, SOLUTION SUBCUTANEOUS at 14:38

## 2024-02-27 ASSESSMENT — FIBROSIS 4 INDEX: FIB4 SCORE: 1.52

## 2024-02-27 NOTE — PROGRESS NOTES
Pt arrives to [facility] for Udenyca injection.  Pt denies s/sx of infection or other complaints today.  Udenyca given SC to back of LUE. Confirmed next appt time with pt.  Pt dc home to self care.

## 2024-03-05 ENCOUNTER — PATIENT OUTREACH (OUTPATIENT)
Dept: ONCOLOGY | Facility: MEDICAL CENTER | Age: 79
End: 2024-03-05
Payer: MEDICARE

## 2024-03-05 ENCOUNTER — PATIENT MESSAGE (OUTPATIENT)
Dept: ONCOLOGY | Facility: MEDICAL CENTER | Age: 79
End: 2024-03-05

## 2024-03-06 RX ORDER — METHYLPREDNISOLONE SODIUM SUCCINATE 125 MG/2ML
125 INJECTION, POWDER, LYOPHILIZED, FOR SOLUTION INTRAMUSCULAR; INTRAVENOUS PRN
Status: CANCELLED | OUTPATIENT
Start: 2024-03-11

## 2024-03-06 RX ORDER — SODIUM CHLORIDE 9 MG/ML
INJECTION, SOLUTION INTRAVENOUS CONTINUOUS
Status: CANCELLED | OUTPATIENT
Start: 2024-03-11

## 2024-03-06 RX ORDER — SODIUM CHLORIDE 9 MG/ML
1000 INJECTION, SOLUTION INTRAVENOUS ONCE
Status: CANCELLED | OUTPATIENT
Start: 2024-03-11

## 2024-03-06 RX ORDER — ONDANSETRON 8 MG/1
8 TABLET, ORALLY DISINTEGRATING ORAL PRN
Status: CANCELLED | OUTPATIENT
Start: 2024-03-11

## 2024-03-06 RX ORDER — 0.9 % SODIUM CHLORIDE 0.9 %
10 VIAL (ML) INJECTION PRN
Status: CANCELLED | OUTPATIENT
Start: 2024-03-11

## 2024-03-06 RX ORDER — 0.9 % SODIUM CHLORIDE 0.9 %
10 VIAL (ML) INJECTION PRN
Status: CANCELLED | OUTPATIENT
Start: 2024-03-10

## 2024-03-06 RX ORDER — PROCHLORPERAZINE MALEATE 10 MG
10 TABLET ORAL EVERY 6 HOURS PRN
Status: CANCELLED | OUTPATIENT
Start: 2024-03-11

## 2024-03-06 RX ORDER — 0.9 % SODIUM CHLORIDE 0.9 %
VIAL (ML) INJECTION PRN
Status: CANCELLED | OUTPATIENT
Start: 2024-03-11

## 2024-03-06 RX ORDER — DIPHENHYDRAMINE HYDROCHLORIDE 50 MG/ML
50 INJECTION INTRAMUSCULAR; INTRAVENOUS PRN
Status: CANCELLED | OUTPATIENT
Start: 2024-03-11

## 2024-03-06 RX ORDER — EPINEPHRINE 1 MG/ML(1)
0.5 AMPUL (ML) INJECTION PRN
Status: CANCELLED | OUTPATIENT
Start: 2024-03-11

## 2024-03-06 RX ORDER — 0.9 % SODIUM CHLORIDE 0.9 %
3 VIAL (ML) INJECTION PRN
Status: CANCELLED | OUTPATIENT
Start: 2024-03-11

## 2024-03-06 RX ORDER — ONDANSETRON 2 MG/ML
4 INJECTION INTRAMUSCULAR; INTRAVENOUS PRN
Status: CANCELLED | OUTPATIENT
Start: 2024-03-11

## 2024-03-06 RX ORDER — 0.9 % SODIUM CHLORIDE 0.9 %
VIAL (ML) INJECTION PRN
Status: CANCELLED | OUTPATIENT
Start: 2024-03-10

## 2024-03-06 RX ORDER — 0.9 % SODIUM CHLORIDE 0.9 %
3 VIAL (ML) INJECTION PRN
Status: CANCELLED | OUTPATIENT
Start: 2024-03-10

## 2024-03-10 ENCOUNTER — OUTPATIENT INFUSION SERVICES (OUTPATIENT)
Dept: ONCOLOGY | Facility: MEDICAL CENTER | Age: 79
End: 2024-03-10
Attending: STUDENT IN AN ORGANIZED HEALTH CARE EDUCATION/TRAINING PROGRAM
Payer: MEDICARE

## 2024-03-10 VITALS
DIASTOLIC BLOOD PRESSURE: 47 MMHG | TEMPERATURE: 98.6 F | HEART RATE: 82 BPM | OXYGEN SATURATION: 100 % | RESPIRATION RATE: 18 BRPM | SYSTOLIC BLOOD PRESSURE: 98 MMHG

## 2024-03-10 DIAGNOSIS — D49.4 BLADDER TUMOR: ICD-10-CM

## 2024-03-10 LAB
ALBUMIN SERPL BCP-MCNC: 3.9 G/DL (ref 3.2–4.9)
ALBUMIN/GLOB SERPL: 1.8 G/DL
ALP SERPL-CCNC: 98 U/L (ref 30–99)
ALT SERPL-CCNC: 12 U/L (ref 2–50)
ANION GAP SERPL CALC-SCNC: 9 MMOL/L (ref 7–16)
AST SERPL-CCNC: 16 U/L (ref 12–45)
BASOPHILS # BLD AUTO: 1 % (ref 0–1.8)
BASOPHILS # BLD: 0.06 K/UL (ref 0–0.12)
BILIRUB SERPL-MCNC: 0.2 MG/DL (ref 0.1–1.5)
BUN SERPL-MCNC: 16 MG/DL (ref 8–22)
CALCIUM ALBUM COR SERPL-MCNC: 8.9 MG/DL (ref 8.5–10.5)
CALCIUM SERPL-MCNC: 8.8 MG/DL (ref 8.5–10.5)
CHLORIDE SERPL-SCNC: 102 MMOL/L (ref 96–112)
CO2 SERPL-SCNC: 24 MMOL/L (ref 20–33)
CREAT SERPL-MCNC: 0.97 MG/DL (ref 0.5–1.4)
EOSINOPHIL # BLD AUTO: 0.06 K/UL (ref 0–0.51)
EOSINOPHIL NFR BLD: 1 % (ref 0–6.9)
ERYTHROCYTE [DISTWIDTH] IN BLOOD BY AUTOMATED COUNT: 43 FL (ref 35.9–50)
GFR SERPLBLD CREATININE-BSD FMLA CKD-EPI: 81 ML/MIN/1.73 M 2
GLOBULIN SER CALC-MCNC: 2.2 G/DL (ref 1.9–3.5)
GLUCOSE SERPL-MCNC: 99 MG/DL (ref 65–99)
HCT VFR BLD AUTO: 25.4 % (ref 42–52)
HGB BLD-MCNC: 9 G/DL (ref 14–18)
IMM GRANULOCYTES # BLD AUTO: 0.37 K/UL (ref 0–0.11)
IMM GRANULOCYTES NFR BLD AUTO: 6.2 % (ref 0–0.9)
LYMPHOCYTES # BLD AUTO: 0.65 K/UL (ref 1–4.8)
LYMPHOCYTES NFR BLD: 10.9 % (ref 22–41)
MAGNESIUM SERPL-MCNC: 1.5 MG/DL (ref 1.5–2.5)
MCH RBC QN AUTO: 31.7 PG (ref 27–33)
MCHC RBC AUTO-ENTMCNC: 35.4 G/DL (ref 32.3–36.5)
MCV RBC AUTO: 89.4 FL (ref 81.4–97.8)
MONOCYTES # BLD AUTO: 0.66 K/UL (ref 0–0.85)
MONOCYTES NFR BLD AUTO: 11.1 % (ref 0–13.4)
NEUTROPHILS # BLD AUTO: 4.17 K/UL (ref 1.82–7.42)
NEUTROPHILS NFR BLD: 69.8 % (ref 44–72)
NRBC # BLD AUTO: 0 K/UL
NRBC BLD-RTO: 0 /100 WBC (ref 0–0.2)
OUTPT INFUS CBC COMMENT OICOM: ABNORMAL
PLATELET # BLD AUTO: 142 K/UL (ref 164–446)
PMV BLD AUTO: 10.4 FL (ref 9–12.9)
POTASSIUM SERPL-SCNC: 4.3 MMOL/L (ref 3.6–5.5)
PROT SERPL-MCNC: 6.1 G/DL (ref 6–8.2)
RBC # BLD AUTO: 2.84 M/UL (ref 4.7–6.1)
SODIUM SERPL-SCNC: 135 MMOL/L (ref 135–145)
WBC # BLD AUTO: 6 K/UL (ref 4.8–10.8)

## 2024-03-10 PROCEDURE — 83735 ASSAY OF MAGNESIUM: CPT

## 2024-03-10 PROCEDURE — 80053 COMPREHEN METABOLIC PANEL: CPT

## 2024-03-10 PROCEDURE — 36415 COLL VENOUS BLD VENIPUNCTURE: CPT

## 2024-03-10 PROCEDURE — 85025 COMPLETE CBC W/AUTO DIFF WBC: CPT

## 2024-03-10 NOTE — PROGRESS NOTES
"Pharmacy Chemotherapy Calculations    Dx: Bladder Cancer  Cycle 4  Previous treatment = C3 2/26/24    Protocol:DDMVAC Dose Dense Methotrexate + VinBLAStine + DOXOrubicin + CISplatin    Methotrexate 30 mg/m² IV push on Day 1   VinBLAStine 3 mg/m² IV over 5 - 10 minutes on Day 1 or on Day 2   DOXOrubicin 30 mg/m² IV push on Day 1 or on Day 2   CISplatin 70 mg/m² IV over 2 hours on Day 1 or on Day 2    14-day cycle for 3 - 6 cycles (neoadjuvant or adjuvant) or 4 - 6 cycles (locally advanced or metastatic)    NCCNGuidelines® for Bladder Cancer V.1.2023.   Alondra HORNE , et al. J Clin Oncol. 2014;32(18):1895-901.a   Kaycee SUE, et al. Eur J Cancer. 2006;42(1):50-4.a   Kaycee SUE, et al. J Clin Oncol. 2001;19(10):2638-46.a  Jose Miguel HB , et al. N Engl J Med. 2003;349(9):859-66.a   Dante T  et al. N Engl J Med. 2020;383(57):2484.a    Allergies:  Patient has no known allergies.       /48   Pulse 82   Temp 36.1 °C (97 °F) (Temporal)   Resp 18   Ht 1.72 m (5' 7.72\")   Wt 77 kg (169 lb 12.1 oz)   SpO2 98%   BMI 26.03 kg/m²  Body surface area is 1.92 meters squared.    ECHO:  1/9/24 = LVEF 65%    Labs 3/10/24:  ANC~ 4170 Plt = 142k   Hgb = 9.0     SCr = 0.97 mg/dL CrCl ~ 70 mL/min   AST/ALT/AP = 16/12/98 TBili = 0.2   K = 4.3  Mag = 1.5 - replaced per protocol    Methotrexate 30 mg/m² x 1.92 m² = 57.6 mg   <10% difference, OK to treat with final dose = 58.5 mg    VinBLAStine 3 mg/m² x 1.92 m² = 5.76 mg   <10% difference, OK to treat with final dose = 5.9 mg    DOXOrubicin 30 mg/m² x 1.92 m² = 57.6 mg   <10% difference, OK to treat with final dose = 58 mg    CISplatin 70 mg/m² x 1.92 m² = 134.4 mg   <10% difference, OK to treat with final dose = 137 mg    Kyle Holt, PharmD   "

## 2024-03-10 NOTE — PROGRESS NOTES
Ronal came into infusion services today for pre-chemo labs. No complaints. Labs drawn from [facility], covered with gauze and coban. Pt has future appointments. Discharged to self care. Labs within parameters for treatment day 3/11/24.

## 2024-03-11 ENCOUNTER — TELEPHONE (OUTPATIENT)
Dept: HEMATOLOGY ONCOLOGY | Facility: MEDICAL CENTER | Age: 79
End: 2024-03-11

## 2024-03-11 ENCOUNTER — OUTPATIENT INFUSION SERVICES (OUTPATIENT)
Dept: ONCOLOGY | Facility: MEDICAL CENTER | Age: 79
End: 2024-03-11
Attending: STUDENT IN AN ORGANIZED HEALTH CARE EDUCATION/TRAINING PROGRAM
Payer: MEDICARE

## 2024-03-11 ENCOUNTER — HOSPITAL ENCOUNTER (OUTPATIENT)
Dept: HEMATOLOGY ONCOLOGY | Facility: MEDICAL CENTER | Age: 79
End: 2024-03-11
Attending: NURSE PRACTITIONER
Payer: MEDICARE

## 2024-03-11 VITALS
DIASTOLIC BLOOD PRESSURE: 36 MMHG | HEART RATE: 75 BPM | SYSTOLIC BLOOD PRESSURE: 98 MMHG | WEIGHT: 166.89 LBS | RESPIRATION RATE: 15 BRPM | HEIGHT: 68 IN | TEMPERATURE: 97.1 F | OXYGEN SATURATION: 100 % | BODY MASS INDEX: 25.29 KG/M2

## 2024-03-11 VITALS
BODY MASS INDEX: 25.73 KG/M2 | HEART RATE: 82 BPM | DIASTOLIC BLOOD PRESSURE: 48 MMHG | SYSTOLIC BLOOD PRESSURE: 100 MMHG | TEMPERATURE: 97 F | OXYGEN SATURATION: 98 % | WEIGHT: 169.75 LBS | RESPIRATION RATE: 18 BRPM | HEIGHT: 68 IN

## 2024-03-11 DIAGNOSIS — C67.9 MALIGNANT NEOPLASM OF URINARY BLADDER, UNSPECIFIED SITE (HCC): ICD-10-CM

## 2024-03-11 DIAGNOSIS — D49.4 BLADDER TUMOR: ICD-10-CM

## 2024-03-11 DIAGNOSIS — Z79.899 ENCOUNTER FOR LONG-TERM (CURRENT) USE OF HIGH-RISK MEDICATION: ICD-10-CM

## 2024-03-11 PROCEDURE — A4212 NON CORING NEEDLE OR STYLET: HCPCS

## 2024-03-11 PROCEDURE — 96417 CHEMO IV INFUS EACH ADDL SEQ: CPT

## 2024-03-11 PROCEDURE — 99214 OFFICE O/P EST MOD 30 MIN: CPT | Performed by: NURSE PRACTITIONER

## 2024-03-11 PROCEDURE — 99212 OFFICE O/P EST SF 10 MIN: CPT | Performed by: NURSE PRACTITIONER

## 2024-03-11 PROCEDURE — 96368 THER/DIAG CONCURRENT INF: CPT

## 2024-03-11 PROCEDURE — 96367 TX/PROPH/DG ADDL SEQ IV INF: CPT

## 2024-03-11 PROCEDURE — 96366 THER/PROPH/DIAG IV INF ADDON: CPT

## 2024-03-11 PROCEDURE — 96413 CHEMO IV INFUSION 1 HR: CPT

## 2024-03-11 PROCEDURE — 96375 TX/PRO/DX INJ NEW DRUG ADDON: CPT

## 2024-03-11 PROCEDURE — 700101 HCHG RX REV CODE 250: Performed by: NURSE PRACTITIONER

## 2024-03-11 PROCEDURE — 96411 CHEMO IV PUSH ADDL DRUG: CPT

## 2024-03-11 PROCEDURE — 700111 HCHG RX REV CODE 636 W/ 250 OVERRIDE (IP): Performed by: NURSE PRACTITIONER

## 2024-03-11 PROCEDURE — 700105 HCHG RX REV CODE 258: Performed by: NURSE PRACTITIONER

## 2024-03-11 PROCEDURE — 96415 CHEMO IV INFUSION ADDL HR: CPT

## 2024-03-11 RX ORDER — METHYLPREDNISOLONE SODIUM SUCCINATE 125 MG/2ML
125 INJECTION, POWDER, LYOPHILIZED, FOR SOLUTION INTRAMUSCULAR; INTRAVENOUS PRN
Status: DISCONTINUED | OUTPATIENT
Start: 2024-03-11 | End: 2024-03-11 | Stop reason: HOSPADM

## 2024-03-11 RX ORDER — ALBUTEROL SULFATE 90 UG/1
AEROSOL, METERED RESPIRATORY (INHALATION)
COMMUNITY

## 2024-03-11 RX ORDER — DIPHENHYDRAMINE HYDROCHLORIDE 50 MG/ML
50 INJECTION INTRAMUSCULAR; INTRAVENOUS PRN
Status: DISCONTINUED | OUTPATIENT
Start: 2024-03-11 | End: 2024-03-11 | Stop reason: HOSPADM

## 2024-03-11 RX ORDER — SODIUM CHLORIDE 9 MG/ML
1000 INJECTION, SOLUTION INTRAVENOUS ONCE
Status: COMPLETED | OUTPATIENT
Start: 2024-03-11 | End: 2024-03-11

## 2024-03-11 RX ORDER — MAGNESIUM SULFATE HEPTAHYDRATE 40 MG/ML
2 INJECTION, SOLUTION INTRAVENOUS ONCE
Status: COMPLETED | OUTPATIENT
Start: 2024-03-11 | End: 2024-03-11

## 2024-03-11 RX ORDER — PHENAZOPYRIDINE HYDROCHLORIDE 100 MG/1
TABLET, FILM COATED ORAL
COMMUNITY

## 2024-03-11 RX ORDER — EPINEPHRINE 1 MG/ML(1)
0.5 AMPUL (ML) INJECTION PRN
Status: DISCONTINUED | OUTPATIENT
Start: 2024-03-11 | End: 2024-03-11 | Stop reason: HOSPADM

## 2024-03-11 RX ADMIN — DOXORUBICIN HYDROCHLORIDE 58 MG: 2 INJECTION, SOLUTION INTRAVENOUS at 12:37

## 2024-03-11 RX ADMIN — CISPLATIN 137 MG: 1 INJECTION INTRAVENOUS at 13:20

## 2024-03-11 RX ADMIN — MAGNESIUM SULFATE HEPTAHYDRATE: 500 INJECTION, SOLUTION INTRAMUSCULAR; INTRAVENOUS at 15:25

## 2024-03-11 RX ADMIN — SODIUM CHLORIDE 1000 ML: 9 INJECTION, SOLUTION INTRAVENOUS at 10:30

## 2024-03-11 RX ADMIN — METHOTREXATE 58.5 MG: 25 INJECTION INTRA-ARTERIAL; INTRAMUSCULAR; INTRATHECAL; INTRAVENOUS at 11:42

## 2024-03-11 RX ADMIN — ONDANSETRON 16 MG: 2 INJECTION INTRAMUSCULAR; INTRAVENOUS at 10:50

## 2024-03-11 RX ADMIN — VINBLASTINE SULFATE 5.9 MG: 1 INJECTION INTRAVENOUS at 12:20

## 2024-03-11 RX ADMIN — LIDOCAINE HYDROCHLORIDE 0.5 ML: 10 INJECTION, SOLUTION EPIDURAL; INFILTRATION; INTRACAUDAL at 10:19

## 2024-03-11 RX ADMIN — FOSAPREPITANT DIMEGLUMINE 150 MG: 150 INJECTION, POWDER, LYOPHILIZED, FOR SOLUTION INTRAVENOUS at 11:10

## 2024-03-11 RX ADMIN — HEPARIN 500 UNITS: 100 SYRINGE at 17:21

## 2024-03-11 RX ADMIN — MAGNESIUM SULFATE HEPTAHYDRATE 2 G: 2 INJECTION, SOLUTION INTRAVENOUS at 13:24

## 2024-03-11 RX ADMIN — DEXAMETHASONE SODIUM PHOSPHATE 12 MG: 4 INJECTION, SOLUTION INTRA-ARTICULAR; INTRALESIONAL; INTRAMUSCULAR; INTRAVENOUS; SOFT TISSUE at 10:40

## 2024-03-11 ASSESSMENT — ENCOUNTER SYMPTOMS
FEVER: 0
BACK PAIN: 1
CHILLS: 0
WEIGHT LOSS: 1
SHORTNESS OF BREATH: 0
VOMITING: 0
COUGH: 1
NAUSEA: 0
CONSTIPATION: 0
DIARRHEA: 0
BLOOD IN STOOL: 0
DIZZINESS: 1
PALPITATIONS: 0

## 2024-03-11 ASSESSMENT — FIBROSIS 4 INDEX
FIB4 SCORE: 2.47
FIB4 SCORE: 2.47

## 2024-03-11 ASSESSMENT — PAIN SCALES - GENERAL: PAINLEVEL: NO PAIN

## 2024-03-11 ASSESSMENT — PAIN DESCRIPTION - PAIN TYPE: TYPE: CHRONIC PAIN

## 2024-03-11 NOTE — TELEPHONE ENCOUNTER
Called and spoke to patient to review his upcoming appointments for scans. Explained to patient that [facility] [place] [facility] was unavailable in time for his appointment with Dr. Osborn on 4/2 , so the scans are going to be at the [facility] location. Pt stated he will look at his my chart and verbalized understanding.

## 2024-03-11 NOTE — PROGRESS NOTES
Chemotherapy Verification - SECONDARY RN       Height = 172 cm  Weight = 77 kg  BSA = 1.92 m2       Medication: methotrexate  Dose: 30 mg/mg  Calculated Dose: 57.6 mg                             (In mg/m2, AUC, mg/kg)     Medication: vinblastine  Dose: 3 mg/m2  Calculated Dose: 5.78 mg                             (In mg/m2, AUC, mg/kg)    Medication: doxorubicin  Dose: 30 mg/m2  Calculated Dose: 57.6 mg                             (In mg/m2, AUC, mg/kg)    Medication: Cisplatin  Dose: 70 mg/m2  Calculated Dose: 134.4 mg                             (In mg/m2, AUC, mg/kg)    I confirm that this process was performed independently.

## 2024-03-11 NOTE — PROGRESS NOTES
"Pharmacy Chemotherapy Calculation:    Dx: High-grade urothelial carcinoma       Protocol: DDMVAC (Dose-dense Methotrexate/VinBLAStine/DOXOrubicin/CISplatin)    *Dosing Reference*   Methotrexate 30 mg/m² IV push on Day 1   VinBLAStine 3 mg/m² IV over 5 - 10 minutes on Day 1 or on Day 2   DOXOrubicin 30 mg/m² IV push on Day 1 or on Day 2   CISplatin 70 mg/m² IV over 2 hours on Day 1 or on Day 2  14-day cycle for 3 - 6 cycles (neoadjuvant or adjuvant) or 4 - 6 cycles (locally advanced or metastatic)     NCCN Guidelines® for Bladder Cancer V.1.2023.   Alondra HORNE, et al. J Clin Oncol. 2014;32(18):1895-901.a   Kaycee SUE, et al. Eur J Cancer. 2006;42(1):50-4.a   Kaycee SUE, et al. J Clin Oncol. 2001;19(10):2638-46.a   Jose Miguel HB , et al. N Engl J Med. 2003;349(9):859-66.a   Dante T et al. N Engl J Med. 2020;383(93):2488.a    Allergies:  Patient has no known allergies.       /48   Pulse 82   Temp 36.1 °C (97 °F) (Temporal)   Resp 18   Ht 1.72 m (5' 7.72\")   Wt 77 kg (169 lb 12.1 oz)   SpO2 98%   BMI 26.03 kg/m²  Body surface area is 1.92 meters squared.    Labs 3/10/24:  ANC~ 4170 Plt = 142k   Hgb = 9.0     SCr = 0.97 mg/dL CrCl ~ 70.2 mL/min   AST/ALT/AP = 16/12/98 TBili = 0.2  Mag = 1.5  K+ = 4.3      01/09/24 16:19   Left Ventrical Ejection Fraction 65     Drug Order   (Drug name, dose, route, IV Fluid & volume, frequency, number of doses) Cycle 4  Previous treatment: C3 2/26/24     Medication = Methotrexate  Base Dose = 30 mg/m2  Calc Dose: Base Dose x 1.92 m2 = 57.6 mg  Final Dose = 58.5 mg  Route = IV  Fluid & Volume = NS 50 mL  Admin Duration = Over 15 minutes          <10% difference, OK to treat with final dose   Medication = VinBLAStine  Base Dose = 3 mg/m2  Calc Dose: Base Dose x 1.92 m2 = 5.76 mg  Final Dose = 5.9 mg  Route = IV  Fluid & Volume = NS 25 mL  Admin Duration = Over 10 minutes          <10% difference, OK to treat with final dose   Medication = DOXOrubicin  Base Dose = 30 " mg/m2  Calc Dose:Base Dose x 1.92 m2 = 57.6 mg  Final Dose = 58 mg  Route = IV  Fluid & Volume = 29 mL empty bag  Admin Duration = Over 20 minutes          <10% difference, OK to treat with final dose   Medication = CISplatin  Base Dose = 70 mg/m2  Calc Dose: Base Dose x 1.92 m2 = 134.4 mg  Final Dose = 137 mg  Route = IV  Fluid & Volume =  mL  Admin Duration = Over 2 hours          <10% difference, OK to treat with final dose     By my signature below, I confirm this process was performed independently with the BSA and all final chemotherapy dosing calculations congruent. I have reviewed the above chemotherapy order and that my calculation of the final dose and BSA (when applicable) corroborate those calculations of the  pharmacist. Discrepancies of 10% or greater in the written dose have been addressed and documented within the [facility] Progress notes.    Dodie Barrett, AnnD

## 2024-03-11 NOTE — PROGRESS NOTES
Chemotherapy Verification - PRIMARY RN  C4 D1     Height = 172 cm  Weight = 77 kg  BSA = 1.92 m^2       Medication: methotrexate PF  Dose: 30 mg/m2  Calculated Dose: 57.6 mg                             (In mg/m2, AUC, mg/kg)     Medication: vinblastine (VELBAN)  Dose: 3 mg/m2  Calculated Dose: 5.76 mg                             (In mg/m2, AUC, mg/kg)    Medication: doxorubicin (ADRIAMYCIN)  Dose: 30 mg/m2  Calculated Dose: 57.6 mg                             (In mg/m2, AUC, mg/kg)    Medication: cisplatin (PLATINOL)   Dose: 70 MG/M2  Calculated Dose: 134.4 mg                            (In mg/m2, AUC, mg/kg)        I confirm this process was performed independently with the BSA and all final chemotherapy dosing calculations congruent.  Any discrepancies of 10% or greater have been addressed with the chemotherapy pharmacist. The resolution of the discrepancy has been documented in the EPIC progress notes.

## 2024-03-11 NOTE — PROGRESS NOTES
Subjective     Ronal Hair is a 78 y.o. male who presents with Bladder Cancer (Anurag /pre chemo )          HPI    Patient seen today for evaluation prior to cycle 4 of chemotherapy employing DDMVAC for bladder cancer, for continued monitoring of symptoms and side effects of cancer treatments.     Oncology history of presenting illness:  Patient reports that he has been struggling for months.  He notes that he has been having frequent urination for months.  He had a CT scan which did not reveal any evidence of stone in the setting of his pain.  He saw urologist and underwent a cystoscopy which revealed a mass in the bladder.  Patient was admitted for a TURBT on December 13, 2023.     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  He also has perineural invasion present.     Treatment history:  01/29/24: C1D1 DDMVAC  02/12/24: C2D1 DDMVAC  02/26/24: C3D1 DDMVAC  03/11/24: C4D1 DDMVAC      Interval history:  Patient overall is tolerating treatment well.  His blood pressure is slightly low and he had 1 or 2 instances where he had mild lightheadedness and dizziness.  He did state however it has been very tolerable.  He does note nausea for the first 3-4 days only that resolved with antiemetics.  Overall he is doing well and tolerating treatment well.  He did state that he is eating well with a great appetite.      No Known Allergies  Current Outpatient Medications on File Prior to Encounter   Medication Sig Dispense Refill    ondansetron (ZOFRAN) 4 MG Tab tablet Take 1 Tablet by mouth every four hours as needed for Nausea/Vomiting (for nausea, vomiting). 30 Tablet 6    prochlorperazine (COMPAZINE) 10 MG Tab Take 1 Tablet by mouth every 6 hours as needed (for nausea, vomiting). 30 Tablet 6    lidocaine-prilocaine (EMLA) 2.5-2.5 % Cream Apply to port one hour prior to access and cover with plastic wrap. 1  Each 3    NON SPECIFIED Laxative daily      Acetaminophen (TYLENOL PO) Take  by mouth.      ibuprofen (MOTRIN) 200 MG Tab Take 800 mg by mouth every 6 hours as needed for Inflammation.      tamsulosin (FLOMAX) 0.4 MG capsule Take 0.8 mg by mouth 1/2 hour after breakfast.      finasteride (PROSCAR) 5 MG Tab Take 5 mg by mouth every day.      tiotropium (SPIRIVA) 18 MCG Cap Inhale 1 puff by mouth once daily (Patient taking differently: Place 18 mcg into inhaler and inhale as needed (shortness or breath). Uses every 2-3 days) 30 Capsule 11    atorvastatin (LIPITOR) 40 MG Tab Take 1 Tablet by mouth every evening. 90 Tablet 3    Multiple Vitamins-Minerals (MULTIVITAMIN ADULTS 50+ PO) Take 1 Tablet by mouth every day.      oxyCODONE immediate-release (ROXICODONE) 5 MG Tab Take 5 mg by mouth at bedtime. (Patient not taking: Reported on 2/5/2024)      sildenafil citrate (VIAGRA) 25 MG Tab Take 0.5 Tablets by mouth 1 time a day as needed for Erectile Dysfunction. 12.5 mg = 1/2 tablet (Patient not taking: Reported on 1/29/2024) 10 Tablet 5    senna-docusate (PERICOLACE OR SENOKOT S) 8.6-50 MG Tab Take 1 Tablet by mouth every day. (Patient not taking: Reported on 2/5/2024) 30 Tablet 0     No current facility-administered medications on file prior to encounter.       Review of Systems   Constitutional:  Positive for malaise/fatigue and weight loss. Negative for chills and fever.   Respiratory:  Positive for cough (still smoking but decreased by 75% thus far). Negative for shortness of breath.    Cardiovascular:  Negative for chest pain and palpitations.   Gastrointestinal:  Negative for blood in stool, constipation, diarrhea, nausea and vomiting.   Genitourinary:  Negative for dysuria and hematuria.   Musculoskeletal:  Positive for back pain (chronic).   Neurological:  Positive for dizziness (mild).              Objective     BP (!) 98/36 (BP Location: Left arm, Patient Position: Sitting, BP Cuff Size: Adult) Comment: Pt states  "he is tired  Pulse 75   Temp 36.2 °C (97.1 °F) (Temporal)   Resp 15   Ht 1.73 m (5' 8.11\")   Wt 75.7 kg (166 lb 14.2 oz)   SpO2 100%   BMI 25.29 kg/m²      Physical Exam  Vitals reviewed.   Constitutional:       General: He is not in acute distress.     Appearance: Normal appearance. He is not diaphoretic.   HENT:      Head: Normocephalic and atraumatic.   Cardiovascular:      Rate and Rhythm: Normal rate and regular rhythm.      Heart sounds: Normal heart sounds. No murmur heard.     No friction rub. No gallop.   Pulmonary:      Effort: Pulmonary effort is normal. No respiratory distress.      Breath sounds: Normal breath sounds. No wheezing.   Abdominal:      General: Bowel sounds are normal. There is no distension.      Palpations: Abdomen is soft.      Tenderness: There is no abdominal tenderness.   Musculoskeletal:         General: No swelling or tenderness. Normal range of motion.   Skin:     General: Skin is warm and dry.   Neurological:      Mental Status: He is alert and oriented to person, place, and time.   Psychiatric:         Mood and Affect: Mood normal.         Behavior: Behavior normal.                 Latest Reference Range & Units 03/10/24 10:10   WBC 4.8 - 10.8 K/uL 6.0   RBC 4.70 - 6.10 M/uL 2.84 (L)   Hemoglobin 14.0 - 18.0 g/dL 9.0 (L)   Hematocrit 42.0 - 52.0 % 25.4 (L)   MCV 81.4 - 97.8 fL 89.4   MCH 27.0 - 33.0 pg 31.7   MCHC 32.3 - 36.5 g/dL 35.4   RDW 35.9 - 50.0 fL 43.0   Platelet Count 164 - 446 K/uL 142 (L)   MPV 9.0 - 12.9 fL 10.4   Neutrophils-Polys 44.00 - 72.00 % 69.80   Neutrophils (Absolute) 1.82 - 7.42 K/uL 4.17   Lymphocytes 22.00 - 41.00 % 10.90 (L)   Lymphs (Absolute) 1.00 - 4.80 K/uL 0.65 (L)   Monocytes 0.00 - 13.40 % 11.10   Monos (Absolute) 0.00 - 0.85 K/uL 0.66   Eosinophils 0.00 - 6.90 % 1.00   Eos (Absolute) 0.00 - 0.51 K/uL 0.06   Basophils 0.00 - 1.80 % 1.00   Baso (Absolute) 0.00 - 0.12 K/uL 0.06   Immature Granulocytes 0.00 - 0.90 % 6.20 (H)   Immature " Granulocytes (abs) 0.00 - 0.11 K/uL 0.37 (H)   Nucleated RBC 0.00 - 0.20 /100 WBC 0.00   NRBC (Absolute) K/uL 0.00   Outpt Infus CBC Comment  see below   Sodium 135 - 145 mmol/L 135   Potassium 3.6 - 5.5 mmol/L 4.3   Chloride 96 - 112 mmol/L 102   Co2 20 - 33 mmol/L 24   Anion Gap 7.0 - 16.0  9.0   Glucose 65 - 99 mg/dL 99   Bun 8 - 22 mg/dL 16   Creatinine 0.50 - 1.40 mg/dL 0.97   GFR (CKD-EPI) >60 mL/min/1.73 m 2 81   Calcium 8.5 - 10.5 mg/dL 8.8   Correct Calcium 8.5 - 10.5 mg/dL 8.9   AST(SGOT) 12 - 45 U/L 16   ALT(SGPT) 2 - 50 U/L 12   Alkaline Phosphatase 30 - 99 U/L 98   Total Bilirubin 0.1 - 1.5 mg/dL 0.2   Albumin 3.2 - 4.9 g/dL 3.9   Total Protein 6.0 - 8.2 g/dL 6.1   Globulin 1.9 - 3.5 g/dL 2.2   A-G Ratio g/dL 1.8   Magnesium 1.5 - 2.5 mg/dL 1.5              Assessment & Plan       1. Malignant neoplasm of urinary bladder, unspecified site (HCC)  CT-CHEST (THORAX) WITH    CT-ABDOMEN & PELVIS UROGRAM      2. Encounter for long-term (current) use of high-risk medication                1.  Patient with high-grade urothelial carcinoma with focal squamous differentiation, invasive into muscularis propria (pT2).  Patient currently on neoadjuvant DDMVAC scheduled for cycle 4 today.  I did review his CBC, CMP and labs are appropriate to proceed with treatment as planned.    Patient has received 4 cycles of neoadjuvant chemotherapy after this cycle.  Will go ahead and proceed with scans for follow-up and have him back into the clinic to see Dr. Osborn to review results.  Will also update urologist as well in preparation for upcoming surgery.    Discussed this plan of care in detail with the patient today.      Please note that this dictation was created using voice recognition software. I have made every reasonable attempt to correct obvious errors, but I expect that there are errors of grammar and possibly content that I did not discover before finalizing the note.

## 2024-03-11 NOTE — ADDENDUM NOTE
Encounter addended by: Nasra Ray, Med Ass't on: 3/11/2024 10:30 AM   Actions taken: Charge Capture section accepted

## 2024-03-12 ENCOUNTER — OUTPATIENT INFUSION SERVICES (OUTPATIENT)
Dept: ONCOLOGY | Facility: MEDICAL CENTER | Age: 79
End: 2024-03-12
Attending: STUDENT IN AN ORGANIZED HEALTH CARE EDUCATION/TRAINING PROGRAM
Payer: MEDICARE

## 2024-03-12 VITALS
RESPIRATION RATE: 18 BRPM | OXYGEN SATURATION: 99 % | DIASTOLIC BLOOD PRESSURE: 52 MMHG | HEIGHT: 68 IN | WEIGHT: 173.94 LBS | SYSTOLIC BLOOD PRESSURE: 117 MMHG | TEMPERATURE: 98.1 F | BODY MASS INDEX: 26.36 KG/M2 | HEART RATE: 67 BPM

## 2024-03-12 DIAGNOSIS — D49.4 BLADDER TUMOR: ICD-10-CM

## 2024-03-12 PROCEDURE — 700111 HCHG RX REV CODE 636 W/ 250 OVERRIDE (IP): Mod: JZ | Performed by: NURSE PRACTITIONER

## 2024-03-12 PROCEDURE — 96372 THER/PROPH/DIAG INJ SC/IM: CPT

## 2024-03-12 RX ADMIN — PEGFILGRASTIM-CBQV 6 MG: 6 INJECTION, SOLUTION SUBCUTANEOUS at 15:25

## 2024-03-12 ASSESSMENT — FIBROSIS 4 INDEX: FIB4 SCORE: 2.47

## 2024-03-12 NOTE — PROGRESS NOTES
Pt arrived ambulatory to [facility] for UDenyca injection. POC discussed. Medication given as ordered to back of L arm, pt tolerated well. No additional appts needed at this time per pt he has completed tx. Pt discharged from [facility] in NAD under self care.

## 2024-03-12 NOTE — PROGRESS NOTES
Ronal arrives to [facility] for DDMVAC for bladder carcinoma. Patient denies acute health concerns. Denies s/s active infections, open wounds, and mouth sores. Right chest port accessed in sterile fashion. Port flushes easily and has brisk blood return. Labs drawn on 3/10 reviewed and meets parameters to proceed with treatment. 1L NS pre-hydration bolus infused concurrently with premedications. Methotrexate, Vinblastine, Adriamycin, then Cisplatin all infused without adverse s/s. Cisplatin and magnesium replacement infused concurrently over 2 hours. Port with brisk blood return before, between, and after chemotherapies. Post-hydration infused over 2 hours without issues. Port flushed, heparin locked, and de-accessed. Gauze and paper tape applied over site. Patient returns tomorrow for Udenyca. Discharged home to self care in no apparent distress.

## 2024-03-20 ENCOUNTER — HOSPITAL ENCOUNTER (OUTPATIENT)
Dept: RADIOLOGY | Facility: MEDICAL CENTER | Age: 79
End: 2024-03-20
Attending: NURSE PRACTITIONER
Payer: MEDICARE

## 2024-03-20 DIAGNOSIS — C67.9 MALIGNANT NEOPLASM OF URINARY BLADDER, UNSPECIFIED SITE (HCC): ICD-10-CM

## 2024-03-20 PROCEDURE — 71260 CT THORAX DX C+: CPT

## 2024-03-20 PROCEDURE — 700117 HCHG RX CONTRAST REV CODE 255: Performed by: NURSE PRACTITIONER

## 2024-03-20 PROCEDURE — 74178 CT ABD&PLV WO CNTR FLWD CNTR: CPT

## 2024-03-20 RX ADMIN — IOHEXOL 100 ML: 350 INJECTION, SOLUTION INTRAVENOUS at 11:00

## 2024-03-21 RX ORDER — TAMSULOSIN HYDROCHLORIDE 0.4 MG/1
0.8 CAPSULE ORAL
Qty: 90 CAPSULE | Refills: 0 | Status: SHIPPED | OUTPATIENT
Start: 2024-03-21

## 2024-04-02 ENCOUNTER — APPOINTMENT (OUTPATIENT)
Dept: HEMATOLOGY ONCOLOGY | Facility: MEDICAL CENTER | Age: 79
End: 2024-04-02
Payer: MEDICARE

## 2024-04-02 VITALS
OXYGEN SATURATION: 100 % | TEMPERATURE: 98.4 F | SYSTOLIC BLOOD PRESSURE: 102 MMHG | HEIGHT: 68 IN | HEART RATE: 89 BPM | BODY MASS INDEX: 25.09 KG/M2 | WEIGHT: 165.57 LBS | DIASTOLIC BLOOD PRESSURE: 38 MMHG

## 2024-04-02 DIAGNOSIS — C67.9 MALIGNANT NEOPLASM OF URINARY BLADDER, UNSPECIFIED SITE (HCC): ICD-10-CM

## 2024-04-02 DIAGNOSIS — Z79.899 ENCOUNTER FOR LONG-TERM (CURRENT) USE OF HIGH-RISK MEDICATION: ICD-10-CM

## 2024-04-02 PROCEDURE — 99212 OFFICE O/P EST SF 10 MIN: CPT | Performed by: STUDENT IN AN ORGANIZED HEALTH CARE EDUCATION/TRAINING PROGRAM

## 2024-04-02 PROCEDURE — 99214 OFFICE O/P EST MOD 30 MIN: CPT | Performed by: STUDENT IN AN ORGANIZED HEALTH CARE EDUCATION/TRAINING PROGRAM

## 2024-04-02 ASSESSMENT — ENCOUNTER SYMPTOMS
SHORTNESS OF BREATH: 0
MEMORY LOSS: 0
HEARTBURN: 0
HEADACHES: 0
COUGH: 0
FLANK PAIN: 0
TREMORS: 0
NAUSEA: 0
NECK PAIN: 0
SORE THROAT: 0
DIZZINESS: 0
BLURRED VISION: 0
PALPITATIONS: 0
FOCAL WEAKNESS: 0
EYE DISCHARGE: 1
WHEEZING: 0
WEIGHT LOSS: 0
ORTHOPNEA: 0
VOMITING: 0
FEVER: 0
SPUTUM PRODUCTION: 0
TINGLING: 0
ABDOMINAL PAIN: 0
DEPRESSION: 0
BRUISES/BLEEDS EASILY: 0
BACK PAIN: 1
SENSORY CHANGE: 0
CHILLS: 0

## 2024-04-02 ASSESSMENT — FIBROSIS 4 INDEX: FIB4 SCORE: 2.47

## 2024-04-02 NOTE — PROGRESS NOTES
"    Consult Note: Hematology/Oncology     Referring Provider:Agustín Cardoso MD  Primary Care:  Shakira Henriquez M.D.    Chief Complaint   Patient presents with    Cancer     Malignant neoplasm of urinary bladder      Current Treatment:  01/29/24: C1D1 DDMVAC   2/12/24: C2D1 DDMVAC   2/26/24: C3D1 DDMVAC   3/11/24: C4D1 DDMVAC     Prior Treatment: None    Subjective:   History of Presenting Illness:  Ronal Hair is a 78 y.o. male with a past medical history of lymphoma who presents with a new diagnosis of T2N0 bladder cancer.    Patient reports that he has been struggling for months.  He notes that he has been having frequent urination for months.  He had a CT scan which did not reveal any evidence of stone in the setting of his pain.  He saw urologist and underwent a cystoscopy which revealed a mass in the bladder.  Patient was admitted for a TURBT on December 13.    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  He also has perineural invasion present.    Patient is here to discuss neoadjuvant chemotherapy.    He also works as a  for disabled individuals.  He reports that he can no longer work as he has to urinate every 15 minutes and has frequency and urgency.    He has port scheduled to be placed on 1/26/24    Echo completed on 1/9/24, and EF was 65%.     Interval History    Patient reports that he is doing well.    We discussed his BP and he denies any symptoms.     We discussed increasing his hydration and he reports adequately drinking. He denies any BP meds.     He reports that the last chemo \"knocked\" him out.  He reports having hay fever now.     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    High cholesterol     Hypertension 01/12/2024    pt denies    Infectious disease     Lymphoma ([facility]) 01/22/2016    Nephrolithiasis 01/06/2023    Osteomyelitis of left foot ([facility]) 11/11/2020    Pain of toe 11/03/2020    Psychiatric problem 01/12/2024    anxiety related to diagnosis    Reactive airway disease without complication 03/18/2022    Reactive airway disease without complication 03/18/2022    Scoliosis     Snoring     Urinary incontinence 01/12/2024    wears depends when away from home        Past Surgical History:   Procedure Laterality Date    TURBT (TRANSURETHRAL RESECTION OF BLADDER TUMOR)  12/13/2023    Procedure: BIPOLAR TRANSURETHRAL RESECTION OF BLADDER TUMOR;  Surgeon: Addison Seymour M.D.;  Location: [facility];  Service: Urology    RI CYSTOSCOPY,INSERT URETERAL STENT Left 01/07/2023    Procedure: CYSTOSCOPY, WITH URETERAL STENT INSERTION;  Surgeon: Addison Seymour M.D.;  Location: [facility];  Service: Urology    RI CYSTO/URETERO/PYELOSCOPY, DX Left 01/07/2023    Procedure: URETEROSCOPY;  Surgeon: Addison Seymour M.D.;  Location: [facility];  Service: Urology    LASERTRIPSY Left 01/07/2023    Procedure: LITHOTRIPSY, USING LASER;  Surgeon: Addison Seymour M.D.;  Location: [facility];  Service: Urology    AORTOBIFEM BYPASS  11/08/2020    Procedure: CREATION, BYPASS, ARTERIAL, AORTA TO FEMORAL, BILATERAL;  Surgeon: Jimi Morrison M.D.;  Location: [facility];  Service: General    KNEE ARTHROSCOPY Left 1968    APPENDECTOMY CHILD      CATARACT EXTRACTION WITH IOL Bilateral     OTHER ABDOMINAL SURGERY      OTHER ORTHOPEDIC SURGERY      SHOULDER ARTHROSCOPY Left        Social History     Tobacco Use    Smoking status: Every Day      Current packs/day: 0.50     Average packs/day: 2.0 packs/day for 58.2 years (115.7 ttl pk-yrs)     Types: Cigarettes     Start date: 1/1/1966     Last attempt to quit: 11/3/2020    Smokeless tobacco: Never    Tobacco comments:     Pt states he is still smoking / only 1/2 a pack per day now    Vaping Use    Vaping Use: Never used   Substance Use Topics    Alcohol use: Not Currently    Drug use: Never        Family History   Problem Relation Age of Onset    Cancer Maternal Aunt         melanoma    Cancer Maternal Uncle         Throat cancer       No Known Allergies    Current Outpatient Medications   Medication Sig Dispense Refill    tamsulosin (FLOMAX) 0.4 MG capsule Take 2 Capsules by mouth 1/2 hour after breakfast. 90 Capsule 0    albuterol 108 (90 Base) MCG/ACT Aero Soln inhalation aerosol INHALE 2 PUFFS BY MOUTH EVERY 4 HOURS AS NEEDED FOR SHORTNESS OF BREATH FOR WHEEZING      phenazopyridine (PYRIDIUM) 100 MG Tab TAKE 2 TABLETS BY MOUTH THREE TIMES DAILY AS NEEDED FOR MODERATE OR MILD PAIN      lidocaine-prilocaine (EMLA) 2.5-2.5 % Cream Apply to port one hour prior to access and cover with plastic wrap. 1 Each 3    NON SPECIFIED Laxative daily      Acetaminophen (TYLENOL PO) Take  by mouth.      ibuprofen (MOTRIN) 200 MG Tab Take 800 mg by mouth every 6 hours as needed for Inflammation.      finasteride (PROSCAR) 5 MG Tab Take 5 mg by mouth every day.      tiotropium (SPIRIVA) 18 MCG Cap Inhale 1 puff by mouth once daily (Patient taking differently: Place 18 mcg into inhaler and inhale as needed (shortness or breath). Uses every 2-3 days) 30 Capsule 11    atorvastatin (LIPITOR) 40 MG Tab Take 1 Tablet by mouth every evening. 90 Tablet 3    Multiple Vitamins-Minerals (MULTIVITAMIN ADULTS 50+ PO) Take 1 Tablet by mouth every day.      ondansetron (ZOFRAN) 4 MG Tab tablet Take 1 Tablet by mouth every four hours as needed for Nausea/Vomiting (for nausea, vomiting). (Patient not taking: Reported on 4/2/2024) 30  "Tablet 6    prochlorperazine (COMPAZINE) 10 MG Tab Take 1 Tablet by mouth every 6 hours as needed (for nausea, vomiting). (Patient not taking: Reported on 4/2/2024) 30 Tablet 6    oxyCODONE immediate-release (ROXICODONE) 5 MG Tab Take 5 mg by mouth at bedtime. (Patient not taking: Reported on 2/5/2024)      sildenafil citrate (VIAGRA) 25 MG Tab Take 0.5 Tablets by mouth 1 time a day as needed for Erectile Dysfunction. 12.5 mg = 1/2 tablet (Patient not taking: Reported on 1/29/2024) 10 Tablet 5     No current facility-administered medications for this encounter.       Review of Systems   Constitutional:  Negative for chills, fever and weight loss.   HENT:  Negative for congestion, ear pain, nosebleeds and sore throat.    Eyes:  Positive for discharge. Negative for blurred vision.   Respiratory:  Negative for cough, sputum production, shortness of breath and wheezing.    Cardiovascular:  Negative for chest pain, palpitations, orthopnea and leg swelling.   Gastrointestinal:  Negative for abdominal pain, heartburn, nausea and vomiting.   Genitourinary:  Negative for dysuria, flank pain and urgency.   Musculoskeletal:  Positive for back pain. Negative for neck pain.   Neurological:  Negative for dizziness, tingling, tremors, sensory change, focal weakness and headaches.   Endo/Heme/Allergies:  Does not bruise/bleed easily.   Psychiatric/Behavioral:  Negative for depression, memory loss and suicidal ideas.    All other systems reviewed and are negative.      Problem list, medications, and allergies reviewed by myself today in Epic.     Objective:     Vitals:    04/02/24 0759   BP: (!) 102/38   BP Location: Left arm   Patient Position: Sitting   BP Cuff Size: Adult   Pulse: 89   Temp: 36.9 °C (98.4 °F)   TempSrc: Temporal   SpO2: 100%   Weight: 75.1 kg (165 lb 9.1 oz)   Height: 1.72 m (5' 7.72\")       DESC; KARNOFSKY SCALE WITH ECOG EQUIVALENT: 80, Normal activity with effort; some signs or symptoms of disease (ECOG " equivalent 1)    DISTRESS LEVEL: no apparent distress    Physical Exam  Constitutional:       General: He is not in acute distress.     Appearance: Normal appearance.      Comments: Port in place   HENT:      Head: Normocephalic and atraumatic.      Nose: Nose normal. No congestion.      Mouth/Throat:      Mouth: Mucous membranes are moist.      Pharynx: Oropharynx is clear.   Eyes:      General: No scleral icterus.     Conjunctiva/sclera: Conjunctivae normal.      Pupils: Pupils are equal, round, and reactive to light.   Cardiovascular:      Rate and Rhythm: Normal rate and regular rhythm.      Pulses: Normal pulses.      Heart sounds: No murmur heard.     No friction rub.   Pulmonary:      Effort: Pulmonary effort is normal. No respiratory distress.      Breath sounds: Normal breath sounds. No stridor. No wheezing or rales.   Chest:      Chest wall: No tenderness.   Abdominal:      General: Abdomen is flat. Bowel sounds are normal. There is no distension.      Palpations: Abdomen is soft. There is no mass.      Tenderness: There is no abdominal tenderness. There is no guarding or rebound.   Musculoskeletal:         General: No swelling, tenderness or deformity. Normal range of motion.      Cervical back: Normal range of motion and neck supple. No rigidity or tenderness.      Right lower leg: No edema.      Left lower leg: No edema.   Skin:     General: Skin is warm.      Coloration: Skin is not jaundiced or pale.      Findings: No bruising or rash.   Neurological:      General: No focal deficit present.      Mental Status: He is alert and oriented to person, place, and time. Mental status is at baseline.      Motor: No weakness.   Psychiatric:         Mood and Affect: Mood normal.         Behavior: Behavior normal.         Thought Content: Thought content normal.         Judgment: Judgment normal.         Labs:   Most recent labs reviewed.      Slightly anemic, no issues with Cr.      Imaging:   Most recent images  below have been independently reviewed by me.     CT CAP  1. No evidence of metastatic disease.  2. Decreased, persistent right-sided bladder wall thickening/malignancy.  3. Small simple renal cysts, which do not require follow-up.  4. Tiny bilateral nonobstructing renal stones.  5. Prostate enlargement.       CT Chest  1. No evidence of metastatic disease.  2. No acute process.       CT AP  1. Severe irregular bladder wall thickening, consistent with bladder cancer. No regional adenopathy or distant metastatic disease identified.  2. 4 mm distal right ureteral stone.  3. Single bilateral nonobstructing renal stones measuring 2 mm.  4. Multiple subcentimeter simple renal cysts, which do not require follow-up.  5. Prostate is enlarged, 5.1 cm in diameter.    Pathology:  A. Bladder tumor:          High-grade urothelial carcinoma with focal squamous           differentiation, invasive into muscularis propria (pT2).          Perineural invasion present.       Assessment/Plan:      Cancer Staging   No matching staging information was found for the patient.       Mr. Hair 77 yo M with a new diagnosis of of high grade urothelial carcinoma with focal squamous differentiation, pT2 N0 (stage II) s/p C4 ddMVAC.     Clinically he is doing well. Labs reviewed, no concerns.     We reviewed his CT AP which shows no metastatic disease. He also has decreased R sided wall thickening.      We did discuss 2 more cycles of chemo but patient does not wish to pursue this.     Plan  -needs follow up with Dr. Cardoso  -my MA will call Dr Cardoso's office for his appt and the patient has assured me he will call to ensure he has f/u ASAP  -will see patient in 1 month to review pathology       COMPLETED Chemotherapy regimen  14-day cycles for 3-6 cycles methotrexate 30 mg/m² IV push on day 1  Vinblastine 3 mg/m² IV over 5 to 10 minutes on days 1  Doxorubicin 30 mg/m² IV push on day 1  Cisplatin 70 mg/m² over 2 hours on day 1    #2.  Hypotension  -I offered hydration in office - pt refused  -I counseled pt to go to the ER, - pt refused  -counseled patient to go to the ER immediately if he becomes symptomatic    No follow-ups on file.     Any questions and concerns raised by the patient were addressed and answered. Patient denies any further questions.  Patient encouraged to call the office with any concerns or issues.     Judi Osborn M.D.  Hematology/Oncology      30 minutes was spent on this visit

## 2024-04-02 NOTE — ADDENDUM NOTE
Encounter addended by: Jose Carlos Gould on: 4/2/2024 8:22 AM   Actions taken: Charge Capture section accepted

## 2024-04-16 ENCOUNTER — PATIENT OUTREACH (OUTPATIENT)
Dept: ONCOLOGY | Facility: MEDICAL CENTER | Age: 79
End: 2024-04-16
Payer: MEDICARE

## 2024-04-16 NOTE — PROGRESS NOTES
"Call placed to Ronal to check in. He answers and discusses his current frustration. He says he has called and messaged and was going to call again to Dr. Cardoso's office to arrange at least a follow up appt if not a surgery date. He wants answers. He states this has all been going on since September 2023 and he feels nothing has been treated \"urgently.\" \"They don't care if I die.\" ONN provided active listening and emotional support. ONN will reach out to office staff and find out when patient can be seen this month. Ronal says \"Yesterday was 5 weeks since my last chemo, so its time.\" Ronal reports he hasn't been able to work and needs to return to his job. He works for [employer]. He says he would like to have the surgery, or be cleared to go back to work. Ronal has completed his chemotherapy, choosing to stop 2 cycles early. His next follow up with Dr. Osborn is 5/7.  ONN able to speak to surgery scheduler for Dr. Cardoso. The order for surgery was placed on 4/4 and she is aware. Pt requires a 5 hour surgery at [facility]. The  is having trouble as there is no available OR time currently and she is still working on it. She states to update the patient that it is being worked on and she will reach out by Friday to have him scheduled.  ONN called Ronal to update him.  "

## 2024-04-22 ENCOUNTER — APPOINTMENT (OUTPATIENT)
Dept: ADMISSIONS | Facility: MEDICAL CENTER | Age: 79
End: 2024-04-22
Attending: UROLOGY
Payer: MEDICARE

## 2024-04-25 ENCOUNTER — PRE-ADMISSION TESTING (OUTPATIENT)
Dept: ADMISSIONS | Facility: MEDICAL CENTER | Age: 79
End: 2024-04-25
Payer: MEDICARE

## 2024-04-25 ENCOUNTER — APPOINTMENT (OUTPATIENT)
Dept: ADMISSIONS | Facility: MEDICAL CENTER | Age: 79
End: 2024-04-25
Payer: MEDICARE

## 2024-04-25 ENCOUNTER — APPOINTMENT (OUTPATIENT)
Dept: ADMISSIONS | Facility: MEDICAL CENTER | Age: 79
End: 2024-04-25
Attending: UROLOGY
Payer: MEDICARE

## 2024-04-25 RX ORDER — DOXAZOSIN 2 MG/1
2 TABLET ORAL DAILY
COMMUNITY
End: 2024-05-24

## 2024-04-30 ENCOUNTER — HOSPITAL ENCOUNTER (OUTPATIENT)
Dept: RADIOLOGY | Facility: MEDICAL CENTER | Age: 79
End: 2024-04-30
Attending: UROLOGY
Payer: MEDICARE

## 2024-04-30 ENCOUNTER — PRE-ADMISSION TESTING (OUTPATIENT)
Dept: ADMISSIONS | Facility: MEDICAL CENTER | Age: 79
End: 2024-04-30
Attending: UROLOGY
Payer: MEDICARE

## 2024-04-30 DIAGNOSIS — Z01.811 PRE-OPERATIVE RESPIRATORY EXAMINATION: ICD-10-CM

## 2024-04-30 DIAGNOSIS — Z01.812 PRE-OPERATIVE LABORATORY EXAMINATION: ICD-10-CM

## 2024-04-30 DIAGNOSIS — Z01.810 PRE-OPERATIVE CARDIOVASCULAR EXAMINATION: ICD-10-CM

## 2024-04-30 LAB
ABO GROUP BLD: ABNORMAL
ALBUMIN SERPL BCP-MCNC: 4.1 G/DL (ref 3.2–4.9)
ALBUMIN/GLOB SERPL: 1.8 G/DL
ALP SERPL-CCNC: 84 U/L (ref 30–99)
ALT SERPL-CCNC: 12 U/L (ref 2–50)
ANION GAP SERPL CALC-SCNC: 14 MMOL/L (ref 7–16)
APPEARANCE UR: CLEAR
AST SERPL-CCNC: 16 U/L (ref 12–45)
BACTERIA #/AREA URNS HPF: NEGATIVE /HPF
BASOPHILS # BLD AUTO: 0.7 % (ref 0–1.8)
BASOPHILS # BLD: 0.02 K/UL (ref 0–0.12)
BILIRUB SERPL-MCNC: 0.6 MG/DL (ref 0.1–1.5)
BILIRUB UR QL STRIP.AUTO: NEGATIVE
BLD GP AB SCN SERPL QL: ABNORMAL
BUN SERPL-MCNC: 18 MG/DL (ref 8–22)
CALCIUM ALBUM COR SERPL-MCNC: 9.2 MG/DL (ref 8.5–10.5)
CALCIUM SERPL-MCNC: 9.3 MG/DL (ref 8.5–10.5)
CHLORIDE SERPL-SCNC: 102 MMOL/L (ref 96–112)
CO2 SERPL-SCNC: 23 MMOL/L (ref 20–33)
COLOR UR: YELLOW
CREAT SERPL-MCNC: 1.04 MG/DL (ref 0.5–1.4)
EKG IMPRESSION: NORMAL
EOSINOPHIL # BLD AUTO: 0.19 K/UL (ref 0–0.51)
EOSINOPHIL NFR BLD: 6.2 % (ref 0–6.9)
EPI CELLS #/AREA URNS HPF: ABNORMAL /HPF
ERYTHROCYTE [DISTWIDTH] IN BLOOD BY AUTOMATED COUNT: 47.3 FL (ref 35.9–50)
GFR SERPLBLD CREATININE-BSD FMLA CKD-EPI: 74 ML/MIN/1.73 M 2
GLOBULIN SER CALC-MCNC: 2.3 G/DL (ref 1.9–3.5)
GLUCOSE SERPL-MCNC: 102 MG/DL (ref 65–99)
GLUCOSE UR STRIP.AUTO-MCNC: NEGATIVE MG/DL
HCT VFR BLD AUTO: 28.2 % (ref 42–52)
HGB BLD-MCNC: 9.9 G/DL (ref 14–18)
HYALINE CASTS #/AREA URNS LPF: ABNORMAL /LPF
IMM GRANULOCYTES # BLD AUTO: 0.02 K/UL (ref 0–0.11)
IMM GRANULOCYTES NFR BLD AUTO: 0.7 % (ref 0–0.9)
INR PPP: 1.03 (ref 0.87–1.13)
KETONES UR STRIP.AUTO-MCNC: NEGATIVE MG/DL
LEUKOCYTE ESTERASE UR QL STRIP.AUTO: ABNORMAL
LYMPHOCYTES # BLD AUTO: 0.67 K/UL (ref 1–4.8)
LYMPHOCYTES NFR BLD: 21.8 % (ref 22–41)
MCH RBC QN AUTO: 34.6 PG (ref 27–33)
MCHC RBC AUTO-ENTMCNC: 35.1 G/DL (ref 32.3–36.5)
MCV RBC AUTO: 98.6 FL (ref 81.4–97.8)
MICRO URNS: ABNORMAL
MONOCYTES # BLD AUTO: 0.32 K/UL (ref 0–0.85)
MONOCYTES NFR BLD AUTO: 10.4 % (ref 0–13.4)
NEUTROPHILS # BLD AUTO: 1.85 K/UL (ref 1.82–7.42)
NEUTROPHILS NFR BLD: 60.2 % (ref 44–72)
NITRITE UR QL STRIP.AUTO: NEGATIVE
NRBC # BLD AUTO: 0 K/UL
NRBC BLD-RTO: 0 /100 WBC (ref 0–0.2)
PH UR STRIP.AUTO: 6 [PH] (ref 5–8)
PLATELET # BLD AUTO: 114 K/UL (ref 164–446)
PMV BLD AUTO: 9.8 FL (ref 9–12.9)
POTASSIUM SERPL-SCNC: 4 MMOL/L (ref 3.6–5.5)
PROT SERPL-MCNC: 6.4 G/DL (ref 6–8.2)
PROT UR QL STRIP: NEGATIVE MG/DL
PROTHROMBIN TIME: 13.6 SEC (ref 12–14.6)
RBC # BLD AUTO: 2.86 M/UL (ref 4.7–6.1)
RBC # URNS HPF: ABNORMAL /HPF
RBC UR QL AUTO: NEGATIVE
RH BLD: ABNORMAL
SODIUM SERPL-SCNC: 139 MMOL/L (ref 135–145)
SP GR UR STRIP.AUTO: 1.01
UROBILINOGEN UR STRIP.AUTO-MCNC: 0.2 MG/DL
WBC # BLD AUTO: 3.1 K/UL (ref 4.8–10.8)
WBC #/AREA URNS HPF: ABNORMAL /HPF

## 2024-04-30 PROCEDURE — 93010 ELECTROCARDIOGRAM REPORT: CPT | Performed by: INTERNAL MEDICINE

## 2024-04-30 PROCEDURE — 93005 ELECTROCARDIOGRAM TRACING: CPT

## 2024-05-01 ENCOUNTER — PATIENT OUTREACH (OUTPATIENT)
Dept: ONCOLOGY | Facility: MEDICAL CENTER | Age: 79
End: 2024-05-01
Payer: MEDICARE

## 2024-05-02 LAB
BACTERIA UR CULT: NORMAL
SIGNIFICANT IND 70042: NORMAL
SITE SITE: NORMAL
SOURCE SOURCE: NORMAL

## 2024-05-07 ENCOUNTER — APPOINTMENT (OUTPATIENT)
Dept: HEMATOLOGY ONCOLOGY | Facility: MEDICAL CENTER | Age: 79
End: 2024-05-07
Payer: MEDICARE

## 2024-05-08 ENCOUNTER — NON-PROVIDER VISIT (OUTPATIENT)
Dept: WOUND CARE | Facility: MEDICAL CENTER | Age: 79
End: 2024-05-08
Attending: UROLOGY
Payer: MEDICARE

## 2024-05-08 NOTE — PATIENT INSTRUCTIONS
Change urostomies every 3-5 days or if it is leaking. Change appliance immediately if it is leaking or peristomal skin feels irritated, has itching, or  burning.   To change the appliance, remove previous appliance, cleanse peristomal skin with warm water/washcloth, pat dry, make an ostomy template or use cardboard measuring guide and trace ostomy shape onto back of barrier, cut out barrier, apply a paste ring around barrier opening and apply appliance. Empty pouches when no more than ½ full. Check contents every 2 hours or as needed. Do not leave soap residue on tissue and do not use baby wipes or skin prep wipes.     Should you experience any significant changes in your condition, such as infection (redness, swelling, localized heat, increased pain, fever > 101 F, chills) or have any questions regarding your home care instructions, please contact the wound center at [phone]. If after hours, contact your primary care physician or go to the hospital emergency room.

## 2024-05-08 NOTE — CERTIFICATION
"Ostomy Pre-Operative Marking and Teaching       Date of Surgery: May 10 2024  Type of Surgery: ileo conduit for urostomy due to bladder cancer  Surgeon: Agustín FLANAGAN M.D.     Subjective: \"I have an IQ of 120, it get this.\" Patient works for [employer] as  & wants to go back to work 6 weeks post op due to financial needs. Patient as already finished chemotherapy but did not have radiation.     Objective: Assessed patient to identify potential stoma site within his/her line of site on a flat pouching surface, within the rectus muscle, away from belt-line, bony prominences, exiting scars and umbilicus.    Assessment:  Potential site (s) located for: Urostomy   1. Procedure explained to the patient.  2. Rectus muscle identified with patient in supine position.  3. Appropriate abdominal quadrant for planned surgical procedure identified.  4. Existing scars identified.  5. Potential sites evaluated with patient:      a. Supine      b. Sitting       c. Bending forward/ twisting at waist   6. Site (s) selected with respect to skin folds, creases, abdominal contours and belt line.  7. Special considerations:      a. Lower quadrant disappears when sitting.      b. Belt line in lower quadrant       c. Lateral crease      d. Minimal space between hip bone & umbilicus making short lower quadrant      e. Severe midline abdominal non-symptomatic possible diastasis recti.  8. Potential site (s) marked with an \"X\" (skin prepped with skin protectant wipe, kwame applied with indelible marker, kwame fixed with skin protectant wipe again.                       Site(s) marked: RUQ    Patient Teachin. Reviewed nature of surgical procedure.  2. Reviewed basic anatomy and function of the GI or  Tract.  3. Reviewed and provided the patient with literature-Urostomy,     Other_[facility] handout.  4. Pouching system with hands on demonstrated.  5. Questions and concerns addressed  6. Other    Post-Op Plan:    1. Family assistance " needed; daughter visiting from [place] & will be present in hospital but did not come to pre-marking. Patient lives with girlfriend. Patient was able to independently apply fit & appliance at pre-marking, as well as empty pouch in toilet.     2. Possible Home Health depending on patient post op & still have outpatient ostomy care    3. Patient interested in 1 piece appliance, down drain & possible leg bag while at work      Report provided to Inpatient ostomy team [facility]

## 2024-05-10 ENCOUNTER — HOSPITAL ENCOUNTER (INPATIENT)
Facility: MEDICAL CENTER | Age: 79
LOS: 7 days | DRG: 654 | End: 2024-05-17
Attending: UROLOGY | Admitting: UROLOGY
Payer: MEDICARE

## 2024-05-10 ENCOUNTER — ANESTHESIA EVENT (OUTPATIENT)
Dept: SURGERY | Facility: MEDICAL CENTER | Age: 79
DRG: 654 | End: 2024-05-10
Payer: MEDICARE

## 2024-05-10 ENCOUNTER — ANESTHESIA (OUTPATIENT)
Dept: SURGERY | Facility: MEDICAL CENTER | Age: 79
DRG: 654 | End: 2024-05-10
Payer: MEDICARE

## 2024-05-10 ENCOUNTER — APPOINTMENT (OUTPATIENT)
Dept: RADIOLOGY | Facility: MEDICAL CENTER | Age: 79
DRG: 654 | End: 2024-05-10
Payer: MEDICARE

## 2024-05-10 DIAGNOSIS — D49.4 BLADDER TUMOR: ICD-10-CM

## 2024-05-10 DIAGNOSIS — Z71.89 ENCOUNTER FOR OSTOMY CARE EDUCATION: ICD-10-CM

## 2024-05-10 LAB
ANION GAP SERPL CALC-SCNC: 10 MMOL/L (ref 7–16)
BARCODED ABORH UBTYP: 6200
BARCODED PRD CODE UBPRD: NORMAL
BARCODED UNIT NUM UBUNT: NORMAL
BASOPHILS # BLD AUTO: 0.2 % (ref 0–1.8)
BASOPHILS # BLD: 0.01 K/UL (ref 0–0.12)
BUN SERPL-MCNC: 24 MG/DL (ref 8–22)
CALCIUM SERPL-MCNC: 7.6 MG/DL (ref 8.5–10.5)
CHLORIDE SERPL-SCNC: 106 MMOL/L (ref 96–112)
CO2 SERPL-SCNC: 19 MMOL/L (ref 20–33)
COMPONENT F 8504F: NORMAL
COMPONENT R 8504R: NORMAL
CREAT SERPL-MCNC: 0.99 MG/DL (ref 0.5–1.4)
EOSINOPHIL # BLD AUTO: 0.01 K/UL (ref 0–0.51)
EOSINOPHIL NFR BLD: 0.2 % (ref 0–6.9)
ERYTHROCYTE [DISTWIDTH] IN BLOOD BY AUTOMATED COUNT: 51.3 FL (ref 35.9–50)
ERYTHROCYTE [DISTWIDTH] IN BLOOD BY AUTOMATED COUNT: 52 FL (ref 35.9–50)
GFR SERPLBLD CREATININE-BSD FMLA CKD-EPI: 79 ML/MIN/1.73 M 2
GLUCOSE SERPL-MCNC: 211 MG/DL (ref 65–99)
HCT VFR BLD AUTO: 28.3 % (ref 42–52)
HCT VFR BLD AUTO: 29.5 % (ref 42–52)
HCT VFR BLD AUTO: 30.8 % (ref 42–52)
HGB BLD-MCNC: 10.1 G/DL (ref 14–18)
HGB BLD-MCNC: 10.4 G/DL (ref 14–18)
HGB BLD-MCNC: 9.9 G/DL (ref 14–18)
IMM GRANULOCYTES # BLD AUTO: 0.01 K/UL (ref 0–0.11)
IMM GRANULOCYTES NFR BLD AUTO: 0.2 % (ref 0–0.9)
LYMPHOCYTES # BLD AUTO: 0.35 K/UL (ref 1–4.8)
LYMPHOCYTES NFR BLD: 6.1 % (ref 22–41)
MCH RBC QN AUTO: 32.1 PG (ref 27–33)
MCH RBC QN AUTO: 32.1 PG (ref 27–33)
MCHC RBC AUTO-ENTMCNC: 33.8 G/DL (ref 32.3–36.5)
MCHC RBC AUTO-ENTMCNC: 34.2 G/DL (ref 32.3–36.5)
MCV RBC AUTO: 93.7 FL (ref 81.4–97.8)
MCV RBC AUTO: 95.1 FL (ref 81.4–97.8)
MONOCYTES # BLD AUTO: 0.26 K/UL (ref 0–0.85)
MONOCYTES NFR BLD AUTO: 4.6 % (ref 0–13.4)
NEUTROPHILS # BLD AUTO: 5.06 K/UL (ref 1.82–7.42)
NEUTROPHILS NFR BLD: 88.7 % (ref 44–72)
NRBC # BLD AUTO: 0 K/UL
NRBC BLD-RTO: 0 /100 WBC (ref 0–0.2)
PATHOLOGY CONSULT NOTE: NORMAL
PLATELET # BLD AUTO: 100 K/UL (ref 164–446)
PLATELET # BLD AUTO: 93 K/UL (ref 164–446)
PLATELETS.RETICULATED NFR BLD AUTO: 3.5 % (ref 0.6–13.1)
PMV BLD AUTO: 10.4 FL (ref 9–12.9)
PMV BLD AUTO: 9.7 FL (ref 9–12.9)
POTASSIUM SERPL-SCNC: 4.8 MMOL/L (ref 3.6–5.5)
PRODUCT TYPE UPROD: NORMAL
RBC # BLD AUTO: 3.15 M/UL (ref 4.7–6.1)
RBC # BLD AUTO: 3.24 M/UL (ref 4.7–6.1)
SODIUM SERPL-SCNC: 135 MMOL/L (ref 135–145)
UNIT STATUS USTAT: NORMAL
WBC # BLD AUTO: 5.7 K/UL (ref 4.8–10.8)
WBC # BLD AUTO: 8.7 K/UL (ref 4.8–10.8)

## 2024-05-10 PROCEDURE — P9016 RBC LEUKOCYTES REDUCED: HCPCS | Mod: 91

## 2024-05-10 PROCEDURE — 36415 COLL VENOUS BLD VENIPUNCTURE: CPT

## 2024-05-10 PROCEDURE — A9270 NON-COVERED ITEM OR SERVICE: HCPCS | Performed by: UROLOGY

## 2024-05-10 PROCEDURE — 62322 NJX INTERLAMINAR LMBR/SAC: CPT | Performed by: UROLOGY

## 2024-05-10 PROCEDURE — 700101 HCHG RX REV CODE 250: Performed by: ANESTHESIOLOGY

## 2024-05-10 PROCEDURE — 770001 HCHG ROOM/CARE - MED/SURG/GYN PRIV*

## 2024-05-10 PROCEDURE — 160009 HCHG ANES TIME/MIN: Performed by: UROLOGY

## 2024-05-10 PROCEDURE — 80048 BASIC METABOLIC PNL TOTAL CA: CPT

## 2024-05-10 PROCEDURE — 85027 COMPLETE CBC AUTOMATED: CPT

## 2024-05-10 PROCEDURE — 0T1B07C BYPASS BLADDER TO ILEOCUTANEOUS WITH AUTOLOGOUS TISSUE SUBSTITUTE, OPEN APPROACH: ICD-10-PCS | Performed by: UROLOGY

## 2024-05-10 PROCEDURE — 88331 PATH CONSLTJ SURG 1 BLK 1SPC: CPT

## 2024-05-10 PROCEDURE — 700101 HCHG RX REV CODE 250: Performed by: UROLOGY

## 2024-05-10 PROCEDURE — 700105 HCHG RX REV CODE 258: Performed by: UROLOGY

## 2024-05-10 PROCEDURE — 30243K1 TRANSFUSION OF NONAUTOLOGOUS FROZEN PLASMA INTO CENTRAL VEIN, PERCUTANEOUS APPROACH: ICD-10-PCS | Performed by: UROLOGY

## 2024-05-10 PROCEDURE — 700105 HCHG RX REV CODE 258: Performed by: ANESTHESIOLOGY

## 2024-05-10 PROCEDURE — 110371 HCHG SHELL REV 272: Performed by: UROLOGY

## 2024-05-10 PROCEDURE — 99232 SBSQ HOSP IP/OBS MODERATE 35: CPT | Mod: GC | Performed by: FAMILY MEDICINE

## 2024-05-10 PROCEDURE — 88309 TISSUE EXAM BY PATHOLOGIST: CPT

## 2024-05-10 PROCEDURE — 30243N1 TRANSFUSION OF NONAUTOLOGOUS RED BLOOD CELLS INTO CENTRAL VEIN, PERCUTANEOUS APPROACH: ICD-10-PCS | Performed by: UROLOGY

## 2024-05-10 PROCEDURE — 3E0R3BZ INTRODUCTION OF ANESTHETIC AGENT INTO SPINAL CANAL, PERCUTANEOUS APPROACH: ICD-10-PCS | Performed by: ANESTHESIOLOGY

## 2024-05-10 PROCEDURE — 36430 TRANSFUSION BLD/BLD COMPNT: CPT

## 2024-05-10 PROCEDURE — 0VT00ZZ RESECTION OF PROSTATE, OPEN APPROACH: ICD-10-PCS | Performed by: UROLOGY

## 2024-05-10 PROCEDURE — 74018 RADEX ABDOMEN 1 VIEW: CPT

## 2024-05-10 PROCEDURE — 0DJD8ZZ INSPECTION OF LOWER INTESTINAL TRACT, VIA NATURAL OR ARTIFICIAL OPENING ENDOSCOPIC: ICD-10-PCS | Performed by: SURGERY

## 2024-05-10 PROCEDURE — 86923 COMPATIBILITY TEST ELECTRIC: CPT

## 2024-05-10 PROCEDURE — 88307 TISSUE EXAM BY PATHOLOGIST: CPT | Mod: 59

## 2024-05-10 PROCEDURE — 160029 HCHG SURGERY MINUTES - 1ST 30 MINS LEVEL 4: Performed by: UROLOGY

## 2024-05-10 PROCEDURE — 700102 HCHG RX REV CODE 250 W/ 637 OVERRIDE(OP): Performed by: UROLOGY

## 2024-05-10 PROCEDURE — 0TTB0ZZ RESECTION OF BLADDER, OPEN APPROACH: ICD-10-PCS | Performed by: UROLOGY

## 2024-05-10 PROCEDURE — 85055 RETICULATED PLATELET ASSAY: CPT

## 2024-05-10 PROCEDURE — C1755 CATHETER, INTRASPINAL: HCPCS | Performed by: UROLOGY

## 2024-05-10 PROCEDURE — C1729 CATH, DRAINAGE: HCPCS | Performed by: UROLOGY

## 2024-05-10 PROCEDURE — 700111 HCHG RX REV CODE 636 W/ 250 OVERRIDE (IP): Performed by: UROLOGY

## 2024-05-10 PROCEDURE — 306580 SET INFUSION,POWERLOC 20G X.75: Performed by: UROLOGY

## 2024-05-10 PROCEDURE — 160041 HCHG SURGERY MINUTES - EA ADDL 1 MIN LEVEL 4: Performed by: UROLOGY

## 2024-05-10 PROCEDURE — 85025 COMPLETE CBC W/AUTO DIFF WBC: CPT

## 2024-05-10 PROCEDURE — 0DBB0ZZ EXCISION OF ILEUM, OPEN APPROACH: ICD-10-PCS | Performed by: UROLOGY

## 2024-05-10 PROCEDURE — 160048 HCHG OR STATISTICAL LEVEL 1-5: Performed by: UROLOGY

## 2024-05-10 PROCEDURE — 0T180ZC BYPASS BILATERAL URETERS TO ILEOCUTANEOUS, OPEN APPROACH: ICD-10-PCS | Performed by: UROLOGY

## 2024-05-10 PROCEDURE — 88305 TISSUE EXAM BY PATHOLOGIST: CPT | Mod: 59

## 2024-05-10 PROCEDURE — 700111 HCHG RX REV CODE 636 W/ 250 OVERRIDE (IP): Mod: JZ | Performed by: ANESTHESIOLOGY

## 2024-05-10 PROCEDURE — 160035 HCHG PACU - 1ST 60 MINS PHASE I: Performed by: UROLOGY

## 2024-05-10 PROCEDURE — P9017 PLASMA 1 DONOR FRZ W/IN 8 HR: HCPCS

## 2024-05-10 PROCEDURE — 85014 HEMATOCRIT: CPT

## 2024-05-10 PROCEDURE — 160002 HCHG RECOVERY MINUTES (STAT): Performed by: UROLOGY

## 2024-05-10 PROCEDURE — 0TBD0ZZ EXCISION OF URETHRA, OPEN APPROACH: ICD-10-PCS | Performed by: UROLOGY

## 2024-05-10 PROCEDURE — 85018 HEMOGLOBIN: CPT

## 2024-05-10 RX ORDER — SODIUM CHLORIDE 9 MG/ML
INJECTION, SOLUTION INTRAVENOUS CONTINUOUS
Status: DISPENSED | OUTPATIENT
Start: 2024-05-10 | End: 2024-05-12

## 2024-05-10 RX ORDER — ACETAMINOPHEN 500 MG
1000 TABLET ORAL EVERY 6 HOURS PRN
Status: DISCONTINUED | OUTPATIENT
Start: 2024-05-15 | End: 2024-05-17 | Stop reason: HOSPADM

## 2024-05-10 RX ORDER — BISACODYL 10 MG
10 SUPPOSITORY, RECTAL RECTAL DAILY
Status: DISCONTINUED | OUTPATIENT
Start: 2024-05-12 | End: 2024-05-17 | Stop reason: HOSPADM

## 2024-05-10 RX ORDER — OXYCODONE HCL 5 MG/5 ML
10 SOLUTION, ORAL ORAL
Status: DISCONTINUED | OUTPATIENT
Start: 2024-05-10 | End: 2024-05-10 | Stop reason: HOSPADM

## 2024-05-10 RX ORDER — AMOXICILLIN 250 MG
2 CAPSULE ORAL DAILY
Status: DISCONTINUED | OUTPATIENT
Start: 2024-05-12 | End: 2024-05-17 | Stop reason: HOSPADM

## 2024-05-10 RX ORDER — OXYCODONE HYDROCHLORIDE 5 MG/1
5 TABLET ORAL
Status: DISCONTINUED | OUTPATIENT
Start: 2024-05-10 | End: 2024-05-10

## 2024-05-10 RX ORDER — POLYETHYLENE GLYCOL 3350 17 G/17G
1 POWDER, FOR SOLUTION ORAL DAILY
Status: DISCONTINUED | OUTPATIENT
Start: 2024-05-10 | End: 2024-05-17 | Stop reason: HOSPADM

## 2024-05-10 RX ORDER — MEPERIDINE HYDROCHLORIDE 25 MG/ML
12.5 INJECTION INTRAMUSCULAR; INTRAVENOUS; SUBCUTANEOUS
Status: DISCONTINUED | OUTPATIENT
Start: 2024-05-10 | End: 2024-05-10 | Stop reason: HOSPADM

## 2024-05-10 RX ORDER — LABETALOL HYDROCHLORIDE 5 MG/ML
5 INJECTION, SOLUTION INTRAVENOUS
Status: DISCONTINUED | OUTPATIENT
Start: 2024-05-10 | End: 2024-05-10 | Stop reason: HOSPADM

## 2024-05-10 RX ORDER — DIPHENHYDRAMINE HYDROCHLORIDE 50 MG/ML
25 INJECTION INTRAMUSCULAR; INTRAVENOUS EVERY 6 HOURS PRN
Status: DISCONTINUED | OUTPATIENT
Start: 2024-05-10 | End: 2024-05-17 | Stop reason: HOSPADM

## 2024-05-10 RX ORDER — ROPIVACAINE HYDROCHLORIDE 2 MG/ML
INJECTION, SOLUTION EPIDURAL; INFILTRATION
Status: COMPLETED | OUTPATIENT
Start: 2024-05-10 | End: 2024-05-10

## 2024-05-10 RX ORDER — DEXAMETHASONE SODIUM PHOSPHATE 4 MG/ML
INJECTION, SOLUTION INTRA-ARTICULAR; INTRALESIONAL; INTRAMUSCULAR; INTRAVENOUS; SOFT TISSUE PRN
Status: DISCONTINUED | OUTPATIENT
Start: 2024-05-10 | End: 2024-05-10 | Stop reason: SURG

## 2024-05-10 RX ORDER — OXYCODONE HCL 5 MG/5 ML
5 SOLUTION, ORAL ORAL
Status: DISCONTINUED | OUTPATIENT
Start: 2024-05-10 | End: 2024-05-10 | Stop reason: HOSPADM

## 2024-05-10 RX ORDER — LIDOCAINE HYDROCHLORIDE AND EPINEPHRINE 15; 5 MG/ML; UG/ML
INJECTION, SOLUTION EPIDURAL
Status: COMPLETED | OUTPATIENT
Start: 2024-05-10 | End: 2024-05-10

## 2024-05-10 RX ORDER — DIPHENHYDRAMINE HYDROCHLORIDE 50 MG/ML
12.5 INJECTION INTRAMUSCULAR; INTRAVENOUS
Status: DISCONTINUED | OUTPATIENT
Start: 2024-05-10 | End: 2024-05-10 | Stop reason: HOSPADM

## 2024-05-10 RX ORDER — ROCURONIUM BROMIDE 10 MG/ML
INJECTION, SOLUTION INTRAVENOUS PRN
Status: DISCONTINUED | OUTPATIENT
Start: 2024-05-10 | End: 2024-05-10 | Stop reason: SURG

## 2024-05-10 RX ORDER — OXYCODONE HYDROCHLORIDE 5 MG/1
5 TABLET ORAL EVERY 4 HOURS PRN
Status: DISCONTINUED | OUTPATIENT
Start: 2024-05-10 | End: 2024-05-13

## 2024-05-10 RX ORDER — LABETALOL HYDROCHLORIDE 5 MG/ML
INJECTION, SOLUTION INTRAVENOUS PRN
Status: DISCONTINUED | OUTPATIENT
Start: 2024-05-10 | End: 2024-05-10 | Stop reason: SURG

## 2024-05-10 RX ORDER — HYDROMORPHONE HYDROCHLORIDE 1 MG/ML
0.5 INJECTION, SOLUTION INTRAMUSCULAR; INTRAVENOUS; SUBCUTANEOUS
Status: DISCONTINUED | OUTPATIENT
Start: 2024-05-10 | End: 2024-05-10

## 2024-05-10 RX ORDER — HYDRALAZINE HYDROCHLORIDE 20 MG/ML
5 INJECTION INTRAMUSCULAR; INTRAVENOUS
Status: DISCONTINUED | OUTPATIENT
Start: 2024-05-10 | End: 2024-05-10 | Stop reason: HOSPADM

## 2024-05-10 RX ORDER — ACETAMINOPHEN 500 MG
1000 TABLET ORAL EVERY 6 HOURS PRN
COMMUNITY
End: 2024-05-24

## 2024-05-10 RX ORDER — OXYCODONE HYDROCHLORIDE 10 MG/1
10 TABLET ORAL
Status: DISCONTINUED | OUTPATIENT
Start: 2024-05-10 | End: 2024-05-10

## 2024-05-10 RX ORDER — SODIUM CHLORIDE, SODIUM LACTATE, POTASSIUM CHLORIDE, CALCIUM CHLORIDE 600; 310; 30; 20 MG/100ML; MG/100ML; MG/100ML; MG/100ML
INJECTION, SOLUTION INTRAVENOUS CONTINUOUS
Status: ACTIVE | OUTPATIENT
Start: 2024-05-10 | End: 2024-05-10

## 2024-05-10 RX ORDER — ONDANSETRON 2 MG/ML
4 INJECTION INTRAMUSCULAR; INTRAVENOUS
Status: DISCONTINUED | OUTPATIENT
Start: 2024-05-10 | End: 2024-05-10 | Stop reason: HOSPADM

## 2024-05-10 RX ORDER — HYDROMORPHONE HYDROCHLORIDE 1 MG/ML
0.1 INJECTION, SOLUTION INTRAMUSCULAR; INTRAVENOUS; SUBCUTANEOUS
Status: DISCONTINUED | OUTPATIENT
Start: 2024-05-10 | End: 2024-05-10 | Stop reason: HOSPADM

## 2024-05-10 RX ORDER — METOPROLOL TARTRATE 1 MG/ML
1 INJECTION, SOLUTION INTRAVENOUS
Status: DISCONTINUED | OUTPATIENT
Start: 2024-05-10 | End: 2024-05-10 | Stop reason: HOSPADM

## 2024-05-10 RX ORDER — SCOLOPAMINE TRANSDERMAL SYSTEM 1 MG/1
1 PATCH, EXTENDED RELEASE TRANSDERMAL
Status: DISCONTINUED | OUTPATIENT
Start: 2024-05-10 | End: 2024-05-17 | Stop reason: HOSPADM

## 2024-05-10 RX ORDER — HYDROMORPHONE HYDROCHLORIDE 1 MG/ML
0.2 INJECTION, SOLUTION INTRAMUSCULAR; INTRAVENOUS; SUBCUTANEOUS
Status: DISCONTINUED | OUTPATIENT
Start: 2024-05-10 | End: 2024-05-10 | Stop reason: HOSPADM

## 2024-05-10 RX ORDER — ATORVASTATIN CALCIUM 40 MG/1
40 TABLET, FILM COATED ORAL EVERY EVENING
Status: DISCONTINUED | OUTPATIENT
Start: 2024-05-11 | End: 2024-05-17 | Stop reason: HOSPADM

## 2024-05-10 RX ORDER — HYDROMORPHONE HYDROCHLORIDE 1 MG/ML
0.4 INJECTION, SOLUTION INTRAMUSCULAR; INTRAVENOUS; SUBCUTANEOUS
Status: DISCONTINUED | OUTPATIENT
Start: 2024-05-10 | End: 2024-05-10 | Stop reason: HOSPADM

## 2024-05-10 RX ORDER — NALOXONE HYDROCHLORIDE 0.4 MG/ML
INJECTION, SOLUTION INTRAMUSCULAR; INTRAVENOUS; SUBCUTANEOUS PRN
Status: DISCONTINUED | OUTPATIENT
Start: 2024-05-10 | End: 2024-05-10 | Stop reason: SURG

## 2024-05-10 RX ORDER — EPHEDRINE SULFATE 50 MG/ML
5 INJECTION, SOLUTION INTRAVENOUS
Status: COMPLETED | OUTPATIENT
Start: 2024-05-10 | End: 2024-05-10

## 2024-05-10 RX ORDER — IPRATROPIUM BROMIDE AND ALBUTEROL SULFATE 2.5; .5 MG/3ML; MG/3ML
3 SOLUTION RESPIRATORY (INHALATION)
Status: DISCONTINUED | OUTPATIENT
Start: 2024-05-10 | End: 2024-05-10 | Stop reason: HOSPADM

## 2024-05-10 RX ORDER — MIDAZOLAM HYDROCHLORIDE 1 MG/ML
1 INJECTION INTRAMUSCULAR; INTRAVENOUS
Status: DISCONTINUED | OUTPATIENT
Start: 2024-05-10 | End: 2024-05-10 | Stop reason: HOSPADM

## 2024-05-10 RX ORDER — HALOPERIDOL 5 MG/ML
1 INJECTION INTRAMUSCULAR
Status: DISCONTINUED | OUTPATIENT
Start: 2024-05-10 | End: 2024-05-10 | Stop reason: HOSPADM

## 2024-05-10 RX ORDER — ACETAMINOPHEN 500 MG
1000 TABLET ORAL EVERY 6 HOURS
Status: DISPENSED | OUTPATIENT
Start: 2024-05-10 | End: 2024-05-15

## 2024-05-10 RX ORDER — DIPHENHYDRAMINE HYDROCHLORIDE 50 MG/ML
25 INJECTION INTRAMUSCULAR; INTRAVENOUS EVERY 6 HOURS PRN
Status: DISCONTINUED | OUTPATIENT
Start: 2024-05-10 | End: 2024-05-10

## 2024-05-10 RX ORDER — MIDAZOLAM HYDROCHLORIDE 1 MG/ML
INJECTION INTRAMUSCULAR; INTRAVENOUS PRN
Status: DISCONTINUED | OUTPATIENT
Start: 2024-05-10 | End: 2024-05-10 | Stop reason: SURG

## 2024-05-10 RX ORDER — SCOLOPAMINE TRANSDERMAL SYSTEM 1 MG/1
1 PATCH, EXTENDED RELEASE TRANSDERMAL
Status: DISCONTINUED | OUTPATIENT
Start: 2024-05-10 | End: 2024-05-10

## 2024-05-10 RX ORDER — PHENYLEPHRINE HYDROCHLORIDE 10 MG/ML
INJECTION, SOLUTION INTRAMUSCULAR; INTRAVENOUS; SUBCUTANEOUS PRN
Status: DISCONTINUED | OUTPATIENT
Start: 2024-05-10 | End: 2024-05-10 | Stop reason: SURG

## 2024-05-10 RX ORDER — DEXAMETHASONE SODIUM PHOSPHATE 4 MG/ML
4 INJECTION, SOLUTION INTRA-ARTICULAR; INTRALESIONAL; INTRAMUSCULAR; INTRAVENOUS; SOFT TISSUE
Status: COMPLETED | OUTPATIENT
Start: 2024-05-10 | End: 2024-05-12

## 2024-05-10 RX ORDER — EPHEDRINE SULFATE 50 MG/ML
INJECTION, SOLUTION INTRAVENOUS PRN
Status: DISCONTINUED | OUTPATIENT
Start: 2024-05-10 | End: 2024-05-10 | Stop reason: SURG

## 2024-05-10 RX ORDER — HEPARIN SODIUM 5000 [USP'U]/ML
5000 INJECTION, SOLUTION INTRAVENOUS; SUBCUTANEOUS EVERY 8 HOURS
Qty: 9 ML | Refills: 0 | Status: DISPENSED | OUTPATIENT
Start: 2024-05-11 | End: 2024-05-14

## 2024-05-10 RX ORDER — KETOROLAC TROMETHAMINE 15 MG/ML
15 INJECTION, SOLUTION INTRAMUSCULAR; INTRAVENOUS EVERY 6 HOURS
Status: DISCONTINUED | OUTPATIENT
Start: 2024-05-10 | End: 2024-05-11

## 2024-05-10 RX ORDER — TIOTROPIUM BROMIDE 18 UG/1
1 CAPSULE ORAL; RESPIRATORY (INHALATION) DAILY
Status: DISCONTINUED | OUTPATIENT
Start: 2024-05-10 | End: 2024-05-10

## 2024-05-10 RX ORDER — LIDOCAINE HYDROCHLORIDE 20 MG/ML
INJECTION, SOLUTION EPIDURAL; INFILTRATION; INTRACAUDAL; PERINEURAL PRN
Status: DISCONTINUED | OUTPATIENT
Start: 2024-05-10 | End: 2024-05-10 | Stop reason: SURG

## 2024-05-10 RX ORDER — NICOTINE 21 MG/24HR
1 PATCH, TRANSDERMAL 24 HOURS TRANSDERMAL EVERY 24 HOURS
COMMUNITY
End: 2024-05-21 | Stop reason: SDUPTHER

## 2024-05-10 RX ORDER — IBUPROFEN 800 MG/1
800 TABLET ORAL 3 TIMES DAILY PRN
Status: DISCONTINUED | OUTPATIENT
Start: 2024-05-13 | End: 2024-05-11

## 2024-05-10 RX ORDER — HALOPERIDOL 5 MG/ML
1 INJECTION INTRAMUSCULAR EVERY 6 HOURS PRN
Status: DISCONTINUED | OUTPATIENT
Start: 2024-05-10 | End: 2024-05-17 | Stop reason: HOSPADM

## 2024-05-10 RX ORDER — ONDANSETRON 2 MG/ML
INJECTION INTRAMUSCULAR; INTRAVENOUS PRN
Status: DISCONTINUED | OUTPATIENT
Start: 2024-05-10 | End: 2024-05-10 | Stop reason: SURG

## 2024-05-10 RX ORDER — ONDANSETRON 2 MG/ML
4 INJECTION INTRAMUSCULAR; INTRAVENOUS EVERY 4 HOURS PRN
Status: DISCONTINUED | OUTPATIENT
Start: 2024-05-10 | End: 2024-05-17 | Stop reason: HOSPADM

## 2024-05-10 RX ORDER — ONDANSETRON 2 MG/ML
4 INJECTION INTRAMUSCULAR; INTRAVENOUS EVERY 4 HOURS PRN
Status: DISCONTINUED | OUTPATIENT
Start: 2024-05-10 | End: 2024-05-10

## 2024-05-10 RX ADMIN — LIDOCAINE HYDROCHLORIDE,EPINEPHRINE BITARTRATE 3 ML: 15; .005 INJECTION, SOLUTION EPIDURAL; INFILTRATION; INTRACAUDAL; PERINEURAL at 08:20

## 2024-05-10 RX ADMIN — PHENYLEPHRINE HYDROCHLORIDE 500 MCG: 10 INJECTION INTRAVENOUS at 11:33

## 2024-05-10 RX ADMIN — EPHEDRINE SULFATE 300 MG: 50 INJECTION, SOLUTION INTRAVENOUS at 12:42

## 2024-05-10 RX ADMIN — PHENYLEPHRINE HYDROCHLORIDE 300 MCG: 10 INJECTION INTRAVENOUS at 12:30

## 2024-05-10 RX ADMIN — CEFEPIME 2 G: 2 INJECTION, POWDER, FOR SOLUTION INTRAVENOUS at 08:54

## 2024-05-10 RX ADMIN — SODIUM CHLORIDE, POTASSIUM CHLORIDE, SODIUM LACTATE AND CALCIUM CHLORIDE: 600; 310; 30; 20 INJECTION, SOLUTION INTRAVENOUS at 08:08

## 2024-05-10 RX ADMIN — FENTANYL CITRATE 100 MCG: 50 INJECTION, SOLUTION INTRAMUSCULAR; INTRAVENOUS at 08:11

## 2024-05-10 RX ADMIN — ROPIVACAINE HYDROCHLORIDE 10 ML: 5 INJECTION, SOLUTION EPIDURAL; INFILTRATION; PERINEURAL at 14:01

## 2024-05-10 RX ADMIN — PHENYLEPHRINE HYDROCHLORIDE 200 MCG: 10 INJECTION INTRAVENOUS at 13:52

## 2024-05-10 RX ADMIN — FENTANYL CITRATE 50 MCG: 50 INJECTION, SOLUTION INTRAMUSCULAR; INTRAVENOUS at 10:01

## 2024-05-10 RX ADMIN — PHENYLEPHRINE HYDROCHLORIDE 300 MCG: 10 INJECTION INTRAVENOUS at 13:40

## 2024-05-10 RX ADMIN — SODIUM CHLORIDE, POTASSIUM CHLORIDE, SODIUM LACTATE AND CALCIUM CHLORIDE: 600; 310; 30; 20 INJECTION, SOLUTION INTRAVENOUS at 11:04

## 2024-05-10 RX ADMIN — EPHEDRINE SULFATE 5 MG: 50 INJECTION, SOLUTION INTRAVENOUS at 15:26

## 2024-05-10 RX ADMIN — ROPIVACAINE HYDROCHLORIDE 6 ML: 5 INJECTION, SOLUTION EPIDURAL; INFILTRATION; PERINEURAL at 14:54

## 2024-05-10 RX ADMIN — LIDOCAINE HYDROCHLORIDE 50 MG: 20 INJECTION, SOLUTION EPIDURAL; INFILTRATION; INTRACAUDAL at 08:39

## 2024-05-10 RX ADMIN — DEXAMETHASONE SODIUM PHOSPHATE 8 MG: 4 INJECTION INTRA-ARTICULAR; INTRALESIONAL; INTRAMUSCULAR; INTRAVENOUS; SOFT TISSUE at 09:44

## 2024-05-10 RX ADMIN — PHENYLEPHRINE HYDROCHLORIDE 200 MCG: 10 INJECTION INTRAVENOUS at 13:29

## 2024-05-10 RX ADMIN — SODIUM CHLORIDE, POTASSIUM CHLORIDE, SODIUM LACTATE AND CALCIUM CHLORIDE: 600; 310; 30; 20 INJECTION, SOLUTION INTRAVENOUS at 13:50

## 2024-05-10 RX ADMIN — ACETAMINOPHEN 1000 MG: 500 TABLET, FILM COATED ORAL at 23:45

## 2024-05-10 RX ADMIN — PHENYLEPHRINE HYDROCHLORIDE 300 MCG: 10 INJECTION INTRAVENOUS at 13:12

## 2024-05-10 RX ADMIN — PROPOFOL 100 MG: 10 INJECTION, EMULSION INTRAVENOUS at 08:39

## 2024-05-10 RX ADMIN — PHENYLEPHRINE HYDROCHLORIDE 200 MCG: 10 INJECTION INTRAVENOUS at 14:29

## 2024-05-10 RX ADMIN — PHENYLEPHRINE HYDROCHLORIDE 200 MCG: 10 INJECTION INTRAVENOUS at 10:53

## 2024-05-10 RX ADMIN — EPHEDRINE SULFATE 5 MG: 50 INJECTION, SOLUTION INTRAVENOUS at 15:31

## 2024-05-10 RX ADMIN — ONDANSETRON 4 MG: 2 INJECTION INTRAMUSCULAR; INTRAVENOUS at 14:05

## 2024-05-10 RX ADMIN — KETOROLAC TROMETHAMINE 15 MG: 15 INJECTION, SOLUTION INTRAMUSCULAR; INTRAVENOUS at 23:44

## 2024-05-10 RX ADMIN — FENTANYL CITRATE 50 MCG: 50 INJECTION, SOLUTION INTRAMUSCULAR; INTRAVENOUS at 15:47

## 2024-05-10 RX ADMIN — EPHEDRINE SULFATE 25 MG: 50 INJECTION, SOLUTION INTRAVENOUS at 09:21

## 2024-05-10 RX ADMIN — MIDAZOLAM HYDROCHLORIDE 1 MG: 1 INJECTION, SOLUTION INTRAMUSCULAR; INTRAVENOUS at 08:16

## 2024-05-10 RX ADMIN — KETOROLAC TROMETHAMINE 15 MG: 15 INJECTION, SOLUTION INTRAMUSCULAR; INTRAVENOUS at 18:09

## 2024-05-10 RX ADMIN — SUGAMMADEX 200 MG: 100 INJECTION, SOLUTION INTRAVENOUS at 14:46

## 2024-05-10 RX ADMIN — ROCURONIUM BROMIDE 50 MG: 50 INJECTION, SOLUTION INTRAVENOUS at 08:39

## 2024-05-10 RX ADMIN — LABETALOL HYDROCHLORIDE 5 MG: 5 INJECTION, SOLUTION INTRAVENOUS at 09:11

## 2024-05-10 RX ADMIN — PHENYLEPHRINE HYDROCHLORIDE 200 MCG: 10 INJECTION INTRAVENOUS at 14:04

## 2024-05-10 RX ADMIN — PHENYLEPHRINE HYDROCHLORIDE 300 MCG: 10 INJECTION INTRAVENOUS at 13:00

## 2024-05-10 RX ADMIN — PHENYLEPHRINE HYDROCHLORIDE 500 MCG: 10 INJECTION INTRAVENOUS at 11:11

## 2024-05-10 RX ADMIN — NALOXONE HYDROCHLORIDE 0.04 MCG: 0.4 INJECTION, SOLUTION INTRAMUSCULAR; INTRAVENOUS; SUBCUTANEOUS at 14:57

## 2024-05-10 RX ADMIN — EPHEDRINE SULFATE 25 MG: 50 INJECTION, SOLUTION INTRAVENOUS at 10:53

## 2024-05-10 RX ADMIN — POLYETHYLENE GLYCOL 3350 1 PACKET: 17 POWDER, FOR SOLUTION ORAL at 18:11

## 2024-05-10 RX ADMIN — CEFTRIAXONE SODIUM 1000 MG: 10 INJECTION, POWDER, FOR SOLUTION INTRAVENOUS at 20:31

## 2024-05-10 RX ADMIN — SODIUM CHLORIDE: 9 INJECTION, SOLUTION INTRAVENOUS at 18:11

## 2024-05-10 RX ADMIN — ROCURONIUM BROMIDE 30 MG: 50 INJECTION, SOLUTION INTRAVENOUS at 11:30

## 2024-05-10 RX ADMIN — PHENYLEPHRINE HYDROCHLORIDE 300 MCG: 10 INJECTION INTRAVENOUS at 12:39

## 2024-05-10 RX ADMIN — PHENYLEPHRINE HYDROCHLORIDE 200 MCG: 10 INJECTION INTRAVENOUS at 10:36

## 2024-05-10 RX ADMIN — PHENYLEPHRINE HYDROCHLORIDE 200 MCG: 10 INJECTION INTRAVENOUS at 14:14

## 2024-05-10 RX ADMIN — PHENYLEPHRINE HYDROCHLORIDE 200 MCG: 10 INJECTION INTRAVENOUS at 10:43

## 2024-05-10 RX ADMIN — ROCURONIUM BROMIDE 30 MG: 50 INJECTION, SOLUTION INTRAVENOUS at 12:25

## 2024-05-10 RX ADMIN — ROPIVACAINE HYDROCHLORIDE 5 ML: 5 INJECTION, SOLUTION EPIDURAL; INFILTRATION; PERINEURAL at 08:25

## 2024-05-10 RX ADMIN — SODIUM CHLORIDE: 9 INJECTION, SOLUTION INTRAVENOUS at 23:47

## 2024-05-10 RX ADMIN — SODIUM CHLORIDE, POTASSIUM CHLORIDE, SODIUM LACTATE AND CALCIUM CHLORIDE: 600; 310; 30; 20 INJECTION, SOLUTION INTRAVENOUS at 09:22

## 2024-05-10 RX ADMIN — HYDROMORPHONE HYDROCHLORIDE: 1 INJECTION, SOLUTION INTRAMUSCULAR; INTRAVENOUS; SUBCUTANEOUS at 15:53

## 2024-05-10 RX ADMIN — MIDAZOLAM HYDROCHLORIDE 1 MG: 1 INJECTION, SOLUTION INTRAMUSCULAR; INTRAVENOUS at 08:19

## 2024-05-10 RX ADMIN — ROCURONIUM BROMIDE 20 MG: 50 INJECTION, SOLUTION INTRAVENOUS at 09:52

## 2024-05-10 RX ADMIN — SODIUM CHLORIDE, POTASSIUM CHLORIDE, SODIUM LACTATE AND CALCIUM CHLORIDE: 600; 310; 30; 20 INJECTION, SOLUTION INTRAVENOUS at 10:51

## 2024-05-10 RX ADMIN — EPHEDRINE SULFATE 25 MG: 50 INJECTION, SOLUTION INTRAVENOUS at 11:00

## 2024-05-10 RX ADMIN — PHENYLEPHRINE HYDROCHLORIDE 500 MCG: 10 INJECTION INTRAVENOUS at 11:26

## 2024-05-10 RX ADMIN — ACETAMINOPHEN 1000 MG: 500 TABLET, FILM COATED ORAL at 18:10

## 2024-05-10 ASSESSMENT — PAIN SCALES - GENERAL: PAIN_LEVEL: 0

## 2024-05-10 ASSESSMENT — PAIN DESCRIPTION - PAIN TYPE
TYPE: SURGICAL PAIN
TYPE: ACUTE PAIN;SURGICAL PAIN
TYPE: SURGICAL PAIN

## 2024-05-10 ASSESSMENT — COGNITIVE AND FUNCTIONAL STATUS - GENERAL
HELP NEEDED FOR BATHING: A LITTLE
DAILY ACTIVITIY SCORE: 19
EATING MEALS: A LITTLE
PERSONAL GROOMING: A LITTLE
SUGGESTED CMS G CODE MODIFIER DAILY ACTIVITY: CK
MOVING FROM LYING ON BACK TO SITTING ON SIDE OF FLAT BED: A LOT
TURNING FROM BACK TO SIDE WHILE IN FLAT BAD: A LOT
MOBILITY SCORE: 12
TOILETING: A LITTLE
DRESSING REGULAR UPPER BODY CLOTHING: A LITTLE
STANDING UP FROM CHAIR USING ARMS: A LOT
CLIMB 3 TO 5 STEPS WITH RAILING: A LOT
SUGGESTED CMS G CODE MODIFIER MOBILITY: CL
WALKING IN HOSPITAL ROOM: A LOT
MOVING TO AND FROM BED TO CHAIR: A LOT

## 2024-05-10 ASSESSMENT — LIFESTYLE VARIABLES
TOTAL SCORE: 0
EVER FELT BAD OR GUILTY ABOUT YOUR DRINKING: NO
HAVE YOU EVER FELT YOU SHOULD CUT DOWN ON YOUR DRINKING: NO
HOW MANY TIMES IN THE PAST YEAR HAVE YOU HAD 5 OR MORE DRINKS IN A DAY: 0
CONSUMPTION TOTAL: NEGATIVE
TOTAL SCORE: 0
ALCOHOL_USE: NO
DOES PATIENT WANT TO STOP DRINKING: NO
HAVE PEOPLE ANNOYED YOU BY CRITICIZING YOUR DRINKING: NO
TOTAL SCORE: 0
AVERAGE NUMBER OF DAYS PER WEEK YOU HAVE A DRINK CONTAINING ALCOHOL: 0
EVER HAD A DRINK FIRST THING IN THE MORNING TO STEADY YOUR NERVES TO GET RID OF A HANGOVER: NO
ON A TYPICAL DAY WHEN YOU DRINK ALCOHOL HOW MANY DRINKS DO YOU HAVE: 0

## 2024-05-10 ASSESSMENT — PATIENT HEALTH QUESTIONNAIRE - PHQ9
SUM OF ALL RESPONSES TO PHQ9 QUESTIONS 1 AND 2: 0
2. FEELING DOWN, DEPRESSED, IRRITABLE, OR HOPELESS: NOT AT ALL
1. LITTLE INTEREST OR PLEASURE IN DOING THINGS: NOT AT ALL

## 2024-05-10 ASSESSMENT — FIBROSIS 4 INDEX: FIB4 SCORE: 3.079201435678004078

## 2024-05-10 NOTE — ANESTHESIA PROCEDURE NOTES
Epidural Block    Date/Time: 5/10/2024 8:10 AM    Performed by: Edgar Strange M.D.  Authorized by: Edgar Strange M.D.    Patient Location:  OR  Start Time:  5/10/2024 8:10 AM  End Time:  5/10/2024 8:20 AM  Reason for Block: at surgeon's request and post-op pain management    patient identified, IV checked, site marked, risks and benefits discussed, surgical consent, monitors and equipment checked, pre-op evaluation and timeout performed    Patient Position:  Sitting  Prep: ChloraPrep, patient draped and sterile technique    Monitoring:  Blood pressure, continuous pulse oximetry and heart rate  Approach:  Midline  Location:  L1-L2  Injection Technique:  FAITH air  Skin infiltration:  Lidocaine  Strength:  1%  Dose:  3ml  Needle Type:  Tuohy  Needle Gauge:  17 G  Needle Length:  3.5 in  Loss of resistance::  5  Catheter Size:  19 G  Catheter at Skin Depth:  9  Test Dose Result:  Negative   EZ placement with one attempt.

## 2024-05-10 NOTE — OR NURSING
1503- Pt arrives to PACU from OR on 6L of oxygen via mask. Report received. Midline incision CDI.     1514- X ray at bedside, labs drawn.     1530- Pt medicated for pain per MAR.     1600- Pt daughter Theresa called and updated on pt status and POC.

## 2024-05-10 NOTE — ANESTHESIA TIME REPORT
Anesthesia Start and Stop Event Times       Date Time Event    5/10/2024 0808 Anesthesia Start     0815 Ready for Procedure     1514 Anesthesia Stop          Responsible Staff  05/10/24      Name Role Begin End    Edgar Strange M.D. Anesth 0808 1317    Morgan Younger M.D. Anesth 1317 1514          Overtime Reason:  overtime    Comments:

## 2024-05-10 NOTE — ANESTHESIA PREPROCEDURE EVALUATION
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            Relevant Problems   PULMONARY   (positive) COPD (chronic obstructive pulmonary disease) (HCC)      CARDIAC   (positive) Peripheral artery disease (HCC)       Physical Exam    Airway   Mallampati: II  TM distance: >3 FB  Neck ROM: full       Cardiovascular - normal exam  Rhythm: regular  Rate: normal  (-) murmur     Dental - normal exam  (+) upper dentures      Very poor dentition     Pulmonary - normal exam  Breath sounds clear to auscultation     Abdominal    Neurological - normal exam         Other findings: Patient has COPD and HTN.  ECHO normal.              Anesthesia Plan    ASA 3   ASA physical status 3 criteria: COPD - poorly controlled    Plan - general       Airway plan will be ETT    (Epidural planned)      Induction: intravenous    Postoperative Plan: Postoperative administration of opioids is intended.    Pertinent diagnostic labs and testing reviewed    Informed Consent:    Anesthetic plan and risks discussed with patient.    Use of blood products discussed with: patient whom consented to blood products.

## 2024-05-10 NOTE — ANESTHESIA PROCEDURE NOTES
Arterial Line    Performed by: Edgar Strange M.D.  Authorized by: Edgar Strange M.D.    Start Time:  5/10/2024 9:20 AM  End Time:  5/10/2024 9:26 AM  Localization: surface landmarks    Patient Location:  OR  Indication: continuous blood pressure monitoring        Catheter Size:  20 G  Seldinger Technique?: Yes    Site:  Radial artery  Line Secured:  Antimicrobial disc, tape and transparent dressing  Events: patient tolerated procedure well with no complications and greater than 3 attempts

## 2024-05-10 NOTE — PROGRESS NOTES
Medication history reviewed with PT at bedside    Med rec is complete per PT reporting    Allergies reviewed.     Patient denies any outpatient antibiotics in the last 30 days.     Patient is not taking anticoagulants.    Preferred pharmacy for this visit - [facility] on [address] ([phone])

## 2024-05-10 NOTE — OP REPORT
Full Operative Note    Patient Name: Ronal Hair    MRN: [id number]    Date of Surgery: 05/10/24    Pre-operative diagnosis: Bladder cancer  Post-operative diagnosis: Same    Procedure:  CPT 69435 - OPEN CYSTECTOMY, COMPLETE, WITH URETEROILEAL CONDUIT, WITH BOWEL ANASTOMOSIS, PELVIC - MODIFIER 22 (SEE FINDINGS)  CPT 86118 - OPEN PROSTATECTOMY, RETROPUBIC RADICAL, WITHOUT NERVE SPARING - MODIFIER 22 (SEE FINDINGS)  SMALL BOWEL RESECTION      Surgeon: Agustín FLANAGAN M.D.   Assistant: Addison Seymour MD, Diamante Hooker PA-C  Consulting Surgeon: Cuco Osborn MD    Anesthesiologist: Edgar Strange M.D.; Morgan Younger M.D.        Anesthesia Type: General    Estimated Blood Loss: 1000 ML    Fluids in: 4500 ml crystalloid, 3 units RBC, 1 unit FFP    Specimen:  Bladder, prostate, small bowel resection, right distal and proximal ureter, left distal and proximal ureter, urethral margin    Drains:  1. Bilateral 8-Portuguese uretero-ileal feeding tubes  2. 19 Portuguese Bebeto drain in pelvis    Indications for Prodecure: The patient is a 78 y.o. 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 He presents today for an open radical cystoprostatectomy, bilateral pelvic and retroperitoneal lymph node dissection, and ileal conduit urinary diversion. The risks, benefits and alternatives to procedure explained to the patient in detail and he agreed to proceed. Consent was obtained.     Procedure Findings: There was no gross extra-vesical tumor extension. Negative urethral margin and bilateral ureteral margins on frozen section. The left ureter was densely adherent to the left aorto-femoral graft and required meticulous dissection. The pelvic lymph nodes could not be safely resected given the patient's history of aorto-bifemoral bypass. Previous abdominal surgery added extensive complexity and operative time > 60 minutes (Modifier 22). Dr. Osborn performed a flexible sigmoidoscopy due to  proximity of resection to the rectum and found rectum intact and no rectal injury.     Description of Procedure: The patient was taken to the operating room, placed in supine position on the operating table. Once general anesthesia was achieved, the patient was given preoperative antibiotics consisting of  Cefepime . The patient was then placed in the slightly hyper-extended supine position, and the abdomen and pelvis was prepped and draped in usual sterile fashion. A 16-Amharic bladder catheter was placed per urethra. An incision made from the symphysis pubis up to the umbilicus and carried down sharply through the subcutaneous tissue. The fascia of the external oblique was opened, the peritoneal cavity was entered sharply, keeping the urachal remnant attached to the bladder. The abdomen was explored and noted a normal-feeling liver and gallbladder, normal appendix, no hydroureteronephrosis bilaterally, and no retroperitoneal nor pelvic lymphadenopathy.      The reflection of the right colon was taken down laterally and then coming around the ileocolic area up to the duodenum in order to elevate the right colon and small bowel off of the retroperitoneum. The right ureter was identified and traced into the pelvis. On the left side, the left colon was mobilized off of Gerota's fascia. The left ureter was identified and freed up as well. A passageway was made under the sigmoid colon over the sacral promontory to facilitate access to the pelvic nodes. A Bookwalter retractor was placed, and the right colon and small bowel were packed into the epigastrium under 3 moist towels.     Cystoprostatectomy was performed next. The ureters were traced down to the pelvis and divided just a little bit above the bladder. The distal ends of each ureter were sent for frozen section and this returned no evidence of malignancy. The proximal ends of the ureters were clipped so that they could passively dilate, and then tucked up under  Gerota's fascia on each side. The bladder pedicles including the anterior branches of the internal iliac artery and vein were taken down bilaterally using the LigaSure device. The posterior peritoneum and the cul-de-sac was incised, staying a little bit on the rectal side in the midline to avoid entry into the bladder. The soft tissue lateral to the ureteral stumps was taken down using the LigaSure. To limit rectosigmoid injury, sharp and blunt dissection was used to enter into Denonvilliers space. The rectum was swept off of the prostate all the way down to the apex. This then developed the posterior pedicles coming around the rectum to the bladder and prostate, and those were taken down with a combination of the LigaSure and clips, taking care not to injure the rectum. Attention was turned to the apex of the prostate. The anterior adhesions between the prostate and symphysis were taken down with electrocautery. The fibroareolar tissue was cleaned off of the apex to expose the endopelvic fascia. This fascia was entered sharply and the levator muscle fibers swept off of the apex of the prostate with a Kittner sponge. The puboprostatic ligaments were divided to expose the dorsal venous complex, which was then taken with a figure-of-eight using 0 Vicryl, which was then tacked up to the pubic symphysis. The dorsal venous complex was bunched together with an Allis clamp distally over the bladder and with a Cades clamp near the DVC as a handle and to prevent back bleeding. The dorsal venous complex was then divided just outside the apex of the prostate with electrocautery. The specimen was now attached only by the urethra. The anterior urethra was dissected sharply with scissors until the urethral catheter was exposed. The catheter was clamped and cut, then used as a handle to lift up the specimen. The posterior urethra was then dissected sharply with scissors. The specimen including the bladder, prostate and  perivesical lymph nodes was handed off the surgical field and sent for permanent pathology. The pelvis was irrigated. Good hemostasis was ensured. The rectum was intact, but given the proximity of resection I asked Dr. Osborn for an intraoperative consultation and he performed a flexible sigmoidoscopy that found the rectum intact and without injury.  A dry lap was placed in the pelvis.     A lymph node dissection could not be safely performed secondary to extensive desmoplastic reaction due to history of aorto-bifemoral bypass grafts. The lymph nodes did not appear grossly enlarged.    Urinary diversion was performed next. The avascular line of Treves was identified in the distal small bowel mesentery and divided deeply in that location with the LigaSure device. The bowel was then divided with a MOE 80 mm stapler load. An approximately 20 cm segment of distal ileum was measured for the conduit. Another 5 cm of ileum proximal to that was measured and divided the bowel there with another load of the MOE 80 mm stapler. The proximal butt end of the conduit was closed with a Corey Wilson stitch with 2 layers of 3-0 chromic suture and a layer of 3-0 Vicryl imbricating suture. Bowel continuity was re-established with a side-to-side stapled anastomosis with the MOE 80 mm stapler, closed the top with a TA 60 mm, oversewed the top staple line with running 3-0 Vicryl. An interrupted 3-0 Vicryl was placed in the crotch of the small bowel anastomosis. The mesenteric trap was widely patent and not closed. The conduit was then positioned with the butt end overlying in the presacral space. The left ureter was brought through the mesenteric trap, spatulated medially, and anastomosed to the conduit with interrupted 4-0 Vicryl. The right ureter was brought down and anastomosed in a similar fashion. Both ureters were stented with an 8-Kyrgyz feeding tube, which was passed through the distal end of the conduit and all the way up to each  kidney. The skin over the site previously marked by the enterostomal therapist in the right abdomen was excised using the piston of a 20-cc syringe as a template. The subcutaneous tissue was divided with Bovie. The fascia was divided with a cruciate incision and 3 sutures of 2-0 Vicryl were placed through the fascia to later help fix the conduit in place. Scissors were used to split the muscle belly of the rectus fascia and the posterior peritoneum was opened 2 finger-breadths. The distal end of the conduit was brought up using a Morris until it extended about an inch above the skin. We fixed the conduit to the fascia using the pre-placed Vicryl sutures.  The ileal conduit was everted in a rosebud fashion using 2-0 Vicryl at 3 locations, taking care to avoid injury to the mesentery medially.  The urostomy was then matured all the way around with 4-0 Vicryls.     Attention was directed to closure. The pelvis was irrigated again with sterile water. Adequate hemostasis was ensured. All sponges were removed from the abdomen and pelvis. A 19-Khmer Bebeto drain was placed in the pelvis and brought out through a stab wound on the left side. All bowel contents were placed back in their normal anatomic position. The fascia was closed with running #1 PDS in 2 segments. The subcutaneous tissue was irrigated and closed with 3-0 Vicryl. The skin was closed with 3-0 Monocryl subcuticular stitch in 2 segments. Dermabond was applied over all incisions. A urostomy appliance was placed.     All needles, instruments, and sponge counts were correct. The patient was then returned to supine position, awakened in the operating room and transported to recovery room in stable condition.     Attestation: I was the attending for this surgery. I was present and participated for all aspects of the operation as dictated above.    Attestation: I was the attending for this case. I was present and participated for all aspects of the operation  including insertion and removal of all endoscopic equipment. Please note that Addison Seymour MD, assisted me with this complex procedure, given there was no qualified provider to assist at the bedside.    Plan: Labs and KUB in PACU. ERAS pathway with early mobilization and weaning of IV fluids as tolerated.    _______________________  Agustín Cardoso MD  Urologic Surgical Oncology  [facility] [place]

## 2024-05-10 NOTE — ANESTHESIA POSTPROCEDURE EVALUATION
Patient: Ronal Hair    Procedure Summary       Date: 05/10/24 Room / Location: [facility] 14 / SURGERY [facility]    Anesthesia Start: 0808 Anesthesia Stop: 1514    Procedures:       RADICAL CYSTOPROSTATECTOMY WITH CREATION OF ILEAL CONDUIT (OR OTHER URINARY DIVERSION) (Abdomen)      EXCISION, INTESTINE (Abdomen)      SIGMOIDOSCOPY, FLEXIBLE (Anus) Diagnosis: (MALIGNANT TUMOR OF URINARY BLADDER)    Surgeons: Agustín FLANAGAN M.D. Responsible Provider: Morgan Younger M.D.    Anesthesia Type: general ASA Status: 3            Final Anesthesia Type: general  Last vitals  BP   Blood Pressure : (!) 82/51    Temp   36.3 °C (97.3 °F)    Pulse   93   Resp   16    SpO2   99 %      Anesthesia Post Evaluation    Patient location during evaluation: PACU  Patient participation: complete - patient participated  Level of consciousness: awake and alert  Pain score: 0    Airway patency: patent  Anesthetic complications: no  Cardiovascular status: hemodynamically stable  Respiratory status: acceptable  Hydration status: euvolemic    PONV: none          There were no known notable events for this encounter.     Nurse Pain Score: 0 (NPRS)

## 2024-05-10 NOTE — OP REPORT
Flexible sigmoidoscopy report    Date: 5/10/2024    Surgeon: Cuco Osborn    Sedation: General endotracheal anesthesia    Pre-operative Diagnosis: Evaluate for possible rectal injury during radical cystoprostatectomy and creation of an ileal conduit  Post-operative Diagnosis: No evidence of rectal injury    Procedure:   Flexible sigmoidoscopy with instillation of air    Indications: 78-year-old male undergoing cystoprostatectomy with deep dissection and concern for rectal injury and I was asked to evaluate.      Findings: No evidence of full-thickness rectal injury on gross inspection.  No evidence of serosal disruption.  No evidence of leak on air insufflation.    Procedure in detail:   Digital rectal examination failed to demonstrate any abnormal findings.  The flexible colonoscope was introduced through the rectum and advanced under direct visualization until the sigmoid colon was reached.  Pelvis was filled with saline and the distal sigmoid colon and rectum were instilled with air under pressure.  Visualization of the mucosa revealed no abnormalities.  The colonoscope was not retroflexed within the rectum.  Careful visualization was performed as the instrument was withdrawn.  Patient tolerates to the procedure was good.  The procedure was not difficult.    Cuco Osborn MD PhD  [facility]  Colon and Rectal Surgeon  [phone]

## 2024-05-10 NOTE — ANESTHESIA PROCEDURE NOTES
Airway    Date/Time: 5/10/2024 8:42 AM    Performed by: Edgar Strange M.D.  Authorized by: Edgar Strange M.D.    Location:  OR  Urgency:  Elective  Difficult Airway: No    Indications for Airway Management:  Anesthesia      Spontaneous Ventilation: absent    Sedation Level:  Deep  Preoxygenated: Yes    Patient Position:  Sniffing  Mask Difficulty Assessment:  1 - vent by mask  Final Airway Type:  Endotracheal airway  Final Endotracheal Airway:  ETT  Cuffed: Yes    Technique Used for Successful ETT Placement:  Direct laryngoscopy    Insertion Site:  Oral  Blade Type:  Arvizu  Laryngoscope Blade/Videolaryngoscope Blade Size:  2  ETT Size (mm):  7.5  Measured from:  Teeth  ETT to Teeth (cm):  22  Placement Verified by: auscultation and capnometry    Cormack-Lehane Classification:  Grade I - full view of glottis  Number of Attempts at Approach:  1

## 2024-05-10 NOTE — H&P
"No changes to below note except:    Completed NAC. Plan for open RC/prostatectomy, bilateral PLND (if possible given aorto-femoral bypass), and ileal conduit.    Pain control: Epidural vs TAP catheters.    Abx: Cefepime.     SQH prophylaxis    Preop: Cr 1.0, UA OK, HCT 28,  - Will plan on transfuse 1 or 2 untis intra-op    Hospitalist and PT consult post-op - wasn't planning on walking much)    H/o aortobifemoral bypass at [facility].     /70   Pulse 68   Temp 36 °C (96.8 °F) (Temporal)   Resp 16   Ht 1.689 m (5' 6.5\")   Wt 75.5 kg (166 lb 7.2 oz)   SpO2 95%   BMI 26.46 kg/m²     Constitutional:       Appearance: Normal appearance.   HENT:      Head: Atraumatic.      Nose: Nose normal.      Mouth/Throat:      Pharynx: Oropharynx is clear.   Eyes:      Extraocular Movements: Extraocular movements intact.   Cardiovascular:      Pulses: Normal pulses.   Pulmonary:      Effort: Pulmonary effort is normal.   Abdominal:      General: Abdomen is flat.      Palpations: Abdomen is soft.      Midline vertical incision. Stoma marking on the right  Genitourinary:     Normal penis  Musculoskeletal:         General: Normal range of motion.      Cervical back: Normal range of motion.   Skin:     General: Skin is warm.   Neurological:      General: No focal deficit present.      Mental Status: He is alert.   Psychiatric:         Mood and Affect: Mood normal.        _______________________  Agustín Cardoso MD  Urologic Surgical Oncology  [facility] [place]     ---  follow up 15, 01-  Follow up with Dr. Cardoso within the next 2 weeks to discuss bladder cancer treatment. - PER JUWAN 12/28/23 KB  Performed by Agustín Cardoso MD, Urology, [phone]  Scribed by [facility]  Reason for Visit  bladder cancer  Assessment & Plan  malignant tumor of urinary bladder  Mr. Hair is a 78yoM with a diagnosis of muscle-invasive urothelial carcinoma of the bladder on the basis of 12/13/2023 TURBT resection of a 2.3 cm anterior " bladder wall mass. Pathology returned high-grade urothelial carcinoma with focal squamous differentiation invasive into the muscularis propria, with perineural invasion, staged T2. Renal function is normal. Cr: 0.72. Staging with imaging has not yet been completed.    1. I spent this consultation discussing the causes and natural history of muscle-invasive urothelial carcinoma of the bladder. We reviewed the NCCN guidelines for management of muscle-invasive disease. We discussed the standard of care, which is a radical cystectomy/cystoprostatectomy. I explained that the risks of surgery include bleeding, infection, injury to surrounding bowel and organs, urine leak, failure to cure, deep venous thrombosis, pulmonary embolus, stroke, and heart attack. I spoke frankly about the relatively high complication rate associated with this procedure: over 50% have some type of surgical complication, approximately 15% have a major complication, and up to 3% experience mortality in the 90 days following surgery.    2. We also spoke about the pros and cons of urinary diversion options including ileal conduit and orthotopic neobladder, specifically emphasizing the higher complication rate (i.e., urinary retention) associated with a neobladder. I stated that given his medical and surgical history, I recommend an ileal conduit for him.     3. We also discussed the use of neoadjuvant chemotherapy prior to surgery, which has been shown in randomized controlled trials to confer an overall survival benefit. I therefore recommend a consultation with Medical Oncology and if appropriate I favor the approach involving neoadjuvant chemotherapy followed soon thereafter by surgery to optimize cure.    4. We will also need to complete staging with a CT of the chest, abdomen, and pelvis. Contrast should be utilized and his renal function allows for this.    5. After a thorough discussion, the patient elected to proceed with clinical staging  with imaging and medical oncology consultation (Dr. Kerwin Hendrickson at [facility]). I will also have his case presented at the [facility] Tumor Board.    L5 follow-up visit based on complexity and time spent (40 minutes), more than half of which was spent directly with the patient and additionally included non-direct time for chart review, documentation, and coordination of care.      Order CT, chest + abdomen + pelvis, w/wo contrast  Send for medical oncologist referral  Start meloxicam 15 mg tablet 1 tablet every day as directed  Start oxyCODONE 5 mg tablet 1 tablet every 12 hours as needed for 30 days  overactive bladder  No improvement on Oxybutynin ER 5mg, now discontinued. C/o very bothersome frequency q15 minutes. Samples of Myrbetriq 50mg dispensed 12/28/2023.    - Patient states he voids [id number] and takes Ibuprofen daily which he states helps his frequency. (800mg q4-6hours)  - Discussed his irritative symptoms are due to his specific tumor.  - Will send Oxycodone 5mg q56mreob prn and Meloxicam 15mg qD for his irritative symptoms.  ureteric stone  Presented to ED 01/07/2023 w/ left 9 mm ureteral stone. Admitted for pain control. s/p left CULTs w/ stent on string 01/07/2023 with Dr. Seymour. SOS removed 01/15/2023. RBUS (09/30/23) showing no hydronephrosis bilaterally. No renal or ureteral stones identified.     Next ultrasound scheduled 03/07/2024 with subsequent follow up with Georgia Reyez PA-C.   lower urinary tract symptoms due to benign prostatic hypertrophy  On flomax and finasteride, however still with very bothersome frequency q15 minutes. Previously discussed that this is likely 2/2 to inflammation from bladder cancer.    Samples of Myrbetriq 50mg dispensed 12/28/2023.  dysuria  Seen 09/2023 for possible prostatitis. Was seen by PCP 08/03/2023 for dysuria and pelvic discomfort, ucx negative for infection, treated with Bactrim. Returned to PCP 08/14/2023 with persistent dysuria, UA at that time with no signs of  infection. He reports he may have passed a stone at that time. UCx (09/05/23) negative.     UA 01/03/2024: Moderate ++ Blood, Moderate ++ Leuks.  Received and discussed urinalysis, dipstick results  History of Present Illness  78 yoM referred for muscle invasive bladder cancer.    He initially presented to ED 01/07/2023 w/ left 9 mm ureteral stone. Admitted for pain control. s/p left CULTs w/ stent on string 01/07/2023 with Dr. Seymour. SOS removed 01/15/2023.     He was then seen by PCP 08/03/2023 for dysuria and pelvic discomfort, ucx negative for infection, treated with Bactrim. Returned to PCP 08/14/2023 with persistent dysuria, UA at that time with no signs of infection. He also reported a weak stream of the urine for which he has been on Flomax for the last 2-3 years. RBUS (09/30/2023) revealed a 2.3 cm right anterior bladder wall mass. Cysto (10/30/23) confirmed a 2cm ulcerated appearing bladder lesion on the posterior wall with surrounding edema. Moderate trabeculation.     He underwent TURBT on 12/13/2023. Pathology returned high-grade urothelial carcinoma with focal squamous differentiation, invasive into muscularis propria (T2). Perineural invasion was present.     Medical and surgical history is notable for an abdominal surgery 3 years ago, however the patient is not sure what the surgery was. He has a history of lymphoma treated with chemotherapy with no obvious evidence of recurrence. He also has significant vascular disease and underwent aortobifemoral bypass at [facility] in 2020.    Patient presents 01/03/2024 to discuss treatment options for his bladder cancer. CT, chest w/ ordered by  was not completed prior to visit. Patient states he voids [id number] and takes Ibuprofen daily (800mg q4-6hours) which he states helps his frequency. No blood thinner use.     Social History: Patient is an ADA , which makes it so that he is unable to drive while on pain medications. He will need to request paid  medical leave while undergoing bladder cancer treatment.     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    Surgical History: aortobifem bypass (2020); cystoscopy,insert ureteral stent (Left, 2023); cysto/uretero/pyeloscopy, dx (Left, 2023); lasertripsy (Left, 2023); and cataract extraction with iol (Bilateral).  Renal Function  2023: BUN: 11, Cr: 0.72  2023: BUN: 12, Cr: 0.75    Ucx Timeline:  2023: Ucx: Negative    Imagin2023: RBUS: No hydronephrosis bilaterally. No renal or ureteral stones identified. 2.3 cm right anterior bladder wall mass.   2023: CT AP w/o: B/l nephrolithiasis with a 8.6 mm L mid ureteral stone resulting in L urinary outflow obstructive changes.   Review of Systems  ROS as noted in the HPI  Screening  None recorded  Physical Exam  UN - Problem focused exam - MALE    Constitutional  General Appearance: well-nourished, healthy-appearing, cooperative, well groomed  Level of Distress: no acute distress    Respiratory  Respiratory Movements normal respiration effort, good air entry    Skin  General Appearance of extremities: no edema  Inspection and palpation: no rash    Neurological/Psychiatric  Neurological/Psychiatric: normal mood, normal affect, (normal) orientation: person, (normal) orientation: time, (normal) orientation: place

## 2024-05-11 ENCOUNTER — ANESTHESIA EVENT (OUTPATIENT)
Dept: ANESTHESIOLOGY | Facility: MEDICAL CENTER | Age: 79
DRG: 863 | End: 2024-05-11
Payer: MEDICARE

## 2024-05-11 PROBLEM — N17.9 AKI (ACUTE KIDNEY INJURY) (HCC): Status: ACTIVE | Noted: 2024-05-11

## 2024-05-11 LAB
ANION GAP SERPL CALC-SCNC: 10 MMOL/L (ref 7–16)
ANION GAP SERPL CALC-SCNC: 10 MMOL/L (ref 7–16)
BUN SERPL-MCNC: 35 MG/DL (ref 8–22)
BUN SERPL-MCNC: 39 MG/DL (ref 8–22)
CALCIUM SERPL-MCNC: 7.5 MG/DL (ref 8.5–10.5)
CALCIUM SERPL-MCNC: 7.6 MG/DL (ref 8.5–10.5)
CHLORIDE SERPL-SCNC: 107 MMOL/L (ref 96–112)
CHLORIDE SERPL-SCNC: 109 MMOL/L (ref 96–112)
CO2 SERPL-SCNC: 19 MMOL/L (ref 20–33)
CO2 SERPL-SCNC: 20 MMOL/L (ref 20–33)
CREAT SERPL-MCNC: 1.6 MG/DL (ref 0.5–1.4)
CREAT SERPL-MCNC: 1.63 MG/DL (ref 0.5–1.4)
ERYTHROCYTE [DISTWIDTH] IN BLOOD BY AUTOMATED COUNT: 53.5 FL (ref 35.9–50)
GFR SERPLBLD CREATININE-BSD FMLA CKD-EPI: 43 ML/MIN/1.73 M 2
GFR SERPLBLD CREATININE-BSD FMLA CKD-EPI: 44 ML/MIN/1.73 M 2
GLUCOSE SERPL-MCNC: 134 MG/DL (ref 65–99)
GLUCOSE SERPL-MCNC: 159 MG/DL (ref 65–99)
HCT VFR BLD AUTO: 27.1 % (ref 42–52)
HGB BLD-MCNC: 9.5 G/DL (ref 14–18)
MCH RBC QN AUTO: 32.9 PG (ref 27–33)
MCHC RBC AUTO-ENTMCNC: 35.1 G/DL (ref 32.3–36.5)
MCV RBC AUTO: 93.8 FL (ref 81.4–97.8)
PLATELET # BLD AUTO: 100 K/UL (ref 164–446)
PLATELETS.RETICULATED NFR BLD AUTO: 3.3 % (ref 0.6–13.1)
PMV BLD AUTO: 9.9 FL (ref 9–12.9)
POTASSIUM SERPL-SCNC: 5.1 MMOL/L (ref 3.6–5.5)
POTASSIUM SERPL-SCNC: 5.9 MMOL/L (ref 3.6–5.5)
RBC # BLD AUTO: 2.89 M/UL (ref 4.7–6.1)
SODIUM SERPL-SCNC: 137 MMOL/L (ref 135–145)
SODIUM SERPL-SCNC: 138 MMOL/L (ref 135–145)
WBC # BLD AUTO: 9.5 K/UL (ref 4.8–10.8)

## 2024-05-11 PROCEDURE — A9270 NON-COVERED ITEM OR SERVICE: HCPCS | Performed by: UROLOGY

## 2024-05-11 PROCEDURE — A9270 NON-COVERED ITEM OR SERVICE: HCPCS | Performed by: STUDENT IN AN ORGANIZED HEALTH CARE EDUCATION/TRAINING PROGRAM

## 2024-05-11 PROCEDURE — 80048 BASIC METABOLIC PNL TOTAL CA: CPT

## 2024-05-11 PROCEDURE — 700101 HCHG RX REV CODE 250: Performed by: UROLOGY

## 2024-05-11 PROCEDURE — 700111 HCHG RX REV CODE 636 W/ 250 OVERRIDE (IP): Mod: JZ | Performed by: ANESTHESIOLOGY

## 2024-05-11 PROCEDURE — A9270 NON-COVERED ITEM OR SERVICE: HCPCS

## 2024-05-11 PROCEDURE — 700111 HCHG RX REV CODE 636 W/ 250 OVERRIDE (IP): Performed by: UROLOGY

## 2024-05-11 PROCEDURE — 770001 HCHG ROOM/CARE - MED/SURG/GYN PRIV*

## 2024-05-11 PROCEDURE — 700105 HCHG RX REV CODE 258: Performed by: ANESTHESIOLOGY

## 2024-05-11 PROCEDURE — 700102 HCHG RX REV CODE 250 W/ 637 OVERRIDE(OP): Performed by: UROLOGY

## 2024-05-11 PROCEDURE — 700105 HCHG RX REV CODE 258: Performed by: UROLOGY

## 2024-05-11 PROCEDURE — 85027 COMPLETE CBC AUTOMATED: CPT

## 2024-05-11 PROCEDURE — 700102 HCHG RX REV CODE 250 W/ 637 OVERRIDE(OP): Performed by: STUDENT IN AN ORGANIZED HEALTH CARE EDUCATION/TRAINING PROGRAM

## 2024-05-11 PROCEDURE — 700102 HCHG RX REV CODE 250 W/ 637 OVERRIDE(OP)

## 2024-05-11 PROCEDURE — 700105 HCHG RX REV CODE 258: Performed by: PHYSICIAN ASSISTANT

## 2024-05-11 PROCEDURE — 99232 SBSQ HOSP IP/OBS MODERATE 35: CPT | Mod: GC | Performed by: FAMILY MEDICINE

## 2024-05-11 PROCEDURE — 85055 RETICULATED PLATELET ASSAY: CPT

## 2024-05-11 RX ORDER — NICOTINE 21 MG/24HR
14 PATCH, TRANSDERMAL 24 HOURS TRANSDERMAL
Status: DISCONTINUED | OUTPATIENT
Start: 2024-05-11 | End: 2024-05-17 | Stop reason: HOSPADM

## 2024-05-11 RX ORDER — BENZONATATE 100 MG/1
100 CAPSULE ORAL 3 TIMES DAILY PRN
Status: DISCONTINUED | OUTPATIENT
Start: 2024-05-11 | End: 2024-05-17 | Stop reason: HOSPADM

## 2024-05-11 RX ORDER — SODIUM CHLORIDE 9 MG/ML
1000 INJECTION, SOLUTION INTRAVENOUS ONCE
Status: DISCONTINUED | OUTPATIENT
Start: 2024-05-11 | End: 2024-05-11

## 2024-05-11 RX ORDER — SODIUM CHLORIDE 9 MG/ML
500 INJECTION, SOLUTION INTRAVENOUS ONCE
Status: COMPLETED | OUTPATIENT
Start: 2024-05-11 | End: 2024-05-11

## 2024-05-11 RX ADMIN — CEFTRIAXONE SODIUM 1000 MG: 10 INJECTION, POWDER, FOR SOLUTION INTRAVENOUS at 19:52

## 2024-05-11 RX ADMIN — HYDROMORPHONE HYDROCHLORIDE: 1 INJECTION, SOLUTION INTRAMUSCULAR; INTRAVENOUS; SUBCUTANEOUS at 14:26

## 2024-05-11 RX ADMIN — BENZONATATE 100 MG: 100 CAPSULE ORAL at 21:21

## 2024-05-11 RX ADMIN — SODIUM CHLORIDE 500 ML: 9 INJECTION, SOLUTION INTRAVENOUS at 13:00

## 2024-05-11 RX ADMIN — ONDANSETRON 4 MG: 2 INJECTION INTRAMUSCULAR; INTRAVENOUS at 13:00

## 2024-05-11 RX ADMIN — SODIUM CHLORIDE: 9 INJECTION, SOLUTION INTRAVENOUS at 19:53

## 2024-05-11 RX ADMIN — ATORVASTATIN CALCIUM 40 MG: 40 TABLET, FILM COATED ORAL at 17:40

## 2024-05-11 RX ADMIN — OXYCODONE 5 MG: 5 TABLET ORAL at 08:38

## 2024-05-11 RX ADMIN — ACETAMINOPHEN 1000 MG: 500 TABLET, FILM COATED ORAL at 04:38

## 2024-05-11 RX ADMIN — KETOROLAC TROMETHAMINE 15 MG: 15 INJECTION, SOLUTION INTRAMUSCULAR; INTRAVENOUS at 04:38

## 2024-05-11 RX ADMIN — NICOTINE 14 MG: 14 PATCH TRANSDERMAL at 16:22

## 2024-05-11 RX ADMIN — SODIUM CHLORIDE: 9 INJECTION, SOLUTION INTRAVENOUS at 14:34

## 2024-05-11 ASSESSMENT — ENCOUNTER SYMPTOMS
HEADACHES: 0
ABDOMINAL PAIN: 1
NAUSEA: 1
CHILLS: 0
SHORTNESS OF BREATH: 0
VOMITING: 1
FEVER: 0

## 2024-05-11 ASSESSMENT — PAIN DESCRIPTION - PAIN TYPE
TYPE: SURGICAL PAIN
TYPE: ACUTE PAIN;SURGICAL PAIN

## 2024-05-11 NOTE — CONSULTS
[facility] FAMILY MEDICINE CONSULT NOTE        Attending:   Rosalva Santoyo    Resident:   Mary Goncalves D.O.    PATIENT:   Ronal Hair; [id number]; 1945    ID:   78 y.o. male with PMHx COPD, PAD s/p aorto-bifemoral bypass, small infrarenal AAA without rupture, HTN, HLD, back pain follicular lymphoma, admitted by urology on 5/10 for cystoprostatectomy due to bladder carcinoma. Now POD0 with ileal conduit.      REASON FOR CONSULT: COPD management and review of other medical issues.    HPI:   Patient is s/p chemotherapy with port in place over right pectoral and TURBT 12/13/2023.  Patient states he has been awaiting this procedure for some months and kept getting delayed.  Diagnosis of cancer is T2 N0 high-grade urothelial carcinoma with focal squamous cell differentiation invasive into the muscularis propria, pT2, with perineural invasion.  Patient is in notable postop pain postprocedure and is not able to give extensive history.    Patient states he takes Spiriva at home most days for his COPD, this manages it well and he is not on oxygen at home.  He follows up with Dr. Henriquez at [facility] family medicine who he likes very much as his PCP.  Patient is an active smoker he states he was using a nicotine patch at home to reduce cigarette use but has started smoking more lately due to the stress of the cancer.    PMHx:   Past Medical History:   Diagnosis Date    Abdominal aortic aneurysm (AAA) without rupture ([facility]) 11/11/2020    Back pain     Bowel habit changes     constipation: has a bowel movement every 5-6 days-takes stool softener and laxative    Cancer ([facility]) 01/12/2024    bladder cancer    Cataract     IOL OU    COPD (chronic obstructive pulmonary disease) ([facility]) 08/26/2022    Dental disorder     upper dentures    Erectile dysfunction     Follicular lymphoma ([facility]) 10/09/2019    High cholesterol     Hypertension 01/12/2024    pt denies    Infectious disease     Lymphoma ([facility]) 01/22/2016    Nephrolithiasis 01/06/2023     Osteomyelitis of left foot (HCC) 11/11/2020    Pain of toe 11/03/2020    Psychiatric problem 01/12/2024    anxiety related to diagnosis    Reactive airway disease without complication 03/18/2022    Reactive airway disease without complication 03/18/2022    Scoliosis     Snoring     Urinary incontinence 01/12/2024    wears depends when away from home         HOME MEDS:  Home Medications    Medication Sig Taking? Last Dose Authorizing Provider   acetaminophen (TYLENOL) 500 MG Tab Take 1,000 mg by mouth every 6 hours as needed for Moderate Pain. Yes 5/9/2024 at 2330 Physician Outpatient   nicotine (NICODERM) 21 MG/24HR PATCH 24 HR Place 1 Patch on the skin every 24 hours. Yes NOT PLACED AT THIS TIME Physician Outpatient   doxazosin (CARDURA) 2 MG Tab Take 2 mg by mouth every day. Yes 5/9/2024 at AM Physician Outpatient   tamsulosin (FLOMAX) 0.4 MG capsule Take 2 Capsules by mouth 1/2 hour after breakfast. Yes 5/9/2024 at AM Shakira Henriquez M.D.   lidocaine-prilocaine (EMLA) 2.5-2.5 % Cream Apply to port one hour prior to access and cover with plastic wrap. Yes 5/10/2024 at AM Judi Osborn M.D.   tiotropium (SPIRIVA) 18 MCG Cap Inhale 1 puff by mouth once daily  Patient taking differently: Place 18 mcg into inhaler and inhale every day. Yes 5/10/2024 at AM Shakira Henriquez M.D.   ondansetron (ZOFRAN) 4 MG Tab tablet Take 1 Tablet by mouth every four hours as needed for Nausea/Vomiting (for nausea, vomiting).  >3 WEEKS AGO at [facility] Judi Osborn M.D.   prochlorperazine (COMPAZINE) 10 MG Tab Take 1 Tablet by mouth every 6 hours as needed (for nausea, vomiting).  >3 WEEKS AGO at [facility] Judi Osborn M.D.   ibuprofen (MOTRIN) 200 MG Tab Take 800 mg by mouth every 6 hours as needed for Inflammation.  1 WEEK AGO at [facility] Physician Outpatient   atorvastatin (LIPITOR) 40 MG Tab Take 1 Tablet by mouth every evening.  5/8/2024 at PM Shakira Henriquez M.D.   Multiple Vitamins-Minerals (MULTIVITAMIN ADULTS 50+ PO) Take 1  Tablet by mouth every day.  1 WEEK AGO at [facility] Physician Outpatient         CURRENT MEDS:  Current Facility-Administered Medications   Medication Last Admin    atorvastatin (Lipitor) tablet 40 mg      NS infusion New Bag at 05/10/24 2347    Pharmacy Consult Request ...Pain Management Review 1 Each      acetaminophen (Tylenol) tablet 1,000 mg 1,000 mg at 05/10/24 2345    Followed by    [START ON 5/15/2024] acetaminophen (Tylenol) tablet 1,000 mg      ketorolac (Toradol) 15 MG/ML injection 15 mg 15 mg at 05/10/24 2344    Followed by    [START ON 5/13/2024] ibuprofen (Motrin) tablet 800 mg      [START ON 5/12/2024] senna-docusate (Pericolace Or Senokot S) 8.6-50 MG per tablet 2 Tablet      [START ON 5/12/2024] bisacodyl (Dulcolax) suppository 10 mg      polyethylene glycol/lytes (Miralax) Packet 1 Packet 1 Packet at 05/10/24 1811    heparin injection 5,000 Units      cefTRIAXone (Rocephin) syringe 1,000 mg 1,000 mg at 05/10/24 2031    ondansetron (Zofran) syringe/vial injection 4 mg      dexamethasone (Decadron) injection 4 mg      diphenhydrAMINE (Benadryl) injection 25 mg      haloperidol lactate (Haldol) injection 1 mg      scopolamine (Transderm-Scop) patch 1 Patch      HYDROmorphone pf (Dilaudid) 20 mcg/mL, ropivacaine (Naropin) 0.1 % in  mL epidural infusion Rate Verify at 05/10/24 1937    tiotropium (Spiriva Respimat) 2.5 mcg/Act inhalation spray 5 mcg      oxyCODONE immediate-release (Roxicodone) tablet 5 mg         SOC Hx:   Smoking: Previously documented: 108 pack years (2 packs a day for 54 years).  Patient has attempted to quit in the past, has been prescribed Chantix and nicotine patches.  Vaping: Never  Alcohol: Not currently but had previous use.  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  Was in the [employer] in the past.    ALLERGIES:   None      OBJECTIVE:  Vitals:    05/10/24 2116 05/10/24 2203 05/10/24 2333 05/11/24 0000   BP: 114/67 110/72 113/71 115/63   Pulse: 94 88 94  "95   Resp: 18 18 18 19   Temp: 36.2 °C (97.2 °F) 36.2 °C (97.1 °F) 36.3 °C (97.3 °F) 36.4 °C (97.5 °F)   TempSrc: Temporal Temporal Temporal Temporal   SpO2:  97% 96% 97%   Weight:       Height:           Intake/Output Summary (Last 24 hours) at 5/10/2024 2129  Last data filed at 5/10/2024 2031  Gross per 24 hour   Intake 5944.52 ml   Output 1840 ml   Net 4104.52 ml       PHYSICAL EXAM:  General: Moderate distress secondary to pain, not interested in answering questions.  Frail.  HEENT: NC/AT. EOMI.   Cardiovascular: Port in place over right pectoral.  Respiratory: Normal effort, on 2 L O2 NC.  Abdomen: Ice pack overlying, ostomy bag for ileal conduit on right side of abdomen.  LLQ HITESH drain.  Epidural on mid back.  EXT:  FOUNTAIN, no edema  Skin: many signs of aging.  Neuro: Non-focal    LABS:  Recent Labs     05/10/24  1256 05/10/24  1513 05/10/24  1618   WBC 5.7  --  8.7   RBC 3.24*  --  3.15*   HEMOGLOBIN 10.4* 9.9* 10.1*   HEMATOCRIT 30.8* 28.3* 29.5*   MCV 95.1  --  93.7   MCH 32.1  --  32.1   RDW 51.3*  --  52.0*   PLATELETCT 93*  --  100*   MPV 9.7  --  10.4   NEUTSPOLYS 88.70*  --   --    LYMPHOCYTES 6.10*  --   --    MONOCYTES 4.60  --   --    EOSINOPHILS 0.20  --   --    BASOPHILS 0.20  --   --      Recent Labs     05/10/24  1618   SODIUM 135   POTASSIUM 4.8   CHLORIDE 106   CO2 19*   BUN 24*   CREATININE 0.99   CALCIUM 7.6*     Estimated GFR/CRCL = Estimated Creatinine Clearance: 58.4 mL/min (by C-G formula based on SCr of 0.99 mg/dL).  Recent Labs     05/10/24  1618   GLUCOSE 211*                 No results for input(s): \"INR\", \"APTT\", \"FIBRINOGEN\" in the last 72 hours.    Invalid input(s): \"DIMER\"      IMAGING:  [id number] VIEW   Final Result      1. Appropriate courses of the bilateral percutaneous stents (feeding tubes) entering the right lower quadrant ileal conduit and terminating over the expected locations of the right and left renal pelvis. No kink.   2. Surgical drain in the pelvis with " "postsurgical changes.   3. Moderate stool throughout the colon.          INVESTIGATION:  Patient has 108-pack-year smoking history, has smoked 2 packs a day for the last 54 years.  In 2022, patient was started on tiotropium by Dr. Henriquez due to suspicion of COPD.  Since that time, diagnosis of COPD has appeared on patient's file although cannot find mention of any PFTs.  Patient had been prescribed Chantix in the past but does not seem to be taking it at this time.  Likely for bladder cancer staging, patient had recent chest CT on 3/20/2024 which showed \"no evidence of metastatic disease\" no findings of lung cancer.    Patient has chronic history of PAD with nonpalpable pulses but have been dopplerable in the past.  Patient is s/p aortic bifemoral bypass in 2020.  His last LDL level was in December 2022 and was 66.  A1c at that time was 5.6.  PAD and smoking history was associated with patient's left great toe osteomyelitis in August 2020 which almost caused an amputation.  Patient was aware of these complications' association with smoking.    Patient was diagnosed with follicular lymphoma in 2014, he started chemotherapy in October 2019 and completed in January 2020.  He is now considered clinically cured by PET scan.    ASSESSMENT/PLAN:  78 y.o. male with PMHx COPD, PAD s/p aorto-bifemoral bypass, small infrarenal AAA without rupture, extensive smoking hx, follicular lymphoma in remission, admitted by urology on 5/10 for bladder carcinoma treatment now s/p cystoprostatectomy with ileal conduit.  Medicine team consulted for management of COPD and other medical issues.     Tobacco dependence  Current smoker with attempts to quit and medical management directed at this in the past.  108 pack years (1 to 2 packs for 54 years).  This is extensive smoking history and has caused obvious complications such as PAD requiring aortofemoral bypass and COPD.  This is more concerning at this time due to patient's surgery and " concern for its effect on healing.  Luckily, no signs of lung cancer on recent chest CT.  - Recommend patient have tobacco counseling in-house with focus on restarting Chantix, nicotine patches and highest aggressive therapy available.  - Nicotine patch while patient in hospital if desired.  - Patient due for visit with PCP, Dr. Henriquez who is very well versed in addiction medicine and runs addiction clinic at [facility].  Highly recommend patient make urgent appointment with her.    COPD (chronic obstructive pulmonary disease) ([facility])  Cannot find PFTs on file, although COPD seems to be noted as confirmed diagnosis in the past 2 years.  Pt not requiring O2 at baseline at home.    -Acquire previous PFTs if done, regardless pt may benefit with updated PFTs to better direct treatment  -Currently taking Spiriva, no reason to change treatment at this time as patient is symptomatically controlled.  - Does not appear that patient has pulmonologist, referral may be reasonable at this time to optimize management and smoking cessation efforts    Peripheral artery disease (HCC)  Patient in past has had nonpalpable DP pulses but have been present with Doppler.  Patient is s/p aortobifemoral bypass in 2020.  This is likely effect of extensive smoking history.  Last LDL below goal at 66.  Patient's presumed small vessel disease poses risk for postop healing from current surgery (cystoprostatectomy with ileal conduit for bladder carcinoma).  Does not appear patient has ever seen vascular medicine at [facility].  - Patient may greatly benefit from referral to vascular medicine.  - Continue home atorvastatin 40 mg.      FOLLOW UP: Due for an appointment at PCP office: Dr. Henriquez, last visit with her was August 2022.  (Patient attests that she is still his PCP.    Mary Goncalves D.O.  PGY-2  [facility]

## 2024-05-11 NOTE — PROGRESS NOTES
Bedside report received, assessment completed    A&O x  4, pt calls appropriately  Mobility: Up with x1 assist, FWW  Fall Risk Assessment: HIGH, bed alarm yes, door notifications yes  Pain Assessment / Reassessment completed, medication provided per MAR  Diet: CLD  LDA:   IV Access: 20g L-wrist SL  HITESH: LLQ  Central Line: R-chest port, accessed, NS 125ml    GI/: urostomy void, - flatus, - / PTA BM  DVT Prophylaxis: heparin - pt refused, SCD on while in bed      Parrish Score: 17, Interventions per flow sheet  Procedures:    - 5/10 Cystoprostatectomy w/ creation of ileal conduit (RLQ)  D/C Plan:    - Epidural Mgt/ pain mgt   - Diet tolerance   - Ambulation   - Wound Care/ Ostomy care  - Hold Toradol per order from provider d/t increased Cr, trending BMP     Reviewed plan of care with patient, bed in lowest position and locked, pt resting comfortably now, call light within reach, all needs met at this time. Interventions will be executed per plan of care

## 2024-05-11 NOTE — ASSESSMENT & PLAN NOTE
Likely due to poor renal perfusion given him needing 3U of pRBC and 1U of FFP during surgery.  Creatinine is now at baseline.  He is making adequate urine through urostomy.    Avoid NSAIDs, would avoid toradol   Avoid nephrotoxic agents.  BMP to trend kidney function    Monitor urine output

## 2024-05-11 NOTE — PROGRESS NOTES
Note to reader: this note follows the APSO format rather than the historical SOAP format. Assessment and plan located at the top of the note for ease of use.    Chief Complaint  78 y.o. year old male here with No chief complaint on file.      Assessment/Plan  Interval History   Active Hospital Problems    Diagnosis     COPD (chronic obstructive pulmonary disease) (HCC) [J44.9]     Tobacco dependence [F17.200]     Peripheral artery disease (HCC) [I73.9]       pt seen and examined     5/11- POD 1 from cystoprostatectomy with creation of ileal conduit. Having poor pain control at midline incision. Vomited while I was in the room. 875cc uop since surgery, blood tinged. Drain with ss fluid. H/h 9.5/27.1. cr 1.63 (jump from pre op around 0.99). K 5.9.     Disposition  Stable      PLAN:  IS.  Ambulate with PT/OT.  Pain control. Will avoid toradol today due to elevated Cr and rec dilaudid IV if pain OOC.   Daily labs. Bolus recommended and then recheck labs early afternoon.  Clears for diet with plans to advance as tolerated in the next few days.   Appreciate hospitalist and ostomy nurses help.     Review of Systems  Physical Exam   Review of Systems   Constitutional:  Negative for chills and fever.   Respiratory:  Negative for shortness of breath.    Cardiovascular:  Negative for chest pain.   Gastrointestinal:  Positive for abdominal pain, nausea and vomiting.   Genitourinary:  Positive for hematuria.   Neurological:  Negative for headaches.   All other systems reviewed and are negative.    Vitals:    05/11/24 0000 05/11/24 0159 05/11/24 0430 05/11/24 0746   BP: 115/63  128/64 (!) 74/56   Pulse: 95  90 92   Resp: 19  18 20   Temp: 36.4 °C (97.5 °F)  36.2 °C (97.2 °F) 36.8 °C (98.2 °F)   TempSrc: Temporal  Temporal Temporal   SpO2: 97% 95% 97% 92%   Weight:       Height:         Physical Exam  Vitals and nursing note reviewed.   Constitutional:       Appearance: Normal appearance.   HENT:      Head: Normocephalic and  atraumatic.      Right Ear: Tympanic membrane normal.      Left Ear: Tympanic membrane normal.      Mouth/Throat:      Mouth: Mucous membranes are dry.   Abdominal:      General: There is no distension.      Tenderness: There is abdominal tenderness.      Comments: Urostomy pink tinged with bloody urine.   8fr feeding tube in conduit   Neurological:      Mental Status: He is alert.   Psychiatric:         Mood and Affect: Mood normal.         Behavior: Behavior normal.          Hematology Chemistry   Lab Results   Component Value Date/Time    WBC 9.5 05/11/2024 04:40 AM    HEMOGLOBIN 9.5 (L) 05/11/2024 04:40 AM    HEMATOCRIT 27.1 (L) 05/11/2024 04:40 AM    PLATELETCT 100 (L) 05/11/2024 04:40 AM     Lab Results   Component Value Date/Time    SODIUM 137 05/11/2024 04:40 AM    POTASSIUM 5.9 (H) 05/11/2024 04:40 AM    CHLORIDE 107 05/11/2024 04:40 AM    CO2 20 05/11/2024 04:40 AM    GLUCOSE 159 (H) 05/11/2024 04:40 AM    BUN 35 (H) 05/11/2024 04:40 AM    CREATININE 1.63 (H) 05/11/2024 04:40 AM         Labs not explicitly included in this progress note were reviewed by the author.   Radiology/imaging not explicitly included in this progress note was reviewed by the author.     Core Measures

## 2024-05-11 NOTE — CONSULTS
[facility] [place] [facility] [place]     Attending: Rosalva Santoyo Md    Resident: Estephania Osborn M.D.     PATIENT: Ronal Hair; [id number]; 1945    ID:   78 y.o. male with PMHx COPD, PAD s/p aorto-bifemoral bypass, small infrarenal AAA without rupture, HTN, HLD, back pain follicular lymphoma, admitted by urology on 5/10 for cystoprostatectomy due to bladder carcinoma. Now POD1 with ileal conduit and cystoprostatectomy.      Reason for consult   COPD management and review of other medical issues     SUBJECTIVE:   Patient this morning reported that he had a lot of pain and nausea. He reports that he would like nicotine patches. He thinks that his breathing is okay, but the pain is making it difficult to take deep breaths.     OBJECTIVE:     Vitals:    05/11/24 0159 05/11/24 0430 05/11/24 0746 05/11/24 1232   BP:  128/64 (!) 74/56 (!) 154/76   Pulse:  90 92 100   Resp:  18 20 18   Temp:  36.2 °C (97.2 °F) 36.8 °C (98.2 °F) 36.3 °C (97.3 °F)   TempSrc:  Temporal Temporal Temporal   SpO2: 95% 97% 92% 90%   Weight:       Height:           Intake/Output Summary (Last 24 hours) at 5/11/2024 1003  Last data filed at 5/11/2024 0753  Gross per 24 hour   Intake 6007.22 ml   Output 2743 ml   Net 3264.22 ml       PE:   General: No acute distress, resting comfortably in bed.  HEENT: NC/AT. PERRLA. EOMI. MMM  Cardiovascular: Normal S1/S2, RRR, no m/r/g  Respiratory: Symmetric inspiratory effort. CTAB with no adventitious sounds  Abdomen: BS+, soft, tenderness around surgical sites, pink ostomy with bowel contents   EXT:  FOUNTAIN, 5/5 strength, 2+ pulses, no rashes, bruising, or bleeding.  Neuro: Non focal with no numbness, tingling or changes in sensation    LABS:  Recent Labs     05/10/24  1256 05/10/24  1513 05/10/24  1618 05/11/24  0440   WBC 5.7  --  8.7 9.5   RBC 3.24*  --  3.15* 2.89*   HEMOGLOBIN 10.4* 9.9* 10.1* 9.5*   HEMATOCRIT 30.8* 28.3* 29.5* 27.1*   MCV 95.1  --  93.7 93.8   MCH 32.1  --  32.1 32.9   RDW 51.3*  --   "52.0* 53.5*   PLATELETCT 93*  --  100* 100*   MPV 9.7  --  10.4 9.9   NEUTSPOLYS 88.70*  --   --   --    LYMPHOCYTES 6.10*  --   --   --    MONOCYTES 4.60  --   --   --    EOSINOPHILS 0.20  --   --   --    BASOPHILS 0.20  --   --   --      Recent Labs     05/10/24  1618 05/11/24  0440   SODIUM 135 137   POTASSIUM 4.8 5.9*   CHLORIDE 106 107   CO2 19* 20   BUN 24* 35*   CREATININE 0.99 1.63*   CALCIUM 7.6* 7.6*     Estimated GFR/CRCL = Estimated Creatinine Clearance: 35.4 mL/min (A) (by C-G formula based on SCr of 1.63 mg/dL (H)).  Recent Labs     05/10/24  1618 05/11/24  0440   GLUCOSE 211* 159*                 No results for input(s): \"INR\", \"APTT\", \"FIBRINOGEN\" in the last 72 hours.    Invalid input(s): \"DIMER\"    MICROBIOLOGY:   Results       ** No results found for the last 168 hours. **              IMAGING:   [id number] VIEW   Final Result      1. Appropriate courses of the bilateral percutaneous stents (feeding tubes) entering the right lower quadrant ileal conduit and terminating over the expected locations of the right and left renal pelvis. No kink.   2. Surgical drain in the pelvis with postsurgical changes.   3. Moderate stool throughout the colon.          MEDS:  Current Facility-Administered Medications   Medication Last Admin    atorvastatin (Lipitor) tablet 40 mg      NS infusion New Bag at 05/11/24 1434    Pharmacy Consult Request ...Pain Management Review 1 Each      acetaminophen (Tylenol) tablet 1,000 mg 1,000 mg at 05/11/24 0438    Followed by    [START ON 5/15/2024] acetaminophen (Tylenol) tablet 1,000 mg      [START ON 5/12/2024] senna-docusate (Pericolace Or Senokot S) 8.6-50 MG per tablet 2 Tablet      [START ON 5/12/2024] bisacodyl (Dulcolax) suppository 10 mg      polyethylene glycol/lytes (Miralax) Packet 1 Packet 1 Packet at 05/10/24 1811    heparin injection 5,000 Units      cefTRIAXone (Rocephin) syringe 1,000 mg 1,000 mg at 05/10/24 2031    ondansetron (Zofran) syringe/vial " injection 4 mg 4 mg at 05/11/24 1300    dexamethasone (Decadron) injection 4 mg      diphenhydrAMINE (Benadryl) injection 25 mg      haloperidol lactate (Haldol) injection 1 mg      scopolamine (Transderm-Scop) patch 1 Patch      HYDROmorphone pf (Dilaudid) 20 mcg/mL, ropivacaine (Naropin) 0.1 % in  mL epidural infusion New Bag at 05/11/24 1426    tiotropium (Spiriva Respimat) 2.5 mcg/Act inhalation spray 5 mcg      oxyCODONE immediate-release (Roxicodone) tablet 5 mg 5 mg at 05/11/24 0838       ASSESSMENT/PLAN: Ronal Hair is a 78 y.o. male who is POD #1 of cystoprostatectomy with ileal conduit. [facility] was consulted for medical management recommendations as follows:     JULIA (acute kidney injury) (HCC)  Assessment & Plan  Likely due to poor renal perfusion and need for 4 U pRBC.     Avoid NSAIDs, would avoid toradol   BMP to trend kidney function    Bolus as needed   Monitor urine output   Monitor hyperkalemia, would avoid foods once diet is advanced high in potassium     COPD (chronic obstructive pulmonary disease) (HCC)- (present on admission)  Assessment & Plan  Cannot find PFTs on file, although COPD seems to be noted as confirmed diagnosis in the past 2 years.  Pt not requiring O2 at baseline at home.    Acquire previous PFTs if done, regardless pt may benefit with updated PFTs to better direct treatment. Could be done outpatient   Currently taking Spiriva, no reason to change treatment at this time as patient is symptomatically controlled.  Does not appear that patient has pulmonologist, referral may be reasonable at this time to optimize management and smoking cessation efforts    Tobacco dependence- (present on admission)  Assessment & Plan  Current smoker with attempts to quit and medical management directed at this in the past.  108 pack years (1 to 2 packs for 54 years).  This is extensive smoking history and has caused obvious complications such as PAD requiring aortofemoral bypass  and COPD.  This is more concerning at this time due to patient's surgery and concern for its effect on healing.  Luckily, no signs of lung cancer on recent chest CT.   Recommend patient have tobacco counseling in-house with focus on restarting Chantix, nicotine patches and highest aggressive therapy available.  Nicotine patch while patient in hospital if desired.  Patient due for visit with PCP, Dr. Henriquez who is very well versed in addiction medicine and runs addiction clinic at [facility].  Highly recommend patient make urgent appointment with her.    Peripheral artery disease (HCC)- (present on admission)  Assessment & Plan  Patient in past has had nonpalpable DP pulses but have been present with Doppler.  Patient is s/p aortobifemoral bypass in 2020.  This is likely effect of extensive smoking history.  Last LDL below goal at 66.  Patient's presumed small vessel disease poses risk for postop healing from current surgery (cystoprostatectomy with ileal conduit for bladder carcinoma).  Does not appear patient has ever seen vascular medicine at [facility].  Patient may greatly benefit from referral to vascular medicine. Can be done outpatient   Continue home atorvastatin 40 mg.        CODE STATUS: Full     Estephania Osborn MD  PGY1  [facility]

## 2024-05-11 NOTE — CARE PLAN
The patient is Stable - Low risk of patient condition declining or worsening    Shift Goals  Clinical Goals: Keep pain less than 5  Patient Goals: pain control  Family Goals: Pt comfort    Progress made toward(s) clinical / shift goals:  Epidural rate increased to max dose 12mL/hr. Awaiting pain eval. Scheduled pain meds given as well.     Patient is not progressing towards the following goals:

## 2024-05-11 NOTE — CARE PLAN
The patient is Stable - Low risk of patient condition declining or worsening    Shift Goals  Clinical Goals: Ambulation/ PO intake  Patient Goals: Pain Mgt  Family Goals: Pt comfort    Progress made toward(s) clinical / shift goals:          Problem: Pain - Standard  Goal: Alleviation of pain or a reduction in pain to the patient’s comfort goal  Outcome: Progressing     Problem: Knowledge Deficit - Standard  Goal: Patient and family/care givers will demonstrate understanding of plan of care, disease process/condition, diagnostic tests and medications  Outcome: Progressing     Problem: Skin Care - Ostomy  Goal: Skin remains free from irritation  Outcome: Progressing     Problem: Knowledge Deficit - Ostomy  Goal: Patient will demonstrate ability to manage and maintain ostomy  Outcome: Progressing

## 2024-05-11 NOTE — ASSESSMENT & PLAN NOTE
Current smoker with attempts to quit and medical management directed at this in the past.  108 pack years (1 to 2 packs for 54 years).  This is extensive smoking history and has caused obvious complications such as PAD requiring aortofemoral bypass and COPD.  This is more concerning at this time due to patient's surgery and concern for its effect on healing.  Luckily, no signs of lung cancer on recent chest CT.   Recommend patient have tobacco counseling in-house with focus on restarting Chantix, nicotine patches and highest aggressive therapy available.  Nicotine patch ordered   Tessalon pearls for cough   Patient due for visit with PCP, Dr. Henriquez who is very well versed in addiction medicine and runs addiction clinic at [facility].  Highly recommend patient make urgent appointment with her.

## 2024-05-11 NOTE — CARE PLAN
The patient is Watcher - Medium risk of patient condition declining or worsening    Shift Goals  Clinical Goals: pain control, drain monitoring  Patient Goals: pain control  Family Goals: Pt comfort    Progress made toward(s) clinical / shift goals:    Problem: Pain - Standard  Goal: Alleviation of pain or a reduction in pain to the patient’s comfort goal  Description: Target End Date:  Prior to discharge or change in level of care    Document on Vitals flowsheet    1.  Document pain using the appropriate pain scale per order or unit policy  2.  Educate and implement non-pharmacologic comfort measures (i.e. relaxation, distraction, massage, cold/heat therapy, etc.)  3.  Pain management medications as ordered  4.  Reassess pain after pain med administration per policy  5.  If opiods administered assess patient's response to pain medication is appropriate per POSS sedation scale  6.  Follow pain management plan developed in collaboration with patient and interdisciplinary team (including palliative care or pain specialists if applicable)  Outcome: Progressing     Problem: Knowledge Deficit - Standard  Goal: Patient and family/care givers will demonstrate understanding of plan of care, disease process/condition, diagnostic tests and medications  Description: Target End Date:  1-3 days or as soon as patient condition allows    Document in Patient Education    1.  Patient and family/caregiver oriented to unit, equipment, visitation policy and means for communicating concern  2.  Complete/review Learning Assessment  3.  Assess knowledge level of disease process/condition, treatment plan, diagnostic tests and medications  4.  Explain disease process/condition, treatment plan, diagnostic tests and medications  Outcome: Progressing       Patient is not progressing towards the following goals:

## 2024-05-11 NOTE — ASSESSMENT & PLAN NOTE
Patient in past has had nonpalpable DP pulses but have been present with Doppler.  Patient is s/p aortobifemoral bypass in 2020.  This is likely effect of extensive smoking history.  Last LDL below goal at 66.  Patient's presumed small vessel disease poses risk for postop healing from current surgery (cystoprostatectomy with ileal conduit for bladder carcinoma).  Does not appear patient has ever seen vascular medicine at [facility].  Patient may greatly benefit from referral to vascular medicine. Can be done outpatient   Continue home atorvastatin 40 mg.

## 2024-05-11 NOTE — DISCHARGE PLANNING
Received Choice Form at: 11:25  Agency/Facility Name: [facility]  Sent Referral per Choice Form at: Scanned to media only       Received Choice Form at: 1127  Agency/Facility Name: [facility]   Sent Referral per Choice Form at: Scanned to media only

## 2024-05-11 NOTE — DISCHARGE PLANNING
"HTH/SCP TCN chart review completed. Collaborated with MARIA ISABEL [place]. The most current review of medical record, knowledge of pt's PLOF and social support, LACE+ score of 66, 6 clicks scores of 12 mobility were considered.      Pt seen at bedside. Introduced TCN program. Provided education regarding post acute levels of care. Education provided regarding case management policy for blanket SNF referrals. Discussed HTH/SCP plan benefits. Pt verbalizes understanding.     Note that pt is unsure if he will require post acute placement or HH services or additional services at time of dc. He has not mobilizes since surgery per pt and chart review. Note per urology PA note from this AM, \"Ambulate with PT/OT\". However also note pt does not have therapy consults. Relayed concerns to SW for possible therapy consults to assist with dc planning considerations.     Pt does report he would be agreeable to therapy/team recommendations with re: dc planning. He is aware of possible blanket SNF referrals (as noted above per CM policy if indicated) and reports will select from accepting facilities if indicated. He is also agreeable to HH and any DME/AD recommended as well. Note that pt does not have supplemental 02 at home and is currently on increased 02 as well.    Requested provider consults for: PT and OT from  if indicated via discussion with MARIA ISABEL. Given aforementioned, choice proactively obtained for HH and DME (02/AD), faxed to [facility] and given to MARIA ISABEL. TCN will continue to follow and collaborate with discharge planning team as additional post acute needs arise. Thank you.     Completed today:  Discussed possible need for therapy consults with SW (see above)  Choice obtained: HH ([facility]), [facility] (02 via [facility]);pt aware of possible blanket SNF referrals per CM policy if indicated; see above  SCP with [facility] PCP. Note that pt has upcoming outpatient appointments with providers scheduled (earliest on 5/15)  "

## 2024-05-11 NOTE — WOUND TEAM
This Ostomy RN attempted to see patient for new urostomy education, however patient stated he was not feeling well today and kindly requested for Ostomy RN to return tomorrow. Folder/booklet and supplies left in patient room.

## 2024-05-11 NOTE — PROGRESS NOTES
Report received from ender RN  Pt AAOx4, Alert. Responds appropriately  2L NC  Pain managed per MAR, resting comfortably  Epidural in place. Pain of 3/10. Denies numbness/tingling in BLE  +void via RLQ ileal conduit, No flatus. No bm  Clear liquid diet, tolerating well. No n/v  Turns self in bed  Calls appropriately for assistance  SCDs on    Skin: MLI HAKEEM, RLQ ileostomy, LLQ HITESH drain, Epidural mid back    POC reviewed, education provided. Bed locked and in low position, pt instructed to call for assistance. Comprehension verbalized. Call light within reach, all needs met at this time.

## 2024-05-11 NOTE — ASSESSMENT & PLAN NOTE
Cannot find PFTs on file, although COPD seems to be noted as confirmed diagnosis in the past 2 years.  Pt not requiring O2 at baseline at home.      Currently taking Spiriva, no reason to change treatment at this time as patient is symptomatically controlled.  Currently at room air.  Continue to titrate oxygen as needed for goal saturations over 90%.  Commend using incentive spirometer.  At time of discharge consider follow-up with pulmonology referral

## 2024-05-12 PROBLEM — D64.9 ANEMIA: Status: ACTIVE | Noted: 2024-05-12

## 2024-05-12 LAB
ABO GROUP BLD: ABNORMAL
ANION GAP SERPL CALC-SCNC: 10 MMOL/L (ref 7–16)
BARCODED ABORH UBTYP: 6200
BARCODED ABORH UBTYP: 6200
BARCODED PRD CODE UBPRD: ABNORMAL
BARCODED PRD CODE UBPRD: ABNORMAL
BARCODED UNIT NUM UBUNT: ABNORMAL
BARCODED UNIT NUM UBUNT: ABNORMAL
BASOPHILS # BLD AUTO: 0 % (ref 0–1.8)
BASOPHILS # BLD: 0 K/UL (ref 0–0.12)
BLD GP AB SCN SERPL QL: ABNORMAL
BUN SERPL-MCNC: 32 MG/DL (ref 8–22)
CALCIUM SERPL-MCNC: 7.9 MG/DL (ref 8.5–10.5)
CHLORIDE SERPL-SCNC: 108 MMOL/L (ref 96–112)
CO2 SERPL-SCNC: 20 MMOL/L (ref 20–33)
COMPONENT R 8504R: ABNORMAL
COMPONENT R 8504R: ABNORMAL
CREAT SERPL-MCNC: 1.03 MG/DL (ref 0.5–1.4)
EOSINOPHIL # BLD AUTO: 0.03 K/UL (ref 0–0.51)
EOSINOPHIL NFR BLD: 0.8 % (ref 0–6.9)
ERYTHROCYTE [DISTWIDTH] IN BLOOD BY AUTOMATED COUNT: 52.7 FL (ref 35.9–50)
ERYTHROCYTE [DISTWIDTH] IN BLOOD BY AUTOMATED COUNT: 53.3 FL (ref 35.9–50)
GFR SERPLBLD CREATININE-BSD FMLA CKD-EPI: 75 ML/MIN/1.73 M 2
GLUCOSE SERPL-MCNC: 111 MG/DL (ref 65–99)
HCT VFR BLD AUTO: 18.6 % (ref 42–52)
HCT VFR BLD AUTO: 21.4 % (ref 42–52)
HGB BLD-MCNC: 6.4 G/DL (ref 14–18)
HGB BLD-MCNC: 7.3 G/DL (ref 14–18)
IMM GRANULOCYTES # BLD AUTO: 0.01 K/UL (ref 0–0.11)
IMM GRANULOCYTES NFR BLD AUTO: 0.3 % (ref 0–0.9)
LYMPHOCYTES # BLD AUTO: 0.44 K/UL (ref 1–4.8)
LYMPHOCYTES NFR BLD: 11.9 % (ref 22–41)
MCH RBC QN AUTO: 32.4 PG (ref 27–33)
MCH RBC QN AUTO: 32.7 PG (ref 27–33)
MCHC RBC AUTO-ENTMCNC: 34.1 G/DL (ref 32.3–36.5)
MCHC RBC AUTO-ENTMCNC: 34.4 G/DL (ref 32.3–36.5)
MCV RBC AUTO: 94.9 FL (ref 81.4–97.8)
MCV RBC AUTO: 95.1 FL (ref 81.4–97.8)
MONOCYTES # BLD AUTO: 0.4 K/UL (ref 0–0.85)
MONOCYTES NFR BLD AUTO: 10.8 % (ref 0–13.4)
NEUTROPHILS # BLD AUTO: 2.81 K/UL (ref 1.82–7.42)
NEUTROPHILS NFR BLD: 76.2 % (ref 44–72)
NRBC # BLD AUTO: 0 K/UL
NRBC BLD-RTO: 0 /100 WBC (ref 0–0.2)
PLATELET # BLD AUTO: 83 K/UL (ref 164–446)
PLATELET # BLD AUTO: 88 K/UL (ref 164–446)
PLATELETS.RETICULATED NFR BLD AUTO: 2.3 % (ref 0.6–13.1)
PLATELETS.RETICULATED NFR BLD AUTO: 2.7 % (ref 0.6–13.1)
PMV BLD AUTO: 10.2 FL (ref 9–12.9)
PMV BLD AUTO: 9.6 FL (ref 9–12.9)
POTASSIUM SERPL-SCNC: 4.4 MMOL/L (ref 3.6–5.5)
PRODUCT TYPE UPROD: ABNORMAL
PRODUCT TYPE UPROD: ABNORMAL
RBC # BLD AUTO: 1.96 M/UL (ref 4.7–6.1)
RBC # BLD AUTO: 2.25 M/UL (ref 4.7–6.1)
RH BLD: ABNORMAL
SODIUM SERPL-SCNC: 138 MMOL/L (ref 135–145)
UNIT STATUS USTAT: ABNORMAL
UNIT STATUS USTAT: ABNORMAL
WBC # BLD AUTO: 3.7 K/UL (ref 4.8–10.8)
WBC # BLD AUTO: 5.2 K/UL (ref 4.8–10.8)

## 2024-05-12 PROCEDURE — 700102 HCHG RX REV CODE 250 W/ 637 OVERRIDE(OP): Performed by: UROLOGY

## 2024-05-12 PROCEDURE — 85055 RETICULATED PLATELET ASSAY: CPT

## 2024-05-12 PROCEDURE — 700111 HCHG RX REV CODE 636 W/ 250 OVERRIDE (IP): Performed by: UROLOGY

## 2024-05-12 PROCEDURE — 700105 HCHG RX REV CODE 258: Performed by: UROLOGY

## 2024-05-12 PROCEDURE — 85027 COMPLETE CBC AUTOMATED: CPT

## 2024-05-12 PROCEDURE — P9016 RBC LEUKOCYTES REDUCED: HCPCS

## 2024-05-12 PROCEDURE — 86900 BLOOD TYPING SEROLOGIC ABO: CPT

## 2024-05-12 PROCEDURE — A9270 NON-COVERED ITEM OR SERVICE: HCPCS

## 2024-05-12 PROCEDURE — 86901 BLOOD TYPING SEROLOGIC RH(D): CPT

## 2024-05-12 PROCEDURE — 80048 BASIC METABOLIC PNL TOTAL CA: CPT

## 2024-05-12 PROCEDURE — 99232 SBSQ HOSP IP/OBS MODERATE 35: CPT | Mod: GC | Performed by: FAMILY MEDICINE

## 2024-05-12 PROCEDURE — 700111 HCHG RX REV CODE 636 W/ 250 OVERRIDE (IP): Mod: JZ

## 2024-05-12 PROCEDURE — 700102 HCHG RX REV CODE 250 W/ 637 OVERRIDE(OP)

## 2024-05-12 PROCEDURE — 700111 HCHG RX REV CODE 636 W/ 250 OVERRIDE (IP): Performed by: ANESTHESIOLOGY

## 2024-05-12 PROCEDURE — 700105 HCHG RX REV CODE 258: Performed by: ANESTHESIOLOGY

## 2024-05-12 PROCEDURE — 85025 COMPLETE CBC W/AUTO DIFF WBC: CPT

## 2024-05-12 PROCEDURE — A9270 NON-COVERED ITEM OR SERVICE: HCPCS | Performed by: UROLOGY

## 2024-05-12 PROCEDURE — 770001 HCHG ROOM/CARE - MED/SURG/GYN PRIV*

## 2024-05-12 PROCEDURE — 36430 TRANSFUSION BLD/BLD COMPNT: CPT

## 2024-05-12 PROCEDURE — 86923 COMPATIBILITY TEST ELECTRIC: CPT

## 2024-05-12 PROCEDURE — 302094 BELT OSTOMY (HOLLISTER): Performed by: UROLOGY

## 2024-05-12 PROCEDURE — 86850 RBC ANTIBODY SCREEN: CPT

## 2024-05-12 PROCEDURE — 302103 DOWN DRAIN ADAPTER: Performed by: UROLOGY

## 2024-05-12 RX ORDER — FUROSEMIDE 10 MG/ML
20 INJECTION INTRAMUSCULAR; INTRAVENOUS ONCE
Status: COMPLETED | OUTPATIENT
Start: 2024-05-12 | End: 2024-05-12

## 2024-05-12 RX ADMIN — ONDANSETRON 4 MG: 2 INJECTION INTRAMUSCULAR; INTRAVENOUS at 12:24

## 2024-05-12 RX ADMIN — SENNOSIDES AND DOCUSATE SODIUM 2 TABLET: 50; 8.6 TABLET ORAL at 05:21

## 2024-05-12 RX ADMIN — ACETAMINOPHEN 1000 MG: 500 TABLET, FILM COATED ORAL at 12:24

## 2024-05-12 RX ADMIN — TIOTROPIUM BROMIDE INHALATION SPRAY 5 MCG: 3.12 SPRAY, METERED RESPIRATORY (INHALATION) at 05:21

## 2024-05-12 RX ADMIN — DEXAMETHASONE SODIUM PHOSPHATE 4 MG: 4 INJECTION, SOLUTION INTRAMUSCULAR; INTRAVENOUS at 23:15

## 2024-05-12 RX ADMIN — ACETAMINOPHEN 1000 MG: 500 TABLET, FILM COATED ORAL at 01:55

## 2024-05-12 RX ADMIN — ATORVASTATIN CALCIUM 40 MG: 40 TABLET, FILM COATED ORAL at 17:46

## 2024-05-12 RX ADMIN — SODIUM CHLORIDE: 9 INJECTION, SOLUTION INTRAVENOUS at 01:56

## 2024-05-12 RX ADMIN — ACETAMINOPHEN 1000 MG: 500 TABLET, FILM COATED ORAL at 17:46

## 2024-05-12 RX ADMIN — HYDROMORPHONE HYDROCHLORIDE: 1 INJECTION, SOLUTION INTRAMUSCULAR; INTRAVENOUS; SUBCUTANEOUS at 13:20

## 2024-05-12 RX ADMIN — BENZONATATE 100 MG: 100 CAPSULE ORAL at 05:29

## 2024-05-12 RX ADMIN — NICOTINE 14 MG: 14 PATCH TRANSDERMAL at 05:18

## 2024-05-12 RX ADMIN — FUROSEMIDE 20 MG: 10 INJECTION, SOLUTION INTRAVENOUS at 23:15

## 2024-05-12 RX ADMIN — ONDANSETRON 4 MG: 2 INJECTION INTRAMUSCULAR; INTRAVENOUS at 01:55

## 2024-05-12 ASSESSMENT — ENCOUNTER SYMPTOMS
HEADACHES: 0
FEVER: 0
ABDOMINAL PAIN: 1
VOMITING: 0
SHORTNESS OF BREATH: 0
CHILLS: 0
NAUSEA: 0

## 2024-05-12 ASSESSMENT — PAIN DESCRIPTION - PAIN TYPE
TYPE: SURGICAL PAIN
TYPE: CHRONIC PAIN
TYPE: CHRONIC PAIN

## 2024-05-12 ASSESSMENT — COPD QUESTIONNAIRES
DURING THE PAST 4 WEEKS HOW MUCH DID YOU FEEL SHORT OF BREATH: NONE/LITTLE OF THE TIME
DO YOU EVER COUGH UP ANY MUCUS OR PHLEGM?: NO/ONLY WITH OCCASIONAL COLDS OR INFECTIONS
COPD SCREENING SCORE: 4
HAVE YOU SMOKED AT LEAST 100 CIGARETTES IN YOUR ENTIRE LIFE: YES

## 2024-05-12 NOTE — PROGRESS NOTES
"Rounded on pt, complaining of cough and concern for breaking midline abdominal sutures with coughing (still intact at this time).  Tiotropium ordered but pt stated he hasn't received it since he got here (confirmed in MAR), this helps his sx.  Appears RT consult may not have been on, possible reason why he was not getting his tiotropium - added at this time.  Offered pt cough medication to suppress cough, he is hesitant.  I told him they will be on if he asks for it - ordered tessalon perles PRN.  Spoke to RN who will aim to get pt dose of Spiriva tonight.    NS running at 125 - will watch for BMP in AM.   Pain does not appear controlled, pt states his epidural is just \"causing more problems\", I encouraged him to ask for PRN oxy on board.  Pt has only received 1 dose of PRN oxy today but reportedly has also told the RN his pain is only a 1/10.      "

## 2024-05-12 NOTE — ANESTHESIA POST-OP FOLLOW-UP NOTE
"Anesthesia Pain Service Note    Type:  Epidural catheter    Patient: Ronal Hair    Patient seen and examined. and Patient chart reviewed.     /55   Pulse (!) 106   Temp 36.7 °C (98.1 °F) (Temporal)   Resp 15   Ht 1.689 m (5' 6.5\")   Wt 75.5 kg (166 lb 7.2 oz)   SpO2 93%   BMI 26.46 kg/m²     Complaints:  No complaints.    Pain:   3/10    LOC:   Awake    Rate:  10    Exam:  Vital signs stable and Catheter came apart at connection  ,     Impression:  Adequate analgesia    Assessment & Plan:  Acute post-procedural pain (G89.18) and Other (specify) D/C tomorrow     No change  - continue betadine to distal 2 inches of catheter then cut at one inch and replaced in  connector after wiping with betadine then taped       Jimi Capellan M.D.  5/12/2024  1:28 PM  "

## 2024-05-12 NOTE — PROGRESS NOTES
Bedside report received, assessment completed    A&O x  4, pt calls appropriately  Mobility: Up with x1 assist, FWW  Fall Risk Assessment: HIGH, bed alarm yes, door notifications yes  Pain Assessment / Reassessment completed, medication provided per MAR  Diet: CLD  LDA:   IV Access: 20g L-wrist SL  HITESH: LLQ  Central Line: R-chest port, accessed, NS 125ml    GI/: urostomy void, - flatus, - / PTA BM  DVT Prophylaxis: heparin - pt refused, SCD on while in bed      Parrish Score: 18, Interventions per flow sheet  Procedures:    - 5/10 Cystoprostatectomy w/ creation of ileal conduit (RLQ)  D/C Plan:    - Epidural Mgt/ pain mgt   - Diet tolerance   - Ambulation   - Wound Care/ Ostomy care  - Trending BMP    Reviewed plan of care with patient, bed in lowest position and locked, pt resting comfortably now, call light within reach, all needs met at this time. Interventions will be executed per plan of care

## 2024-05-12 NOTE — CARE PLAN
The patient is Stable - Low risk of patient condition declining or worsening    Shift Goals  Clinical Goals: ambulation, tolerate diet, pain control  Patient Goals: rest  Family Goals: Pt comfort    Progress made toward(s) clinical / shift goals:    Problem: Pain - Standard  Goal: Alleviation of pain or a reduction in pain to the patient’s comfort goal  Description: Target End Date:  Prior to discharge or change in level of care    Document on Vitals flowsheet    1.  Document pain using the appropriate pain scale per order or unit policy  2.  Educate and implement non-pharmacologic comfort measures (i.e. relaxation, distraction, massage, cold/heat therapy, etc.)  3.  Pain management medications as ordered  4.  Reassess pain after pain med administration per policy  5.  If opiods administered assess patient's response to pain medication is appropriate per POSS sedation scale  6.  Follow pain management plan developed in collaboration with patient and interdisciplinary team (including palliative care or pain specialists if applicable)  Outcome: Progressing     Problem: Knowledge Deficit - Standard  Goal: Patient and family/care givers will demonstrate understanding of plan of care, disease process/condition, diagnostic tests and medications  Description: Target End Date:  1-3 days or as soon as patient condition allows    Document in Patient Education    1.  Patient and family/caregiver oriented to unit, equipment, visitation policy and means for communicating concern  2.  Complete/review Learning Assessment  3.  Assess knowledge level of disease process/condition, treatment plan, diagnostic tests and medications  4.  Explain disease process/condition, treatment plan, diagnostic tests and medications  Outcome: Progressing       Patient is not progressing towards the following goals:

## 2024-05-12 NOTE — WOUND TEAM
[facility]  Inpatient Services  New Ostomy Initial Evaluation    HPI:  Reviewed  PMH: Reviewed   SH: Reviewed         Past Surgical History:   Procedure Laterality Date    TURBT (TRANSURETHRAL RESECTION OF BLADDER TUMOR)  12/13/2023    Procedure: BIPOLAR TRANSURETHRAL RESECTION OF BLADDER TUMOR;  Surgeon: Addison Seymour M.D.;  Location: SURGERY [facility];  Service: Urology    OR CYSTOSCOPY,INSERT URETERAL STENT Left 01/07/2023    Procedure: CYSTOSCOPY, WITH URETERAL STENT INSERTION;  Surgeon: Addison Seymour M.D.;  Location: SURGERY [facility];  Service: Urology    OR CYSTO/URETERO/PYELOSCOPY, DX Left 01/07/2023    Procedure: URETEROSCOPY;  Surgeon: Addison Seymour M.D.;  Location: SURGERY [facility];  Service: Urology    LASERTRIPSY Left 01/07/2023    Procedure: LITHOTRIPSY, USING LASER;  Surgeon: Addison Seymour M.D.;  Location: SURGERY [facility];  Service: Urology    AORTOBIFEM BYPASS  11/08/2020    Procedure: CREATION, BYPASS, ARTERIAL, AORTA TO FEMORAL, BILATERAL;  Surgeon: Jimi Morrison M.D.;  Location: SURGERY [facility];  Service: General    KNEE ARTHROSCOPY Left 1968    APPENDECTOMY CHILD      CATARACT EXTRACTION WITH IOL Bilateral     OTHER ABDOMINAL SURGERY      OTHER ORTHOPEDIC SURGERY      SHOULDER ARTHROSCOPY Left        Surgery Date: 5/10/24    Surgeon(s):  Cuco Osborn MD, PhD  LEONARDO Conner M.D.    Procedure(s):  RADICAL CYSTOPROSTATECTOMY WITH CREATION OF ILEAL CONDUIT (OR OTHER URINARY DIVERSION)     Permanence: Yes    Pertinent History:  Pt has urostomy r/t Bladder cancer requiring cystectomy and prostatectomy                                                       Urostomy 05/10/24 Ileal conduit RLQ (Active)   Wound Image     Stomal Appliance Assessment Intact   Stoma Assessment Beefy red   Stoma Shape Budded Less Than One Inch   Stoma Size (in) 1.2   Peristomal Assessment Intact   Mucocutaneous Junction Intact   Treatment  "Cleansed with water/washcloth;Appliance Changed   Stomal Appliance 2 1/4\" (57mm) CTF;Paste Ring, 2\"   Output (mL) 300 mL   WOUND RN ONLY - Stomal Appliance  2 Piece;2 1/4\" (57mm) CTF;Paste Ring, 2\";Urostomy Pouch;Down Drain Bag   Appliance (Pouch) # [id number], [id number], [place] [id number]   Appliance Brand [place]   Secure Start completed No   Ostomy Care Resources Provided UOAA Tip Sheet                       Urostomy Interventions:  Removed appliance (using push pull method) - By Ostomy RN  Cleansed nathaniel-stomal skin with moist warm washcloth - By Ostomy RN  Created/Checked template fit - By Ostomy RN  Traced Shape to back of barrier - By Ostomy RN  Cut barrier to stoma size - By Ostomy RN  Confirmed fit - By Ostomy RN  Removed plastic backing and \"Dog Eared\" paper edges - By Ostomy RN  Stretched paste ring to fit barrier opening - By Ostomy RN  Applied paste ring to back of barrier - By Ostomy RN  Applied barrier to skin and adhered with friction - By Ostomy RN  Attached pouch - By Ostomy RN  Connected Uro spout to down drain bag with down drain adapter - By Ostomy RN    Ostomy Nurse Plan of Care - Frequency of Follow-up:   Ostomy nurses to continue to follow for ostomy needs and teaching until patient independent with care or discharge.  Ostomy Nurse follow-up frequency:  Every other day    Patient Education:   All questions answered, procedure explained, and education provided.      Ostomy RN to follow-up daily for education     Date:  05/12/24  Folder/paperwork delivered  Pt was able to verbalize most of the steps for pouching system change      Needs for next visit: hands-on, review paperwork    Evaluation:      Date:  05/12/24    Pt has new urostomy. He was marked at [facility] ostomy clinic. Referral placed. He is still a little under the anesthesia and \"I didn't get any sleep last night. They're redoing the bathroom next door.\"        Flatus: N/A  Stool Output: N/A  Urine Output: bloody  Mobility: Unable to " assess    DIET ORDERS (From admission to next 24h)       Start     Ordered    05/10/24 1924  Diet Order Diet: Clear Liquid  ALL MEALS        Comments: Ice chips in pacu  POD0 - <200cc ice chips/shift  POD1 - clears <400cc/shift  POD2 - CLD, crackers, toast and english muffins ok  POD3 - full liquid diet with crackers, toast and english muffins ok  POD4 - reg diet, but hold advancing diet if significant burping, hiccuping, bloating, nausea or vomiting.   Question:  Diet:  Answer:  Clear Liquid    05/10/24 1923                     Secure Start Signed:  No  Outpatient Referral Placed:  Yes     5 Sets of appliances in Ostomy bag for discharge:  No    INSURANCE OPTIONS:   If patient has [facility]/Senior care plus patient will need an [employer] book. If patientt has Medicaid be sure patient has care chest paperwork.    Currently Active Insurance       Payor Plan    [facility] SENIOR CARE PLUS SCP ESSENTIAL             Anticipated Discharge Plans:  TBD    Ostomy Supplies for DC:  To be determined in 4 to 6 weeks once stomal edema has fully resolved

## 2024-05-12 NOTE — PROGRESS NOTES
"Report received from ender RN  Pt AAOx4, Alert. Responds appropriately. Irritable  2L NC  Pain managed per MAR, resting comfortably.   Epidural in place. Pain of 3/10. Denies numbness/tingling in BLE  +void via RLQ ileal conduit, No flatus. No bm  Clear liquid diet, tolerating well. No n/v  Turns self in bed  Calls appropriately for assistance  SCDs on  Refusing ambulation. States he \"needs 48 hours to heal before he gets out of bed.\" Education provided    Skin: MLI HAKEEM, RLQ ileostomy, LLQ HITESH drain, Epidural mid back    POC reviewed, education provided. Bed locked and in low position, pt instructed to call for assistance. Comprehension verbalized. Call light within reach, all needs met at this time.  "

## 2024-05-12 NOTE — ASSESSMENT & PLAN NOTE
Anemia likely secondary to blood loss during surgery. In OR required 3 units packed red blood cells and 1 unit of fresh frozen plasma during this hospitalization.  His hemoglobin was 7.3 on 5/12 with repeat in the afternoon of 6.4 thus requiring transfusion of 2 u pRBC. His hemoglobin has been stable over the last day with trend of 9.7 to 9.4.     Recommending to continue to trend H&H  Recommend transfusion with pRBC if Hgb is less than 7 or if patient is symptomatic with hemoglobin less than 8.   Continue monitoring urostomy and HITESH drain output  Continue monitoring abdominal exams for any changes

## 2024-05-12 NOTE — RESPIRATORY CARE
"COPD EDUCATION by COPD CLINICAL EDUCATOR  5/12/2024 at 1:58 PM by Tamar Godinez, RRT     Patient interviewed by COPD education team. Patient refused COPD program at this time. An Action Plan was completed in the EMR to reflect current Respiratory Medication use.                 COPD Screen  COPD Risk Screening  Do you have a history of COPD?: Yes  Do you have a Pulmonologist?: No  COPD Population Screener  During the past 4 weeks, how much did you feel short of breath?: None/Little of the time  Do you ever cough up any mucus or phlegm?: No/only with occasional colds or infections  In the past 12 months, you do less than you used to because of your breathing problems: Disagree/unsure  Have you smoked at least 100 cigarettes in your entire life?: Yes  How old are you?: 60+  COPD Screening Score: 4  COPD Coordinator Recommended: Yes    COPD Assessment  COPD Clinical Specialists ONLY  COPD Education Initiated: Yes--Short Intervention (refusing  dscussion AP updated)  Is this a COPD exacerbation patient?: No  DME Company: none prior  Physician Name: [facility]  Referrals Initiated:  (declined)  Interdisciplinary Rounds: Attendance at Rounds (30 Min)    PFT Results  No results found for: \"PFT\"    [facility]  [facility] provides bedside medication delivery for all eligible patients at discharge.  Would you like to opt out of this program for any reason?: No - Stay Opted In     MY COPD ACTION PLAN     It is recommended that patients and physicians /healthcare providers complete this action plan together. This plan should be discussed at each physician visit and updated as needed.    The green, yellow and red zones show groups of symptoms of COPD. This list of symptoms is not comprehensive, and you may experience other symptoms. In the \"Actions\" column, your healthcare provider has recommended actions for you to take based on your symptoms.    Patient Name: Ronal Hair   YOB: 1945   Last " "Updated on:     Green Zone:  I am doing well today Actions     Usual activitiy and exercise level   Take daily medications     Usual amounts of cough and phlegm/mucus   Use oxygen as prescribed     Sleep well at night   Continue regular exercise/diet plan     Appetite is good   At all times avoid cigarette smoke, inhaled irritants     Daily Medications (these medications are taken every day):   Tiotropium Bromide Monohydrate (Spiriva) 1 capsule Once daily        Yellow Zone:  I am having a bad day or a COPD flare Actions     More breathless than usual   Continue daily medications     I have less energy for my daily activities   Use quick relief inhaler as ordered     Increased or thicker phlegm/mucus   Use oxygen as prescribed     Using quick relief inhaler/nebulizer more often   Get plenty of rest     Swelling of ankles more than usual   Use pursed lip breathing     More coughing than usual   At all times avoid cigarette smoke, inhaled irritants     I feel like I have a \"chest cold\"     Poor sleep and my symptoms woke me up     My appetite is not good     My medicine is not helping      Call provider immediately if symptoms don’t improve     Continue daily medications, add rescue medications:               Medications to be used during a flare up, (as Discussed with Provider):              Red Zone:  I need urgent medical care Actions     Severe shortness of breath even at rest   Call 911 or seek medical care immediately     Not able to do any activity because of breathing      Fever or shaking chills      Feeling confused or very drowsy       Chest pains      Coughing up blood                  "

## 2024-05-12 NOTE — ANESTHESIA POST-OP FOLLOW-UP NOTE
"Anesthesia Pain Service Note    Type:  Epidural catheter    Patient: Ronal Hair    Patient seen and examined.     /65   Pulse 94   Temp 36.5 °C (97.7 °F) (Temporal)   Resp 16   Ht 1.689 m (5' 6.5\")   Wt 75.5 kg (166 lb 7.2 oz)   SpO2 94%   BMI 26.46 kg/m²     Complaints:  No complaints.    Pain:   2/10    LOC:   Awake    Rate:  6    Exam:  Vital signs stable    Impression:  Adequate analgesia    Assessment & Plan:  Acute post-procedural pain (G89.18)     No change  - continue      Yunior Escoto M.D.  5/11/2024  9:39 PM  "

## 2024-05-12 NOTE — CONSULTS
[facility]     Attending: Rosalva Santoyo Md    Resident: Estephania Osborn M.D.     PATIENT: Ronal Hair; [id number]; 1945    ID:   78 y.o. male with PMHx COPD, PAD s/p aorto-bifemoral bypass, small infrarenal AAA without rupture, HTN, HLD, back pain follicular lymphoma, admitted by urology on 5/10 for cystoprostatectomy due to bladder carcinoma. Now POD2 with ileal conduit and cystoprostatectomy.         SUBJECTIVE:   Overnight patient was concerned for coughing, tessalon pearls were added. He had not received his Spiriva. Discussed with RN to give a one time dose and an RT consult was placed. He was on maintenance fluids.  Patient this morning reports that his breathing is better. His pain is better controlled. He has not been up to walk yet. He denies lightheadedness and dizziness.     OBJECTIVE:     Vitals:    05/11/24 2315 05/12/24 0337 05/12/24 0748 05/12/24 1140   BP: 126/65 127/60 103/55 108/55   Pulse: 97 99 (!) 102 (!) 106   Resp: 16 16 16 15   Temp: 36.9 °C (98.4 °F) 36 °C (96.8 °F) 36.8 °C (98.2 °F) 36.7 °C (98.1 °F)   TempSrc: Temporal Temporal Temporal Temporal   SpO2: 97% 93% 96% 93%   Weight:       Height:           Intake/Output Summary (Last 24 hours) at 5/12/2024 0615  Last data filed at 5/12/2024 0525  Gross per 24 hour   Intake 3157.12 ml   Output 2423 ml   Net 734.12 ml       Physical Exam  Constitutional:       General: He is not in acute distress.  HENT:      Head: Normocephalic.      Mouth/Throat:      Mouth: Mucous membranes are moist.      Pharynx: Oropharynx is clear.   Eyes:      Extraocular Movements: Extraocular movements intact.      Conjunctiva/sclera: Conjunctivae normal.   Cardiovascular:      Rate and Rhythm: Normal rate and regular rhythm.      Pulses: Normal pulses.      Heart sounds: Normal heart sounds.   Pulmonary:      Effort: Pulmonary effort is normal. No respiratory distress.      Breath sounds: Normal breath sounds.   Abdominal:      General:  "Abdomen is flat. There is no distension.      Tenderness: There is abdominal tenderness (along incisions). There is no rebound.      Comments: Well healing incision, clean dry and intact   Drain with serosanguinous fluid   Ileal conduit with pink tinged fluid    Musculoskeletal:      Cervical back: Normal range of motion.   Skin:     General: Skin is warm.      Capillary Refill: Capillary refill takes less than 2 seconds.   Neurological:      General: No focal deficit present.      Mental Status: He is alert and oriented to person, place, and time.   Psychiatric:         Mood and Affect: Mood normal.           LABS:  Recent Labs     05/10/24  1256 05/10/24  1513 05/10/24  1618 05/11/24  0440 05/12/24  0536   WBC 5.7  --  8.7 9.5 5.2   RBC 3.24*  --  3.15* 2.89* 2.25*   HEMOGLOBIN 10.4*   < > 10.1* 9.5* 7.3*   HEMATOCRIT 30.8*   < > 29.5* 27.1* 21.4*   MCV 95.1  --  93.7 93.8 95.1   MCH 32.1  --  32.1 32.9 32.4   RDW 51.3*  --  52.0* 53.5* 53.3*   PLATELETCT 93*  --  100* 100* 88*   MPV 9.7  --  10.4 9.9 10.2   NEUTSPOLYS 88.70*  --   --   --   --    LYMPHOCYTES 6.10*  --   --   --   --    MONOCYTES 4.60  --   --   --   --    EOSINOPHILS 0.20  --   --   --   --    BASOPHILS 0.20  --   --   --   --     < > = values in this interval not displayed.     Recent Labs     05/11/24 0440 05/11/24  1633 05/12/24  0536   SODIUM 137 138 138   POTASSIUM 5.9* 5.1 4.4   CHLORIDE 107 109 108   CO2 20 19* 20   BUN 35* 39* 32*   CREATININE 1.63* 1.60* 1.03   CALCIUM 7.6* 7.5* 7.9*     Estimated GFR/CRCL = Estimated Creatinine Clearance: 56.1 mL/min (by C-G formula based on SCr of 1.03 mg/dL).  Recent Labs     05/11/24 0440 05/11/24  1633 05/12/24  0536   GLUCOSE 159* 134* 111*                 No results for input(s): \"INR\", \"APTT\", \"FIBRINOGEN\" in the last 72 hours.    Invalid input(s): \"DIMER\"    MICROBIOLOGY:   Results       ** No results found for the last 168 hours. **              IMAGING:   [id number] VIEW   Final Result    "   1. Appropriate courses of the bilateral percutaneous stents (feeding tubes) entering the right lower quadrant ileal conduit and terminating over the expected locations of the right and left renal pelvis. No kink.   2. Surgical drain in the pelvis with postsurgical changes.   3. Moderate stool throughout the colon.          MEDS:  Current Facility-Administered Medications   Medication Last Admin    tiotropium (Spiriva Respimat) 2.5 mcg/Act inhalation spray 5 mcg 5 mcg at 05/12/24 0521    nicotine (Nicoderm) 14 MG/24HR 14 mg 14 mg at 05/12/24 0518    Respiratory Therapy Consult      benzonatate (Tessalon) capsule 100 mg 100 mg at 05/12/24 0529    atorvastatin (Lipitor) tablet 40 mg 40 mg at 05/11/24 1740    NS infusion New Bag at 05/12/24 0156    Pharmacy Consult Request ...Pain Management Review 1 Each      acetaminophen (Tylenol) tablet 1,000 mg 1,000 mg at 05/12/24 1224    Followed by    [START ON 5/15/2024] acetaminophen (Tylenol) tablet 1,000 mg      senna-docusate (Pericolace Or Senokot S) 8.6-50 MG per tablet 2 Tablet 2 Tablet at 05/12/24 0521    bisacodyl (Dulcolax) suppository 10 mg      polyethylene glycol/lytes (Miralax) Packet 1 Packet 1 Packet at 05/10/24 1811    heparin injection 5,000 Units 5,000 Units at 05/12/24 1400    ondansetron (Zofran) syringe/vial injection 4 mg 4 mg at 05/12/24 1224    dexamethasone (Decadron) injection 4 mg      diphenhydrAMINE (Benadryl) injection 25 mg      haloperidol lactate (Haldol) injection 1 mg      scopolamine (Transderm-Scop) patch 1 Patch      HYDROmorphone pf (Dilaudid) 20 mcg/mL, ropivacaine (Naropin) 0.1 % in  mL epidural infusion New Bag at 05/12/24 1320    oxyCODONE immediate-release (Roxicodone) tablet 5 mg 5 mg at 05/11/24 0838       ASSESSMENT/PLAN: Ronal Hair is a 78 y.o. male   Anemia  Assessment & Plan  Hgb 7.3 this morning. He has been receiving maintenance fluids since yesterday, do not believe that this is dilutional. He has had  serosanguinous drainage from drain and some pink tinged fluid from ileal conduit. He denies dizziness and lightheadedness     Trend H&H   Recommend transfusion with pRBC if Hgb is less than 7 or if patient is symptomatic with hemoglobin less than 8.     JULIA (acute kidney injury) (HCC), resolving  Assessment & Plan  Likely due to poor renal perfusion given him needing 3U of pRBC and 1U of FFP during surgery.     Avoid NSAIDs, would avoid toradol   BMP to trend kidney function    Maintenance fluids NS, recommend titration down while oral intake increases   Monitor urine output       COPD (chronic obstructive pulmonary disease) (HCC)- (present on admission)  Assessment & Plan  Cannot find PFTs on file, although COPD seems to be noted as confirmed diagnosis in the past 2 years.  Pt not requiring O2 at baseline at home.    Acquire previous PFTs if done, regardless pt may benefit with updated PFTs to better direct treatment. Could be done outpatient   Currently taking Spiriva, no reason to change treatment at this time as patient is symptomatically controlled.  Does not appear that patient has pulmonologist, referral may be reasonable at this time to optimize management and smoking cessation efforts    Tobacco dependence- (present on admission)  Assessment & Plan  Current smoker with attempts to quit and medical management directed at this in the past.  108 pack years (1 to 2 packs for 54 years).  This is extensive smoking history and has caused obvious complications such as PAD requiring aortofemoral bypass and COPD.  This is more concerning at this time due to patient's surgery and concern for its effect on healing.  Luckily, no signs of lung cancer on recent chest CT.   Recommend patient have tobacco counseling in-house with focus on restarting Chantix, nicotine patches and highest aggressive therapy available.  Nicotine patch ordered   Tessalon pearls for cough   Patient due for visit with PCP, Dr. Henriquez who is very  well versed in addiction medicine and runs addiction clinic at [facility].  Highly recommend patient make urgent appointment with her.    Peripheral artery disease (HCC)- (present on admission)  Assessment & Plan  Patient in past has had nonpalpable DP pulses but have been present with Doppler.  Patient is s/p aortobifemoral bypass in 2020.  This is likely effect of extensive smoking history.  Last LDL below goal at 66.  Patient's presumed small vessel disease poses risk for postop healing from current surgery (cystoprostatectomy with ileal conduit for bladder carcinoma).  Does not appear patient has ever seen vascular medicine at [facility].  Patient may greatly benefit from referral to vascular medicine. Can be done outpatient   Continue home atorvastatin 40 mg.        CODE STATUS: Full     Estephania sOborn MD  PGY1  [facility]

## 2024-05-12 NOTE — PROGRESS NOTES
Note to reader: this note follows the APSO format rather than the historical SOAP format. Assessment and plan located at the top of the note for ease of use.    Chief Complaint  78 y.o. year old male here with No chief complaint on file.      Assessment/Plan  Interval History   Active Hospital Problems    Diagnosis     JULIA (acute kidney injury) ([facility]) [N17.9]     COPD (chronic obstructive pulmonary disease) ([facility]) [J44.9]     Tobacco dependence [F17.200]     Peripheral artery disease (HCC) [I73.9]       pt seen and examined     5/12- pod 2. Clinically improved. Nausea improved. Good uop yesterday 2490cc/24h. Cr improved to 1.03. k 4.1. h/h7.3/21.4(9.5/27.1).     5/11- POD 1 from cystoprostatectomy with creation of ileal conduit. Having poor pain control at midline incision. Vomited while I was in the room. 875cc uop since surgery, blood tinged. Drain with ss fluid. H/h 9.5/27.1. cr 1.63 (jump from pre op around 0.99). K 5.9.     Disposition  Stable      PLAN:  IS.  Ambulate with PT/OT.  Pain control. Cr improved so if needs it, can add toradol/ibuprofen back on.   Slow with fluid intake in the first few days. Advance slowly as tolerated.    Will drop fluids 50cc/hr then recheck CBC ~4pm. If stable/improving OK to increase to 75cc/hr until intaking appropriate PO.  Appreciate hospitalist and ostomy nurses help.     Review of Systems  Physical Exam   Review of Systems   Constitutional:  Negative for chills and fever.   Respiratory:  Negative for shortness of breath.    Cardiovascular:  Negative for chest pain.   Gastrointestinal:  Positive for abdominal pain. Negative for nausea and vomiting.   Genitourinary:  Positive for hematuria.   Neurological:  Negative for headaches.   All other systems reviewed and are negative.    Vitals:    05/11/24 2016 05/11/24 2315 05/12/24 0337 05/12/24 0748   BP: 115/65 126/65 127/60 103/55   Pulse: 94 97 99 (!) 102   Resp: 16 16 16 16   Temp: 36.5 °C (97.7 °F) 36.9 °C (98.4 °F) 36 °C  (96.8 °F) 36.8 °C (98.2 °F)   TempSrc: Temporal Temporal Temporal Temporal   SpO2: 94% 97% 93% 96%   Weight:       Height:         Physical Exam  Vitals and nursing note reviewed.   Constitutional:       Appearance: Normal appearance.   HENT:      Head: Normocephalic and atraumatic.      Right Ear: Tympanic membrane normal.      Left Ear: Tympanic membrane normal.      Mouth/Throat:      Mouth: Mucous membranes are dry.   Abdominal:      General: There is no distension.      Tenderness: There is abdominal tenderness.      Comments: Urostomy pink tinged with bloody urine.   8fr feeding tube in conduit   Neurological:      Mental Status: He is alert.   Psychiatric:         Mood and Affect: Mood normal.         Behavior: Behavior normal.          Hematology Chemistry   Lab Results   Component Value Date/Time    WBC 5.2 05/12/2024 05:36 AM    HEMOGLOBIN 7.3 (L) 05/12/2024 05:36 AM    HEMATOCRIT 21.4 (L) 05/12/2024 05:36 AM    PLATELETCT 88 (L) 05/12/2024 05:36 AM     Lab Results   Component Value Date/Time    SODIUM 138 05/12/2024 05:36 AM    POTASSIUM 4.4 05/12/2024 05:36 AM    CHLORIDE 108 05/12/2024 05:36 AM    CO2 20 05/12/2024 05:36 AM    GLUCOSE 111 (H) 05/12/2024 05:36 AM    BUN 32 (H) 05/12/2024 05:36 AM    CREATININE 1.03 05/12/2024 05:36 AM         Labs not explicitly included in this progress note were reviewed by the author.   Radiology/imaging not explicitly included in this progress note was reviewed by the author.     Core Measures

## 2024-05-13 ENCOUNTER — ANESTHESIA (OUTPATIENT)
Dept: ANESTHESIOLOGY | Facility: MEDICAL CENTER | Age: 79
DRG: 654 | End: 2024-05-13
Payer: MEDICARE

## 2024-05-13 ENCOUNTER — APPOINTMENT (OUTPATIENT)
Dept: RADIOLOGY | Facility: MEDICAL CENTER | Age: 79
DRG: 654 | End: 2024-05-13
Payer: MEDICARE

## 2024-05-13 ENCOUNTER — APPOINTMENT (OUTPATIENT)
Dept: RADIOLOGY | Facility: MEDICAL CENTER | Age: 79
DRG: 654 | End: 2024-05-13
Attending: UROLOGY
Payer: MEDICARE

## 2024-05-13 LAB
ANION GAP SERPL CALC-SCNC: 9 MMOL/L (ref 7–16)
BUN SERPL-MCNC: 20 MG/DL (ref 8–22)
CALCIUM SERPL-MCNC: 8.4 MG/DL (ref 8.5–10.5)
CHLORIDE SERPL-SCNC: 105 MMOL/L (ref 96–112)
CO2 SERPL-SCNC: 21 MMOL/L (ref 20–33)
CREAT SERPL-MCNC: 0.77 MG/DL (ref 0.5–1.4)
EKG IMPRESSION: NORMAL
ERYTHROCYTE [DISTWIDTH] IN BLOOD BY AUTOMATED COUNT: 49.2 FL (ref 35.9–50)
GFR SERPLBLD CREATININE-BSD FMLA CKD-EPI: 92 ML/MIN/1.73 M 2
GLUCOSE SERPL-MCNC: 123 MG/DL (ref 65–99)
HCT VFR BLD AUTO: 27.5 % (ref 42–52)
HGB BLD-MCNC: 9.7 G/DL (ref 14–18)
MCH RBC QN AUTO: 32.9 PG (ref 27–33)
MCHC RBC AUTO-ENTMCNC: 35.3 G/DL (ref 32.3–36.5)
MCV RBC AUTO: 93.2 FL (ref 81.4–97.8)
PLATELET # BLD AUTO: 91 K/UL (ref 164–446)
PLATELETS.RETICULATED NFR BLD AUTO: 2.3 % (ref 0.6–13.1)
PMV BLD AUTO: 9.6 FL (ref 9–12.9)
POTASSIUM SERPL-SCNC: 4.1 MMOL/L (ref 3.6–5.5)
RBC # BLD AUTO: 2.95 M/UL (ref 4.7–6.1)
SODIUM SERPL-SCNC: 135 MMOL/L (ref 135–145)
WBC # BLD AUTO: 4.9 K/UL (ref 4.8–10.8)

## 2024-05-13 PROCEDURE — P9016 RBC LEUKOCYTES REDUCED: HCPCS

## 2024-05-13 PROCEDURE — A9270 NON-COVERED ITEM OR SERVICE: HCPCS

## 2024-05-13 PROCEDURE — 85027 COMPLETE CBC AUTOMATED: CPT

## 2024-05-13 PROCEDURE — 36430 TRANSFUSION BLD/BLD COMPNT: CPT

## 2024-05-13 PROCEDURE — 80048 BASIC METABOLIC PNL TOTAL CA: CPT

## 2024-05-13 PROCEDURE — 99232 SBSQ HOSP IP/OBS MODERATE 35: CPT | Mod: GC | Performed by: FAMILY MEDICINE

## 2024-05-13 PROCEDURE — 700102 HCHG RX REV CODE 250 W/ 637 OVERRIDE(OP): Performed by: UROLOGY

## 2024-05-13 PROCEDURE — A9270 NON-COVERED ITEM OR SERVICE: HCPCS | Performed by: ANESTHESIOLOGY

## 2024-05-13 PROCEDURE — A9270 NON-COVERED ITEM OR SERVICE: HCPCS | Performed by: UROLOGY

## 2024-05-13 PROCEDURE — A9270 NON-COVERED ITEM OR SERVICE: HCPCS | Performed by: STUDENT IN AN ORGANIZED HEALTH CARE EDUCATION/TRAINING PROGRAM

## 2024-05-13 PROCEDURE — 93010 ELECTROCARDIOGRAM REPORT: CPT | Performed by: INTERNAL MEDICINE

## 2024-05-13 PROCEDURE — 93005 ELECTROCARDIOGRAM TRACING: CPT

## 2024-05-13 PROCEDURE — 700111 HCHG RX REV CODE 636 W/ 250 OVERRIDE (IP): Mod: JZ | Performed by: ANESTHESIOLOGY

## 2024-05-13 PROCEDURE — 770001 HCHG ROOM/CARE - MED/SURG/GYN PRIV*

## 2024-05-13 PROCEDURE — 85055 RETICULATED PLATELET ASSAY: CPT

## 2024-05-13 PROCEDURE — 700102 HCHG RX REV CODE 250 W/ 637 OVERRIDE(OP)

## 2024-05-13 PROCEDURE — 700111 HCHG RX REV CODE 636 W/ 250 OVERRIDE (IP)

## 2024-05-13 PROCEDURE — 700102 HCHG RX REV CODE 250 W/ 637 OVERRIDE(OP): Performed by: STUDENT IN AN ORGANIZED HEALTH CARE EDUCATION/TRAINING PROGRAM

## 2024-05-13 PROCEDURE — 700102 HCHG RX REV CODE 250 W/ 637 OVERRIDE(OP): Performed by: ANESTHESIOLOGY

## 2024-05-13 PROCEDURE — 74018 RADEX ABDOMEN 1 VIEW: CPT

## 2024-05-13 RX ORDER — HYDROMORPHONE HYDROCHLORIDE 1 MG/ML
0.5 INJECTION, SOLUTION INTRAMUSCULAR; INTRAVENOUS; SUBCUTANEOUS
Status: DISCONTINUED | OUTPATIENT
Start: 2024-05-13 | End: 2024-05-17 | Stop reason: HOSPADM

## 2024-05-13 RX ORDER — LABETALOL HYDROCHLORIDE 5 MG/ML
10 INJECTION, SOLUTION INTRAVENOUS EVERY 4 HOURS PRN
Status: DISCONTINUED | OUTPATIENT
Start: 2024-05-13 | End: 2024-05-17 | Stop reason: HOSPADM

## 2024-05-13 RX ORDER — LORAZEPAM 2 MG/ML
1 INJECTION INTRAMUSCULAR ONCE
Status: COMPLETED | OUTPATIENT
Start: 2024-05-13 | End: 2024-05-13

## 2024-05-13 RX ORDER — OXYCODONE HYDROCHLORIDE 5 MG/1
5 TABLET ORAL EVERY 4 HOURS PRN
Status: DISCONTINUED | OUTPATIENT
Start: 2024-05-13 | End: 2024-05-17 | Stop reason: HOSPADM

## 2024-05-13 RX ADMIN — NICOTINE 14 MG: 14 PATCH TRANSDERMAL at 06:08

## 2024-05-13 RX ADMIN — ACETAMINOPHEN 1000 MG: 500 TABLET, FILM COATED ORAL at 13:40

## 2024-05-13 RX ADMIN — SCOPOLAMINE 1 PATCH: 1.5 PATCH, EXTENDED RELEASE TRANSDERMAL at 03:09

## 2024-05-13 RX ADMIN — DIPHENHYDRAMINE HYDROCHLORIDE 25 MG: 50 INJECTION, SOLUTION INTRAMUSCULAR; INTRAVENOUS at 18:06

## 2024-05-13 RX ADMIN — DIPHENHYDRAMINE HYDROCHLORIDE 25 MG: 50 INJECTION, SOLUTION INTRAMUSCULAR; INTRAVENOUS at 01:18

## 2024-05-13 RX ADMIN — ONDANSETRON 4 MG: 2 INJECTION INTRAMUSCULAR; INTRAVENOUS at 00:43

## 2024-05-13 RX ADMIN — OXYCODONE 5 MG: 5 TABLET ORAL at 15:37

## 2024-05-13 RX ADMIN — LORAZEPAM 1 MG: 2 INJECTION INTRAMUSCULAR; INTRAVENOUS at 19:08

## 2024-05-13 RX ADMIN — TIOTROPIUM BROMIDE INHALATION SPRAY 5 MCG: 3.12 SPRAY, METERED RESPIRATORY (INHALATION) at 06:08

## 2024-05-13 ASSESSMENT — PAIN DESCRIPTION - PAIN TYPE
TYPE: ACUTE PAIN

## 2024-05-13 ASSESSMENT — ENCOUNTER SYMPTOMS
NAUSEA: 1
FLANK PAIN: 0
CHILLS: 0
VOMITING: 1
ABDOMINAL PAIN: 0
SHORTNESS OF BREATH: 0
FEVER: 0

## 2024-05-13 NOTE — DISCHARGE PLANNING
HTH/SCP TCN chart review completed. Collaborated with Maliha MELTON.  This TCN requested therapy consults as 6 clicks for mobility is a 12 per collaboration with CM.  CM requested PT/OT consults.  Current discharge considerations are for SNF vs. Home with Home Health when medically cleared.  .  No O2 at baseline and patient is currently on supplemental O2.      CM stated patient declined post-acute placement stating his s/o is his caregiver and will have 24/7 assistance at home.  He states he is agreeable to HH if indicated.  TCN will continue to follow and collaborate with discharge planning team as additional post acute needs arise. Thank you.    Completed:  PT/OT consults in place.   Choice obtained: SON[facility]A ([facility]), [facility] (02 via [employer]);pt aware of possible blanket SNF referrals per  policy if indicated; see above  SCP with [facility] PCP. Note that pt has upcoming outpatient appointments with providers scheduled (earliest on 5/15)

## 2024-05-13 NOTE — DISCHARGE PLANNING
Case Management Discharge Planning    Admission Date: 5/10/2024  GMLOS: 5.3  ALOS: 3    6-Clicks ADL Score: 19  6-Clicks Mobility Score: 12  PT and/or OT Eval ordered: Yes  Post-acute Referrals Ordered: No  Post-acute Choice Obtained: Yes  Has referral(s) been sent to post-acute provider:  No    Anticipated Discharge Dispo: Discharge Disposition: D/T to home under [facility] care in anticipation of covered skilled care (06)    DME Needed: No    Action(s) Taken: Chart review completed. Patient discussed during IDT rounds.     RNCM requested bedside RN place PT/OT eval order; requested DPA send blanket SNF referral to [place] facilities.     Patient states he lives in an apartment on the first floor with his significant other, Le; patient states Le will be a good support for patient when he is discharged from the hospital and plans to stay with the patient full-time during his recovery period.     RNCM discussed discharge planning with patient. Patient states he is not amenable to post-acute placement at this time; amenable to  services (if indicated).    Choice proactively obtained by HT/PERRY TCN per chart review; choice forms faxed to DPA and placed into patient's media file.     Patient states his preferred pharmacy is the [facility] on [address]; denies baseline DME use; denies home O2 use    Escalations Completed: None    Medically Clear: No    Next Steps: CM to follow up with IDT regarding DCP needs/barriers.     Barriers to Discharge: Medical clearance    Is the patient up for discharge tomorrow: No    Care Transition Team Assessment    Information Source  Orientation Level: Oriented X4  Information Given By: Patient  Informant's Name: Ronal  Who is responsible for making decisions for patient? : Patient    Readmission Evaluation  Is this a readmission?: No    Elopement Risk  Legal Hold: No  Ambulatory or Self Mobile in Wheelchair: Yes  Disoriented: No  Psychiatric Symptoms: None  History of Wandering:  No  Elopement this Admit: No  Vocalizing Wanting to Leave: No  Displays Behaviors, Body Language Wanting to Leave: No-Not at Risk for Elopement  Elopement Risk: Not at Risk for Elopement    Interdisciplinary Discharge Planning  Does Admitting Nurse Feel This Could be a Complex Discharge?: No  Primary Care Physician: Shakira Henriquez  Lives with - Patient's Self Care Capacity: Significant Other (Le)  Patient or legal guardian wants to designate a caregiver: No  Support Systems: Spouse / Significant Other  Housing / Facility: 1 Story Apartment / Condo  Do You Take your Prescribed Medications Regularly: Yes  Able to Return to Previous ADL's: Future Time w/Therapy  Mobility Issues: No  Prior Services: None, Home-Independent  Patient Prefers to be Discharged to:: home  Assistance Needed: Unknown at this Time  Durable Medical Equipment: Unknown    Discharge Preparedness  What is your plan after discharge?: Home health care  What are your discharge supports?: Child, Other (comment) (significant other)  Prior Functional Level: Ambulatory, Drives Self, Independent with Activities of Daily Living, Independent with Medication Management  Difficulity with ADLs: None  Difficulity with IADLs: None    Functional Assesment  Prior Functional Level: Ambulatory, Drives Self, Independent with Activities of Daily Living, Independent with Medication Management    Finances  Financial Barriers to Discharge: No  Prescription Coverage: Yes    Vision / Hearing Impairment  Vision Impairment : Yes  Right Eye Vision: Impaired, Wears Glasses  Left Eye Vision: Impaired, Wears Glasses  Hearing Impairment : No    Values / Beliefs / Concerns  Values / Beliefs Concerns : No    Advance Directive  Advance Directive?: None  Advance Directive offered?: AD Booklet refused    Domestic Abuse  Have you ever been the victim of abuse or violence?: No  Physical Abuse or Sexual Abuse: No  Verbal Abuse or Emotional Abuse: No  Possible Abuse/Neglect Reported to::  Not Applicable    Psychological Assessment  History of Substance Abuse: None  History of Psychiatric Problems: No  Non-compliant with Treatment: No  Newly Diagnosed Illness: No    Discharge Risks or Barriers  Discharge risks or barriers?: Complex medical needs  Patient risk factors: Cognitive / sensory / physical deficit, Complex medical needs, Vulnerable adult    Anticipated Discharge Information  Discharge Disposition: D/T to home under [facility] care in anticipation of covered skilled care (06)

## 2024-05-13 NOTE — PROGRESS NOTES
4 Eyes Skin Assessment Completed by DREW Mcdonnell and DREW Rodriguez.    Head WDL  Ears WDL  Nose WDL  Mouth WDL  Neck WDL  Breast/Chest WDL  Shoulder Blades WDL  Spine dressing over previous epidural site, CDI  (R) Arm/Elbow/Hand Bruising  (L) Arm/Elbow/Hand WDL  Abdomen MLI with dermabond, approximated, HAKEEM. LLQ Martin drain with dressing, CDI. RLQ urostomy, patent, CDI  Groin WDL  Scrotum/Coccyx/Buttocks Redness and Blanching  (R) Leg WDL  (L) Leg WDL  (R) Heel/Foot/Toe Redness and Blanching  (L) Heel/Foot/Toe Redness and Blanching          Devices In Places Pulse Ox, Central Line, and Nasal Cannula      Interventions In Place NC W/Ear Foams and Pressure Redistribution Mattress    Possible Skin Injury No    Pictures Uploaded Into Epic N/A  Wound Consult Placed N/A  RN Wound Prevention Protocol Ordered Yes

## 2024-05-13 NOTE — PROGRESS NOTES
Bedside report received.  Assessment complete.  A&O x 4. Patient calls appropriately.  Patient up with x1-2 assist. PT/OT consult placed for low 6 clicks scores. Bed alarm on.   Patient has 1/10 pain. Pain managed dilaudid epidural  Complains of mild nausea without vomiting. Tolerating clear liquid diet.  MLI with dermabond, approximated, HAKEEM. RLQ urostomy, patent, CDI. LLQ HITESH drain with dressing, CDI.   + void via urostomy, + flatus, - BM.  Patient denies SOB.  SCD's refused.    Review plan with of care with patient. Call light and personal belongings within reach. Hourly rounding in place. All needs met at this time.

## 2024-05-13 NOTE — CARE PLAN
The patient is Watcher - Medium risk of patient condition declining or worsening    Shift Goals  Clinical Goals: Blood transfusion, monitor urostomy output, pain control  Patient Goals: Rest  Family Goals: Pt comfort    Progress made toward(s) clinical / shift goals:  Pt transfused with 2units RBCs per order. Urostomy output color improved throughout shift. Patients pain tolerable and at baseline per pt. Epidural titrated down for this reason as well as increased nausea throughout shift.    Problem: Pain - Standard  Goal: Alleviation of pain or a reduction in pain to the patient’s comfort goal  Description: Target End Date:  Prior to discharge or change in level of care    Document on Vitals flowsheet    1.  Document pain using the appropriate pain scale per order or unit policy  2.  Educate and implement non-pharmacologic comfort measures (i.e. relaxation, distraction, massage, cold/heat therapy, etc.)  3.  Pain management medications as ordered  4.  Reassess pain after pain med administration per policy  5.  If opiods administered assess patient's response to pain medication is appropriate per POSS sedation scale  6.  Follow pain management plan developed in collaboration with patient and interdisciplinary team (including palliative care or pain specialists if applicable)  Outcome: Progressing     Problem: Fall Risk  Goal: Patient will remain free from falls  Description: Target End Date:  Prior to discharge or change in level of care    Document interventions on the Douglass Angel Luis Fall Risk Assessment    1.  Assess for fall risk factors  2.  Implement fall precautions  Outcome: Progressing       Patient is not progressing towards the following goals:  Educational reinforcement needed for POC.     Problem: Knowledge Deficit - Standard  Goal: Patient and family/care givers will demonstrate understanding of plan of care, disease process/condition, diagnostic tests and medications  Description: Target End Date:  1-3  days or as soon as patient condition allows    Document in Patient Education    1.  Patient and family/caregiver oriented to unit, equipment, visitation policy and means for communicating concern  2.  Complete/review Learning Assessment  3.  Assess knowledge level of disease process/condition, treatment plan, diagnostic tests and medications  4.  Explain disease process/condition, treatment plan, diagnostic tests and medications  Outcome: Not Progressing

## 2024-05-13 NOTE — ANESTHESIA TIME REPORT
Anesthesia Start and Stop Event Times       Date Time Event    5/13/2024 1205 Anesthesia Start     1206 Anesthesia Stop          Responsible Staff    No responsible staff documented.       Overtime Reason:  no overtime (within assigned shift)    Comments:

## 2024-05-13 NOTE — PROGRESS NOTES
6:30pm: Hb returned at 6.4 (36 hrs ago: 9.5, 12 hrs ago: 6.4), initially proposed to be dilutional per urology earlier today, pt has gotten 1.5L in last 24 hr (per RN).    7:30pm: At this time pt is asx, normotensive at 115/66 but is slightly tachycardic in low 100s.  Notably, urine has changed from a light pink to a maroon color in ostomy bag at this time.  It was a similar color last night.  Abdomen is mildly tense but unchanged per RN.  Pt only complaining of wanting his epidural out d/t bad dreams and feeling like he pulled it, but did not realize it was taped, site appears unaltered.   Contacted Dr. Lr, urologist on call - ordered 2 U of blood and would like 20mg IV lasix between the units.  I ordered repeat CBC and BMP for MN, will follow results.

## 2024-05-13 NOTE — PROGRESS NOTES
Mary Goncalves contacted and informed of patients increasing complaints of nausea and hypertension. Orders for PRN anti-hypertensive obtained. Scopolamine patch to be applied.

## 2024-05-13 NOTE — PROGRESS NOTES
"Anesthesia Pain Service Note    Type:  Epidural catheter    Patient: Ronal Hair    Patient seen and examined. and Patient chart reviewed.     /68   Pulse 93   Temp 36.9 °C (98.4 °F) (Temporal)   Resp 18   Ht 1.689 m (5' 6.5\")   Wt 75.5 kg (166 lb 7.2 oz)   SpO2 98%   BMI 26.46 kg/m²     Complaints:   Wants to ambulate, get catheter removed.    Pain:   2/10    LOC:   Awake    Rate:  8    Exam:  Vital signs stable, Afebrile, and Site clean, dry, intact without tenderness or erythema    Impression:  Adequate analgesia    Assessment & Plan:  Acute post-procedural pain (G89.18)     Change (see below)  D/C catheter today      Pop Vallejo D.O.  5/13/2024  11:57 AM  "

## 2024-05-13 NOTE — DISCHARGE PLANNING
Received Choice form at [facility]  Agency/Facility Name: [facility] blanket  Referral sent per Choice form @ 1042  Per CM Maliha sent out [facility] blanket

## 2024-05-13 NOTE — PROGRESS NOTES
"  [facility] FAMILY MEDICINE PROGRESS NOTE     MEDICAL STUDENT NOTE FOR EDUCATIONAL PURPOSES ONLY. PLEASE REFER TO RESIDENT/ATTENDING NOTE FOR PATIENT CARE.     Attending: Rosalva Santoyo MD  Senior Resident: Nicole Johnson MD  Cayden Resident: Estephania Osborn MD  Medical Student: Kye Mojica, MS3    PATIENT: Ronal Hair; [id number]; 1945    Subjective: Patient reports he is still having a considerable amount of pain and is feeling weak. He has not been able to get out of bed to ambulate. He states he is having issues with his epidural, and reports that he tried to reach for his phone and the epidural was \"dislodged,\" and he requests that it is removed so that he is able to be mobile. Has not had a bowel movement.     OBJECTIVE:  Temp:  [36.7 °C (98.1 °F)-37.2 °C (99 °F)] 36.8 °C (98.2 °F)  Pulse:  [] 83  Resp:  [15-18] 16  BP: (115-175)/(66-88) 135/72  SpO2:  [92 %-98 %] 92 %    Intake/Output Summary (Last 24 hours) at 5/13/2024 1346  Last data filed at 5/13/2024 1215  Gross per 24 hour   Intake 1603.83 ml   Output 3410 ml   Net -1806.17 ml       PE:   Physical Exam  Constitutional:       General: He is not in acute distress.  HENT:      Head: Normocephalic and atraumatic.      Nose: Nose normal.      Mouth/Throat:      Mouth: Mucous membranes are dry.   Eyes:      Extraocular Movements: Extraocular movements intact.      Conjunctiva/sclera: Conjunctivae normal.      Pupils: Pupils are equal, round, and reactive to light.   Cardiovascular:      Rate and Rhythm: Normal rate and regular rhythm.      Pulses: Normal pulses.      Heart sounds: Normal heart sounds.   Pulmonary:      Effort: Pulmonary effort is normal.      Breath sounds: Normal breath sounds.   Abdominal:      General: There is distension.      Tenderness: There is abdominal tenderness. There is no guarding or rebound.      Comments: Ostomy in place, clean, dry, intact. HITESH drain in place, draining minimal serosanguinous fluid. Midline " "incision clean and intact, draining serous fluid.    Genitourinary:     Comments: Urostomy in place draining red/brown fluid    Musculoskeletal:      Cervical back: Normal range of motion and neck supple.      Comments: Epidural in place.     Neurological:      General: No focal deficit present.      Mental Status: He is alert and oriented to person, place, and time.   Psychiatric:         Mood and Affect: Mood normal.         Behavior: Behavior normal.            LABS:  Recent Labs     05/12/24  0536 05/12/24  1750 05/13/24  0145   WBC 5.2 3.7* 4.9   RBC 2.25* 1.96* 2.95*   HEMOGLOBIN 7.3* 6.4* 9.7*   HEMATOCRIT 21.4* 18.6* 27.5*   MCV 95.1 94.9 93.2   MCH 32.4 32.7 32.9   RDW 53.3* 52.7* 49.2   PLATELETCT 88* 83* 91*   MPV 10.2 9.6 9.6   NEUTSPOLYS  --  76.20*  --    LYMPHOCYTES  --  11.90*  --    MONOCYTES  --  10.80  --    EOSINOPHILS  --  0.80  --    BASOPHILS  --  0.00  --      Recent Labs     05/11/24  1633 05/12/24  0536 05/13/24  0145   SODIUM 138 138 135   POTASSIUM 5.1 4.4 4.1   CHLORIDE 109 108 105   CO2 19* 20 21   BUN 39* 32* 20   CREATININE 1.60* 1.03 0.77   CALCIUM 7.5* 7.9* 8.4*     Estimated GFR/CRCL = Estimated Creatinine Clearance: 75 mL/min (by C-G formula based on SCr of 0.77 mg/dL).  Recent Labs     05/11/24  1633 05/12/24  0536 05/13/24  0145   GLUCOSE 134* 111* 123*                 No results for input(s): \"INR\", \"APTT\", \"FIBRINOGEN\" in the last 72 hours.    Invalid input(s): \"DIMER\"    IMAGING:   [id number] VIEW   Final Result      1. Appropriate courses of the bilateral percutaneous stents (feeding tubes) entering the right lower quadrant ileal conduit and terminating over the expected locations of the right and left renal pelvis. No kink.   2. Surgical drain in the pelvis with postsurgical changes.   3. Moderate stool throughout the colon.          MEDS:  Current Facility-Administered Medications   Medication Last Admin    labetalol (Normodyne/Trandate) injection 10 mg      tiotropium " (Spiriva Respimat) 2.5 mcg/Act inhalation spray 5 mcg 5 mcg at 05/13/24 0608    nicotine (Nicoderm) 14 MG/24HR 14 mg 14 mg at 05/13/24 0608    Respiratory Therapy Consult      benzonatate (Tessalon) capsule 100 mg 100 mg at 05/12/24 0529    atorvastatin (Lipitor) tablet 40 mg 40 mg at 05/12/24 1746    Pharmacy Consult Request ...Pain Management Review 1 Each      acetaminophen (Tylenol) tablet 1,000 mg 1,000 mg at 05/13/24 1340    Followed by    [START ON 5/15/2024] acetaminophen (Tylenol) tablet 1,000 mg      senna-docusate (Pericolace Or Senokot S) 8.6-50 MG per tablet 2 Tablet 2 Tablet at 05/12/24 0521    bisacodyl (Dulcolax) suppository 10 mg      polyethylene glycol/lytes (Miralax) Packet 1 Packet 1 Packet at 05/10/24 1811    [Held by provider] heparin injection 5,000 Units      ondansetron (Zofran) syringe/vial injection 4 mg 4 mg at 05/13/24 0043    diphenhydrAMINE (Benadryl) injection 25 mg 25 mg at 05/13/24 0118    haloperidol lactate (Haldol) injection 1 mg      scopolamine (Transderm-Scop) patch 1 Patch 1 Patch at 05/13/24 0309    HYDROmorphone pf (Dilaudid) 20 mcg/mL, ropivacaine (Naropin) 0.1 % in  mL epidural infusion Rate Verify at 05/13/24 0714    oxyCODONE immediate-release (Roxicodone) tablet 5 mg 5 mg at 05/11/24 0838         ASSESSMENT/PLAN:   Ronal Hair is a 78 y.o. male admitted on 5/10/2024 by urology for cystoprostatectomy due to bladder carcinoma. POD 3.     #Anemia  Hb 6.4 initially thought to be dilutional per urology. Patient received 1.5L of fluid over 24 hrs w/o improvement. 2 units of blood improved Hb to 9.7. Has had serosanguinous drainage from ileal conduit and HITESH drain however does not report lightheadedness, dizziness, or syncope.   -Continue trending H&H   -Transfusion precautions for Hb less than 7 or Hb less than 8 w/ symptoms    #JULIA  Likely 2/2 poor renal perfusion. Has received fluid and BUN has improved to 20 and creatinine improved to 0.77, returning to baseline.  No hx of CKD.  -Avoid nephrotoxic agents  -Encourage oral hydration  -Daily BMP  -Monitor I/Os    #COPD  Confirmed diagnosis prior to admission. Does not require O2 at home.  -Continue home medications    #Tobacco dependence  Current smoker. 108 pack year history. Complications including PAD and COPD. Has reported occasional cough.  -Recommend counseling and restarting nicotine replacement therapy.   -Tessalon perles for cough     Bowel Regimen  - Senna/docusate, polyethylene glycol, milk of magnesia, bisacodyl PRN   DVT Prophylaxis  -Enoxaparin    Disposition: Admitted    Code status: Full code    Kye Mojica, MS3  Medical Student, [employer]

## 2024-05-13 NOTE — PROGRESS NOTES
Patient requesting to have his epidural out. Patient rates pain 1/10 with epidural running at 8 ml/h. Patient reports he wishes to mobilize but is unable to mobilize with the epidural in place. Patient educated it is possible to mobilize with epidural in place, however, patient declines to do so.

## 2024-05-13 NOTE — PROGRESS NOTES
Epidural removed per order, tip intact. Patient tolerated well, dressing placed over epidural site.

## 2024-05-13 NOTE — PROGRESS NOTES
Verified with anesthesiologist Dr. Vallejo that epidural is okay to be removed by this RN. Orders received to remove epidural catheter. No anti-coagulants given during course of stay.

## 2024-05-13 NOTE — PROGRESS NOTES
Pt is A&O 4, intermittent confusion  Pain + pt complains of chronic back pain   - nausea  Tolerating a Clear liquid diet   Incision + MLI with dermabond  + Drains LLQ HITESH w/ serosang output  RLQ urostomy to down drain bag with sintia/blood tinged output  + flatus  - BM  Up x1 stand pivot to commode  SCD's refused  Bed alarm on (frame alarm), pt high fall risk per lilian saunders. Pt refusing to ambulate to allow for placement of strip alarm   Reviewed plan of care with patient, bed in lowest position and locked, pt resting comfortably now, call light within reach, all needs met at this time. Interventions will be executed per plan of care    Provider [facility] Family provider Mary Goncalves at bedside assessing patient status and urostomy, in contact with Dr Lr. Orders received for 2 unit RBC transfusion and lasix admin.

## 2024-05-13 NOTE — CONSULTS
[facility] CONSULT [facility]     Attending: Rosalva Santoyo Md    Resident: Estephania Osborn M.D.     PATIENT: Ronal Hair; [id number]; 1945    ID:   78 y.o. male with PMHx COPD, PAD s/p aorto-bifemoral bypass, small infrarenal AAA without rupture, HTN, HLD, back pain follicular lymphoma, admitted by urology on 5/10 for cystoprostatectomy due to bladder carcinoma. Now POD2 with ileal conduit and cystoprostatectomy.         SUBJECTIVE:   Overnight repeat CBC had hemoglobin of 6.4.  His urine output from that ostomy was maroon.  Urology was contacted and he was given 2 units of pRBC and Lasix.  Repeat hemoglobin was 9.7.  Patient reports that he has improved with his nausea he no longer feels lightheaded or dizzy.  He denies any shortness of breath or chest pain.  He does state that his abdomen is tender.  He does want to get up out of bed but would like to have the epidural removed prior.    OBJECTIVE:     Vitals:    05/13/24 0140 05/13/24 0315 05/13/24 0725 05/13/24 1210   BP: (!) 175/88 (!) 172/88 137/68 135/72   Pulse: 92 99 93 83   Resp: 18 17 18 16   Temp: 36.7 °C (98.1 °F) 36.8 °C (98.2 °F) 36.9 °C (98.4 °F) 36.8 °C (98.2 °F)   TempSrc: Temporal Temporal Temporal Temporal   SpO2: 95% 96% 98% 92%   Weight:       Height:             Intake/Output Summary (Last 24 hours) at 5/13/2024 1454  Last data filed at 5/13/2024 1215  Gross per 24 hour   Intake 1603.83 ml   Output 3410 ml   Net -1806.17 ml          Physical Exam  HENT:      Nose: Nose normal.      Mouth/Throat:      Mouth: Mucous membranes are moist.      Pharynx: Oropharynx is clear.   Eyes:      Extraocular Movements: Extraocular movements intact.      Conjunctiva/sclera: Conjunctivae normal.   Cardiovascular:      Rate and Rhythm: Normal rate and regular rhythm.      Pulses: Normal pulses.      Heart sounds: Normal heart sounds.   Pulmonary:      Effort: Pulmonary effort is normal. No respiratory distress.      Breath sounds: Normal breath  "sounds.   Abdominal:      General: There is distension.      Tenderness: There is abdominal tenderness. There is no rebound.      Comments: Incision is dry clean and intact  HITESH drain with serosanguineous fluid  Urostomy with red-tinged urine present   Musculoskeletal:         General: Normal range of motion.      Cervical back: Normal range of motion.      Right lower leg: No edema.      Left lower leg: No edema.   Skin:     General: Skin is warm.      Capillary Refill: Capillary refill takes less than 2 seconds.   Neurological:      General: No focal deficit present.      Mental Status: He is alert and oriented to person, place, and time.           LABS:  Recent Labs     05/12/24  0536 05/12/24  1750 05/13/24  0145   WBC 5.2 3.7* 4.9   RBC 2.25* 1.96* 2.95*   HEMOGLOBIN 7.3* 6.4* 9.7*   HEMATOCRIT 21.4* 18.6* 27.5*   MCV 95.1 94.9 93.2   MCH 32.4 32.7 32.9   RDW 53.3* 52.7* 49.2   PLATELETCT 88* 83* 91*   MPV 10.2 9.6 9.6   NEUTSPOLYS  --  76.20*  --    LYMPHOCYTES  --  11.90*  --    MONOCYTES  --  10.80  --    EOSINOPHILS  --  0.80  --    BASOPHILS  --  0.00  --      Recent Labs     05/11/24  1633 05/12/24  0536 05/13/24  0145   SODIUM 138 138 135   POTASSIUM 5.1 4.4 4.1   CHLORIDE 109 108 105   CO2 19* 20 21   BUN 39* 32* 20   CREATININE 1.60* 1.03 0.77   CALCIUM 7.5* 7.9* 8.4*     Estimated GFR/CRCL = Estimated Creatinine Clearance: 75 mL/min (by C-G formula based on SCr of 0.77 mg/dL).  Recent Labs     05/11/24  1633 05/12/24  0536 05/13/24  0145   GLUCOSE 134* 111* 123*                 No results for input(s): \"INR\", \"APTT\", \"FIBRINOGEN\" in the last 72 hours.    Invalid input(s): \"DIMER\"    MICROBIOLOGY:   Results       ** No results found for the last 168 hours. **              IMAGING:   [id number] VIEW   Final Result      1. Appropriate courses of the bilateral percutaneous stents (feeding tubes) entering the right lower quadrant ileal conduit and terminating over the expected locations of the right and " left renal pelvis. No kink.   2. Surgical drain in the pelvis with postsurgical changes.   3. Moderate stool throughout the colon.          MEDS:  Current Facility-Administered Medications   Medication Last Admin    labetalol (Normodyne/Trandate) injection 10 mg      tiotropium (Spiriva Respimat) 2.5 mcg/Act inhalation spray 5 mcg 5 mcg at 05/13/24 0608    nicotine (Nicoderm) 14 MG/24HR 14 mg 14 mg at 05/13/24 0608    Respiratory Therapy Consult      benzonatate (Tessalon) capsule 100 mg 100 mg at 05/12/24 0529    atorvastatin (Lipitor) tablet 40 mg 40 mg at 05/12/24 1746    Pharmacy Consult Request ...Pain Management Review 1 Each      acetaminophen (Tylenol) tablet 1,000 mg 1,000 mg at 05/13/24 1340    Followed by    [START ON 5/15/2024] acetaminophen (Tylenol) tablet 1,000 mg      senna-docusate (Pericolace Or Senokot S) 8.6-50 MG per tablet 2 Tablet 2 Tablet at 05/12/24 0521    bisacodyl (Dulcolax) suppository 10 mg      polyethylene glycol/lytes (Miralax) Packet 1 Packet 1 Packet at 05/10/24 1811    [Held by provider] heparin injection 5,000 Units      ondansetron (Zofran) syringe/vial injection 4 mg 4 mg at 05/13/24 0043    diphenhydrAMINE (Benadryl) injection 25 mg 25 mg at 05/13/24 0118    haloperidol lactate (Haldol) injection 1 mg      scopolamine (Transderm-Scop) patch 1 Patch 1 Patch at 05/13/24 0309    HYDROmorphone pf (Dilaudid) 20 mcg/mL, ropivacaine (Naropin) 0.1 % in  mL epidural infusion Rate Verify at 05/13/24 0714    oxyCODONE immediate-release (Roxicodone) tablet 5 mg 5 mg at 05/11/24 0838       ASSESSMENT/PLAN: Ronal Hair is a 78 y.o. male   Anemia  Assessment & Plan  Hgb 7.3 this morning. He has been receiving maintenance fluids since yesterday, do not believe that this is dilutional. He has had serosanguinous drainage from drain and some pink tinged fluid from ileal conduit. He denies dizziness and lightheadedness     Recommend trending H&H, recommend checking every 8 hours until  hemoglobin/hematocrit is stable  Recommend transfusion with pRBC if Hgb is less than 7 or if patient is symptomatic with hemoglobin less than 8.   Continue monitoring your ostomy and HITESH drain output  Continue monitoring abdominal exams for any changes    JULIA (acute kidney injury) (HCC), resolving  Assessment & Plan  Likely due to poor renal perfusion given him needing 3U of pRBC and 1U of FFP during surgery.     Avoid NSAIDs, would avoid toradol   BMP to trend kidney function    Maintenance fluids NS, recommend titration down while oral intake increases   Monitor urine output       COPD (chronic obstructive pulmonary disease) (HCC)- (present on admission)  Assessment & Plan  Cannot find PFTs on file, although COPD seems to be noted as confirmed diagnosis in the past 2 years.  Pt not requiring O2 at baseline at home.    Acquire previous PFTs if done, regardless pt may benefit with updated PFTs to better direct treatment. Could be done outpatient   Currently taking Spiriva, no reason to change treatment at this time as patient is symptomatically controlled.  Does not appear that patient has pulmonologist, referral may be reasonable at this time to optimize management and smoking cessation efforts  Continue to wean off oxygen.  Patient is not on oxygen at home or at baseline.  Goal saturations over 90%.    Tobacco dependence- (present on admission)  Assessment & Plan  Current smoker with attempts to quit and medical management directed at this in the past.  108 pack years (1 to 2 packs for 54 years).  This is extensive smoking history and has caused obvious complications such as PAD requiring aortofemoral bypass and COPD.  This is more concerning at this time due to patient's surgery and concern for its effect on healing.  Luckily, no signs of lung cancer on recent chest CT.   Recommend patient have tobacco counseling in-house with focus on restarting Chantix, nicotine patches and highest aggressive therapy  available.  Nicotine patch ordered   Tessalon pearls for cough   Patient due for visit with PCP, Dr. Henriquez who is very well versed in addiction medicine and runs addiction clinic at [facility].  Highly recommend patient make urgent appointment with her.    Peripheral artery disease (HCC)- (present on admission)  Assessment & Plan  Patient in past has had nonpalpable DP pulses but have been present with Doppler.  Patient is s/p aortobifemoral bypass in 2020.  This is likely effect of extensive smoking history.  Last LDL below goal at 66.  Patient's presumed small vessel disease poses risk for postop healing from current surgery (cystoprostatectomy with ileal conduit for bladder carcinoma).  Does not appear patient has ever seen vascular medicine at [facility].  Patient may greatly benefit from referral to vascular medicine. Can be done outpatient   Continue home atorvastatin 40 mg.      Please reach out to family medicine for any questions regarding medical management. Urology is primary for patient.  Will defer diet, anticoagulation, pain regimen and postsurgical management to urology.    CODE STATUS: Full     Estephania Osborn MD  PGY1  [facility]

## 2024-05-14 ENCOUNTER — HOME HEALTH ADMISSION (OUTPATIENT)
Dept: HOME HEALTH SERVICES | Facility: HOME HEALTHCARE | Age: 79
End: 2024-05-14
Payer: MEDICARE

## 2024-05-14 LAB
ANION GAP SERPL CALC-SCNC: 10 MMOL/L (ref 7–16)
BUN SERPL-MCNC: 21 MG/DL (ref 8–22)
CALCIUM SERPL-MCNC: 8.2 MG/DL (ref 8.5–10.5)
CHLORIDE SERPL-SCNC: 105 MMOL/L (ref 96–112)
CO2 SERPL-SCNC: 24 MMOL/L (ref 20–33)
CREAT SERPL-MCNC: 0.67 MG/DL (ref 0.5–1.4)
ERYTHROCYTE [DISTWIDTH] IN BLOOD BY AUTOMATED COUNT: 48.8 FL (ref 35.9–50)
GFR SERPLBLD CREATININE-BSD FMLA CKD-EPI: 96 ML/MIN/1.73 M 2
GLUCOSE SERPL-MCNC: 110 MG/DL (ref 65–99)
HCT VFR BLD AUTO: 25.4 % (ref 42–52)
HGB BLD-MCNC: 9.2 G/DL (ref 14–18)
MCH RBC QN AUTO: 33 PG (ref 27–33)
MCHC RBC AUTO-ENTMCNC: 36.2 G/DL (ref 32.3–36.5)
MCV RBC AUTO: 91 FL (ref 81.4–97.8)
PLATELET # BLD AUTO: 102 K/UL (ref 164–446)
PLATELETS.RETICULATED NFR BLD AUTO: 2.8 % (ref 0.6–13.1)
PMV BLD AUTO: 9.6 FL (ref 9–12.9)
POTASSIUM SERPL-SCNC: 3.8 MMOL/L (ref 3.6–5.5)
RBC # BLD AUTO: 2.79 M/UL (ref 4.7–6.1)
SODIUM SERPL-SCNC: 139 MMOL/L (ref 135–145)
WBC # BLD AUTO: 5.4 K/UL (ref 4.8–10.8)

## 2024-05-14 PROCEDURE — 97535 SELF CARE MNGMENT TRAINING: CPT

## 2024-05-14 PROCEDURE — 85055 RETICULATED PLATELET ASSAY: CPT

## 2024-05-14 PROCEDURE — 770001 HCHG ROOM/CARE - MED/SURG/GYN PRIV*

## 2024-05-14 PROCEDURE — 700102 HCHG RX REV CODE 250 W/ 637 OVERRIDE(OP): Performed by: UROLOGY

## 2024-05-14 PROCEDURE — 85027 COMPLETE CBC AUTOMATED: CPT

## 2024-05-14 PROCEDURE — A9270 NON-COVERED ITEM OR SERVICE: HCPCS | Performed by: UROLOGY

## 2024-05-14 PROCEDURE — 700111 HCHG RX REV CODE 636 W/ 250 OVERRIDE (IP)

## 2024-05-14 PROCEDURE — 80048 BASIC METABOLIC PNL TOTAL CA: CPT

## 2024-05-14 PROCEDURE — 700111 HCHG RX REV CODE 636 W/ 250 OVERRIDE (IP): Mod: JZ | Performed by: ANESTHESIOLOGY

## 2024-05-14 PROCEDURE — 99232 SBSQ HOSP IP/OBS MODERATE 35: CPT | Mod: GC | Performed by: FAMILY MEDICINE

## 2024-05-14 PROCEDURE — 97166 OT EVAL MOD COMPLEX 45 MIN: CPT

## 2024-05-14 PROCEDURE — 97162 PT EVAL MOD COMPLEX 30 MIN: CPT

## 2024-05-14 PROCEDURE — A9270 NON-COVERED ITEM OR SERVICE: HCPCS

## 2024-05-14 PROCEDURE — 700102 HCHG RX REV CODE 250 W/ 637 OVERRIDE(OP)

## 2024-05-14 RX ORDER — HEPARIN SODIUM 5000 [USP'U]/ML
5000 INJECTION, SOLUTION INTRAVENOUS; SUBCUTANEOUS EVERY 8 HOURS
Status: DISCONTINUED | OUTPATIENT
Start: 2024-05-14 | End: 2024-05-16

## 2024-05-14 RX ADMIN — BISACODYL 10 MG: 10 SUPPOSITORY RECTAL at 04:49

## 2024-05-14 RX ADMIN — ACETAMINOPHEN 1000 MG: 500 TABLET, FILM COATED ORAL at 23:28

## 2024-05-14 RX ADMIN — ACETAMINOPHEN 1000 MG: 500 TABLET, FILM COATED ORAL at 11:58

## 2024-05-14 RX ADMIN — ONDANSETRON 4 MG: 2 INJECTION INTRAMUSCULAR; INTRAVENOUS at 16:52

## 2024-05-14 RX ADMIN — NICOTINE 14 MG: 14 PATCH TRANSDERMAL at 04:44

## 2024-05-14 RX ADMIN — HEPARIN SODIUM 5000 UNITS: 5000 INJECTION, SOLUTION INTRAVENOUS; SUBCUTANEOUS at 13:21

## 2024-05-14 RX ADMIN — ACETAMINOPHEN 1000 MG: 500 TABLET, FILM COATED ORAL at 16:52

## 2024-05-14 RX ADMIN — HEPARIN SODIUM 5000 UNITS: 5000 INJECTION, SOLUTION INTRAVENOUS; SUBCUTANEOUS at 21:57

## 2024-05-14 RX ADMIN — TIOTROPIUM BROMIDE INHALATION SPRAY 5 MCG: 3.12 SPRAY, METERED RESPIRATORY (INHALATION) at 04:45

## 2024-05-14 RX ADMIN — ATORVASTATIN CALCIUM 40 MG: 40 TABLET, FILM COATED ORAL at 16:52

## 2024-05-14 ASSESSMENT — PAIN DESCRIPTION - PAIN TYPE
TYPE: ACUTE PAIN

## 2024-05-14 ASSESSMENT — COGNITIVE AND FUNCTIONAL STATUS - GENERAL
DRESSING REGULAR UPPER BODY CLOTHING: A LITTLE
MOBILITY SCORE: 18
DAILY ACTIVITIY SCORE: 19
TOILETING: A LITTLE
STANDING UP FROM CHAIR USING ARMS: A LITTLE
CLIMB 3 TO 5 STEPS WITH RAILING: A LITTLE
SUGGESTED CMS G CODE MODIFIER DAILY ACTIVITY: CK
TURNING FROM BACK TO SIDE WHILE IN FLAT BAD: A LITTLE
HELP NEEDED FOR BATHING: A LITTLE
SUGGESTED CMS G CODE MODIFIER MOBILITY: CK
DRESSING REGULAR LOWER BODY CLOTHING: A LOT
MOVING TO AND FROM BED TO CHAIR: A LITTLE
MOVING FROM LYING ON BACK TO SITTING ON SIDE OF FLAT BED: A LITTLE
WALKING IN HOSPITAL ROOM: A LITTLE

## 2024-05-14 ASSESSMENT — ENCOUNTER SYMPTOMS
NAUSEA: 1
FLANK PAIN: 0
SHORTNESS OF BREATH: 0
VOMITING: 0
FEVER: 0
CHILLS: 0
ABDOMINAL PAIN: 0

## 2024-05-14 ASSESSMENT — GAIT ASSESSMENTS
DEVIATION: BRADYKINETIC
DISTANCE (FEET): 250
ASSISTIVE DEVICE: FRONT WHEEL WALKER
GAIT LEVEL OF ASSIST: STANDBY ASSIST

## 2024-05-14 ASSESSMENT — ACTIVITIES OF DAILY LIVING (ADL): TOILETING: INDEPENDENT

## 2024-05-14 NOTE — CONSULTS
[facility]     Attending: Rosalva Santoyo Md    Resident: Estephania Osborn M.D.     PATIENT: Ronal Hair; [id number]; 1945    ID:   78 y.o. male with PMHx COPD, PAD s/p aorto-bifemoral bypass, small infrarenal AAA without rupture, HTN, HLD, back pain follicular lymphoma, admitted by urology on 5/10 for cystoprostatectomy due to bladder carcinoma. Now POD4 with ileal conduit and cystoprostatectomy.         SUBJECTIVE:   This morning is now on room air.  He reports that he continues to breathe well, he does state that sometimes the pain makes it more difficult.  He states that he feels that his COPD is at his baseline.  He is doing well that the Spiriva and with his nicotine patches.      Per chart review patient has hemoglobin has been stable overnight.  His JULIA has now resolved.  Surgery has placed an NG tube due to vomiting and ileus noted on KUB.    OBJECTIVE:     Vitals:    05/14/24 0337 05/14/24 0716 05/14/24 0958 05/14/24 1017   BP: 137/79 97/66 (!) 159/75    Pulse: 78 90 90    Resp: 16 17     Temp: 36.6 °C (97.9 °F) 36.5 °C (97.7 °F)     TempSrc: Temporal Temporal     SpO2: 97% 100% 99% 92%   Weight:       Height:             Intake/Output Summary (Last 24 hours) at 5/14/2024 1326  Last data filed at 5/14/2024 1000  Gross per 24 hour   Intake 120 ml   Output 2450 ml   Net -2330 ml          Physical Exam  Constitutional:       General: He is not in acute distress.     Appearance: Normal appearance. He is ill-appearing.   HENT:      Head: Normocephalic.      Nose: Nose normal.      Comments: NG tube in place     Mouth/Throat:      Mouth: Mucous membranes are moist.      Pharynx: Oropharynx is clear.   Eyes:      Extraocular Movements: Extraocular movements intact.      Conjunctiva/sclera: Conjunctivae normal.   Cardiovascular:      Rate and Rhythm: Normal rate and regular rhythm.      Pulses: Normal pulses.      Heart sounds: Normal heart sounds.   Pulmonary:      Effort: Pulmonary  "effort is normal. No respiratory distress.      Breath sounds: Normal breath sounds. No rhonchi or rales.   Abdominal:      General: There is distension.      Tenderness: There is abdominal tenderness. There is no guarding or rebound.      Comments: Midline incision clean dry and intact  HITESH drain with serosanguineous fluid  Urostomy with red-tinged urine collection in bag   Musculoskeletal:         General: Normal range of motion.      Cervical back: Normal range of motion.      Right lower leg: No edema.      Left lower leg: No edema.      Comments: Dressing over removed epidural that is clean dry and intact   Skin:     General: Skin is warm.      Capillary Refill: Capillary refill takes less than 2 seconds.   Neurological:      General: No focal deficit present.      Mental Status: He is alert and oriented to person, place, and time.   Psychiatric:         Mood and Affect: Mood normal.         Behavior: Behavior normal.           LABS:  Recent Labs     05/12/24 1750 05/13/24 0145 05/14/24  0406   WBC 3.7* 4.9 5.4   RBC 1.96* 2.95* 2.79*   HEMOGLOBIN 6.4* 9.7* 9.2*   HEMATOCRIT 18.6* 27.5* 25.4*   MCV 94.9 93.2 91.0   MCH 32.7 32.9 33.0   RDW 52.7* 49.2 48.8   PLATELETCT 83* 91* 102*   MPV 9.6 9.6 9.6   NEUTSPOLYS 76.20*  --   --    LYMPHOCYTES 11.90*  --   --    MONOCYTES 10.80  --   --    EOSINOPHILS 0.80  --   --    BASOPHILS 0.00  --   --      Recent Labs     05/12/24 0536 05/13/24 0145 05/14/24  0406   SODIUM 138 135 139   POTASSIUM 4.4 4.1 3.8   CHLORIDE 108 105 105   CO2 20 21 24   BUN 32* 20 21   CREATININE 1.03 0.77 0.67   CALCIUM 7.9* 8.4* 8.2*     Estimated GFR/CRCL = Estimated Creatinine Clearance: 86.2 mL/min (by C-G formula based on SCr of 0.67 mg/dL).  Recent Labs     05/12/24 0536 05/13/24 0145 05/14/24  0406   GLUCOSE 111* 123* 110*                 No results for input(s): \"INR\", \"APTT\", \"FIBRINOGEN\" in the last 72 hours.    Invalid input(s): \"DIMER\"    MICROBIOLOGY:   Results       ** No " results found for the last 168 hours. **              IMAGING:   DX-ABDOMEN FOR TUBE PLACEMENT   Final Result      NG tube tip projecting at the stomach.      [id number] VIEW   Final Result      Increased small bowel is dilatation is probably related to ileus      [id number] VIEW   Final Result      1. Appropriate courses of the bilateral percutaneous stents (feeding tubes) entering the right lower quadrant ileal conduit and terminating over the expected locations of the right and left renal pelvis. No kink.   2. Surgical drain in the pelvis with postsurgical changes.   3. Moderate stool throughout the colon.          MEDS:  Current Facility-Administered Medications   Medication Last Admin    heparin injection 5,000 Units 5,000 Units at 05/14/24 1321    labetalol (Normodyne/Trandate) injection 10 mg      oxyCODONE immediate-release (Roxicodone) tablet 5 mg      HYDROmorphone (Dilaudid) injection 0.5 mg      tiotropium (Spiriva Respimat) 2.5 mcg/Act inhalation spray 5 mcg 5 mcg at 05/14/24 0445    nicotine (Nicoderm) 14 MG/24HR 14 mg 14 mg at 05/14/24 0444    Respiratory Therapy Consult      benzonatate (Tessalon) capsule 100 mg 100 mg at 05/12/24 0529    atorvastatin (Lipitor) tablet 40 mg 40 mg at 05/12/24 1746    Pharmacy Consult Request ...Pain Management Review 1 Each      acetaminophen (Tylenol) tablet 1,000 mg 1,000 mg at 05/14/24 1158    Followed by    [START ON 5/15/2024] acetaminophen (Tylenol) tablet 1,000 mg      senna-docusate (Pericolace Or Senokot S) 8.6-50 MG per tablet 2 Tablet 2 Tablet at 05/12/24 0521    bisacodyl (Dulcolax) suppository 10 mg 10 mg at 05/14/24 0449    polyethylene glycol/lytes (Miralax) Packet 1 Packet 1 Packet at 05/10/24 1811    ondansetron (Zofran) syringe/vial injection 4 mg 4 mg at 05/13/24 0043    diphenhydrAMINE (Benadryl) injection 25 mg 25 mg at 05/13/24 1806    haloperidol lactate (Haldol) injection 1 mg      scopolamine (Transderm-Scop) patch 1 Patch 1 Patch at  05/13/24 0309    HYDROmorphone pf (Dilaudid) 20 mcg/mL, ropivacaine (Naropin) 0.1 % in  mL epidural infusion Stopped at 05/13/24 1532       ASSESSMENT/PLAN: Ronal Hair is a 78 y.o. male have been consulted on by urology for medical management of his chronic conditions.  We have provided some recommendations for conditions that occurred during this hospitalization such as anemia and JULIA as below.    Anemia  Assessment & Plan  Anemia likely secondary to blood loss during surgery. In OR required 3 units packed red blood cells and 1 unit of fresh frozen plasma during this hospitalization.  His hemoglobin was 7.3 on 5/12 with repeat in the afternoon of 6.4 thus requiring transfusion of 2 u pRBC. His hemoglobin has been stable over the last day with trend of 9.7 to 9.4.     Recommending to continue to trend H&H  Recommend transfusion with pRBC if Hgb is less than 7 or if patient is symptomatic with hemoglobin less than 8.   Continue monitoring urostomy and HITESH drain output  Continue monitoring abdominal exams for any changes    JULIA (acute kidney injury) (HCC), resolved  Assessment & Plan  Likely due to poor renal perfusion given him needing 3U of pRBC and 1U of FFP during surgery.  Creatinine is now at baseline.  He is making adequate urine through urostomy.    Avoid NSAIDs, would avoid toradol   Avoid nephrotoxic agents.  BMP to trend kidney function    Monitor urine output       COPD (chronic obstructive pulmonary disease) (HCC)- (present on admission)  Assessment & Plan  Cannot find PFTs on file, although COPD seems to be noted as confirmed diagnosis in the past 2 years.  Pt not requiring O2 at baseline at home.      Currently taking Spiriva, no reason to change treatment at this time as patient is symptomatically controlled.  Currently at room air.  Continue to titrate oxygen as needed for goal saturations over 90%.  Commend using incentive spirometer.  At time of discharge consider follow-up with pulmonology  referral    Tobacco dependence- (present on admission)  Assessment & Plan  Current smoker with attempts to quit and medical management directed at this in the past.  108 pack years (1 to 2 packs for 54 years).  This is extensive smoking history and has caused obvious complications such as PAD requiring aortofemoral bypass and COPD.  This is more concerning at this time due to patient's surgery and concern for its effect on healing.  Luckily, no signs of lung cancer on recent chest CT.   Recommend patient have tobacco counseling in-house with focus on restarting Chantix, nicotine patches and highest aggressive therapy available.  Nicotine patch ordered   Tessalon pearls for cough   Patient due for visit with PCP, Dr. Henriquez who is very well versed in addiction medicine and runs addiction clinic at [facility].  Highly recommend patient make urgent appointment with her.    Peripheral artery disease (HCC)- (present on admission)  Assessment & Plan  Patient in past has had nonpalpable DP pulses but have been present with Doppler.  Patient is s/p aortobifemoral bypass in 2020.  This is likely effect of extensive smoking history.  Last LDL below goal at 66.  Patient's presumed small vessel disease poses risk for postop healing from current surgery (cystoprostatectomy with ileal conduit for bladder carcinoma).  Does not appear patient has ever seen vascular medicine at [facility].  Patient may greatly benefit from referral to vascular medicine. Can be done outpatient   Continue home atorvastatin 40 mg.      Patient is on room air which is home baseline and tolerating his home medication of Spiriva with no COPD exacerbation at this time.  His tobacco dependence is adequately managed with nicotine patches and Tessalon Perles.  JULIA has resolved and anemia has been stable and likely secondary to blood loss during surgery.      At this time [facility] will be signing off.  Please reconsult for any questions or  medical management.      Thank you for this consult and please instruct Mr. Hair to schedule a follow-up appointment with [facility] at time of discharge for follow-up on chronic medical conditions in the outpatient setting.    CODE STATUS: Full     Estephania Osborn MD  PGY1  [facility]

## 2024-05-14 NOTE — PROGRESS NOTES
Report received from RN, assumed care at   Pt is A0X4, and responds appropriately   Pt declines any SOB, chest pain, new onset of numbness/ tingling  Pt rates pain at 1/10, on a scale of 1-10, pt medicated per MAR  Pt is voiding adequately and without hesitancy via urostomy  Pt does not have flatus, hypoactive bowel sounds, BM on 5/14  Pt ambulates with a 1x assist   Pt is tolerating a NPO diet, pt denies any nausea/vomiting  Plan of care discussed, all questions answered. Explained importance of calling before getting OOB and pt verbalizes understanding. Explained importance of oral care. Call light is within reach, treaded slipper socks on, bed in lowest/ locked position, hourly rounding in place, all needs met at this time

## 2024-05-14 NOTE — DISCHARGE PLANNING
HTH/SCP TCN chart review completed. Collaborated with LINH Jett. Current discharge considerations are for Home with Home Health, possible supplemental O2 and close outpatient f/u when medically cleared.  He is on RA at his baseline and is currently on 2 L/min O2 NC.  Patient agreeable to Home Health.   TCN will continue to follow and collaborate with discharge planning team as additional post acute needs arise. Thank you.    Completed:  PT recommends Home Health on 5/14/24.    OT recommends Home Health on 5/14/24.    Choice obtained: [facility] ([facility]), [facility] ([facility] via [facility]);pt aware of possible blanket SNF referrals per  policy if indicated; see above  SCP with [facility] PCP. Note that pt has upcoming outpatient appointments with providers scheduled (earliest on 5/15)

## 2024-05-14 NOTE — PROGRESS NOTES
NGT inserted per order. Abdominal X ray placed for placement verification. Pt NGT hooked to LIS per order after placement verified per x ray.

## 2024-05-14 NOTE — DISCHARGE PLANNING
Case Management Discharge Planning    Admission Date: 5/10/2024  GMLOS: 5.3  ALOS: 4    6-Clicks ADL Score: 19  6-Clicks Mobility Score: 18      Anticipated Discharge Dispo: Discharge Disposition: D/T to SNF with Medicare cert in anticipation of skilled care (03)    DME Needed: No    Action(s) Taken: Chart review completed. Patient discussed during IDT rounds.     1325: Per chart review, PT/OT recommending home health; RNCM reached out to provider to request home health referral.     1513: HH referral noted in EPIC; RNCM sent referral per patient's choice    Escalations Completed: None    Medically Clear: No    Next Steps: CM to follow up with IDT regarding DCP needs/barriers; CM to send referral per patient's choice when HH referral noted in EPIC.     Barriers to Discharge: Medical clearance    Is the patient up for discharge tomorrow: No

## 2024-05-14 NOTE — PROGRESS NOTES
Note to reader: this note follows the APSO format rather than the historical SOAP format. Assessment and plan located at the top of the note for ease of use.    Chief Complaint  78 y.o. year old male here with No chief complaint on file.      Assessment/Plan  Interval History   Active Hospital Problems    Diagnosis     Anemia [D64.9]     JULIA (acute kidney injury) ([facility], located [facility]), resolving [N17.9]     COPD (chronic obstructive pulmonary disease) (HCC) [J44.9]     Tobacco dependence [F17.200]     Peripheral artery disease (HCC) [I73.9]      5/14. POD4. Sitting up in bed in NAD. NGT placed last night, so far 700cc of output documented. Distension improved, pain well controlled with PO medications. Pt reports he ambulated today, walked around the unit, denies lightheadedness, CP, SOB, or increased pain with ambulation. AFVSS, H/H stable at 9.2/25.4 (9.7/27.5), Cr 0.67 (0.77). UOP 3450cc in 24h (2160), drain with 75cc out (140). Pt does report one tiny liquid BM today, but is still experiencing belching/hiccuping. Suspect BM may be from prior stool in colon, as bowel prep prior to surgery was incomplete.        Plan:   - H/H now stabilized, will restart heparin and continue to follow H/H closely  - Prior to discharge will transition to lovenox and teach patient how to self-administer  - Wound care to see patient and provide ostomy teaching  - Pt to continue to ambulate frequently, goal of at least 4x/day  - Can consider NGT clamp trial tomorrow if patient aggressively ambulates today  - Appreciate hospitalist assistance     Case discussed with Dr Cardoso, who has directed this patient's plan of care.      Review of Systems  Physical Exam   Review of Systems   Constitutional:  Negative for chills and fever.   Respiratory:  Negative for shortness of breath.    Cardiovascular:  Negative for chest pain.   Gastrointestinal:  Positive for nausea. Negative for abdominal pain and vomiting.   Genitourinary:  Negative for flank pain  and hematuria.   All other systems reviewed and are negative.    Vitals:    05/13/24 2354 05/14/24 0337 05/14/24 0716 05/14/24 0958   BP: 135/81 137/79 97/66 (!) 159/75   Pulse: 94 78 90 90   Resp: 16 16 17    Temp: 36.8 °C (98.2 °F) 36.6 °C (97.9 °F) 36.5 °C (97.7 °F)    TempSrc: Temporal Temporal Temporal    SpO2: 97% 97% 100% 99%   Weight:       Height:         Physical Exam  Vitals and nursing note reviewed.   Constitutional:       General: He is not in acute distress.  HENT:      Head: Normocephalic.      Nose: Nose normal.      Comments: NGT in place secured with tape, greenish fluid in tubing     Mouth/Throat:      Pharynx: Oropharynx is clear.   Eyes:      Conjunctiva/sclera: Conjunctivae normal.   Pulmonary:      Effort: Pulmonary effort is normal.   Abdominal:      General: There is distension.      Tenderness: There is abdominal tenderness.      Comments: Midline incision intact, HITESH present LLQ with scant serosang fluid in bulb. Urostomy in RLQ with clear yellow urine in tubing.   Musculoskeletal:         General: Normal range of motion.      Cervical back: Normal range of motion.   Skin:     General: Skin is warm and dry.   Neurological:      General: No focal deficit present.      Mental Status: He is alert and oriented to person, place, and time.   Psychiatric:         Mood and Affect: Mood normal.         Behavior: Behavior normal.          Hematology Chemistry   Lab Results   Component Value Date/Time    WBC 5.4 05/14/2024 04:06 AM    HEMOGLOBIN 9.2 (L) 05/14/2024 04:06 AM    HEMATOCRIT 25.4 (L) 05/14/2024 04:06 AM    PLATELETCT 102 (L) 05/14/2024 04:06 AM     Lab Results   Component Value Date/Time    SODIUM 139 05/14/2024 04:06 AM    POTASSIUM 3.8 05/14/2024 04:06 AM    CHLORIDE 105 05/14/2024 04:06 AM    CO2 24 05/14/2024 04:06 AM    GLUCOSE 110 (H) 05/14/2024 04:06 AM    BUN 21 05/14/2024 04:06 AM    CREATININE 0.67 05/14/2024 04:06 AM         Labs not explicitly included in this progress note  were reviewed by the author.   Radiology/imaging not explicitly included in this progress note was reviewed by the author.     Medications reviewed, Labs reviewed and Radiology images reviewed

## 2024-05-14 NOTE — DISCHARGE PLANNING
Thank you for sending this referral to [facility]. This patient has not seen their PCP since 2022. Please setup an appt for patient to reestablish with PCP.    Referral on hold.     Thanks,   [facility]

## 2024-05-14 NOTE — PROGRESS NOTES
Note to reader: this note follows the APSO format rather than the historical SOAP format. Assessment and plan located at the top of the note for ease of use.    Chief Complaint  78 y.o. year old male here with No chief complaint on file.      Assessment/Plan  Interval History   Active Hospital Problems    Diagnosis     Anemia [D64.9]     JULIA (acute kidney injury) ([facility]), resolving [N17.9]     COPD (chronic obstructive pulmonary disease) ([facility]) [J44.9]     Tobacco dependence [F17.200]     Peripheral artery disease ([facility]) [I73.9]      5/13 POD3. Seen and examined, lying in bed, hiccuping and belching. Has been refusing to ambulate so far during admission d/t epidural, which was removed this afternoon. Several episodes of vomiting today. Has been drinking clear liquids mostly. Overnight H/H dropped to 6.4/18.6, but pt received 2 units pRBCs and IV lasix, and this am H/H improved to 9.7/27.5. AFVSS, drain with 140cc output (235), 2700cc UOP so far today (2160), urine clear yellow. Urostomy pink and patent, feeding tubes visible. Holding heparin.    Plan:   - Obtained KUB, demonstrating dilated loops of small bowel. NGT to be placed tonight, ordered ativan to assist with procedure-related anxiety  - NPO diet with NGT in place  - PT to assess pt tomorrow, strongly emphasized importance of ambulating at least TID  - Continue to follow H/H, will restart anticoag once H/H stabilizes  - Wound care to see patient and provide ostomy teaching  - Prior to discharge will transition to lovenox and teach patient how to self-administer  - IV dilaudid ordered for pain control  - Appreciate hospitalist assistance     Case discussed with Dr Cardoso, who has directed this patient's plan of care.      Review of Systems  Physical Exam   Review of Systems   Constitutional:  Negative for chills and fever.   Respiratory:  Negative for shortness of breath.    Cardiovascular:  Negative for chest pain.   Gastrointestinal:  Positive for nausea  and vomiting. Negative for abdominal pain.   Genitourinary:  Negative for flank pain and hematuria.   All other systems reviewed and are negative.    Vitals:    05/13/24 0315 05/13/24 0725 05/13/24 1210 05/13/24 1639   BP: (!) 172/88 137/68 135/72 (!) 146/71   Pulse: 99 93 83 84   Resp: 17 18 16 16   Temp: 36.8 °C (98.2 °F) 36.9 °C (98.4 °F) 36.8 °C (98.2 °F) 36.8 °C (98.2 °F)   TempSrc: Temporal Temporal Temporal Temporal   SpO2: 96% 98% 92% 92%   Weight:       Height:         Physical Exam  Vitals and nursing note reviewed.   Constitutional:       General: He is not in acute distress.  HENT:      Head: Normocephalic.      Nose: Nose normal.      Mouth/Throat:      Pharynx: Oropharynx is clear.   Eyes:      Conjunctiva/sclera: Conjunctivae normal.   Pulmonary:      Effort: Pulmonary effort is normal.   Abdominal:      General: There is distension.      Tenderness: There is abdominal tenderness.      Comments: Midline incision intact, HITESH present LLQ with scant serosang fluid in bulb. Urostomy in RLQ with clear yellow urine in tubing.   Musculoskeletal:         General: Normal range of motion.      Cervical back: Normal range of motion.   Skin:     General: Skin is warm and dry.   Neurological:      General: No focal deficit present.      Mental Status: He is alert and oriented to person, place, and time.   Psychiatric:         Mood and Affect: Mood normal.         Behavior: Behavior normal.          Hematology Chemistry   Lab Results   Component Value Date/Time    WBC 4.9 05/13/2024 01:45 AM    HEMOGLOBIN 9.7 (L) 05/13/2024 01:45 AM    HEMATOCRIT 27.5 (L) 05/13/2024 01:45 AM    PLATELETCT 91 (L) 05/13/2024 01:45 AM     Lab Results   Component Value Date/Time    SODIUM 135 05/13/2024 01:45 AM    POTASSIUM 4.1 05/13/2024 01:45 AM    CHLORIDE 105 05/13/2024 01:45 AM    CO2 21 05/13/2024 01:45 AM    GLUCOSE 123 (H) 05/13/2024 01:45 AM    BUN 20 05/13/2024 01:45 AM    CREATININE 0.77 05/13/2024 01:45 AM         Labs not  explicitly included in this progress note were reviewed by the author.   Radiology/imaging not explicitly included in this progress note was reviewed by the author.     Medications reviewed, Labs reviewed and Radiology images reviewed

## 2024-05-14 NOTE — THERAPY
"Physical Therapy   Initial Evaluation     Patient Name: Ronal Hair  Age:  78 y.o., Sex:  male  Medical Record #: [id number]  Today's Date: 5/14/2024     Precautions  Precautions: Fall Risk;Nasogastric Tube  Comments: abdominal prx, HITESH x1, ostomy    Assessment  Patient is 78 y.o. male presenting with bladder cancer s/p radical cystoprostatectomy with creation of ileal conduit on 5/10. Pt with PMH including COPD, PAD, bladder cancer s/p neoadjuvant chemotherapy. Pt is independent with functional mobility at baseline using no AD. Pt normally lives alone but has a \"friend\" who will stay with him to help as needed. During current session, pt presents with decreased insight into deficits, impaired balance, and decreased endurance requiring overall SBA for mobility as detailed below. Able to walk 250 ft with FWW, no LOB. Pt was receptive to abdominal prx edu however he would benefit from continued reinforcement. Recommend d/c home with FWW and HHPT. Pt would benefit from continued acute PT services to improve functional mobility prior to d/c.    Plan    Physical Therapy Initial Treatment Plan   Treatment Plan : Bed Mobility, Equipment, Family / Caregiver Training, Gait Training, Manual Therapy, Neuro Re-Education / Balance, Self Care / Home Evaluation, Stair Training, Therapeutic Activities, Therapeutic Exercise  Treatment Frequency: 4 Times per Week  Duration: Until Therapy Goals Met    DC Equipment Recommendations: Front-Wheel Walker  Discharge Recommendations: Recommend home health for continued physical therapy services       Subjective    Pt received up in chair after OT session, agreeable to participate. \"I won't need this by the time I go home\" referring to FWW.     Objective       05/14/24 1017   Precautions   Precautions Fall Risk;Nasogastric Tube   Comments abdominal prx, HITESH x1, ostomy   Vitals   Pulse Oximetry 92 %   O2 Delivery Device Room air w/o2 available   Vitals Comments RN notified   Pain 0 - 10 " "Group   Therapist Pain Assessment Post Activity Pain Same as Prior to Activity;Nurse Notified  (no c/o increased pain with mobility, however pt was perseverative on getting multiple lines out)   Prior Living Situation   Prior Services Home-Independent   Housing / Facility 1 Story Apartment / Condo  (on ground floor)   Steps Into Home 1   Steps In Home 0   Equipment Owned None   Lives with - Patient's Self Care Capacity Unrelated Adult   Comments Pt reported that he normally lives alone but has a \"friend\" who will stay with him to help as needed. Friend does not work and will be home at all times. Pt was still driving PTA   Prior Level of Functional Mobility   Bed Mobility Independent   Transfer Status Independent   Ambulation Independent   Ambulation Distance limited community   Assistive Devices Used None   Stairs Independent   Cognition    Cognition / Consciousness X   Level of Consciousness Alert   Safety Awareness Impaired;Impulsive   New Learning Impaired   Comments pleasant and cooperative, perseverative on getting NGT and other lines out, impaired insight into deficits   Passive ROM Lower Body   Passive ROM Lower Body WDL   Comments assessed functionally   Active ROM Lower Body    Active ROM Lower Body  WDL   Comments assessed functionally   Strength Lower Body   Lower Body Strength  WDL   Comments functional for ambulation   Sensation Lower Body   Comments no c/o altered sensation BLE   Coordination Lower Body    Coordination Lower Body  WDL   Comments assessed functionally   Balance Assessment   Sitting Balance (Static) Fair +   Sitting Balance (Dynamic) Fair +   Standing Balance (Static) Fair   Standing Balance (Dynamic) Fair   Weight Shift Sitting Good   Weight Shift Standing Fair   Comments FWW   Bed Mobility    Sit to Supine Standby Assist   Scooting Supervised   Rolling Supervised   Comments up in chair pre, HOBE (pt endorsed having a recliner at home for sleeping), required cues for log roll   Gait " Analysis   Gait Level Of Assist Standby Assist   Assistive Device Front Wheel Walker   Distance (Feet) 250   # of Times Distance was Traveled 1   Deviation Bradykinetic   # of Stairs Climbed 0   Functional Mobility   Sit to Stand Standby Assist   Bed, Chair, Wheelchair Transfer Standby Assist   Transfer Method Stand Step   Mobility chair>up in hallway FWW>bed   6 Clicks Assessment - How much HELP from from another person do you currently need... (If the patient hasn't done an activity recently, how much help from another person do you think he/she would need if he/she tried?)   Turning from your back to your side while in a flat bed without using bedrails? 3   Moving from lying on your back to sitting on the side of a flat bed without using bedrails? 3   Moving to and from a bed to a chair (including a wheelchair)? 3   Standing up from a chair using your arms (e.g., wheelchair, or bedside chair)? 3   Walking in hospital room? 3   Climbing 3-5 steps with a railing? 3   6 clicks Mobility Score 18   Patient / Family Goals    Patient / Family Goal #1 to go home   Short Term Goals    Short Term Goal # 1 Pt will perform supine<>sit with log roll and SPV to progress bed mobility in 6 visits.   Short Term Goal # 2 Pt will perform sit<>stand and stand step txfer with FWW vs no AD and SPV to progress OOB mobility in 6 visits.   Short Term Goal # 3 Pt will ambulate 250 ft with FWW vs no AD and SPV to access community in 6 visits.   Short Term Goal # 4 Pt will navigate 1 stair with FWW vs no AD and SPV to access home in 6 visits.   Education Group   Education Provided Role of Physical Therapist   Role of Physical Therapist Patient Response Patient;Acceptance;Explanation;Demonstration;Verbal Demonstration;Action Demonstration   Additional Comments provided and reviewed HO for abdominal prx   Physical Therapy Initial Treatment Plan    Treatment Plan  Bed Mobility;Equipment;Family / Caregiver Training;Gait Training;Manual  Therapy;Neuro Re-Education / Balance;Self Care / Home Evaluation;Stair Training;Therapeutic Activities;Therapeutic Exercise   Treatment Frequency 4 Times per Week   Duration Until Therapy Goals Met   Problem List    Problems Pain;Impaired Bed Mobility;Impaired Transfers;Impaired Ambulation;Impaired Balance;Decreased Activity Tolerance;Safety Awareness Deficits / Cognition   Anticipated Discharge Equipment and Recommendations   DC Equipment Recommendations Front-Wheel Walker   Discharge Recommendations Recommend home health for continued physical therapy services   Interdisciplinary Plan of Care Collaboration   IDT Collaboration with  Nursing;Occupational Therapist   Patient Position at End of Therapy In Bed;Bed Alarm On;Call Light within Reach;Tray Table within Reach;Phone within Reach   Collaboration Comments RN and OT updated   Session Information   Date / Session Number  5/14- 1(1/4, 5/20)

## 2024-05-14 NOTE — THERAPY
"Occupational Therapy   Initial Evaluation     Patient Name: Ronal Hair  Age:  78 y.o., Sex:  male  Medical Record #: [id number]  Today's Date: 5/14/2024     Precautions  Precautions: Fall Risk, Other (See Comments)  Comments: abdominal prec, JPx1, NG, ostomy    Assessment  Pt is a 78 y.o. male admitted on 5/10/2024 by urology for cystoprostatectomy due to bladder carcinoma. POD 3. PMHx COPD, PAD s/p aorto-bifemoral bypass, small infrarenal AAA without rupture, HTN, HLD, back pain follicular lymphoma     Pt seen for OT eval. Pt participated in bed mobility, functional transfers with CGA. Requires assist for majority of ADLs at this time mostly due to line mgmt. Pt limited due to generalized weakness, multiple lines/ drains. Educ on abdominal precautions, will require ongoing educ for safety and will continue to benefit from inpt OT. Anticipate pt will be safe to dc home with SO and daughter who is coming from [place] today. Recommend HHOT     Plan    Occupational Therapy Initial Treatment Plan   Treatment Interventions: Self Care / Activities of Daily Living, Therapeutic Exercises, Therapeutic Activity, Adaptive Equipment  Treatment Frequency: 4 Times per Week  Duration: Until Therapy Goals Met    DC Equipment Recommendations: Tub / Shower Seat  Discharge Recommendations: Recommend home health for continued occupational therapy services     Subjective    \"I would walk more if I had this stuff out\"      Objective       05/14/24 0958   Prior Living Situation   Prior Services None;Home-Independent   Housing / Facility 1 Story Apartment / Condo   Steps Into Home 1   Steps In Home 0   Bathroom Set up Walk In Shower   Equipment Owned None   Lives with - Patient's Self Care Capacity Significant Other   Prior Level of ADL Function   Self Feeding Independent   Grooming / Hygiene Independent   Bathing Independent   Dressing Independent   Toileting Independent   Prior Level of IADL Function   Medication Management " Independent   Laundry Independent   Kitchen Mobility Independent   Finances Independent   Home Management Independent   Shopping Independent   Prior Level Of Mobility Independent Without Device in Community   Driving / Transportation Driving Independent   History of Falls   History of Falls No   Precautions   Precautions Fall Risk;Other (See Comments)   Comments abdominal prec, JPx1, NG, ostomy   Vitals   Pulse 90   Patient BP Position Sitting   Blood Pressure  (!) 159/75   Pulse Oximetry 99 %   O2 (LPM) 2   O2 Delivery Device Silicone Nasal Cannula   Pain   Intervention Declines   Pain 0 - 10 Group   Therapist Pain Assessment During Activity;Post Activity Pain Same as Prior to Activity;Nurse Notified;0   Non Verbal Descriptors   Non Verbal Scale  Calm   Cognition    Cognition / Consciousness X   Speech/ Communication Slurred   Orientation Level Oriented x 4   Level of Consciousness Alert   Ability To Follow Commands 1 Step   Safety Awareness Impaired;Impulsive   New Learning Impaired   Comments pt speaks quietly and mildly slurred. can be impulsive and persevertive about NG tube. able to be redirected, following 1 step commands consistently   Passive ROM Upper Body   Passive ROM Upper Body WDL   Active ROM Upper Body   Active ROM Upper Body  WDL   Dominant Hand Right   Strength Upper Body   Upper Body Strength  WDL   Sensation Upper Body   Upper Extremity Sensation  WDL   Upper Body Muscle Tone   Upper Body Muscle Tone  WDL   Neurological Concerns   Neurological Concerns No   Coordination Upper Body   Coordination WDL   Balance Assessment   Sitting Balance (Static) Fair +   Sitting Balance (Dynamic) Fair   Standing Balance (Static) Fair   Standing Balance (Dynamic) Fair -   Weight Shift Sitting Fair   Weight Shift Standing Fair   Bed Mobility    Supine to Sit Standby Assist   Scooting Standby Assist   Rolling Standby Assist   Comments cued for log roll. HOB slightly elevated   ADL Assessment   Grooming  Supervision;Seated   Upper Body Dressing Minimal Assist  (multiple line mgmt)   Lower Body Dressing Moderate Assist   Toileting   (declined)   Functional Mobility   Sit to Stand Standby Assist   Bed, Chair, Wheelchair Transfer Contact Guard Assist   Toilet Transfers Contact Guard Assist  (commode)   Transfer Method Stand Step   Mobility supine>EOB> commode> chair   Comments HHA   Visual Perception   Visual Perception  WDL   Edema / Skin Assessment   Edema / Skin  X   Comments midline incision abdomen   Activity Tolerance   Sitting in Chair post session   Sitting Edge of Bed 8 min   Standing 4 min   Patient / Family Goals   Patient / Family Goal #1 to go home   Short Term Goals   Short Term Goal # 1 Pt will complete functional mobility and transfers supervised   Short Term Goal # 2 Pt will complete UB/LB dressing tasks supervised with A/E as needed   Short Term Goal # 3 Pt will complete toileting supervised   Short Term Goal # 4 Pt will complete standing grooming tasks supervised   Education Group   Education Provided Role of Occupational Therapist;Energy Conservation;Activities of Daily Living   Role of Occupational Therapist Patient Response Patient;Acceptance;Explanation;Demonstration;Verbal Demonstration;Action Demonstration   Energy Conservation Patient Response Patient;Acceptance;Explanation;Demonstration;Action Demonstration;Verbal Demonstration   ADL Patient Response Patient;Acceptance;Demonstration;Explanation;Verbal Demonstration;Action Demonstration   Occupational Therapy Initial Treatment Plan    Treatment Interventions Self Care / Activities of Daily Living;Therapeutic Exercises;Therapeutic Activity;Adaptive Equipment   Treatment Frequency 4 Times per Week   Duration Until Therapy Goals Met   Problem List   Problem List Decreased Active Daily Living Skills;Decreased Functional Mobility;Decreased Activity Tolerance;Safety Awareness Deficits / Cognition   Anticipated Discharge Equipment and Recommendations    DC Equipment Recommendations Tub / Shower Seat   Discharge Recommendations Recommend home health for continued occupational therapy services   Interdisciplinary Plan of Care Collaboration   IDT Collaboration with  Nursing;Physical Therapist   Patient Position at End of Therapy Seated;Chair Alarm On;Call Light within Reach;Tray Table within Reach   Collaboration Comments RN updated   Session Information   Date / Session Number  5/14, #1 (1/4, 5/20)

## 2024-05-15 LAB
ANION GAP SERPL CALC-SCNC: 9 MMOL/L (ref 7–16)
BASOPHILS # BLD AUTO: 0 % (ref 0–1.8)
BASOPHILS # BLD: 0 K/UL (ref 0–0.12)
BUN SERPL-MCNC: 28 MG/DL (ref 8–22)
CALCIUM SERPL-MCNC: 8.3 MG/DL (ref 8.5–10.5)
CHLORIDE SERPL-SCNC: 107 MMOL/L (ref 96–112)
CO2 SERPL-SCNC: 26 MMOL/L (ref 20–33)
CREAT SERPL-MCNC: 0.84 MG/DL (ref 0.5–1.4)
EOSINOPHIL # BLD AUTO: 0.03 K/UL (ref 0–0.51)
EOSINOPHIL NFR BLD: 0.6 % (ref 0–6.9)
ERYTHROCYTE [DISTWIDTH] IN BLOOD BY AUTOMATED COUNT: 47.4 FL (ref 35.9–50)
GFR SERPLBLD CREATININE-BSD FMLA CKD-EPI: 90 ML/MIN/1.73 M 2
GLUCOSE SERPL-MCNC: 103 MG/DL (ref 65–99)
HCT VFR BLD AUTO: 24.2 % (ref 42–52)
HGB BLD-MCNC: 8.3 G/DL (ref 14–18)
IMM GRANULOCYTES # BLD AUTO: 0.01 K/UL (ref 0–0.11)
IMM GRANULOCYTES NFR BLD AUTO: 0.2 % (ref 0–0.9)
LYMPHOCYTES # BLD AUTO: 0.56 K/UL (ref 1–4.8)
LYMPHOCYTES NFR BLD: 11.5 % (ref 22–41)
MCH RBC QN AUTO: 32.4 PG (ref 27–33)
MCHC RBC AUTO-ENTMCNC: 34.3 G/DL (ref 32.3–36.5)
MCV RBC AUTO: 94.5 FL (ref 81.4–97.8)
MONOCYTES # BLD AUTO: 0.44 K/UL (ref 0–0.85)
MONOCYTES NFR BLD AUTO: 9 % (ref 0–13.4)
NEUTROPHILS # BLD AUTO: 3.83 K/UL (ref 1.82–7.42)
NEUTROPHILS NFR BLD: 78.7 % (ref 44–72)
NRBC # BLD AUTO: 0 K/UL
NRBC BLD-RTO: 0 /100 WBC (ref 0–0.2)
PLATELET # BLD AUTO: 107 K/UL (ref 164–446)
PMV BLD AUTO: 9.9 FL (ref 9–12.9)
POTASSIUM SERPL-SCNC: 3.7 MMOL/L (ref 3.6–5.5)
RBC # BLD AUTO: 2.56 M/UL (ref 4.7–6.1)
SODIUM SERPL-SCNC: 142 MMOL/L (ref 135–145)
WBC # BLD AUTO: 4.9 K/UL (ref 4.8–10.8)

## 2024-05-15 PROCEDURE — 700102 HCHG RX REV CODE 250 W/ 637 OVERRIDE(OP)

## 2024-05-15 PROCEDURE — 770001 HCHG ROOM/CARE - MED/SURG/GYN PRIV*

## 2024-05-15 PROCEDURE — 700111 HCHG RX REV CODE 636 W/ 250 OVERRIDE (IP)

## 2024-05-15 PROCEDURE — A9270 NON-COVERED ITEM OR SERVICE: HCPCS | Performed by: UROLOGY

## 2024-05-15 PROCEDURE — 80048 BASIC METABOLIC PNL TOTAL CA: CPT

## 2024-05-15 PROCEDURE — A9270 NON-COVERED ITEM OR SERVICE: HCPCS

## 2024-05-15 PROCEDURE — 85025 COMPLETE CBC W/AUTO DIFF WBC: CPT

## 2024-05-15 PROCEDURE — 700102 HCHG RX REV CODE 250 W/ 637 OVERRIDE(OP): Performed by: UROLOGY

## 2024-05-15 RX ADMIN — ATORVASTATIN CALCIUM 40 MG: 40 TABLET, FILM COATED ORAL at 16:34

## 2024-05-15 RX ADMIN — NICOTINE 14 MG: 14 PATCH TRANSDERMAL at 05:37

## 2024-05-15 RX ADMIN — HEPARIN SODIUM 5000 UNITS: 5000 INJECTION, SOLUTION INTRAVENOUS; SUBCUTANEOUS at 20:56

## 2024-05-15 RX ADMIN — BISACODYL 10 MG: 10 SUPPOSITORY RECTAL at 05:44

## 2024-05-15 RX ADMIN — TIOTROPIUM BROMIDE INHALATION SPRAY 5 MCG: 3.12 SPRAY, METERED RESPIRATORY (INHALATION) at 05:42

## 2024-05-15 RX ADMIN — HEPARIN SODIUM 5000 UNITS: 5000 INJECTION, SOLUTION INTRAVENOUS; SUBCUTANEOUS at 14:21

## 2024-05-15 RX ADMIN — HEPARIN SODIUM 5000 UNITS: 5000 INJECTION, SOLUTION INTRAVENOUS; SUBCUTANEOUS at 05:38

## 2024-05-15 RX ADMIN — ACETAMINOPHEN 1000 MG: 500 TABLET, FILM COATED ORAL at 12:19

## 2024-05-15 ASSESSMENT — ENCOUNTER SYMPTOMS
NAUSEA: 0
FEVER: 0
CHILLS: 0
SHORTNESS OF BREATH: 0
ABDOMINAL PAIN: 0
FLANK PAIN: 0
VOMITING: 0

## 2024-05-15 ASSESSMENT — PAIN DESCRIPTION - PAIN TYPE: TYPE: ACUTE PAIN

## 2024-05-15 NOTE — DISCHARGE PLANNING
ATTN: Case Management  RE: Referral for Home Health    As of 5.15.24, we have accepted the Home Health referral for the patient listed above.    A [facility] clinician will be out to see the patient within 48 hours. If you have any questions or concerns regarding the patient’s transition to Home Health, please do not hesitate to contact us at [phone].      We look forward to collaborating with you,  [facility]

## 2024-05-15 NOTE — PROGRESS NOTES
Assumed care of patient at 1845. Bedside report received from Pop RUSSELL. Assessment complete  AA&Ox4. Denies CP/SOB.  Reporting 1/10 pain. Declined intervention at this time.  Educated patient regarding pharmacologic and non pharmacologic modalities for pain management.  Skin per flowsheets  Tolerating NPO diet. Denies N/V.  + void. + BM. Last BM 5/14  Pt ambulates with x1 assist  All needs met at this time. Call light within reach. Pt calls appropriately. Bed low and locked, non skid socks in place. Hourly rounding in place.

## 2024-05-15 NOTE — WOUND TEAM
[facility]  Inpatient Services  New Ostomy Follow-up Management & Teaching      Ostomy Nurse Plan of Care - Frequency of Follow-up:   Ostomy nurses to continue to follow for ostomy needs and teaching until patient independent with care or discharge.  Ostomy Nurse follow-up frequency:  Every other day       Past Surgical History:   Procedure Laterality Date    TURBT (TRANSURETHRAL RESECTION OF BLADDER TUMOR)  12/13/2023    Procedure: BIPOLAR TRANSURETHRAL RESECTION OF BLADDER TUMOR;  Surgeon: Addison Seymour M.D.;  Location: SURGERY [facility];  Service: Urology    GA CYSTOSCOPY,INSERT URETERAL STENT Left 01/07/2023    Procedure: CYSTOSCOPY, WITH URETERAL STENT INSERTION;  Surgeon: Addison Seymour M.D.;  Location: SURGERY [facility];  Service: Urology    GA CYSTO/URETERO/PYELOSCOPY, DX Left 01/07/2023    Procedure: URETEROSCOPY;  Surgeon: Addison Seymour M.D.;  Location: SURGERY [facility];  Service: Urology    LASERTRIPSY Left 01/07/2023    Procedure: LITHOTRIPSY, USING LASER;  Surgeon: Addison Seymour M.D.;  Location: SURGERY [facility];  Service: Urology    AORTOBIFEM BYPASS  11/08/2020    Procedure: CREATION, BYPASS, ARTERIAL, AORTA TO FEMORAL, BILATERAL;  Surgeon: Jimi Morrison M.D.;  Location: [facility];  Service: General    KNEE ARTHROSCOPY Left 1968    APPENDECTOMY CHILD      CATARACT EXTRACTION WITH IOL Bilateral     OTHER ABDOMINAL SURGERY      OTHER ORTHOPEDIC SURGERY      SHOULDER ARTHROSCOPY Left        Surgery Date: 5/10/24    Surgeon(s):  Cuco Osborn MD, PhD  LEONARDO Conner M.D.    Procedure(s):  RADICAL CYSTOPROSTATECTOMY WITH CREATION OF ILEAL CONDUIT (OR OTHER URINARY DIVERSION)     Permanence: Yes    Pertinent History:  Pt has urostomy r/t Bladder cancer requiring cystectomy and prostatectomy                                                       Urostomy 05/10/24 Ileal conduit RLQ (Active)   Wound Image   05/12/24 1000  "  Stomal Appliance Assessment Changed 05/14/24 1700   Stoma Assessment Red;Standard 05/14/24 1700   Stoma Shape Round 05/14/24 1700   Stoma Size (in) 1.2 05/12/24 1000   Peristomal Assessment Clean;Dry;Intact 05/14/24 1700   Mucocutaneous Junction Intact 05/14/24 1700   Treatment Appliance Changed;Bag Change;Cleansed with water/washcloth 05/14/24 1700   Stomal Appliance 2 1/4\" (57mm) CTF;Paste Ring, 2\" 05/12/24 1000   Output (mL) 500 mL 05/14/24 1519   WOUND RN ONLY - Stomal Appliance  2 Piece;Paste Ring, 2\";Urostomy Pouch;2 1/4\" (57mm) CTF 05/14/24 1700   Appliance (Pouch) # [id number], [id number], [place] 8805 05/12/24 1000   Appliance Brand [place] 05/14/24 1700   Secure Start completed No 05/14/24 1700   Ostomy Care Resources Provided UOAA Tip Sheet 05/12/24 1000   WOUND NURSE ONLY - Time Spent with Patient (mins) 30 05/14/24 1700                       Urostomy Interventions:  Removed appliance (using push pull method) - By patient   Cleansed nathaniel-stomal skin with moist warm washcloth - By patient   Created/Checked template fit - By patient   Traced Shape to back of barrier - By patient   Cut barrier to stoma size - By patient   Confirmed fit - By patient   Removed plastic backing and \"Dog Eared\" paper edges - By patient   Stretched paste ring to fit barrier opening - By patient   Applied paste ring to back of barrier - By patient   Applied barrier to skin and adhered with friction - By patient   Attached pouch - By patient   Closed Pouch end - By patient   Connected Uro spout to down drain bag with down drain adapter - By patient     Patient Education:   All questions answered, procedure explained, previous education reinforced    Ostomy RN to follow-up daily for education     Date:  05/14/24    Reinforced education provided during last visit  Educated regarding the following things: stoma size/shape. Stoma will likely change sizes in the first 4 to 6 weeks. Currently using the most basic supplies while in the hospital, " "there are many brands and options in regards to supplies.  Educated on when to empty and how to empty, burping appliance, Wear time, Diet (chewing food well) and hydration, Medications, activity including showering and swimming. Pt aware that they should keep an extra set of appliances with them at all times (do not leave in warm cars).  Patient practiced emptying pouch with bedside RN/CNA  Educated on Down drain bags for overnight  Pt completed all paper education and just needs continued hands on  Date:  05/12/24  Folder/paperwork delivered  Pt was able to verbalize most of the steps for pouching system change      Needs for next visit: supplies for discharge    Evaluation:      Date:  05/14/24    Patient performed all hands-on with removal and replacement of the urostomy bag.  Patient will need to continue with hands-on.  Mirror can be utilized for bedside or use bathroom mirror for education.     Date:  05/12/24    Pt has new urostomy. He was marked at [facility] ostomy clinic. Referral placed. He is still a little under the anesthesia and \"I didn't get any sleep last night. They're redoing the bathroom next door.\"        Flatus: Not Present  Stool Output: N/A  Urine Output: bloody and mucus  Mobility: Up to chair, Ambulate , and Ambulating at Baseline    DIET ORDERS (From admission to next 24h)       Start     Ordered    05/13/24 1840  Diet NPO Restrict to: Strict  ALL MEALS        Question:  Diet NPO Restrict to:  Answer:  Strict    05/13/24 1839                     Secure Start Signed:  No  Outpatient Referral Placed:  Yes       5 Sets of appliances in Ostomy bag for discharge:  No    INSURANCE OPTIONS:  If patient has [facility] plus patient will need an [employer] book. If patientt has Medicaid be sure patient has care chest paperwork.    Currently Active Insurance       Payor Plan    [facility] SENIOR CARE PLUS SCP ESSENTIAL             Anticipated Discharge Plans:  Self/Family Care, Home " "Health Care, and Outpatient Wound Center    Ostomy Supplies for DC:  UROSTOMY 2 PIECE POUCH (20 per month):  9in Urostomy Appliance 2 1/4\" ([place] [id number])  ACCESSORIES:  Skin Prep Wipes (3M [place] 5393) - 2 box per month, Adapt Flat paste ring 2\" (Tay 8450) - 15 per month, Urostomy Drain Tube Adapter (Tay 8923) , and Down Drain Bag ([place] [id number]) - 2 per month  To be determined in 4 to 6 weeks once stomal edema has fully resolved  "

## 2024-05-15 NOTE — PROGRESS NOTES
Virtual Nurse rounding complete.    Round Needs: Other: patient wanting to know when he can eat and Notified of Patient Needs: primary RN.

## 2024-05-15 NOTE — CARE PLAN
The patient is Stable - Low risk of patient condition declining or worsening    Shift Goals  Clinical Goals: NG mgmt, urostmy mgmt, ambulate  Patient Goals: NG tube out  Family Goals: Pt comfort    Progress made toward(s) clinical / shift goals:  Pt NG tube and urostomy were managed without complications during shift. Pt was able to ambulate 32x times in hallway during shift.       Problem: Skin Care - Ostomy  Goal: Skin remains free from irritation  Outcome: Progressing     Problem: Knowledge Deficit - Standard  Goal: Patient and family/care givers will demonstrate understanding of plan of care, disease process/condition, diagnostic tests and medications  Outcome: Progressing     Problem: Fall Risk  Goal: Patient will remain free from falls  Outcome: Progressing       Patient is not progressing towards the following goals:

## 2024-05-15 NOTE — CARE PLAN
The patient is Stable - Low risk of patient condition declining or worsening    Shift Goals: NG, Urostomy, Pain, Safety  Clinical Goals: NG, Urostomy, Pain, Safety  Patient Goals: Rest, NG out  Family Goals: Pt comfort    Progress made toward(s) clinical / shift goals:  Pt resting comfortably in bed, pain well controlled with current medication.  Pt verbalized increasing comfort with urostomy care.  Pt calls appropriately for assistance with OOBA.  Pt turns self in bed.  This RN discussed POC and BTD with Pt, who verbalized understanding.

## 2024-05-15 NOTE — PROGRESS NOTES
Report received from RN, assumed care at 0645  Pt is A0X4, and responds appropriately   Pt declines any SOB, chest pain, new onset of numbness/ tingling  Pt rates pain at 0/10, on a scale of 1-10, pt medicated per MAR  Pt is voiding adequately via urostomy  Pt has flatus, hypoactive bowel sounds, BM on 5/15  Pt ambulates with a 1x assist   Pt is tolerating a NPO diet, pt denies any nausea/vomiting  Plan of care discussed, all questions answered. Explained importance of calling before getting OOB and pt verbalizes understanding. Explained importance of oral care. Call light is within reach, treaded slipper socks on, bed in lowest/ locked position, hourly rounding in place, all needs met at this time

## 2024-05-15 NOTE — THERAPY
"Physical Therapy Contact Note    Patient Name: Ronal Hair  Age:  78 y.o., Sex:  male  Medical Record #: [id number]  Today's Date: 5/15/2024    Pt received getting back in bed after walking full lap of unit with CNA and FWW. CNA reported that pt also walked 2 half laps with her earlier today. Pt politely declined further OOB mobility with this therapist, \"that's the farthest I've walked in awhile.\" Reported that he would like to rest but would do another lap with nursing later this evening. Pt still perseverative on getting NGT removed, deferred pt questions to RN. Will reattempt as able/appropriate.        05/15/24 1634   Treatment Variance   Reason For Missed Therapy Non-Medical - Patient Refused   Interdisciplinary Plan of Care Collaboration   IDT Collaboration with  Nursing;Certified Nursing Assistant   Patient Position at End of Therapy In Bed;Bed Alarm On;Call Light within Reach;Tray Table within Reach;Phone within Reach   Collaboration Comments RN and CNA updated   Session Information   Date / Session Number  5/14- 1(1/4, 5/20) refused 5/15       "

## 2024-05-15 NOTE — DISCHARGE PLANNING
HTH/SCP TCN chart review completed. Per review, noted patient acceptance to [facility]  5/15. Collaborated with CM. Current discharge considerations are for Home with Home Health, possible supplemental O2 and close outpatient f/u when medically cleared.      TCN will continue to follow and collaborate with discharge planning team as additional post acute needs arise. Thank you.     Completed  PT recommends Home Health on 5/14/24.    OT recommends Home Health on 5/14/24.    Choice obtained: [facility] ([facility]), [facility] (02 via [employer]). Per review, noted patient acceptance to [facility]  5/15  SCP with [facility] PCP. Per review of AVS, patient is scheduled for Hospital Follow Up with Resident Mykel Gipson M.D. Thursday May 23, 2024 11:00 AM

## 2024-05-16 LAB
ANION GAP SERPL CALC-SCNC: 11 MMOL/L (ref 7–16)
BASOPHILS # BLD AUTO: 0 % (ref 0–1.8)
BASOPHILS # BLD: 0 K/UL (ref 0–0.12)
BUN SERPL-MCNC: 23 MG/DL (ref 8–22)
CALCIUM SERPL-MCNC: 7.9 MG/DL (ref 8.5–10.5)
CHLORIDE SERPL-SCNC: 106 MMOL/L (ref 96–112)
CO2 SERPL-SCNC: 25 MMOL/L (ref 20–33)
CREAT SERPL-MCNC: 0.77 MG/DL (ref 0.5–1.4)
EOSINOPHIL # BLD AUTO: 0.06 K/UL (ref 0–0.51)
EOSINOPHIL NFR BLD: 1.5 % (ref 0–6.9)
ERYTHROCYTE [DISTWIDTH] IN BLOOD BY AUTOMATED COUNT: 46.9 FL (ref 35.9–50)
GFR SERPLBLD CREATININE-BSD FMLA CKD-EPI: 92 ML/MIN/1.73 M 2
GLUCOSE SERPL-MCNC: 116 MG/DL (ref 65–99)
HCT VFR BLD AUTO: 22.8 % (ref 42–52)
HGB BLD-MCNC: 8.1 G/DL (ref 14–18)
IMM GRANULOCYTES # BLD AUTO: 0.02 K/UL (ref 0–0.11)
IMM GRANULOCYTES NFR BLD AUTO: 0.5 % (ref 0–0.9)
LYMPHOCYTES # BLD AUTO: 0.45 K/UL (ref 1–4.8)
LYMPHOCYTES NFR BLD: 11.4 % (ref 22–41)
MCH RBC QN AUTO: 32.8 PG (ref 27–33)
MCHC RBC AUTO-ENTMCNC: 35.5 G/DL (ref 32.3–36.5)
MCV RBC AUTO: 92.3 FL (ref 81.4–97.8)
MONOCYTES # BLD AUTO: 0.44 K/UL (ref 0–0.85)
MONOCYTES NFR BLD AUTO: 11.2 % (ref 0–13.4)
NEUTROPHILS # BLD AUTO: 2.97 K/UL (ref 1.82–7.42)
NEUTROPHILS NFR BLD: 75.4 % (ref 44–72)
NRBC # BLD AUTO: 0 K/UL
NRBC BLD-RTO: 0 /100 WBC (ref 0–0.2)
PLATELET # BLD AUTO: 110 K/UL (ref 164–446)
PMV BLD AUTO: 10.2 FL (ref 9–12.9)
POTASSIUM SERPL-SCNC: 3.3 MMOL/L (ref 3.6–5.5)
RBC # BLD AUTO: 2.47 M/UL (ref 4.7–6.1)
SODIUM SERPL-SCNC: 142 MMOL/L (ref 135–145)
WBC # BLD AUTO: 3.9 K/UL (ref 4.8–10.8)

## 2024-05-16 PROCEDURE — 770001 HCHG ROOM/CARE - MED/SURG/GYN PRIV*

## 2024-05-16 PROCEDURE — 700102 HCHG RX REV CODE 250 W/ 637 OVERRIDE(OP): Performed by: UROLOGY

## 2024-05-16 PROCEDURE — 85025 COMPLETE CBC W/AUTO DIFF WBC: CPT

## 2024-05-16 PROCEDURE — A9270 NON-COVERED ITEM OR SERVICE: HCPCS

## 2024-05-16 PROCEDURE — 700102 HCHG RX REV CODE 250 W/ 637 OVERRIDE(OP)

## 2024-05-16 PROCEDURE — 302102 BAG OST N IMG 2.25IN 2PC (FECAL): Performed by: UROLOGY

## 2024-05-16 PROCEDURE — A9270 NON-COVERED ITEM OR SERVICE: HCPCS | Performed by: UROLOGY

## 2024-05-16 PROCEDURE — 80048 BASIC METABOLIC PNL TOTAL CA: CPT

## 2024-05-16 PROCEDURE — 700111 HCHG RX REV CODE 636 W/ 250 OVERRIDE (IP)

## 2024-05-16 PROCEDURE — 97530 THERAPEUTIC ACTIVITIES: CPT

## 2024-05-16 RX ORDER — ENOXAPARIN SODIUM 100 MG/ML
40 INJECTION SUBCUTANEOUS DAILY
Status: DISCONTINUED | OUTPATIENT
Start: 2024-05-16 | End: 2024-05-17 | Stop reason: HOSPADM

## 2024-05-16 RX ADMIN — NICOTINE 14 MG: 14 PATCH TRANSDERMAL at 05:08

## 2024-05-16 RX ADMIN — HEPARIN SODIUM 5000 UNITS: 5000 INJECTION, SOLUTION INTRAVENOUS; SUBCUTANEOUS at 13:18

## 2024-05-16 RX ADMIN — ENOXAPARIN SODIUM 40 MG: 100 INJECTION SUBCUTANEOUS at 18:21

## 2024-05-16 RX ADMIN — HEPARIN SODIUM 5000 UNITS: 5000 INJECTION, SOLUTION INTRAVENOUS; SUBCUTANEOUS at 05:07

## 2024-05-16 RX ADMIN — TIOTROPIUM BROMIDE INHALATION SPRAY 5 MCG: 3.12 SPRAY, METERED RESPIRATORY (INHALATION) at 05:07

## 2024-05-16 RX ADMIN — POLYETHYLENE GLYCOL 3350 1 PACKET: 17 POWDER, FOR SOLUTION ORAL at 05:08

## 2024-05-16 RX ADMIN — ATORVASTATIN CALCIUM 40 MG: 40 TABLET, FILM COATED ORAL at 16:23

## 2024-05-16 RX ADMIN — SENNOSIDES AND DOCUSATE SODIUM 2 TABLET: 50; 8.6 TABLET ORAL at 05:07

## 2024-05-16 ASSESSMENT — ENCOUNTER SYMPTOMS
FEVER: 0
CHILLS: 0
VOMITING: 0
SHORTNESS OF BREATH: 0
FLANK PAIN: 0
ABDOMINAL PAIN: 0
NAUSEA: 0

## 2024-05-16 ASSESSMENT — COGNITIVE AND FUNCTIONAL STATUS - GENERAL
WALKING IN HOSPITAL ROOM: A LITTLE
MOVING FROM LYING ON BACK TO SITTING ON SIDE OF FLAT BED: A LITTLE
MOBILITY SCORE: 18
SUGGESTED CMS G CODE MODIFIER MOBILITY: CK
CLIMB 3 TO 5 STEPS WITH RAILING: A LITTLE
STANDING UP FROM CHAIR USING ARMS: A LITTLE
TURNING FROM BACK TO SIDE WHILE IN FLAT BAD: A LITTLE
MOVING TO AND FROM BED TO CHAIR: A LITTLE

## 2024-05-16 ASSESSMENT — GAIT ASSESSMENTS
ASSISTIVE DEVICE: FRONT WHEEL WALKER
GAIT LEVEL OF ASSIST: SUPERVISED
DISTANCE (FEET): 350

## 2024-05-16 ASSESSMENT — PAIN DESCRIPTION - PAIN TYPE: TYPE: CHRONIC PAIN

## 2024-05-16 NOTE — PROGRESS NOTES
Virtual Nurse rounding complete.    Round Needs: No needs at this time. Patient states he would like to go home.

## 2024-05-16 NOTE — DISCHARGE PLANNING
"HTH/SCP TCN chart review completed. Current discharge considerations are home with home health and DME ([facility]) per therapy recommendations. Noted per TCN note on 5/11, patient is \"also agreeable to HH and any DME/AD recommended as well.\" Choice in media.  Additionally per TCN note on 5/13, patient rpeorts he \"will have 24/7 assistance at home.\"      TCN will continue to follow and collaborate with discharge planning team as additional post acute needs arise. Thank you.    Completed:  PT recommends Home Health on 5/14/24.   PT also recommending FWW   OT recommends Home Health on 5/14/24.    Choice obtained: HH ([facility] has accepted), DME (02 via [employer] and AD via [employer]).   SCP with [facility] PCP. PCP f/u scheduled for 5/23  "

## 2024-05-16 NOTE — PROGRESS NOTES
Assumed care of patient at 1845. Bedside report received from Pop RUSSELL. Assessment complete.  AA&Ox4. Denies CP/SOB.  Reporting 1/10 pain. Declined intervention at this time.  Educated patient regarding pharmacologic and non pharmacologic modalities for pain management.  Skin per flowsheets  Tolerating NPO diet. Denies N/V.  + void. + BM. Last BM 5/15  Pt ambulates with SBA  All needs met at this time. Call light within reach. Pt calls appropriately. Bed low and locked, non skid socks in place. Hourly rounding in place.

## 2024-05-16 NOTE — PROGRESS NOTES
Report received from RN, assumed care at 0645  Pt is A0X4, and responds appropriately   Pt declines any SOB, chest pain, new onset of numbness/ tingling  Pt rates pain at 0/10, on a scale of 1-10, pt medicated per MAR  Pt is voiding adequately via urostomy  Pt has flatus, hypoactive bowel sounds, BM on 5/15  Pt ambulates with a 1x assist FWW  Pt is tolerating a clear liquid diet, pt denies any nausea/vomiting  Plan of care discussed, all questions answered. Explained importance of calling before getting OOB and pt verbalizes understanding. Explained importance of oral care. Call light is within reach, treaded slipper socks on, bed in lowest/ locked position, hourly rounding in place, all needs met at this time

## 2024-05-16 NOTE — PROGRESS NOTES
Note to reader: this note follows the APSO format rather than the historical SOAP format. Assessment and plan located at the top of the note for ease of use.    Chief Complaint  78 y.o. year old male here with No chief complaint on file.      Assessment/Plan  Interval History   Active Hospital Problems    Diagnosis     Anemia [D64.9]     JULIA (acute kidney injury) ([facility]), resolved [N17.9]     COPD (chronic obstructive pulmonary disease) ([facility]) [J44.9]     Tobacco dependence [F17.200]     Peripheral artery disease ([facility]) [I73.9]      5/15 POD5. Has ambulated several times today and NGT output has slowed. Labs and vitals stable after restarting heparin yesterday. Abd significantly less distended on exam, and belching has ceased. Reports 2 liquid BM today, very small. Passing flatus. Denies N/V/F/C. Good UOP from urostomy, pain well controlled. Started NGT clamp trial at 1600 and NGT restarted on suction at 2000, after 45 min of suction only scant output was present, so NGT pulled.    5/14. POD4. Sitting up in bed in NAD. NGT placed last night, so far 700cc of output documented. Distension improved, pain well controlled with PO medications. Pt reports he ambulated today, walked around the unit, denies lightheadedness, CP, SOB, or increased pain with ambulation. AFVSS, H/H stable at 9.2/25.4 (9.7/27.5), Cr 0.67 (0.77). UOP 3450cc in 24h (2160), drain with 75cc out (140). Pt does report one tiny liquid BM today, but is still experiencing belching/hiccuping. Suspect BM may be from prior stool in colon, as bowel prep prior to surgery was incomplete.        Plan:   - Continue to trend H/H, now that heparin has been restarted  - Prior to discharge will transition to lovenox and teach patient how to self-administer  - Wound care to see patient and provide ostomy teaching  - Pt to continue to ambulate frequently, goal of at least 4x/day  - Successful NGT clamp trial today, will give CLD tonight and consider advancing tomorrow  as tolerated  - Appreciate hospitalist assistance     Case discussed with Dr Cardoso, who has directed this patient's plan of care.      Review of Systems  Physical Exam   Review of Systems   Constitutional:  Negative for chills and fever.   Respiratory:  Negative for shortness of breath.    Cardiovascular:  Negative for chest pain.   Gastrointestinal:  Negative for abdominal pain, nausea and vomiting.   Genitourinary:  Negative for flank pain and hematuria.   All other systems reviewed and are negative.    Vitals:    05/15/24 0407 05/15/24 0702 05/15/24 1501 05/15/24 1909   BP: (!) 151/81 134/66 139/75 (!) 144/71   Pulse: 78 75 78 79   Resp: 16 17 18 20   Temp: 36.8 °C (98.2 °F) 36.3 °C (97.3 °F) 36.5 °C (97.7 °F) 36.5 °C (97.7 °F)   TempSrc: Temporal Temporal Temporal Temporal   SpO2: 94% 94% 92% 90%   Weight:       Height:         Physical Exam  Vitals and nursing note reviewed.   Constitutional:       General: He is not in acute distress.  HENT:      Head: Normocephalic.      Nose: Nose normal.      Comments: NGT in place secured with tape, greenish fluid in tubing     Mouth/Throat:      Pharynx: Oropharynx is clear.   Eyes:      Conjunctiva/sclera: Conjunctivae normal.   Pulmonary:      Effort: Pulmonary effort is normal.   Abdominal:      General: There is distension (much improved from yesterday).      Tenderness: There is no abdominal tenderness.      Comments: Midline incision intact, HITESH present LLQ with scant serosang fluid in bulb. Urostomy in RLQ with clear yellow urine in tubing.   Musculoskeletal:         General: Normal range of motion.      Cervical back: Normal range of motion.   Skin:     General: Skin is warm and dry.   Neurological:      General: No focal deficit present.      Mental Status: He is alert and oriented to person, place, and time.   Psychiatric:         Mood and Affect: Mood normal.         Behavior: Behavior normal.          Hematology Chemistry   Lab Results   Component Value  Date/Time    WBC 4.9 05/15/2024 02:45 AM    HEMOGLOBIN 8.3 (L) 05/15/2024 02:45 AM    HEMATOCRIT 24.2 (L) 05/15/2024 02:45 AM    PLATELETCT 107 (L) 05/15/2024 02:45 AM     Lab Results   Component Value Date/Time    SODIUM 142 05/15/2024 02:45 AM    POTASSIUM 3.7 05/15/2024 02:45 AM    CHLORIDE 107 05/15/2024 02:45 AM    CO2 26 05/15/2024 02:45 AM    GLUCOSE 103 (H) 05/15/2024 02:45 AM    BUN 28 (H) 05/15/2024 02:45 AM    CREATININE 0.84 05/15/2024 02:45 AM         Labs not explicitly included in this progress note were reviewed by the author.   Radiology/imaging not explicitly included in this progress note was reviewed by the author.     Medications reviewed, Labs reviewed and Radiology images reviewed

## 2024-05-16 NOTE — WOUND TEAM
[facility]  Inpatient Services  New Ostomy Follow-up Management & Teaching      Ostomy Nurse Plan of Care - Frequency of Follow-up:   Ostomy nurses to continue to follow for ostomy needs and teaching until patient independent with care or discharge.  Ostomy Nurse follow-up frequency:  Every other day       Past Surgical History:   Procedure Laterality Date    TURBT (TRANSURETHRAL RESECTION OF BLADDER TUMOR)  12/13/2023    Procedure: BIPOLAR TRANSURETHRAL RESECTION OF BLADDER TUMOR;  Surgeon: Addison Seymour M.D.;  Location: SURGERY [facility];  Service: Urology    CA CYSTOSCOPY,INSERT URETERAL STENT Left 01/07/2023    Procedure: CYSTOSCOPY, WITH URETERAL STENT INSERTION;  Surgeon: Addison Seymour M.D.;  Location: SURGERY [facility];  Service: Urology    CA CYSTO/URETERO/PYELOSCOPY, DX Left 01/07/2023    Procedure: URETEROSCOPY;  Surgeon: Addison Seymour M.D.;  Location: SURGERY [facility];  Service: Urology    LASERTRIPSY Left 01/07/2023    Procedure: LITHOTRIPSY, USING LASER;  Surgeon: Addison Seymour M.D.;  Location: SURGERY [facility];  Service: Urology    AORTOBIFEM BYPASS  11/08/2020    Procedure: CREATION, BYPASS, ARTERIAL, AORTA TO FEMORAL, BILATERAL;  Surgeon: Jimi Morrison M.D.;  Location: [facility];  Service: General    KNEE ARTHROSCOPY Left 1968    APPENDECTOMY CHILD      CATARACT EXTRACTION WITH IOL Bilateral     OTHER ABDOMINAL SURGERY      OTHER ORTHOPEDIC SURGERY      SHOULDER ARTHROSCOPY Left        Surgery Date: 5/10/24    Surgeon(s):  Cuco Osborn MD, PhD  LEONARDO Conner M.D.    Procedure(s):  RADICAL CYSTOPROSTATECTOMY WITH CREATION OF ILEAL CONDUIT (OR OTHER URINARY DIVERSION)     Permanence: Yes    Pertinent History:  Pt has urostomy r/t Bladder cancer requiring cystectomy and prostatectomy                                                       Urostomy 05/10/24 Ileal conduit RLQ (Active)   Wound Image   05/12/24 1000  "  Stomal Appliance Assessment Clean;Dry;Intact;Changed 05/16/24 1200   Stoma Assessment Pink;Red 05/16/24 1200   Stoma Shape Round 05/16/24 1200   Stoma Size (in) 1.25 05/16/24 1200   Peristomal Assessment Clean;Dry;Intact 05/16/24 1200   Mucocutaneous Junction Intact 05/16/24 1200   Treatment Appliance Changed;Bag Change;Cleansed with water/washcloth;Site care 05/16/24 1200   Peristomal Protectant Paste Ring 05/16/24 1200   Stomal Appliance Paste Ring, 2\";2 1/4\" (57mm) CTF;Down Drain Bag;Urostomy Pouch 05/16/24 1200   Output (mL) 250 mL 05/16/24 1200   WOUND RN ONLY - Stomal Appliance  2 Piece;Paste Ring, 2\";2 1/4\" (57mm) CTF;Down Drain Bag;Urostomy Pouch 05/16/24 1200   Appliance (Pouch) # [id number], [id number], [place] 8805 05/12/24 1000   Appliance Brand [place] 05/16/24 1200   Appliance Supplier [employer] 05/16/24 1200   Secure Start completed No 05/14/24 1700   Ostomy Care Resources Provided UOAA Tip Sheet 05/12/24 1000   WOUND NURSE ONLY - Time Spent with Patient (mins) 30 05/14/24 1700                       Urostomy Interventions:  Removed appliance (using push pull method) - By patient   Cleansed nathaniel-stomal skin with moist warm washcloth - By patient   Created/Checked template fit - By patient   Traced Shape to back of barrier - By patient   Cut barrier to stoma size - By patient   Confirmed fit - By Ostomy RN with patient assistance    Removed plastic backing and \"Dog Eared\" paper edges - By patient   Stretched paste ring to fit barrier opening - By patient   Applied paste ring to back of barrier - By patient   Applied barrier to skin and adhered with friction - By patient   Attached pouch - By patient   Connected Uro spout to down drain bag with down drain adapter - By patient     Patient Education:   All questions answered, procedure explained, previous education reinforced    Ostomy RN to follow-up daily for education     Date:  05/16/24    Reinforced education provided during last visit  Patient independent " "with change and ostomy RN to sign off  Pt completed all paper education and just needs continued hands on  Date:  05/14/24    Reinforced education provided during last visit  Educated regarding the following things: stoma size/shape. Stoma will likely change sizes in the first 4 to 6 weeks. Currently using the most basic supplies while in the hospital, there are many brands and options in regards to supplies.  Educated on when to empty and how to empty, burping appliance, Wear time, Diet (chewing food well) and hydration, Medications, activity including showering and swimming. Pt aware that they should keep an extra set of appliances with them at all times (do not leave in warm cars).  Patient practiced emptying pouch with bedside RN/CNA  Educated on Down drain bags for overnight  Pt completed all paper education and just needs continued hands on  Date:  05/12/24  Folder/paperwork delivered  Pt was able to verbalize most of the steps for pouching system change      Needs for next visit: answer any questions prior to discharge    Evaluation:      Date:  05/16/24    Patient was independent with ostomy change today, most difficult part is lining up the appliance to attach to the skin but with the proper mirror set up patient will have no issues. All questions were answered and patient has no concerns with discharge. Extra sets of supplies x6 given to patient for discharge.       Date:  05/14/24    Patient performed all hands-on with removal and replacement of the urostomy bag.  Patient will need to continue with hands-on.  Mirror can be utilized for bedside or use bathroom mirror for education.     Date:  05/12/24    Pt has new urostomy. He was marked at [facility] ostomy clinic. Referral placed. He is still a little under the anesthesia and \"I didn't get any sleep last night. They're redoing the bathroom next door.\"        Flatus: N/A  Stool Output: N/A  Urine Output: present  Mobility: Ambulate     DIET ORDERS " (From admission to next 24h)       Start     Ordered    05/16/24 1128  Diet Order Diet: Full Liquid  ALL MEALS        Question:  Diet:  Answer:  Full Liquid    05/16/24 1127                     Secure Start Signed:  No  Outpatient Referral Placed:  Yes         5 Sets of appliances in Ostomy bag for discharge:  Yes    INSURANCE OPTIONS:  If patient has [facility]/Senior care plus patient will need an [employer] book. If patientt has Medicaid be sure patient has care chest paperwork.    Currently Active Insurance       Payor Plan    [facility] SENIOR CARE PLUS SCP ESSENTIAL             Anticipated Discharge Plans:  Self/Family Care and Outpatient Wound Center    Ostomy Supplies for DC:  To be determined in 4 to 6 weeks once stomal edema has fully resolved

## 2024-05-16 NOTE — DISCHARGE PLANNING
Case Management Discharge Planning    Admission Date: 5/10/2024  GMLOS: 5.3  ALOS: 6    6-Clicks ADL Score: 19  6-Clicks Mobility Score: 18      Anticipated Discharge Dispo: Discharge Disposition: D/T to home under [facility] care in anticipation of covered skilled care (06)    DME Needed: Yes    DME Ordered: Yes    Action(s) Taken: Chart review completed. Patient discussed during IDT rounds.     Per chart review, FWW delivered to bedside; patient has been accepted with [facility]     Escalations Completed: None    Medically Clear: No    Next Steps: CM to follow up with IDT regarding DCP needs/barriers    Barriers to Discharge: Medical clearance    Is the patient up for discharge tomorrow: No

## 2024-05-16 NOTE — THERAPY
"Physical Therapy   Discharge Note     Patient Name: Ronal Hair  Age:  78 y.o., Sex:  male  Medical Record #: [id number]  Today's Date: 5/16/2024     Precautions  Precautions: Fall Risk  Comments: abdominal precautions, HITESH drain, ostomy    Assessment    Pt agreeable to PT tx session however anxious due to repeated interruptions of sleep throughout the day.  Pt progressed to mobilize around unit as detailed below with SPV and use of FWW.  Pt caught toe on one stair, reported it was due to socks on non-slip surface & irritable when therapist guarded pt on steps; insisted on going up & over practice staircase twice despite cues to navigate 1 step to simulate home.  Attempted to educate pt on strategies to mimic home setup (I.e. flatten bed, lower bed rail) and to practice abdominal precautions, pt not receptive to education.  No further acute PT needs due to goals met & pt not receptive to alternative strategies.  Patient will not be actively followed for physical therapy services at this time, however may be seen if requested by physician for 1 more visit within 30 days to address any discharge or equipment needs.    Plan    Reason for Discharge From Therapy: Discharge Secondary to Goals Met    DC Equipment Recommendations: Front-Wheel Walker (delivered)  Discharge Recommendations: Recommend home health for continued physical therapy services    Subjective    \"I'm tired of being treated like a 7 year old.\"     Objective     05/16/24 1607   Precautions   Precautions Fall Risk   Comments abdominal precautions, HITESH drain, ostomy   Pain 0 - 10 Group   Therapist Pain Assessment Post Activity Pain Same as Prior to Activity;Nurse Notified;0   Cognition    Cognition / Consciousness X   Level of Consciousness Alert   Safety Awareness Impaired;Impulsive   New Learning Impaired   Comments Agreeable to session however anxious regarding frequent interruptions; irritable, not receptive to education. Perseverative on needing to " get home & that things will be easier at home.   Balance   Sitting Balance (Static) Fair +   Sitting Balance (Dynamic) Fair +   Standing Balance (Static) Fair   Standing Balance (Dynamic) Fair   Weight Shift Sitting Fair   Weight Shift Standing Fair   Skilled Intervention Verbal Cuing;Compensatory Strategies   Bed Mobility    Supine to Sit Supervised   Sit to Supine Supervised   Scooting Supervised   Skilled Intervention Verbal Cuing   Comments HOB elevated, refused to have HOB flat or to move rails   Gait Analysis   Gait Level Of Assist Supervised   Assistive Device Front Wheel Walker   Distance (Feet) 350   # of Times Distance was Traveled 1   Deviation (kyphotic posture at baseline per pt; quick speed)   # of Stairs Climbed 5 (ascend 5, descend 5 - practice staircase x 2)   Level of Assist with Stairs Supervised   Weight Bearing Status No restrictions   Skilled Intervention Verbal Cuing;Compensatory Strategies   Functional Mobility   Sit to Stand Supervised   Bed, Chair, Wheelchair Transfer Supervised   Transfer Method Stand Step   Mobility bed mobility, ambulation, stairs   Skilled Intervention Verbal Cuing   Activity Tolerance   Sitting in Chair Refused   Comments functional   Short Term Goals    Short Term Goal # 1 Pt will perform supine<>sit with log roll and SPV to progress bed mobility in 6 visits.   Goal Outcome # 1 (Refused to use log roll strategy with hospital bed, insistent he will be able to log roll in/out of bed at home)   Short Term Goal # 2 Pt will perform sit<>stand and stand step txfer with FWW vs no AD and SPV to progress OOB mobility in 6 visits.   Goal Outcome # 2 Goal met (Use of FWW however reported he will not use it in his home.  Declined to attempt without FWW this date.)   Short Term Goal # 3 Pt will ambulate 250 ft with FWW vs no AD and SPV to access community in 6 visits.   Goal Outcome # 3 Goal met (Use of FWW, declined offer to attempt without.)   Short Term Goal # 4 Pt will  navigate 1 stair with FWW vs no AD and SPV to access home in 6 visits.   Goal Outcome # 4 Goal met (use of rail)   Education Group   Role of Physical Therapist Patient Response Patient;Nonacceptance;Explanation; Not Receptive to Education

## 2024-05-16 NOTE — DOCUMENTATION QUERY
"                                                                         [facility]                                                                       Query Response Note      PATIENT:               POORNIMA TORREZ  ACCT #:                  [id number]  MRN:                     [id number]  :                      1945  ADMIT DATE:       5/10/2024 5:25 AM  DISCH DATE:          RESPONDING  PROVIDER #:        [id number]           QUERY TEXT:    Anemia due to \"likely\" blood loss is documented in the Medical Record.      Can this documentation be further specified?    The patient's Clinical Indicators include:  5/10   Admit 78 y.o. M w/ muscle invasive bladder cancer, tumor to anterior bladder wall s/p chemo.  Op Report: Open cystectomy w/ uretreroileal conduit; prostatectomy. Estimated Blood Loss: 1000 ML.    5/10 -  Hemoglobin: 10.4 -> 9.9 -> 9.5 -> 7.3 -> 6.4     [facility] Medicine Note: Repeat hemoglobin of 6.4. Urine output from that ostomy was maroon.     Family Medicine Note: Anemia likely secondary to blood loss during surgery.     Treatment: Transfusion PRBC's and FFP; serial CBC; monitor urostomy & HITESH output  Risk Factors: Post cystectomy/prostatectomy; HITESH drainage serosanguineous; urostomy w/ red tinged urine.    Thank You,  Amanda Sandhu RN, CCDS  Senior Clinical    [email or web address]  Connect via Voalte Messenger  Options provided:   -- Acute blood loss anemia   -- Acute on chronic blood loss anemia   -- Other type of anemia, (Please specify type)   -- Other explanation, (please specify other explanation)   -- Unable to determine      Query created by: Amanda Sandhu on 5/15/2024 2:38 PM    RESPONSE TEXT:    Unable to determine          Electronically signed by:  INDRA ELIZONDO MD 2024 12:49 PM              "

## 2024-05-16 NOTE — CARE PLAN
The patient is Stable - Low risk of patient condition declining or worsening    Shift Goals: NG tube, Urostomy, HITESH drain  Clinical Goals: NG Tube, Urostomy  Patient Goals: NG tube out  Family Goals: Pt comfort    Progress made toward(s) clinical / shift goals:  Pt resting comfortably in bed, pain well controlled with current medication.  Pt demonstrating increasing comfort with urostomy.  Pt calls appropriately for assistance with OOBA and turns self in bed.  This RN discussed POC and BTD with Pt, who verbalized understanding.

## 2024-05-17 ENCOUNTER — TELEPHONE (OUTPATIENT)
Dept: HEMATOLOGY ONCOLOGY | Facility: MEDICAL CENTER | Age: 79
End: 2024-05-17
Payer: MEDICARE

## 2024-05-17 ENCOUNTER — PHARMACY VISIT (OUTPATIENT)
Dept: PHARMACY | Facility: MEDICAL CENTER | Age: 79
End: 2024-05-17
Payer: COMMERCIAL

## 2024-05-17 VITALS
HEART RATE: 73 BPM | HEIGHT: 67 IN | SYSTOLIC BLOOD PRESSURE: 156 MMHG | BODY MASS INDEX: 26.12 KG/M2 | OXYGEN SATURATION: 94 % | DIASTOLIC BLOOD PRESSURE: 72 MMHG | RESPIRATION RATE: 17 BRPM | TEMPERATURE: 98.1 F | WEIGHT: 166.45 LBS

## 2024-05-17 LAB
ANION GAP SERPL CALC-SCNC: 10 MMOL/L (ref 7–16)
BASOPHILS # BLD AUTO: 0 % (ref 0–1.8)
BASOPHILS # BLD: 0 K/UL (ref 0–0.12)
BUN SERPL-MCNC: 18 MG/DL (ref 8–22)
CALCIUM SERPL-MCNC: 7.9 MG/DL (ref 8.5–10.5)
CHLORIDE SERPL-SCNC: 105 MMOL/L (ref 96–112)
CO2 SERPL-SCNC: 26 MMOL/L (ref 20–33)
CREAT SERPL-MCNC: 0.69 MG/DL (ref 0.5–1.4)
EOSINOPHIL # BLD AUTO: 0.16 K/UL (ref 0–0.51)
EOSINOPHIL NFR BLD: 4.1 % (ref 0–6.9)
ERYTHROCYTE [DISTWIDTH] IN BLOOD BY AUTOMATED COUNT: 48.6 FL (ref 35.9–50)
GFR SERPLBLD CREATININE-BSD FMLA CKD-EPI: 96 ML/MIN/1.73 M 2
GLUCOSE SERPL-MCNC: 107 MG/DL (ref 65–99)
HCT VFR BLD AUTO: 24.7 % (ref 42–52)
HGB BLD-MCNC: 8.3 G/DL (ref 14–18)
IMM GRANULOCYTES # BLD AUTO: 0.01 K/UL (ref 0–0.11)
IMM GRANULOCYTES NFR BLD AUTO: 0.3 % (ref 0–0.9)
LYMPHOCYTES # BLD AUTO: 0.58 K/UL (ref 1–4.8)
LYMPHOCYTES NFR BLD: 14.8 % (ref 22–41)
MCH RBC QN AUTO: 32 PG (ref 27–33)
MCHC RBC AUTO-ENTMCNC: 33.6 G/DL (ref 32.3–36.5)
MCV RBC AUTO: 95.4 FL (ref 81.4–97.8)
MONOCYTES # BLD AUTO: 0.39 K/UL (ref 0–0.85)
MONOCYTES NFR BLD AUTO: 9.9 % (ref 0–13.4)
NEUTROPHILS # BLD AUTO: 2.78 K/UL (ref 1.82–7.42)
NEUTROPHILS NFR BLD: 70.9 % (ref 44–72)
NRBC # BLD AUTO: 0 K/UL
NRBC BLD-RTO: 0 /100 WBC (ref 0–0.2)
PLATELET # BLD AUTO: 117 K/UL (ref 164–446)
PMV BLD AUTO: 10.1 FL (ref 9–12.9)
POTASSIUM SERPL-SCNC: 3.2 MMOL/L (ref 3.6–5.5)
RBC # BLD AUTO: 2.59 M/UL (ref 4.7–6.1)
SODIUM SERPL-SCNC: 141 MMOL/L (ref 135–145)
WBC # BLD AUTO: 3.9 K/UL (ref 4.8–10.8)

## 2024-05-17 PROCEDURE — 85025 COMPLETE CBC W/AUTO DIFF WBC: CPT

## 2024-05-17 PROCEDURE — 700111 HCHG RX REV CODE 636 W/ 250 OVERRIDE (IP): Performed by: UROLOGY

## 2024-05-17 PROCEDURE — 700102 HCHG RX REV CODE 250 W/ 637 OVERRIDE(OP)

## 2024-05-17 PROCEDURE — A9270 NON-COVERED ITEM OR SERVICE: HCPCS

## 2024-05-17 PROCEDURE — 80048 BASIC METABOLIC PNL TOTAL CA: CPT

## 2024-05-17 PROCEDURE — RXMED WILLOW AMBULATORY MEDICATION CHARGE

## 2024-05-17 RX ORDER — ENOXAPARIN SODIUM 100 MG/ML
40 INJECTION SUBCUTANEOUS DAILY
Qty: 26 EACH | Refills: 0 | Status: ACTIVE | OUTPATIENT
Start: 2024-05-17 | End: 2024-06-12

## 2024-05-17 RX ADMIN — NICOTINE 14 MG: 14 PATCH TRANSDERMAL at 04:21

## 2024-05-17 RX ADMIN — TIOTROPIUM BROMIDE INHALATION SPRAY 5 MCG: 3.12 SPRAY, METERED RESPIRATORY (INHALATION) at 04:21

## 2024-05-17 RX ADMIN — HEPARIN 500 UNITS: 100 SYRINGE at 13:28

## 2024-05-17 ASSESSMENT — SOCIAL DETERMINANTS OF HEALTH (SDOH)

## 2024-05-17 NOTE — PROGRESS NOTES
Note to reader: this note follows the APSO format rather than the historical SOAP format. Assessment and plan located at the top of the note for ease of use.    Chief Complaint  78 y.o. year old male here with No chief complaint on file.      Assessment/Plan  Interval History   Active Hospital Problems    Diagnosis     Anemia [D64.9]     JULIA (acute kidney injury) (HCC), resolved [N17.9]     COPD (chronic obstructive pulmonary disease) (HCC) [J44.9]     Tobacco dependence [F17.200]     Peripheral artery disease (HCC) [I73.9]      5/16 POD6. Successful NGT clamp trial yesterday. Highly motivated to discharge tomorrow, has been ambulating frequently. No N/V/F/C, denies belching or hiccups. Passing gas. Multiple BM's this afternoon on CLD, advanced to FLD in PM. 1350cc UOP, 260cc out from drain. H/H stable.      Plan:   - Stopped heparin and will start lovenox 40mg subQ.   - Nursing to teach patient how to self administer lovenox  - Wound care to see patient and provide ostomy teaching  - Pt to continue to ambulate frequently, goal of at least 4x/day  -  If tolerates FLD tonight, will advance to regular diet tomorrow am, and if continues to do well, anticipate possible PM discharge  - Appreciate hospitalist assistance     Case discussed with Dr Cardoso, who has directed this patient's plan of care.      Review of Systems  Physical Exam   Review of Systems   Constitutional:  Negative for chills and fever.   Respiratory:  Negative for shortness of breath.    Cardiovascular:  Negative for chest pain.   Gastrointestinal:  Negative for abdominal pain, nausea and vomiting.   Genitourinary:  Negative for flank pain and hematuria.   All other systems reviewed and are negative.    Vitals:    05/15/24 1909 05/16/24 0345 05/16/24 0723 05/16/24 1512   BP: (!) 144/71 (!) 141/68 (!) 144/65 (!) 154/65   Pulse: 79 71 68 71   Resp: 20 20 17 18   Temp: 36.5 °C (97.7 °F) 36.8 °C (98.2 °F) 36.8 °C (98.2 °F) 36.3 °C (97.4 °F)   TempSrc:  Temporal Temporal Temporal Temporal   SpO2: 90% 95% 94% 97%   Weight:       Height:         Physical Exam  Vitals and nursing note reviewed.   Constitutional:       General: He is not in acute distress.  HENT:      Head: Normocephalic.      Nose: Nose normal.      Mouth/Throat:      Pharynx: Oropharynx is clear.   Eyes:      Conjunctiva/sclera: Conjunctivae normal.   Pulmonary:      Effort: Pulmonary effort is normal.   Abdominal:      General: There is no distension (much improved from yesterday).      Tenderness: There is no abdominal tenderness.      Comments: Midline incision intact, HITESH present LLQ with scant serosang fluid in bulb. Urostomy in RLQ with clear yellow urine in tubing.   Musculoskeletal:         General: Normal range of motion.      Cervical back: Normal range of motion.   Skin:     General: Skin is warm and dry.   Neurological:      General: No focal deficit present.      Mental Status: He is alert and oriented to person, place, and time.   Psychiatric:         Mood and Affect: Mood normal.         Behavior: Behavior normal.          Hematology Chemistry   Lab Results   Component Value Date/Time    WBC 3.9 (L) 05/16/2024 02:45 AM    HEMOGLOBIN 8.1 (L) 05/16/2024 02:45 AM    HEMATOCRIT 22.8 (L) 05/16/2024 02:45 AM    PLATELETCT 110 (L) 05/16/2024 02:45 AM     Lab Results   Component Value Date/Time    SODIUM 142 05/16/2024 02:45 AM    POTASSIUM 3.3 (L) 05/16/2024 02:45 AM    CHLORIDE 106 05/16/2024 02:45 AM    CO2 25 05/16/2024 02:45 AM    GLUCOSE 116 (H) 05/16/2024 02:45 AM    BUN 23 (H) 05/16/2024 02:45 AM    CREATININE 0.77 05/16/2024 02:45 AM         Labs not explicitly included in this progress note were reviewed by the author.   Radiology/imaging not explicitly included in this progress note was reviewed by the author.     Medications reviewed, Labs reviewed and Radiology images reviewed

## 2024-05-17 NOTE — PROGRESS NOTES
Assumed care of patient at 0700. Patient is alert and oriented, respirations are unlabored and regular on room air. Patient sitting up in bed at this time. Denies pain. Lung sounds clear throughout, diminished in bases. Abdomen soft, tender, +bowel sounds, midline incision with durmabond, CDI, RLQ urostomy with +yellow/pink output, LLQ HITESH with serous output, BM 5/16. Bed in lowest position, call light within reach, ostomy teaching has been completed by wound team, patient eager to discharge home, patient states no needs at this time.

## 2024-05-17 NOTE — DISCHARGE INSTRUCTIONS
Discharge Instructions    Discharged to home by car with relative. Discharged via wheelchair, hospital escort: Yes.  Special equipment needed: Cane    Be sure to schedule a follow-up appointment with your primary care doctor or any specialists as instructed.     Discharge Plan:   Diet Plan: Discussed  Activity Level: Discussed  Confirmed Follow up Appointment: Patient to Call and Schedule Appointment  Confirmed Symptoms Management: Discussed  Medication Reconciliation Updated: Yes    I understand that a diet low in cholesterol, fat, and sodium is recommended for good health. Unless I have been given specific instructions below for another diet, I accept this instruction as my diet prescription.   Other diet: None    Special Instructions: None    -Is this patient being discharged with medication to prevent blood clots?  Yes, Lovenox Enoxaparin Injection  What is this medication?  ENOXAPARIN (ee nox a PA rin) prevents or treats blood clots. It belongs to a group of medications called blood thinners.  This medicine may be used for other purposes; ask your health care provider or pharmacist if you have questions.  COMMON BRAND NAME(S): Lovenox  What should I tell my care team before I take this medication?  They need to know if you have any of these conditions:  Bleeding disorders, hemorrhage, or hemophilia  Infection of the heart or heart valves  Kidney or liver disease  Previous stroke  Prosthetic heart valve  Recent surgery or delivery of a baby  Ulcer in the stomach or intestine, diverticulitis, or other bowel disease  An unusual or allergic reaction to enoxaparin, heparin, pork or pork products, other medications, foods, dyes, or preservatives  Pregnant or trying to get pregnant  Breast-feeding  How should I use this medication?  This medication is injected under the skin. You will be taught how to prepare and give it. For your therapy to work as well as possible, take each dose exactly as prescribed on the  prescription label. Do not skip doses. Skipping or stopping this medication can increase your risk of a blood clot. Keep taking this medication unless your care team tells you to stop.  It is important that you put your used needles and syringes in a special sharps container. Do not put them in a trash can. If you do not have a sharps container, call your care team to get one.  This medication comes with INSTRUCTIONS FOR USE. Ask your pharmacist for directions on how to use this medicine. Read the information carefully. Talk to your care team if you have questions.  Talk to your care team about use of this medication in children. Special care may be needed.  Overdosage: If you think you have taken too much of this medicine contact a poison control center or emergency room at once.  NOTE: This medicine is only for you. Do not share this medicine with others.  What if I miss a dose?  If you miss a dose, take it as soon as you can. If it is almost time for your next dose, take only that dose. Do not take double or extra doses.  What may interact with this medication?  Aspirin and aspirin-like medications  Certain medications that treat or prevent blood clots  Dipyridamole  NSAIDs, medications for pain and inflammation, like ibuprofen or naproxen  This list may not describe all possible interactions. Give your health care provider a list of all the medicines, herbs, non-prescription drugs, or dietary supplements you use. Also tell them if you smoke, drink alcohol, or use illegal drugs. Some items may interact with your medicine.  What should I watch for while using this medication?  Visit your care team for regular checks on your progress. You may need blood work done while you are taking this medication. Your condition will be monitored carefully while you are receiving this medication. It is important not to miss any appointments.  If you are going to need surgery or other procedure, tell your care team that you are  using this medication.  Using this medication for a long time may weaken your bones and increase the risk of bone fractures.  Avoid sports and activities that might cause injury while you are using this medication. Severe falls or injuries can cause unseen bleeding. Be careful when using sharp tools or knives. Consider using an electric razor. Take special care brushing or flossing your teeth. Report any injuries, bruising, or red spots on the skin to your care team.  Wear a medical ID bracelet or chain. Carry a card that describes your disease and details of your medication and dosage times.  What side effects may I notice from receiving this medication?  Side effects that you should report to your care team as soon as possible:  Allergic reactions--skin rash, itching, hives, swelling of the face, lips, tongue, or throat  Bleeding--bloody or black, tar-like stools, vomiting blood or brown material that looks like coffee grounds, red or dark brown urine, small red or purple spots on skin, unusual bruising or bleeding  Side effects that usually do not require medical attention (report to your care team if they continue or are bothersome):  Pain, redness, or irritation at the injection site  Swelling of the ankles, hands, or feet  This list may not describe all possible side effects. Call your doctor for medical advice about side effects. You may report side effects to FDA at 1-265-FDA-3257.  Where should I keep my medication?  Keep out of the reach of children.  Store at room temperature between 15 and 30 degrees C (59 and 86 degrees F). Do not freeze. If your injections have been specially prepared, you may need to store them in the refrigerator. Ask your pharmacist. Throw away any unused medication after the expiration date.  NOTE: This sheet is a summary. It may not cover all possible information. If you have questions about this medicine, talk to your doctor, pharmacist, or health care provider.  © 2023  [employer]vier/Gold Standard (2022-02-22 00:00:00)    Is patient discharged on Warfarin / Coumadin?   No

## 2024-05-17 NOTE — CARE PLAN
The patient is Stable - Low risk of patient condition declining or worsening    Shift Goals  Clinical Goals: ambulate, advance diet, urostomy mgmt  Patient Goals: advance diet  Family Goals: Pt comfort    Progress made toward(s) clinical / shift goals:  Pt ambulated in hallway and room during shift. Pt diet was advanced during shift, pt urostomy was managed during shift.       Problem: Skin Care - Ostomy  Goal: Skin remains free from irritation  Outcome: Progressing     Problem: Knowledge Deficit - Standard  Goal: Patient and family/care givers will demonstrate understanding of plan of care, disease process/condition, diagnostic tests and medications  Outcome: Progressing     Problem: Discharge Barriers/Self-Care - Ostomy  Goal: Ostomy patient's continuum of care needs will be met  Outcome: Progressing       Patient is not progressing towards the following goals:

## 2024-05-17 NOTE — TELEPHONE ENCOUNTER
Patient calling from hospital bed, to cancel Monday's appointment.    He will call back at a later date to reschedule.

## 2024-05-17 NOTE — CARE PLAN
The patient is Stable - Low risk of patient condition declining or worsening    Shift Goals  Clinical Goals: Ambulation, monitor for recurrence of nausea or vomiting. education  Patient Goals: go home in AM.  Family Goals: Pt comfort    Progress made toward(s) clinical / shift goals:  Patient up to ambulate during shift. Denies any nausea or vomiting at this time.     Patient is not progressing towards the following goals:

## 2024-05-17 NOTE — DISCHARGE PLANNING
TCN following. HTH/SCP chart reviewed. No new TCN needs identified. Per chart patient ambulated 350 feet with FWW.  Noted per CM note FWW at bedside.  Please see prior TCN note from 5/16 for most recent discharge planning considerations if indicated.     Completed:  PT recommends Home Health on 5/16/24.   PT also recommending FWW   OT recommends Home Health on 5/14/24.    Choice obtained: HH ([facility] has accepted), DME (02 via [facility] and AD via [facility]).   SCP with [facility] PCP. PCP f/u scheduled for 5/23

## 2024-05-17 NOTE — PROGRESS NOTES
"Bedside report received.  Assessment complete.  A&O x 4. Patient calls appropriately.  Patient ambulates with STBY assist. Bed alarm off.   Patient has 0/10 pain. Pain managed with prescribed medications.  Denies N&V. Tolerating full liquid diet. To start regular at breakfast  + void, + flatus, + BM.  Patient denies SOB.  Patient calm and cooperative with staff and POC at this time.  Review plan with of care with patient. Call light and personal belongings within reach. Hourly rounding in place. All needs met at this time.    /55   Pulse 84   Temp 37.1 °C (98.8 °F) (Temporal)   Resp 18   Ht 1.689 m (5' 6.5\")   Wt 75.5 kg (166 lb 7.2 oz)   SpO2 96%   BMI 26.46 kg/m²     "

## 2024-05-17 NOTE — DISCHARGE SUMMARY
Discharge Summary    CHIEF COMPLAINT ON ADMISSION  No chief complaint on file.      Reason for Admission  Bladder cancer (HCC)     Admission Date  5/10/2024    CODE STATUS  Full Code    HPI & HOSPITAL COURSE  This is a 78 y.o. male here with muscle invasive bladder cancer, who underwent open radical cystoprostatectomy with creation of ileal conduit on 5/10 with Dr Cardoso. His hospital course was relatively uncomplicated. On POD4 he did develop ileus, but this resolved after ~48 hours with an NGT. On POD7 he is seen and examined, sitting up in bed in NAD. He has been ambulating without difficulty, passing bowel movements regularly, tolerating a regular diet without nausea or vomiting, and has demonstrated proficiency in caring for his urostomy and self-injecting lovenox. His pain is controlled without the use of pain medications, and his labs and vitals are stable.  No notes on file    Therefore, he is discharged in good and stable condition to home with close outpatient follow-up.    The patient recovered much more quickly than anticipated on admission.    Discharge Date  05/17/24      FOLLOW UP ITEMS POST DISCHARGE  Please keep follow up appointment on 5/23 with Dr Cardoso    DISCHARGE DIAGNOSES  Active Problems:    Peripheral artery disease (HCC) (POA: Yes)    Tobacco dependence (POA: Yes)    COPD (chronic obstructive pulmonary disease) (HCC) (POA: Yes)    JULIA (acute kidney injury) (HCC), resolved (POA: Unknown)    Anemia (POA: Unknown)  Resolved Problems:    * No resolved hospital problems. *      FOLLOW UP  Future Appointments   Date Time Provider Department Center   5/23/2024 11:00 AM Mykel Gipson M.D. [facility]     No follow-up provider specified.    MEDICATIONS ON DISCHARGE     Medication List        START taking these medications        Instructions   enoxaparin 40 MG/0.4ML Sosy inj  Commonly known as: Lovenox   Inject 40 mg under the skin every day for 26 days.  Dose: 40 mg            CONTINUE  taking these medications        Instructions   acetaminophen 500 MG Tabs  Commonly known as: Tylenol   Take 1,000 mg by mouth every 6 hours as needed for Moderate Pain.  Dose: 1,000 mg     atorvastatin 40 MG Tabs  Commonly known as: Lipitor   Take 1 Tablet by mouth every evening.  Dose: 40 mg     doxazosin 2 MG Tabs  Commonly known as: Cardura   Take 2 mg by mouth every day.  Dose: 2 mg     ibuprofen 200 MG Tabs  Commonly known as: Motrin   Take 800 mg by mouth every 6 hours as needed for Inflammation.  Dose: 800 mg     lidocaine-prilocaine 2.5-2.5 % Crea  Commonly known as: Emla   Doctor's comments: Directions: Apply to port 1 hour prior to access of port and cover with plastic wrap.  Apply to port one hour prior to access and cover with plastic wrap.     MULTIVITAMIN ADULTS 50+ PO   Take 1 Tablet by mouth every day.  Dose: 1 Tablet     nicotine 21 MG/24HR Pt24  Commonly known as: Nicoderm   Place 1 Patch on the skin every 24 hours.  Dose: 1 Patch     ondansetron 4 MG Tabs tablet  Commonly known as: Zofran   Take 1 Tablet by mouth every four hours as needed for Nausea/Vomiting (for nausea, vomiting).  Dose: 4 mg     prochlorperazine 10 MG Tabs  Commonly known as: Compazine   Take 1 Tablet by mouth every 6 hours as needed (for nausea, vomiting).  Dose: 10 mg     tiotropium 18 MCG Caps  Commonly known as: Spiriva   Inhale 1 puff by mouth once daily            STOP taking these medications      tamsulosin 0.4 MG capsule  Commonly known as: Flomax              Allergies  No Known Allergies    DIET  Orders Placed This Encounter   Procedures    Diet Order Diet: Regular (soft solids, no red meats or raw vegetables)     Standing Status:   Standing     Number of Occurrences:   1     Order Specific Question:   Diet:     Answer:   Regular [1]     Comments:   soft solids, no red meats or raw vegetables       ACTIVITY  Light duty.  10-lb lifting restriction    CONSULTATIONS  Internal medicine    PROCEDURES  Open radical  cystoprostatectomy with ileal conduit creation on 5/10 with Dr Cardoso.    LABORATORY  Lab Results   Component Value Date    SODIUM 141 05/17/2024    POTASSIUM 3.2 (L) 05/17/2024    CHLORIDE 105 05/17/2024    CO2 26 05/17/2024    GLUCOSE 107 (H) 05/17/2024    BUN 18 05/17/2024    CREATININE 0.69 05/17/2024        Lab Results   Component Value Date    WBC 3.9 (L) 05/17/2024    HEMOGLOBIN 8.3 (L) 05/17/2024    HEMATOCRIT 24.7 (L) 05/17/2024    PLATELETCT 117 (L) 05/17/2024        Total time of the discharge process exceeds 35 minutes.

## 2024-05-17 NOTE — PROGRESS NOTES
Pt discharged to home with family via car. A&O x 4, on RA. Discharge instructions reviewed, and signed. IV removed. Pt vocalized understanding of AVS, plan of care, and medications. All questions answered at this time. Pt escorted out with staff.

## 2024-05-20 ENCOUNTER — APPOINTMENT (OUTPATIENT)
Dept: HEMATOLOGY ONCOLOGY | Facility: MEDICAL CENTER | Age: 79
End: 2024-05-20
Payer: MEDICARE

## 2024-05-21 DIAGNOSIS — F17.200 TOBACCO DEPENDENCE: ICD-10-CM

## 2024-05-21 RX ORDER — NICOTINE 21 MG/24HR
1 PATCH, TRANSDERMAL 24 HOURS TRANSDERMAL EVERY 24 HOURS
Qty: 30 PATCH | Refills: 2 | Status: SHIPPED | OUTPATIENT
Start: 2024-05-21

## 2024-05-22 ENCOUNTER — TELEPHONE (OUTPATIENT)
Dept: HEALTH INFORMATION MANAGEMENT | Facility: OTHER | Age: 79
End: 2024-05-22
Payer: MEDICARE

## 2024-05-23 ENCOUNTER — APPOINTMENT (OUTPATIENT)
Dept: MEDICAL GROUP | Facility: CLINIC | Age: 79
End: 2024-05-23
Payer: MEDICARE

## 2024-05-23 ENCOUNTER — HOSPITAL ENCOUNTER (OUTPATIENT)
Facility: MEDICAL CENTER | Age: 79
End: 2024-05-23
Attending: UROLOGY
Payer: MEDICARE

## 2024-05-23 LAB
ANION GAP SERPL CALC-SCNC: 13 MMOL/L (ref 7–16)
BASOPHILS # BLD AUTO: 0 % (ref 0–1.8)
BASOPHILS # BLD: 0 K/UL (ref 0–0.12)
BUN SERPL-MCNC: 16 MG/DL (ref 8–22)
CALCIUM SERPL-MCNC: 8.4 MG/DL (ref 8.5–10.5)
CHLORIDE SERPL-SCNC: 100 MMOL/L (ref 96–112)
CO2 SERPL-SCNC: 23 MMOL/L (ref 20–33)
CREAT SERPL-MCNC: 0.87 MG/DL (ref 0.5–1.4)
EOSINOPHIL # BLD AUTO: 0.04 K/UL (ref 0–0.51)
EOSINOPHIL NFR BLD: 0.9 % (ref 0–6.9)
ERYTHROCYTE [DISTWIDTH] IN BLOOD BY AUTOMATED COUNT: 48.8 FL (ref 35.9–50)
GFR SERPLBLD CREATININE-BSD FMLA CKD-EPI: 89 ML/MIN/1.73 M 2
GLUCOSE SERPL-MCNC: 106 MG/DL (ref 65–99)
HCT VFR BLD AUTO: 26.2 % (ref 42–52)
HGB BLD-MCNC: 8.9 G/DL (ref 14–18)
LYMPHOCYTES # BLD AUTO: 0.4 K/UL (ref 1–4.8)
LYMPHOCYTES NFR BLD: 10.3 % (ref 22–41)
MANUAL DIFF BLD: NORMAL
MCH RBC QN AUTO: 32 PG (ref 27–33)
MCHC RBC AUTO-ENTMCNC: 34 G/DL (ref 32.3–36.5)
MCV RBC AUTO: 94.2 FL (ref 81.4–97.8)
MONOCYTES # BLD AUTO: 0.17 K/UL (ref 0–0.85)
MONOCYTES NFR BLD AUTO: 4.3 % (ref 0–13.4)
MORPHOLOGY BLD-IMP: NORMAL
NEUTROPHILS # BLD AUTO: 3.3 K/UL (ref 1.82–7.42)
NEUTROPHILS NFR BLD: 84.5 % (ref 44–72)
NRBC # BLD AUTO: 0 K/UL
NRBC BLD-RTO: 0 /100 WBC (ref 0–0.2)
OVALOCYTES BLD QL SMEAR: NORMAL
PLATELET # BLD AUTO: 198 K/UL (ref 164–446)
PLATELET BLD QL SMEAR: NORMAL
PMV BLD AUTO: 10.6 FL (ref 9–12.9)
POIKILOCYTOSIS BLD QL SMEAR: NORMAL
POTASSIUM SERPL-SCNC: 3.7 MMOL/L (ref 3.6–5.5)
RBC # BLD AUTO: 2.78 M/UL (ref 4.7–6.1)
RBC BLD AUTO: PRESENT
SODIUM SERPL-SCNC: 136 MMOL/L (ref 135–145)
WBC # BLD AUTO: 3.9 K/UL (ref 4.8–10.8)

## 2024-05-24 ENCOUNTER — HOSPITAL ENCOUNTER (INPATIENT)
Facility: MEDICAL CENTER | Age: 79
LOS: 1 days | DRG: 863 | End: 2024-05-25
Attending: EMERGENCY MEDICINE | Admitting: INTERNAL MEDICINE
Payer: MEDICARE

## 2024-05-24 ENCOUNTER — APPOINTMENT (OUTPATIENT)
Dept: RADIOLOGY | Facility: MEDICAL CENTER | Age: 79
DRG: 863 | End: 2024-05-24
Attending: EMERGENCY MEDICINE
Payer: MEDICARE

## 2024-05-24 ENCOUNTER — APPOINTMENT (OUTPATIENT)
Dept: MEDICAL GROUP | Facility: CLINIC | Age: 79
End: 2024-05-24
Payer: MEDICARE

## 2024-05-24 DIAGNOSIS — T81.9XXA COMPLICATION OF PROCEDURE, INITIAL ENCOUNTER: ICD-10-CM

## 2024-05-24 DIAGNOSIS — L08.9 SOFT TISSUE INFECTION: ICD-10-CM

## 2024-05-24 PROBLEM — A41.9 SEPSIS (HCC): Status: ACTIVE | Noted: 2024-05-24

## 2024-05-24 PROBLEM — Z71.89 ACP (ADVANCE CARE PLANNING): Status: ACTIVE | Noted: 2022-08-26

## 2024-05-24 LAB
ALBUMIN SERPL BCP-MCNC: 2.9 G/DL (ref 3.2–4.9)
ALBUMIN/GLOB SERPL: 1 G/DL
ALP SERPL-CCNC: 94 U/L (ref 30–99)
ALT SERPL-CCNC: 24 U/L (ref 2–50)
ANION GAP SERPL CALC-SCNC: 12 MMOL/L (ref 7–16)
APPEARANCE UR: CLEAR
AST SERPL-CCNC: 28 U/L (ref 12–45)
BACTERIA #/AREA URNS HPF: ABNORMAL /HPF
BASOPHILS # BLD AUTO: 0.9 % (ref 0–1.8)
BASOPHILS # BLD: 0.03 K/UL (ref 0–0.12)
BILIRUB SERPL-MCNC: 0.6 MG/DL (ref 0.1–1.5)
BILIRUB UR QL STRIP.AUTO: NEGATIVE
BUN SERPL-MCNC: 16 MG/DL (ref 8–22)
CALCIUM ALBUM COR SERPL-MCNC: 9.4 MG/DL (ref 8.5–10.5)
CALCIUM SERPL-MCNC: 8.5 MG/DL (ref 8.5–10.5)
CHLORIDE SERPL-SCNC: 99 MMOL/L (ref 96–112)
CO2 SERPL-SCNC: 23 MMOL/L (ref 20–33)
COLOR UR: YELLOW
CREAT SERPL-MCNC: 0.81 MG/DL (ref 0.5–1.4)
CRP SERPL HS-MCNC: 24.31 MG/DL (ref 0–0.75)
EOSINOPHIL # BLD AUTO: 0.02 K/UL (ref 0–0.51)
EOSINOPHIL NFR BLD: 0.8 % (ref 0–6.9)
EPI CELLS #/AREA URNS HPF: NEGATIVE /HPF
ERYTHROCYTE [DISTWIDTH] IN BLOOD BY AUTOMATED COUNT: 47.8 FL (ref 35.9–50)
GFR SERPLBLD CREATININE-BSD FMLA CKD-EPI: 91 ML/MIN/1.73 M 2
GLOBULIN SER CALC-MCNC: 2.8 G/DL (ref 1.9–3.5)
GLUCOSE SERPL-MCNC: 118 MG/DL (ref 65–99)
GLUCOSE UR STRIP.AUTO-MCNC: NEGATIVE MG/DL
HCT VFR BLD AUTO: 24.8 % (ref 42–52)
HGB BLD-MCNC: 8.4 G/DL (ref 14–18)
HYALINE CASTS #/AREA URNS LPF: ABNORMAL /LPF
INR PPP: 1.08 (ref 0.87–1.13)
KETONES UR STRIP.AUTO-MCNC: NEGATIVE MG/DL
LACTATE SERPL-SCNC: 1.1 MMOL/L (ref 0.5–2)
LACTATE SERPL-SCNC: 1.4 MMOL/L (ref 0.5–2)
LEUKOCYTE ESTERASE UR QL STRIP.AUTO: ABNORMAL
LIPASE SERPL-CCNC: 12 U/L (ref 11–82)
LYMPHOCYTES # BLD AUTO: 0.25 K/UL (ref 1–4.8)
LYMPHOCYTES NFR BLD: 8.6 % (ref 22–41)
MANUAL DIFF BLD: NORMAL
MCH RBC QN AUTO: 31.7 PG (ref 27–33)
MCHC RBC AUTO-ENTMCNC: 33.9 G/DL (ref 32.3–36.5)
MCV RBC AUTO: 93.6 FL (ref 81.4–97.8)
MICRO URNS: ABNORMAL
MONOCYTES # BLD AUTO: 0.15 K/UL (ref 0–0.85)
MONOCYTES NFR BLD AUTO: 5.2 % (ref 0–13.4)
MORPHOLOGY BLD-IMP: NORMAL
NEUTROPHILS # BLD AUTO: 2.45 K/UL (ref 1.82–7.42)
NEUTROPHILS NFR BLD: 84.5 % (ref 44–72)
NITRITE UR QL STRIP.AUTO: POSITIVE
NRBC # BLD AUTO: 0 K/UL
NRBC BLD-RTO: 0 /100 WBC (ref 0–0.2)
PH UR STRIP.AUTO: 6.5 [PH] (ref 5–8)
PLATELET # BLD AUTO: 209 K/UL (ref 164–446)
PLATELET BLD QL SMEAR: NORMAL
PMV BLD AUTO: 10 FL (ref 9–12.9)
POTASSIUM SERPL-SCNC: 3.7 MMOL/L (ref 3.6–5.5)
PROCALCITONIN SERPL-MCNC: 0.15 NG/ML
PROT SERPL-MCNC: 5.7 G/DL (ref 6–8.2)
PROT UR QL STRIP: 30 MG/DL
PROTHROMBIN TIME: 14.2 SEC (ref 12–14.6)
RBC # BLD AUTO: 2.65 M/UL (ref 4.7–6.1)
RBC # URNS HPF: ABNORMAL /HPF
RBC BLD AUTO: NORMAL
RBC UR QL AUTO: ABNORMAL
SODIUM SERPL-SCNC: 134 MMOL/L (ref 135–145)
SP GR UR STRIP.AUTO: 1.02
UROBILINOGEN UR STRIP.AUTO-MCNC: 0.2 MG/DL
WBC # BLD AUTO: 2.9 K/UL (ref 4.8–10.8)
WBC #/AREA URNS HPF: ABNORMAL /HPF

## 2024-05-24 PROCEDURE — 36415 COLL VENOUS BLD VENIPUNCTURE: CPT

## 2024-05-24 PROCEDURE — A9270 NON-COVERED ITEM OR SERVICE: HCPCS | Performed by: INTERNAL MEDICINE

## 2024-05-24 PROCEDURE — 85007 BL SMEAR W/DIFF WBC COUNT: CPT

## 2024-05-24 PROCEDURE — 83605 ASSAY OF LACTIC ACID: CPT | Mod: 91

## 2024-05-24 PROCEDURE — 87077 CULTURE AEROBIC IDENTIFY: CPT

## 2024-05-24 PROCEDURE — 85610 PROTHROMBIN TIME: CPT

## 2024-05-24 PROCEDURE — 700105 HCHG RX REV CODE 258: Performed by: INTERNAL MEDICINE

## 2024-05-24 PROCEDURE — 99497 ADVNCD CARE PLAN 30 MIN: CPT | Mod: 25 | Performed by: INTERNAL MEDICINE

## 2024-05-24 PROCEDURE — 87186 SC STD MICRODIL/AGAR DIL: CPT

## 2024-05-24 PROCEDURE — 99406 BEHAV CHNG SMOKING 3-10 MIN: CPT | Performed by: INTERNAL MEDICINE

## 2024-05-24 PROCEDURE — 99223 1ST HOSP IP/OBS HIGH 75: CPT | Mod: AI | Performed by: INTERNAL MEDICINE

## 2024-05-24 PROCEDURE — 84145 PROCALCITONIN (PCT): CPT

## 2024-05-24 PROCEDURE — 700117 HCHG RX CONTRAST REV CODE 255: Performed by: EMERGENCY MEDICINE

## 2024-05-24 PROCEDURE — 770001 HCHG ROOM/CARE - MED/SURG/GYN PRIV*

## 2024-05-24 PROCEDURE — 83690 ASSAY OF LIPASE: CPT

## 2024-05-24 PROCEDURE — 87086 URINE CULTURE/COLONY COUNT: CPT

## 2024-05-24 PROCEDURE — 99406 BEHAV CHNG SMOKING 3-10 MIN: CPT

## 2024-05-24 PROCEDURE — 96374 THER/PROPH/DIAG INJ IV PUSH: CPT

## 2024-05-24 PROCEDURE — 87040 BLOOD CULTURE FOR BACTERIA: CPT

## 2024-05-24 PROCEDURE — 86140 C-REACTIVE PROTEIN: CPT

## 2024-05-24 PROCEDURE — 81001 URINALYSIS AUTO W/SCOPE: CPT

## 2024-05-24 PROCEDURE — 700111 HCHG RX REV CODE 636 W/ 250 OVERRIDE (IP): Mod: JZ | Performed by: INTERNAL MEDICINE

## 2024-05-24 PROCEDURE — 80053 COMPREHEN METABOLIC PANEL: CPT

## 2024-05-24 PROCEDURE — 85027 COMPLETE CBC AUTOMATED: CPT

## 2024-05-24 PROCEDURE — 700102 HCHG RX REV CODE 250 W/ 637 OVERRIDE(OP): Performed by: INTERNAL MEDICINE

## 2024-05-24 PROCEDURE — 87147 CULTURE TYPE IMMUNOLOGIC: CPT

## 2024-05-24 PROCEDURE — 99285 EMERGENCY DEPT VISIT HI MDM: CPT

## 2024-05-24 PROCEDURE — 700111 HCHG RX REV CODE 636 W/ 250 OVERRIDE (IP): Mod: JZ | Performed by: EMERGENCY MEDICINE

## 2024-05-24 PROCEDURE — 74177 CT ABD & PELVIS W/CONTRAST: CPT

## 2024-05-24 RX ORDER — AMOXICILLIN 250 MG
2 CAPSULE ORAL EVERY EVENING
Status: DISCONTINUED | OUTPATIENT
Start: 2024-05-24 | End: 2024-05-25 | Stop reason: HOSPADM

## 2024-05-24 RX ORDER — FUROSEMIDE 10 MG/ML
40 INJECTION INTRAMUSCULAR; INTRAVENOUS ONCE
Qty: 4 ML | Refills: 0 | Status: COMPLETED | OUTPATIENT
Start: 2024-05-24 | End: 2024-05-24

## 2024-05-24 RX ORDER — ATORVASTATIN CALCIUM 40 MG/1
40 TABLET, FILM COATED ORAL EVERY EVENING
Status: DISCONTINUED | OUTPATIENT
Start: 2024-05-24 | End: 2024-05-25 | Stop reason: HOSPADM

## 2024-05-24 RX ORDER — SILDENAFIL 25 MG/1
12.5 TABLET, FILM COATED ORAL
COMMUNITY

## 2024-05-24 RX ORDER — SENNOSIDES 8.6 MG
1300 CAPSULE ORAL 3 TIMES DAILY PRN
COMMUNITY

## 2024-05-24 RX ORDER — SODIUM CHLORIDE 9 MG/ML
INJECTION, SOLUTION INTRAVENOUS CONTINUOUS
Status: DISCONTINUED | OUTPATIENT
Start: 2024-05-24 | End: 2024-05-24

## 2024-05-24 RX ORDER — ONDANSETRON 2 MG/ML
4 INJECTION INTRAMUSCULAR; INTRAVENOUS EVERY 4 HOURS PRN
Status: DISCONTINUED | OUTPATIENT
Start: 2024-05-24 | End: 2024-05-25 | Stop reason: HOSPADM

## 2024-05-24 RX ORDER — ACETAMINOPHEN 325 MG/1
650 TABLET ORAL EVERY 6 HOURS PRN
Status: DISCONTINUED | OUTPATIENT
Start: 2024-05-24 | End: 2024-05-25 | Stop reason: HOSPADM

## 2024-05-24 RX ORDER — POLYETHYLENE GLYCOL 3350 17 G/17G
1 POWDER, FOR SOLUTION ORAL
Status: DISCONTINUED | OUTPATIENT
Start: 2024-05-24 | End: 2024-05-25 | Stop reason: HOSPADM

## 2024-05-24 RX ORDER — ENOXAPARIN SODIUM 100 MG/ML
40 INJECTION SUBCUTANEOUS DAILY
Status: DISCONTINUED | OUTPATIENT
Start: 2024-05-24 | End: 2024-05-25 | Stop reason: HOSPADM

## 2024-05-24 RX ORDER — CEFDINIR 300 MG/1
300 CAPSULE ORAL EVERY 12 HOURS
Status: ON HOLD | COMMUNITY
Start: 2024-05-23 | End: 2024-05-25

## 2024-05-24 RX ORDER — CEFTRIAXONE 2 G/1
2000 INJECTION, POWDER, FOR SOLUTION INTRAMUSCULAR; INTRAVENOUS ONCE
Status: COMPLETED | OUTPATIENT
Start: 2024-05-24 | End: 2024-05-24

## 2024-05-24 RX ORDER — SODIUM CHLORIDE, SODIUM LACTATE, POTASSIUM CHLORIDE, AND CALCIUM CHLORIDE .6; .31; .03; .02 G/100ML; G/100ML; G/100ML; G/100ML
500 INJECTION, SOLUTION INTRAVENOUS
Status: DISCONTINUED | OUTPATIENT
Start: 2024-05-24 | End: 2024-05-25 | Stop reason: HOSPADM

## 2024-05-24 RX ORDER — DOXAZOSIN 2 MG/1
2 TABLET ORAL DAILY
Status: DISCONTINUED | OUTPATIENT
Start: 2024-05-24 | End: 2024-05-24

## 2024-05-24 RX ORDER — ONDANSETRON 4 MG/1
4 TABLET, ORALLY DISINTEGRATING ORAL EVERY 4 HOURS PRN
Status: DISCONTINUED | OUTPATIENT
Start: 2024-05-24 | End: 2024-05-25 | Stop reason: HOSPADM

## 2024-05-24 RX ADMIN — ENOXAPARIN SODIUM 40 MG: 100 INJECTION SUBCUTANEOUS at 17:43

## 2024-05-24 RX ADMIN — ATORVASTATIN CALCIUM 40 MG: 40 TABLET, FILM COATED ORAL at 17:44

## 2024-05-24 RX ADMIN — CEFTRIAXONE SODIUM 2000 MG: 2 INJECTION, POWDER, FOR SOLUTION INTRAMUSCULAR; INTRAVENOUS at 13:56

## 2024-05-24 RX ADMIN — VANCOMYCIN HYDROCHLORIDE 1750 MG: 5 INJECTION, POWDER, LYOPHILIZED, FOR SOLUTION INTRAVENOUS at 20:32

## 2024-05-24 RX ADMIN — IOHEXOL 100 ML: 350 INJECTION, SOLUTION INTRAVENOUS at 12:45

## 2024-05-24 RX ADMIN — FUROSEMIDE 40 MG: 10 INJECTION INTRAMUSCULAR; INTRAVENOUS at 17:44

## 2024-05-24 SDOH — ECONOMIC STABILITY: TRANSPORTATION INSECURITY
IN THE PAST 12 MONTHS, HAS THE LACK OF TRANSPORTATION KEPT YOU FROM MEDICAL APPOINTMENTS OR FROM GETTING MEDICATIONS?: PATIENT DECLINED

## 2024-05-24 SDOH — ECONOMIC STABILITY: TRANSPORTATION INSECURITY
IN THE PAST 12 MONTHS, HAS LACK OF RELIABLE TRANSPORTATION KEPT YOU FROM MEDICAL APPOINTMENTS, MEETINGS, WORK OR FROM GETTING THINGS NEEDED FOR DAILY LIVING?: PATIENT DECLINED

## 2024-05-24 ASSESSMENT — LIFESTYLE VARIABLES
HAVE PEOPLE ANNOYED YOU BY CRITICIZING YOUR DRINKING: NO
DO YOU DRINK ALCOHOL: NO
TOTAL SCORE: 0
EVER FELT BAD OR GUILTY ABOUT YOUR DRINKING: NO
ON A TYPICAL DAY WHEN YOU DRINK ALCOHOL HOW MANY DRINKS DO YOU HAVE: 0
ALCOHOL_USE: NO
TOTAL SCORE: 0
AVERAGE NUMBER OF DAYS PER WEEK YOU HAVE A DRINK CONTAINING ALCOHOL: 0
CONSUMPTION TOTAL: NEGATIVE
DOES PATIENT WANT TO STOP DRINKING: NO
DOES PATIENT WANT TO STOP DRINKING: NO
EVER HAD A DRINK FIRST THING IN THE MORNING TO STEADY YOUR NERVES TO GET RID OF A HANGOVER: NO
HAVE YOU EVER FELT YOU SHOULD CUT DOWN ON YOUR DRINKING: NO
TOTAL SCORE: 0
HOW MANY TIMES IN THE PAST YEAR HAVE YOU HAD 5 OR MORE DRINKS IN A DAY: 0

## 2024-05-24 ASSESSMENT — COGNITIVE AND FUNCTIONAL STATUS - GENERAL
DAILY ACTIVITIY SCORE: 21
WALKING IN HOSPITAL ROOM: A LITTLE
SUGGESTED CMS G CODE MODIFIER MOBILITY: CJ
TURNING FROM BACK TO SIDE WHILE IN FLAT BAD: A LITTLE
TOILETING: A LITTLE
MOVING FROM LYING ON BACK TO SITTING ON SIDE OF FLAT BED: A LITTLE
MOBILITY SCORE: 20
HELP NEEDED FOR BATHING: A LITTLE
CLIMB 3 TO 5 STEPS WITH RAILING: A LITTLE
DRESSING REGULAR UPPER BODY CLOTHING: A LITTLE
SUGGESTED CMS G CODE MODIFIER DAILY ACTIVITY: CJ

## 2024-05-24 ASSESSMENT — ENCOUNTER SYMPTOMS
FEVER: 0
ORTHOPNEA: 0
VOMITING: 0
WEIGHT LOSS: 0
BLURRED VISION: 0
ABDOMINAL PAIN: 1
SPEECH CHANGE: 0
DIZZINESS: 0
PHOTOPHOBIA: 0
CONSTIPATION: 0
CHILLS: 0
PALPITATIONS: 0
COUGH: 0
HEMOPTYSIS: 0
MYALGIAS: 0
DOUBLE VISION: 0
NECK PAIN: 0
WEAKNESS: 0
NAUSEA: 0
DIARRHEA: 0
CLAUDICATION: 0

## 2024-05-24 ASSESSMENT — SOCIAL DETERMINANTS OF HEALTH (SDOH)
WITHIN THE LAST YEAR, HAVE YOU BEEN HUMILIATED OR EMOTIONALLY ABUSED IN OTHER WAYS BY YOUR PARTNER OR EX-PARTNER?: NO
WITHIN THE LAST YEAR, HAVE YOU BEEN KICKED, HIT, SLAPPED, OR OTHERWISE PHYSICALLY HURT BY YOUR PARTNER OR EX-PARTNER?: NO
WITHIN THE PAST 12 MONTHS, YOU WORRIED THAT YOUR FOOD WOULD RUN OUT BEFORE YOU GOT THE MONEY TO BUY MORE: NEVER TRUE
WITHIN THE LAST YEAR, HAVE TO BEEN RAPED OR FORCED TO HAVE ANY KIND OF SEXUAL ACTIVITY BY YOUR PARTNER OR EX-PARTNER?: NO
IN THE PAST 12 MONTHS, HAS THE ELECTRIC, GAS, OIL, OR WATER COMPANY THREATENED TO SHUT OFF SERVICE IN YOUR HOME?: NO
WITHIN THE PAST 12 MONTHS, THE FOOD YOU BOUGHT JUST DIDN'T LAST AND YOU DIDN'T HAVE MONEY TO GET MORE: NEVER TRUE
WITHIN THE LAST YEAR, HAVE YOU BEEN AFRAID OF YOUR PARTNER OR EX-PARTNER?: NO

## 2024-05-24 ASSESSMENT — FIBROSIS 4 INDEX
FIB4 SCORE: 1.77
FIB4 SCORE: 2.08

## 2024-05-24 ASSESSMENT — PAIN DESCRIPTION - PAIN TYPE: TYPE: CHRONIC PAIN

## 2024-05-24 NOTE — ASSESSMENT & PLAN NOTE
This is Sepsis Present on admission  SIRS criteria identified on my evaluation include: Tachypnea, with respirations greater than 20 per minute and Leukopenia, with WBC less than 4,000  No end organ dysfunction   Source is surgery site  Sepsis protocol initiated  Crystalloid Fluid Administration: Fluid resuscitation ordered per standard protocol - 30 mL/kg per current or ideal body weight  IV antibiotics as appropriate for source of sepsis  Reassessment: I have reassessed the patient's hemodynamic status holding   IV fluid due to significant lower extremity edema      IV vancomycin and Unasyn and de-escalate antibiotics later, waiting for blood and urine culture.  Surgery on board for possible I&D if needed

## 2024-05-24 NOTE — ASSESSMENT & PLAN NOTE
Invasive bladder cancer and underwent radical cystoprostatectomy with urostomy on 5/10  Urology on board  Follow-up with urology as outpatient for chemotherapy

## 2024-05-24 NOTE — ASSESSMENT & PLAN NOTE
5/24, plan of care was discussed in detail with the patient, discussed all images and result, discussed the goal of care, discussed the CODE STATUS, patient wants to be full code at this time, time 25 minutes.

## 2024-05-24 NOTE — ED NOTES
Med rec completed per patient at bedside.    Allergies reviewed with patient. NKDA.    Outpatient antibiotics within the last 30 days: Patient is on a 14 day course of cefdinir prescribed on 5/23/2024. Patient states that he started this antibiotic last night and has had 2 doses so far.    ANTICOAGULATION: Patient is on LOVENOX injections from 5/17/2024 - 6/12/2024.    Patient's preferred pharmacy: [facility] on [address].

## 2024-05-24 NOTE — ED NOTES
States he wants PIV removed now due to arm pain and numbness after CT w/ contrast.Provided with support and PIV removed. Dressing applied. No redness

## 2024-05-24 NOTE — H&P
"Hospital Medicine History & Physical Note    Date of Service  5/24/2024    Primary Care Physician  Shakira Henriquez M.D.    Consultants  Urology    Code Status  Full Code    Chief Complaint  Chief Complaint   Patient presents with    Post-Op Complications     Pt states he has increases drainage from abd incision.   Pt was given a \"booster shot of antibiotic\" yesterday from PCP. Today enforings increased testical  and leg swelling. Pt also feels his stoma is leaking because he is unable to properly fit the bag.        History of Presenting Illness    78-year-old male with history of smoking, lymphoma, COPD, AAA and bladder cancer who presented 5/24 with abdominal pain.  Patient has history of invasive bladder cancer and underwent open radical cystoprostatectomy on 5/10 with Dr Cardoso.  Also patient had urostomy at that time, patient was discharged with Lovenox and pain medication.  However he started having pain around surgery site last couple days, got worse, no significant fever or chills, also patient noticed swelling around his scrotum and lower abdomen, patient called urology clinic where he was evaluated and received 1 bolus of IV antibiotics and sent home home however no significant improving and came back.  On admission patient was found to have leukopenia 2.9 with tachypnea, normal kidney function and normal lactic acid also procalcitonin was normal at 0.1 however elevated CRP, CT abdomen with contrast showed fluid collection around 16 cm in the anterior abdominal and pelvic wall, likely seroma, blood and urine culture were obtained, ED doctor consulted urology and IV antibiotics were started.    I discussed the plan of care with patient, bedside RN, and ED physician .    Review of Systems  Review of Systems   Constitutional:  Positive for malaise/fatigue. Negative for chills, fever and weight loss.   HENT:  Negative for ear pain, hearing loss and tinnitus.    Eyes:  Negative for blurred vision, double vision " and photophobia.   Respiratory:  Negative for cough and hemoptysis.    Cardiovascular:  Negative for chest pain, palpitations, orthopnea and claudication.   Gastrointestinal:  Positive for abdominal pain. Negative for constipation, diarrhea, nausea and vomiting.   Genitourinary:  Negative for dysuria, frequency and urgency.   Musculoskeletal:  Negative for myalgias and neck pain.   Skin:  Negative for rash.   Neurological:  Negative for dizziness, speech change and weakness.       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    Surgical History   has a past surgical history that includes other orthopedic surgery; other abdominal surgery; aortobifem bypass (11/08/2020); pr cystoscopy,insert ureteral stent (Left, 01/07/2023); pr cysto/uretero/pyeloscopy, dx (Left, 01/07/2023); lasertripsy (Left, 01/07/2023); cataract extraction with iol (Bilateral); turbt (transurethral resection of bladder tumor) (12/13/2023); appendectomy child; knee arthroscopy (Left, 1968); shoulder arthroscopy (Left); pr sigmoidoscopy,diagnostic (N/A, 5/10/2024); cystoprostatectomy radical (N/A, 5/10/2024); and bowel resection (N/A, 5/10/2024).     Family History  family history includes Cancer in his maternal aunt and maternal uncle.   Family history reviewed  with patient. There is no family history that is pertinent to the chief complaint.     Social History   reports that he has been smoking cigarettes. He started smoking about 58 years ago. He has a 115.7 pack-year smoking history. He has never used smokeless tobacco. He reports that he does not currently use alcohol. He reports that he does not use drugs.    Allergies  No Known Allergies    Medications  Prior to Admission Medications   Prescriptions Last Dose Informant Patient Reported? Taking?   Multiple Vitamins-Minerals (MULTIVITAMIN ADULTS 50+ PO)  Patient Yes No   Sig: Take 1 Tablet by mouth every day.   atorvastatin (LIPITOR) 40 MG Tab  Patient No No   Sig: Take 1 Tablet by mouth every evening.   cefdinir (OMNICEF) 300 MG Cap   Yes Yes   Sig: Take 300 mg by mouth every 12 hours. 14 day course prescribed 5/23/2024.   doxazosin (CARDURA) 2 MG Tab  Patient Yes No   Sig: Take 2 mg by mouth every day.   enoxaparin (LOVENOX) 40 MG/0.4ML Solution Prefilled Syringe inj   No No   Sig: Inject 1 syringe (40 mg) under the skin every day for 26 days.   ibuprofen (MOTRIN) 200 MG Tab  Patient Yes No   Sig: Take 800 mg by mouth every 6 hours as needed for Inflammation.   lidocaine-prilocaine (EMLA) 2.5-2.5 % Cream  Patient No No   Sig: Apply to port one hour prior to access and cover with plastic wrap.   nicotine (NICODERM) 21 MG/24HR PATCH 24 HR   No No   Sig: Place 1 Patch on the skin every 24 hours.   ondansetron (ZOFRAN) 4 MG Tab tablet  Patient No No   Sig: Take 1 Tablet by mouth every four hours as needed for Nausea/Vomiting (for nausea, vomiting).   prochlorperazine (COMPAZINE) 10 MG Tab  Patient No No   Sig: Take 1 Tablet by mouth every 6 hours as needed (for nausea, vomiting).   tiotropium (SPIRIVA) 18 MCG Cap  Patient No No   Sig: Inhale 1 puff by mouth once daily   Patient taking differently: Place 18 mcg into inhaler and inhale every day.      Facility-Administered Medications: None       Physical Exam  Temp:   [36.7 °C (98 °F)] 36.7 °C (98 °F)  Pulse:  [77-89] 82  Resp:  [13-25] 25  BP: ()/(49-69) 146/69  SpO2:  [95 %-100 %] 98 %  Blood Pressure : 109/59   Temperature: 36.7 °C (98 °F)   Pulse: 80   Respiration: (!) 24   Pulse Oximetry: 98 %       Physical Exam  Constitutional:       General: He is not in acute distress.     Appearance: He is ill-appearing.   HENT:      Head: Normocephalic and atraumatic.   Eyes:      General: No scleral icterus.  Cardiovascular:      Rate and Rhythm: Normal rate.      Heart sounds: No murmur heard.  Pulmonary:      Effort: No respiratory distress.      Breath sounds: No wheezing or rales.   Abdominal:      General: There is no distension.      Palpations: There is mass.      Tenderness: There is abdominal tenderness. There is no right CVA tenderness, left CVA tenderness or guarding.      Comments: Redness around surgery site  Urostomy in place  Mass in suprapubic area   Genitourinary:     Comments: Swelling on his scrotum  Musculoskeletal:      Right lower leg: No edema.      Left lower leg: No edema.   Lymphadenopathy:      Cervical: No cervical adenopathy.   Skin:     Coloration: Skin is not jaundiced.      Findings: No bruising, lesion or rash.   Neurological:      General: No focal deficit present.      Mental Status: He is alert. Mental status is at baseline. He is disoriented.      Cranial Nerves: No cranial nerve deficit.      Motor: No weakness.      Gait: Gait normal.         Laboratory:  Recent Labs     05/23/24  1310 05/24/24  1126   WBC 3.9* 2.9*   RBC 2.78* 2.65*   HEMOGLOBIN 8.9* 8.4*   HEMATOCRIT 26.2* 24.8*   MCV 94.2 93.6   MCH 32.0 31.7   MCHC 34.0 33.9   RDW 48.8 47.8   PLATELETCT 198 209   MPV 10.6 10.0     Recent Labs     05/23/24  1310 05/24/24  1126   SODIUM 136 134*   POTASSIUM 3.7 3.7   CHLORIDE 100 99   CO2 23 23   GLUCOSE 106* 118*   BUN 16 16   CREATININE 0.87 0.81   CALCIUM 8.4* 8.5     Recent Labs     05/23/24  1310 05/24/24  1126   ALTSGPT  --  24  "  ASTSGOT  --  28   ALKPHOSPHAT  --  94   TBILIRUBIN  --  0.6   LIPASE  --  12   GLUCOSE 106* 118*     Recent Labs     05/24/24  1126   INR 1.08     No results for input(s): \"NTPROBNP\" in the last 72 hours.      No results for input(s): \"TROPONINT\" in the last 72 hours.    Imaging:  CT-ABDOMEN-PELVIS WITH   Final Result      1. Nonenhancing fluid collection in the anterior abdominal and pelvic wall, extending inferiorly to the penis and scrotal soft tissues measuring 3.6 x 1.5 x 16.6 cm in diameter. Finding is most compatible with seroma.   2. Postsurgical changes in the bowel with an ostomy in the right lower quadrant.   3. Ileus.   4. Stable bilateral renal cortical cysts, requiring no further imaging follow-up as per consensus guidelines based on imaging criteria.   5. The remainder is stable.      EC-ECHOCARDIOGRAM COMPLETE W/ CONT    (Results Pending)   US-EXTREMITY VENOUS LOWER BILAT    (Results Pending)       X-Ray:  I have personally reviewed the images and compared with prior images.  EKG:  I have personally reviewed the images and compared with prior images.    Assessment/Plan:  Justification for Admission Status  I anticipate this patient will require at least two midnights for appropriate medical management, necessitating inpatient admission because sepsis due to surgery site infection    Patient will need a Med/Surg bed on SURGICAL service .  The need is secondary to sepsis.    * Postoperative or surgical complication  Assessment & Plan  Patient came with surgery site pain  There is redness and CT scan showed fluid collection likely seroma??  Started IV antibiotics ceftriaxone and vancomycin  MRSA swab  Blood and urine culture  Surgery was consulted    Sepsis (HCC)- (present on admission)  Assessment & Plan  This is Sepsis Present on admission  SIRS criteria identified on my evaluation include: Tachypnea, with respirations greater than 20 per minute and Leukopenia, with WBC less than 4,000  No end organ " dysfunction   Source is surgery site  Sepsis protocol initiated  Crystalloid Fluid Administration: Fluid resuscitation ordered per standard protocol - 30 mL/kg per current or ideal body weight  IV antibiotics as appropriate for source of sepsis  Reassessment: I have reassessed the patient's hemodynamic status holding   IV fluid due to significant lower extremity edema      IV vancomycin and Unasyn and de-escalate antibiotics later, waiting for blood and urine culture.  Surgery on board for possible I&D if needed    Bladder tumor- (present on admission)  Assessment & Plan  Invasive bladder cancer and underwent radical cystoprostatectomy with urostomy on 5/10  Urology on board  Follow-up with urology as outpatient for chemotherapy    COPD (chronic obstructive pulmonary disease) (HCC)- (present on admission)  Assessment & Plan  No exacerbation  Encouraged the patient to quit smoking  Inhalers    History of lymphoma- (present on admission)  Assessment & Plan  On admission, 2 years ago   follow-up with Oncologist as outpatient    Benign prostatic hyperplasia- (present on admission)  Assessment & Plan  Patient has urostomy    ACP (advance care planning)- (present on admission)  Assessment & Plan  5/24, plan of care was discussed in detail with the patient, discussed all images and result, discussed the goal of care, discussed the CODE STATUS, patient wants to be full code at this time, time 25 minutes.    Tobacco dependence- (present on admission)  Assessment & Plan  I spent 5 minutes counseling the patient on cessation techniques and resources were offered including nicotine patches, gum, along with medical treatment with wellbutrin and chantix. The patient understands continuing to smoke could lead to complications such as lung disease, heart attack, stroke and death.  The benefits of stopping were also presented to him. The patient verbalized understanding. CPT CODE: 76617 (3-10 minutes tobacco  counseling).          VTE prophylaxis: SCDs/TEDs and enoxaparin ppx

## 2024-05-24 NOTE — CONSULTS
UROLOGY Consult Note:    Elaine Lawrence P.A.-C.  Date & Time note created:    5/24/2024   3:26 PM     Referring MD:  Dr. Corea     Patient ID:   Name:             Ronal aHir   YOB: 1945  Age:                 78 y.o.  male   MRN:               [id number]                                                             Reason for Consult:      Post operative complications     History of Present Illness:    Mr. Hair is a 77yo M with a diagnosis of muscle-invasive urothelial carcinoma of the bladder on the basis of 12/13/2023 TURBT resection of a 2.3 cm anterior bladder wall mass. Pathology returned high-grade urothelial carcinoma with focal squamous differentiation invasive into the muscularis propria, with perineural invasion, staged T2.Staging with imaging revealed no evidence of metastatic disease. He completed four cycles of neoadjuvant ddMVAC with Dr. Judi Osborn. Now s/p cystoprostatectomy with ileal conduit, no LN dissection 5/10. Discharged 5/17 after meeting goals for discharge and was in stable condition. Pt was seen by our office for post op with Dr. Cardoso 5/23. Concern for possible infection. Abd swelling had increased and pt was leaking from surgical incisions. Pt was started on cefdinir, given ER precautions, and outpt work up was ordered. Pt returned to ER as urostomy bag was leaking due to abd swelling. Pt feels well, no f/c/n/v.     Review of Systems:      Constitutional: Denies fevers   Eyes: Denies changes in vision   Ears/Nose/Throat/Mouth: Denies nasal congestion   Cardiovascular: no chest pain   Respiratory: no shortness of breath   Gastrointestinal/Hepatic: Denies abdominal pain   Genitourinary: Denies hematuria, dysuria or frequency  Musculoskeletal/Rheum: Denies joint pain   Skin: Denies rash  Neurological: Denies headache   Psychiatric: denies mood disorder   Endocrine: Claire thyroid problems  Heme/Oncology/Lymph Nodes: Denies enlarged lymph nodes    All other systems were reviewed and are negative (AMA/CMS criteria)                Past Medical History:   Past Medical History:   Diagnosis Date    Abdominal aortic aneurysm (AAA) without rupture (Formerly [facility]) 11/11/2020    Back pain     Bowel habit changes     constipation: has a bowel movement every 5-6 days-takes stool softener and laxative    Cancer (Formerly [facility]) 01/12/2024    bladder cancer    Cataract     IOL OU    COPD (chronic obstructive pulmonary disease) (Formerly [facility]) 08/26/2022    Dental disorder     upper dentures    Erectile dysfunction     Follicular lymphoma (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[facility]) 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/2019    High cholesterol     Hypertension 01/12/2024    pt denies    Infectious disease     Lymphoma (Formerly [facility]) 01/22/2016    Nephrolithiasis 01/06/2023    Osteomyelitis of left foot (Formerly [facility]) 11/11/2020    Pain of toe 11/03/2020    Psychiatric problem 01/12/2024    anxiety related to diagnosis    Reactive airway disease without complication 03/18/2022    Reactive airway disease without complication 03/18/2022    Scoliosis     Snoring     Urinary incontinence 01/12/2024    wears depends when away from home     Active Hospital Problems    Diagnosis     Sepsis (Formerly [facility]) [A41.9]        Past Surgical History:  Past Surgical History:   Procedure Laterality Date    KS SIGMOIDOSCOPY,DIAGNOSTIC N/A 5/10/2024    Procedure: SIGMOIDOSCOPY, FLEXIBLE;  Surgeon: Agustín FLANAGAN M.D.;  Location: SURGERY [facility];  Service: Urology    CYSTOPROSTATECTOMY RADICAL N/A 5/10/2024    Procedure: RADICAL CYSTOPROSTATECTOMY WITH CREATION OF ILEAL CONDUIT (OR OTHER URINARY DIVERSION);  Surgeon: Agustín FLANAGAN M.D.;  Location: SURGERY [facility];  Service: Urology    BOWEL RESECTION N/A 5/10/2024    Procedure: EXCISION, INTESTINE;  Surgeon: Agustín FLANAGAN M.D.;  Location: SURGERY [facility];  Service: Urology    TURBT (TRANSURETHRAL RESECTION OF BLADDER TUMOR)  12/13/2023    Procedure: BIPOLAR TRANSURETHRAL RESECTION OF BLADDER TUMOR;  Surgeon: Addison Seymour M.D.;  Location:  SURGERY [facility];  Service: Urology    DC CYSTOSCOPY,INSERT URETERAL STENT Left 01/07/2023    Procedure: CYSTOSCOPY, WITH URETERAL STENT INSERTION;  Surgeon: Addison Seymour M.D.;  Location: SURGERY [facility];  Service: Urology    DC CYSTO/URETERO/PYELOSCOPY, DX Left 01/07/2023    Procedure: URETEROSCOPY;  Surgeon: Addison Seymour M.D.;  Location: SURGERY [facility];  Service: Urology    LASERTRIPSY Left 01/07/2023    Procedure: LITHOTRIPSY, USING LASER;  Surgeon: Addison Seymour M.D.;  Location: SURGERY [facility];  Service: Urology    AORTOBIFEM BYPASS  11/08/2020    Procedure: CREATION, BYPASS, ARTERIAL, AORTA TO FEMORAL, BILATERAL;  Surgeon: Jimi Morrison M.D.;  Location: [facility];  Service: General    KNEE ARTHROSCOPY Left [facility]    APPENDECTOMY CHILD      CATARACT EXTRACTION WITH IOL Bilateral     OTHER ABDOMINAL SURGERY      OTHER ORTHOPEDIC SURGERY      SHOULDER ARTHROSCOPY Left        Hospital Medications:    Current Facility-Administered Medications:     LR (Bolus) infusion 500 mL, 500 mL, Intravenous, Once PRN, Melvin Clements M.D.    atorvastatin (Lipitor) tablet 40 mg, 40 mg, Oral, Q EVENING, Melvin Clements M.D.    doxazosin (Cardura) tablet 2 mg, 2 mg, Oral, DAILY, Melvin Clements M.D.    tiotropium (Spiriva) 18 MCG inhalation capsule 1 Capsule, 1 Capsule, Inhalation, DAILY, Melvin Clements M.D.    NS infusion, , Intravenous, Continuous, Melvin Clements M.D.    enoxaparin (Lovenox) inj 40 mg, 40 mg, Subcutaneous, DAILY AT 1800, Melvin Clements M.D.    acetaminophen (Tylenol) tablet 650 mg, 650 mg, Oral, Q6HRS PRN, Melvin Clements M.D.    senna-docusate (Pericolace Or Senokot S) 8.6-50 MG per tablet 2 Tablet, 2 Tablet, Oral, Q EVENING **AND** polyethylene glycol/lytes (Miralax) Packet 1 Packet, 1 Packet, Oral, QDAY PRN, Melvin Clements M.D.    ondansetron (Zofran) syringe/vial injection 4 mg, 4 mg, Intravenous, Q4HRS PRN, Melvin Clements M.D.     ondansetron (Zofran ODT) dispertab 4 mg, 4 mg, Oral, Q4HRS PRN, Melvin Clements M.D.    Current Outpatient Medications:     cefdinir (OMNICEF) 300 MG Cap, Take 300 mg by mouth every 12 hours. 14 day course prescribed 5/23/2024., Disp: , Rfl:     acetaminophen (TYLENOL 8 HOUR) 650 MG CR tablet, Take 1,300 mg by mouth 3 times a day as needed for Mild Pain. 2 tablets = 1,300 mg., Disp: , Rfl:     sildenafil citrate (VIAGRA) 25 MG Tab, Take 12.5 mg by mouth 1 time a day as needed for Erectile Dysfunction. 0.5 tablet = 12.5 mg., Disp: , Rfl:     enoxaparin (LOVENOX) 40 MG/0.4ML Solution Prefilled Syringe inj, Inject 1 syringe (40 mg) under the skin every day for 26 days., Disp: 26 Each, Rfl: 0    atorvastatin (LIPITOR) 40 MG Tab, Take 1 Tablet by mouth every evening., Disp: 90 Tablet, Rfl: 3    Multiple Vitamins-Minerals (MULTIVITAMIN ADULTS 50+ PO), Take 1 Tablet by mouth every day., Disp: , Rfl:     nicotine (NICODERM) 21 MG/24HR PATCH 24 HR, Place 1 Patch on the skin every 24 hours., Disp: 30 Patch, Rfl: 2    ondansetron (ZOFRAN) 4 MG Tab tablet, Take 1 Tablet by mouth every four hours as needed for Nausea/Vomiting (for nausea, vomiting)., Disp: 30 Tablet, Rfl: 6    prochlorperazine (COMPAZINE) 10 MG Tab, Take 1 Tablet by mouth every 6 hours as needed (for nausea, vomiting)., Disp: 30 Tablet, Rfl: 6    lidocaine-prilocaine (EMLA) 2.5-2.5 % Cream, Apply to port one hour prior to access and cover with plastic wrap., Disp: 1 Each, Rfl: 3    tiotropium (SPIRIVA) 18 MCG Cap, Inhale 1 puff by mouth once daily (Patient taking differently: Place 18 mcg into inhaler and inhale every day.), Disp: 30 Capsule, Rfl: 11    Current Outpatient Medications:  (Not in a hospital admission)      Medication Allergy:  No Known Allergies    Family History:  Family History   Problem Relation Age of Onset    Cancer Maternal Aunt         melanoma    Cancer Maternal Uncle         Throat cancer       Social History:  Social History  "    Socioeconomic History    Marital status:      Spouse name: Not on file    Number of children: Not on file    Years of education: Not on file    Highest education level: Not on file   Occupational History    Not on file   Tobacco Use    Smoking status: Every Day     Current packs/day: 0.50     Average packs/day: 2.0 packs/day for 58.3 years (115.7 ttl pk-yrs)     Types: Cigarettes     Start date: 1/1/1966     Last attempt to quit: 11/3/2020    Smokeless tobacco: Never    Tobacco comments:     Pt states he is still smoking / only 1/2 a pack per day now    Vaping Use    Vaping status: Never Used   Substance and Sexual Activity    Alcohol use: Not Currently    Drug use: Never    Sexual activity: Yes     Partners: Female   Other Topics Concern    Not on file   Social History Narrative    Not on file     Social Determinants of Health     Financial Resource Strain: Not on file   Food Insecurity: No Food Insecurity (11/4/2020)    Hunger Vital Sign     Worried About Running Out of Food in the Last Year: Never true     Ran Out of Food in the Last Year: Never true   Transportation Needs: No Transportation Needs (11/4/2020)    PRAPARE - Transportation     Lack of Transportation (Medical): No     Lack of Transportation (Non-Medical): No   Physical Activity: Not on file   Stress: Not on file   Social Connections: Not on file   Intimate Partner Violence: Not At Risk (5/17/2024)    Humiliation, Afraid, Rape, and Kick questionnaire     Fear of Current or Ex-Partner: No     Emotionally Abused: No     Physically Abused: No     Sexually Abused: No   Housing Stability: Not on file         Physical Exam:  Vitals/ General Appearance:   Weight/BMI: Body mass index is 27.41 kg/m².  BP (!) 146/69   Pulse 82   Temp 36.7 °C (98 °F) (Temporal)   Resp (!) 25   Ht 1.702 m (5' 7\")   Wt 79.4 kg (175 lb)   SpO2 98%   Vitals:    05/24/24 1300 05/24/24 1330 05/24/24 1400 05/24/24 1430   BP: 138/63 127/62 (!) 150/66 (!) 146/69 "   Pulse: 77 77 80 82   Resp: 13 16 (!) 22 (!) 25   Temp:       TempSrc:       SpO2: 97% 98% 97% 98%   Weight:       Height:         Oxygen Therapy:  Pulse Oximetry: 98 %, O2 Delivery Device: None - Room Air    Constitutional:   Well developed, No acute distress  HENMT:  Normocephalic, Atraumatic,   Lungs:  Normal effort, speaking in full sentences   Abdomen: Abdomen with distention. Surgical sites are well approximated. Midline abd wound with surrounding erythema. Urostomy pink and moist. Urine clear yellow.   : -CVAT  Skin: Warm, Dry, No erythema, No rash, no induration.  Neurologic: Alert & oriented x 3, No focal deficits noted.  Psychiatric: Affect normal, Judgment normal, Mood normal.      MDM (Data Review):     Records reviewed and summarized in current documentation    Lab Data Review:  Recent Results (from the past 24 hour(s))   CBC WITH DIFFERENTIAL    Collection Time: 05/24/24 11:26 AM   Result Value Ref Range    WBC 2.9 (L) 4.8 - 10.8 K/uL    RBC 2.65 (L) 4.70 - 6.10 M/uL    Hemoglobin 8.4 (L) 14.0 - 18.0 g/dL    Hematocrit 24.8 (L) 42.0 - 52.0 %    MCV 93.6 81.4 - 97.8 fL    MCH 31.7 27.0 - 33.0 pg    MCHC 33.9 32.3 - 36.5 g/dL    RDW 47.8 35.9 - 50.0 fL    Platelet Count 209 164 - 446 K/uL    MPV 10.0 9.0 - 12.9 fL    Neutrophils-Polys 84.50 (H) 44.00 - 72.00 %    Lymphocytes 8.60 (L) 22.00 - 41.00 %    Monocytes 5.20 0.00 - 13.40 %    Eosinophils 0.80 0.00 - 6.90 %    Basophils 0.90 0.00 - 1.80 %    Nucleated RBC 0.00 0.00 - 0.20 /100 WBC    Neutrophils (Absolute) 2.45 1.82 - 7.42 K/uL    Lymphs (Absolute) 0.25 (L) 1.00 - 4.80 K/uL    Monos (Absolute) 0.15 0.00 - 0.85 K/uL    Eos (Absolute) 0.02 0.00 - 0.51 K/uL    Baso (Absolute) 0.03 0.00 - 0.12 K/uL    NRBC (Absolute) 0.00 K/uL   COMP METABOLIC PANEL    Collection Time: 05/24/24 11:26 AM   Result Value Ref Range    Sodium 134 (L) 135 - 145 mmol/L    Potassium 3.7 3.6 - 5.5 mmol/L    Chloride 99 96 - 112 mmol/L    Co2 23 20 - 33 mmol/L    Anion Gap  12.0 7.0 - 16.0    Glucose 118 (H) 65 - 99 mg/dL    Bun 16 8 - 22 mg/dL    Creatinine 0.81 0.50 - 1.40 mg/dL    Calcium 8.5 8.5 - 10.5 mg/dL    Correct Calcium 9.4 8.5 - 10.5 mg/dL    AST(SGOT) 28 12 - 45 U/L    ALT(SGPT) 24 2 - 50 U/L    Alkaline Phosphatase 94 30 - 99 U/L    Total Bilirubin 0.6 0.1 - 1.5 mg/dL    Albumin 2.9 (L) 3.2 - 4.9 g/dL    Total Protein 5.7 (L) 6.0 - 8.2 g/dL    Globulin 2.8 1.9 - 3.5 g/dL    A-G Ratio 1.0 g/dL   LIPASE    Collection Time: 05/24/24 11:26 AM   Result Value Ref Range    Lipase 12 11 - 82 U/L   PROCALCITONIN    Collection Time: 05/24/24 11:26 AM   Result Value Ref Range    Procalcitonin 0.15 <0.25 ng/mL   CRP QUANTITIVE (NON-CARDIAC)    Collection Time: 05/24/24 11:26 AM   Result Value Ref Range    Stat C-Reactive Protein 24.31 (H) 0.00 - 0.75 mg/dL   DIFFERENTIAL MANUAL    Collection Time: 05/24/24 11:26 AM   Result Value Ref Range    Manual Diff Status PERFORMED    PERIPHERAL SMEAR REVIEW    Collection Time: 05/24/24 11:26 AM   Result Value Ref Range    Peripheral Smear Review see below    PLATELET ESTIMATE    Collection Time: 05/24/24 11:26 AM   Result Value Ref Range    Plt Estimation Normal    MORPHOLOGY    Collection Time: 05/24/24 11:26 AM   Result Value Ref Range    RBC Morphology Normal    ESTIMATED GFR    Collection Time: 05/24/24 11:26 AM   Result Value Ref Range    GFR (CKD-EPI) 91 >60 mL/min/1.73 m 2   URINALYSIS    Collection Time: 05/24/24 12:35 PM    Specimen: Urine   Result Value Ref Range    Color Yellow     Character Clear     Specific Gravity 1.018 <1.035    Ph 6.5 5.0 - 8.0    Glucose Negative Negative mg/dL    Ketones Negative Negative mg/dL    Protein 30 (A) Negative mg/dL    Bilirubin Negative Negative    Urobilinogen, Urine 0.2 Negative    Nitrite Positive (A) Negative    Leukocyte Esterase Moderate (A) Negative    Occult Blood Small (A) Negative    Micro Urine Req Microscopic    URINE MICROSCOPIC (W/UA)    Collection Time: 05/24/24 12:35 PM   Result  Value Ref Range    WBC 20-50 (A) /hpf    RBC 5-10 (A) /hpf    Bacteria Few (A) None /hpf    Epithelial Cells Negative /hpf    Hyaline Cast 0-2 /lpf       Imaging/Procedures Review:    Reviewed    MDM (Assessment and Plan):     77 yo M with recent cystoprostatectomy with ileal conduit now with post op fluid collection and infection. UA nitrate positive. WBC, kidney functions WNL. H/h 8.4 slowly decreasing over the past few days, pt is on 30days post op lovenox. CT reviewed by Dr. García. Due to size and location he does not feel this is large enough to drain. If pt worsening or PE not improving may need to repeat imaging.     Plan:  No surgical plans at this time as fluid collection is too small to drain.   Continue abx, trend labs and renal function, h/h   Follow up on urine culture.   If H/H drops further may have to hold anticoag.   Ostomy care to see   Urology to follow     Dr. García and Dr. Cardoso are aware of this consult and have directed this plan of care      Elaine Lawrence, [email or web address].-ISAEL.   [address] [place] [address]   [phone]

## 2024-05-24 NOTE — ED NOTES
Placed on cardiac monitor. Agree with triage note.  States he is being treated by [facility] for an abdominal infection after sx removing bladder and prostate. Denies fever or chills.States he is here to receive assistance with changing R urostomy bag due to leakage and difficulty placing due to abdominal swelling. ERP at bedside.

## 2024-05-24 NOTE — ASSESSMENT & PLAN NOTE
I spent 5 minutes counseling the patient on cessation techniques and resources were offered including nicotine patches, gum, along with medical treatment with wellbutrin and chantix. The patient understands continuing to smoke could lead to complications such as lung disease, heart attack, stroke and death.  The benefits of stopping were also presented to him. The patient verbalized understanding. CPT CODE: 64065 (3-10 minutes tobacco counseling).

## 2024-05-24 NOTE — ED PROVIDER NOTES
"ED Provider Note    CHIEF COMPLAINT  Chief Complaint   Patient presents with    Post-Op Complications     Pt states he has increases drainage from abd incision.   Pt was given a \"booster shot of antibiotic\" yesterday from PCP. Today enforings increased testical  and leg swelling. Pt also feels his stoma is leaking because he is unable to properly fit the bag.        EXTERNAL RECORDS REVIEWED  Inpatient Notes admitted to this facility 5/10/2024 for open radical cystoprostatectomy with creation of ileal conduit on 5/10 with Dr. Cardoso.  His hospital course was relatively uncomplicated beyond an ileus which resolved after about 48 hours with an NG tube.  He was ambulating, having regular bowel movements, and his pain was well-controlled.  He was discharged to follow-up as an outpatient.    HPI/ROS  LIMITATION TO HISTORY   Select: : None  OUTSIDE HISTORIAN(S):  None    Ronal Hair is a 78 y.o. male who presents with abdominal distension, scrotal swelling, and drainage from his abdominal incision. Patient underwent an open cystoprostatectomy with ileal conduit earlier this month with Dr. Cardoso. He notes his recovery has been largely uncomplicated until he developed some redness around the surgical site that was felt to be a superficial infection. He was seen by Dr. Cardoso in clinic yesterday and was prescribed cefdinir after receiving an IM antibiotic shot. His ureteral stents were reportedly removed yesterday as well. After returning home he noticed some drainage from the surgical incision and has been dressing the area but the dressing is becoming saturated as soon as it is placed and when he awoke this morning his clothing was drenched. He has some associated swelling in the area and has developed some swelling in the penis and scrotal region. He denies any fevers.     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    SURGICAL HISTORY   has a past surgical history that includes other orthopedic surgery; other abdominal surgery; aortobifem bypass (11/08/2020); cystoscopy,insert ureteral stent (Left, 01/07/2023); cysto/uretero/pyeloscopy, dx (Left, 01/07/2023); lasertripsy (Left, 01/07/2023); cataract extraction with iol (Bilateral); turbt (transurethral resection of bladder tumor) (12/13/2023); appendectomy child; knee arthroscopy (Left, 1968); shoulder arthroscopy (Left); sigmoidoscopy,diagnostic (N/A, 5/10/2024); cystoprostatectomy radical (N/A, 5/10/2024); and bowel resection (N/A, 5/10/2024).    FAMILY HISTORY  Family History   Problem Relation Age of Onset    Cancer Maternal Aunt         melanoma    Cancer Maternal Uncle         Throat cancer       SOCIAL HISTORY  Social History     Tobacco Use    Smoking status: Every Day     Current packs/day: 0.50     Average packs/day: 2.0 packs/day for 58.3 years (115.7 ttl pk-yrs)     Types: Cigarettes     Start date: 1/1/1966     Last attempt to quit: 11/3/2020    Smokeless tobacco: Never    Tobacco comments:     Pt states he is still smoking / only 1/2 a pack per day now    Vaping Use    Vaping status: Never Used   Substance and Sexual Activity    Alcohol use: Not Currently    Drug use: Never    Sexual activity: Yes     Partners: Female       CURRENT MEDICATIONS  Home Medications        "Reviewed by Waleska Lin R.N. (Registered Nurse) on 05/24/24 at 1005  Med List Status: Not Addressed     Medication Last Dose Status   acetaminophen (TYLENOL) 500 MG Tab  Active   atorvastatin (LIPITOR) 40 MG Tab  Active   doxazosin (CARDURA) 2 MG Tab  Active   enoxaparin (LOVENOX) 40 MG/0.4ML Solution Prefilled Syringe inj  Active   ibuprofen (MOTRIN) 200 MG Tab  Active   lidocaine-prilocaine (EMLA) 2.5-2.5 % Cream  Active   Multiple Vitamins-Minerals (MULTIVITAMIN ADULTS 50+ PO)  Active   nicotine (NICODERM) 21 MG/24HR PATCH 24 HR  Active   ondansetron (ZOFRAN) 4 MG Tab tablet  Active   prochlorperazine (COMPAZINE) 10 MG Tab  Active   tiotropium (SPIRIVA) 18 MCG Cap  Active                    ALLERGIES  No Known Allergies    PHYSICAL EXAM  VITAL SIGNS: /49   Pulse 89   Temp 36.7 °C (98 °F) (Temporal)   Resp 16   Ht 1.702 m (5' 7\")   Wt 79.4 kg (175 lb)   SpO2 100%   BMI 27.41 kg/m²    Physical Exam  Vitals and nursing note reviewed.   Constitutional:       Appearance: Normal appearance.   HENT:      Head: Normocephalic and atraumatic.      Right Ear: External ear normal.      Left Ear: External ear normal.      Nose: Nose normal.      Mouth/Throat:      Mouth: Mucous membranes are moist.      Pharynx: Oropharynx is clear.   Eyes:      Extraocular Movements: Extraocular movements intact.      Conjunctiva/sclera: Conjunctivae normal.      Pupils: Pupils are equal, round, and reactive to light.   Cardiovascular:      Rate and Rhythm: Normal rate and regular rhythm.   Pulmonary:      Effort: Pulmonary effort is normal.   Abdominal:      Palpations: Abdomen is soft.      Comments: Surgical incision is intact with scant amount of serous drainage. There is a large amount of underlying induration with mild tenderness but no fluctuance or crepitus. There is a small amount of surrounding erythema. Urostomy appears and healthy.   Genitourinary:     Comments: Penile and scrotal edema with out significant " erythema or tenderness. No perineal erythema.   Musculoskeletal:         General: Normal range of motion.      Cervical back: Normal range of motion and neck supple.   Skin:     General: Skin is warm and dry.   Neurological:      General: No focal deficit present.      Mental Status: He is alert and oriented to person, place, and time.   Psychiatric:         Mood and Affect: Mood normal.         Behavior: Behavior normal.       EKG/LABS  Results for orders placed or performed during the hospital encounter of 05/24/24   CBC WITH DIFFERENTIAL   Result Value Ref Range    WBC 2.9 (L) 4.8 - 10.8 K/uL    RBC 2.65 (L) 4.70 - 6.10 M/uL    Hemoglobin 8.4 (L) 14.0 - 18.0 g/dL    Hematocrit 24.8 (L) 42.0 - 52.0 %    MCV 93.6 81.4 - 97.8 fL    MCH 31.7 27.0 - 33.0 pg    MCHC 33.9 32.3 - 36.5 g/dL    RDW 47.8 35.9 - 50.0 fL    Platelet Count 209 164 - 446 K/uL    MPV 10.0 9.0 - 12.9 fL    Neutrophils-Polys 84.50 (H) 44.00 - 72.00 %    Lymphocytes 8.60 (L) 22.00 - 41.00 %    Monocytes 5.20 0.00 - 13.40 %    Eosinophils 0.80 0.00 - 6.90 %    Basophils 0.90 0.00 - 1.80 %    Nucleated RBC 0.00 0.00 - 0.20 /100 WBC    Neutrophils (Absolute) 2.45 1.82 - 7.42 K/uL    Lymphs (Absolute) 0.25 (L) 1.00 - 4.80 K/uL    Monos (Absolute) 0.15 0.00 - 0.85 K/uL    Eos (Absolute) 0.02 0.00 - 0.51 K/uL    Baso (Absolute) 0.03 0.00 - 0.12 K/uL    NRBC (Absolute) 0.00 K/uL   COMP METABOLIC PANEL   Result Value Ref Range    Sodium 134 (L) 135 - 145 mmol/L    Potassium 3.7 3.6 - 5.5 mmol/L    Chloride 99 96 - 112 mmol/L    Co2 23 20 - 33 mmol/L    Anion Gap 12.0 7.0 - 16.0    Glucose 118 (H) 65 - 99 mg/dL    Bun 16 8 - 22 mg/dL    Creatinine 0.81 0.50 - 1.40 mg/dL    Calcium 8.5 8.5 - 10.5 mg/dL    Correct Calcium 9.4 8.5 - 10.5 mg/dL    AST(SGOT) 28 12 - 45 U/L    ALT(SGPT) 24 2 - 50 U/L    Alkaline Phosphatase 94 30 - 99 U/L    Total Bilirubin 0.6 0.1 - 1.5 mg/dL    Albumin 2.9 (L) 3.2 - 4.9 g/dL    Total Protein 5.7 (L) 6.0 - 8.2 g/dL    Globulin  2.8 1.9 - 3.5 g/dL    A-G Ratio 1.0 g/dL   LIPASE   Result Value Ref Range    Lipase 12 11 - 82 U/L   URINALYSIS    Specimen: Urine   Result Value Ref Range    Color Yellow     Character Clear     Specific Gravity 1.018 <1.035    Ph 6.5 5.0 - 8.0    Glucose Negative Negative mg/dL    Ketones Negative Negative mg/dL    Protein 30 (A) Negative mg/dL    Bilirubin Negative Negative    Urobilinogen, Urine 0.2 Negative    Nitrite Positive (A) Negative    Leukocyte Esterase Moderate (A) Negative    Occult Blood Small (A) Negative    Micro Urine Req Microscopic    PROCALCITONIN   Result Value Ref Range    Procalcitonin 0.15 <0.25 ng/mL   CRP QUANTITIVE (NON-CARDIAC)   Result Value Ref Range    Stat C-Reactive Protein 24.31 (H) 0.00 - 0.75 mg/dL   DIFFERENTIAL MANUAL   Result Value Ref Range    Manual Diff Status PERFORMED    PERIPHERAL SMEAR REVIEW   Result Value Ref Range    Peripheral Smear Review see below    PLATELET ESTIMATE   Result Value Ref Range    Plt Estimation Normal    MORPHOLOGY   Result Value Ref Range    RBC Morphology Normal    ESTIMATED GFR   Result Value Ref Range    GFR (CKD-EPI) 91 >60 mL/min/1.73 m 2   URINE MICROSCOPIC (W/UA)   Result Value Ref Range    WBC 20-50 (A) /hpf    RBC 5-10 (A) /hpf    Bacteria Few (A) None /hpf    Epithelial Cells Negative /hpf    Hyaline Cast 0-2 /lpf   LACTIC ACID   Result Value Ref Range    Lactic Acid 1.4 0.5 - 2.0 mmol/L   LACTIC ACID   Result Value Ref Range    Lactic Acid 1.1 0.5 - 2.0 mmol/L   Prothrombin time (INR)   Result Value Ref Range    PT 14.2 12.0 - 14.6 sec    INR 1.08 0.87 - 1.13   Blood Culture    Specimen: Peripheral; Blood   Result Value Ref Range    Significant Indicator NEG     Source BLD     Site PERIPHERAL     Culture Result       No Growth  Note: Blood cultures are incubated for 5 days and  are monitored continuously.Positive blood cultures  are called to the RN and reported as soon as  they are identified.     Blood Culture    Specimen: Line;  Blood   Result Value Ref Range    Significant Indicator NEG     Source BLD     Site Peripheral     Culture Result       No Growth  Note: Blood cultures are incubated for 5 days and  are monitored continuously.Positive blood cultures  are called to the RN and reported as soon as  they are identified.     Urine Culture    Specimen: Urine   Result Value Ref Range    Significant Indicator POS (POS)     Source UR     Site -     Culture Result - (A)     Culture Result (A)      Methicillin Resistant Staphylococcus aureus  >100,000 cfu/mL  Presumptive identification  Methicillin resistant by screening method     LACTIC ACID   Result Value Ref Range    Lactic Acid 0.8 0.5 - 2.0 mmol/L   LACTIC ACID   Result Value Ref Range    Lactic Acid 1.2 0.5 - 2.0 mmol/L   CBC WITH DIFFERENTIAL   Result Value Ref Range    WBC 2.4 (L) 4.8 - 10.8 K/uL    RBC 2.48 (L) 4.70 - 6.10 M/uL    Hemoglobin 8.1 (L) 14.0 - 18.0 g/dL    Hematocrit 22.8 (L) 42.0 - 52.0 %    MCV 91.9 81.4 - 97.8 fL    MCH 32.7 27.0 - 33.0 pg    MCHC 35.5 32.3 - 36.5 g/dL    RDW 47.0 35.9 - 50.0 fL    Platelet Count 203 164 - 446 K/uL    MPV 10.1 9.0 - 12.9 fL    Neutrophils-Polys 66.10 44.00 - 72.00 %    Lymphocytes 17.40 (L) 22.00 - 41.00 %    Monocytes 12.20 0.00 - 13.40 %    Eosinophils 1.70 0.00 - 6.90 %    Basophils 2.60 (H) 0.00 - 1.80 %    Nucleated RBC 0.00 0.00 - 0.20 /100 WBC    Neutrophils (Absolute) 1.59 (L) 1.82 - 7.42 K/uL    Lymphs (Absolute) 0.42 (L) 1.00 - 4.80 K/uL    Monos (Absolute) 0.29 0.00 - 0.85 K/uL    Eos (Absolute) 0.04 0.00 - 0.51 K/uL    Baso (Absolute) 0.06 0.00 - 0.12 K/uL    NRBC (Absolute) 0.00 K/uL    Anisocytosis 1+     Microcytosis 1+    Basic Metabolic Panel   Result Value Ref Range    Sodium 136 135 - 145 mmol/L    Potassium 3.8 3.6 - 5.5 mmol/L    Chloride 101 96 - 112 mmol/L    Co2 24 20 - 33 mmol/L    Glucose 97 65 - 99 mg/dL    Bun 15 8 - 22 mg/dL    Creatinine 0.97 0.50 - 1.40 mg/dL    Calcium 8.3 (L) 8.5 - 10.5 mg/dL    Anion  Gap 11.0 7.0 - 16.0   CRP QUANTITIVE (NON-CARDIAC)   Result Value Ref Range    Stat C-Reactive Protein 21.42 (H) 0.00 - 0.75 mg/dL   MAGNESIUM   Result Value Ref Range    Magnesium 1.8 1.5 - 2.5 mg/dL   ESTIMATED GFR   Result Value Ref Range    GFR (CKD-EPI) 81 >60 mL/min/1.73 m 2   DIFFERENTIAL MANUAL   Result Value Ref Range    Manual Diff Status PERFORMED     Comment See Comment    PERIPHERAL SMEAR REVIEW   Result Value Ref Range    Peripheral Smear Review see below    PLATELET ESTIMATE   Result Value Ref Range    Plt Estimation Normal    MORPHOLOGY   Result Value Ref Range    RBC Morphology Present     Poikilocytosis 1+     Ovalocytes 1+      I have independently interpreted this EKG    RADIOLOGY/PROCEDURES   I have independently interpreted the diagnostic imaging associated with this visit and am waiting the final reading from the radiologist.   My preliminary interpretation is as follows: No free intraperitoneal air.    Radiologist interpretation:  CT-ABDOMEN-PELVIS WITH   Final Result      1. Nonenhancing fluid collection in the anterior abdominal and pelvic wall, extending inferiorly to the penis and scrotal soft tissues measuring 3.6 x 1.5 x 16.6 cm in diameter. Finding is most compatible with seroma.   2. Postsurgical changes in the bowel with an ostomy in the right lower quadrant.   3. Ileus.   4. Stable bilateral renal cortical cysts, requiring no further imaging follow-up as per consensus guidelines based on imaging criteria.   5. The remainder is stable.        COURSE & MEDICAL DECISION MAKING    ASSESSMENT, COURSE AND PLAN  Care Narrative: This is a 78 year old male about 2 weeks out from a radical cystoprostatectomy with ileal conduit creation who is here with worsening suprapubic distension, swelling in the  area, and drainage from the surgical incision.    He is AF with normal VS. Appears well hydrated and non-toxic. Abdomen is soft until the area of the surgical incision which is well  approximated but has a small amount of serous drainage which has saturated his underclothes and the dressing. There is underlying induration but no fluctuance or crepitus. There is a mild amount of tenderness but no pain out of proportion and I have a low suspicion for necrotizing fasciitis. There is penile and scrotal swelling but no erythema and perineal involvement - low suspicion for Renato's gangrene.    Given a dose of ceftriaxone while awaiting lab results.    CBC with anemia that is consistent with prior. He is leukopenic but not neutropenic. Metabolic panel without significant abnormality.    CT abd/pelvis reveals a large fluid collection in the anterior abdominal and pelvic wall extending to the penis and scrotal soft tissues which appeared most compatible with seroma.    Discussed with Dr. García, urology, who recommended hospitalization. Urology will continue to follow. He was discussed with the hospitalist and admitted in guarded condition.    ADDITIONAL PROBLEMS MANAGED  None    DISPOSITION AND DISCUSSIONS  I have discussed management of the patient with the following physicians and ISAIAH's:  Dr. García, urology. Dr. Clements, hospitalist.    Discussion of management with other QHP or appropriate source(s): None     Escalation of care considered, and ultimately not performed: N/A.    Barriers to care at this time, including but not limited to:  None .     Decision tools and prescription drugs considered including, but not limited to:  N/A .    FINAL DIAGNOSIS  Surgical site infection  Post-operative seroma    Electronically signed by: Vincenzo Kaplan M.D., 5/24/2024 10:26 AM

## 2024-05-24 NOTE — ASSESSMENT & PLAN NOTE
Patient came with surgery site pain  There is redness and CT scan showed fluid collection likely seroma??  Started IV antibiotics ceftriaxone and vancomycin  MRSA swab  Blood and urine culture  Surgery was consulted

## 2024-05-25 VITALS
DIASTOLIC BLOOD PRESSURE: 58 MMHG | WEIGHT: 160.5 LBS | OXYGEN SATURATION: 96 % | HEIGHT: 67 IN | SYSTOLIC BLOOD PRESSURE: 121 MMHG | TEMPERATURE: 98.1 F | HEART RATE: 85 BPM | RESPIRATION RATE: 17 BRPM | BODY MASS INDEX: 25.19 KG/M2

## 2024-05-25 LAB
ANION GAP SERPL CALC-SCNC: 11 MMOL/L (ref 7–16)
ANISOCYTOSIS BLD QL SMEAR: ABNORMAL
BASOPHILS # BLD AUTO: 2.6 % (ref 0–1.8)
BASOPHILS # BLD: 0.06 K/UL (ref 0–0.12)
BUN SERPL-MCNC: 15 MG/DL (ref 8–22)
CALCIUM SERPL-MCNC: 8.3 MG/DL (ref 8.5–10.5)
CHLORIDE SERPL-SCNC: 101 MMOL/L (ref 96–112)
CO2 SERPL-SCNC: 24 MMOL/L (ref 20–33)
COMMENT NL1176: NORMAL
CREAT SERPL-MCNC: 0.97 MG/DL (ref 0.5–1.4)
CRP SERPL HS-MCNC: 21.42 MG/DL (ref 0–0.75)
EOSINOPHIL # BLD AUTO: 0.04 K/UL (ref 0–0.51)
EOSINOPHIL NFR BLD: 1.7 % (ref 0–6.9)
ERYTHROCYTE [DISTWIDTH] IN BLOOD BY AUTOMATED COUNT: 47 FL (ref 35.9–50)
GFR SERPLBLD CREATININE-BSD FMLA CKD-EPI: 81 ML/MIN/1.73 M 2
GLUCOSE SERPL-MCNC: 97 MG/DL (ref 65–99)
HCT VFR BLD AUTO: 22.8 % (ref 42–52)
HGB BLD-MCNC: 8.1 G/DL (ref 14–18)
LACTATE SERPL-SCNC: 0.8 MMOL/L (ref 0.5–2)
LACTATE SERPL-SCNC: 1.2 MMOL/L (ref 0.5–2)
LYMPHOCYTES # BLD AUTO: 0.42 K/UL (ref 1–4.8)
LYMPHOCYTES NFR BLD: 17.4 % (ref 22–41)
MAGNESIUM SERPL-MCNC: 1.8 MG/DL (ref 1.5–2.5)
MANUAL DIFF BLD: NORMAL
MCH RBC QN AUTO: 32.7 PG (ref 27–33)
MCHC RBC AUTO-ENTMCNC: 35.5 G/DL (ref 32.3–36.5)
MCV RBC AUTO: 91.9 FL (ref 81.4–97.8)
MICROCYTES BLD QL SMEAR: ABNORMAL
MONOCYTES # BLD AUTO: 0.29 K/UL (ref 0–0.85)
MONOCYTES NFR BLD AUTO: 12.2 % (ref 0–13.4)
MORPHOLOGY BLD-IMP: NORMAL
NEUTROPHILS # BLD AUTO: 1.59 K/UL (ref 1.82–7.42)
NEUTROPHILS NFR BLD: 66.1 % (ref 44–72)
NRBC # BLD AUTO: 0 K/UL
NRBC BLD-RTO: 0 /100 WBC (ref 0–0.2)
OVALOCYTES BLD QL SMEAR: NORMAL
PLATELET # BLD AUTO: 203 K/UL (ref 164–446)
PLATELET BLD QL SMEAR: NORMAL
PMV BLD AUTO: 10.1 FL (ref 9–12.9)
POIKILOCYTOSIS BLD QL SMEAR: NORMAL
POTASSIUM SERPL-SCNC: 3.8 MMOL/L (ref 3.6–5.5)
RBC # BLD AUTO: 2.48 M/UL (ref 4.7–6.1)
RBC BLD AUTO: PRESENT
SODIUM SERPL-SCNC: 136 MMOL/L (ref 135–145)
WBC # BLD AUTO: 2.4 K/UL (ref 4.8–10.8)

## 2024-05-25 PROCEDURE — 85027 COMPLETE CBC AUTOMATED: CPT

## 2024-05-25 PROCEDURE — 86140 C-REACTIVE PROTEIN: CPT

## 2024-05-25 PROCEDURE — 700111 HCHG RX REV CODE 636 W/ 250 OVERRIDE (IP): Performed by: STUDENT IN AN ORGANIZED HEALTH CARE EDUCATION/TRAINING PROGRAM

## 2024-05-25 PROCEDURE — 85007 BL SMEAR W/DIFF WBC COUNT: CPT

## 2024-05-25 PROCEDURE — 99239 HOSP IP/OBS DSCHRG MGMT >30: CPT | Performed by: STUDENT IN AN ORGANIZED HEALTH CARE EDUCATION/TRAINING PROGRAM

## 2024-05-25 PROCEDURE — 700105 HCHG RX REV CODE 258: Performed by: INTERNAL MEDICINE

## 2024-05-25 PROCEDURE — 700101 HCHG RX REV CODE 250: Performed by: INTERNAL MEDICINE

## 2024-05-25 PROCEDURE — 83735 ASSAY OF MAGNESIUM: CPT

## 2024-05-25 PROCEDURE — 83605 ASSAY OF LACTIC ACID: CPT | Mod: 91

## 2024-05-25 PROCEDURE — 700111 HCHG RX REV CODE 636 W/ 250 OVERRIDE (IP): Performed by: INTERNAL MEDICINE

## 2024-05-25 PROCEDURE — 80048 BASIC METABOLIC PNL TOTAL CA: CPT

## 2024-05-25 RX ORDER — AMOXICILLIN AND CLAVULANATE POTASSIUM 875; 125 MG/1; MG/1
1 TABLET, FILM COATED ORAL 2 TIMES DAILY
Qty: 26 TABLET | Refills: 0 | Status: ACTIVE | OUTPATIENT
Start: 2024-05-25 | End: 2024-05-26

## 2024-05-25 RX ADMIN — VANCOMYCIN HYDROCHLORIDE 750 MG: 5 INJECTION, POWDER, LYOPHILIZED, FOR SOLUTION INTRAVENOUS at 06:09

## 2024-05-25 RX ADMIN — CEFTRIAXONE SODIUM 2000 MG: 10 INJECTION, POWDER, FOR SOLUTION INTRAVENOUS at 06:03

## 2024-05-25 RX ADMIN — HEPARIN 300 UNITS: 100 SYRINGE at 08:58

## 2024-05-25 ASSESSMENT — PAIN DESCRIPTION - PAIN TYPE
TYPE: CHRONIC PAIN
TYPE: CHRONIC PAIN

## 2024-05-25 NOTE — PROGRESS NOTES
Pharmacy Vancomycin Kinetics Note for 5/24/2024     78 y.o. male on Vancomycin day # 1     Vancomycin Indication (AUC Dosing): Skin/skin structure infection (concern for postop seroma)    Provider specified end date: 05/31/24 (TBD pending further w/u)    Active Antibiotics (From admission, onward)      Ordered     Ordering Provider       Fri May 24, 2024  6:12 PM    05/24/24 1812  vancomycin (Vancocin) 1,750 mg in  mL IVPB  (vancomycin (VANCOCIN) IV (LD + Maintenance))  ONCE         Mlevin Clements M.D.    05/24/24 1812  vancomycin (Vancocin) 750 mg in  mL IVPB  (vancomycin (VANCOCIN) IV (LD + Maintenance))  EVERY 12 HOURS         Melvin Clements M.D.       Fri May 24, 2024  4:02 PM    05/24/24 1602  cefTRIAXone (Rocephin) syringe 2,000 mg  EVERY 24 HOURS         Melvin Clements M.D.    05/24/24 1602  MD Alert...Vancomycin per Pharmacy  PHARMACY TO DOSE        Question:  Indication(s) for vancomycin?  Answer:  Skin and soft tissue infection    Melvin Clements M.D.          Dosing Weight: 72.8 kg (160 lb 7.9 oz)    Admission History: Admitted on 5/24/2024 for Sepsis (HCC) [A41.9]    Allergies: Patient has no known allergies.     Pertinent cultures to date:     Results       Procedure Component Value Units Date/Time    Urine Culture [[id number]] Collected: 05/24/24 1235    Order Status: Sent Specimen: Urine Updated: 05/24/24 1541    Blood Culture [[id number]] Collected: 05/24/24 1455    Order Status: Sent Specimen: Blood from Line Updated: 05/24/24 1517    Blood Culture [[id number]] Collected: 05/24/24 1455    Order Status: Sent Specimen: Blood from Peripheral Updated: 05/24/24 1517    URINALYSIS [[id number]]  (Abnormal) Collected: 05/24/24 1235    Order Status: Completed Specimen: Urine Updated: 05/24/24 1319     Color Yellow     Character Clear     Specific Gravity 1.018     Ph 6.5     Glucose Negative mg/dL      Ketones Negative mg/dL      Protein 30 mg/dL      Bilirubin Negative      "Urobilinogen, Urine 0.2     Nitrite Positive     Leukocyte Esterase Moderate     Occult Blood Small     Micro Urine Req Microscopic          Labs:     Estimated Creatinine Clearance: 72.5 mL/min (by C-G formula based on SCr of 0.81 mg/dL).    Recent Labs     24  1310 24  1126   WBC 3.9* 2.9*   NEUTSPOLYS 84.50* 84.50*     Recent Labs     24  1310 24  1126   BUN 16 16   CREATININE 0.87 0.81   ALBUMIN  --  2.9*     No intake or output data in the 24 hours ending 24 1812   BP (!) 146/69   Pulse 85   Temp 36.6 °C (97.8 °F) (Temporal)   Resp 17   Ht 1.702 m (5' 7\")   Wt 72.8 kg (160 lb 7.9 oz)   SpO2 98%  Temp (24hrs), Av.6 °C (97.9 °F), Min:36.6 °C (97.8 °F), Max:36.7 °C (98 °F)    List concerns for Vancomycin clearance:     Obesity;Age    Pharmacokinetics:    AUC kinetics:   Ke (hr ^-1): 0.0642 hr^-1  Half life: 10.8 hr  Clearance: 3.038  Estimated TDD: 1519  Estimated Dose: 810  Estimated interval: 12.8    A/P:     Day #1 of empiric vancomycin/ceftriaxone for postoperative seroma concerns. Hx of invasive urothelial carcinoma s/p recent cystoprostatectomy w/ urostomy creation in 2023. Urology deferring drain placement for now. Ucx, bcx's, and MRSA PCR pending. Previously on vancomycin 1000 mg Q12H back in 2020; similar renal fx, however will likely require decreased dosing given ~25% reduction in body weight (92 kg in  vs 72 kg currently). Minimal risk factors for accumulation noted.    Give vancomycin load 1750 mg (25 mg/kg) IV once tonight, followed by 750 mg IV Q12H tomorrow morning for a predicted AUC of 494 (goal 400-600. Consider steady state peak & trough levels on , assuming renal fx remains stable at baseline.    Pharmacy will continue following.    Morgan Fall, PharmD    "

## 2024-05-25 NOTE — CARE PLAN
"The patient is Watcher - Medium risk of patient condition declining or worsening    Shift Goals  Clinical Goals: ambulation, IV ABX  Patient Goals: \"go home\"  Family Goals: THOMAS    Progress made toward(s) clinical / shift goals:     Pt ambulated to bathroom multiple times throughout shift. No need for pain medications. IV abx administered throughout shift. Pt verbalized wanting to leave, stating it feels like a \"shelter\" here. Pt was then educated on POC and agreed to receive Abx.   Problem: Pain - Standard  Goal: Alleviation of pain or a reduction in pain to the patient’s comfort goal  Description: Target End Date:  Prior to discharge or change in level of care    Document on Vitals flowsheet    1.  Document pain using the appropriate pain scale per order or unit policy  2.  Educate and implement non-pharmacologic comfort measures (i.e. relaxation, distraction, massage, cold/heat therapy, etc.)  3.  Pain management medications as ordered  4.  Reassess pain after pain med administration per policy  5.  If opiods administered assess patient's response to pain medication is appropriate per POSS sedation scale  6.  Follow pain management plan developed in collaboration with patient and interdisciplinary team (including palliative care or pain specialists if applicable)  Outcome: Progressing       Patient is not progressing towards the following goals:      Problem: Knowledge Deficit - Standard  Goal: Patient and family/care givers will demonstrate understanding of plan of care, disease process/condition, diagnostic tests and medications  Description: Target End Date:  1-3 days or as soon as patient condition allows    Document in Patient Education    1.  Patient and family/caregiver oriented to unit, equipment, visitation policy and means for communicating concern  2.  Complete/review Learning Assessment  3.  Assess knowledge level of disease process/condition, treatment plan, diagnostic tests and medications  4.  " Explain disease process/condition, treatment plan, diagnostic tests and medications  Outcome: Not Met

## 2024-05-25 NOTE — PROGRESS NOTES
Bedside report received from day shift nurse. Assumed care at 1845.   Pt A&Ox4  Tolerating regular diet, denies n/v. Hypoactive bowel sounds, + flatus, LBM 5/23 per pt. IV access through single lumen R chest port that is TKO.  Saturating >90% on RA.  Skin per flowsheets.  RLQ urostomy, + output. Pt refused to use [employer] appliances. Wants to use his home appliances.   Pt ambulates self.  Pain is controlled through medication orders. Updated on plan of care. Safety education provided. Bed locked in low. Call light within reach. Rounding in place.

## 2024-05-25 NOTE — DISCHARGE INSTRUCTIONS
Take antibiotic as directed, do not take the cefdinir, take augmentin, total 2 week course, you completed 1 day in hospital so 13 more days have been prescribed.   Urology will call you for quick follow up, likely Tuesday   If you develop any increased drainage from your surgical site, increased redness, fevers or chills, seek urgent medical attention

## 2024-05-25 NOTE — DISCHARGE SUMMARY
"Discharge Summary    CHIEF COMPLAINT ON ADMISSION  Chief Complaint   Patient presents with    Post-Op Complications     Pt states he has increases drainage from abd incision.   Pt was given a \"booster shot of antibiotic\" yesterday from PCP. Today enforings increased testical  and leg swelling. Pt also feels his stoma is leaking because he is unable to properly fit the bag.        Reason for Admission  Post op comp     Admission Date  5/24/2024    CODE STATUS  Full Code    HPI & HOSPITAL COURSE   Mr. Hair is a 78 year old male  with a diagnosis of muscle-invasive urothelial carcinoma of the bladder on the basis of 12/13/2023 TURBT resection of a 2.3 cm anterior bladder wall mass. Pathology returned high-grade urothelial carcinoma with focal squamous differentiation invasive into the muscularis propria, with perineural invasion, staged T2. Staging with imaging revealed no evidence of metastatic disease. He completed four cycles of neoadjuvant ddMVAC with Dr. Judi Osborn. Now s/p cystoprostatectomy with ileal conduit, without Lymph node dissection dissection on 5/10 and subsequently Discharged on 5/17. He was seen in urology office for post op with Dr. Cardoso 5/23. Concern for possible infection at surgical site as he had increased surgical site  and  was leaking from surgical incisions. Pt was started on cefdinir, given ER precautions, and outpt work up was ordered. He then presented to the ER On 5/24 as urostomy bag was leaking due to the abdominal surgical site swelling. He denied any symptoms of fever, chills, pain or other systemic symptoms. He was started on IV unasyn and vancomycin. On my evaluation of the patient he was very upset about his diet and being hospitalized and was adament about discharge, he has discontinued his lines and was getting dressed on upon me entering the room. He was verbally aggressive and would not let me do a full evaluation. On one portion of the around the umbilicus of his " surgical incision he had some erythema and drainage that did appear infected. At that time I did not have any culture results. Patient refused to stay and thus was discharged against medical advice, he had no hx of MRSA in the past or major risk factors thus he was discharged on oral Augmentin with strict return precautions and told to follow up with his urologist ASAP. I also notified urology of his departure.       Therefore, he is discharged in guarded and stable condition against medcial advice.    The patient met 2-midnight criteria for an inpatient stay at the time of discharge.    Discharge Date  5/25/2024    FOLLOW UP ITEMS POST DISCHARGE  Cultures, wound infection   Chronic medical conditions     DISCHARGE DIAGNOSES  Principal Problem:    Postoperative or surgical complication (POA: Unknown)  Active Problems:    Tobacco dependence (POA: Yes)    Benign prostatic hyperplasia (POA: Yes)    History of lymphoma (POA: Yes)    COPD (chronic obstructive pulmonary disease) (HCC) (POA: Yes)    ACP (advance care planning) (POA: Yes)    Bladder tumor (POA: Yes)    Sepsis (HCC) (POA: Yes)  Resolved Problems:    * No resolved hospital problems. *      FOLLOW UP  Future Appointments   Date Time Provider Department Center   6/24/2024 11:00 AM Orlando Philip M.D. [facility] PANFILO Henriquez M.D.  [address] [place] [id number]  [phone]    Call  As needed    Agustín FLANAGAN M.D.  [address] [facility] [address]  [phone]    Call  call to schedule asap      MEDICATIONS ON DISCHARGE     Medication List        START taking these medications        Instructions   amoxicillin-clavulanate 875-125 MG Tabs  Commonly known as: Augmentin   Take 1 Tablet by mouth 2 times a day for 13 days.  Dose: 1 Tablet            CONTINUE taking these medications        Instructions   atorvastatin 40 MG Tabs  Commonly known as: Lipitor   Take 1 Tablet by mouth every evening.  Dose: 40 mg     enoxaparin 40 MG/0.4ML  Sosy inj  Commonly known as: Lovenox   Inject 1 syringe (40 mg) under the skin every day for 26 days.  Dose: 40 mg     lidocaine-prilocaine 2.5-2.5 % Crea  Commonly known as: Emla   Doctor's comments: Directions: Apply to port 1 hour prior to access of port and cover with plastic wrap.  Apply to port one hour prior to access and cover with plastic wrap.     MULTIVITAMIN ADULTS 50+ PO   Take 1 Tablet by mouth every day.  Dose: 1 Tablet     nicotine 21 MG/24HR Pt24  Commonly known as: Nicoderm   Place 1 Patch on the skin every 24 hours.  Dose: 1 Patch     ondansetron 4 MG Tabs tablet  Commonly known as: Zofran   Take 1 Tablet by mouth every four hours as needed for Nausea/Vomiting (for nausea, vomiting).  Dose: 4 mg     prochlorperazine 10 MG Tabs  Commonly known as: Compazine   Take 1 Tablet by mouth every 6 hours as needed (for nausea, vomiting).  Dose: 10 mg     sildenafil citrate 25 MG Tabs  Commonly known as: Viagra   Take 12.5 mg by mouth 1 time a day as needed for Erectile Dysfunction. 0.5 tablet = 12.5 mg.  Dose: 12.5 mg     tiotropium 18 MCG Caps  Commonly known as: Spiriva   Inhale 1 puff by mouth once daily     Tylenol 8 Hour 650 MG CR tablet  Generic drug: acetaminophen   Take 1,300 mg by mouth 3 times a day as needed for Mild Pain. 2 tablets = 1,300 mg.  Dose: 1,300 mg            STOP taking these medications      cefdinir 300 MG Caps  Commonly known as: Omnicef              Allergies  No Known Allergies    DIET  Orders Placed This Encounter   Procedures    Diet Order Diet: Regular     Standing Status:   Standing     Number of Occurrences:   1     Order Specific Question:   Diet:     Answer:   Regular [1]       ACTIVITY  As tolerated.      CONSULTATIONS  Urology     PROCEDURES  NA    LABORATORY  Lab Results   Component Value Date    SODIUM 136 05/25/2024    POTASSIUM 3.8 05/25/2024    CHLORIDE 101 05/25/2024    CO2 24 05/25/2024    GLUCOSE 97 05/25/2024    BUN 15 05/25/2024    CREATININE 0.97 05/25/2024         Lab Results   Component Value Date    WBC 2.4 (L) 05/25/2024    HEMOGLOBIN 8.1 (L) 05/25/2024    HEMATOCRIT 22.8 (L) 05/25/2024    PLATELETCT 203 05/25/2024        Total time of the discharge process exceeds 50 minutes.

## 2024-05-25 NOTE — PROGRESS NOTES
Pt discharged home. Chest port heparin locked. Patient educated about medications and when to come back to the hospital.All questions answered.

## 2024-05-25 NOTE — PROGRESS NOTES
Virtual Nurse rounding complete.    Round Needs: Notified of Patient Needs: RN Pt upset regarding diet order and d/t being admitted for IV abx.  RN aware and contacting MD

## 2024-05-26 ENCOUNTER — TELEPHONE (OUTPATIENT)
Dept: HOSPITALIST | Facility: MEDICAL CENTER | Age: 79
End: 2024-05-26
Payer: MEDICARE

## 2024-05-26 LAB
BACTERIA UR CULT: ABNORMAL
BACTERIA UR CULT: ABNORMAL
SIGNIFICANT IND 70042: ABNORMAL
SITE SITE: ABNORMAL
SOURCE SOURCE: ABNORMAL

## 2024-05-26 RX ORDER — SULFAMETHOXAZOLE AND TRIMETHOPRIM 800; 160 MG/1; MG/1
1 TABLET ORAL 2 TIMES DAILY
Qty: 20 TABLET | Refills: 0 | Status: SHIPPED | OUTPATIENT
Start: 2024-05-26 | End: 2024-06-05

## 2024-05-29 ENCOUNTER — PATIENT OUTREACH (OUTPATIENT)
Dept: ONCOLOGY | Facility: MEDICAL CENTER | Age: 79
End: 2024-05-29
Payer: MEDICARE

## 2024-05-29 LAB
BACTERIA BLD CULT: NORMAL
BACTERIA BLD CULT: NORMAL
SIGNIFICANT IND 70042: NORMAL
SIGNIFICANT IND 70042: NORMAL
SITE SITE: NORMAL
SITE SITE: NORMAL
SOURCE SOURCE: NORMAL
SOURCE SOURCE: NORMAL

## 2024-05-29 NOTE — DOCUMENTATION QUERY
[facility]                                                                       Query Response Note      PATIENT:               POORNIMA TORREZ  ACCT #:                  [id number]  MRN:                     [id number]  :                      1945  ADMIT DATE:       5/10/2024 5:25 AM  DISCH DATE:        2024 2:14 PM  RESPONDING  PROVIDER #:        [id number]           QUERY TEXT:    Please clarify the term ?post operative? related to the documented condition of ileus in the Medical Record.      NOTE:  If an appropriate response is not listed below, please respond with a new note.                Documentation indicators that raised basis for question:   78 y.o. male admitted with muscle invasive bladder cancer, who underwent open radical cystoprostatectomy with creation of ileal conduit on 5/10.     P/N- Dr. Santoyo: Surgery has placed an NG tube due to vomiting and ileus noted on KUB.     Abdominal x-ray: There is probably some decrease in the colonic stool from prior.  There are also likely increased dilated small bowel loops. There is air within the colon. Findings suggest ileus rather than distal obstruction. Postsurgical changes related to cystectomy, ileal conduit and bilateral ureteral stents.     DCS: On POD4 he did develop ileus, but this resolved after ~48 hours with an NGT.     Treatment: NG tube, abdominal x-ray    Risk Factors: open radical cystoprostatectomy with creation of ileal conduit.    Thank you,  [email or web address]  Options provided:   -- Ileus is unexpected and a complication of the procedure performed   -- Ileus is not a complication due to or associated with the procedure   -- Ileus is routinely expected and integral/inherent to the procedure performed   -- Ileus is related to another etiology, (please document underlying etiology)   -- Ileus is without clinical  significance   -- Unable to determine      Query created by: Katlin Vazquez on 5/28/2024 5:14 AM    RESPONSE TEXT:    Ileus is not a complication due to or associated with the procedure          Electronically signed by:  FREDDY LOPEZ 5/28/2024 9:06 PM

## 2024-05-29 NOTE — PROGRESS NOTES
POD5. Called Marcello to check in and assess. Pt went to ED d/t swelling and urostomy bag leaking. There was not enough fluid build up to drain anything. ONN will wait for patient call back or try again on Friday. Marcello will need follow up appts with urology, medical oncology, and will need wound care ostomy appts.

## 2024-06-03 ENCOUNTER — OFFICE VISIT (OUTPATIENT)
Dept: WOUND CARE | Facility: MEDICAL CENTER | Age: 79
End: 2024-06-03
Attending: UROLOGY
Payer: MEDICARE

## 2024-06-03 VITALS
TEMPERATURE: 97.2 F | SYSTOLIC BLOOD PRESSURE: 108 MMHG | OXYGEN SATURATION: 98 % | DIASTOLIC BLOOD PRESSURE: 58 MMHG | HEART RATE: 82 BPM | RESPIRATION RATE: 16 BRPM

## 2024-06-03 DIAGNOSIS — N99.528 COMPLICATION OF UROSTOMY (HCC): ICD-10-CM

## 2024-06-03 DIAGNOSIS — Z93.6 PRESENCE OF UROSTOMY (HCC): ICD-10-CM

## 2024-06-03 DIAGNOSIS — T14.8XXA WOUND INFECTION: ICD-10-CM

## 2024-06-03 DIAGNOSIS — F17.210 CIGARETTE NICOTINE DEPENDENCE WITHOUT COMPLICATION: ICD-10-CM

## 2024-06-03 DIAGNOSIS — L08.9 WOUND INFECTION: ICD-10-CM

## 2024-06-03 DIAGNOSIS — S31.109D OPEN WOUND OF ABDOMEN, SUBSEQUENT ENCOUNTER: ICD-10-CM

## 2024-06-03 DIAGNOSIS — T81.31XD DEHISCENCE OF OPERATIVE WOUND, SUBSEQUENT ENCOUNTER: ICD-10-CM

## 2024-06-03 PROCEDURE — 99406 BEHAV CHNG SMOKING 3-10 MIN: CPT | Performed by: NURSE PRACTITIONER

## 2024-06-03 PROCEDURE — 3078F DIAST BP <80 MM HG: CPT | Performed by: NURSE PRACTITIONER

## 2024-06-03 PROCEDURE — 11042 DBRDMT SUBQ TIS 1ST 20SQCM/<: CPT | Performed by: NURSE PRACTITIONER

## 2024-06-03 PROCEDURE — 99214 OFFICE O/P EST MOD 30 MIN: CPT | Mod: 25 | Performed by: NURSE PRACTITIONER

## 2024-06-03 PROCEDURE — 99214 OFFICE O/P EST MOD 30 MIN: CPT

## 2024-06-03 PROCEDURE — 3074F SYST BP LT 130 MM HG: CPT | Performed by: NURSE PRACTITIONER

## 2024-06-03 PROCEDURE — 11042 DBRDMT SUBQ TIS 1ST 20SQCM/<: CPT

## 2024-06-03 NOTE — PROGRESS NOTES
Provider Encounter- Full Thickness wound             Ostomy  HISTORY OF PRESENT ILLNESS  Wound History:    START OF CARE IN CLINIC: 6/3/24    REFERRING PROVIDER: Agustín Cardoso MD     WOUND- Full Thickness Wound   LOCATION: Midline abdomen     OSTOMY TYPE: Urostomy-permanent   LOCATION: RLQ              SURGERY:S/p cystoprostatectomy with ileal conduit, without lymph node dissection on 5/10/2024 by Dr. 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  Staging revealed no evidence of metastatic disease.  He underwent chemotherapy.  Patient premarked for urostomy at [facility] ostomy [facility].    He underwent surgical resection with ileal conduit creation on 5/10/2024.  He was admitted postoperatively.  Seen by wound and ostomy team.  He discharged on 5/17/2024.  He followed up at urology on 5/23/2024.  Urostomy stents were removed.  There was concern for possible surgical site infection as he had increased drainage from his incision.  He was started on cefdinir and followed up at the ED on 5/24/2024 as he had difficulties keeping urostomy appliance on because of drainage from his abdomen.  He was started on IV antibiotics and then left AMA on 5/25/2024.      Pertinent Medical History: PAD s/p aortobifem bypass, nicotine dependence, bladder cancer    TOBACCO USE:  trying to quit 1-2 pack per day previously, now smoking one third less  No drinking   no drug        Patient's problem list, allergies, and current medications reviewed and updated in Epic    Interval History:  6/3/2024: Initial wound and ostomy evaluation, initial provider Clinic visit with Jory WHITE, FNP-BC, CWON, CFCN.  Pt denies fevers, chills, nausea, vomiting.  Accompanied by friend Le.  Followed up with urologist a week ago and stents removed from urostomy.  He reports he is able to cut and apply his own  appliance.  Changing every 1 to 2 days secondary to drainage from midline wound.  Reports he lost 30 pounds with combination of chemotherapy.  He has gained 5 pounds back.  Reports increased appetite.  Uses walker for long distances secondary to scoliosis.  Adequate urine output.  Emptying appliance when it is a third to care home full.  - Referral to home health placed for wound and ostomy care.        REVIEW OF SYSTEMS:   Review of Systems   Constitutional:  Negative for chills, diaphoresis and fever.   HENT: Negative.     Eyes: Negative.    Respiratory:  Negative for cough, shortness of breath and wheezing.    Cardiovascular:  Negative for chest pain, palpitations and claudication.   Gastrointestinal:  Negative for nausea and vomiting.   Genitourinary:         Yellow, clear   Musculoskeletal:  Negative for back pain, falls and joint pain.   Skin:  Negative for itching and rash.        Abdomen wound, urostomy   Neurological:  Negative for weakness.   Psychiatric/Behavioral:  Negative for depression. The patient is not nervous/anxious.          PHYSICAL EXAMINATION:   BP (!) 160/81   Pulse 92   Temp 36.1 °C (96.9 °F)   Resp 20   SpO2 97%     Physical Exam  Vitals reviewed.   Cardiovascular:      Rate and Rhythm: Normal rate.   Abdominal:      Palpations: Abdomen is soft.      Comments: Abdomen soft, rounded    RLQ urostomy: Stoma red, well budded, lost direction 6:00, 1-1/8 inch, MCJ intact, sutures dissolving, peristomal skin intact   Skin:     Comments: Abdominal midline surgical dehiscence: Full-thickness, thin slough, pinpoint opening, tract 2 cm deep, tract 2.4 at 5:00, moderate serosanguineous drainage, abdominal midline with residual Dermabond scattered throughout incision, no evidence of infection   Neurological:      Mental Status: He is alert and oriented to person, place, and time.   Psychiatric:         Mood and Affect: Mood normal.      Comments: Anxious           WOUND ASSESSMENT     Wound 06/03/24  Full Thickness Wound Abdomen Mid (Active)   Wound Image    06/03/24 1600   Site Assessment Red 06/03/24 1600   Periwound Assessment Clean;Dry;Intact;Scar tissue 06/03/24 1600   Margins Unattached edges 06/03/24 1600   Closure Secondary intention 06/03/24 1600   Drainage Amount Large 06/03/24 1600   Drainage Description Tan;Serosanguineous 06/03/24 1600   Treatments Cleansed;Topical Lidocaine;Provider debridement;Irrigation;Site care 06/03/24 1600   Wound Cleansing Hypochlorus Acid 06/03/24 1600   Periwound Protectant No-sting Skin Prep 06/03/24 1600   Dressing Status Clean;Dry;Intact 06/03/24 1600   Dressing Changed New 06/03/24 1600   Dressing Cleansing/Solutions Not Applicable 06/03/24 1600   Dressing Options Silver Strip Packing;Nonadhesive Foam;Hypafix Tape 06/03/24 1600   Dressing Change/Treatment Frequency Every 48 hrs, and As Needed 06/03/24 1600   Wound Team Following Weekly 06/03/24 1600   Non-staged Wound Description Full thickness 06/03/24 1600   Wound Length (cm) 0.2 cm 06/03/24 1600   Wound Width (cm) 0.2 cm 06/03/24 1600   Wound Depth (cm) 1.1 cm 06/03/24 1600   Wound Surface Area (cm^2) 0.04 cm^2 06/03/24 1600   Wound Volume (cm^3) 0.044 cm^3 06/03/24 1600   Post-Procedure Length (cm) 0.3 cm 06/03/24 1600   Post-Procedure Width (cm) 0.4 cm 06/03/24 1600   Post-Procedure Depth (cm) 2 cm 06/03/24 1600   Post-Procedure Surface Area (cm^2) 0.12 cm^2 06/03/24 1600   Post-Procedure Volume (cm^3) 0.24 cm^3 06/03/24 1600   Tunneling (cm) 2.4 cm 06/03/24 1600   Tunneling Clock Position of Wound 5 06/03/24 1600   Wound Odor None 06/03/24 1600   Exposed Structures THOMAS 06/03/24 1600   Number of days: 0       PROCEDURE:   -2% viscous lidocaine applied topically to wound bed for approximately 5 minutes prior to debridement  -Curette used to debride wound bed.  Excisional debridement was performed to remove devitalized tissue until healthy, bleeding tissue was visualized.   Entire surface of wound including tract,  "1.2 cm2 debrided.  Tissue debrided into the subcutaneous layer.  Removed residual Dermabond to midline incision with scissors and forceps revealing intact skin.  -Bleeding controlled with manual pressure.    -Wound care completed by wound RN, refer to flowsheet  -Patient tolerated the procedure well, without c/o pain or discomfort.       STOMA ASSESSMENT/PROCEDURE: Peristomal skin care, pouching procedure and ostomy teaching by ostomy RN.  Refer to Ostomy RN Assessment and Photo.       Urostomy 05/10/24 Ileal conduit RLQ (Active)   Wound Image    06/03/24 1600   Stomal Appliance Assessment Changed 06/03/24 1600   Stoma Assessment Red 06/03/24 1600   Stoma Shape Round 06/03/24 1600   Stoma Size (in) 1.13 06/03/24 1600   Peristomal Assessment Clean;Dry;Intact 06/03/24 1600   Mucocutaneous Junction Intact 06/03/24 1600   Treatment Removed appliance with adhesive remover;Cleansed with water/washcloth;Appliance Changed 06/03/24 1600   Peristomal Protectant Paste Ring 06/03/24 1600   Stomal Appliance Paste Ring, 2\";2 1/4\" (57mm) CTF;Urostomy Pouch 06/03/24 1600   Output Color Yellow 06/03/24 1600   WOUND RN ONLY - Stomal Appliance  2 Piece;Paste Ring, Ceraplus, 2\" slim;2 1/4\" (57mm) CTF;Urostomy Pouch 06/03/24 1600   Appliance (Pouch) # [id number], [id number], [id number] 06/03/24 1600   Appliance Brand [employer] 06/03/24 1600   Appliance Supplier Other (Comments) 06/03/24 1600            Pertinent Labs and Diagnostics:    Labs:   A1c:   Lab Results   Component Value Date/Time    HBA1C 5.6 12/21/2022 06:25 AM      Pathology 5/10/2024  A. Right distal ureter:          Segment of ureter, negative for malignancy.   B. Left distal ureter:          Segment of ureter, negative for malignancy.   C. Distal urethra:          Partially denuded urothelial mucosa with acute and chronic           inflammation and cystitis cystica/glandularis; negative for           malignancy.          Underlying prostatic tissue also with acute and chronic           " inflammation; negative for malignancy.   D. Prostate and bladder:          Urothelial mucosa demonstrating marked ulceration and necrosis           with associated acute inflammation, foreign body-type giant           cell reaction, and reactive urothelial atypia, consistent with           prior resection site/ therapy related changes.          No residual in situ or invasive urothelial carcinoma identified;           pathologic complete response (ypT0).          Prostate demonstrating prostatic adenocarcinoma, grade group 5           (Malika 4+5 = 9).          Tumor involves   10% of examined tissue with focal extraprostatic           extension and invasion into the muscularis propria of the           bladder neck.          Tumor focally involves the left apical surgical margin of           resection.          Lymphovascular and perineural invasion present.   E. Small bowel resection:          Segment of small bowel without significant histopathologic           abnormality; uninvolved by tumor.          Benign, viable surgical margins of resection.   F. Left proximal ureter:          Segment of ureter, negative for malignancy.   G. Right proximal ureter:          Segment of ureter, negative for malignancy.        IMAGIN2024 CT abdomen pelvis   1. Nonenhancing fluid collection in the anterior abdominal and pelvic wall, extending inferiorly to the penis and scrotal soft tissues measuring 3.6 x 1.5 x 16.6 cm in diameter. Finding is most compatible with seroma.  2. Postsurgical changes in the bowel with an ostomy in the right lower quadrant.  3. Ileus.  4. Stable bilateral renal cortical cysts, requiring no further imaging follow-up as per consensus guidelines based on imaging criteria.  5. The remainder is stable.    VASCULAR STUDIES: n/a    LAST  WOUND CULTURE:  DATE :   Lab Results   Component Value Date/Time    CULTRSULT No growth after 5 days of incubation. 2024 02:55 PM    CULTRSULT No growth after  5 days of incubation. 05/24/2024 02:55 PM         ASSESSMENT AND PLAN:     1. Complication of urostomy (HCC)  2. Presence of urostomy (HCC)  Created 5/10/2024    6/3/2024: Initial ostomy evaluation in conjunction with ostomy RN.  Peristomal skin intact  - Initiate soft convex for crease when sitting  - Reports independent at home with changing appliance.  Goal for next appointment to change in front of mirror  - Referral to home health for ostomy care placed  - Patient to follow-up at wound care clinic weekly until patient has become independent in care  -continue ostomy teaching by ostomy nurse.   - Peristomal skin care, and pouching modification by RN in clinic today with instruction provided to patient.    Ostomy care: Paste ring, soft convex, 2-1/4 cut to fit 2 piece urostomy appliance    3. Open wound of abdomen, subsequent encounter  4.  Dehiscence of operative wound, subsequent encounter  S/p cystoprostatectomy with ileal conduit, without lymph node dissection on 5/10/2024 by Dr. Cardoso    6/3/2024: Initial wound evaluation.  Midline dehisced at proximal aspect.  Tract of 2.4 cm  - Excisional debridement performed today.  Medically necessary to promote wound healing.  -Patient to follow-up weekly at wound care clinic  - Referral to home health placed.  Home health to see patient 2 times per week in between clinic appointments  - Pathology negative for malignancy on 5/10/2024    Wound care: Silver strip packing, adhesive foam    5.  Wound infection    6/3/2024: Continues on Bactrim DS for 10 days that was given to him during his last hospitalization 5/26/2024  - Tolerating well, no evidence of infection    6.  Cigarette nicotine dependence without complication    6/3/2024: Tobacco cessation education provided for 4 minutes, discussed options of nicotine patch, medical treatment with wellbutrin and chantix. Discussed the risks of smoking including increased risk of heart disease, stroke, cancer and COPD.  Discussed the benefits of quitting smoking on wound healing and circulation system.  Patient is interested in quitting at this time.  -Currently using nicotine patch.  Reports he is smoking a third less than he used to              PATIENT EDUCATION  - Importance of adequate nutrition for wound healing  -Advised to go to ER for any increased redness, swelling, drainage, or odor, or if patient develops fever, chills, nausea or vomiting.     My total time spent caring for the patient on the day of the encounter was 30 minutes.   This does not include time spent on separately billable procedures/tests.       Please note that this note may have been created using voice recognition software. I have worked with technical experts from [facility] to optimize the interface.  I have made every reasonable attempt to correct obvious errors, but there may be errors of grammar and possibly content that I did not discover before finalizing the note.

## 2024-06-04 ENCOUNTER — HOME HEALTH ADMISSION (OUTPATIENT)
Dept: HOME HEALTH SERVICES | Facility: HOME HEALTHCARE | Age: 79
End: 2024-06-04
Payer: MEDICARE

## 2024-06-04 ASSESSMENT — ENCOUNTER SYMPTOMS
BACK PAIN: 0
VOMITING: 0
FEVER: 0
PALPITATIONS: 0
CLAUDICATION: 0
SHORTNESS OF BREATH: 0
NAUSEA: 0
CHILLS: 0
FALLS: 0
WHEEZING: 0
NERVOUS/ANXIOUS: 0
WEAKNESS: 0
COUGH: 0
EYES NEGATIVE: 1
DEPRESSION: 0
DIAPHORESIS: 0

## 2024-06-04 NOTE — PATIENT INSTRUCTIONS
-Keep your wound dressing clean, dry, and intact. Only change dressing if it's over saturated, soiled or falls off.     -Should you experience any significant changes in your wound(s), such as infection (redness, swelling, localized heat, increased pain, fever > 101 F, chills) or have any questions regarding your home care instructions, please contact the wound center at [phone]. If after hours, contact your primary care physician or go to the hospital emergency room.

## 2024-06-04 NOTE — CERTIFICATION
"Ostomy Evaluation  For 90 Day Certification Period: 06/03/24   - 09/01/24     Surgeon: Agustín Cardoso M.D.  Surgery type and date: 5/10/24 Permanent  Pre-Marked: Yes  Pertinent Hx: Patient with history of bladder cancer. Patient pre-marked in clinic by Kim 5/8/24, had surgery on 5/10/24 and remained admitted until 5/17/24 and was discharged home with home health. Patient was accepted by [employer] [facility], but he has not yet had his first appt. Patient states that he was worried about his incision draining. Patient went to ED on 5/24/24 due to increased drainage, he was discharged 5/25/24 with oral abx and instructions to follow up with his urologist.    Start of Care: 6/3/24    Supplier:  Patient will need supply order placed when home health is discontinued  Order date: TBD    OBJECTIVE: 2 piece flat 2 1/4\" appliance in place, moderate wear noted on inside of barrier when removed. Per patient, appliance has been in place for 3 days.    STOMA ASSESSMENT:         Urostomy 05/10/24 Ileal conduit RLQ (Active)   Wound Image    06/03/24 1600   Stomal Appliance Assessment Changed 06/03/24 1600   Stoma Assessment Red 06/03/24 1600   Stoma Shape Round 06/03/24 1600   Stoma Size (in) 1.13 06/03/24 1600   Peristomal Assessment Clean;Dry;Intact 06/03/24 1600   Mucocutaneous Junction Intact 06/03/24 1600   Treatment Removed appliance with adhesive remover;Cleansed with water/washcloth;Appliance Changed 06/03/24 1600   Peristomal Protectant Paste Ring 06/03/24 1600   Stomal Appliance Paste Ring, 2\";2 1/4\" (57mm) CTF;Urostomy Pouch 06/03/24 1600   Output Color Yellow 06/03/24 1600   WOUND RN ONLY - Stomal Appliance  2 Piece;Paste Ring, Ceraplus, 2\" slim;2 1/4\" (57mm) CTF;Urostomy Pouch 06/03/24 1600   Appliance (Pouch) # [id number], [id number], [id number] 06/03/24 1600   Appliance Brand Tay 06/03/24 1600   Appliance Supplier [facility] 06/03/24 1600               Pouching Procedure Notes (if indicated): n/a    Previous Trials and " Notes:  Patient previously in flat appliance, patient agreeable to try soft convex barrier today. Patient states that he has tried a one piece at home and did not like it.      Patient/Caregiver Education:       1 Can do independently   2 Needs some help   3 Needs a lot of help and additional review   4 No chance to review, needs additional follow-up     EMPTY THE POUCH  Empty pouch when it is 1/3 - ½ full 1   Assemble supplies before emptying: toilet paper, pouch, deodorant  1   Assume a correct position 1   Open pouch 1   Lower opening into the toilet and empty 1   Wipe opening before resealing 1   Add pouch deodorant (if used) 1   Reclamp or reseal the pouch 1   , REMOVING THE OLD POUCH  Assemble supplies for pouch change: new pouch, towel, measurement guide, pen, scissors, garbage bag (skin barrier ring, powder, deodorant, and adhesive remover, if needed) 1   Remove the outer adhesive by starting at one corner/edge 1   Place one hand on skin and push down while your other hand lifts the barrier to push skin away from barrier. Use a warm wet cloth or adhesive releaser if needed 1   Place the old pouch in a garbage bag 1   If you are using a clamp, do not throw it away 1   , CLEAN AND INSPECT THE SKIN  Check the skin for color, bleeding, and irritation 1   Clean skin around the stoma with ONLY warm water 1   Pat the skin dry 1       Crusting if needed    Apply stoma powder to red/wet skin, brush off excess 4   Apply skin protectant over powder ONLY to seal in place 4   , MEASURE AND CUT OPENING  Cover the stoma with a piece of tissue or gauze while measuring 1   Measure the stoma accurately with the measurement guide 1   Trace the correct size on the back of the pouch barrier 1   Place your finger into the precut opening and push the pouch away from the barrier in using a one-piece system 1   Cut the traced opening with full teeth of the scissors 1   Align opening over the stoma making sure it is close to the  "stoma edge. Adjust as needed.  1   Colostomy - 1/8” clearance between stoma and appliance (width of a nickel on its side)  Ileostomy/Urostomy - 1/16” clearance between stoma and appliance (width of a dime on its side) 1   , and APPLY NEW POUCHING SYSTEM  Remove the paper backing from the pouch barrier 1   Crust if needed 4   Remove any gauze/tissue placed over stoma 1   Center and apply the pouch skin barrier around the stoma. Starting closest to the stoma, press and rub on the barrier for 30-60 seconds \"Going around the race track\" 1   For a two-piece system, apply the pouch to the barrier flange 1   Close the bottom of the pouch 1       PLAN/GOALS:  Patient independent with changes at home, can practice hands on in front of mirror at next clinic visit.  Patient to be seen by [facility] twice weekly, orders routed        WOUND PROCEDURE NOTE (if indicated):        Wound 06/03/24 Full Thickness Wound Abdomen Mid (Active)   Wound Image    06/03/24 1600   Site Assessment Red 06/03/24 1600   Periwound Assessment Clean;Dry;Intact;Scar tissue 06/03/24 1600   Margins Unattached edges 06/03/24 1600   Closure Secondary intention 06/03/24 1600   Drainage Amount Large 06/03/24 1600   Drainage Description Tan;Serosanguineous 06/03/24 1600   Treatments Cleansed;Topical Lidocaine;Provider debridement;Irrigation;Site care 06/03/24 1600   Wound Cleansing Hypochlorus Acid 06/03/24 1600   Periwound Protectant No-sting Skin Prep 06/03/24 1600   Dressing Status Clean;Dry;Intact 06/03/24 1600   Dressing Changed New 06/03/24 1600   Dressing Cleansing/Solutions Not Applicable 06/03/24 1600   Dressing Options Silver Strip Packing;Nonadhesive Foam;Hypafix Tape 06/03/24 1600   Dressing Change/Treatment Frequency Every 48 hrs, and As Needed 06/03/24 1600   Wound Team Following Weekly 06/03/24 1600   Non-staged Wound Description Full thickness 06/03/24 1600   Wound Length (cm) 0.2 cm 06/03/24 1600   Wound Width (cm) 0.2 cm 06/03/24 " 1600   Wound Depth (cm) 1.1 cm 06/03/24 1600   Wound Surface Area (cm^2) 0.04 cm^2 06/03/24 1600   Wound Volume (cm^3) 0.044 cm^3 06/03/24 1600   Post-Procedure Length (cm) 0.3 cm 06/03/24 1600   Post-Procedure Width (cm) 0.4 cm 06/03/24 1600   Post-Procedure Depth (cm) 2 cm 06/03/24 1600   Post-Procedure Surface Area (cm^2) 0.12 cm^2 06/03/24 1600   Post-Procedure Volume (cm^3) 0.24 cm^3 06/03/24 1600   Tunneling (cm) 2.4 cm 06/03/24 1600   Tunneling Clock Position of Wound 5 06/03/24 1600   Wound Odor None 06/03/24 1600   Exposed Structures THOMAS 06/03/24 1600

## 2024-06-05 ENCOUNTER — HOME CARE VISIT (OUTPATIENT)
Dept: HOME HEALTH SERVICES | Facility: HOME HEALTHCARE | Age: 79
End: 2024-06-05

## 2024-06-05 ENCOUNTER — TELEPHONE (OUTPATIENT)
Dept: WOUND CARE | Facility: MEDICAL CENTER | Age: 79
End: 2024-06-05

## 2024-06-05 NOTE — TELEPHONE ENCOUNTER
I will see him in October for 6 mos follow up visit. Please have him schedule.    Vaishali from [facility] called to state that Ronal is not home bound. Leaves home frequently and  they will not be able to provide services.

## 2024-06-07 ENCOUNTER — HOSPITAL ENCOUNTER (OUTPATIENT)
Dept: HEMATOLOGY ONCOLOGY | Facility: MEDICAL CENTER | Age: 79
End: 2024-06-07
Attending: STUDENT IN AN ORGANIZED HEALTH CARE EDUCATION/TRAINING PROGRAM
Payer: MEDICARE

## 2024-06-07 VITALS
OXYGEN SATURATION: 96 % | TEMPERATURE: 97.5 F | SYSTOLIC BLOOD PRESSURE: 108 MMHG | HEART RATE: 75 BPM | BODY MASS INDEX: 23.84 KG/M2 | WEIGHT: 151.9 LBS | DIASTOLIC BLOOD PRESSURE: 48 MMHG | HEIGHT: 67 IN

## 2024-06-07 DIAGNOSIS — D49.4 BLADDER TUMOR: ICD-10-CM

## 2024-06-07 PROCEDURE — 99214 OFFICE O/P EST MOD 30 MIN: CPT | Performed by: STUDENT IN AN ORGANIZED HEALTH CARE EDUCATION/TRAINING PROGRAM

## 2024-06-07 PROCEDURE — 99212 OFFICE O/P EST SF 10 MIN: CPT | Performed by: STUDENT IN AN ORGANIZED HEALTH CARE EDUCATION/TRAINING PROGRAM

## 2024-06-07 RX ORDER — 0.9 % SODIUM CHLORIDE 0.9 %
20 VIAL (ML) INJECTION PRN
OUTPATIENT
Start: 2024-06-24

## 2024-06-07 ASSESSMENT — ENCOUNTER SYMPTOMS
VOMITING: 0
DIZZINESS: 0
PALPITATIONS: 0
SORE THROAT: 0
ORTHOPNEA: 0
EYE DISCHARGE: 1
DEPRESSION: 0
BRUISES/BLEEDS EASILY: 0
SHORTNESS OF BREATH: 0
COUGH: 0
NAUSEA: 0
SENSORY CHANGE: 0
MEMORY LOSS: 0
TINGLING: 0
WHEEZING: 0
FEVER: 0
SPUTUM PRODUCTION: 0
FOCAL WEAKNESS: 0
BACK PAIN: 1
HEARTBURN: 0
HEADACHES: 0
TREMORS: 0
NECK PAIN: 0
WEIGHT LOSS: 0
CHILLS: 0
BLURRED VISION: 0
ABDOMINAL PAIN: 0
FLANK PAIN: 0

## 2024-06-07 ASSESSMENT — FIBROSIS 4 INDEX: FIB4 SCORE: 2.14

## 2024-06-07 NOTE — ADDENDUM NOTE
Encounter addended by: Jose Carlos Gould on: 6/7/2024 8:42 AM   Actions taken: Charge Capture section accepted

## 2024-06-07 NOTE — PROGRESS NOTES
Consult Note: Hematology/Oncology     Referring Provider:Agustín Cardoso MD  Primary Care:  Shakira Henriquez M.D.    Chief Complaint   Patient presents with    Cancer     One month FV      Current Treatment:  01/29/24: C1D1 DDMVAC   2/12/24: C2D1 DDMVAC   2/26/24: C3D1 DDMVAC   3/11/24: C4D1 DDMVAC     05/16/24: Cystoprostatectomy (pT0 N0 - bladder; T3 Prostate)    Prior Treatment: None    Subjective:   History of Presenting Illness:  Ronal Hair is a 78 y.o. male with a past medical history of lymphoma who presents with a new diagnosis of T2N0 bladder cancer.    Patient reports that he has been struggling for months.  He notes that he has been having frequent urination for months.  He had a CT scan which did not reveal any evidence of stone in the setting of his pain.  He saw urologist and underwent a cystoscopy which revealed a mass in the bladder.  Patient was admitted for a TURBT on December 13.    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  He also has perineural invasion present.    Patient is here to discuss neoadjuvant chemotherapy.    He also works as a  for disabled individuals.  He reports that he can no longer work as he has to urinate every 15 minutes and has frequency and urgency.    He has port scheduled to be placed on 1/26/24    Echo completed on 1/9/24, and EF was 65%.     Interval History    Cystoprostatectomy was completed on 5/16.     He reports he is doing well. He is getting stronger. He takes a nap everyday    His only complaint is financial and issues with work.     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    High cholesterol     Hypertension 01/12/2024    pt denies    Infectious disease     Lymphoma ([facility]) 01/22/2016    Nephrolithiasis 01/06/2023    Osteomyelitis of left foot ([facility]) 11/11/2020    Pain of toe 11/03/2020    Psychiatric problem 01/12/2024    anxiety related to diagnosis    Reactive airway disease without complication 03/18/2022    Reactive airway disease without complication 03/18/2022    Scoliosis     Snoring     Urinary incontinence 01/12/2024    wears depends when away from home        Past Surgical History:   Procedure Laterality Date    OK SIGMOIDOSCOPY,DIAGNOSTIC N/A 5/10/2024    Procedure: SIGMOIDOSCOPY, FLEXIBLE;  Surgeon: Agustín FLANAGAN M.D.;  Location: [facility];  Service: Urology    CYSTOPROSTATECTOMY RADICAL N/A 5/10/2024    Procedure: RADICAL CYSTOPROSTATECTOMY WITH CREATION OF ILEAL CONDUIT (OR OTHER URINARY DIVERSION);  Surgeon: Agustín FLANAGAN M.D.;  Location: [facility];  Service: Urology    BOWEL RESECTION N/A 5/10/2024    Procedure: EXCISION, INTESTINE;  Surgeon: Agustín FLANAGAN M.D.;  Location: [facility];  Service: Urology    TURBT (TRANSURETHRAL RESECTION OF BLADDER TUMOR)  12/13/2023    Procedure: BIPOLAR TRANSURETHRAL RESECTION OF BLADDER TUMOR;  Surgeon: Addison Seymour M.D.;  Location: SURGERY [facility];  Service: Urology    OK CYSTOSCOPY,INSERT URETERAL STENT Left 01/07/2023    Procedure: CYSTOSCOPY, WITH URETERAL STENT INSERTION;  Surgeon: Addison Seymour M.D.;  Location: SURGERY [facility];  Service: Urology    OK CYSTO/URETERO/PYELOSCOPY, DX Left 01/07/2023    Procedure: URETEROSCOPY;  Surgeon: Addison Seymour M.D.;  Location: [facility];  Service: Urology    LASERTRIPSY Left 01/07/2023    Procedure: LITHOTRIPSY, USING LASER;  Surgeon: Addison Seymour  M.D.;  Location: SURGERY [facility];  Service: Urology    AORTOBIFEM BYPASS  11/08/2020    Procedure: CREATION, BYPASS, ARTERIAL, AORTA TO FEMORAL, BILATERAL;  Surgeon: Jimi Morrison M.D.;  Location: SURGERY [facility];  Service: General    KNEE ARTHROSCOPY Left 1968    APPENDECTOMY CHILD      CATARACT EXTRACTION WITH IOL Bilateral     OTHER ABDOMINAL SURGERY      OTHER ORTHOPEDIC SURGERY      SHOULDER ARTHROSCOPY Left        Social History     Tobacco Use    Smoking status: Every Day     Current packs/day: 0.50     Average packs/day: 2.0 packs/day for 58.4 years (115.8 ttl pk-yrs)     Types: Cigarettes     Start date: 1/1/1966     Last attempt to quit: 11/3/2020    Smokeless tobacco: Never    Tobacco comments:     Pt states he is still smoking / only 1/2 a pack per day now    Vaping Use    Vaping status: Never Used   Substance Use Topics    Alcohol use: Not Currently    Drug use: Never        Family History   Problem Relation Age of Onset    Cancer Maternal Aunt         melanoma    Cancer Maternal Uncle         Throat cancer       No Known Allergies    Current Outpatient Medications   Medication Sig Dispense Refill    acetaminophen (TYLENOL 8 HOUR) 650 MG CR tablet Take 1,300 mg by mouth 3 times a day as needed for Mild Pain. 2 tablets = 1,300 mg.  Indications: Pain      sildenafil citrate (VIAGRA) 25 MG Tab Take 12.5 mg by mouth 1 time a day as needed for Erectile Dysfunction. 0.5 tablet = 12.5 mg.  Indications: Erectile Dysfunction      nicotine (NICODERM) 21 MG/24HR PATCH 24 HR Place 1 Patch on the skin every 24 hours. 30 Patch 2    enoxaparin (LOVENOX) 40 MG/0.4ML Solution Prefilled Syringe inj Inject 1 syringe (40 mg) under the skin every day for 26 days. 26 Each 0    ondansetron (ZOFRAN) 4 MG Tab tablet Take 1 Tablet by mouth every four hours as needed for Nausea/Vomiting (for nausea, vomiting). 30 Tablet 6    prochlorperazine (COMPAZINE) 10 MG Tab Take 1 Tablet by mouth every 6 hours as needed (for  "nausea, vomiting). 30 Tablet 6    lidocaine-prilocaine (EMLA) 2.5-2.5 % Cream Apply to port one hour prior to access and cover with plastic wrap. 1 Each 3    tiotropium (SPIRIVA) 18 MCG Cap Inhale 1 puff by mouth once daily (Patient taking differently: Place 18 mcg into inhaler and inhale every day.) 30 Capsule 11    atorvastatin (LIPITOR) 40 MG Tab Take 1 Tablet by mouth every evening. 90 Tablet 3    Multiple Vitamins-Minerals (MULTIVITAMIN ADULTS 50+ PO) Take 1 Tablet by mouth every day. Indications: suppliment       No current facility-administered medications for this encounter.       Review of Systems   Constitutional:  Negative for chills, fever and weight loss.   HENT:  Negative for congestion, ear pain, nosebleeds and sore throat.    Eyes:  Positive for discharge. Negative for blurred vision.   Respiratory:  Negative for cough, sputum production, shortness of breath and wheezing.    Cardiovascular:  Negative for chest pain, palpitations, orthopnea and leg swelling.   Gastrointestinal:  Negative for abdominal pain, heartburn, nausea and vomiting.   Genitourinary:  Negative for dysuria, flank pain and urgency.   Musculoskeletal:  Positive for back pain. Negative for neck pain.   Neurological:  Negative for dizziness, tingling, tremors, sensory change, focal weakness and headaches.   Endo/Heme/Allergies:  Does not bruise/bleed easily.   Psychiatric/Behavioral:  Negative for depression, memory loss and suicidal ideas.    All other systems reviewed and are negative.      Problem list, medications, and allergies reviewed by myself today in Epic.     Objective:     Vitals:    06/07/24 0812   BP: 108/48   BP Location: Left arm   Patient Position: Sitting   BP Cuff Size: Adult   Pulse: 75   Temp: 36.4 °C (97.5 °F)   TempSrc: Temporal   SpO2: 96%   Weight: 68.9 kg (151 lb 14.4 oz)   Height: 1.702 m (5' 7.01\")       DESC; KARNOFSKY SCALE WITH ECOG EQUIVALENT: 80, Normal activity with effort; some signs or symptoms of " disease (ECOG equivalent 1)    DISTRESS LEVEL: no apparent distress    Physical Exam  Constitutional:       General: He is not in acute distress.     Appearance: Normal appearance.      Comments: Port in place   HENT:      Head: Normocephalic and atraumatic.      Nose: Nose normal. No congestion.      Mouth/Throat:      Mouth: Mucous membranes are moist.      Pharynx: Oropharynx is clear.   Eyes:      General: No scleral icterus.     Conjunctiva/sclera: Conjunctivae normal.      Pupils: Pupils are equal, round, and reactive to light.   Cardiovascular:      Rate and Rhythm: Normal rate and regular rhythm.      Pulses: Normal pulses.      Heart sounds: No murmur heard.     No friction rub.   Pulmonary:      Effort: Pulmonary effort is normal. No respiratory distress.      Breath sounds: Normal breath sounds. No stridor. No wheezing or rales.   Chest:      Chest wall: No tenderness.   Abdominal:      General: Abdomen is flat. Bowel sounds are normal. There is no distension.      Palpations: Abdomen is soft. There is no mass.      Tenderness: There is no abdominal tenderness. There is no guarding or rebound.   Genitourinary:     Comments: Sutures in place, no infection, cystoscopy bag  Musculoskeletal:         General: No swelling, tenderness or deformity. Normal range of motion.      Cervical back: Normal range of motion and neck supple. No rigidity or tenderness.      Right lower leg: No edema.      Left lower leg: No edema.   Skin:     General: Skin is warm.      Coloration: Skin is not jaundiced or pale.      Findings: No bruising or rash.   Neurological:      General: No focal deficit present.      Mental Status: He is alert and oriented to person, place, and time. Mental status is at baseline.      Motor: No weakness.   Psychiatric:         Mood and Affect: Mood normal.         Behavior: Behavior normal.         Thought Content: Thought content normal.         Judgment: Judgment normal.         Labs:   Most recent  labs reviewed.      Slightly anemic, no issues with Cr.      Imaging:   Most recent images below have been independently reviewed by me.     CT CAP  1. No evidence of metastatic disease.  2. Decreased, persistent right-sided bladder wall thickening/malignancy.  3. Small simple renal cysts, which do not require follow-up.  4. Tiny bilateral nonobstructing renal stones.  5. Prostate enlargement.       CT Chest  1. No evidence of metastatic disease.  2. No acute process.       CT AP  1. Severe irregular bladder wall thickening, consistent with bladder cancer. No regional adenopathy or distant metastatic disease identified.  2. 4 mm distal right ureteral stone.  3. Single bilateral nonobstructing renal stones measuring 2 mm.  4. Multiple subcentimeter simple renal cysts, which do not require follow-up.  5. Prostate is enlarged, 5.1 cm in diameter.    Pathology:  A. Bladder tumor:          High-grade urothelial carcinoma with focal squamous           differentiation, invasive into muscularis propria (pT2).          Perineural invasion present.       Assessment/Plan:      Cancer Staging   No matching staging information was found for the patient.       Mr. Hair 79 yo M with a new diagnosis of of high grade urothelial carcinoma with focal squamous differentiation, pT2 N0 (stage II) s/p C4 ddMVAC.     Patient underwent a  Cystoprostatectomy.      With respect to the bladder, there was no residual in situ or invasive urothelial carcinoma identified; pathologic complete response (ypT0).    With respect to his prostate, he was found to have a pT3 NX Acinar Adenocarcinoma with EPE, urinary bladder neck invasion and LVI and + margins.     Clinically he is doing well. Labs reviewed, no concerns.     #1 Bladder Cancer  -s/p cystectomy  -we discussed that he does not need adjuvant treatment.  -will initiate surveillance scans    -CTU and CT chest q3-6 months for 2 years then annually foryears 3-5  -will monitor CBC and CMP q3-6  months  -urology to manage urine cytology and urethral wash q6 months    #2. Prostate cancer  -discussed plan with Urologist, Dr. Cardoso  -pt to have PSA q3 months and refer for salvage XRT if PSA becomes detectable.     #3. Hypotension  -I offered hydration in office - pt refused  -counseled patient to go to the ER immediately if he becomes symptomatic    #4. Port in place  -will need flushes until It can be removed    #5. Wound   -healing well  -continue with wound care    No follow-ups on file.     Any questions and concerns raised by the patient were addressed and answered. Patient denies any further questions.  Patient encouraged to call the office with any concerns or issues.     Judi Osborn M.D.  Hematology/Oncology      30 minutes was spent on this visit

## 2024-06-11 ENCOUNTER — OFFICE VISIT (OUTPATIENT)
Dept: WOUND CARE | Facility: MEDICAL CENTER | Age: 79
End: 2024-06-11
Attending: UROLOGY
Payer: MEDICARE

## 2024-06-11 VITALS
TEMPERATURE: 97 F | RESPIRATION RATE: 18 BRPM | DIASTOLIC BLOOD PRESSURE: 58 MMHG | SYSTOLIC BLOOD PRESSURE: 110 MMHG | HEART RATE: 60 BPM | OXYGEN SATURATION: 100 %

## 2024-06-11 DIAGNOSIS — T81.31XD DEHISCENCE OF OPERATIVE WOUND, SUBSEQUENT ENCOUNTER: ICD-10-CM

## 2024-06-11 DIAGNOSIS — L08.9 WOUND INFECTION: ICD-10-CM

## 2024-06-11 DIAGNOSIS — Z93.6 PRESENCE OF UROSTOMY (HCC): ICD-10-CM

## 2024-06-11 DIAGNOSIS — S31.109D OPEN WOUND OF ABDOMEN, SUBSEQUENT ENCOUNTER: ICD-10-CM

## 2024-06-11 DIAGNOSIS — F17.210 CIGARETTE NICOTINE DEPENDENCE WITHOUT COMPLICATION: ICD-10-CM

## 2024-06-11 DIAGNOSIS — T14.8XXA WOUND INFECTION: ICD-10-CM

## 2024-06-11 DIAGNOSIS — N99.528 COMPLICATION OF UROSTOMY (HCC): ICD-10-CM

## 2024-06-11 PROCEDURE — 11042 DBRDMT SUBQ TIS 1ST 20SQCM/<: CPT

## 2024-06-11 PROCEDURE — 11042 DBRDMT SUBQ TIS 1ST 20SQCM/<: CPT | Performed by: NURSE PRACTITIONER

## 2024-06-11 PROCEDURE — 99213 OFFICE O/P EST LOW 20 MIN: CPT | Mod: 25 | Performed by: NURSE PRACTITIONER

## 2024-06-11 PROCEDURE — 99213 OFFICE O/P EST LOW 20 MIN: CPT

## 2024-06-11 PROCEDURE — 3074F SYST BP LT 130 MM HG: CPT | Performed by: NURSE PRACTITIONER

## 2024-06-11 PROCEDURE — 3078F DIAST BP <80 MM HG: CPT | Performed by: NURSE PRACTITIONER

## 2024-06-11 NOTE — PATIENT INSTRUCTIONS
-Change your dressing as instructed and immediately if it becomes soiled, soaked, or falls off.    -Change urostomies every 3-5 days. Change appliance immediately if it is leaking or peristomal skin feels irritated, has itching, or  burning.   To change the appliance, remove previous appliance, cleanse peristomal skin with warm water/washcloth, pat dry, make an ostomy template or use cardboard measuring guide and trace ostomy shape onto back of barrier, cut out barrier, apply a paste ring around barrier opening and apply appliance. Empty pouches when no more than ½ full. Check contents every 2 hours or as needed. Do not leave soap residue on tissue and do not use baby wipes or skin prep wipes.     -Should you experience any significant changes in your wound(s), such as infection (redness, swelling, localized heat, increased pain, fever > 101 F, chills) or have any questions regarding your home care instructions, please contact the wound center at [phone]. If after hours, contact your primary care physician or go to the hospital emergency room.

## 2024-06-11 NOTE — PROGRESS NOTES
Provider Encounter- Full Thickness wound             Ostomy  HISTORY OF PRESENT ILLNESS  Wound History:    START OF CARE IN CLINIC: 6/3/24    REFERRING PROVIDER: Agustín Cardoso MD     WOUND- Full Thickness Wound   LOCATION: Midline abdomen     OSTOMY TYPE: Urostomy-permanent   LOCATION: RLQ              SURGERY:S/p cystoprostatectomy with ileal conduit, without lymph node dissection on 5/10/2024 by Dr. 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  Staging revealed no evidence of metastatic disease.  He underwent chemotherapy.  Patient premarked for urostomy at [facility] ostomy [facility].    He underwent surgical resection with ileal conduit creation on 5/10/2024.  He was admitted postoperatively.  Seen by wound and ostomy team.  He discharged on 5/17/2024.  He followed up at urology on 5/23/2024.  Urostomy stents were removed.  There was concern for possible surgical site infection as he had increased drainage from his incision.  He was started on cefdinir and followed up at the ED on 5/24/2024 as he had difficulties keeping urostomy appliance on because of drainage from his abdomen.  He was started on IV antibiotics and then left AMA on 5/25/2024.      Pertinent Medical History: PAD s/p aortobifem bypass, nicotine dependence, bladder cancer    TOBACCO USE:  trying to quit 1-2 pack per day previously, now smoking one third less  No drinking   no drug use        Patient's problem list, allergies, and current medications reviewed and updated in Epic    Interval History:  6/3/2024: Initial wound and ostomy evaluation, initial provider Clinic visit with Jory WHITE, FNP-BC, CWON, CFCN.  Pt denies fevers, chills, nausea, vomiting.  Accompanied by friend Le.  Followed up with urologist a week ago and stents removed from urostomy.  He reports he is able to cut and apply his own  appliance.  Changing every 1 to 2 days secondary to drainage from midline wound.  Reports he lost 30 pounds with combination of chemotherapy.  He has gained 5 pounds back.  Reports increased appetite.  Uses walker for long distances secondary to scoliosis.  Adequate urine output.  Emptying appliance when it is a third to [facility] full.  - Referral to home health placed for wound and ostomy care.    6/11/24: Clinic visit with Jory WHITE, FNP-BC, CWON, CFVIKRAM.  Pt denies fevers, chills, nausea, vomiting. Denied by HH. Abdominal Packing in place for 1 wk , depth decreased. Did not change uro for1 wk. Stopped lovenox 2 injections short. Increased smoking-stressed. Sees PA this Thursday. F/u appts made for 2/wk.         REVIEW OF SYSTEMS:   Unchanged from previous wound clinic assessment on 6/3/24, except as noted in interval history above        PHYSICAL EXAMINATION:   BP (!) 160/81   Pulse 92   Temp 36.1 °C (96.9 °F)   Resp 20   SpO2 97%     Physical Exam  Vitals reviewed.   Cardiovascular:      Rate and Rhythm: Normal rate.   Abdominal:      Palpations: Abdomen is soft.      Comments: Abdomen soft, rounded    RLQ urostomy: Stoma red, well budded, os direction 6:00, 1-1/8 inch, MCJ intact, sutures dissolving, peristomal skin intact   Skin:     Comments: Abdominal midline surgical dehiscence: Full-thickness, depth decreased, thin slough, pinpoint opening, moderate serosanguineous drainage,     abdominal midline with residual Dermabond scattered throughout incision, no evidence of infection   Neurological:      Mental Status: He is alert and oriented to person, place, and time.   Psychiatric:         Mood and Affect: Mood normal.      Comments: Anxious           WOUND ASSESSMENT     Wound 06/03/24 Full Thickness Wound Abdomen Mid (Active)   Wound Image    06/11/24 1330   Site Assessment Red 06/11/24 1330   Periwound Assessment Clean;Dry;Intact 06/11/24 1330   Margins Unattached edges 06/11/24 1330   Closure  Secondary intention 06/11/24 1330   Drainage Amount Large 06/11/24 1330   Drainage Description Serosanguineous 06/11/24 1330   Treatments Cleansed;Topical Lidocaine;Provider debridement;Site care 06/11/24 1330   Wound Cleansing Normal Saline Irrigation 06/11/24 1330   Periwound Protectant No-sting Skin Prep 06/11/24 1330   Dressing Status Clean;Dry;Intact 06/11/24 1330   Dressing Changed Changed 06/11/24 1330   Dressing Cleansing/Solutions Not Applicable 06/11/24 1330   Dressing Options Silver Strip Packing;Hydrofiber;Nonadhesive Foam;Hypafix Tape 06/11/24 1330   Dressing Change/Treatment Frequency Every 48 hrs, and As Needed 06/11/24 1330   Wound Team Following Weekly 06/11/24 1330   Non-staged Wound Description Full thickness 06/11/24 1330   Wound Length (cm) 0.4 cm 06/11/24 1330   Wound Width (cm) 0.2 cm 06/11/24 1330   Wound Depth (cm) 1.3 cm 06/11/24 1330   Wound Surface Area (cm^2) 0.08 cm^2 06/11/24 1330   Wound Volume (cm^3) 0.104 cm^3 06/11/24 1330   Post-Procedure Length (cm) 0.4 cm 06/11/24 1330   Post-Procedure Width (cm) 0.3 cm 06/11/24 1330   Post-Procedure Depth (cm) 1.5 cm 06/11/24 1330   Post-Procedure Surface Area (cm^2) 0.12 cm^2 06/11/24 1330   Post-Procedure Volume (cm^3) 0.18 cm^3 06/11/24 1330   Wound Healing % -136 06/11/24 1330   Tunneling (cm) 0 cm 06/11/24 1330   Tunneling Clock Position of Wound 5 06/03/24 1600   Undermining (cm) 0 cm 06/11/24 1330   Wound Odor None 06/11/24 1330   Exposed Structures THOMAS 06/11/24 1330   Number of days: 9       PROCEDURE:   -2% viscous lidocaine applied topically to wound bed for approximately 5 minutes prior to debridement  -Curette used to debride wound bed.  Excisional debridement was performed to remove devitalized tissue until healthy, bleeding tissue was visualized.   Entire surface of wound including tract, 1.6 cm2 debrided.  Tissue debrided into the subcutaneous layer.  Removed residual Dermabond to midline incision with scissors and forceps  "revealing intact skin.  -Bleeding controlled with manual pressure.    -Wound care completed by wound RN, refer to flowsheet  -Patient tolerated the procedure well, without c/o pain or discomfort.       STOMA ASSESSMENT/PROCEDURE: Peristomal skin care, pouching procedure and ostomy teaching by ostomy RN.  Refer to Ostomy RN Assessment and Photo.           Urostomy 05/10/24 Ileal conduit RLQ (Active)   Wound Image   06/11/24 1330   Stomal Appliance Assessment Changed 06/11/24 1330   Stoma Assessment Red 06/11/24 1330   Stoma Shape Round 06/11/24 1330   Stoma Size (in) 1.13 06/11/24 1330   Peristomal Assessment Clean;Dry;Intact 06/11/24 1330   Mucocutaneous Junction Intact 06/11/24 1330   Treatment Removed appliance with adhesive remover;Appliance Changed 06/11/24 1330   Peristomal Protectant Paste Ring 06/11/24 1330   Stomal Appliance 2 1/4\" (57mm) CTF;Paste Ring, 2\";Urostomy Pouch 06/11/24 1330   Output Color Yellow 06/11/24 1330   WOUND RN ONLY - Stomal Appliance  2 Piece;Paste Ring, Ceraplus, 2\" slim;2 1/4\" (57mm) CTF;Urostomy Pouch 06/11/24 1330   Appliance (Pouch) # [id number] 06/11/24 1330   Appliance Brand Tay 06/11/24 1330   Appliance Supplier Other (Comments) 06/11/24 1330            Pertinent Labs and Diagnostics:    Labs:   A1c:   Lab Results   Component Value Date/Time    HBA1C 5.6 12/21/2022 06:25 AM      Pathology 5/10/2024  A. Right distal ureter:          Segment of ureter, negative for malignancy.   B. Left distal ureter:          Segment of ureter, negative for malignancy.   C. Distal urethra:          Partially denuded urothelial mucosa with acute and chronic           inflammation and cystitis cystica/glandularis; negative for           malignancy.          Underlying prostatic tissue also with acute and chronic           inflammation; negative for malignancy.   D. Prostate and bladder:          Urothelial mucosa demonstrating marked ulceration and necrosis           with associated " acute inflammation, foreign body-type giant           cell reaction, and reactive urothelial atypia, consistent with           prior resection site/ therapy related changes.          No residual in situ or invasive urothelial carcinoma identified;           pathologic complete response (ypT0).          Prostate demonstrating prostatic adenocarcinoma, grade group 5           (Malika 4+5 = 9).          Tumor involves   10% of examined tissue with focal extraprostatic           extension and invasion into the muscularis propria of the           bladder neck.          Tumor focally involves the left apical surgical margin of           resection.          Lymphovascular and perineural invasion present.   E. Small bowel resection:          Segment of small bowel without significant histopathologic           abnormality; uninvolved by tumor.          Benign, viable surgical margins of resection.   F. Left proximal ureter:          Segment of ureter, negative for malignancy.   G. Right proximal ureter:          Segment of ureter, negative for malignancy.        IMAGIN2024 CT abdomen pelvis   1. Nonenhancing fluid collection in the anterior abdominal and pelvic wall, extending inferiorly to the penis and scrotal soft tissues measuring 3.6 x 1.5 x 16.6 cm in diameter. Finding is most compatible with seroma.  2. Postsurgical changes in the bowel with an ostomy in the right lower quadrant.  3. Ileus.  4. Stable bilateral renal cortical cysts, requiring no further imaging follow-up as per consensus guidelines based on imaging criteria.  5. The remainder is stable.    VASCULAR STUDIES: n/a    LAST  WOUND CULTURE:  DATE :   Lab Results   Component Value Date/Time    CULTRSULT No growth after 5 days of incubation. 2024 02:55 PM    CULTRSULT No growth after 5 days of incubation. 2024 02:55 PM         ASSESSMENT AND PLAN:     1. Complication of urostomy (HCC)  2. Presence of urostomy (HCC)  Created  5/10/2024    6/11/2024: ostomy evaluation in conjunction with ostomy RN.  Peristomal skin intact, ostomy appliance stayed in place for 1 wk. Discussed with pt to change 2x/wk  -continue soft convex for crease when sitting  - Reports independent at home with changing appliance.  Goal for next appointment to change in front of mirror  - declined by home health for ostomy care   - Patient to follow-up at wound care clinic twice weekly until patient has become independent in care  -continue ostomy teaching by ostomy nurse.   - Peristomal skin care, and pouching modification by RN in clinic today with instruction provided to patient.    Ostomy care: Paste ring, soft convex, 2-1/4 cut to fit 2 piece urostomy appliance    3. Open wound of abdomen, subsequent encounter  4.  Dehiscence of operative wound, subsequent encounter  S/p cystoprostatectomy with ileal conduit, without lymph node dissection on 5/10/2024 by Dr. Cardoso    6/11/2024: area unchanged, depth decreased  - Excisional debridement performed today.  Medically necessary to promote wound healing.  -Patient to follow-up twice weekly at wound care clinic  - declined by home health   - Pathology negative for malignancy on 5/10/2024    Wound care: Silver strip packing, adhesive foam    5.  Wound infection    6/11/2024: Continues on Bactrim DS for 10 days that was given to him during his last hospitalization 5/26/2024  - Tolerating well, no evidence of infection  Finishes tonight    6.  Cigarette nicotine dependence without complication    6/11/2024: Tobacco cessation previously discussed.  Patient reports he has increased his smoking over the last week due to stress.  Patient is interested in quitting at this time.  -Currently using nicotine patch.                PATIENT EDUCATION  - Importance of adequate nutrition for wound healing  -Advised to go to ER for any increased redness, swelling, drainage, or odor, or if patient develops fever, chills, nausea or vomiting.      My total time spent caring for the patient on the day of the encounter was 20 minutes.   This does not include time spent on separately billable procedures/tests.       Please note that this note may have been created using voice recognition software. I have worked with technical experts from [facility] to optimize the interface.  I have made every reasonable attempt to correct obvious errors, but there may be errors of grammar and possibly content that I did not discover before finalizing the note.

## 2024-06-11 NOTE — WOUND TEAM
"Ostomy Evaluation  For 90 Day Certification Period: 06/03/24   - 09/09/24     Surgeon: Agustín Cardoso M.D.  Surgery type and date: 5/10/24 Permanent  Pre-Marked: Yes  Pertinent Hx: Patient with history of bladder cancer. Patient pre-marked in clinic by Kim 5/8/24, had surgery on 5/10/24 and remained admitted until 5/17/24 and was discharged home with [facility]. Patient was accepted by [facility], but he has not yet had his first appt. Patient states that he was worried about his incision draining. Patient went to ED on 5/24/24 due to increased drainage, he was discharged 5/25/24 with oral abx and instructions to follow up with his urologist.    Start of Care: 6/3/24    Supplier:  [facility], patient did not get HH  Order date: TBD    OBJECTIVE: 2 piece soft convex 2 1/4\" appliance in place, moderate wear noted on inside of barrier when removed. Per patient, appliance has been in place for 1 week, patient did not want to change it at home.    STOMA ASSESSMENT:        Urostomy 05/10/24 Ileal conduit RLQ (Active)   Wound Image   06/11/24 1330   Stomal Appliance Assessment Changed 06/11/24 1330   Stoma Assessment Red 06/11/24 1330   Stoma Shape Round 06/11/24 1330   Stoma Size (in) 1.13 06/11/24 1330   Peristomal Assessment Clean;Dry;Intact 06/11/24 1330   Mucocutaneous Junction Intact 06/11/24 1330   Treatment Removed appliance with adhesive remover;Appliance Changed 06/11/24 1330   Peristomal Protectant Paste Ring 06/11/24 1330   Stomal Appliance 2 1/4\" (57mm) CTF;Paste Ring, 2\";Urostomy Pouch 06/11/24 1330   Output Color Yellow 06/11/24 1330   WOUND RN ONLY - Stomal Appliance  2 Piece;Paste Ring, Ceraplus, 2\" slim;2 1/4\" (57mm) CTF;Urostomy Pouch 06/11/24 1330   Appliance (Pouch) # [id number], [id number] 06/11/24 1330   Appliance Brand Tay 06/11/24 1330   Appliance Supplier Other (Comments) 06/11/24 1330                          Pouching Procedure Notes (if indicated): n/a    Previous Trials and Notes:  Patient " previously in flat appliance, continued soft convex barrier placed last week. Patient states that he has tried a one piece at home and did not like it.      Patient/Caregiver Education:       1 Can do independently   2 Needs some help   3 Needs a lot of help and additional review   4 No chance to review, needs additional follow-up     EMPTY THE POUCH  Empty pouch when it is 1/3 - ½ full 1   Assemble supplies before emptying: toilet paper, pouch, deodorant  1   Assume a correct position 1   Open pouch 1   Lower opening into the toilet and empty 1   Wipe opening before resealing 1   Add pouch deodorant (if used) 1   Reclamp or reseal the pouch 1   , REMOVING THE OLD POUCH  Assemble supplies for pouch change: new pouch, towel, measurement guide, pen, scissors, garbage bag (skin barrier ring, powder, deodorant, and adhesive remover, if needed) 1   Remove the outer adhesive by starting at one corner/edge 1   Place one hand on skin and push down while your other hand lifts the barrier to push skin away from barrier. Use a warm wet cloth or adhesive releaser if needed 1   Place the old pouch in a garbage bag 1   If you are using a clamp, do not throw it away 1   , CLEAN AND INSPECT THE SKIN  Check the skin for color, bleeding, and irritation 1   Clean skin around the stoma with ONLY warm water 1   Pat the skin dry 1       Crusting if needed    Apply stoma powder to red/wet skin, brush off excess 4   Apply skin protectant over powder ONLY to seal in place 4   , MEASURE AND CUT OPENING  Cover the stoma with a piece of tissue or gauze while measuring 1   Measure the stoma accurately with the measurement guide 1   Trace the correct size on the back of the pouch barrier 1   Place your finger into the precut opening and push the pouch away from the barrier in using a one-piece system 1   Cut the traced opening with full teeth of the scissors 1   Align opening over the stoma making sure it is close to the stoma edge. Adjust as  "needed.  1   Colostomy - 1/8” clearance between stoma and appliance (width of a nickel on its side)  Ileostomy/Urostomy - 1/16” clearance between stoma and appliance (width of a dime on its side) 1   , and APPLY NEW POUCHING SYSTEM  Remove the paper backing from the pouch barrier 1   Crust if needed 4   Remove any gauze/tissue placed over stoma 1   Center and apply the pouch skin barrier around the stoma. Starting closest to the stoma, press and rub on the barrier for 30-60 seconds \"Going around the race track\" 1   For a two-piece system, apply the pouch to the barrier flange 1   Close the bottom of the pouch 1       PLAN/GOALS:  Patient states he is independent with changes at home, however, he did not change at home in the last week. He is perseverating on the drainage from the midline wound and states he did not want to disturb appliance.  Patient did not get picked up by HH, patient has requested twice weekly appts for wound care, states that he has scoliosis and cannot change abdominal dressing. Roommate Gwen who is present during appt, showed how to change dressing, left over supplies given to patient.  Instructed to change appliance once a week at home, goal is to have clinic change once, patient change once. Clinic will be completing wound care, per his request.        WOUND PROCEDURE NOTE (if indicated):  Wound 06/03/24 Full Thickness Wound Abdomen Mid (Active)   Wound Image       Site Assessment Red    Periwound Assessment Clean;Dry;Intact    Margins Unattached edges    Closure Secondary intention    Drainage Amount Large    Drainage Description Serosanguineous    Treatments Cleansed;Topical Lidocaine;Provider debridement;Site care    Wound Cleansing Normal Saline Irrigation    Periwound Protectant No-sting Skin Prep    Dressing Status Clean;Dry;Intact    Dressing Changed Changed    Dressing Cleansing/Solutions Not Applicable    Dressing Options Silver Strip Packing;Hydrofiber;Nonadhesive Foam;Hypafix Tape "    Dressing Change/Treatment Frequency Every 48 hrs, and As Needed    Wound Team Following Weekly    Non-staged Wound Description Full thickness    Wound Length (cm) 0.4 cm    Wound Width (cm) 0.2 cm    Wound Depth (cm) 1.3 cm    Wound Surface Area (cm^2) 0.08 cm^2    Wound Volume (cm^3) 0.104 cm^3    Post-Procedure Length (cm) 0.4 cm    Post-Procedure Width (cm) 0.3 cm    Post-Procedure Depth (cm) 1.5 cm    Post-Procedure Surface Area (cm^2) 0.12 cm^2    Post-Procedure Volume (cm^3) 0.18 cm^3    Wound Healing % -136    Tunneling (cm) 0 cm    Undermining (cm) 0 cm    Wound Odor None    Exposed Structures THOMAS

## 2024-06-14 ENCOUNTER — NON-PROVIDER VISIT (OUTPATIENT)
Dept: WOUND CARE | Facility: MEDICAL CENTER | Age: 79
End: 2024-06-14
Attending: UROLOGY
Payer: MEDICARE

## 2024-06-14 PROCEDURE — 99211 OFF/OP EST MAY X REQ PHY/QHP: CPT

## 2024-06-14 NOTE — WOUND TEAM
"Ostomy Evaluation  For 90 Day Certification Period: 06/03/24   - 09/12/24     Surgeon: Agustín Cardoso M.D.  Surgery type and date: 5/10/24 Permanent  Pre-Marked: Yes  Pertinent Hx: Patient with history of bladder cancer. Patient pre-marked in clinic by Kim 5/8/24, had surgery on 5/10/24 and remained admitted until 5/17/24 and was discharged home with home health. Patient was accepted by [facility], but he has not yet had his first appt. Patient states that he was worried about his incision draining. Patient went to ED on 5/24/24 due to increased drainage, he was discharged 5/25/24 with oral abx and instructions to follow up with his urologist.    Start of Care: 6/3/24    Supplier:  [employer], patient did not get HH  Order date: TBD    OBJECTIVE: 2 piece soft convex 2 1/4\" appliance intact, moderate wear noted on inside of barrier when removed. Appliance has been in place for 4 days.    STOMA ASSESSMENT:        Urostomy 05/10/24 Ileal conduit RLQ (Active)   Wound Image   06/11/24 1330   Stomal Appliance Assessment Changed 06/14/24 1400   Stoma Assessment Red 06/14/24 1400   Stoma Shape Round 06/14/24 1400   Stoma Size (in) 1.13 06/14/24 1400   Peristomal Assessment Clean;Dry;Intact 06/14/24 1400   Mucocutaneous Junction Intact 06/14/24 1400   Treatment pt removed own appliance; declined use of adhesive remover. ;Cleansed with water/washcloth;Appliance Changed 06/14/24 1400   Peristomal Protectant Paste Ring 06/14/24 1400   Stomal Appliance Paste Ring, 2\";2 1/4\" (57mm) CTF;Urostomy Pouch 06/14/24 1400   Output Color Yellow 06/14/24 1400   WOUND RN ONLY - Stomal Appliance  2 Piece;Paste Ring, 2\";Soft Convex;2 1/4\" (57mm) CTF;Urostomy Pouch 06/14/24 1400   Appliance (Pouch) # [id number], [id number] 06/14/24 1400   Appliance Brand Tay 06/14/24 1400   Appliance Supplier TBD 06/14/24 1400     Pouching Procedure Notes (if indicated):   Pt provided most of own ostomy care today.      Patient/Caregiver Education:       1 " Can do independently   2 Needs some help   3 Needs a lot of help and additional review   4 No chance to review, needs additional follow-up     EMPTY THE POUCH  Empty pouch when it is 1/3 - ½ full 1   Assemble supplies before emptying: toilet paper, pouch, deodorant  1   Assume a correct position 1   Open pouch 1   Lower opening into the toilet and empty 1   Wipe opening before resealing 1   Add pouch deodorant (if used) 1   Reclamp or reseal the pouch 1   , REMOVING THE OLD POUCH  Assemble supplies for pouch change: new pouch, towel, measurement guide, pen, scissors, garbage bag (skin barrier ring, powder, deodorant, and adhesive remover, if needed) 1   Remove the outer adhesive by starting at one corner/edge 1   Place one hand on skin and push down while your other hand lifts the barrier to push skin away from barrier. Use a warm wet cloth or adhesive releaser if needed 1   Place the old pouch in a garbage bag 1   If you are using a clamp, do not throw it away 1   , CLEAN AND INSPECT THE SKIN  Check the skin for color, bleeding, and irritation 1   Clean skin around the stoma with ONLY warm water 1   Pat the skin dry 1       Crusting if needed    Apply stoma powder to red/wet skin, brush off excess 4   Apply skin protectant over powder ONLY to seal in place 4   , MEASURE AND CUT OPENING  Cover the stoma with a piece of tissue or gauze while measuring 1   Measure the stoma accurately with the measurement guide 1   Trace the correct size on the back of the pouch barrier 1   Place your finger into the precut opening and push the pouch away from the barrier in using a one-piece system 1   Cut the traced opening with full teeth of the scissors 1   Align opening over the stoma making sure it is close to the stoma edge. Adjust as needed.  1   Colostomy - 1/8” clearance between stoma and appliance (width of a nickel on its side)  Ileostomy/Urostomy - 1/16” clearance between stoma and appliance (width of a dime on its side)  "1   , and APPLY NEW POUCHING SYSTEM  Remove the paper backing from the pouch barrier 1   Crust if needed 4   Remove any gauze/tissue placed over stoma 1   Center and apply the pouch skin barrier around the stoma. Starting closest to the stoma, press and rub on the barrier for 30-60 seconds \"Going around the race track\" 1   For a two-piece system, apply the pouch to the barrier flange 1   Close the bottom of the pouch 1       PLAN/GOALS:  See pt in room 9 or 10 next week for use of mirror. Have pt complete own appliance change. Measure once more (slightly smaller than 1 1/8\"), then order supplies. Precut? Pt will then discharge from ostomy portion of care and continue with wound service.  Attempted to teach pt to change own wound packing. He was willing, but only semi-successful. Continue 2x/wk appts for now.         WOUND PROCEDURE NOTE (if indicated):   Wound 06/03/24 Full Thickness Wound Abdomen Mid (Active)   Wound Image    06/11/24 1330   Site Assessment Red;Unable to visualize base of wound/tract. 06/14/24 1400   Periwound Assessment Intact;Scar tissue 06/14/24 1400   Margins Unattached edges 06/14/24 1400   Closure Secondary intention 06/14/24 1400   Drainage Amount Moderate 06/14/24 1400   Drainage Description Serosanguineous 06/14/24 1400   Treatments Cleansed 06/14/24 1400   Wound Cleansing Normal Saline Irrigation 06/14/24 1400   Periwound Protectant Skin Protectant Wipes to Periwound 06/14/24 1400   Dressing Status Clean;Dry;Intact 06/11/24 1330   Dressing Changed Changed 06/14/24 1400   Dressing Cleansing/Solutions Not Applicable 06/14/24 1400   Dressing Options Silver Strip Packing;Dry Gauze;Nonadhesive Foam;Hypafix Tape 06/14/24 1400   Dressing Change/Treatment Frequency Twice Weekly 06/14/24 1400   Wound Team Following Bi-Weekly 06/14/24 1400   Non-staged Wound Description Full thickness 06/14/24 1400   Wound Length (cm) 0.4 cm 06/11/24 1330   Wound Width (cm) 0.2 cm 06/11/24 1330   Wound Depth (cm) " 1.3 cm 06/11/24 1330   Wound Surface Area (cm^2) 0.08 cm^2 06/11/24 1330   Wound Volume (cm^3) 0.104 cm^3 06/11/24 1330   Post-Procedure Length (cm) 0.4 cm 06/11/24 1330   Post-Procedure Width (cm) 0.3 cm 06/11/24 1330   Post-Procedure Depth (cm) 1.5 cm 06/11/24 1330   Post-Procedure Surface Area (cm^2) 0.12 cm^2 06/11/24 1330   Post-Procedure Volume (cm^3) 0.18 cm^3 06/11/24 1330   Wound Healing % -136 06/11/24 1330   Tunneling (cm) 0 cm 06/11/24 1330   Tunneling Clock Position of Wound 5 06/03/24 1600   Undermining (cm) 0 cm 06/11/24 1330   Wound Odor None 06/14/24 1400   Exposed Structures THOMAS 06/14/24 1400

## 2024-06-14 NOTE — PATIENT INSTRUCTIONS
-Keep your wound dressing clean, dry, and intact.     -Change your midline abdominal dressing if it becomes soiled, soaked, or falls off.    -Change urostomies every 3-5 days. Change appliance immediately if it is leaking or peristomal skin feels irritated, has itching, or  burning.   To change the appliance, remove previous appliance, cleanse peristomal skin with warm water/washcloth, pat dry, make an ostomy template or use cardboard measuring guide and trace ostomy shape onto back of barrier, cut out barrier, apply a paste ring around barrier opening and apply appliance. Empty pouches when no more than ½ full. Check contents every 2 hours or as needed. Do not leave soap residue on tissue and do not use baby wipes or skin prep wipes.     -Should you experience any significant changes in your wound(s), such as infection (redness, swelling, localized heat, increased pain, fever > 101 F, chills) or have any questions regarding your home care instructions, please contact the wound center at [phone]. If after hours, contact your primary care physician or go to the hospital emergency room.

## 2024-06-17 ENCOUNTER — OFFICE VISIT (OUTPATIENT)
Dept: WOUND CARE | Facility: MEDICAL CENTER | Age: 79
End: 2024-06-17
Attending: UROLOGY
Payer: MEDICARE

## 2024-06-17 VITALS
OXYGEN SATURATION: 95 % | HEART RATE: 76 BPM | SYSTOLIC BLOOD PRESSURE: 125 MMHG | RESPIRATION RATE: 16 BRPM | DIASTOLIC BLOOD PRESSURE: 53 MMHG | TEMPERATURE: 97.5 F

## 2024-06-17 DIAGNOSIS — N99.528 COMPLICATION OF UROSTOMY (HCC): ICD-10-CM

## 2024-06-17 DIAGNOSIS — S31.109D OPEN WOUND OF ABDOMEN, SUBSEQUENT ENCOUNTER: ICD-10-CM

## 2024-06-17 DIAGNOSIS — T14.8XXA WOUND INFECTION: ICD-10-CM

## 2024-06-17 DIAGNOSIS — L08.9 WOUND INFECTION: ICD-10-CM

## 2024-06-17 DIAGNOSIS — F17.210 CIGARETTE NICOTINE DEPENDENCE WITHOUT COMPLICATION: ICD-10-CM

## 2024-06-17 DIAGNOSIS — T81.31XD DEHISCENCE OF OPERATIVE WOUND, SUBSEQUENT ENCOUNTER: ICD-10-CM

## 2024-06-17 DIAGNOSIS — Z93.6 PRESENCE OF UROSTOMY (HCC): ICD-10-CM

## 2024-06-17 PROCEDURE — 99213 OFFICE O/P EST LOW 20 MIN: CPT

## 2024-06-17 PROCEDURE — 3078F DIAST BP <80 MM HG: CPT | Performed by: STUDENT IN AN ORGANIZED HEALTH CARE EDUCATION/TRAINING PROGRAM

## 2024-06-17 PROCEDURE — 99213 OFFICE O/P EST LOW 20 MIN: CPT | Performed by: STUDENT IN AN ORGANIZED HEALTH CARE EDUCATION/TRAINING PROGRAM

## 2024-06-17 PROCEDURE — 3074F SYST BP LT 130 MM HG: CPT | Performed by: STUDENT IN AN ORGANIZED HEALTH CARE EDUCATION/TRAINING PROGRAM

## 2024-06-17 NOTE — WOUND TEAM
"Ostomy Evaluation  For 90 Day Certification Period: 06/03/24   - 09/15/24     Surgeon: Agustín Cardoso M.D.  Surgery type and date: 5/10/24 Permanent  Pre-Marked: Yes  Pertinent Hx: Patient with history of bladder cancer. Patient pre-marked in clinic by Kim 5/8/24, had surgery on 5/10/24 and remained admitted until 5/17/24 and was discharged home with home health. Patient was accepted by [facility], but he has not yet had his first appt. Patient states that he was worried about his incision draining. Patient went to ED on 5/24/24 due to increased drainage, he was discharged 5/25/24 with oral abx and instructions to follow up with his urologist.    Start of Care: 6/3/24    Supplier: [employer]  Order date: 06/17/24      OBJECTIVE: 2 piece soft convex 2 1/4\" appliance intact, moderate wear noted on inside of barrier when removed. Appliance has been in place for 3 days.    STOMA ASSESSMENT:         Urostomy 05/10/24 Ileal conduit RLQ (Active)   Wound Image   06/17/24 1600   Stomal Appliance Assessment Changed 06/17/24 1600   Stoma Assessment Red 06/17/24 1600   Stoma Shape Budded Greater Than One Inch;Round 06/17/24 1600   Stoma Size (in) 1 06/17/24 1600   Peristomal Assessment Clean;Dry;Intact 06/17/24 1600   Mucocutaneous Junction Intact 06/17/24 1600   Treatment Removed appliance with adhesive remover;Cleansed with water/washcloth;Appliance Changed 06/17/24 1600   Peristomal Protectant Paste Ring 06/17/24 1600   Stomal Appliance Paste Ring, 2\";2 1/4\" (57mm) CTF;Urostomy Pouch 06/17/24 1600   Output Color Yellow;Clear 06/17/24 1600   WOUND RN ONLY - Stomal Appliance  2 Piece;Paste Ring, 2\";Soft Convex;2 1/4\" (57mm) CTF;Urostomy Pouch 06/17/24 1600   Appliance (Pouch) # [id number], [id number], [id number] 06/17/24 1600   Appliance Brand Tay 06/17/24 1600   Appliance Supplier [employer] 06/17/24 1600            Pouching Procedure Notes (if indicated):         Patient/Caregiver Education:       1 Can do independently   2 Needs " some help   3 Needs a lot of help and additional review   4 No chance to review, needs additional follow-up     EMPTY THE POUCH  Empty pouch when it is 1/3 - ½ full 1   Assemble supplies before emptying: toilet paper, pouch, deodorant  1   Assume a correct position 1   Open pouch 1   Lower opening into the toilet and empty 1   Wipe opening before resealing 1   Add pouch deodorant (if used) 1   Reclamp or reseal the pouch 1   , REMOVING THE OLD POUCH  Assemble supplies for pouch change: new pouch, towel, measurement guide, pen, scissors, garbage bag (skin barrier ring, powder, deodorant, and adhesive remover, if needed) 1   Remove the outer adhesive by starting at one corner/edge 1   Place one hand on skin and push down while your other hand lifts the barrier to push skin away from barrier. Use a warm wet cloth or adhesive releaser if needed 1   Place the old pouch in a garbage bag 1   If you are using a clamp, do not throw it away 1   , CLEAN AND INSPECT THE SKIN  Check the skin for color, bleeding, and irritation 1   Clean skin around the stoma with ONLY warm water 1   Pat the skin dry 1       Crusting if needed    Apply stoma powder to red/wet skin, brush off excess 4   Apply skin protectant over powder ONLY to seal in place 4     MEASURE AND CUT OPENING  Cover the stoma with a piece of tissue or gauze while measuring 1   Measure the stoma accurately with the measurement guide 1   Trace the correct size on the back of the pouch barrier 1   Place your finger into the precut opening and push the pouch away from the barrier in using a one-piece system 1   Cut the traced opening with full teeth of the scissors 1   Align opening over the stoma making sure it is close to the stoma edge. Adjust as needed.  1   Colostomy - 1/8” clearance between stoma and appliance (width of a nickel on its side)  Ileostomy/Urostomy - 1/16” clearance between stoma and appliance (width of a dime on its side) 1     APPLY NEW POUCHING  "SYSTEM  Remove the paper backing from the pouch barrier 1   Crust if needed 4   Remove any gauze/tissue placed over stoma 1   Center and apply the pouch skin barrier around the stoma. Starting closest to the stoma, press and rub on the barrier for 30-60 seconds \"Going around the race track\" 1   For a two-piece system, apply the pouch to the barrier flange 1   Close the bottom of the pouch 1       PLAN/GOALS:  Patient ready to order supplies, discussed howt his works moving forward. Will order precut barriers.   Drop down to 1x weekly for wound care.         WOUND PROCEDURE NOTE (if indicated):      Non-selective debridement to wound and nathaniel-wound using normal saline, cotton tipped applicator, and gauze to remove biofilm and non-viable tissue.           Wound 06/03/24 Full Thickness Wound Abdomen Mid (Active)   Wound Image   06/17/24 1600   Site Assessment Red;[address] 06/17/24 1600   Periwound Assessment Scar tissue 06/17/24 1600   Margins Unattached edges 06/17/24 1600   Closure Secondary intention 06/17/24 1600   Drainage Amount Moderate 06/17/24 1600   Drainage Description Serosanguineous;Green 06/17/24 1600   Treatments Cleansed;Topical Lidocaine;Nonselective debridement;Site care 06/17/24 1600   Wound Cleansing Normal Saline Irrigation 06/17/24 1600   Periwound Protectant Skin Protectant Wipes to Periwound;Barrier Paste 06/17/24 1600   Dressing Status Old drainage 06/17/24 1600   Dressing Changed Changed 06/17/24 1600   Dressing Cleansing/Solutions Not Applicable 06/17/24 1600   Dressing Options Silver Strip Packing;Nonadhesive Foam;Hypafix Tape 06/17/24 1600   Dressing Change/Treatment Frequency Every 48 hrs, and As Needed 06/17/24 1600   Wound Team Following Bi-Weekly 06/17/24 1600   Non-staged Wound Description Full thickness 06/17/24 1600   Wound Length (cm) 0.3 cm 06/17/24 1600   Wound Width (cm) 0.5 cm 06/17/24 1600   Wound Depth (cm) 1.5 cm 06/17/24 1600   Wound Surface Area (cm^2) 0.15 cm^2 06/17/24 1600 "   Wound Volume (cm^3) 0.225 cm^3 06/17/24 1600   Post-Procedure Length (cm) 0.4 cm 06/11/24 1330   Post-Procedure Width (cm) 0.3 cm 06/11/24 1330   Post-Procedure Depth (cm) 1.5 cm 06/11/24 1330   Post-Procedure Surface Area (cm^2) 0.12 cm^2 06/11/24 1330   Post-Procedure Volume (cm^3) 0.18 cm^3 06/11/24 1330   Wound Healing % -411 06/17/24 1600   Tunneling (cm) 0 cm 06/17/24 1600   Tunneling Clock Position of Wound 5 06/03/24 1600   Undermining (cm) 0 cm 06/17/24 1600   Wound Odor None 06/17/24 1600   Exposed Structures THOMAS 06/17/24 1600

## 2024-06-17 NOTE — PATIENT INSTRUCTIONS
-Keep your wound dressing clean, dry, and intact. Only change dressing if it's over saturated, soiled or falls off.     -Change your dressing if it becomes soiled, soaked, or falls off.    -Change urostomies every 3-5 days. Change appliance immediately if it is leaking or peristomal skin feels irritated, has itching, or  burning.   To change the appliance, remove previous appliance, cleanse peristomal skin with warm water/washcloth, pat dry, make an ostomy template or use cardboard measuring guide and trace ostomy shape onto back of barrier, cut out barrier, apply a paste ring around barrier opening and apply appliance. Empty pouches when no more than ½ full. Check contents every 2 hours or as needed. Do not leave soap residue on tissue and do not use baby wipes or skin prep wipes.     -Should you experience any significant changes in your wound(s), such as infection (redness, swelling, localized heat, increased pain, fever > 101 F, chills) or have any questions regarding your home care instructions, please contact the wound center at [phone]. If after hours, contact your primary care physician or go to the hospital emergency room.

## 2024-06-17 NOTE — PROGRESS NOTES
Provider Encounter- Full Thickness wound             Ostomy  HISTORY OF PRESENT ILLNESS  Wound History:    START OF CARE IN CLINIC: 6/3/24    REFERRING PROVIDER: Agustín Cardoso MD     WOUND- Full Thickness Wound   LOCATION: Midline abdomen     OSTOMY TYPE: Urostomy-permanent   LOCATION: RLQ              SURGERY:S/p cystoprostatectomy with ileal conduit, without lymph node dissection on 5/10/2024 by Dr. 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  Staging revealed no evidence of metastatic disease.  He underwent chemotherapy.  Patient premarked for urostomy at [facility] ostomy [facility].    He underwent surgical resection with ileal conduit creation on 5/10/2024.  He was admitted postoperatively.  Seen by wound and ostomy team.  He discharged on 5/17/2024.  He followed up at urology on 5/23/2024.  Urostomy stents were removed.  There was concern for possible surgical site infection as he had increased drainage from his incision.  He was started on cefdinir and followed up at the ED on 5/24/2024 as he had difficulties keeping urostomy appliance on because of drainage from his abdomen.  He was started on IV antibiotics and then left AMA on 5/25/2024.      Pertinent Medical History: PAD s/p aortobifem bypass, nicotine dependence, bladder cancer    TOBACCO USE:  trying to quit 1-2 pack per day previously, now smoking one third less  No drinking   no drug use        Patient's problem list, allergies, and current medications reviewed and updated in Epic    Interval History:  6/3/2024: Initial wound and ostomy evaluation, initial provider Clinic visit with Jory WHITE, FNP-BC, CWON, CFCN.  Pt denies fevers, chills, nausea, vomiting.  Accompanied by friend Le.  Followed up with urologist a week ago and stents removed from urostomy.  He reports he is able to cut and apply his own  appliance.  Changing every 1 to 2 days secondary to drainage from midline wound.  Reports he lost 30 pounds with combination of chemotherapy.  He has gained 5 pounds back.  Reports increased appetite.  Uses walker for long distances secondary to scoliosis.  Adequate urine output.  Emptying appliance when it is a third to intermediate full.  - Referral to home health placed for wound and ostomy care.    6/11/24: Clinic visit with Jory WHITE, FNP-BC, CWON, CFVIKRAM.  Pt denies fevers, chills, nausea, vomiting. Denied by HH. Abdominal Packing in place for 1 wk , depth decreased. Did not change uro for1 wk. Stopped lovenox 2 injections short. Increased smoking-stressed. Sees PA this Thursday. F/u appts made for 2/wk.     6/17/2024: Clinic visit with Elder Grande MD. Patient reports doing ok. He denies any current leaks from urostomy. He is independent in ostomy care. Wound has not changed significantly, remains small tract with similar depth though does endorse decreased drainage. He continues to smoke, unwilling to quit at this time due to stressors in life.    REVIEW OF SYSTEMS:   Unchanged from previous wound clinic assessment on 6/11/24, except as noted in interval history above    PHYSICAL EXAMINATION:   BP (!) 160/81   Pulse 92   Temp 36.1 °C (96.9 °F)   Resp 20   SpO2 97%     Physical Exam  Vitals reviewed.   Cardiovascular:      Rate and Rhythm: Normal rate.   Abdominal:      Palpations: Abdomen is soft.      Comments: Abdomen soft, rounded    RLQ urostomy: Stoma red, well budded, os direction 6:00, 1-1/8 inch, MCJ intact, sutures dissolving, peristomal skin intact   Skin:     Comments: Abdominal midline surgical dehiscence: Wound is now small wound tract, depth largely unchanged. He reports less drainage. Continues to have some pain. No evidence of infection.   Neurological:      Mental Status: He is alert and oriented to person, place, and time.   Psychiatric:         Mood and Affect: Mood normal.       Comments: Anxious           WOUND ASSESSMENT     Wound 06/03/24 Full Thickness Wound Abdomen Mid (Active)   Wound Image   06/17/24 1600   Site Assessment Red;[place]ye 06/17/24 1600   Periwound Assessment Scar tissue 06/17/24 1600   Margins Unattached edges 06/17/24 1600   Closure Secondary intention 06/17/24 1600   Drainage Amount Moderate 06/17/24 1600   Drainage Description Serosanguineous;Green 06/17/24 1600   Treatments Cleansed;Topical Lidocaine;Nonselective debridement;Site care 06/17/24 1600   Wound Cleansing Normal Saline Irrigation 06/17/24 1600   Periwound Protectant Skin Protectant Wipes to Periwound;Barrier Paste 06/17/24 1600   Dressing Status Old drainage 06/17/24 1600   Dressing Changed Changed 06/17/24 1600   Dressing Cleansing/Solutions Not Applicable 06/17/24 1600   Dressing Options Silver Strip Packing;Nonadhesive Foam;Hypafix Tape 06/17/24 1600   Dressing Change/Treatment Frequency Every 48 hrs, and As Needed 06/17/24 1600   Wound Team Following Bi-Weekly 06/17/24 1600   Non-staged Wound Description Full thickness 06/17/24 1600   Wound Length (cm) 0.3 cm 06/17/24 1600   Wound Width (cm) 0.5 cm 06/17/24 1600   Wound Depth (cm) 1.5 cm 06/17/24 1600   Wound Surface Area (cm^2) 0.15 cm^2 06/17/24 1600   Wound Volume (cm^3) 0.225 cm^3 06/17/24 1600   Post-Procedure Length (cm) 0.4 cm 06/11/24 1330   Post-Procedure Width (cm) 0.3 cm 06/11/24 1330   Post-Procedure Depth (cm) 1.5 cm 06/11/24 1330   Post-Procedure Surface Area (cm^2) 0.12 cm^2 06/11/24 1330   Post-Procedure Volume (cm^3) 0.18 cm^3 06/11/24 1330   Wound Healing % -411 06/17/24 1600   Tunneling (cm) 0 cm 06/17/24 1600   Tunneling Clock Position of Wound 5 06/03/24 1600   Undermining (cm) 0 cm 06/17/24 1600   Wound Odor None 06/17/24 1600   Exposed Structures THOMAS 06/17/24 1600   Number of days: 14     PROCEDURE:   - No excisional debridement performed today due to lack of slough and patient discomfort.    STOMA ASSESSMENT/PROCEDURE:  "Peristomal skin care, pouching procedure and ostomy teaching by ostomy RN.  Refer to Ostomy RN Assessment and Photo.        Urostomy 05/10/24 Ileal conduit RLQ (Active)   Wound Image   06/17/24 1600   Stomal Appliance Assessment Changed 06/17/24 1600   Stoma Assessment Red 06/17/24 1600   Stoma Shape Budded Greater Than One Inch;Round 06/17/24 1600   Stoma Size (in) 1 06/17/24 1600   Peristomal Assessment Clean;Dry;Intact 06/17/24 1600   Mucocutaneous Junction Intact 06/17/24 1600   Treatment Removed appliance with adhesive remover;Cleansed with water/washcloth;Appliance Changed 06/17/24 1600   Peristomal Protectant Paste Ring 06/17/24 1600   Stomal Appliance Paste Ring, 2\";2 1/4\" (57mm) CTF;Urostomy Pouch 06/17/24 1600   Output Color Yellow;Clear 06/17/24 1600   WOUND RN ONLY - Stomal Appliance  2 Piece;Paste Ring, 2\";Soft Convex;2 1/4\" (57mm) CTF;Urostomy Pouch 06/17/24 1600   Appliance (Pouch) # [id number], [id number] 06/17/24 1600   Appliance Brand [place] 06/17/24 1600   Appliance Supplier [employer] 06/17/24 1600      Pertinent Labs and Diagnostics:    Labs:   A1c:   Lab Results   Component Value Date/Time    HBA1C 5.6 12/21/2022 06:25 AM      Pathology 5/10/2024  A. Right distal ureter:          Segment of ureter, negative for malignancy.   B. Left distal ureter:          Segment of ureter, negative for malignancy.   C. Distal urethra:          Partially denuded urothelial mucosa with acute and chronic           inflammation and cystitis cystica/glandularis; negative for           malignancy.          Underlying prostatic tissue also with acute and chronic           inflammation; negative for malignancy.   D. Prostate and bladder:          Urothelial mucosa demonstrating marked ulceration and necrosis           with associated acute inflammation, foreign body-type giant           cell reaction, and reactive urothelial atypia, consistent with           prior resection site/ therapy related changes.          No " residual in situ or invasive urothelial carcinoma identified;           pathologic complete response (ypT0).          Prostate demonstrating prostatic adenocarcinoma, grade group 5           (Malika 4+5 = 9).          Tumor involves   10% of examined tissue with focal extraprostatic           extension and invasion into the muscularis propria of the           bladder neck.          Tumor focally involves the left apical surgical margin of           resection.          Lymphovascular and perineural invasion present.   E. Small bowel resection:          Segment of small bowel without significant histopathologic           abnormality; uninvolved by tumor.          Benign, viable surgical margins of resection.   F. Left proximal ureter:          Segment of ureter, negative for malignancy.   G. Right proximal ureter:          Segment of ureter, negative for malignancy.        IMAGIN2024 CT abdomen pelvis   1. Nonenhancing fluid collection in the anterior abdominal and pelvic wall, extending inferiorly to the penis and scrotal soft tissues measuring 3.6 x 1.5 x 16.6 cm in diameter. Finding is most compatible with seroma.  2. Postsurgical changes in the bowel with an ostomy in the right lower quadrant.  3. Ileus.  4. Stable bilateral renal cortical cysts, requiring no further imaging follow-up as per consensus guidelines based on imaging criteria.  5. The remainder is stable.    VASCULAR STUDIES: n/a    LAST  WOUND CULTURE:  DATE :   Lab Results   Component Value Date/Time    CULTRSULT No growth after 5 days of incubation. 2024 02:55 PM    CULTRSULT No growth after 5 days of incubation. 2024 02:55 PM         ASSESSMENT AND PLAN:     1. Complication of urostomy (HCC)  2. Presence of urostomy (HCC)  Created 5/10/2024    2024: Ostomy evaluation in conjunction with ostomy RN.  - Peristomal skin remain intact. No leaks or other issues  - Reports independent at home with changing appliance.  - declined  by home health for ostomy care   - Patient to follow-up at wound care clinic weekly  - Continue ostomy teaching by ostomy nurse.   - Peristomal skin care, and pouching by RN in clinic today with instruction provided to patient.    Ostomy care: Paste ring, soft convex, 2-1/4 cut to fit 2 piece urostomy appliance    3. Open wound of abdomen, subsequent encounter  4.  Dehiscence of operative wound, subsequent encounter  S/p cystoprostatectomy with ileal conduit, without lymph node dissection on 5/10/2024 by Dr. Cardoso    6/17/2024: Wound size and depth largely unchanged.  - No excisional debridement required today  - Return to clinic weekly  - Pathology negative for malignancy on 5/10/2024    Wound care: Silver strip packing, foam, tape    5.  Wound infection    6/17/2024:   - Completed all Abx. No evidence of active infection.  - Continue to monitor for signs and symptoms of infection during clinic visits.    6.  Cigarette nicotine dependence without complication    6/17/2024:  Patient has been counseled on smoking cessation. Due to life stressors, he is now unwilling to quit at this time. Counseled again on impaired wound healing and smoking.    PATIENT EDUCATION  - Importance of adequate nutrition for wound healing  -Advised to go to ER for any increased redness, swelling, drainage, or odor, or if patient develops fever, chills, nausea or vomiting.     My total time spent caring for the patient on the day of the encounter was 20 minutes, reviewing history, assessment, counseling and education, and coordination of care.  This does not include time spent on separately billable procedures/tests.    Please note that this note may have been created using voice recognition software. I have worked with technical experts from [employer] to optimize the interface.  I have made every reasonable attempt to correct obvious errors, but there may be errors of grammar and possibly content that I did not discover before  finalizing the note.

## 2024-06-20 ENCOUNTER — APPOINTMENT (OUTPATIENT)
Dept: WOUND CARE | Facility: MEDICAL CENTER | Age: 79
End: 2024-06-20
Attending: UROLOGY
Payer: MEDICARE

## 2024-06-24 ENCOUNTER — NON-PROVIDER VISIT (OUTPATIENT)
Dept: WOUND CARE | Facility: MEDICAL CENTER | Age: 79
End: 2024-06-24
Attending: UROLOGY
Payer: MEDICARE

## 2024-06-24 ENCOUNTER — APPOINTMENT (OUTPATIENT)
Dept: MEDICAL GROUP | Facility: CLINIC | Age: 79
End: 2024-06-24
Payer: MEDICARE

## 2024-06-24 VITALS
SYSTOLIC BLOOD PRESSURE: 120 MMHG | WEIGHT: 155 LBS | DIASTOLIC BLOOD PRESSURE: 62 MMHG | TEMPERATURE: 98 F | OXYGEN SATURATION: 94 % | BODY MASS INDEX: 24.27 KG/M2 | HEART RATE: 43 BPM

## 2024-06-24 DIAGNOSIS — Z09 HOSPITAL DISCHARGE FOLLOW-UP: ICD-10-CM

## 2024-06-24 PROCEDURE — 97602 WOUND(S) CARE NON-SELECTIVE: CPT

## 2024-06-24 PROCEDURE — 99213 OFFICE O/P EST LOW 20 MIN: CPT | Mod: GE

## 2024-06-24 ASSESSMENT — FIBROSIS 4 INDEX: FIB4 SCORE: 2.14

## 2024-06-24 NOTE — PATIENT INSTRUCTIONS
-Keep your wound dressing clean, dry, and intact. Only change dressing if it's over saturated, soiled or falls off.     -Change your dressing if it becomes soiled, soaked, or falls off.    -Change urostomies every 3-5 days. Change appliance immediately if it is leaking or peristomal skin feels irritated, has itching, or  burning.   To change the appliance, remove previous appliance, cleanse peristomal skin with warm water/washcloth, pat dry, make an ostomy template or use cardboard measuring guide and trace ostomy shape onto back of barrier, cut out barrier, apply a paste ring around barrier opening and apply appliance. Empty pouches when no more than ½ full. Check contents every 2 hours or as needed. Do not leave soap residue on tissue and do not use baby wipes or skin prep wipes.     -Should you experience any significant changes in your wound(s), such as infection (redness, swelling, localized heat, increased pain, fever > 101 F, chills) or have any questions regarding your home care instructions, please contact the wound center at [phone]. If after hours, contact your primary care physician or go to the hospital emergency room.

## 2024-06-24 NOTE — ASSESSMENT & PLAN NOTE
Patient was evaluated in the emergency department on 5/24 for postop complications following cystoprostatectomy with ileal conduit.  Since then, patient has been medically cleared by his surgeon and continues to follow-up with oncology.  His urostomy appears to be healing well and he has no questions or concerns regarding its maintenance/care.  Patient has no acute concerns for today's visit.     Plan:  - Continue to follow-up with oncology as previously scheduled  - Follow-up with primary care as needed per patient request

## 2024-06-24 NOTE — PROGRESS NOTES
"   Subjective:     CC:   Chief Complaint   Patient presents with    Follow-Up     Follow up Surgery        HPI:   Ronal presents today for hospital follow-up.  Patient was seen on 5/24 for postop complications s/p cystoprostatectomy with ileal conduit.  Since being discharged, the patient reports he is feeling well and is ready to go back to work.  He was medically cleared by his surgeon and he will continue to follow-up with oncology.  Patient is primarily concerned with his chest port which she would like to have removed as soon as possible.  He does have an upcoming CT scan and believes his port will be removed shortly after that.  Patient appears to be in a financially tenuous situation at this time.  However, he has declined referral to  stating \"I do not have time to go see anybody else.\"  Patient states he just wants to go back to work and will follow-up when he feels he needs to with primary care.    Current Outpatient Medications Ordered in Epic   Medication Sig Dispense Refill    acetaminophen (TYLENOL 8 HOUR) 650 MG CR tablet Take 1,300 mg by mouth 3 times a day as needed for Mild Pain. 2 tablets = 1,300 mg.  Indications: Pain      sildenafil citrate (VIAGRA) 25 MG Tab Take 12.5 mg by mouth 1 time a day as needed for Erectile Dysfunction. 0.5 tablet = 12.5 mg.  Indications: Erectile Dysfunction      nicotine (NICODERM) 21 MG/24HR PATCH 24 HR Place 1 Patch on the skin every 24 hours. 30 Patch 2    atorvastatin (LIPITOR) 40 MG Tab Take 1 Tablet by mouth every evening. 90 Tablet 3    ondansetron (ZOFRAN) 4 MG Tab tablet Take 1 Tablet by mouth every four hours as needed for Nausea/Vomiting (for nausea, vomiting). 30 Tablet 6    prochlorperazine (COMPAZINE) 10 MG Tab Take 1 Tablet by mouth every 6 hours as needed (for nausea, vomiting). (Patient not taking: Reported on 6/24/2024) 30 Tablet 6    lidocaine-prilocaine (EMLA) 2.5-2.5 % Cream Apply to port one hour prior to access and cover with " plastic wrap. (Patient not taking: Reported on 6/24/2024) 1 Each 3    tiotropium (SPIRIVA) 18 MCG Cap Inhale 1 puff by mouth once daily (Patient taking differently: Place 18 mcg into inhaler and inhale every day.) 30 Capsule 11    Multiple Vitamins-Minerals (MULTIVITAMIN ADULTS 50+ PO) Take 1 Tablet by mouth every day. Indications: suppliment (Patient not taking: Reported on 6/24/2024)       No current [facility]-ordered facility-administered medications on file.       ROS:  Negative except for above in HPI    Objective:     Exam:  /62   Pulse (!) 43   Temp 36.7 °C (98 °F)   Wt 70.3 kg (155 lb)   SpO2 94%   BMI 24.27 kg/m²  Body mass index is 24.27 kg/m².    Gen: Alert and oriented, No apparent distress.  HEENT: NCAT, MMM, No lymphadenopathy  Lungs: Normal effort, bibasilar rhonchi, normal effort   CV: Regular rate and rhythm. No murmurs, rubs, or gallops. Radial pulses palpable bilat  Abd: Soft, non-distended, no guarding, no rebound, non-tender to palpation.  Urostomy in place, patent, pink  Ext: No clubbing, cyanosis, edema.  Neuro: Non-focal      Assessment & Plan:     78 y.o. male with the following -     Problem List Items Addressed This Visit    None      No follow-ups on file.    Orlando Philip M.D.

## 2024-06-24 NOTE — WOUND TEAM
"Ostomy Evaluation  For 90 Day Certification Period: 06/03/24   - 09/22/24     Surgeon: Agustín Cardoso M.D.  Surgery type and date: 5/10/24 Permanent  Pre-Marked: Yes  Pertinent Hx: Patient with history of bladder cancer. Patient pre-marked in clinic by Kim 5/8/24, had surgery on 5/10/24 and remained admitted until 5/17/24 and was discharged home with home health. Patient was accepted by [facility], but he has not yet had his first appt. Patient states that he was worried about his incision draining. Patient went to ED on 5/24/24 due to increased drainage, he was discharged 5/25/24 with oral abx and instructions to follow up with his urologist.    Start of Care: 6/3/24    Supplier: [employer]  Order date: 06/17/24      OBJECTIVE: 2 piece soft convex 2 1/4\" appliance intact, moderate wear noted on inside of barrier when removed. Appliance has been in place for 3 days.    STOMA ASSESSMENT:             Urostomy 05/10/24 Ileal conduit RLQ (Active)   Wound Image   06/24/24 1500   Stomal Appliance Assessment Changed 06/24/24 1500   Stoma Assessment Red 06/24/24 1500   Stoma Shape Budded Greater Than One Inch;Round 06/24/24 1500   Stoma Size (in) 1 06/24/24 1500   Peristomal Assessment Clean;Dry;Intact 06/24/24 1500   Mucocutaneous Junction Intact 06/24/24 1500   Treatment Removed appliance with adhesive remover;Cleansed with water/washcloth;Appliance Changed 06/24/24 1500   Peristomal Protectant Paste Ring 06/24/24 1500   Stomal Appliance Paste Ring, 2\";2 1/4\" (57mm) CTF;Urostomy Pouch 06/24/24 1500   Output Color Yellow;Clear 06/24/24 1500   WOUND RN ONLY - Stomal Appliance  2 Piece;Paste Ring, 2\";Soft Convex;2 1/4\" (57mm) CTF;Urostomy Pouch 06/24/24 1500   Appliance (Pouch) # [id number], [id number], [id number] 06/24/24 1500   Appliance Brand [place] 06/24/24 1500   Appliance Supplier [employer] 06/24/24 1500          Pouching Procedure Notes (if indicated):         Patient/Caregiver Education:     Patient is independent with all " ostomy care    1 Can do independently   2 Needs some help   3 Needs a lot of help and additional review   4 No chance to review, needs additional follow-up     EMPTY THE POUCH  Empty pouch when it is 1/3 - ½ full 1   Assemble supplies before emptying: toilet paper, pouch, deodorant  1   Assume a correct position 1   Open pouch 1   Lower opening into the toilet and empty 1   Wipe opening before resealing 1   Add pouch deodorant (if used) 1   Reclamp or reseal the pouch 1   , REMOVING THE OLD POUCH  Assemble supplies for pouch change: new pouch, towel, measurement guide, pen, scissors, garbage bag (skin barrier ring, powder, deodorant, and adhesive remover, if needed) 1   Remove the outer adhesive by starting at one corner/edge 1   Place one hand on skin and push down while your other hand lifts the barrier to push skin away from barrier. Use a warm wet cloth or adhesive releaser if needed 1   Place the old pouch in a garbage bag 1   If you are using a clamp, do not throw it away 1   , CLEAN AND INSPECT THE SKIN  Check the skin for color, bleeding, and irritation 1   Clean skin around the stoma with ONLY warm water 1   Pat the skin dry 1       Crusting if needed    Apply stoma powder to red/wet skin, brush off excess 4   Apply skin protectant over powder ONLY to seal in place 4     MEASURE AND CUT OPENING  Cover the stoma with a piece of tissue or gauze while measuring 1   Measure the stoma accurately with the measurement guide 1   Trace the correct size on the back of the pouch barrier 1   Place your finger into the precut opening and push the pouch away from the barrier in using a one-piece system 1   Cut the traced opening with full teeth of the scissors 1   Align opening over the stoma making sure it is close to the stoma edge. Adjust as needed.  1   Colostomy - 1/8” clearance between stoma and appliance (width of a nickel on its side)  Ileostomy/Urostomy - 1/16” clearance between stoma and appliance (width of a  "dime on its side) 1     APPLY NEW POUCHING SYSTEM  Remove the paper backing from the pouch barrier 1   Crust if needed 4   Remove any gauze/tissue placed over stoma 1   Center and apply the pouch skin barrier around the stoma. Starting closest to the stoma, press and rub on the barrier for 30-60 seconds \"Going around the race track\" 1   For a two-piece system, apply the pouch to the barrier flange 1   Close the bottom of the pouch 1       PLAN/GOALS:  Patient is independent with ostomy care  Return in 2-3 weeks, if needed, for wound care         WOUND PROCEDURE NOTE (if indicated):      Non-selective debridement to wound and nathaniel-wound using normal saline, cotton tipped applicator, and gauze to remove biofilm and non-viable tissue.      ** Changed to non-ad foam only r/t no drainage, unable to probe any depth. Reviewed s/s of infection or changes and when to seek medical services.      Wound 06/03/24 Full Thickness Wound Abdomen Mid (Active)   Wound Image   06/24/24 1500   Site Assessment Pink;Epithelialization 06/24/24 1500   Periwound Assessment Scar tissue 06/24/24 1500   Margins Attached edges 06/24/24 1500   Closure Secondary intention 06/24/24 1500   Drainage Amount None 06/24/24 1500   Drainage Description Serosanguineous;Green 06/17/24 1600   Treatments Cleansed;Nonselective debridement;Site care 06/24/24 1500   Wound Cleansing Hypochlorus Acid 06/24/24 1500   Periwound Protectant Skin Protectant Wipes to Periwound 06/24/24 1500   Dressing Status Old drainage 06/17/24 1600   Dressing Changed New 06/24/24 1500   Dressing Cleansing/Solutions Not Applicable 06/24/24 1500   Dressing Options Nonadhesive Foam;Hypafix Tape 06/24/24 1500   Dressing Change/Treatment Frequency Every 48 hrs, and As Needed 06/24/24 1500   Wound Team Following Bi-Weekly 06/17/24 1600   Non-staged Wound Description Full thickness 06/17/24 1600   Wound Length (cm) 0 cm 06/24/24 1500   Wound Width (cm) 0 cm 06/24/24 1500   Wound Depth (cm) " 0 cm 06/24/24 1500   Wound Surface Area (cm^2) 0 cm^2 06/24/24 1500   Wound Volume (cm^3) 0 cm^3 06/24/24 1500   Post-Procedure Length (cm) 0.4 cm 06/11/24 1330   Post-Procedure Width (cm) 0.3 cm 06/11/24 1330   Post-Procedure Depth (cm) 1.5 cm 06/11/24 1330   Post-Procedure Surface Area (cm^2) 0.12 cm^2 06/11/24 1330   Post-Procedure Volume (cm^3) 0.18 cm^3 06/11/24 1330   Wound Healing % 100 06/24/24 1500   Tunneling (cm) 0 cm 06/24/24 1500   Tunneling Clock Position of Wound 5 06/03/24 1600   Undermining (cm) 0 cm 06/24/24 1500   Wound Odor None 06/24/24 1500   Exposed Structures THOMAS 06/24/24 1500

## 2024-06-25 ENCOUNTER — NON-PROVIDER VISIT (OUTPATIENT)
Dept: OCCUPATIONAL MEDICINE | Facility: CLINIC | Age: 79
End: 2024-06-25

## 2024-06-25 DIAGNOSIS — Z02.89 VISIT FOR OCCUPATIONAL HEALTH EXAMINATION: ICD-10-CM

## 2024-06-25 PROCEDURE — 8907 PR URINE COLLECT ONLY: Performed by: NURSE PRACTITIONER

## 2024-06-25 NOTE — PROGRESS NOTES
Pre Employment MTM - Patient left with his copies of the DS along with the time of arrival & departure.

## 2024-06-26 ENCOUNTER — OFFICE VISIT (OUTPATIENT)
Dept: OCCUPATIONAL MEDICINE | Facility: CLINIC | Age: 79
End: 2024-06-26

## 2024-06-26 DIAGNOSIS — Z02.4 ENCOUNTER FOR COMMERCIAL DRIVER MEDICAL EXAMINATION (CDME): ICD-10-CM

## 2024-06-27 ENCOUNTER — APPOINTMENT (OUTPATIENT)
Dept: WOUND CARE | Facility: MEDICAL CENTER | Age: 79
End: 2024-06-27
Attending: UROLOGY
Payer: MEDICARE

## 2024-06-27 DIAGNOSIS — D49.4 BLADDER TUMOR: ICD-10-CM

## 2024-06-27 DIAGNOSIS — Z79.899 ENCOUNTER FOR LONG-TERM (CURRENT) USE OF HIGH-RISK MEDICATION: ICD-10-CM

## 2024-07-01 ENCOUNTER — APPOINTMENT (OUTPATIENT)
Dept: WOUND CARE | Facility: MEDICAL CENTER | Age: 79
End: 2024-07-01
Attending: UROLOGY
Payer: MEDICARE

## 2024-07-03 ENCOUNTER — TELEPHONE (OUTPATIENT)
Dept: HEMATOLOGY ONCOLOGY | Facility: MEDICAL CENTER | Age: 79
End: 2024-07-03
Payer: MEDICARE

## 2024-07-05 ENCOUNTER — HOSPITAL ENCOUNTER (OUTPATIENT)
Dept: RADIOLOGY | Facility: MEDICAL CENTER | Age: 79
End: 2024-07-05
Attending: STUDENT IN AN ORGANIZED HEALTH CARE EDUCATION/TRAINING PROGRAM
Payer: MEDICARE

## 2024-07-05 ENCOUNTER — APPOINTMENT (OUTPATIENT)
Dept: WOUND CARE | Facility: MEDICAL CENTER | Age: 79
End: 2024-07-05
Attending: UROLOGY
Payer: MEDICARE

## 2024-07-05 DIAGNOSIS — Z79.899 ENCOUNTER FOR LONG-TERM (CURRENT) USE OF HIGH-RISK MEDICATION: ICD-10-CM

## 2024-07-05 DIAGNOSIS — D49.4 BLADDER TUMOR: ICD-10-CM

## 2024-07-05 PROCEDURE — 36590 REMOVAL TUNNELED CV CATH: CPT

## 2024-07-05 RX ORDER — LIDOCAINE HCL/EPINEPHRINE/PF 2%-1:200K
VIAL (ML) INJECTION
Status: DISPENSED
Start: 2024-07-05 | End: 2024-07-05

## 2024-07-26 ENCOUNTER — PATIENT MESSAGE (OUTPATIENT)
Dept: HEALTH INFORMATION MANAGEMENT | Facility: OTHER | Age: 79
End: 2024-07-26

## 2024-07-26 ENCOUNTER — TELEPHONE (OUTPATIENT)
Dept: HEALTH INFORMATION MANAGEMENT | Facility: OTHER | Age: 79
End: 2024-07-26
Payer: MEDICARE

## 2024-07-31 ENCOUNTER — NON-PROVIDER VISIT (OUTPATIENT)
Dept: WOUND CARE | Facility: MEDICAL CENTER | Age: 79
End: 2024-07-31
Attending: UROLOGY
Payer: MEDICARE

## 2024-07-31 PROCEDURE — 99211 OFF/OP EST MAY X REQ PHY/QHP: CPT

## 2024-08-05 PROBLEM — Z93.6 PRESENCE OF UROSTOMY (HCC): Status: ACTIVE | Noted: 2024-08-05

## 2024-08-05 PROBLEM — N99.528 COMPLICATION OF UROSTOMY (HCC): Status: ACTIVE | Noted: 2024-08-05

## 2024-08-12 ENCOUNTER — TELEPHONE (OUTPATIENT)
Dept: WOUND CARE | Facility: MEDICAL CENTER | Age: 79
End: 2024-08-12
Payer: MEDICARE

## 2024-08-12 NOTE — TELEPHONE ENCOUNTER
Ronal came to clinic today as he had a hole in his down drainage bag. He didn't have a spare. He received 3 leg bags with his last order but did not include down bag. This writer placed order with [employer] [id number]. Is the part number.

## 2024-08-28 ENCOUNTER — HOSPITAL ENCOUNTER (OUTPATIENT)
Dept: LAB | Facility: MEDICAL CENTER | Age: 79
End: 2024-08-28
Payer: MEDICARE

## 2024-08-28 LAB — PSA SERPL-MCNC: <0.02 NG/ML (ref 0–4)

## 2024-08-28 PROCEDURE — 84153 ASSAY OF PSA TOTAL: CPT

## 2024-08-28 PROCEDURE — 36415 COLL VENOUS BLD VENIPUNCTURE: CPT

## 2024-09-05 ENCOUNTER — HOSPITAL ENCOUNTER (OUTPATIENT)
Dept: LAB | Facility: MEDICAL CENTER | Age: 79
End: 2024-09-05
Attending: STUDENT IN AN ORGANIZED HEALTH CARE EDUCATION/TRAINING PROGRAM
Payer: MEDICARE

## 2024-09-05 DIAGNOSIS — D49.4 BLADDER TUMOR: ICD-10-CM

## 2024-09-05 LAB
ALBUMIN SERPL BCP-MCNC: 3.8 G/DL (ref 3.2–4.9)
ALBUMIN/GLOB SERPL: 1.3 G/DL
ALP SERPL-CCNC: 89 U/L (ref 30–99)
ALT SERPL-CCNC: 9 U/L (ref 2–50)
ANION GAP SERPL CALC-SCNC: 11 MMOL/L (ref 7–16)
AST SERPL-CCNC: 17 U/L (ref 12–45)
BASOPHILS # BLD AUTO: 0.3 % (ref 0–1.8)
BASOPHILS # BLD: 0.01 K/UL (ref 0–0.12)
BILIRUB SERPL-MCNC: 0.6 MG/DL (ref 0.1–1.5)
BUN SERPL-MCNC: 27 MG/DL (ref 8–22)
CALCIUM ALBUM COR SERPL-MCNC: 9.9 MG/DL (ref 8.5–10.5)
CALCIUM SERPL-MCNC: 9.7 MG/DL (ref 8.5–10.5)
CHLORIDE SERPL-SCNC: 105 MMOL/L (ref 96–112)
CO2 SERPL-SCNC: 20 MMOL/L (ref 20–33)
CREAT SERPL-MCNC: 1.12 MG/DL (ref 0.5–1.4)
EOSINOPHIL # BLD AUTO: 0.13 K/UL (ref 0–0.51)
EOSINOPHIL NFR BLD: 3.8 % (ref 0–6.9)
ERYTHROCYTE [DISTWIDTH] IN BLOOD BY AUTOMATED COUNT: 44 FL (ref 35.9–50)
GFR SERPLBLD CREATININE-BSD FMLA CKD-EPI: 68 ML/MIN/1.73 M 2
GLOBULIN SER CALC-MCNC: 3 G/DL (ref 1.9–3.5)
GLUCOSE SERPL-MCNC: 91 MG/DL (ref 65–99)
HCT VFR BLD AUTO: 34.4 % (ref 42–52)
HGB BLD-MCNC: 11.6 G/DL (ref 14–18)
IMM GRANULOCYTES # BLD AUTO: 0.02 K/UL (ref 0–0.11)
IMM GRANULOCYTES NFR BLD AUTO: 0.6 % (ref 0–0.9)
LYMPHOCYTES # BLD AUTO: 0.8 K/UL (ref 1–4.8)
LYMPHOCYTES NFR BLD: 23.5 % (ref 22–41)
MCH RBC QN AUTO: 31.5 PG (ref 27–33)
MCHC RBC AUTO-ENTMCNC: 33.7 G/DL (ref 32.3–36.5)
MCV RBC AUTO: 93.5 FL (ref 81.4–97.8)
MONOCYTES # BLD AUTO: 0.35 K/UL (ref 0–0.85)
MONOCYTES NFR BLD AUTO: 10.3 % (ref 0–13.4)
NEUTROPHILS # BLD AUTO: 2.1 K/UL (ref 1.82–7.42)
NEUTROPHILS NFR BLD: 61.5 % (ref 44–72)
NRBC # BLD AUTO: 0 K/UL
NRBC BLD-RTO: 0 /100 WBC (ref 0–0.2)
PLATELET # BLD AUTO: 150 K/UL (ref 164–446)
PMV BLD AUTO: 9.7 FL (ref 9–12.9)
POTASSIUM SERPL-SCNC: 4.3 MMOL/L (ref 3.6–5.5)
PROT SERPL-MCNC: 6.8 G/DL (ref 6–8.2)
RBC # BLD AUTO: 3.68 M/UL (ref 4.7–6.1)
SODIUM SERPL-SCNC: 136 MMOL/L (ref 135–145)
WBC # BLD AUTO: 3.4 K/UL (ref 4.8–10.8)

## 2024-09-05 PROCEDURE — 85025 COMPLETE CBC W/AUTO DIFF WBC: CPT

## 2024-09-05 PROCEDURE — 36415 COLL VENOUS BLD VENIPUNCTURE: CPT

## 2024-09-05 PROCEDURE — 80053 COMPREHEN METABOLIC PANEL: CPT

## 2024-09-09 ENCOUNTER — HOSPITAL ENCOUNTER (OUTPATIENT)
Dept: RADIOLOGY | Facility: MEDICAL CENTER | Age: 79
End: 2024-09-09
Attending: STUDENT IN AN ORGANIZED HEALTH CARE EDUCATION/TRAINING PROGRAM
Payer: MEDICARE

## 2024-09-09 DIAGNOSIS — D49.4 BLADDER TUMOR: ICD-10-CM

## 2024-09-09 PROCEDURE — 700117 HCHG RX CONTRAST REV CODE 255: Performed by: STUDENT IN AN ORGANIZED HEALTH CARE EDUCATION/TRAINING PROGRAM

## 2024-09-09 PROCEDURE — 74178 CT ABD&PLV WO CNTR FLWD CNTR: CPT

## 2024-09-09 PROCEDURE — 71260 CT THORAX DX C+: CPT

## 2024-09-09 RX ADMIN — IOHEXOL 100 ML: 350 INJECTION, SOLUTION INTRAVENOUS at 10:30

## 2024-09-10 ENCOUNTER — HOSPITAL ENCOUNTER (OUTPATIENT)
Dept: HEMATOLOGY ONCOLOGY | Facility: MEDICAL CENTER | Age: 79
End: 2024-09-10
Attending: NURSE PRACTITIONER
Payer: MEDICARE

## 2024-09-10 VITALS
OXYGEN SATURATION: 99 % | TEMPERATURE: 97.6 F | HEART RATE: 71 BPM | WEIGHT: 157.19 LBS | HEIGHT: 67 IN | RESPIRATION RATE: 16 BRPM | DIASTOLIC BLOOD PRESSURE: 48 MMHG | SYSTOLIC BLOOD PRESSURE: 96 MMHG | BODY MASS INDEX: 24.67 KG/M2

## 2024-09-10 DIAGNOSIS — C61 PROSTATE CANCER (HCC): ICD-10-CM

## 2024-09-10 DIAGNOSIS — C67.9 MALIGNANT NEOPLASM OF URINARY BLADDER, UNSPECIFIED SITE (HCC): ICD-10-CM

## 2024-09-10 PROCEDURE — 99214 OFFICE O/P EST MOD 30 MIN: CPT | Performed by: NURSE PRACTITIONER

## 2024-09-10 PROCEDURE — 99212 OFFICE O/P EST SF 10 MIN: CPT | Performed by: NURSE PRACTITIONER

## 2024-09-10 RX ORDER — IBUPROFEN 200 MG
200 TABLET ORAL EVERY 6 HOURS PRN
COMMUNITY

## 2024-09-10 ASSESSMENT — PAIN SCALES - GENERAL: PAINLEVEL: NO PAIN

## 2024-09-10 ASSESSMENT — ENCOUNTER SYMPTOMS
FEVER: 0
INSOMNIA: 0
PALPITATIONS: 0
CONSTIPATION: 0
DIARRHEA: 0
SHORTNESS OF BREATH: 1
WHEEZING: 1
HEADACHES: 0
COUGH: 1
NAUSEA: 0
WEIGHT LOSS: 0
ABDOMINAL PAIN: 0
CHILLS: 0
DIZZINESS: 0
VOMITING: 0

## 2024-09-10 ASSESSMENT — FIBROSIS 4 INDEX: FIB4 SCORE: 2.87

## 2024-09-10 NOTE — PROGRESS NOTES
Subjective     Marcello Hair is a 78 y.o. male who presents with Cancer (Anurag/ bladder cancer/ Fv after scans)            HPI    Patient seen today for evaluation for 3 months follow up for bladder cancer and prostate cancer. He presents accompanied by his significant other for today's visit.      Oncology history of presenting illness:  Patient reports that he has been struggling for months.  He notes that he has been having frequent urination for months.  He had a CT scan which did not reveal any evidence of stone in the setting of his pain.  He saw urologist and underwent a cystoscopy which revealed a mass in the bladder.  Patient was admitted for a TURBT on December 13, 2023.     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  He also has perineural invasion present.    He also works as a  for disabled individuals, [employer].      Treatment history:  01/29/24: C1D1 DDMVAC  02/12/24: C2D1 DDMVAC  02/26/24: C3D1 DDMVAC  03/11/24: C4D1 DDMVAC     05/16/24: Cystoprostatectomy (pT0 N0 - bladder; T3 Prostate)      Interval history:  Patient overall is doing well.  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  He does have some fatigue but states that it is improving daily but does tire easily.  He does have a cough, shortness of breath and wheezing which is chronic per patient.  Unfortunately due to the multiple medical bills he has had over the last year, he is having difficulty with finances and is unable to fill medications.  He has not been able to fill his Spiriva for his respiratory issues and he does have some shortness of breath and cough.  He definitely states he feels much better when he is able to take Spiriva daily.  He recently had a tooth pulled and has some pain and takes ibuprofen as needed.  He does have a urostomy in place with good urine output.    Patient recently completed his CT chest and CT  "abdomen the pelvis urogram for his continued surveillance on 9/9/2024.  CT chest was negative for any concerns.  There is no evidence of metastatic disease.  CT abdomen and pelvis urogram shows no ureteral obstruction, no mass or adenopathy in the pelvis.  Increased colonic stool noted.  I did personally review the imaging report and reviewed the reports in detail with the patient today.      Review of Systems   Constitutional:  Positive for malaise/fatigue (improving daily but does tire easily). Negative for chills, fever and weight loss.   Respiratory:  Positive for cough, shortness of breath and wheezing.    Cardiovascular:  Negative for chest pain and palpitations.   Gastrointestinal:  Negative for abdominal pain, constipation, diarrhea, nausea and vomiting.   Genitourinary:  Negative for dysuria.        Orostomy in place - CDI   Neurological:  Negative for dizziness and headaches.   Psychiatric/Behavioral:  The patient does not have insomnia.               Objective     BP 96/48 (BP Location: Right arm, Patient Position: Sitting, BP Cuff Size: Adult)   Pulse 71   Temp 36.4 °C (97.6 °F) (Temporal)   Resp 16   Ht 1.702 m (5' 7.01\")   Wt 71.3 kg (157 lb 3 oz)   SpO2 99%   BMI 24.61 kg/m²      Physical Exam  Vitals reviewed.   Constitutional:       General: He is not in acute distress.     Appearance: Normal appearance. He is not diaphoretic.   HENT:      Head: Normocephalic and atraumatic.   Cardiovascular:      Rate and Rhythm: Normal rate and regular rhythm.      Heart sounds: Normal heart sounds. No murmur heard.     No friction rub. No gallop.   Pulmonary:      Effort: Pulmonary effort is normal. No respiratory distress.      Breath sounds: Wheezing present.   Abdominal:      General: Bowel sounds are normal. There is no distension.      Palpations: Abdomen is soft.      Tenderness: There is no abdominal tenderness.   Musculoskeletal:         General: Normal range of motion.   Skin:     General: Skin is " warm and dry.   Neurological:      Mental Status: He is alert and oriented to person, place, and time.   Psychiatric:         Mood and Affect: Mood normal.         Behavior: Behavior normal.              IMAGING   CT-ABDOMEN & PELVIS UROGRAM    Result Date: 9/9/2024 9/9/2024 9:53 AM HISTORY/REASON FOR EXAM:  Bladder cancer, prior cystoprostatectomy, chronic urinary incontinence/frequency. TECHNIQUE/EXAM DESCRIPTION:  CT Urogram Initial precontrast images were obtained from the diaphragmatic domes through the pubic symphysis using helical technique. Following this, 100 mL of Omnipaque 350 nonionic contrast was administered, and postcontrast nephrographic phase thin-section helical scanning obtained from the diaphragmatic domes through the pubic symphysis. Additional 10-minute delayed imaging is performed from the diaphragmatic domes through the pubic symphysis to evaluate the ureters. Coronal MIP reconstructions of the delayed phase are also performed. Low dose optimization technique was utilized for this CT exam including automated exposure control and adjustment of the mA and/or kV according to patient size. COMPARISON: 5/24/2024 FINDINGS: Lung bases: The lung bases are clear. Kidneys: Bilateral renal vascular calcifications. Bilateral kidney cysts again seen.  No enhancing mass. Renal collecting system: There are no ureter calcifications. No filling defect within the renal collecting systems or ureters.  Distal LEFT ureter is not well-opacified.  RIGHT lower quadrant urinary diversion. Bladder: Bladder is surgically absent. Liver: Unremarkable Spleen: Spleen is unremarkable. Biliary system: Gallbladder and biliary tree are unremarkable. Pancreas: Pancreas is unremarkable. Adrenals: Adrenal glands are unremarkable. Vasculature: Renal veins are patent.  Narrowing of LEFT renal vein as it crosses the aorta, with collateral vessel.  Moderate atherosclerotic calcification of abdominal aorta with prior aortobifemoral  bypass. Lymph nodes: There are no enlarged lymph nodes. Bowel: Increased colonic stool.  No evidence of bowel obstruction. Bones: Lumbar spine degenerative changes.     1.  Prior cystectomy and RIGHT lower quadrant urinary diversion. 2.  No ureteral obstruction. 3.  No mass or adenopathy in the abdomen or pelvis 4.  Increased colonic stool.     CT-CHEST (THORAX) WITH    Result Date: 9/9/2024 9/9/2024 9:53 AM HISTORY/REASON FOR EXAM:  surveillance of bladder cancer. Bladder tumor.  Prior cystoprostatectomy and bowel resection. TECHNIQUE/EXAM DESCRIPTION: CT scan of the chest with contrast. Thin-section helical images were obtained from the lung apices through the adrenal glands following the bolus administration of contrast. 100 mL of Omnipaque 350 nonionic contrast was utilized. Low dose optimization technique was utilized for this CT exam including automated exposure control and adjustment of the mA and/or kV according to patient size. COMPARISON:  3/20/2024 FINDINGS: Lungs: Minimal bibasilar atelectasis.  No focal consolidation.  No suspicious nodule. Mediastinum/Daija: No significant adenopathy. Pleura: No pleural effusion. Cardiac: Heart normal in size without pericardial effusion. There is no coronary artery calcification. Vascular: Unremarkable. Soft tissues: Unremarkable. Bones: Degenerative change lower thoracic and thoracolumbar spine.     No evidence of metastatic disease in the chest. Fleischner Society pulmonary nodule recommendations: Not Applicable         LABS   Latest Reference Range & Units 09/05/24 10:10   WBC 4.8 - 10.8 K/uL 3.4 (L)   RBC 4.70 - 6.10 M/uL 3.68 (L)   Hemoglobin 14.0 - 18.0 g/dL 11.6 (L)   Hematocrit 42.0 - 52.0 % 34.4 (L)   MCV 81.4 - 97.8 fL 93.5   MCH 27.0 - 33.0 pg 31.5   MCHC 32.3 - 36.5 g/dL 33.7   RDW 35.9 - 50.0 fL 44.0   Platelet Count 164 - 446 K/uL 150 (L)   MPV 9.0 - 12.9 fL 9.7   Neutrophils-Polys 44.00 - 72.00 % 61.50   Neutrophils (Absolute) 1.82 - 7.42 K/uL 2.10    Lymphocytes 22.00 - 41.00 % 23.50   Lymphs (Absolute) 1.00 - 4.80 K/uL 0.80 (L)   Monocytes 0.00 - 13.40 % 10.30   Monos (Absolute) 0.00 - 0.85 K/uL 0.35   Eosinophils 0.00 - 6.90 % 3.80   Eos (Absolute) 0.00 - 0.51 K/uL 0.13   Basophils 0.00 - 1.80 % 0.30   Baso (Absolute) 0.00 - 0.12 K/uL 0.01   Immature Granulocytes 0.00 - 0.90 % 0.60   Immature Granulocytes (abs) 0.00 - 0.11 K/uL 0.02   Nucleated RBC 0.00 - 0.20 /100 WBC 0.00   NRBC (Absolute) K/uL 0.00   Sodium 135 - 145 mmol/L 136   Potassium 3.6 - 5.5 mmol/L 4.3   Chloride 96 - 112 mmol/L 105   Co2 20 - 33 mmol/L 20   Anion Gap 7.0 - 16.0  11.0   Glucose 65 - 99 mg/dL 91   Bun 8 - 22 mg/dL 27 (H)   Creatinine 0.50 - 1.40 mg/dL 1.12   GFR (CKD-EPI) >60 mL/min/1.73 m 2 68   Calcium 8.5 - 10.5 mg/dL 9.7   Correct Calcium 8.5 - 10.5 mg/dL 9.9   AST(SGOT) 12 - 45 U/L 17   ALT(SGPT) 2 - 50 U/L 9   Alkaline Phosphatase 30 - 99 U/L 89   Total Bilirubin 0.1 - 1.5 mg/dL 0.6   Albumin 3.2 - 4.9 g/dL 3.8   Total Protein 6.0 - 8.2 g/dL 6.8   Globulin 1.9 - 3.5 g/dL 3.0   A-G Ratio g/dL 1.3        [facility] Reference Range & Units 08/28/24 13:20   Prostatic Specific Antigen Tot 0.00 - 4.00 ng/mL <0.02              Assessment & Plan     1. Malignant neoplasm of urinary bladder, unspecified site (HCC)  CT-CHEST (THORAX) WITH    CT-ABDOMEN & PELVIS UROGRAM    CBC WITH DIFFERENTIAL    Comp Metabolic Panel    REFERRAL TO ONCOLOGY NURSE NAVIGATOR      2. Prostate cancer (HCC)  CT-CHEST (THORAX) WITH    CT-ABDOMEN & PELVIS UROGRAM    CBC WITH DIFFERENTIAL    Comp Metabolic Panel    REFERRAL TO ONCOLOGY NURSE NAVIGATOR              1.  Patient with high-grade urothelial carcinoma with focal squamous differentiation, invasive into muscularis propria (pT2), as well as prostate cancer s/t angelina-adjuvant chemotherapy with DDMVAC x4 cycles, as well as Cystoprostatectomy in May 2024. He currently is on surveillance with imaging and labs every 3-6 months at this time.  Patient is having  difficulty with finances due to paying bills from his year of treatment and surgery.  He is requesting to have follow-up in 6 months.  Scans have been ordered along with CBC and CMP at that time.  He will follow-up in the clinic and establish care with new medical oncologist, Dr. Vazquez.    2.  Patient also noted that have prostate cancer.  He did undergo prostatectomy.  He is followed by his urologist and PSAs have been every 3 months.  His most recent PSA on 8/28/2024 was less than 0.02.  According to Dr. Osborn patient to be referred for salvage XRT if his PSA becomes detectable.    3.  Patient is having difficulty getting medications filled due to financial concerns as he is attempting to pay off his multiple pills from his chemotherapy and surgical treatments for his bladder cancer this year.  He is back at work full-time but still unable to purchase his medications as recommended.  He is having some respiratory symptoms as he is unable to fill his Spiriva.  I will reach out to our nurse navigator to see if there is anything that she might be able to do in order to help patient.  Otherwise recommend patient to follow-up with his PCP and pulmonologist as recommended.      Please note that this dictation was created using voice recognition software. I have made every reasonable attempt to correct obvious errors, but I expect that there are errors of grammar and possibly content that I did not discover before finalizing the note.

## 2024-09-12 DIAGNOSIS — J44.9 CHRONIC OBSTRUCTIVE PULMONARY DISEASE, UNSPECIFIED COPD TYPE (HCC): ICD-10-CM

## 2024-09-12 RX ORDER — TIOTROPIUM BROMIDE 18 UG/1
18 CAPSULE ORAL; RESPIRATORY (INHALATION) DAILY
Qty: 90 CAPSULE | Refills: 3 | Status: SHIPPED | OUTPATIENT
Start: 2024-09-12

## 2024-09-12 RX ORDER — ATORVASTATIN CALCIUM 40 MG/1
40 TABLET, FILM COATED ORAL EVERY EVENING
Qty: 90 TABLET | Refills: 3 | Status: SHIPPED | OUTPATIENT
Start: 2024-09-12

## 2024-10-03 ENCOUNTER — NON-PROVIDER VISIT (OUTPATIENT)
Dept: URGENT CARE | Facility: CLINIC | Age: 79
End: 2024-10-03

## 2024-10-03 DIAGNOSIS — Z02.83 ENCOUNTER FOR DRUG SCREENING: Primary | ICD-10-CM

## 2024-10-03 LAB
BREATH ALCOHOL COMMENT: NORMAL
POC BREATHALIZER: 0 PERCENT (ref 0–0.01)

## 2024-10-03 PROCEDURE — 8898 PR URINE 11 PANEL - SEND TO LAB

## 2024-10-03 PROCEDURE — 82075 ASSAY OF BREATH ETHANOL: CPT

## 2024-10-23 ENCOUNTER — APPOINTMENT (OUTPATIENT)
Dept: WOUND CARE | Facility: MEDICAL CENTER | Age: 79
End: 2024-10-23
Attending: UROLOGY
Payer: MEDICARE

## 2024-11-14 ENCOUNTER — TELEPHONE (OUTPATIENT)
Dept: HEMATOLOGY ONCOLOGY | Facility: MEDICAL CENTER | Age: 79
End: 2024-11-14
Payer: MEDICARE

## 2024-11-14 NOTE — TELEPHONE ENCOUNTER
Returned Pt call after speaking with LAURO Jackson and let the Pt know that we refer him to his Urologist to get the growth on his stoma assessed.  Pt verbalized understanding.

## 2025-01-04 NOTE — HPI: SKIN LESIONS
The patient is Stable - Low risk of patient condition declining or worsening    Shift Goals  Clinical Goals: Pain management, ambulation, shower, change bedding  Patient Goals: Change bedding, shower  Family Goals: not present    Progress made toward(s) clinical / shift goals:    Problem: Pain - Standard  Goal: Alleviation of pain or a reduction in pain to the patient’s comfort goal  Outcome: Progressing     Problem: Knowledge Deficit - Standard  Goal: Patient and family/care givers will demonstrate understanding of plan of care, disease process/condition, diagnostic tests and medications  Outcome: Progressing       Patient is not progressing towards the following goals:      
Is This A New Presentation, Or A Follow-Up?: Skin Lesions
How Severe Is Your Skin Lesion?: mild
Have Your Skin Lesions Been Treated?: not been treated

## 2025-02-17 ENCOUNTER — HOSPITAL ENCOUNTER (EMERGENCY)
Facility: MEDICAL CENTER | Age: 80
End: 2025-02-17
Attending: EMERGENCY MEDICINE
Payer: MEDICARE

## 2025-02-17 ENCOUNTER — APPOINTMENT (OUTPATIENT)
Dept: RADIOLOGY | Facility: MEDICAL CENTER | Age: 80
End: 2025-02-17
Attending: EMERGENCY MEDICINE
Payer: MEDICARE

## 2025-02-17 VITALS
OXYGEN SATURATION: 95 % | WEIGHT: 176.59 LBS | HEART RATE: 64 BPM | SYSTOLIC BLOOD PRESSURE: 157 MMHG | TEMPERATURE: 96.6 F | RESPIRATION RATE: 18 BRPM | DIASTOLIC BLOOD PRESSURE: 67 MMHG | HEIGHT: 67 IN | BODY MASS INDEX: 27.72 KG/M2

## 2025-02-17 DIAGNOSIS — M25.531 RIGHT WRIST PAIN: ICD-10-CM

## 2025-02-17 PROCEDURE — 73100 X-RAY EXAM OF WRIST: CPT | Mod: RT

## 2025-02-17 PROCEDURE — 99284 EMERGENCY DEPT VISIT MOD MDM: CPT

## 2025-02-17 ASSESSMENT — FIBROSIS 4 INDEX: FIB4 SCORE: 2.91

## 2025-02-17 ASSESSMENT — PAIN DESCRIPTION - PAIN TYPE: TYPE: ACUTE PAIN

## 2025-02-17 NOTE — ED TRIAGE NOTES
"Chief Complaint   Patient presents with    Hand Pain     Pt c/o R wrist and thumb pain starting at some point while at work yesterday.  Pt unsure if the pain is work related.  Pt has wrapped his wrist up which he says helps.  He has been taking ibuprofen which seemed to help but he woke up to pain and hand swelling this morning.  Pt requesting XR       Patient to triage ambulatory with a steady gait, AAOx4, Appropriate precautions in place.     Explained wait time and triage process. Placed back in ED lobby. Told to notify ED tech or RN of any changes, verbalized understanding.    BP (!) 146/70   Pulse 66   Temp 35.9 °C (96.6 °F) (Temporal)   Resp 16   Ht 1.702 m (5' 7\")   Wt 80.1 kg (176 lb 9.4 oz)   SpO2 99%   BMI 27.66 kg/m²     "

## 2025-02-17 NOTE — Clinical Note
Ronal Hair was seen and treated in our emergency department on 2/17/2025.  He may return to work on 02/21/2025.       If you have any questions or concerns, please don't hesitate to call.      Morgan Dela Cruz M.D.

## 2025-02-17 NOTE — ED PROVIDER NOTES
ED Provider Note    CHIEF COMPLAINT  Chief Complaint   Patient presents with    Hand Pain     Pt c/o R wrist and thumb pain starting at some point while at work yesterday.  Pt unsure if the pain is work related.  Pt has wrapped his wrist up which he says helps.  He has been taking ibuprofen which seemed to help but he woke up to pain and hand swelling this morning.  Pt requesting XR       EXTERNAL RECORDS REVIEWED  Other no pertinent past medical and chart review    HPI/ROS  LIMITATION TO HISTORY   Select: : None  OUTSIDE HISTORIAN(S):  None      Ronal Orestes Hair is a 79 y.o. male who presents patient presented to the [facility] primary with right wrist and hand discomfort.  Past medical history as documented below.  Works for [employer] as a .  Works swing shift between 1 PM and 9 PM.  Yesterday morning and then throughout the day he had increasing wrist and hand discomfort especially with movement.  No notable trauma.  No prior history of same.    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    SURGICAL HISTORY   has a past surgical history that includes other orthopedic surgery; other abdominal surgery; aortobifem bypass (11/08/2020); cystoscopy,insert ureteral stent (Left, 01/07/2023); cysto/uretero/pyeloscopy, dx (Left,  "01/07/2023); lasertripsy (Left, 01/07/2023); cataract extraction with iol (Bilateral); turbt (transurethral resection of bladder tumor) (12/13/2023); appendectomy child; knee arthroscopy (Left, 1968); shoulder arthroscopy (Left); sigmoidoscopy,diagnostic (N/A, 5/10/2024); cystoprostatectomy radical (N/A, 5/10/2024); and bowel resection (N/A, 5/10/2024).    FAMILY HISTORY  Family History   Problem Relation Age of Onset    Cancer Maternal Aunt         melanoma    Cancer Maternal Uncle         Throat cancer       SOCIAL HISTORY  Social History     Tobacco Use    Smoking status: Every Day     Current packs/day: 0.50     Average packs/day: 2.0 packs/day for 59.1 years (116.1 ttl pk-yrs)     Types: Cigarettes     Start date: 1/1/1966     Last attempt to quit: 11/3/2020    Smokeless tobacco: Never    Tobacco comments:     Pt states he is still smoking / only 1/2 a pack per day now    Vaping Use    Vaping status: Never Used   Substance and Sexual Activity    Alcohol use: Not Currently    Drug use: Never    Sexual activity: Yes     Partners: Female       CURRENT MEDICATIONS  Home Medications       Reviewed by Mckenzie Pepper R.N. (Registered Nurse) on 02/17/25 at 0333  Med List Status: Partial     Medication Last Dose Status   acetaminophen (TYLENOL 8 HOUR) 650 MG CR tablet  Active   atorvastatin (LIPITOR) 40 MG Tab  Active   ibuprofen (MOTRIN) 200 MG Tab  Active   nicotine (NICODERM) 21 MG/24HR PATCH 24 HR  Active   sildenafil citrate (VIAGRA) 25 MG Tab  Active   tiotropium (SPIRIVA HANDIHALER) 18 MCG Cap  Active                  Audit from Redirected Encounters    **Home medications have not yet been reviewed for this encounter**         ALLERGIES  No Known Allergies    PHYSICAL EXAM  VITAL SIGNS: /85   Pulse 67   Temp 35.9 °C (96.6 °F) (Temporal)   Resp 16   Ht 1.702 m (5' 7\")   Wt 80.1 kg (176 lb 9.4 oz)   SpO2 96%   BMI 27.66 kg/m²      Pulse ox interpretation: I interpret this pulse ox as " normal.  Constitutional: Alert in no apparent distress.  HENT: No signs of trauma, Bilateral external ears normal, Nose normal.   Eyes: Pupils are equal and reactive   Neck: Normal range of motion, No tenderness, Supple  Cardiovascular: Regular rate and rhythm, no murmurs.   Thorax & Lungs: Normal breath sounds, No respiratory distress  Abdomen: Bowel sounds normal, Soft, No tenderness  Skin: Warm, Dry, No erythema, No rash.   Musculoskeletal: Good range of motion in all major joints. No tenderness to palpation or major deformities noted.   Neurologic: Alert , Normal motor function, Normal sensory function, No focal deficits noted.   Psychiatric: Affect normal, Judgment normal, Mood normal.       RADIOLOGY/PROCEDURES   I have independently interpreted the diagnostic imaging associated with this visit and am waiting the final reading from the radiologist.   My preliminary interpretation is as follows: No obvious fracture or dislocation    Radiologist interpretation:  DX-WRIST-LIMITED 2- RIGHT   Final Result         1.  No radiographic evidence of acute traumatic injury.   2.  Atherosclerosis          COURSE & MEDICAL DECISION MAKING    ASSESSMENT, COURSE AND PLAN  Care Narrative: 79-year-old male presenting to the emergency department with right wrist pain.  History as above.  Will complete x-ray imaging.      DISPOSITION AND DISCUSSIONS  I have discussed management of the patient with the following physicians and ISAIAH's: None    79-year-old presenting with right wrist pain.  X-ray imaging negative.  Will place patient in wrist splint and referred to orthopedics for further outpatient care and workup.  At this point we will defer from further emergent workup.  Patient setting of ongoing home care and return precautions if needed.        FINAL DIAGNOSIS  1. Right wrist pain         Electronically signed by: Morgan Dela Cruz M.D., 2/17/2025 4:37 AM

## 2025-02-26 NOTE — Clinical Note
[facility]  [id number] [facility]  LEATHA Wang [address]    [id number]    Marcello Hair  [address]   [place] [address]    February 26, 2025    Member Name: Ronal Hair   Member Number: [id number]   Reference Number: [id number]   Approved Services: MRI/CAT Scan   Approved Service Dates: 03/10/2025 - 05/11/2025   Requesting Provider: Renetta Matias   Requested Provider: [facility]     Dear Ronal Orestes Hair:    The following medical service(s) requested by Renetta Matias have been approved:    Procedure Code Procedure Code Name Approved Quantity Status   19247 (CPT®) CHG DIAGNOSTIC COMPUTED TOMOGRAPHY THORAX W/CONTRAST 1 Authorized       The services should be provided by [facility] no later than 05/11/2025. Please contact the provider listed below with any questions.     Provider Information:  [facility]  [phone]    Your plan benefit may require a deductible, co-payment or coinsurance for these services. This authorization does not guarantee [facility] will pay the claim for services that you receive. Payment by [facility] for these services is subject to the terms of your Evidence of Coverage, your eligibility at the time of service, and confirmation of benefit coverage.    For any questions or additional information, please contact Customer Service:    [facility] Toll Free: [phone]  TTY users dial: [phone]   Call Center Hours:  Oct 1 - Mar 31, Mon - Fri 7 AM to 8 PM PST  Oct 1 - Mar 31, Sat - Sun 8 AM to 8 PM PST  Apr 1 - Sep 30, Mon - Fri 7 AM to 8 PM PST   Office Hours: Mon - Fri 8 AM to 5 PM PST   E-mail: [email or web address]   Website:  [email or web address]      This information is available for free in other languages. Please contact Customer Service at the phone number above for more information. [facility] complies with applicable Federal civil rights  laws and does not discriminate on the basis of race, color, national origin, age, disability or sex.    Sincerely,     Healthcare Utilization Management Department     Cc: [facility]   Renetta Matias    Multi-Language Insert  Multi- Services  English: We have free  services to answer any questions you may have about our health or drug plan.  To get an , just call us at [phone].  Someone who speaks English/Language can help you.  This is a free service.  Sami: Tenemos servicios de intérprete sin costo alguno  para responder cualquier pregunta que pueda tener sobre nuestro plan de shalom o medicamentos. Para hablar con un intérprete, por favor llame al [phone]. Alguien que hable español le podrá ayudar. Suad es un servicio gratuito.  Chinese Mandarin: ?????????????????????????????? ???????????????? [phone]????????????????? ?????????  Chinese Cantonese: ?????????????????????????????? ????????????? [phone]???????????????????? ????????  Tagalog:  Mayroon kaming libreng serbisyo sa pagsasaling-valeri maldonado anumang mga elizabeth anguianonggil sa donatog umairong ethangalvarado o panggamot.  Diamond johnson tagasaling-corinna trammell wade sa [phone]. Nai mae  Tagalog.  Rohit ay libreng serbisyo.  Armenian:  Nous proposons matthew services gratuits d'interprétation pour répondre à toutes sandy questions relatives à notre régime de santé ou d'assurance-médicaments. Pour accéder au service d'interprétation, il vous suffit de nous appeler au [phone]. Un interlocuteur [facility] pourra vous aider. Ce service est gratuit.  Montserratian:  Eliazar umanzor có d?ch v? thông d?ch mi?n phí ð? tr? l?i các câu h?i v? chýõng s?c kh?e và chýõng trìn thu?c men. N?u quí v? c?n thông d?ch viên shelly g?i [phone] s? có nhân viên nói ti?ng Vi?t giúp ð? quí v?. Ðây là d?ch v? mi?n phí  .  Luxembourger:  Unser [facility] Dolmetscherservice beantwortet Ihren Fragen zu unserem Gesundheits- und Arzneimittelplan. Unsere Dolmetscher erreichen Sie [phone]. Man piedad Ihnen jayda auf [facility]. Dieser Service ist [facility].  Nepali:  ??? ?? ?? ?? ?? ??? ?? ??? ?? ???? ?? ?? ???? ???? ????. ?? ???? ????? ?? [phone] ??? ??? ????.  ???? ?? ???? ?? ?? ????. ? ???? ??? ?????.   Nigerian: Åñëè ó âàñ âîçíèêíóò âîïðîñû îòíîñèòåëüíî ñòðàõîâîãî èëè ìåäèêàìåíòíîãî ïëàíà, âû ìîæåòå âîñïîëüçîâàòüñÿ íàøèìè áåñïëàòíûìè óñëóãàìè ïåðåâîä÷èêîâ. ×òîáû âîñïîëüçîâàòüñÿ óñëóãàìè ïåðåâîä÷èêà, ïîçâîíèòå íàì ïî òåëåôîíó [phone]. Âàì îêàæåò ïîìîùü ñîòðóäíèê, êîòîðûé ãîâîðèò ïî-póññêè. Äàííàÿ óñëóãà áåñïëàòíàÿ.  Macedonian: ÅääÇ äÞÏã ÎÏãÇÊ ÇáãÊÑÌã ÇáÝæÑí ÇáãÌÇäíÉ ááÅÌÇÈÉ Úä Ãí ÃÓÆáÉ ÊÊÚáÞ ÈÇáÕÍÉ Ãæ ÌÏæá ÇáÃÏæíÉ áÏíäÇ. ááÍÕæá Úáì ãÊÑÌã ÝæÑí¡ áíÓ Úáíß Óæì ÇáÇÊÕÇá ÈäÇ Úáì [phone] . ÓíÞæã ÔÎÕ ãÇ íÊÍÏË ÇáÚÑÈíÉ ÈãÓÇÚÏÊß. åÐå ÎÏãÉ ãÌÇäíÉ.  Susana: ????? ????????? ?? ??? ?? ????? ?? ???? ??? ???? ???? ?? ?????? ?? ???? ???? ?? ??? ????? ??? ????? ???????? ?????? ?????? ???. ?? ???????? ??????? ???? ?? ???, ?? ???? [phone] ?? ??? ????. ??? ??????? ?? ?????? ????? ?? ???? ??? ?? ???? ??. ?? ?? ????? ???? ??.   Burundian:  È disponibile un servizio di interpretariato gratuito per rispondere a eventuali domande sul nostro piano sanitario e farmaceutico. Per un interprete, contattare il galen [phone]. Un nostro incaricato ginny parla Italianovi fornirà l'assistenza necessaria. È un servizio gratuito.  Portugués:  Dispomos de serviços de interpretação gratuitos para responder a qualquer questão que tenha acerca do nosso plano de saúde ou de medicação. Para obter um intérprete, contacte-nos através do número [phone]. Irá encontrar alguém que fale o idioma  Português para o ajudar. Suad serviço é gratuito.  Macedonian Creole:  Nou genyen sèvis entèprèt gratis brandon reponn tout mu ou ta genyen erin plan  medikal oswa dwòg nou an.  Cassandra jwenn yon entèprèt, jis rele nou nan [phone]. Sanjay cook Kreyòl kapab ella w.  Sa a se yon sèvis ki gratis.  Polish:  Umo¿liwiamy bezp³atne skorzystanie z us³ug t³umacza ustnego, który pomo¿e w uzyskaniu odpowiedzi na temat planu zdrowotnego lub dawkowania leków. Flor skorzystaæ z pomocy t³umacza znaj¹cego liat li¿y zadzwoniæ pod numer [phone]. Ta us³uga jest bezp³atna.  Wolof: ????? ??????? ????????????????????? ??????????????????????????????????[phone] ???????????????? ? ????????????????? ?????     Zahra Cardona(Attending)

## 2025-03-04 ENCOUNTER — HOSPITAL ENCOUNTER (OUTPATIENT)
Dept: LAB | Facility: MEDICAL CENTER | Age: 80
End: 2025-03-04
Attending: NURSE PRACTITIONER
Payer: MEDICARE

## 2025-03-04 DIAGNOSIS — C67.9 MALIGNANT NEOPLASM OF URINARY BLADDER, UNSPECIFIED SITE (HCC): ICD-10-CM

## 2025-03-04 DIAGNOSIS — C61 PROSTATE CANCER (HCC): ICD-10-CM

## 2025-03-04 LAB
ALBUMIN SERPL BCP-MCNC: 4 G/DL (ref 3.2–4.9)
ALBUMIN/GLOB SERPL: 1.5 G/DL
ALP SERPL-CCNC: 90 U/L (ref 30–99)
ALT SERPL-CCNC: 14 U/L (ref 2–50)
ANION GAP SERPL CALC-SCNC: 10 MMOL/L (ref 7–16)
AST SERPL-CCNC: 24 U/L (ref 12–45)
BASOPHILS # BLD AUTO: 0.5 % (ref 0–1.8)
BASOPHILS # BLD: 0.02 K/UL (ref 0–0.12)
BILIRUB SERPL-MCNC: 0.8 MG/DL (ref 0.1–1.5)
BUN SERPL-MCNC: 21 MG/DL (ref 8–22)
CALCIUM ALBUM COR SERPL-MCNC: 9.3 MG/DL (ref 8.5–10.5)
CALCIUM SERPL-MCNC: 9.3 MG/DL (ref 8.5–10.5)
CHLORIDE SERPL-SCNC: 105 MMOL/L (ref 96–112)
CO2 SERPL-SCNC: 24 MMOL/L (ref 20–33)
CREAT SERPL-MCNC: 1.14 MG/DL (ref 0.5–1.4)
EOSINOPHIL # BLD AUTO: 0.16 K/UL (ref 0–0.51)
EOSINOPHIL NFR BLD: 4.3 % (ref 0–6.9)
ERYTHROCYTE [DISTWIDTH] IN BLOOD BY AUTOMATED COUNT: 45 FL (ref 35.9–50)
GFR SERPLBLD CREATININE-BSD FMLA CKD-EPI: 66 ML/MIN/1.73 M 2
GLOBULIN SER CALC-MCNC: 2.7 G/DL (ref 1.9–3.5)
GLUCOSE SERPL-MCNC: 127 MG/DL (ref 65–99)
HCT VFR BLD AUTO: 37.6 % (ref 42–52)
HGB BLD-MCNC: 12.7 G/DL (ref 14–18)
IMM GRANULOCYTES # BLD AUTO: 0.01 K/UL (ref 0–0.11)
IMM GRANULOCYTES NFR BLD AUTO: 0.3 % (ref 0–0.9)
LYMPHOCYTES # BLD AUTO: 0.98 K/UL (ref 1–4.8)
LYMPHOCYTES NFR BLD: 26.6 % (ref 22–41)
MCH RBC QN AUTO: 31.4 PG (ref 27–33)
MCHC RBC AUTO-ENTMCNC: 33.8 G/DL (ref 32.3–36.5)
MCV RBC AUTO: 93.1 FL (ref 81.4–97.8)
MONOCYTES # BLD AUTO: 0.19 K/UL (ref 0–0.85)
MONOCYTES NFR BLD AUTO: 5.1 % (ref 0–13.4)
NEUTROPHILS # BLD AUTO: 2.33 K/UL (ref 1.82–7.42)
NEUTROPHILS NFR BLD: 63.2 % (ref 44–72)
NRBC # BLD AUTO: 0 K/UL
NRBC BLD-RTO: 0 /100 WBC (ref 0–0.2)
PLATELET # BLD AUTO: 172 K/UL (ref 164–446)
PMV BLD AUTO: 9.9 FL (ref 9–12.9)
POTASSIUM SERPL-SCNC: 4.5 MMOL/L (ref 3.6–5.5)
PROT SERPL-MCNC: 6.7 G/DL (ref 6–8.2)
RBC # BLD AUTO: 4.04 M/UL (ref 4.7–6.1)
SODIUM SERPL-SCNC: 139 MMOL/L (ref 135–145)
WBC # BLD AUTO: 3.7 K/UL (ref 4.8–10.8)

## 2025-03-04 PROCEDURE — 85025 COMPLETE CBC W/AUTO DIFF WBC: CPT

## 2025-03-04 PROCEDURE — 80053 COMPREHEN METABOLIC PANEL: CPT

## 2025-03-04 PROCEDURE — 36415 COLL VENOUS BLD VENIPUNCTURE: CPT

## 2025-03-11 ENCOUNTER — HOSPITAL ENCOUNTER (OUTPATIENT)
Dept: RADIOLOGY | Facility: MEDICAL CENTER | Age: 80
End: 2025-03-11
Attending: NURSE PRACTITIONER
Payer: MEDICARE

## 2025-03-11 DIAGNOSIS — C67.9 MALIGNANT NEOPLASM OF URINARY BLADDER, UNSPECIFIED SITE (HCC): ICD-10-CM

## 2025-03-11 DIAGNOSIS — C61 PROSTATE CANCER (HCC): ICD-10-CM

## 2025-03-11 PROCEDURE — 71260 CT THORAX DX C+: CPT

## 2025-03-11 PROCEDURE — 74178 CT ABD&PLV WO CNTR FLWD CNTR: CPT

## 2025-03-11 PROCEDURE — 700117 HCHG RX CONTRAST REV CODE 255: Performed by: NURSE PRACTITIONER

## 2025-03-11 RX ADMIN — IOHEXOL 100 ML: 350 INJECTION, SOLUTION INTRAVENOUS at 10:41

## 2025-03-18 ENCOUNTER — HOSPITAL ENCOUNTER (OUTPATIENT)
Dept: HEMATOLOGY ONCOLOGY | Facility: MEDICAL CENTER | Age: 80
End: 2025-03-18
Attending: INTERNAL MEDICINE
Payer: MEDICARE

## 2025-03-18 VITALS
WEIGHT: 174.16 LBS | SYSTOLIC BLOOD PRESSURE: 112 MMHG | RESPIRATION RATE: 15 BRPM | BODY MASS INDEX: 27.34 KG/M2 | HEART RATE: 80 BPM | TEMPERATURE: 97.6 F | OXYGEN SATURATION: 97 % | DIASTOLIC BLOOD PRESSURE: 66 MMHG | HEIGHT: 67 IN

## 2025-03-18 DIAGNOSIS — C67.3 MALIGNANT NEOPLASM OF ANTERIOR WALL OF URINARY BLADDER (HCC): ICD-10-CM

## 2025-03-18 PROCEDURE — 99212 OFFICE O/P EST SF 10 MIN: CPT | Performed by: INTERNAL MEDICINE

## 2025-03-18 PROCEDURE — 99214 OFFICE O/P EST MOD 30 MIN: CPT | Performed by: INTERNAL MEDICINE

## 2025-03-18 ASSESSMENT — PAIN SCALES - GENERAL: PAINLEVEL_OUTOF10: NO PAIN

## 2025-03-18 ASSESSMENT — FIBROSIS 4 INDEX: FIB4 SCORE: 2.87

## 2025-03-18 NOTE — ADDENDUM NOTE
Encounter addended by: Beltran Vazquez M.D. on: 3/18/2025 10:59 AM   Actions taken: SmartForm saved, Clinical Note Signed

## 2025-03-18 NOTE — PROGRESS NOTES
PROGRESS NOTE: HEMATOLOGY/ONCOLOGY         HPI    Patient seen today for regular follow up for bladder cancer and prostate cancer. He presents accompanied by his significant other for today's visit.      Oncology history of presenting illness:  Patient reports that he has been struggling for months.  He notes that he has been having frequent urination for months.  He had a CT scan which did not reveal any evidence of stone in the setting of his pain.  He saw urologist and underwent a cystoscopy which revealed a mass in the bladder.  Patient was admitted for a TURBT on December 13, 2023.     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  He also has perineural invasion present.    He also works as a  for disabled individuals, [employer].      Treatment history:  01/29/24: C1D1 DDMVAC  02/12/24: C2D1 DDMVAC  02/26/24: C3D1 DDMVAC  03/11/24: C4D1 DDMVAC     05/16/24: Cystoprostatectomy (pT0 N0 - bladder; T3 Prostate)      Interval history:  Patient overall is doing well.  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  He does have some fatigue but states that it is improving daily but does tire easily.    Patient recently completed his CT chest and CT abdomen the pelvis urogram for his continued surveillance on 9/9/2024.  CT chest was negative for any concerns.  There is no evidence of metastatic disease.  CT abdomen and pelvis urogram shows no ureteral obstruction, no mass or adenopathy in the pelvis.  Increased colonic stool noted.  I did personally review the imaging report and reviewed the reports in detail with the patient today.      Review of systems  There is no complaint of fever headache or short of breath chest pain nausea vomiting abdominal pain diarrhea reported.            Objective     /66 (BP Location: Right arm, Patient Position: Sitting, BP Cuff Size: Adult)   Pulse 80   Temp 36.4 °C (97.6  "°F) (Temporal)   Resp 15   Ht 1.702 m (5' 7\")   Wt 79 kg (174 lb 2.6 oz)   SpO2 97%   BMI 27.28 kg/m²      Physical Exam  Vitals reviewed.   Constitutional:       General: He is not in acute distress.     Appearance: Normal appearance. He is not diaphoretic.   HENT:      Head: Normocephalic and atraumatic.   Cardiovascular:      Rate and Rhythm: Normal rate and regular rhythm.      Heart sounds: Normal heart sounds. No murmur heard.     No friction rub. No gallop.   Pulmonary:      Effort: Pulmonary effort is normal. No respiratory distress.      Breath sounds: Wheezing present.   Abdominal:      General: Bowel sounds are normal. There is no distension.      Palpations: Abdomen is soft.      Tenderness: There is no abdominal tenderness.   Musculoskeletal:         General: Normal range of motion.   Skin:     General: Skin is warm and dry.   Neurological:      Mental Status: He is alert and oriented to person, place, and time.   Psychiatric:         Mood and Affect: Mood normal.         Behavior: Behavior normal.                  Assessment & Plan     1. Malignant neoplasm of anterior wall of urinary bladder (HCC)             1.  High-grade urothelial carcinoma with focal squamous differentiation, invasive into muscularis propria (pT2)  Patient had 4 cycles of dose dense MVAC chemotherapy from January 2019 to March 11, 2024 which was followed by cystoprostatectomy on May 16, 2024.  He had a complete pathological remission with a T0 N0 for bladder cancer.  He also had a T3 prostate cancer at the time.  Patient had creatinine level of 1.14 and a GFR level was 66.  Patient had a CT scan of abdomen and pelvis on March 11, 2024 which showed no evidence of a cancer recurrence.  He had a CT scan of the chest on March 11, 2025 which showed 12 mm the right axillary lymphadenopathy.  I believe this is a normal-sized lymph node.  Yeah    I spent a significant amount of time with the patient regarding further management and " care.  Since patient went to pathological remission, his chance of recurrence of bladder cancer is very low.  I estimate his risk of recurrence is less than 3 to 5% at this time.  He is seeing urologist every 6 months.  I do not believe there is any need for continuous follow-up CT scan.  I will discharge this patient to Dr. Cardoso for continuous care.    2.  T3 prostate cancer.    He did undergo prostatectomy. His most recent PSA on 8/28/2024 was less than 0.02.  I will discharge this patient to Dr. Cardoso for continuous care.    3. History of lymphoma in 2020?  Patient does not remember well regarding his history of lymphoma but there is no evidence of recurrence at this time    4.  I will discharge this patient to Dr. Stern to have been the primary care physician for continuous care  Please note that this dictation was created using voice recognition software. I have made every reasonable attempt to correct obvious errors, but I expect that there are errors of grammar and possibly content that I did not discover before finalizing the note.

## 2025-03-18 NOTE — PROGRESS NOTES
PROGRESS NOTE: HEMATOLOGY/ONCOLOGY         HPI    Patient seen today for regular follow up for bladder cancer and prostate cancer. He presents accompanied by his significant other for today's visit.      Oncology history of presenting illness:  Patient reports that he has been struggling for months.  He notes that he has been having frequent urination for months.  He had a CT scan which did not reveal any evidence of stone in the setting of his pain.  He saw urologist and underwent a cystoscopy which revealed a mass in the bladder.  Patient was admitted for a TURBT on December 13, 2023.     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  He also has perineural invasion present.    He also works as a  for disabled individuals, [employer].      Treatment history:  01/29/24: C1D1 DDMVAC  02/12/24: C2D1 DDMVAC  02/26/24: C3D1 DDMVAC  03/11/24: C4D1 DDMVAC     05/16/24: Cystoprostatectomy (pT0 N0 - bladder; T3 Prostate)      Interval history:  Patient overall is doing well.  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  He does have some fatigue but states that it is improving daily but does tire easily.    Patient recently completed his CT chest and CT abdomen the pelvis urogram for his continued surveillance on 9/9/2024.  CT chest was negative for any concerns.  There is no evidence of metastatic disease.  CT abdomen and pelvis urogram shows no ureteral obstruction, no mass or adenopathy in the pelvis.  Increased colonic stool noted.  I did personally review the imaging report and reviewed the reports in detail with the patient today.      Review of systems  There is no complaint of fever headache or short of breath chest pain nausea vomiting abdominal pain diarrhea reported.            Objective     /66 (BP Location: Right arm, Patient Position: Sitting, BP Cuff Size: Adult)   Pulse 80   Temp 36.4 °C (97.6  "°F) (Temporal)   Resp 15   Ht 1.702 m (5' 7\")   Wt 79 kg (174 lb 2.6 oz)   SpO2 97%   BMI 27.28 kg/m²      Physical Exam  Vitals reviewed.   Constitutional:       General: He is not in acute distress.     Appearance: Normal appearance. He is not diaphoretic.   HENT:      Head: Normocephalic and atraumatic.   Cardiovascular:      Rate and Rhythm: Normal rate and regular rhythm.      Heart sounds: Normal heart sounds. No murmur heard.     No friction rub. No gallop.   Pulmonary:      Effort: Pulmonary effort is normal. No respiratory distress.      Breath sounds: Wheezing present.   Abdominal:      General: Bowel sounds are normal. There is no distension.      Palpations: Abdomen is soft.      Tenderness: There is no abdominal tenderness.   Musculoskeletal:         General: Normal range of motion.   Skin:     General: Skin is warm and dry.   Neurological:      Mental Status: He is alert and oriented to person, place, and time.   Psychiatric:         Mood and Affect: Mood normal.         Behavior: Behavior normal.                  Assessment & Plan     1. Malignant neoplasm of anterior wall of urinary bladder (HCC)             1.  High-grade urothelial carcinoma with focal squamous differentiation, invasive into muscularis propria (pT2)  Patient had 4 cycles of dose dense MVAC chemotherapy from January 2019 to March 11, 2024 which was followed by cystoprostatectomy on May 16, 2024.  He had a complete pathological remission with a T0 N0 for bladder cancer.  He also had a T3 prostate cancer at the time.  Patient had creatinine level of 1.14 and a GFR level was 66.  Patient had a CT scan of abdomen and pelvis on March 11, 2024 which showed no evidence of a cancer recurrence.  He had a CT scan of the chest on March 11, 2025 which showed 12 mm the right axillary lymphadenopathy.  I believe this is a normal-sized lymph node.  Yeah    I spent a significant amount of time with the patient regarding further management and " care.  Since patient went to pathological remission, his chance of recurrence of bladder cancer is very low.  I estimate his risk of recurrence is less than 3 to 5% at this time.  He is seeing urologist every 6 months.  I do not believe there is any need for continuous follow-up CT scan.  I will discharge this patient to Dr. Cardoso for continuous care.    2.  T3 prostate cancer.    He did undergo prostatectomy. His most recent PSA on 8/28/2024 was less than 0.02.  I will discharge this patient to Dr. Cardoso for continuous care.    3. History of lymphoma in 2020?  Patient does not remember well regarding his history of lymphoma but there is no evidence of recurrence at this time    4.  I will discharge this patient to Dr. Arnold and primary care physician for continuous care.    Please note that this dictation was created using voice recognition software. I have made every reasonable attempt to correct obvious errors, but I expect that there are errors of grammar and possibly content that I did not discover before finalizing the note.

## 2025-04-01 NOTE — ED NOTES
Patient was brought back to the room VIA wheelchair. Patient connected to Pulse OX and BP cuff. Patients chart up for ERP.   DISPLAY PLAN FREE TEXT

## 2025-04-17 RX ORDER — ATORVASTATIN CALCIUM 40 MG/1
40 TABLET, FILM COATED ORAL EVERY EVENING
Qty: 90 TABLET | Refills: 3 | Status: SHIPPED | OUTPATIENT
Start: 2025-04-17

## 2025-04-18 ENCOUNTER — TELEPHONE (OUTPATIENT)
Dept: HEALTH INFORMATION MANAGEMENT | Facility: OTHER | Age: 80
End: 2025-04-18
Payer: MEDICARE

## 2025-05-13 ENCOUNTER — HOSPITAL ENCOUNTER (OUTPATIENT)
Dept: LAB | Facility: MEDICAL CENTER | Age: 80
End: 2025-05-13
Payer: MEDICARE

## 2025-05-13 PROCEDURE — 36415 COLL VENOUS BLD VENIPUNCTURE: CPT

## 2025-05-13 PROCEDURE — 84153 ASSAY OF PSA TOTAL: CPT

## 2025-05-14 ENCOUNTER — OFFICE VISIT (OUTPATIENT)
Dept: OCCUPATIONAL MEDICINE | Facility: CLINIC | Age: 80
End: 2025-05-14

## 2025-05-14 DIAGNOSIS — Z02.89 VISIT FOR OCCUPATIONAL HEALTH EXAMINATION: Primary | ICD-10-CM

## 2025-05-14 DIAGNOSIS — Z02.4 ENCOUNTER FOR COMMERCIAL DRIVER MEDICAL EXAMINATION (CDME): ICD-10-CM

## 2025-05-14 PROCEDURE — 7100 PR DOT PHYSICAL: Performed by: NURSE PRACTITIONER

## 2025-05-15 LAB — PSA SERPL DL<=0.01 NG/ML-MCNC: 0.04 NG/ML (ref 0–4)

## 2025-06-11 ENCOUNTER — APPOINTMENT (OUTPATIENT)
Dept: DERMATOLOGY | Facility: IMAGING CENTER | Age: 80
End: 2025-06-11
Payer: MEDICARE

## 2025-07-06 ENCOUNTER — PATIENT MESSAGE (OUTPATIENT)
Dept: HEMATOLOGY ONCOLOGY | Facility: MEDICAL CENTER | Age: 80
End: 2025-07-06
Payer: MEDICARE

## 2025-07-06 DIAGNOSIS — C67.3 MALIGNANT NEOPLASM OF ANTERIOR WALL OF URINARY BLADDER (HCC): Primary | ICD-10-CM

## 2025-07-06 DIAGNOSIS — C61 PROSTATE CANCER (HCC): ICD-10-CM

## 2025-07-06 DIAGNOSIS — Z79.899 ENCOUNTER FOR LONG-TERM (CURRENT) USE OF MEDICATIONS: ICD-10-CM

## 2025-07-22 ENCOUNTER — APPOINTMENT (OUTPATIENT)
Dept: DERMATOLOGY | Facility: IMAGING CENTER | Age: 80
End: 2025-07-22
Payer: MEDICARE

## 2025-07-22 DIAGNOSIS — L57.0 ACTINIC KERATOSIS: ICD-10-CM

## 2025-07-22 DIAGNOSIS — D18.01 CHERRY ANGIOMA: ICD-10-CM

## 2025-07-22 DIAGNOSIS — L81.4 LENTIGINES: ICD-10-CM

## 2025-07-22 DIAGNOSIS — D48.5 NEOPLASM OF UNCERTAIN BEHAVIOR OF SKIN: ICD-10-CM

## 2025-07-22 DIAGNOSIS — L72.0 EPIDERMAL INCLUSION CYST: ICD-10-CM

## 2025-07-22 DIAGNOSIS — L82.0 INFLAMED SEBORRHEIC KERATOSIS: ICD-10-CM

## 2025-07-22 DIAGNOSIS — L82.1 SEBORRHEIC KERATOSIS: ICD-10-CM

## 2025-07-22 DIAGNOSIS — Z85.828 PERSONAL HISTORY OF OTHER MALIGNANT NEOPLASM OF SKIN: ICD-10-CM

## 2025-07-22 DIAGNOSIS — D22.9 MULTIPLE NEVI: ICD-10-CM

## 2025-07-22 PROCEDURE — 11103 TANGNTL BX SKIN EA SEP/ADDL: CPT | Mod: 59 | Performed by: STUDENT IN AN ORGANIZED HEALTH CARE EDUCATION/TRAINING PROGRAM

## 2025-07-22 PROCEDURE — 17000 DESTRUCT PREMALG LESION: CPT | Mod: 59 | Performed by: STUDENT IN AN ORGANIZED HEALTH CARE EDUCATION/TRAINING PROGRAM

## 2025-07-22 PROCEDURE — 99203 OFFICE O/P NEW LOW 30 MIN: CPT | Mod: 25 | Performed by: STUDENT IN AN ORGANIZED HEALTH CARE EDUCATION/TRAINING PROGRAM

## 2025-07-22 PROCEDURE — 11102 TANGNTL BX SKIN SINGLE LES: CPT | Mod: 59 | Performed by: STUDENT IN AN ORGANIZED HEALTH CARE EDUCATION/TRAINING PROGRAM

## 2025-07-22 PROCEDURE — 17110 DESTRUCTION B9 LES UP TO 14: CPT | Performed by: STUDENT IN AN ORGANIZED HEALTH CARE EDUCATION/TRAINING PROGRAM

## 2025-07-22 PROCEDURE — 17003 DESTRUCT PREMALG LES 2-14: CPT | Mod: 59 | Performed by: STUDENT IN AN ORGANIZED HEALTH CARE EDUCATION/TRAINING PROGRAM

## 2025-07-22 NOTE — PROGRESS NOTES
[facility] DERMATOLOGY CLINIC NOTE         HPI:    Ronal Hair is a 79 y.o. male here for upper body skin exam.     Today notes following concern:   - Patient states he is here for an evaluation from the waist up, spot of concern on left earlobe, right forearm and chest.    Was previously seen at [facility].     No other symptomatic (itching, painful, burning) or changing lesions.       Dermatology History:      - Prior history of skin cancer: Yes, multiple NMSC on face, most recent 2024     - Family history of skin cancer: Yes, father - unknown type. Aunt - melanoma.      Review of Systems: No fevers, chill. Pertinent positives and negatives above.       Medications, Medical History, Surgical History, Family History & Allergies:  Reviewed in the chart, relevant history noted above.       PHYSICAL EXAM  Upper body skin check of the scalp, face, neck, trunk, upper extremities was performed.   Skin type 2    - 3-5mm scattered pink gritty papule(s) on the face, arms, hands  - scattered melanotic macules and papules on the trunk and extremities  - scattered tan macules without surface scale on sun exposed areas of face, neck, upper chest, arms  - scattered tan waxy to gray stuck on papules and plaques on the trunk and extremities  - scattered 2-3mm red papules on trunk, extremities  - well healed scar without evidence of recurrence on L forehead, L cheek, R cheek  - tan to erythematous scaly stuck on papule on the R cheek, L cheek, L chest  - Mid chest 2.5cm subcutaneous mobile nodule with central punctum  - R temple with tan papule 8mm, abutting previous NMSC scar  - R forearm with 1cm erythematous scaly papue    ASSESSMENT & PLAN    # Neoplasm of uncertain behavior  Shave biopsy performed as below:  Location:   A. R temple - ddx BCC, possibly recurrent, adjacent to scar  B. R forearm - ddx SCC  After informed written consent was obtained, using alcohol pad for cleansing and 1% Lidocaine with epinephrine for anesthetic,  with sterile technique and Dermablade, a shave biopsy was used to obtain a biopsy specimen of the lesion. Hemostasis was obtained by pressure and aluminum chloride. Dressing is applied, and wound care instructions provided. Be alert for any signs of cutaneous infection. The specimen is labeled and sent to pathology for evaluation. The procedure was well tolerated without complications.    # Actinic keratosis  Discussed these are pre-cancerous lesions, often arising on chronically sun exposed skin. If left untreated, these have the potential to evolve to a squamous cell cancer (~0.1% per lesion in lifetime).   CRYOTHERAPY:  Risks (including, but not limited to: hypo or hyperpigmentation, redness, blister, blood blister, recurrence, need for further treatment, infection, scar) and benefits of cryotherapy discussed. Patient verbally agreed to proceed with treatment. Cryotherapy freeze thaw cycles of 5-10 seconds were applied.  - LN2 x 6 lesion(s).   Patient tolerated procedure well. Aftercare instructions given.      # Inflamed seborrheic keratosis  - reassured benign, but given lesion(s) symptomatic, treatment with cryotherapy discussed and patient amenable.  CRYOTHERAPY:  Risks (including, but not limited to: hypo or hyperpigmentation, redness, blister, blood blister, recurrence, need for further treatment, infection, scar) and benefits of cryotherapy discussed. Patient verbally agreed to proceed with treatment. Cryotherapy freeze thaw cycles of 5-10 seconds were applied.  - LN2 x 3 lesion(s).  Patient tolerated procedure well. Aftercare instructions given.        # Epidermal inclusion cyst  Counseled on nature of cyst.   - Patient opts for excision due to symptoms, PA form submitted    # Melanotic nevi  - clinically benign appearing today  - ABCDEs of atypical appearing moles discussed.     # History of NMSC  - no evidence of recurrence today  - continue routine skin checks    # Lentigines  - discussed this is a  result of sun exposure, photodamage  - regular sun protective practices with hats/clothing, SPF 30+ with regular application and reapplication recommended    # Seborrheic keratosis  # Cherry angioma  - reassure benign, no treatment needed      Skin Cancer Prevention Counseling   Advise regular sun protection/sunscreen use, SPF 30 or greater, recommend mineral sunscreen, and to reapply every  minutes.   Recommend broad brimmed hats, UPF sun protective clothing when outdoors for extended periods of time   Recommend self checks at home,  ABCDEs of melanoma discussed       Return in about 6 months (around 1/22/2026) for Skin check.      Galina Urbina MD  [facility]

## 2025-08-05 ENCOUNTER — OFFICE VISIT (OUTPATIENT)
Dept: DERMATOLOGY | Facility: IMAGING CENTER | Age: 80
End: 2025-08-05
Payer: MEDICARE

## 2025-08-05 VITALS
TEMPERATURE: 98.2 F | SYSTOLIC BLOOD PRESSURE: 124 MMHG | WEIGHT: 180 LBS | DIASTOLIC BLOOD PRESSURE: 60 MMHG | BODY MASS INDEX: 28.19 KG/M2

## 2025-08-05 DIAGNOSIS — D48.5 NEOPLASM OF UNCERTAIN BEHAVIOR OF SKIN: ICD-10-CM

## 2025-08-05 PROCEDURE — 11404 EXC TR-EXT B9+MARG 3.1-4 CM: CPT | Performed by: STUDENT IN AN ORGANIZED HEALTH CARE EDUCATION/TRAINING PROGRAM

## 2025-08-05 PROCEDURE — 3074F SYST BP LT 130 MM HG: CPT | Performed by: STUDENT IN AN ORGANIZED HEALTH CARE EDUCATION/TRAINING PROGRAM

## 2025-08-05 PROCEDURE — 12032 INTMD RPR S/A/T/EXT 2.6-7.5: CPT | Performed by: STUDENT IN AN ORGANIZED HEALTH CARE EDUCATION/TRAINING PROGRAM

## 2025-08-05 PROCEDURE — 3078F DIAST BP <80 MM HG: CPT | Performed by: STUDENT IN AN ORGANIZED HEALTH CARE EDUCATION/TRAINING PROGRAM

## 2025-08-05 ASSESSMENT — FIBROSIS 4 INDEX: FIB4 SCORE: 2.87

## 2025-08-19 ENCOUNTER — APPOINTMENT (OUTPATIENT)
Dept: DERMATOLOGY | Facility: IMAGING CENTER | Age: 80
End: 2025-08-19
Payer: MEDICARE

## 2025-08-20 ENCOUNTER — NON-PROVIDER VISIT (OUTPATIENT)
Dept: DERMATOLOGY | Facility: IMAGING CENTER | Age: 80
End: 2025-08-20
Payer: MEDICARE

## (undated) DEVICE — STAPLER SKIN DISP - (6/BX 10BX/CA) VISISTAT

## (undated) DEVICE — GOWN WARMING STANDARD FLEX - (30/CA)

## (undated) DEVICE — NEPTUNE 4 PORT MANIFOLD - (20/PK)

## (undated) DEVICE — SUTURE 3-0 CHROMIC GUT SH 27 (36PK/BX)"

## (undated) DEVICE — SET EXTENSION WITH 2 PORTS (48EA/CA) ***PART #2C8610 IS A SUBSTITUTE*****

## (undated) DEVICE — SUTURE 4-0 SILK BB 30 (36PK/BX)"

## (undated) DEVICE — SODIUM CHL. INJ. 0.9% 500ML (24EA/CA 50CA/PF)

## (undated) DEVICE — SUCTION INSTRUMENT YANKAUER BULBOUS TIP W/O VENT (50EA/CA)

## (undated) DEVICE — LACTATED RINGERS INJ 1000 ML - (14EA/CA 60CA/PF)

## (undated) DEVICE — BAG RESUSCITATION DISPOSABLE - WITH MASK (10 EA/CA)

## (undated) DEVICE — SOD. CHL. INJ. 0.9% 1000 ML - (14EA/CA 60CA/PF)

## (undated) DEVICE — STAPLE 55MM LINEAR BLUE 3.8MM (12EA/BX)

## (undated) DEVICE — POLY UMBILICAL TAPE 1/8X30 - (36/BX)

## (undated) DEVICE — PACK MAJOR BASIN - (2EA/CA)

## (undated) DEVICE — CHLORAPREP 26 ML APPLICATOR - ORANGE TINT(25/CA)

## (undated) DEVICE — TOWEL STOP TIMEOUT SAFETY FLAG (40EA/CA)

## (undated) DEVICE — SENSOR OXIMETER ADULT SPO2 RD SET (20EA/BX)

## (undated) DEVICE — GLOVE SIZE 6.5  SURGEON ACCELERATOR FREE GREEN (50PR/BX)

## (undated) DEVICE — COVER LIGHT HANDLE ALC PLUS DISP (18EA/BX)

## (undated) DEVICE — SODIUM CHL IRRIGATION 0.9% 1000ML (12EA/CA)

## (undated) DEVICE — TUBING CLEARLINK DUO-VENT - C-FLO (48EA/CA)

## (undated) DEVICE — SUTURE 2-0 SILK 12 X 18" (36PK/BX)"

## (undated) DEVICE — CORETEMP DRAPE FORM-FITTED EASY DROPANDGO DRAPE FOR USE ON THE CORETEMP FLUID MANAGEMENT 56IN X 56IN

## (undated) DEVICE — TRAY CATHETER FOLEY URINE METER W/STATLOCK 350ML (10EA/CA)

## (undated) DEVICE — DRAIN JACKSON PRATT 19FR - (10/CS)

## (undated) DEVICE — GLOVE BIOGEL SZ 6.5 SURGICAL PF LTX (50PR/BX 4BX/CA)

## (undated) DEVICE — BAG URODRAIN WITH TUBING - (20/CA)

## (undated) DEVICE — SUTURE 4-0 PROLENE SH 36 (36PK/BX)"

## (undated) DEVICE — ELECTRODE BIPOLAR REGULAR CUTTING LOOP URO 24/26 FR (6EA/PK)

## (undated) DEVICE — BOVIE  BLADE 6 EXTENDED - (50/PK)

## (undated) DEVICE — SUTURE 2-0 VICRYL PLUS CT-1 36 (36PK/BX)"

## (undated) DEVICE — FIBER LASER MOSES 200 UM (1/EA)

## (undated) DEVICE — PEN SKIN MARKER W/RULER - (50EA/BX)

## (undated) DEVICE — SUCTION INSTRUMENT YANKAUER OPEN TIP W/O VENT (50EA/CA)

## (undated) DEVICE — ELECTRODE DUAL RETURN W/ CORD - (50/PK)

## (undated) DEVICE — CANISTER SUCTION 3000ML MECHANICAL FILTER AUTO SHUTOFF MEDI-VAC NONSTERILE LF DISP  (40EA/CA)

## (undated) DEVICE — DRAPE MAYO STAND - (30/CA)

## (undated) DEVICE — SENSOR SPO2 NEO LNCS ADHESIVE (20/BX) SEE USER NOTES

## (undated) DEVICE — DERMABOND ADVANCED - (12EA/BX)

## (undated) DEVICE — CLIP MED INTNL HRZN TI ESCP - (25/BX)

## (undated) DEVICE — WATER IRRIG. STER 3000 ML - (4/CA)

## (undated) DEVICE — SUTURE 4-0 MONOCRYL PLUS PS-2 - 27 INCH (36/BX)

## (undated) DEVICE — SPONGE GAUZESTER 4 X 4 4PLY - (128PK/CA)

## (undated) DEVICE — SUTURE 4-0 VICRYL PLUS PC-3 18 (12PK/BX)"

## (undated) DEVICE — SPONGE LAP XR ST 36X36 LF - (1/PK40PK/CA)

## (undated) DEVICE — PROTECTOR ULNA NERVE - (36PR/CA)

## (undated) DEVICE — SUTURE 3-0 SILK 12 X 18 IN - (36/BX)

## (undated) DEVICE — TRAY ANESTHESIA - CONTINUOUS EPIDURAL (10EA/CA) THIS IS A CUSTOM PACK

## (undated) DEVICE — PACK SINGLE BASIN - (6/CA)

## (undated) DEVICE — DRAPE LARGE 3 QUARTER - (20/CA)

## (undated) DEVICE — SUTURE 1 VICRYL PLUS CTX - 8 X 18 INCH (12/BX)

## (undated) DEVICE — BLADE SURGICAL #10 - (50/BX)

## (undated) DEVICE — SCOPE DIGITAL URETEROSCOPE DISPOSABLE (10EA/PK)

## (undated) DEVICE — SUTURE 2-0 VICRYL PLUS CT-1 - 8 X 18 INCH(12/BX)

## (undated) DEVICE — CONTAINER SPECIMEN BAG OR - STERILE 4 OZ W/LID (100EA/CA)

## (undated) DEVICE — FILTER BLOOD TRANSFUSION - (40/CA) (PALL)

## (undated) DEVICE — GLOVE BIOGEL SZ 8 SURGICAL PF LTX - (50PR/BX 4BX/CA)

## (undated) DEVICE — COVER FOOT UNIVERSAL DISP. - (25EA/CA)

## (undated) DEVICE — STOPCOCK 3-WAY W/SWIVEL LEVER LOCK (50EA/CA)

## (undated) DEVICE — PACK CYSTO III (2EA/CA)

## (undated) DEVICE — DRAIN PENROSE STERILE 1/4 X - 18 IN  (25EA/BX)

## (undated) DEVICE — STAPLER SINGLE USE RELO GIA80 - (3EA/CA)

## (undated) DEVICE — SPONGE KITTNER DISSECTORS - (5EA/PK 50PK/CA)

## (undated) DEVICE — TRANSDUCER ADULT DISP. SINGLE BONDED STERILE - (20EA/CA)

## (undated) DEVICE — SET LEADWIRE 5 LEAD BEDSIDE DISPOSABLE ECG (1SET OF 5/EA)

## (undated) DEVICE — BLADE SURGICAL CLIPPER - (50EA/CA)

## (undated) DEVICE — SET FLUID WARMING STANDARD FLOW - (10/CA)

## (undated) DEVICE — GLOVE SZ 6.5 BIOGEL PI MICRO - PF LF (50PR/BX)

## (undated) DEVICE — SPONGE RADIOPAQUE CTN X-LG - STERILE (50PK/CA) MADE TO ORDER ITEM AND HAS A 4-6 WEEK LEAD TIME

## (undated) DEVICE — CLIP MED LG INTNL HRZN TI ESCP - (20/BX)

## (undated) DEVICE — TRAY MULTI-LUMEN 7FR PRESSURE W/MAX BARRIER AND BIOPATCH - (5/CA)

## (undated) DEVICE — WATER IRRIGATION STERILE 1000ML (12EA/CA)

## (undated) DEVICE — VESSELOOP MINI BLUE STERILE - SURG-I-LOOP (10EA/BX)

## (undated) DEVICE — SUTURE GENERAL

## (undated) DEVICE — PACK CYSTOSCOPY III - (8/CA)

## (undated) DEVICE — MASK ANESTHESIA ADULT  - (100/CA)

## (undated) DEVICE — SET BIFURCATED BLOOD - (48EA/CS)

## (undated) DEVICE — BAG DRAINAGE URINARY CLOSED 2000ML (20EA/CA)

## (undated) DEVICE — WIRE GUIDE SENSOR DUAL FLEX - 5/BX

## (undated) DEVICE — SUTURE 4-0 VICRYL PLUS FS-2 - 27 INCH (36/BX)

## (undated) DEVICE — CELLSAVER PACK

## (undated) DEVICE — KIT ANESTHESIA W/CIRCUIT & 3/LT BAG W/FILTER (20EA/CA)

## (undated) DEVICE — SUTURE 4-0 30CM STRATAFIX SPIRAL PS-2 (12EA/BX)

## (undated) DEVICE — SUTURE 4-0 PROLENE RB-1 D/A 36 (36PK/BX)"

## (undated) DEVICE — CONNECTOR HOSE NEPTUNE FOR CYSTO ROOM

## (undated) DEVICE — SUTURE 3-0 VICRYL PLUS SH - 27 INCH (36/BX)

## (undated) DEVICE — SUTURE 3-0 MONOCRYL PLUS PS-2 - (12/BX)

## (undated) DEVICE — SUTURE 3-0 PROLENE SH 36 (36PK/BX)"

## (undated) DEVICE — KIT INTROPERCUTANEOUS SHEATH - 8.5 FR W/MAX BARRIER AND BIOPATCH  (5/CA)

## (undated) DEVICE — SUTURE CV

## (undated) DEVICE — LOADING UNIT SINGLE USE GIA80 (6EA/CA)

## (undated) DEVICE — CELLSAVER STAT

## (undated) DEVICE — SUTURE 0 VICRYL PLUS CT-1 - 36 INCH (36/BX)

## (undated) DEVICE — GLOVE BIOGEL PI INDICATOR SZ 8.0 SURGICAL PF LF -(50/BX 4BX/CA)

## (undated) DEVICE — GLOVE BIOGEL SZ 6 PF LATEX - (50EA/BX 4BX/CA)

## (undated) DEVICE — SLEEVE, VASO, THIGH, MED

## (undated) DEVICE — SUTURE 2-0 VICRYL PLUS SH - 27 INCH (36/BX)

## (undated) DEVICE — TOWELS CLOTH SURGICAL - (4/PK 20PK/CA)

## (undated) DEVICE — SODIUM CHL. IRRIGATION 0.9% 3000ML (4EA/CA 65CA/PF)

## (undated) DEVICE — SUCTION TIP STRAIGHT ARGYLE - 50EA/CA

## (undated) DEVICE — GLOVE BIOGEL PI INDICATOR SZ 6.5 SURGICAL PF LF - (50/BX 4BX/CA)

## (undated) DEVICE — EVACUATOR BLADDER ELLIK - (10/BX)

## (undated) DEVICE — SUTURE 0 VICRYL PLUS UR-6 - 27 INCH (36/BX)

## (undated) DEVICE — SYRINGE 50ML CATHETER TIP (40EA/BX 4BX/CA)

## (undated) DEVICE — GLOVE BIOGEL SZ 7.5 SURGICAL PF LTX - (50PR/BX 4BX/CA)

## (undated) DEVICE — DECANTER FLD BLS - (50/CA)

## (undated) DEVICE — VESSELOOP MAXI BLUE STERILE- SURG-I-LOOP (10EA/BX)

## (undated) DEVICE — GOWN SURGEONS X-LARGE - DISP. (30/CA)

## (undated) DEVICE — PTFE PLED STER - (250/CA)

## (undated) DEVICE — SUTURE 2-0 ETHILON FS - (36/BX) 18 INCH

## (undated) DEVICE — DRESSING LEUKOMED STERILE 11.75X4IN - (50/CA)

## (undated) DEVICE — SPONGE PEANUT - (5/PK 50PK/CA)

## (undated) DEVICE — POLYMER PLEDGETS, 3MM X 7MM

## (undated) DEVICE — RESERVOIR SUCTION 100 CC - SILICONE (20EA/CA)

## (undated) DEVICE — CATHETER URETHRAL FOLEY SILICONE OD18 FR 10 ML (10EA/CA)

## (undated) DEVICE — CLIP LG INTNL HRZN TI ESCP LGT - (20/BX)

## (undated) DEVICE — GOWN SURGEONS LARGE - (32/CA)

## (undated) DEVICE — SUTURE 1 PDS-2 PLUS CTX - (24/BX)

## (undated) DEVICE — BAG DRAINAGE ANTIREFLUX TOWER SLIDE TAP 4000 ML (20EA/CA)

## (undated) DEVICE — TRAY SURESTEP FOLEY TEMP SENSING 16FR (10EA/CA) ORDER  #18764 FOR TEMP FOLEY ONLY

## (undated) DEVICE — SYRINGE 30 ML LL (56/BX)

## (undated) DEVICE — SEALANT TISSUE CLOSURE KIT FIBIRIN VISTASEAL 10ML (1EA/BX)

## (undated) DEVICE — CATHETER URETHRAL FOLEY LATEX 24 FR 30 CC 3-WAY (12/CA)

## (undated) DEVICE — SET EPIDURAL BURRON NON-SAFETY (12EA/CA)

## (undated) DEVICE — SUTURE 1 VICRYL PLUS CTX - 36 INCH (36/BX)

## (undated) DEVICE — SHEET CARDIOVASCULAR - (4/CA)

## (undated) DEVICE — KIT RADIAL ARTERY 20GA W/MAX BARRIER AND BIOPATCH  (5EA/CA) #10740 IS FOR THE SET RADIAL ARTERIAL

## (undated) DEVICE — SUTURE 2-0 SILK SH (36PK/BX)

## (undated) DEVICE — DRESSING TRANSPARENT FILM TEGADERM 4 X 4.75" (50EA/BX)"

## (undated) DEVICE — TUBE CONNECT SUCTION CLEAR 120 X 1/4" (50EA/CA)"

## (undated) DEVICE — TUBE NG SALEM SUMP 16FR (50EA/CA)

## (undated) DEVICE — BLADE SURGICAL #11 - (50/BX)

## (undated) DEVICE — GLOVE SZ 7.5 BIOGEL PI MICRO - PF LF (50PR/BX)

## (undated) DEVICE — KIT SURGIFLO W/OUT THROMBIN - (6EA/CA)

## (undated) DEVICE — SUTURE 0 SILK TIES (36PK/BX)

## (undated) DEVICE — ELECTRODE 850 FOAM ADHESIVE - HYDROGEL RADIOTRNSPRNT (50/PK)

## (undated) DEVICE — SLEEVE VASO CALF MED - (10PR/CA)

## (undated) DEVICE — HEAD HOLDER JUNIOR/ADULT

## (undated) DEVICE — CLIP SM INTNL HRZN TI ESCP LGT - (24EA/PK 25PK/BX)

## (undated) DEVICE — GLOVE BIOGEL INDICATOR SZ 7SURGICAL PF LTX - (50/BX 4BX/CA)

## (undated) DEVICE — SYRINGE ASEPTO - (50EA/CA

## (undated) DEVICE — PAD LAP STERILE 18 X 18 - (5/PK 40PK/CA)

## (undated) DEVICE — GLOVE BIOGEL INDICATOR SZ 8 SURGICAL PF LTX - (50/BX 4BX/CA)

## (undated) DEVICE — SUTURE 2-0 PROLENE SH D/A (36PK/BX)

## (undated) DEVICE — DRAPE IOBAN II 23 IN X 33 IN - (10/BX)

## (undated) DEVICE — SPONGE DRAIN 4 X 4IN 6-PLY - (2/PK25PK/BX12BX/CS)

## (undated) DEVICE — PAD PREP 24 X 48 CUFFED - (100/CA)

## (undated) DEVICE — DRAPE MAGNETIC (INSTRA-MAG) - (30/CA)